# Patient Record
Sex: FEMALE | Race: WHITE | NOT HISPANIC OR LATINO | URBAN - METROPOLITAN AREA
[De-identification: names, ages, dates, MRNs, and addresses within clinical notes are randomized per-mention and may not be internally consistent; named-entity substitution may affect disease eponyms.]

---

## 2016-06-29 RX ORDER — FLUTICASONE PROPIONATE AND SALMETEROL 50; 250 UG/1; UG/1
1 POWDER ORAL; RESPIRATORY (INHALATION)
Qty: 0 | Refills: 0 | COMMUNITY
Start: 2016-06-29

## 2017-01-17 ENCOUNTER — EMERGENCY (EMERGENCY)
Facility: HOSPITAL | Age: 77
LOS: 1 days | Discharge: HOME | End: 2017-01-17
Attending: EMERGENCY MEDICINE | Admitting: EMERGENCY MEDICINE
Payer: MEDICARE

## 2017-01-17 VITALS
OXYGEN SATURATION: 97 % | RESPIRATION RATE: 17 BRPM | SYSTOLIC BLOOD PRESSURE: 104 MMHG | HEART RATE: 76 BPM | DIASTOLIC BLOOD PRESSURE: 69 MMHG | TEMPERATURE: 98 F

## 2017-01-17 VITALS
DIASTOLIC BLOOD PRESSURE: 72 MMHG | RESPIRATION RATE: 16 BRPM | SYSTOLIC BLOOD PRESSURE: 118 MMHG | OXYGEN SATURATION: 99 % | HEART RATE: 73 BPM

## 2017-01-17 DIAGNOSIS — Z93.3 COLOSTOMY STATUS: Chronic | ICD-10-CM

## 2017-01-17 DIAGNOSIS — K63.2 FISTULA OF INTESTINE: ICD-10-CM

## 2017-01-17 DIAGNOSIS — Z88.5 ALLERGY STATUS TO NARCOTIC AGENT: ICD-10-CM

## 2017-01-17 DIAGNOSIS — Z88.8 ALLERGY STATUS TO OTHER DRUGS, MEDICAMENTS AND BIOLOGICAL SUBSTANCES STATUS: ICD-10-CM

## 2017-01-17 DIAGNOSIS — Z88.0 ALLERGY STATUS TO PENICILLIN: ICD-10-CM

## 2017-01-17 DIAGNOSIS — Z88.1 ALLERGY STATUS TO OTHER ANTIBIOTIC AGENTS STATUS: ICD-10-CM

## 2017-01-17 DIAGNOSIS — K63.1 PERFORATION OF INTESTINE (NONTRAUMATIC): Chronic | ICD-10-CM

## 2017-01-17 LAB
ALBUMIN SERPL ELPH-MCNC: 3.7 G/DL — SIGNIFICANT CHANGE UP (ref 3.3–5)
ALP SERPL-CCNC: 151 U/L — HIGH (ref 40–120)
ALT FLD-CCNC: 7 U/L RC — LOW (ref 10–45)
ANION GAP SERPL CALC-SCNC: 13 MMOL/L — SIGNIFICANT CHANGE UP (ref 5–17)
AST SERPL-CCNC: 16 U/L — SIGNIFICANT CHANGE UP (ref 10–40)
BASOPHILS # BLD AUTO: 0 K/UL — SIGNIFICANT CHANGE UP (ref 0–0.2)
BASOPHILS NFR BLD AUTO: 0.6 % — SIGNIFICANT CHANGE UP (ref 0–2)
BILIRUB SERPL-MCNC: 0.3 MG/DL — SIGNIFICANT CHANGE UP (ref 0.2–1.2)
BUN SERPL-MCNC: 20 MG/DL — SIGNIFICANT CHANGE UP (ref 7–23)
CALCIUM SERPL-MCNC: 10 MG/DL — SIGNIFICANT CHANGE UP (ref 8.4–10.5)
CHLORIDE SERPL-SCNC: 102 MMOL/L — SIGNIFICANT CHANGE UP (ref 96–108)
CO2 SERPL-SCNC: 27 MMOL/L — SIGNIFICANT CHANGE UP (ref 22–31)
CREAT SERPL-MCNC: 1.57 MG/DL — HIGH (ref 0.5–1.3)
EOSINOPHIL # BLD AUTO: 0.1 K/UL — SIGNIFICANT CHANGE UP (ref 0–0.5)
EOSINOPHIL NFR BLD AUTO: 1.4 % — SIGNIFICANT CHANGE UP (ref 0–6)
GAS PNL BLDV: SIGNIFICANT CHANGE UP
GLUCOSE SERPL-MCNC: 97 MG/DL — SIGNIFICANT CHANGE UP (ref 70–99)
HCT VFR BLD CALC: 33.8 % — LOW (ref 34.5–45)
HGB BLD-MCNC: 11.6 G/DL — SIGNIFICANT CHANGE UP (ref 11.5–15.5)
LYMPHOCYTES # BLD AUTO: 1.9 K/UL — SIGNIFICANT CHANGE UP (ref 1–3.3)
LYMPHOCYTES # BLD AUTO: 46.4 % — HIGH (ref 13–44)
MAGNESIUM SERPL-MCNC: 1.9 MG/DL — SIGNIFICANT CHANGE UP (ref 1.6–2.6)
MCHC RBC-ENTMCNC: 34 PG — SIGNIFICANT CHANGE UP (ref 27–34)
MCHC RBC-ENTMCNC: 34.3 GM/DL — SIGNIFICANT CHANGE UP (ref 32–36)
MCV RBC AUTO: 99.3 FL — SIGNIFICANT CHANGE UP (ref 80–100)
MONOCYTES # BLD AUTO: 0.4 K/UL — SIGNIFICANT CHANGE UP (ref 0–0.9)
MONOCYTES NFR BLD AUTO: 8.5 % — SIGNIFICANT CHANGE UP (ref 2–14)
NEUTROPHILS # BLD AUTO: 1.8 K/UL — SIGNIFICANT CHANGE UP (ref 1.8–7.4)
NEUTROPHILS NFR BLD AUTO: 43.2 % — SIGNIFICANT CHANGE UP (ref 43–77)
PHOSPHATE SERPL-MCNC: 3.6 MG/DL — SIGNIFICANT CHANGE UP (ref 2.5–4.5)
PLATELET # BLD AUTO: 212 K/UL — SIGNIFICANT CHANGE UP (ref 150–400)
POTASSIUM SERPL-MCNC: 4.8 MMOL/L — SIGNIFICANT CHANGE UP (ref 3.5–5.3)
POTASSIUM SERPL-SCNC: 4.8 MMOL/L — SIGNIFICANT CHANGE UP (ref 3.5–5.3)
PROT SERPL-MCNC: 7 G/DL — SIGNIFICANT CHANGE UP (ref 6–8.3)
RBC # BLD: 3.4 M/UL — LOW (ref 3.8–5.2)
RBC # FLD: 12.9 % — SIGNIFICANT CHANGE UP (ref 10.3–14.5)
SODIUM SERPL-SCNC: 142 MMOL/L — SIGNIFICANT CHANGE UP (ref 135–145)
WBC # BLD: 4.2 K/UL — SIGNIFICANT CHANGE UP (ref 3.8–10.5)
WBC # FLD AUTO: 4.2 K/UL — SIGNIFICANT CHANGE UP (ref 3.8–10.5)

## 2017-01-17 PROCEDURE — 82947 ASSAY GLUCOSE BLOOD QUANT: CPT

## 2017-01-17 PROCEDURE — 85027 COMPLETE CBC AUTOMATED: CPT

## 2017-01-17 PROCEDURE — 83735 ASSAY OF MAGNESIUM: CPT

## 2017-01-17 PROCEDURE — 80053 COMPREHEN METABOLIC PANEL: CPT

## 2017-01-17 PROCEDURE — 82330 ASSAY OF CALCIUM: CPT

## 2017-01-17 PROCEDURE — 83605 ASSAY OF LACTIC ACID: CPT

## 2017-01-17 PROCEDURE — 82435 ASSAY OF BLOOD CHLORIDE: CPT

## 2017-01-17 PROCEDURE — 84295 ASSAY OF SERUM SODIUM: CPT

## 2017-01-17 PROCEDURE — 99283 EMERGENCY DEPT VISIT LOW MDM: CPT

## 2017-01-17 PROCEDURE — 84100 ASSAY OF PHOSPHORUS: CPT

## 2017-01-17 PROCEDURE — 84132 ASSAY OF SERUM POTASSIUM: CPT

## 2017-01-17 PROCEDURE — 99284 EMERGENCY DEPT VISIT MOD MDM: CPT | Mod: GC

## 2017-01-17 PROCEDURE — 85014 HEMATOCRIT: CPT

## 2017-01-17 PROCEDURE — 82803 BLOOD GASES ANY COMBINATION: CPT

## 2017-01-17 NOTE — ED PROVIDER NOTE - MEDICAL DECISION MAKING DETAILS
Sent from NH for new abd fistula. Well appearing. No pain. Evaluate for dehydration, labs, surgical consult.

## 2017-01-17 NOTE — ED ADULT NURSE NOTE - OBJECTIVE STATEMENT
75 yo female with hx diverticulitis, SBO with colostomy, and "abdominal fistulas" brought by EMS from nursing home c/o discomfort around fistula site and high output. Patient also reporting decreased appetite. Patient concerned that she is dehydrated due to low PO intake and high fistula output. At present, patient's abdomen has one midline upper abdominal fistula (?) with yellow output into bag, no redness, swelling, or tenderness to site. Patient also has LLQ colostomy site with an empty bag, site is well appearing and nontender. Respirations are even and unlabored. Patient is moving all extremities. VSS.

## 2017-01-17 NOTE — ED PROVIDER NOTE - ATTENDING CONTRIBUTION TO CARE
75yo with pmhx per chart notable for diverticulitis s/p hartmans, sbo s/p sbr c/b ecf sent from NH for "new fistula" with high output per days. discussed with surgery to come see patient, no abd pain, labs sent, vss. no imaging ordered at this time.

## 2017-01-17 NOTE — ED PROVIDER NOTE - OBJECTIVE STATEMENT
75yo with pmhx per chart notable for diverticulitis s/p hartmans, sbo s/p sbr c/b ecf sent from NH for "new fistula" with high output per days. Pt states she had discomfort around fistula for days but has no pain now. Endorses increased output from fistula of  usual color (light brown). Also with decreased po intake. Denies fever, nausea, vomiting, diarrhea, constipation, sob, cp, rash, new focal numbness weakness, cough, rhinorrhea, change in vision, neck pain,   SURG: Jered

## 2017-01-31 ENCOUNTER — APPOINTMENT (OUTPATIENT)
Dept: TRAUMA SURGERY | Facility: CLINIC | Age: 77
End: 2017-01-31

## 2017-01-31 ENCOUNTER — INPATIENT (INPATIENT)
Facility: HOSPITAL | Age: 77
LOS: 16 days | Discharge: OTHER INSTITUTION | DRG: 394 | End: 2017-02-17
Attending: SURGERY | Admitting: SURGERY
Payer: MEDICARE

## 2017-01-31 VITALS
HEART RATE: 78 BPM | DIASTOLIC BLOOD PRESSURE: 84 MMHG | HEIGHT: 59 IN | TEMPERATURE: 97.5 F | SYSTOLIC BLOOD PRESSURE: 122 MMHG | BODY MASS INDEX: 32.05 KG/M2 | WEIGHT: 159 LBS

## 2017-01-31 VITALS
WEIGHT: 162.92 LBS | HEIGHT: 59 IN | HEART RATE: 85 BPM | DIASTOLIC BLOOD PRESSURE: 72 MMHG | OXYGEN SATURATION: 98 % | RESPIRATION RATE: 16 BRPM | SYSTOLIC BLOOD PRESSURE: 111 MMHG | TEMPERATURE: 98 F

## 2017-01-31 DIAGNOSIS — G62.9 POLYNEUROPATHY, UNSPECIFIED: ICD-10-CM

## 2017-01-31 DIAGNOSIS — D75.82 HEPARIN INDUCED THROMBOCYTOPENIA (HIT): ICD-10-CM

## 2017-01-31 DIAGNOSIS — E03.9 HYPOTHYROIDISM, UNSPECIFIED: ICD-10-CM

## 2017-01-31 DIAGNOSIS — N18.3 CHRONIC KIDNEY DISEASE, STAGE 3 (MODERATE): ICD-10-CM

## 2017-01-31 DIAGNOSIS — K63.2 FISTULA OF INTESTINE: ICD-10-CM

## 2017-01-31 DIAGNOSIS — K63.1 PERFORATION OF INTESTINE (NONTRAUMATIC): Chronic | ICD-10-CM

## 2017-01-31 DIAGNOSIS — Z93.3 COLOSTOMY STATUS: ICD-10-CM

## 2017-01-31 DIAGNOSIS — Z93.3 COLOSTOMY STATUS: Chronic | ICD-10-CM

## 2017-01-31 DIAGNOSIS — R10.9 UNSPECIFIED ABDOMINAL PAIN: ICD-10-CM

## 2017-01-31 DIAGNOSIS — K21.9 GASTRO-ESOPHAGEAL REFLUX DISEASE WITHOUT ESOPHAGITIS: ICD-10-CM

## 2017-01-31 LAB
ALBUMIN SERPL ELPH-MCNC: 4.1 G/DL — SIGNIFICANT CHANGE UP (ref 3.3–5)
ALP SERPL-CCNC: 201 U/L — HIGH (ref 40–120)
ALT FLD-CCNC: 13 U/L RC — SIGNIFICANT CHANGE UP (ref 10–45)
ANION GAP SERPL CALC-SCNC: 17 MMOL/L — SIGNIFICANT CHANGE UP (ref 5–17)
APTT BLD: 30.2 SEC — SIGNIFICANT CHANGE UP (ref 27.5–37.4)
AST SERPL-CCNC: 21 U/L — SIGNIFICANT CHANGE UP (ref 10–40)
BASOPHILS # BLD AUTO: 0 K/UL — SIGNIFICANT CHANGE UP (ref 0–0.2)
BASOPHILS NFR BLD AUTO: 0.5 % — SIGNIFICANT CHANGE UP (ref 0–2)
BILIRUB SERPL-MCNC: 0.4 MG/DL — SIGNIFICANT CHANGE UP (ref 0.2–1.2)
BLD GP AB SCN SERPL QL: NEGATIVE — SIGNIFICANT CHANGE UP
BUN SERPL-MCNC: 23 MG/DL — SIGNIFICANT CHANGE UP (ref 7–23)
CALCIUM SERPL-MCNC: 10.1 MG/DL — SIGNIFICANT CHANGE UP (ref 8.4–10.5)
CHLORIDE SERPL-SCNC: 102 MMOL/L — SIGNIFICANT CHANGE UP (ref 96–108)
CO2 SERPL-SCNC: 21 MMOL/L — LOW (ref 22–31)
CREAT SERPL-MCNC: 1.57 MG/DL — HIGH (ref 0.5–1.3)
EOSINOPHIL # BLD AUTO: 0 K/UL — SIGNIFICANT CHANGE UP (ref 0–0.5)
EOSINOPHIL NFR BLD AUTO: 0.6 % — SIGNIFICANT CHANGE UP (ref 0–6)
GLUCOSE SERPL-MCNC: 94 MG/DL — SIGNIFICANT CHANGE UP (ref 70–99)
HCT VFR BLD CALC: 37.1 % — SIGNIFICANT CHANGE UP (ref 34.5–45)
HGB BLD-MCNC: 12.3 G/DL — SIGNIFICANT CHANGE UP (ref 11.5–15.5)
INR BLD: 1.16 RATIO — SIGNIFICANT CHANGE UP (ref 0.88–1.16)
LYMPHOCYTES # BLD AUTO: 2.1 K/UL — SIGNIFICANT CHANGE UP (ref 1–3.3)
LYMPHOCYTES # BLD AUTO: 37.7 % — SIGNIFICANT CHANGE UP (ref 13–44)
MCHC RBC-ENTMCNC: 32.5 PG — SIGNIFICANT CHANGE UP (ref 27–34)
MCHC RBC-ENTMCNC: 33.1 GM/DL — SIGNIFICANT CHANGE UP (ref 32–36)
MCV RBC AUTO: 98.1 FL — SIGNIFICANT CHANGE UP (ref 80–100)
MONOCYTES # BLD AUTO: 0.4 K/UL — SIGNIFICANT CHANGE UP (ref 0–0.9)
MONOCYTES NFR BLD AUTO: 7.9 % — SIGNIFICANT CHANGE UP (ref 2–14)
NEUTROPHILS # BLD AUTO: 3 K/UL — SIGNIFICANT CHANGE UP (ref 1.8–7.4)
NEUTROPHILS NFR BLD AUTO: 53.2 % — SIGNIFICANT CHANGE UP (ref 43–77)
PLATELET # BLD AUTO: 196 K/UL — SIGNIFICANT CHANGE UP (ref 150–400)
POTASSIUM SERPL-MCNC: 4.5 MMOL/L — SIGNIFICANT CHANGE UP (ref 3.5–5.3)
POTASSIUM SERPL-SCNC: 4.5 MMOL/L — SIGNIFICANT CHANGE UP (ref 3.5–5.3)
PROT SERPL-MCNC: 7.3 G/DL — SIGNIFICANT CHANGE UP (ref 6–8.3)
PROTHROM AB SERPL-ACNC: 12.5 SEC — SIGNIFICANT CHANGE UP (ref 10–13.1)
RBC # BLD: 3.78 M/UL — LOW (ref 3.8–5.2)
RBC # FLD: 13.2 % — SIGNIFICANT CHANGE UP (ref 10.3–14.5)
RH IG SCN BLD-IMP: POSITIVE — SIGNIFICANT CHANGE UP
SODIUM SERPL-SCNC: 140 MMOL/L — SIGNIFICANT CHANGE UP (ref 135–145)
WBC # BLD: 5.6 K/UL — SIGNIFICANT CHANGE UP (ref 3.8–10.5)
WBC # FLD AUTO: 5.6 K/UL — SIGNIFICANT CHANGE UP (ref 3.8–10.5)

## 2017-01-31 PROCEDURE — 99285 EMERGENCY DEPT VISIT HI MDM: CPT | Mod: GC

## 2017-01-31 PROCEDURE — 74176 CT ABD & PELVIS W/O CONTRAST: CPT | Mod: 26

## 2017-01-31 RX ORDER — PANTOPRAZOLE SODIUM 20 MG/1
40 TABLET, DELAYED RELEASE ORAL
Qty: 0 | Refills: 0 | Status: DISCONTINUED | OUTPATIENT
Start: 2017-01-31 | End: 2017-02-17

## 2017-01-31 RX ORDER — SODIUM CHLORIDE 9 MG/ML
1000 INJECTION, SOLUTION INTRAVENOUS
Qty: 0 | Refills: 0 | Status: DISCONTINUED | OUTPATIENT
Start: 2017-01-31 | End: 2017-01-31

## 2017-01-31 RX ORDER — LEVOTHYROXINE SODIUM 125 MCG
88 TABLET ORAL DAILY
Qty: 0 | Refills: 0 | Status: DISCONTINUED | OUTPATIENT
Start: 2017-01-31 | End: 2017-02-17

## 2017-01-31 RX ORDER — GABAPENTIN 400 MG/1
300 CAPSULE ORAL THREE TIMES A DAY
Qty: 0 | Refills: 0 | Status: DISCONTINUED | OUTPATIENT
Start: 2017-01-31 | End: 2017-02-17

## 2017-01-31 RX ORDER — SODIUM CHLORIDE 9 MG/ML
1000 INJECTION INTRAMUSCULAR; INTRAVENOUS; SUBCUTANEOUS
Qty: 0 | Refills: 0 | Status: DISCONTINUED | OUTPATIENT
Start: 2017-01-31 | End: 2017-02-03

## 2017-01-31 RX ORDER — SODIUM CHLORIDE 9 MG/ML
1000 INJECTION INTRAMUSCULAR; INTRAVENOUS; SUBCUTANEOUS ONCE
Qty: 0 | Refills: 0 | Status: COMPLETED | OUTPATIENT
Start: 2017-01-31 | End: 2017-01-31

## 2017-01-31 RX ORDER — ONDANSETRON 8 MG/1
4 TABLET, FILM COATED ORAL EVERY 6 HOURS
Qty: 0 | Refills: 0 | Status: DISCONTINUED | OUTPATIENT
Start: 2017-01-31 | End: 2017-02-17

## 2017-01-31 RX ORDER — TIOTROPIUM BROMIDE 18 UG/1
1 CAPSULE ORAL; RESPIRATORY (INHALATION) DAILY
Qty: 0 | Refills: 0 | Status: DISCONTINUED | OUTPATIENT
Start: 2017-01-31 | End: 2017-02-17

## 2017-01-31 RX ORDER — FLUTICASONE PROPIONATE AND SALMETEROL 50; 250 UG/1; UG/1
1 POWDER ORAL; RESPIRATORY (INHALATION)
Qty: 0 | Refills: 0 | Status: DISCONTINUED | OUTPATIENT
Start: 2017-01-31 | End: 2017-02-17

## 2017-01-31 RX ADMIN — SODIUM CHLORIDE 50 MILLILITER(S): 9 INJECTION INTRAMUSCULAR; INTRAVENOUS; SUBCUTANEOUS at 22:09

## 2017-01-31 RX ADMIN — SODIUM CHLORIDE 2000 MILLILITER(S): 9 INJECTION INTRAMUSCULAR; INTRAVENOUS; SUBCUTANEOUS at 15:22

## 2017-01-31 RX ADMIN — ONDANSETRON 4 MILLIGRAM(S): 8 TABLET, FILM COATED ORAL at 23:50

## 2017-01-31 RX ADMIN — GABAPENTIN 300 MILLIGRAM(S): 400 CAPSULE ORAL at 22:08

## 2017-01-31 NOTE — H&P ADULT. - HISTORY OF PRESENT ILLNESS
76F w/ hx of diverticulitis s/p Ruben's, multiple SBOs s/p small bowel resection (60cm removed in July 2015), enterocutaneous fistula s/p takedown, presents complaining of high output from her multiple abdominal fistulae as well as her end colostomy. She reports that this started a couple of weeks ago. She describes eating small-moderate amounts of food, feeling nauseated and fatigued, falling asleep and then waking up with high output (from both fistulae and ostomy) that required emptying the bags multiple times. The output turned from thicker contents to "more watery than diarrhea." She also reports a new (4th) abdominal fistula that appeared within the last week and a half.

## 2017-01-31 NOTE — H&P ADULT. - ASSESSMENT
77 y/o woman s/p Ruben's complicated by wound dehiscence and the formation of multiple fistulae s/p takedown of the fistula with a small bowel resection complaining of high output from fistulae and colostomy sites

## 2017-01-31 NOTE — ED PROVIDER NOTE - MEDICAL DECISION MAKING DETAILS
77 y/o F w/ CHF, COPD, mult abdom surgeries w/ EC fistula and ostomy, p/w increased ostomy output and abdom pain. Labs, fluids, admission. 75 y/o F w/ CHF, COPD, mult abdom surgeries w/ EC fistula and ostomy, p/w increased ostomy output and abdom pain. Labs, fluids, admission.    Attending MD Nick: 75 yo female with multiple abd surgeries and ileostomy and now enterocutaneous fistula presents for admission for increased ostomy output and abd pain.  Exam (MD Sandoval): abd soft with mild diffuse TTP.  No rebound or guarding. Plan: labs, IVF, admit to surgery for further management.

## 2017-01-31 NOTE — ED PROVIDER NOTE - PHYSICAL EXAMINATION
Gen: NAD  Eyes: PERRL, EOMI. sclerae white, no icterus  ENT: Dry mucous membranes. No exudates  Neck: supple, no LAD, mass or goiter, trachea midline  CV: RRR. Audible S1 and S2. No murmurs, rubs, gallops, S3, nor S4  Pulm: Clear to auscultation bilaterally. No wheezes, rales, or rhonchi  Abd: BS+, nondistended, ostomy in place w/ liquid stool  Musculoskeletal:  No edema  Skin: no lesions or scars noted  Psych: mood good, affect full range and congruent with mood.  Neurologic: AAOx3

## 2017-01-31 NOTE — ED ADULT NURSE NOTE - OBJECTIVE STATEMENT
77 y/o with a hx of Diverticulitis presents to the ED c/o of increased output from enterocutaneous fistula and excess fluids coming from her colostomy.  Patient states that her surgeon drove her to the hospital and the patient has been admitted to surgery.  Patient states she has 4 fistulas on the abdomen and one of them is new, rosio she is unsure how new the fistula is.  Patient states that when she would eat she would become extremely nauseous and tired and she has been feeling this way for 3-4 weeks.  Patient states that when she empties her colostomy bag she would get a rush of fluids would come out. Patient is A&Ox4. Face is symmetrical.  Patient safety and comfort measures provided.

## 2017-01-31 NOTE — ED PROVIDER NOTE - OBJECTIVE STATEMENT
77 y/o F w/ hx of diastolic HF, multiple abdom surgeries w/ enterocutaneous fistuala and ostomy, p/w increased output from ostomy and mid abdom pain, to be admitted to Dr. sheridan for surgery. No fever or chills.

## 2017-01-31 NOTE — H&P ADULT. - PROBLEM SELECTOR PLAN 1
-CT Abdomen and Pelvis with PO contrast only  -IR consult for fistulogram, possible plug  -if total fluid losses >1L or decreased urine output, will start replacement fluid

## 2017-01-31 NOTE — ED ADULT NURSE REASSESSMENT NOTE - NS ED NURSE REASSESS COMMENT FT1
pt. resting comfortably in stretcher, no complaints of abdominal pain or discomfort. bed assigned on 9 monti, report given and pt pending transport. VS stable. safety and fall precautions maintained.

## 2017-01-31 NOTE — H&P ADULT. - GASTROINTESTINAL COMMENTS
Obese abdomen with left colostomy and large dressing over multiple fistulae on the mid-abdomen, both bags containing thin enteric contents

## 2017-01-31 NOTE — ED ADULT NURSE NOTE - PMH
Chronic diastolic CHF (congestive heart failure)    Chronic obstructive pulmonary disease (COPD)    Clostridium difficile infection    Colostomy in place  s/p duarte procedure for diverticulitis  Diverticulitis of intestine with abscess without bleeding  2013  DVT (deep venous thrombosis)  s/p IVC filter, which was later removed  Enterocutaneous fistula    GERD (gastroesophageal reflux disease)    HIT (heparin-induced thrombocytopenia)    Hypothyroid    Orthostatic hypotension    Osteoarthritis of both knees, unspecified osteoarthritis type    Peripheral neuropathy

## 2017-01-31 NOTE — ED PROVIDER NOTE - ATTENDING CONTRIBUTION TO CARE
Attending MD Nick:  I personally have seen and examined this patient.  Resident note reviewed and agree on plan of care and except where noted.  See MDM for details.

## 2017-01-31 NOTE — ED ADULT NURSE NOTE - PSH
Intestinal perforation  8/2015, s/p closure with strattice mesh  S/P exploratory laparotomy  EC fistula complicated with pelvic abscesses  Sigmoid diverticulitis  Ex lap, sigmoid resection, creation of colostomy.  Status post Ruben procedure  for diverticulitis  Wound dehiscence  Wound dehiscence and evisceration, s/p abdominal reconstruction with biologic mesh

## 2017-02-01 LAB
ANION GAP SERPL CALC-SCNC: 12 MMOL/L — SIGNIFICANT CHANGE UP (ref 5–17)
APTT BLD: 30.8 SEC — SIGNIFICANT CHANGE UP (ref 27.5–37.4)
BUN SERPL-MCNC: 25 MG/DL — HIGH (ref 7–23)
CALCIUM SERPL-MCNC: 9 MG/DL — SIGNIFICANT CHANGE UP (ref 8.4–10.5)
CHLORIDE SERPL-SCNC: 107 MMOL/L — SIGNIFICANT CHANGE UP (ref 96–108)
CO2 SERPL-SCNC: 24 MMOL/L — SIGNIFICANT CHANGE UP (ref 22–31)
CREAT SERPL-MCNC: 1.53 MG/DL — HIGH (ref 0.5–1.3)
GLUCOSE SERPL-MCNC: 105 MG/DL — HIGH (ref 70–99)
HCT VFR BLD CALC: 33.2 % — LOW (ref 34.5–45)
HGB BLD-MCNC: 11.1 G/DL — LOW (ref 11.5–15.5)
INR BLD: 1.15 RATIO — SIGNIFICANT CHANGE UP (ref 0.88–1.16)
MAGNESIUM SERPL-MCNC: 1.7 MG/DL — SIGNIFICANT CHANGE UP (ref 1.6–2.6)
MCHC RBC-ENTMCNC: 33.1 PG — SIGNIFICANT CHANGE UP (ref 27–34)
MCHC RBC-ENTMCNC: 33.4 GM/DL — SIGNIFICANT CHANGE UP (ref 32–36)
MCV RBC AUTO: 99.3 FL — SIGNIFICANT CHANGE UP (ref 80–100)
PHOSPHATE SERPL-MCNC: 3.5 MG/DL — SIGNIFICANT CHANGE UP (ref 2.5–4.5)
PLATELET # BLD AUTO: 184 K/UL — SIGNIFICANT CHANGE UP (ref 150–400)
POTASSIUM SERPL-MCNC: 4.7 MMOL/L — SIGNIFICANT CHANGE UP (ref 3.5–5.3)
POTASSIUM SERPL-SCNC: 4.7 MMOL/L — SIGNIFICANT CHANGE UP (ref 3.5–5.3)
PROTHROM AB SERPL-ACNC: 12.4 SEC — SIGNIFICANT CHANGE UP (ref 10–13.1)
RBC # BLD: 3.35 M/UL — LOW (ref 3.8–5.2)
RBC # FLD: 13.2 % — SIGNIFICANT CHANGE UP (ref 10.3–14.5)
SODIUM SERPL-SCNC: 143 MMOL/L — SIGNIFICANT CHANGE UP (ref 135–145)
WBC # BLD: 5.9 K/UL — SIGNIFICANT CHANGE UP (ref 3.8–10.5)
WBC # FLD AUTO: 5.9 K/UL — SIGNIFICANT CHANGE UP (ref 3.8–10.5)

## 2017-02-01 PROCEDURE — 99233 SBSQ HOSP IP/OBS HIGH 50: CPT

## 2017-02-01 RX ORDER — MAGNESIUM SULFATE 500 MG/ML
2 VIAL (ML) INJECTION
Qty: 0 | Refills: 0 | Status: COMPLETED | OUTPATIENT
Start: 2017-02-01 | End: 2017-02-01

## 2017-02-01 RX ORDER — ACETAMINOPHEN 500 MG
650 TABLET ORAL EVERY 6 HOURS
Qty: 0 | Refills: 0 | Status: DISCONTINUED | OUTPATIENT
Start: 2017-02-01 | End: 2017-02-01

## 2017-02-01 RX ORDER — ACETAMINOPHEN 500 MG
650 TABLET ORAL EVERY 6 HOURS
Qty: 0 | Refills: 0 | Status: DISCONTINUED | OUTPATIENT
Start: 2017-02-01 | End: 2017-02-02

## 2017-02-01 RX ADMIN — FLUTICASONE PROPIONATE AND SALMETEROL 1 DOSE(S): 50; 250 POWDER ORAL; RESPIRATORY (INHALATION) at 05:21

## 2017-02-01 RX ADMIN — Medication 650 MILLIGRAM(S): at 12:43

## 2017-02-01 RX ADMIN — Medication 88 MICROGRAM(S): at 05:21

## 2017-02-01 RX ADMIN — Medication 1 TABLET(S): at 12:04

## 2017-02-01 RX ADMIN — Medication 650 MILLIGRAM(S): at 22:02

## 2017-02-01 RX ADMIN — FLUTICASONE PROPIONATE AND SALMETEROL 1 DOSE(S): 50; 250 POWDER ORAL; RESPIRATORY (INHALATION) at 17:22

## 2017-02-01 RX ADMIN — Medication 650 MILLIGRAM(S): at 13:13

## 2017-02-01 RX ADMIN — Medication 650 MILLIGRAM(S): at 06:11

## 2017-02-01 RX ADMIN — GABAPENTIN 300 MILLIGRAM(S): 400 CAPSULE ORAL at 16:13

## 2017-02-01 RX ADMIN — Medication 50 GRAM(S): at 11:41

## 2017-02-01 RX ADMIN — Medication 50 GRAM(S): at 12:41

## 2017-02-01 RX ADMIN — GABAPENTIN 300 MILLIGRAM(S): 400 CAPSULE ORAL at 21:00

## 2017-02-01 RX ADMIN — Medication 650 MILLIGRAM(S): at 05:41

## 2017-02-01 RX ADMIN — TIOTROPIUM BROMIDE 1 CAPSULE(S): 18 CAPSULE ORAL; RESPIRATORY (INHALATION) at 12:41

## 2017-02-01 RX ADMIN — Medication 1 TABLET(S): at 12:41

## 2017-02-01 RX ADMIN — Medication 650 MILLIGRAM(S): at 21:32

## 2017-02-01 RX ADMIN — PANTOPRAZOLE SODIUM 40 MILLIGRAM(S): 20 TABLET, DELAYED RELEASE ORAL at 05:22

## 2017-02-01 RX ADMIN — GABAPENTIN 300 MILLIGRAM(S): 400 CAPSULE ORAL at 05:21

## 2017-02-02 LAB
ANION GAP SERPL CALC-SCNC: 12 MMOL/L — SIGNIFICANT CHANGE UP (ref 5–17)
BUN SERPL-MCNC: 23 MG/DL — SIGNIFICANT CHANGE UP (ref 7–23)
CALCIUM SERPL-MCNC: 8.7 MG/DL — SIGNIFICANT CHANGE UP (ref 8.4–10.5)
CHLORIDE SERPL-SCNC: 108 MMOL/L — SIGNIFICANT CHANGE UP (ref 96–108)
CO2 SERPL-SCNC: 23 MMOL/L — SIGNIFICANT CHANGE UP (ref 22–31)
CREAT SERPL-MCNC: 1.49 MG/DL — HIGH (ref 0.5–1.3)
GLUCOSE SERPL-MCNC: 119 MG/DL — HIGH (ref 70–99)
HCT VFR BLD CALC: 30.2 % — LOW (ref 34.5–45)
HGB BLD-MCNC: 9.8 G/DL — LOW (ref 11.5–15.5)
MAGNESIUM SERPL-MCNC: 2.5 MG/DL — SIGNIFICANT CHANGE UP (ref 1.6–2.6)
MCHC RBC-ENTMCNC: 32.4 GM/DL — SIGNIFICANT CHANGE UP (ref 32–36)
MCHC RBC-ENTMCNC: 32.4 PG — SIGNIFICANT CHANGE UP (ref 27–34)
MCV RBC AUTO: 100 FL — SIGNIFICANT CHANGE UP (ref 80–100)
PHOSPHATE SERPL-MCNC: 3.3 MG/DL — SIGNIFICANT CHANGE UP (ref 2.5–4.5)
PLATELET # BLD AUTO: 156 K/UL — SIGNIFICANT CHANGE UP (ref 150–400)
POTASSIUM SERPL-MCNC: 4.3 MMOL/L — SIGNIFICANT CHANGE UP (ref 3.5–5.3)
POTASSIUM SERPL-SCNC: 4.3 MMOL/L — SIGNIFICANT CHANGE UP (ref 3.5–5.3)
RBC # BLD: 3.02 M/UL — LOW (ref 3.8–5.2)
RBC # FLD: 12.8 % — SIGNIFICANT CHANGE UP (ref 10.3–14.5)
SODIUM SERPL-SCNC: 143 MMOL/L — SIGNIFICANT CHANGE UP (ref 135–145)
SRA INTERP SER-IMP: SIGNIFICANT CHANGE UP
WBC # BLD: 3.9 K/UL — SIGNIFICANT CHANGE UP (ref 3.8–10.5)
WBC # FLD AUTO: 3.9 K/UL — SIGNIFICANT CHANGE UP (ref 3.8–10.5)

## 2017-02-02 PROCEDURE — 74000: CPT | Mod: 26

## 2017-02-02 PROCEDURE — 99233 SBSQ HOSP IP/OBS HIGH 50: CPT

## 2017-02-02 RX ORDER — ACETAMINOPHEN 500 MG
650 TABLET ORAL EVERY 6 HOURS
Qty: 0 | Refills: 0 | Status: DISCONTINUED | OUTPATIENT
Start: 2017-02-03 | End: 2017-02-03

## 2017-02-02 RX ORDER — ACETAMINOPHEN 500 MG
1000 TABLET ORAL ONCE
Qty: 0 | Refills: 0 | Status: COMPLETED | OUTPATIENT
Start: 2017-02-03 | End: 2017-02-03

## 2017-02-02 RX ADMIN — GABAPENTIN 300 MILLIGRAM(S): 400 CAPSULE ORAL at 22:41

## 2017-02-02 RX ADMIN — Medication 88 MICROGRAM(S): at 05:55

## 2017-02-02 RX ADMIN — Medication 1 TABLET(S): at 14:00

## 2017-02-02 RX ADMIN — TIOTROPIUM BROMIDE 1 CAPSULE(S): 18 CAPSULE ORAL; RESPIRATORY (INHALATION) at 14:00

## 2017-02-02 RX ADMIN — GABAPENTIN 300 MILLIGRAM(S): 400 CAPSULE ORAL at 05:55

## 2017-02-02 RX ADMIN — PANTOPRAZOLE SODIUM 40 MILLIGRAM(S): 20 TABLET, DELAYED RELEASE ORAL at 05:55

## 2017-02-02 RX ADMIN — GABAPENTIN 300 MILLIGRAM(S): 400 CAPSULE ORAL at 14:01

## 2017-02-02 RX ADMIN — Medication 650 MILLIGRAM(S): at 17:51

## 2017-02-02 RX ADMIN — FLUTICASONE PROPIONATE AND SALMETEROL 1 DOSE(S): 50; 250 POWDER ORAL; RESPIRATORY (INHALATION) at 17:51

## 2017-02-02 RX ADMIN — FLUTICASONE PROPIONATE AND SALMETEROL 1 DOSE(S): 50; 250 POWDER ORAL; RESPIRATORY (INHALATION) at 05:55

## 2017-02-02 NOTE — PHYSICAL THERAPY INITIAL EVALUATION ADULT - IMPAIRMENTS CONTRIBUTING TO GAIT DEVIATIONS, PT EVAL
decreased strength/endurance decreased. Pt failrly steady wihout AD. Pt required one standing rest break while ambulating secondary to c/o fatigue. decreased strength/endurance decreased. Pt fairly steady wihout AD. Pt required one standing rest break while ambulating secondary to c/o fatigue.

## 2017-02-02 NOTE — PHYSICAL THERAPY INITIAL EVALUATION ADULT - LIVES WITH, PROFILE
Pt resides at an SNF. Pt reports she was there for rehab prior, however, now is a resident there due to ongoing ostomy issues

## 2017-02-02 NOTE — PHYSICAL THERAPY INITIAL EVALUATION ADULT - PERTINENT HX OF CURRENT PROBLEM, REHAB EVAL
Pt is a 76 y.o. female w/ hx of diverticulitis s/p Ruben's, multiple SBOs s/p small bowel resection (60cm removed in July 2015), enterocutaneous fistula s/p takedown, presents complaining of high output from her multiple abdominal fistulae as well as her end colostomy. She describes eating small-moderate amounts of food, feeling nauseated and fatigued, falling asleep and then waking up with high output (from both fistulae and ostomy) that required emptying the bags multiple times.

## 2017-02-02 NOTE — PHYSICAL THERAPY INITIAL EVALUATION ADULT - ADDITIONAL COMMENTS
+CT abdomen and pelvis 1/31/17: Status post Dunham's procedure with a left lower quadrant colostomy. No   bowel obstruction. Large left parastomal hernia containing nonobstructed loops of small bowel. Suggestion of enterocutaneous fistula at the anterior abdomen. Correlate clinically.

## 2017-02-03 LAB
ANION GAP SERPL CALC-SCNC: 8 MMOL/L — SIGNIFICANT CHANGE UP (ref 5–17)
BUN SERPL-MCNC: 23 MG/DL — SIGNIFICANT CHANGE UP (ref 7–23)
CALCIUM SERPL-MCNC: 9.1 MG/DL — SIGNIFICANT CHANGE UP (ref 8.4–10.5)
CHLORIDE SERPL-SCNC: 110 MMOL/L — HIGH (ref 96–108)
CO2 SERPL-SCNC: 24 MMOL/L — SIGNIFICANT CHANGE UP (ref 22–31)
CREAT SERPL-MCNC: 1.37 MG/DL — HIGH (ref 0.5–1.3)
GLUCOSE SERPL-MCNC: 109 MG/DL — HIGH (ref 70–99)
MAGNESIUM SERPL-MCNC: 2.1 MG/DL — SIGNIFICANT CHANGE UP (ref 1.6–2.6)
PHOSPHATE SERPL-MCNC: 3.2 MG/DL — SIGNIFICANT CHANGE UP (ref 2.5–4.5)
POTASSIUM SERPL-MCNC: 4.3 MMOL/L — SIGNIFICANT CHANGE UP (ref 3.5–5.3)
POTASSIUM SERPL-SCNC: 4.3 MMOL/L — SIGNIFICANT CHANGE UP (ref 3.5–5.3)
SODIUM SERPL-SCNC: 142 MMOL/L — SIGNIFICANT CHANGE UP (ref 135–145)

## 2017-02-03 PROCEDURE — 74250 X-RAY XM SM INT 1CNTRST STD: CPT | Mod: 26,59

## 2017-02-03 PROCEDURE — 74000: CPT | Mod: 26

## 2017-02-03 PROCEDURE — 99232 SBSQ HOSP IP/OBS MODERATE 35: CPT

## 2017-02-03 RX ORDER — SODIUM CHLORIDE 9 MG/ML
1000 INJECTION INTRAMUSCULAR; INTRAVENOUS; SUBCUTANEOUS
Qty: 0 | Refills: 0 | Status: DISCONTINUED | OUTPATIENT
Start: 2017-02-03 | End: 2017-02-04

## 2017-02-03 RX ORDER — ACETAMINOPHEN 500 MG
650 TABLET ORAL EVERY 6 HOURS
Qty: 0 | Refills: 0 | Status: DISCONTINUED | OUTPATIENT
Start: 2017-02-04 | End: 2017-02-04

## 2017-02-03 RX ORDER — ACETAMINOPHEN 500 MG
1000 TABLET ORAL ONCE
Qty: 0 | Refills: 0 | Status: COMPLETED | OUTPATIENT
Start: 2017-02-03 | End: 2017-02-03

## 2017-02-03 RX ADMIN — PANTOPRAZOLE SODIUM 40 MILLIGRAM(S): 20 TABLET, DELAYED RELEASE ORAL at 07:49

## 2017-02-03 RX ADMIN — FLUTICASONE PROPIONATE AND SALMETEROL 1 DOSE(S): 50; 250 POWDER ORAL; RESPIRATORY (INHALATION) at 05:44

## 2017-02-03 RX ADMIN — Medication 400 MILLIGRAM(S): at 23:38

## 2017-02-03 RX ADMIN — Medication 1 TABLET(S): at 15:31

## 2017-02-03 RX ADMIN — Medication 400 MILLIGRAM(S): at 00:08

## 2017-02-03 RX ADMIN — GABAPENTIN 300 MILLIGRAM(S): 400 CAPSULE ORAL at 05:45

## 2017-02-03 RX ADMIN — GABAPENTIN 300 MILLIGRAM(S): 400 CAPSULE ORAL at 15:33

## 2017-02-03 RX ADMIN — Medication 1 TABLET(S): at 15:32

## 2017-02-03 RX ADMIN — Medication 88 MICROGRAM(S): at 05:45

## 2017-02-03 RX ADMIN — SODIUM CHLORIDE 50 MILLILITER(S): 9 INJECTION INTRAMUSCULAR; INTRAVENOUS; SUBCUTANEOUS at 07:48

## 2017-02-03 RX ADMIN — Medication 1000 MILLIGRAM(S): at 00:40

## 2017-02-03 RX ADMIN — TIOTROPIUM BROMIDE 1 CAPSULE(S): 18 CAPSULE ORAL; RESPIRATORY (INHALATION) at 15:33

## 2017-02-03 RX ADMIN — SODIUM CHLORIDE 100 MILLILITER(S): 9 INJECTION INTRAMUSCULAR; INTRAVENOUS; SUBCUTANEOUS at 15:35

## 2017-02-03 RX ADMIN — FLUTICASONE PROPIONATE AND SALMETEROL 1 DOSE(S): 50; 250 POWDER ORAL; RESPIRATORY (INHALATION) at 21:58

## 2017-02-03 RX ADMIN — FLUTICASONE PROPIONATE AND SALMETEROL 1 DOSE(S): 50; 250 POWDER ORAL; RESPIRATORY (INHALATION) at 15:33

## 2017-02-03 RX ADMIN — GABAPENTIN 300 MILLIGRAM(S): 400 CAPSULE ORAL at 21:58

## 2017-02-04 LAB
ANION GAP SERPL CALC-SCNC: 12 MMOL/L — SIGNIFICANT CHANGE UP (ref 5–17)
BUN SERPL-MCNC: 23 MG/DL — SIGNIFICANT CHANGE UP (ref 7–23)
CALCIUM SERPL-MCNC: 8.7 MG/DL — SIGNIFICANT CHANGE UP (ref 8.4–10.5)
CHLORIDE SERPL-SCNC: 112 MMOL/L — HIGH (ref 96–108)
CO2 SERPL-SCNC: 20 MMOL/L — LOW (ref 22–31)
CREAT SERPL-MCNC: 1.36 MG/DL — HIGH (ref 0.5–1.3)
GLUCOSE SERPL-MCNC: 91 MG/DL — SIGNIFICANT CHANGE UP (ref 70–99)
HCT VFR BLD CALC: 31.2 % — LOW (ref 34.5–45)
HGB BLD-MCNC: 9.8 G/DL — LOW (ref 11.5–15.5)
MAGNESIUM SERPL-MCNC: 2.2 MG/DL — SIGNIFICANT CHANGE UP (ref 1.6–2.6)
MCHC RBC-ENTMCNC: 31.5 GM/DL — LOW (ref 32–36)
MCHC RBC-ENTMCNC: 31.7 PG — SIGNIFICANT CHANGE UP (ref 27–34)
MCV RBC AUTO: 101 FL — HIGH (ref 80–100)
PHOSPHATE SERPL-MCNC: 3.2 MG/DL — SIGNIFICANT CHANGE UP (ref 2.5–4.5)
PLATELET # BLD AUTO: 132 K/UL — LOW (ref 150–400)
POTASSIUM SERPL-MCNC: 4.7 MMOL/L — SIGNIFICANT CHANGE UP (ref 3.5–5.3)
POTASSIUM SERPL-SCNC: 4.7 MMOL/L — SIGNIFICANT CHANGE UP (ref 3.5–5.3)
RBC # BLD: 3.1 M/UL — LOW (ref 3.8–5.2)
RBC # FLD: 13.1 % — SIGNIFICANT CHANGE UP (ref 10.3–14.5)
SODIUM SERPL-SCNC: 144 MMOL/L — SIGNIFICANT CHANGE UP (ref 135–145)
WBC # BLD: 3.8 K/UL — SIGNIFICANT CHANGE UP (ref 3.8–10.5)
WBC # FLD AUTO: 3.8 K/UL — SIGNIFICANT CHANGE UP (ref 3.8–10.5)

## 2017-02-04 PROCEDURE — 99232 SBSQ HOSP IP/OBS MODERATE 35: CPT

## 2017-02-04 RX ORDER — SODIUM CHLORIDE 9 MG/ML
1000 INJECTION INTRAMUSCULAR; INTRAVENOUS; SUBCUTANEOUS
Qty: 0 | Refills: 0 | Status: DISCONTINUED | OUTPATIENT
Start: 2017-02-04 | End: 2017-02-17

## 2017-02-04 RX ORDER — ACETAMINOPHEN 500 MG
650 TABLET ORAL EVERY 6 HOURS
Qty: 0 | Refills: 0 | Status: DISCONTINUED | OUTPATIENT
Start: 2017-02-05 | End: 2017-02-05

## 2017-02-04 RX ORDER — ACETAMINOPHEN 500 MG
1000 TABLET ORAL ONCE
Qty: 0 | Refills: 0 | Status: COMPLETED | OUTPATIENT
Start: 2017-02-04 | End: 2017-02-04

## 2017-02-04 RX ADMIN — PANTOPRAZOLE SODIUM 40 MILLIGRAM(S): 20 TABLET, DELAYED RELEASE ORAL at 06:04

## 2017-02-04 RX ADMIN — Medication 1 TABLET(S): at 13:01

## 2017-02-04 RX ADMIN — SODIUM CHLORIDE 50 MILLILITER(S): 9 INJECTION INTRAMUSCULAR; INTRAVENOUS; SUBCUTANEOUS at 22:12

## 2017-02-04 RX ADMIN — GABAPENTIN 300 MILLIGRAM(S): 400 CAPSULE ORAL at 22:08

## 2017-02-04 RX ADMIN — Medication 1000 MILLIGRAM(S): at 22:38

## 2017-02-04 RX ADMIN — GABAPENTIN 300 MILLIGRAM(S): 400 CAPSULE ORAL at 06:04

## 2017-02-04 RX ADMIN — Medication 1000 MILLIGRAM(S): at 00:08

## 2017-02-04 RX ADMIN — Medication 88 MICROGRAM(S): at 06:04

## 2017-02-04 RX ADMIN — TIOTROPIUM BROMIDE 1 CAPSULE(S): 18 CAPSULE ORAL; RESPIRATORY (INHALATION) at 13:01

## 2017-02-04 RX ADMIN — GABAPENTIN 300 MILLIGRAM(S): 400 CAPSULE ORAL at 13:01

## 2017-02-04 RX ADMIN — Medication 400 MILLIGRAM(S): at 22:08

## 2017-02-04 RX ADMIN — FLUTICASONE PROPIONATE AND SALMETEROL 1 DOSE(S): 50; 250 POWDER ORAL; RESPIRATORY (INHALATION) at 06:04

## 2017-02-04 RX ADMIN — FLUTICASONE PROPIONATE AND SALMETEROL 1 DOSE(S): 50; 250 POWDER ORAL; RESPIRATORY (INHALATION) at 18:48

## 2017-02-04 RX ADMIN — SODIUM CHLORIDE 100 MILLILITER(S): 9 INJECTION INTRAMUSCULAR; INTRAVENOUS; SUBCUTANEOUS at 06:02

## 2017-02-05 LAB
ANION GAP SERPL CALC-SCNC: 12 MMOL/L — SIGNIFICANT CHANGE UP (ref 5–17)
BUN SERPL-MCNC: 17 MG/DL — SIGNIFICANT CHANGE UP (ref 7–23)
CALCIUM SERPL-MCNC: 9.3 MG/DL — SIGNIFICANT CHANGE UP (ref 8.4–10.5)
CHLORIDE SERPL-SCNC: 110 MMOL/L — HIGH (ref 96–108)
CO2 SERPL-SCNC: 22 MMOL/L — SIGNIFICANT CHANGE UP (ref 22–31)
CREAT SERPL-MCNC: 1.22 MG/DL — SIGNIFICANT CHANGE UP (ref 0.5–1.3)
GLUCOSE SERPL-MCNC: 92 MG/DL — SIGNIFICANT CHANGE UP (ref 70–99)
MAGNESIUM SERPL-MCNC: 1.5 MG/DL — LOW (ref 1.6–2.6)
PHOSPHATE SERPL-MCNC: 3.1 MG/DL — SIGNIFICANT CHANGE UP (ref 2.5–4.5)
POTASSIUM SERPL-MCNC: 4.4 MMOL/L — SIGNIFICANT CHANGE UP (ref 3.5–5.3)
POTASSIUM SERPL-SCNC: 4.4 MMOL/L — SIGNIFICANT CHANGE UP (ref 3.5–5.3)
SODIUM SERPL-SCNC: 144 MMOL/L — SIGNIFICANT CHANGE UP (ref 135–145)

## 2017-02-05 PROCEDURE — 99232 SBSQ HOSP IP/OBS MODERATE 35: CPT

## 2017-02-05 RX ORDER — MAGNESIUM SULFATE 500 MG/ML
2 VIAL (ML) INJECTION
Qty: 0 | Refills: 0 | Status: COMPLETED | OUTPATIENT
Start: 2017-02-05 | End: 2017-02-05

## 2017-02-05 RX ORDER — ACETAMINOPHEN 500 MG
650 TABLET ORAL EVERY 6 HOURS
Qty: 0 | Refills: 0 | Status: DISCONTINUED | OUTPATIENT
Start: 2017-02-06 | End: 2017-02-17

## 2017-02-05 RX ORDER — ACETAMINOPHEN 500 MG
1000 TABLET ORAL ONCE
Qty: 0 | Refills: 0 | Status: COMPLETED | OUTPATIENT
Start: 2017-02-05 | End: 2017-02-05

## 2017-02-05 RX ADMIN — Medication 400 MILLIGRAM(S): at 21:38

## 2017-02-05 RX ADMIN — SODIUM CHLORIDE 50 MILLILITER(S): 9 INJECTION INTRAMUSCULAR; INTRAVENOUS; SUBCUTANEOUS at 21:44

## 2017-02-05 RX ADMIN — FLUTICASONE PROPIONATE AND SALMETEROL 1 DOSE(S): 50; 250 POWDER ORAL; RESPIRATORY (INHALATION) at 06:17

## 2017-02-05 RX ADMIN — Medication 1 TABLET(S): at 13:55

## 2017-02-05 RX ADMIN — GABAPENTIN 300 MILLIGRAM(S): 400 CAPSULE ORAL at 06:16

## 2017-02-05 RX ADMIN — Medication 50 GRAM(S): at 15:14

## 2017-02-05 RX ADMIN — GABAPENTIN 300 MILLIGRAM(S): 400 CAPSULE ORAL at 21:45

## 2017-02-05 RX ADMIN — Medication 88 MICROGRAM(S): at 06:16

## 2017-02-05 RX ADMIN — TIOTROPIUM BROMIDE 1 CAPSULE(S): 18 CAPSULE ORAL; RESPIRATORY (INHALATION) at 13:55

## 2017-02-05 RX ADMIN — GABAPENTIN 300 MILLIGRAM(S): 400 CAPSULE ORAL at 13:56

## 2017-02-05 RX ADMIN — Medication 50 GRAM(S): at 13:56

## 2017-02-05 RX ADMIN — PANTOPRAZOLE SODIUM 40 MILLIGRAM(S): 20 TABLET, DELAYED RELEASE ORAL at 08:10

## 2017-02-05 RX ADMIN — FLUTICASONE PROPIONATE AND SALMETEROL 1 DOSE(S): 50; 250 POWDER ORAL; RESPIRATORY (INHALATION) at 18:18

## 2017-02-06 LAB
ANION GAP SERPL CALC-SCNC: 11 MMOL/L — SIGNIFICANT CHANGE UP (ref 5–17)
BUN SERPL-MCNC: 20 MG/DL — SIGNIFICANT CHANGE UP (ref 7–23)
CALCIUM SERPL-MCNC: 9 MG/DL — SIGNIFICANT CHANGE UP (ref 8.4–10.5)
CHLORIDE SERPL-SCNC: 107 MMOL/L — SIGNIFICANT CHANGE UP (ref 96–108)
CO2 SERPL-SCNC: 24 MMOL/L — SIGNIFICANT CHANGE UP (ref 22–31)
CREAT SERPL-MCNC: 1.3 MG/DL — SIGNIFICANT CHANGE UP (ref 0.5–1.3)
GLUCOSE SERPL-MCNC: 97 MG/DL — SIGNIFICANT CHANGE UP (ref 70–99)
MAGNESIUM SERPL-MCNC: 2.6 MG/DL — SIGNIFICANT CHANGE UP (ref 1.6–2.6)
PHOSPHATE SERPL-MCNC: 3.5 MG/DL — SIGNIFICANT CHANGE UP (ref 2.5–4.5)
POTASSIUM SERPL-MCNC: 5 MMOL/L — SIGNIFICANT CHANGE UP (ref 3.5–5.3)
POTASSIUM SERPL-SCNC: 5 MMOL/L — SIGNIFICANT CHANGE UP (ref 3.5–5.3)
SODIUM SERPL-SCNC: 142 MMOL/L — SIGNIFICANT CHANGE UP (ref 135–145)

## 2017-02-06 PROCEDURE — 76080 X-RAY EXAM OF FISTULA: CPT | Mod: 26

## 2017-02-06 PROCEDURE — 20501 NJX SINUS TRACT DIAGNOSTIC: CPT

## 2017-02-06 RX ORDER — OMEPRAZOLE 10 MG/1
0 CAPSULE, DELAYED RELEASE ORAL
Qty: 0 | Refills: 0 | COMMUNITY

## 2017-02-06 RX ORDER — FONDAPARINUX SODIUM 2.5 MG/.5ML
2.5 INJECTION, SOLUTION SUBCUTANEOUS DAILY
Qty: 0 | Refills: 0 | Status: DISCONTINUED | OUTPATIENT
Start: 2017-02-06 | End: 2017-02-17

## 2017-02-06 RX ADMIN — GABAPENTIN 300 MILLIGRAM(S): 400 CAPSULE ORAL at 05:47

## 2017-02-06 RX ADMIN — Medication 88 MICROGRAM(S): at 05:47

## 2017-02-06 RX ADMIN — GABAPENTIN 300 MILLIGRAM(S): 400 CAPSULE ORAL at 21:16

## 2017-02-06 RX ADMIN — FLUTICASONE PROPIONATE AND SALMETEROL 1 DOSE(S): 50; 250 POWDER ORAL; RESPIRATORY (INHALATION) at 05:48

## 2017-02-06 RX ADMIN — Medication 1 TABLET(S): at 12:38

## 2017-02-06 RX ADMIN — TIOTROPIUM BROMIDE 1 CAPSULE(S): 18 CAPSULE ORAL; RESPIRATORY (INHALATION) at 19:41

## 2017-02-06 RX ADMIN — Medication 650 MILLIGRAM(S): at 16:30

## 2017-02-06 RX ADMIN — FLUTICASONE PROPIONATE AND SALMETEROL 1 DOSE(S): 50; 250 POWDER ORAL; RESPIRATORY (INHALATION) at 19:42

## 2017-02-06 RX ADMIN — Medication 650 MILLIGRAM(S): at 15:49

## 2017-02-06 RX ADMIN — SODIUM CHLORIDE 50 MILLILITER(S): 9 INJECTION INTRAMUSCULAR; INTRAVENOUS; SUBCUTANEOUS at 11:15

## 2017-02-06 RX ADMIN — PANTOPRAZOLE SODIUM 40 MILLIGRAM(S): 20 TABLET, DELAYED RELEASE ORAL at 08:35

## 2017-02-06 RX ADMIN — GABAPENTIN 300 MILLIGRAM(S): 400 CAPSULE ORAL at 15:49

## 2017-02-07 LAB
ANION GAP SERPL CALC-SCNC: 10 MMOL/L — SIGNIFICANT CHANGE UP (ref 5–17)
BUN SERPL-MCNC: 23 MG/DL — SIGNIFICANT CHANGE UP (ref 7–23)
CALCIUM SERPL-MCNC: 8.9 MG/DL — SIGNIFICANT CHANGE UP (ref 8.4–10.5)
CHLORIDE SERPL-SCNC: 105 MMOL/L — SIGNIFICANT CHANGE UP (ref 96–108)
CO2 SERPL-SCNC: 25 MMOL/L — SIGNIFICANT CHANGE UP (ref 22–31)
CREAT SERPL-MCNC: 1.37 MG/DL — HIGH (ref 0.5–1.3)
GLUCOSE SERPL-MCNC: 99 MG/DL — SIGNIFICANT CHANGE UP (ref 70–99)
HCT VFR BLD CALC: 31.1 % — LOW (ref 34.5–45)
HGB BLD-MCNC: 10.4 G/DL — LOW (ref 11.5–15.5)
MAGNESIUM SERPL-MCNC: 1.9 MG/DL — SIGNIFICANT CHANGE UP (ref 1.6–2.6)
MCHC RBC-ENTMCNC: 33.1 PG — SIGNIFICANT CHANGE UP (ref 27–34)
MCHC RBC-ENTMCNC: 33.3 GM/DL — SIGNIFICANT CHANGE UP (ref 32–36)
MCV RBC AUTO: 99.2 FL — SIGNIFICANT CHANGE UP (ref 80–100)
PHOSPHATE SERPL-MCNC: 3.5 MG/DL — SIGNIFICANT CHANGE UP (ref 2.5–4.5)
PLATELET # BLD AUTO: 162 K/UL — SIGNIFICANT CHANGE UP (ref 150–400)
POTASSIUM SERPL-MCNC: 4.7 MMOL/L — SIGNIFICANT CHANGE UP (ref 3.5–5.3)
POTASSIUM SERPL-SCNC: 4.7 MMOL/L — SIGNIFICANT CHANGE UP (ref 3.5–5.3)
RBC # BLD: 3.14 M/UL — LOW (ref 3.8–5.2)
RBC # FLD: 13.1 % — SIGNIFICANT CHANGE UP (ref 10.3–14.5)
SODIUM SERPL-SCNC: 140 MMOL/L — SIGNIFICANT CHANGE UP (ref 135–145)
WBC # BLD: 4.3 K/UL — SIGNIFICANT CHANGE UP (ref 3.8–10.5)
WBC # FLD AUTO: 4.3 K/UL — SIGNIFICANT CHANGE UP (ref 3.8–10.5)

## 2017-02-07 RX ORDER — MAGNESIUM SULFATE 500 MG/ML
1 VIAL (ML) INJECTION ONCE
Qty: 0 | Refills: 0 | Status: COMPLETED | OUTPATIENT
Start: 2017-02-07 | End: 2017-02-07

## 2017-02-07 RX ADMIN — Medication 650 MILLIGRAM(S): at 08:30

## 2017-02-07 RX ADMIN — PANTOPRAZOLE SODIUM 40 MILLIGRAM(S): 20 TABLET, DELAYED RELEASE ORAL at 06:23

## 2017-02-07 RX ADMIN — TIOTROPIUM BROMIDE 1 CAPSULE(S): 18 CAPSULE ORAL; RESPIRATORY (INHALATION) at 15:25

## 2017-02-07 RX ADMIN — Medication 88 MICROGRAM(S): at 06:23

## 2017-02-07 RX ADMIN — GABAPENTIN 300 MILLIGRAM(S): 400 CAPSULE ORAL at 14:26

## 2017-02-07 RX ADMIN — FLUTICASONE PROPIONATE AND SALMETEROL 1 DOSE(S): 50; 250 POWDER ORAL; RESPIRATORY (INHALATION) at 06:23

## 2017-02-07 RX ADMIN — Medication 1 TABLET(S): at 14:26

## 2017-02-07 RX ADMIN — SODIUM CHLORIDE 50 MILLILITER(S): 9 INJECTION INTRAMUSCULAR; INTRAVENOUS; SUBCUTANEOUS at 15:25

## 2017-02-07 RX ADMIN — GABAPENTIN 300 MILLIGRAM(S): 400 CAPSULE ORAL at 06:23

## 2017-02-07 RX ADMIN — GABAPENTIN 300 MILLIGRAM(S): 400 CAPSULE ORAL at 21:32

## 2017-02-07 RX ADMIN — Medication 100 GRAM(S): at 15:25

## 2017-02-07 RX ADMIN — FONDAPARINUX SODIUM 2.5 MILLIGRAM(S): 2.5 INJECTION, SOLUTION SUBCUTANEOUS at 14:26

## 2017-02-07 RX ADMIN — FLUTICASONE PROPIONATE AND SALMETEROL 1 DOSE(S): 50; 250 POWDER ORAL; RESPIRATORY (INHALATION) at 18:12

## 2017-02-08 LAB
ANION GAP SERPL CALC-SCNC: 11 MMOL/L — SIGNIFICANT CHANGE UP (ref 5–17)
BUN SERPL-MCNC: 24 MG/DL — HIGH (ref 7–23)
CALCIUM SERPL-MCNC: 9.3 MG/DL — SIGNIFICANT CHANGE UP (ref 8.4–10.5)
CHLORIDE SERPL-SCNC: 106 MMOL/L — SIGNIFICANT CHANGE UP (ref 96–108)
CO2 SERPL-SCNC: 25 MMOL/L — SIGNIFICANT CHANGE UP (ref 22–31)
CREAT SERPL-MCNC: 1.33 MG/DL — HIGH (ref 0.5–1.3)
GLUCOSE SERPL-MCNC: 85 MG/DL — SIGNIFICANT CHANGE UP (ref 70–99)
MAGNESIUM SERPL-MCNC: 2.2 MG/DL — SIGNIFICANT CHANGE UP (ref 1.6–2.6)
PHOSPHATE SERPL-MCNC: 3.5 MG/DL — SIGNIFICANT CHANGE UP (ref 2.5–4.5)
POTASSIUM SERPL-MCNC: 4.8 MMOL/L — SIGNIFICANT CHANGE UP (ref 3.5–5.3)
POTASSIUM SERPL-SCNC: 4.8 MMOL/L — SIGNIFICANT CHANGE UP (ref 3.5–5.3)
SODIUM SERPL-SCNC: 142 MMOL/L — SIGNIFICANT CHANGE UP (ref 135–145)

## 2017-02-08 PROCEDURE — 93970 EXTREMITY STUDY: CPT | Mod: 26

## 2017-02-08 RX ADMIN — GABAPENTIN 300 MILLIGRAM(S): 400 CAPSULE ORAL at 13:01

## 2017-02-08 RX ADMIN — GABAPENTIN 300 MILLIGRAM(S): 400 CAPSULE ORAL at 21:14

## 2017-02-08 RX ADMIN — FONDAPARINUX SODIUM 2.5 MILLIGRAM(S): 2.5 INJECTION, SOLUTION SUBCUTANEOUS at 11:06

## 2017-02-08 RX ADMIN — TIOTROPIUM BROMIDE 1 CAPSULE(S): 18 CAPSULE ORAL; RESPIRATORY (INHALATION) at 11:06

## 2017-02-08 RX ADMIN — FLUTICASONE PROPIONATE AND SALMETEROL 1 DOSE(S): 50; 250 POWDER ORAL; RESPIRATORY (INHALATION) at 05:10

## 2017-02-08 RX ADMIN — GABAPENTIN 300 MILLIGRAM(S): 400 CAPSULE ORAL at 05:09

## 2017-02-08 RX ADMIN — Medication 88 MICROGRAM(S): at 05:09

## 2017-02-08 RX ADMIN — PANTOPRAZOLE SODIUM 40 MILLIGRAM(S): 20 TABLET, DELAYED RELEASE ORAL at 05:10

## 2017-02-08 RX ADMIN — Medication 1 TABLET(S): at 11:06

## 2017-02-08 RX ADMIN — FLUTICASONE PROPIONATE AND SALMETEROL 1 DOSE(S): 50; 250 POWDER ORAL; RESPIRATORY (INHALATION) at 17:14

## 2017-02-09 LAB
ANION GAP SERPL CALC-SCNC: 13 MMOL/L — SIGNIFICANT CHANGE UP (ref 5–17)
BUN SERPL-MCNC: 25 MG/DL — HIGH (ref 7–23)
CALCIUM SERPL-MCNC: 9 MG/DL — SIGNIFICANT CHANGE UP (ref 8.4–10.5)
CHLORIDE SERPL-SCNC: 107 MMOL/L — SIGNIFICANT CHANGE UP (ref 96–108)
CO2 SERPL-SCNC: 24 MMOL/L — SIGNIFICANT CHANGE UP (ref 22–31)
CREAT SERPL-MCNC: 1.34 MG/DL — HIGH (ref 0.5–1.3)
GLUCOSE SERPL-MCNC: 106 MG/DL — HIGH (ref 70–99)
MAGNESIUM SERPL-MCNC: 1.7 MG/DL — SIGNIFICANT CHANGE UP (ref 1.6–2.6)
PHOSPHATE SERPL-MCNC: 3.9 MG/DL — SIGNIFICANT CHANGE UP (ref 2.5–4.5)
POTASSIUM SERPL-MCNC: 4.8 MMOL/L — SIGNIFICANT CHANGE UP (ref 3.5–5.3)
POTASSIUM SERPL-SCNC: 4.8 MMOL/L — SIGNIFICANT CHANGE UP (ref 3.5–5.3)
SODIUM SERPL-SCNC: 144 MMOL/L — SIGNIFICANT CHANGE UP (ref 135–145)

## 2017-02-09 PROCEDURE — 99221 1ST HOSP IP/OBS SF/LOW 40: CPT

## 2017-02-09 RX ORDER — MAGNESIUM SULFATE 500 MG/ML
2 VIAL (ML) INJECTION ONCE
Qty: 0 | Refills: 0 | Status: COMPLETED | OUTPATIENT
Start: 2017-02-09 | End: 2017-02-09

## 2017-02-09 RX ADMIN — GABAPENTIN 300 MILLIGRAM(S): 400 CAPSULE ORAL at 13:12

## 2017-02-09 RX ADMIN — Medication 88 MICROGRAM(S): at 05:41

## 2017-02-09 RX ADMIN — PANTOPRAZOLE SODIUM 40 MILLIGRAM(S): 20 TABLET, DELAYED RELEASE ORAL at 05:41

## 2017-02-09 RX ADMIN — FONDAPARINUX SODIUM 2.5 MILLIGRAM(S): 2.5 INJECTION, SOLUTION SUBCUTANEOUS at 11:18

## 2017-02-09 RX ADMIN — GABAPENTIN 300 MILLIGRAM(S): 400 CAPSULE ORAL at 22:24

## 2017-02-09 RX ADMIN — FLUTICASONE PROPIONATE AND SALMETEROL 1 DOSE(S): 50; 250 POWDER ORAL; RESPIRATORY (INHALATION) at 05:41

## 2017-02-09 RX ADMIN — FLUTICASONE PROPIONATE AND SALMETEROL 1 DOSE(S): 50; 250 POWDER ORAL; RESPIRATORY (INHALATION) at 17:19

## 2017-02-09 RX ADMIN — Medication 650 MILLIGRAM(S): at 06:12

## 2017-02-09 RX ADMIN — Medication 50 GRAM(S): at 11:18

## 2017-02-09 RX ADMIN — Medication 650 MILLIGRAM(S): at 22:25

## 2017-02-09 RX ADMIN — TIOTROPIUM BROMIDE 1 CAPSULE(S): 18 CAPSULE ORAL; RESPIRATORY (INHALATION) at 11:18

## 2017-02-09 RX ADMIN — Medication 650 MILLIGRAM(S): at 22:55

## 2017-02-09 RX ADMIN — Medication 1 TABLET(S): at 11:18

## 2017-02-09 RX ADMIN — GABAPENTIN 300 MILLIGRAM(S): 400 CAPSULE ORAL at 05:41

## 2017-02-09 RX ADMIN — Medication 650 MILLIGRAM(S): at 05:42

## 2017-02-10 RX ORDER — NYSTATIN CREAM 100000 [USP'U]/G
1 CREAM TOPICAL DAILY
Qty: 0 | Refills: 0 | Status: DISCONTINUED | OUTPATIENT
Start: 2017-02-10 | End: 2017-02-17

## 2017-02-10 RX ADMIN — NYSTATIN CREAM 1 APPLICATION(S): 100000 CREAM TOPICAL at 21:53

## 2017-02-10 RX ADMIN — GABAPENTIN 300 MILLIGRAM(S): 400 CAPSULE ORAL at 06:39

## 2017-02-10 RX ADMIN — TIOTROPIUM BROMIDE 1 CAPSULE(S): 18 CAPSULE ORAL; RESPIRATORY (INHALATION) at 12:33

## 2017-02-10 RX ADMIN — FONDAPARINUX SODIUM 2.5 MILLIGRAM(S): 2.5 INJECTION, SOLUTION SUBCUTANEOUS at 12:33

## 2017-02-10 RX ADMIN — GABAPENTIN 300 MILLIGRAM(S): 400 CAPSULE ORAL at 16:16

## 2017-02-10 RX ADMIN — FLUTICASONE PROPIONATE AND SALMETEROL 1 DOSE(S): 50; 250 POWDER ORAL; RESPIRATORY (INHALATION) at 06:38

## 2017-02-10 RX ADMIN — FLUTICASONE PROPIONATE AND SALMETEROL 1 DOSE(S): 50; 250 POWDER ORAL; RESPIRATORY (INHALATION) at 19:05

## 2017-02-10 RX ADMIN — Medication 88 MICROGRAM(S): at 06:39

## 2017-02-10 RX ADMIN — PANTOPRAZOLE SODIUM 40 MILLIGRAM(S): 20 TABLET, DELAYED RELEASE ORAL at 06:39

## 2017-02-10 RX ADMIN — GABAPENTIN 300 MILLIGRAM(S): 400 CAPSULE ORAL at 21:53

## 2017-02-10 RX ADMIN — Medication 1 TABLET(S): at 12:33

## 2017-02-10 RX ADMIN — SODIUM CHLORIDE 50 MILLILITER(S): 9 INJECTION INTRAMUSCULAR; INTRAVENOUS; SUBCUTANEOUS at 21:54

## 2017-02-11 LAB
ANION GAP SERPL CALC-SCNC: 13 MMOL/L — SIGNIFICANT CHANGE UP (ref 5–17)
BUN SERPL-MCNC: 24 MG/DL — HIGH (ref 7–23)
CALCIUM SERPL-MCNC: 9.1 MG/DL — SIGNIFICANT CHANGE UP (ref 8.4–10.5)
CHLORIDE SERPL-SCNC: 106 MMOL/L — SIGNIFICANT CHANGE UP (ref 96–108)
CO2 SERPL-SCNC: 23 MMOL/L — SIGNIFICANT CHANGE UP (ref 22–31)
CREAT SERPL-MCNC: 1.41 MG/DL — HIGH (ref 0.5–1.3)
GLUCOSE SERPL-MCNC: 95 MG/DL — SIGNIFICANT CHANGE UP (ref 70–99)
HCT VFR BLD CALC: 33.7 % — LOW (ref 34.5–45)
HGB BLD-MCNC: 10.8 G/DL — LOW (ref 11.5–15.5)
MAGNESIUM SERPL-MCNC: 1.9 MG/DL — SIGNIFICANT CHANGE UP (ref 1.6–2.6)
MCHC RBC-ENTMCNC: 32.1 GM/DL — SIGNIFICANT CHANGE UP (ref 32–36)
MCHC RBC-ENTMCNC: 32.2 PG — SIGNIFICANT CHANGE UP (ref 27–34)
MCV RBC AUTO: 100 FL — SIGNIFICANT CHANGE UP (ref 80–100)
PHOSPHATE SERPL-MCNC: 3.5 MG/DL — SIGNIFICANT CHANGE UP (ref 2.5–4.5)
PLATELET # BLD AUTO: 169 K/UL — SIGNIFICANT CHANGE UP (ref 150–400)
POTASSIUM SERPL-MCNC: 5.3 MMOL/L — SIGNIFICANT CHANGE UP (ref 3.5–5.3)
POTASSIUM SERPL-SCNC: 5.3 MMOL/L — SIGNIFICANT CHANGE UP (ref 3.5–5.3)
RBC # BLD: 3.36 M/UL — LOW (ref 3.8–5.2)
RBC # FLD: 13.3 % — SIGNIFICANT CHANGE UP (ref 10.3–14.5)
SODIUM SERPL-SCNC: 142 MMOL/L — SIGNIFICANT CHANGE UP (ref 135–145)
WBC # BLD: 5 K/UL — SIGNIFICANT CHANGE UP (ref 3.8–10.5)
WBC # FLD AUTO: 5 K/UL — SIGNIFICANT CHANGE UP (ref 3.8–10.5)

## 2017-02-11 RX ORDER — MAGNESIUM SULFATE 500 MG/ML
1 VIAL (ML) INJECTION ONCE
Qty: 0 | Refills: 0 | Status: COMPLETED | OUTPATIENT
Start: 2017-02-11 | End: 2017-02-11

## 2017-02-11 RX ADMIN — GABAPENTIN 300 MILLIGRAM(S): 400 CAPSULE ORAL at 21:43

## 2017-02-11 RX ADMIN — TIOTROPIUM BROMIDE 1 CAPSULE(S): 18 CAPSULE ORAL; RESPIRATORY (INHALATION) at 12:55

## 2017-02-11 RX ADMIN — GABAPENTIN 300 MILLIGRAM(S): 400 CAPSULE ORAL at 05:43

## 2017-02-11 RX ADMIN — Medication 1 TABLET(S): at 12:58

## 2017-02-11 RX ADMIN — FLUTICASONE PROPIONATE AND SALMETEROL 1 DOSE(S): 50; 250 POWDER ORAL; RESPIRATORY (INHALATION) at 17:42

## 2017-02-11 RX ADMIN — NYSTATIN CREAM 1 APPLICATION(S): 100000 CREAM TOPICAL at 12:59

## 2017-02-11 RX ADMIN — FLUTICASONE PROPIONATE AND SALMETEROL 1 DOSE(S): 50; 250 POWDER ORAL; RESPIRATORY (INHALATION) at 05:43

## 2017-02-11 RX ADMIN — PANTOPRAZOLE SODIUM 40 MILLIGRAM(S): 20 TABLET, DELAYED RELEASE ORAL at 05:43

## 2017-02-11 RX ADMIN — Medication 100 GRAM(S): at 13:07

## 2017-02-11 RX ADMIN — FONDAPARINUX SODIUM 2.5 MILLIGRAM(S): 2.5 INJECTION, SOLUTION SUBCUTANEOUS at 12:55

## 2017-02-11 RX ADMIN — SODIUM CHLORIDE 100 MILLILITER(S): 9 INJECTION INTRAMUSCULAR; INTRAVENOUS; SUBCUTANEOUS at 21:43

## 2017-02-11 RX ADMIN — GABAPENTIN 300 MILLIGRAM(S): 400 CAPSULE ORAL at 14:06

## 2017-02-11 RX ADMIN — SODIUM CHLORIDE 100 MILLILITER(S): 9 INJECTION INTRAMUSCULAR; INTRAVENOUS; SUBCUTANEOUS at 17:40

## 2017-02-11 RX ADMIN — Medication 88 MICROGRAM(S): at 05:43

## 2017-02-12 LAB
ANION GAP SERPL CALC-SCNC: 9 MMOL/L — SIGNIFICANT CHANGE UP (ref 5–17)
BUN SERPL-MCNC: 21 MG/DL — SIGNIFICANT CHANGE UP (ref 7–23)
CALCIUM SERPL-MCNC: 9 MG/DL — SIGNIFICANT CHANGE UP (ref 8.4–10.5)
CHLORIDE SERPL-SCNC: 107 MMOL/L — SIGNIFICANT CHANGE UP (ref 96–108)
CO2 SERPL-SCNC: 25 MMOL/L — SIGNIFICANT CHANGE UP (ref 22–31)
CREAT SERPL-MCNC: 1.3 MG/DL — SIGNIFICANT CHANGE UP (ref 0.5–1.3)
GLUCOSE SERPL-MCNC: 97 MG/DL — SIGNIFICANT CHANGE UP (ref 70–99)
MAGNESIUM SERPL-MCNC: 1.9 MG/DL — SIGNIFICANT CHANGE UP (ref 1.6–2.6)
PHOSPHATE SERPL-MCNC: 3.5 MG/DL — SIGNIFICANT CHANGE UP (ref 2.5–4.5)
POTASSIUM SERPL-MCNC: 4.7 MMOL/L — SIGNIFICANT CHANGE UP (ref 3.5–5.3)
POTASSIUM SERPL-SCNC: 4.7 MMOL/L — SIGNIFICANT CHANGE UP (ref 3.5–5.3)
SODIUM SERPL-SCNC: 141 MMOL/L — SIGNIFICANT CHANGE UP (ref 135–145)

## 2017-02-12 RX ORDER — MAGNESIUM SULFATE 500 MG/ML
1 VIAL (ML) INJECTION ONCE
Qty: 0 | Refills: 0 | Status: COMPLETED | OUTPATIENT
Start: 2017-02-12 | End: 2017-02-12

## 2017-02-12 RX ADMIN — GABAPENTIN 300 MILLIGRAM(S): 400 CAPSULE ORAL at 05:28

## 2017-02-12 RX ADMIN — FLUTICASONE PROPIONATE AND SALMETEROL 1 DOSE(S): 50; 250 POWDER ORAL; RESPIRATORY (INHALATION) at 18:38

## 2017-02-12 RX ADMIN — FONDAPARINUX SODIUM 2.5 MILLIGRAM(S): 2.5 INJECTION, SOLUTION SUBCUTANEOUS at 11:55

## 2017-02-12 RX ADMIN — NYSTATIN CREAM 1 APPLICATION(S): 100000 CREAM TOPICAL at 11:59

## 2017-02-12 RX ADMIN — Medication 88 MICROGRAM(S): at 05:28

## 2017-02-12 RX ADMIN — Medication 1 TABLET(S): at 11:58

## 2017-02-12 RX ADMIN — GABAPENTIN 300 MILLIGRAM(S): 400 CAPSULE ORAL at 14:03

## 2017-02-12 RX ADMIN — TIOTROPIUM BROMIDE 1 CAPSULE(S): 18 CAPSULE ORAL; RESPIRATORY (INHALATION) at 11:54

## 2017-02-12 RX ADMIN — Medication 1 TABLET(S): at 11:59

## 2017-02-12 RX ADMIN — PANTOPRAZOLE SODIUM 40 MILLIGRAM(S): 20 TABLET, DELAYED RELEASE ORAL at 05:28

## 2017-02-12 RX ADMIN — SODIUM CHLORIDE 100 MILLILITER(S): 9 INJECTION INTRAMUSCULAR; INTRAVENOUS; SUBCUTANEOUS at 21:27

## 2017-02-12 RX ADMIN — Medication 100 GRAM(S): at 14:03

## 2017-02-12 RX ADMIN — FLUTICASONE PROPIONATE AND SALMETEROL 1 DOSE(S): 50; 250 POWDER ORAL; RESPIRATORY (INHALATION) at 05:28

## 2017-02-12 RX ADMIN — GABAPENTIN 300 MILLIGRAM(S): 400 CAPSULE ORAL at 21:26

## 2017-02-13 LAB
ANION GAP SERPL CALC-SCNC: 11 MMOL/L — SIGNIFICANT CHANGE UP (ref 5–17)
BUN SERPL-MCNC: 19 MG/DL — SIGNIFICANT CHANGE UP (ref 7–23)
CALCIUM SERPL-MCNC: 9.1 MG/DL — SIGNIFICANT CHANGE UP (ref 8.4–10.5)
CHLORIDE SERPL-SCNC: 107 MMOL/L — SIGNIFICANT CHANGE UP (ref 96–108)
CO2 SERPL-SCNC: 22 MMOL/L — SIGNIFICANT CHANGE UP (ref 22–31)
CREAT SERPL-MCNC: 1.46 MG/DL — HIGH (ref 0.5–1.3)
GLUCOSE SERPL-MCNC: 117 MG/DL — HIGH (ref 70–99)
POTASSIUM SERPL-MCNC: 4.8 MMOL/L — SIGNIFICANT CHANGE UP (ref 3.5–5.3)
POTASSIUM SERPL-SCNC: 4.8 MMOL/L — SIGNIFICANT CHANGE UP (ref 3.5–5.3)
SODIUM SERPL-SCNC: 140 MMOL/L — SIGNIFICANT CHANGE UP (ref 135–145)

## 2017-02-13 PROCEDURE — 99232 SBSQ HOSP IP/OBS MODERATE 35: CPT

## 2017-02-13 RX ADMIN — GABAPENTIN 300 MILLIGRAM(S): 400 CAPSULE ORAL at 12:45

## 2017-02-13 RX ADMIN — NYSTATIN CREAM 1 APPLICATION(S): 100000 CREAM TOPICAL at 12:45

## 2017-02-13 RX ADMIN — GABAPENTIN 300 MILLIGRAM(S): 400 CAPSULE ORAL at 23:05

## 2017-02-13 RX ADMIN — Medication 650 MILLIGRAM(S): at 13:18

## 2017-02-13 RX ADMIN — GABAPENTIN 300 MILLIGRAM(S): 400 CAPSULE ORAL at 05:02

## 2017-02-13 RX ADMIN — SODIUM CHLORIDE 100 MILLILITER(S): 9 INJECTION INTRAMUSCULAR; INTRAVENOUS; SUBCUTANEOUS at 23:06

## 2017-02-13 RX ADMIN — TIOTROPIUM BROMIDE 1 CAPSULE(S): 18 CAPSULE ORAL; RESPIRATORY (INHALATION) at 12:44

## 2017-02-13 RX ADMIN — FLUTICASONE PROPIONATE AND SALMETEROL 1 DOSE(S): 50; 250 POWDER ORAL; RESPIRATORY (INHALATION) at 23:05

## 2017-02-13 RX ADMIN — Medication 1 TABLET(S): at 12:45

## 2017-02-13 RX ADMIN — FONDAPARINUX SODIUM 2.5 MILLIGRAM(S): 2.5 INJECTION, SOLUTION SUBCUTANEOUS at 12:45

## 2017-02-13 RX ADMIN — PANTOPRAZOLE SODIUM 40 MILLIGRAM(S): 20 TABLET, DELAYED RELEASE ORAL at 05:02

## 2017-02-13 RX ADMIN — Medication 88 MICROGRAM(S): at 05:02

## 2017-02-13 RX ADMIN — Medication 650 MILLIGRAM(S): at 12:48

## 2017-02-13 RX ADMIN — FLUTICASONE PROPIONATE AND SALMETEROL 1 DOSE(S): 50; 250 POWDER ORAL; RESPIRATORY (INHALATION) at 05:02

## 2017-02-14 LAB
ANION GAP SERPL CALC-SCNC: 12 MMOL/L — SIGNIFICANT CHANGE UP (ref 5–17)
BUN SERPL-MCNC: 20 MG/DL — SIGNIFICANT CHANGE UP (ref 7–23)
CALCIUM SERPL-MCNC: 8.5 MG/DL — SIGNIFICANT CHANGE UP (ref 8.4–10.5)
CHLORIDE SERPL-SCNC: 108 MMOL/L — SIGNIFICANT CHANGE UP (ref 96–108)
CO2 SERPL-SCNC: 22 MMOL/L — SIGNIFICANT CHANGE UP (ref 22–31)
CREAT SERPL-MCNC: 1.44 MG/DL — HIGH (ref 0.5–1.3)
GLUCOSE SERPL-MCNC: 101 MG/DL — HIGH (ref 70–99)
POTASSIUM SERPL-MCNC: 4.7 MMOL/L — SIGNIFICANT CHANGE UP (ref 3.5–5.3)
POTASSIUM SERPL-SCNC: 4.7 MMOL/L — SIGNIFICANT CHANGE UP (ref 3.5–5.3)
SODIUM SERPL-SCNC: 142 MMOL/L — SIGNIFICANT CHANGE UP (ref 135–145)

## 2017-02-14 PROCEDURE — 99232 SBSQ HOSP IP/OBS MODERATE 35: CPT

## 2017-02-14 RX ADMIN — NYSTATIN CREAM 1 APPLICATION(S): 100000 CREAM TOPICAL at 13:25

## 2017-02-14 RX ADMIN — FONDAPARINUX SODIUM 2.5 MILLIGRAM(S): 2.5 INJECTION, SOLUTION SUBCUTANEOUS at 13:25

## 2017-02-14 RX ADMIN — GABAPENTIN 300 MILLIGRAM(S): 400 CAPSULE ORAL at 13:25

## 2017-02-14 RX ADMIN — Medication 1 TABLET(S): at 13:25

## 2017-02-14 RX ADMIN — Medication 88 MICROGRAM(S): at 06:12

## 2017-02-14 RX ADMIN — GABAPENTIN 300 MILLIGRAM(S): 400 CAPSULE ORAL at 21:33

## 2017-02-14 RX ADMIN — TIOTROPIUM BROMIDE 1 CAPSULE(S): 18 CAPSULE ORAL; RESPIRATORY (INHALATION) at 13:25

## 2017-02-14 RX ADMIN — FLUTICASONE PROPIONATE AND SALMETEROL 1 DOSE(S): 50; 250 POWDER ORAL; RESPIRATORY (INHALATION) at 17:32

## 2017-02-14 RX ADMIN — PANTOPRAZOLE SODIUM 40 MILLIGRAM(S): 20 TABLET, DELAYED RELEASE ORAL at 08:48

## 2017-02-14 RX ADMIN — FLUTICASONE PROPIONATE AND SALMETEROL 1 DOSE(S): 50; 250 POWDER ORAL; RESPIRATORY (INHALATION) at 06:12

## 2017-02-14 RX ADMIN — GABAPENTIN 300 MILLIGRAM(S): 400 CAPSULE ORAL at 06:12

## 2017-02-14 RX ADMIN — Medication 650 MILLIGRAM(S): at 22:03

## 2017-02-14 RX ADMIN — Medication 650 MILLIGRAM(S): at 21:33

## 2017-02-15 LAB
ANION GAP SERPL CALC-SCNC: 11 MMOL/L — SIGNIFICANT CHANGE UP (ref 5–17)
BUN SERPL-MCNC: 21 MG/DL — SIGNIFICANT CHANGE UP (ref 7–23)
CALCIUM SERPL-MCNC: 8.7 MG/DL — SIGNIFICANT CHANGE UP (ref 8.4–10.5)
CHLORIDE SERPL-SCNC: 109 MMOL/L — HIGH (ref 96–108)
CO2 SERPL-SCNC: 22 MMOL/L — SIGNIFICANT CHANGE UP (ref 22–31)
CREAT SERPL-MCNC: 1.24 MG/DL — SIGNIFICANT CHANGE UP (ref 0.5–1.3)
GLUCOSE SERPL-MCNC: 102 MG/DL — HIGH (ref 70–99)
HCT VFR BLD CALC: 30.1 % — LOW (ref 34.5–45)
HGB BLD-MCNC: 10.1 G/DL — LOW (ref 11.5–15.5)
MAGNESIUM SERPL-MCNC: 1.5 MG/DL — LOW (ref 1.6–2.6)
MCHC RBC-ENTMCNC: 33.6 PG — SIGNIFICANT CHANGE UP (ref 27–34)
MCHC RBC-ENTMCNC: 33.7 GM/DL — SIGNIFICANT CHANGE UP (ref 32–36)
MCV RBC AUTO: 99.6 FL — SIGNIFICANT CHANGE UP (ref 80–100)
PHOSPHATE SERPL-MCNC: 3.5 MG/DL — SIGNIFICANT CHANGE UP (ref 2.5–4.5)
PLATELET # BLD AUTO: 181 K/UL — SIGNIFICANT CHANGE UP (ref 150–400)
POTASSIUM SERPL-MCNC: 4.7 MMOL/L — SIGNIFICANT CHANGE UP (ref 3.5–5.3)
POTASSIUM SERPL-SCNC: 4.7 MMOL/L — SIGNIFICANT CHANGE UP (ref 3.5–5.3)
RBC # BLD: 3.02 M/UL — LOW (ref 3.8–5.2)
RBC # FLD: 13 % — SIGNIFICANT CHANGE UP (ref 10.3–14.5)
SODIUM SERPL-SCNC: 142 MMOL/L — SIGNIFICANT CHANGE UP (ref 135–145)
WBC # BLD: 4.4 K/UL — SIGNIFICANT CHANGE UP (ref 3.8–10.5)
WBC # FLD AUTO: 4.4 K/UL — SIGNIFICANT CHANGE UP (ref 3.8–10.5)

## 2017-02-15 RX ORDER — MAGNESIUM SULFATE 500 MG/ML
2 VIAL (ML) INJECTION ONCE
Qty: 0 | Refills: 0 | Status: COMPLETED | OUTPATIENT
Start: 2017-02-15 | End: 2017-02-15

## 2017-02-15 RX ADMIN — Medication 650 MILLIGRAM(S): at 05:42

## 2017-02-15 RX ADMIN — GABAPENTIN 300 MILLIGRAM(S): 400 CAPSULE ORAL at 15:16

## 2017-02-15 RX ADMIN — TIOTROPIUM BROMIDE 1 CAPSULE(S): 18 CAPSULE ORAL; RESPIRATORY (INHALATION) at 12:50

## 2017-02-15 RX ADMIN — FLUTICASONE PROPIONATE AND SALMETEROL 1 DOSE(S): 50; 250 POWDER ORAL; RESPIRATORY (INHALATION) at 17:06

## 2017-02-15 RX ADMIN — Medication 50 GRAM(S): at 19:02

## 2017-02-15 RX ADMIN — GABAPENTIN 300 MILLIGRAM(S): 400 CAPSULE ORAL at 05:13

## 2017-02-15 RX ADMIN — Medication 650 MILLIGRAM(S): at 05:12

## 2017-02-15 RX ADMIN — FONDAPARINUX SODIUM 2.5 MILLIGRAM(S): 2.5 INJECTION, SOLUTION SUBCUTANEOUS at 12:51

## 2017-02-15 RX ADMIN — Medication 1 TABLET(S): at 12:51

## 2017-02-15 RX ADMIN — Medication 1 TABLET(S): at 12:50

## 2017-02-15 RX ADMIN — PANTOPRAZOLE SODIUM 40 MILLIGRAM(S): 20 TABLET, DELAYED RELEASE ORAL at 05:13

## 2017-02-15 RX ADMIN — NYSTATIN CREAM 1 APPLICATION(S): 100000 CREAM TOPICAL at 12:51

## 2017-02-15 RX ADMIN — Medication 88 MICROGRAM(S): at 05:13

## 2017-02-15 RX ADMIN — FLUTICASONE PROPIONATE AND SALMETEROL 1 DOSE(S): 50; 250 POWDER ORAL; RESPIRATORY (INHALATION) at 05:13

## 2017-02-15 RX ADMIN — GABAPENTIN 300 MILLIGRAM(S): 400 CAPSULE ORAL at 22:10

## 2017-02-16 PROCEDURE — 99232 SBSQ HOSP IP/OBS MODERATE 35: CPT

## 2017-02-16 RX ADMIN — GABAPENTIN 300 MILLIGRAM(S): 400 CAPSULE ORAL at 21:18

## 2017-02-16 RX ADMIN — TIOTROPIUM BROMIDE 1 CAPSULE(S): 18 CAPSULE ORAL; RESPIRATORY (INHALATION) at 17:54

## 2017-02-16 RX ADMIN — PANTOPRAZOLE SODIUM 40 MILLIGRAM(S): 20 TABLET, DELAYED RELEASE ORAL at 05:45

## 2017-02-16 RX ADMIN — FONDAPARINUX SODIUM 2.5 MILLIGRAM(S): 2.5 INJECTION, SOLUTION SUBCUTANEOUS at 12:49

## 2017-02-16 RX ADMIN — NYSTATIN CREAM 1 APPLICATION(S): 100000 CREAM TOPICAL at 12:49

## 2017-02-16 RX ADMIN — FLUTICASONE PROPIONATE AND SALMETEROL 1 DOSE(S): 50; 250 POWDER ORAL; RESPIRATORY (INHALATION) at 17:54

## 2017-02-16 RX ADMIN — FLUTICASONE PROPIONATE AND SALMETEROL 1 DOSE(S): 50; 250 POWDER ORAL; RESPIRATORY (INHALATION) at 05:45

## 2017-02-16 RX ADMIN — Medication 88 MICROGRAM(S): at 05:45

## 2017-02-16 RX ADMIN — GABAPENTIN 300 MILLIGRAM(S): 400 CAPSULE ORAL at 05:45

## 2017-02-16 RX ADMIN — GABAPENTIN 300 MILLIGRAM(S): 400 CAPSULE ORAL at 13:56

## 2017-02-16 RX ADMIN — Medication 1 TABLET(S): at 12:48

## 2017-02-17 ENCOUNTER — TRANSCRIPTION ENCOUNTER (OUTPATIENT)
Age: 77
End: 2017-02-17

## 2017-02-17 VITALS
RESPIRATION RATE: 18 BRPM | HEART RATE: 93 BPM | OXYGEN SATURATION: 97 % | TEMPERATURE: 98 F | DIASTOLIC BLOOD PRESSURE: 68 MMHG | SYSTOLIC BLOOD PRESSURE: 109 MMHG

## 2017-02-17 PROCEDURE — 80053 COMPREHEN METABOLIC PANEL: CPT

## 2017-02-17 PROCEDURE — 86901 BLOOD TYPING SEROLOGIC RH(D): CPT

## 2017-02-17 PROCEDURE — 97161 PT EVAL LOW COMPLEX 20 MIN: CPT

## 2017-02-17 PROCEDURE — 99285 EMERGENCY DEPT VISIT HI MDM: CPT | Mod: 25

## 2017-02-17 PROCEDURE — 76080 X-RAY EXAM OF FISTULA: CPT

## 2017-02-17 PROCEDURE — 86022 PLATELET ANTIBODIES: CPT

## 2017-02-17 PROCEDURE — 86900 BLOOD TYPING SEROLOGIC ABO: CPT

## 2017-02-17 PROCEDURE — 83735 ASSAY OF MAGNESIUM: CPT

## 2017-02-17 PROCEDURE — 74176 CT ABD & PELVIS W/O CONTRAST: CPT

## 2017-02-17 PROCEDURE — 85730 THROMBOPLASTIN TIME PARTIAL: CPT

## 2017-02-17 PROCEDURE — 86850 RBC ANTIBODY SCREEN: CPT

## 2017-02-17 PROCEDURE — C1887: CPT

## 2017-02-17 PROCEDURE — 93970 EXTREMITY STUDY: CPT

## 2017-02-17 PROCEDURE — 85027 COMPLETE CBC AUTOMATED: CPT

## 2017-02-17 PROCEDURE — 74250 X-RAY XM SM INT 1CNTRST STD: CPT

## 2017-02-17 PROCEDURE — 97116 GAIT TRAINING THERAPY: CPT

## 2017-02-17 PROCEDURE — 99231 SBSQ HOSP IP/OBS SF/LOW 25: CPT

## 2017-02-17 PROCEDURE — 84100 ASSAY OF PHOSPHORUS: CPT

## 2017-02-17 PROCEDURE — 94640 AIRWAY INHALATION TREATMENT: CPT

## 2017-02-17 PROCEDURE — 85610 PROTHROMBIN TIME: CPT

## 2017-02-17 PROCEDURE — 80048 BASIC METABOLIC PNL TOTAL CA: CPT

## 2017-02-17 PROCEDURE — 97530 THERAPEUTIC ACTIVITIES: CPT

## 2017-02-17 PROCEDURE — 20501 NJX SINUS TRACT DIAGNOSTIC: CPT

## 2017-02-17 PROCEDURE — 74018 RADEX ABDOMEN 1 VIEW: CPT

## 2017-02-17 RX ORDER — ACETAMINOPHEN 500 MG
2 TABLET ORAL
Qty: 0 | Refills: 0 | COMMUNITY
Start: 2017-02-17

## 2017-02-17 RX ADMIN — Medication 88 MICROGRAM(S): at 06:27

## 2017-02-17 RX ADMIN — Medication 1 TABLET(S): at 12:15

## 2017-02-17 RX ADMIN — GABAPENTIN 300 MILLIGRAM(S): 400 CAPSULE ORAL at 06:27

## 2017-02-17 RX ADMIN — NYSTATIN CREAM 1 APPLICATION(S): 100000 CREAM TOPICAL at 12:15

## 2017-02-17 RX ADMIN — FLUTICASONE PROPIONATE AND SALMETEROL 1 DOSE(S): 50; 250 POWDER ORAL; RESPIRATORY (INHALATION) at 06:28

## 2017-02-17 RX ADMIN — FONDAPARINUX SODIUM 2.5 MILLIGRAM(S): 2.5 INJECTION, SOLUTION SUBCUTANEOUS at 12:16

## 2017-02-17 RX ADMIN — PANTOPRAZOLE SODIUM 40 MILLIGRAM(S): 20 TABLET, DELAYED RELEASE ORAL at 09:51

## 2017-02-17 RX ADMIN — GABAPENTIN 300 MILLIGRAM(S): 400 CAPSULE ORAL at 13:06

## 2017-02-17 RX ADMIN — TIOTROPIUM BROMIDE 1 CAPSULE(S): 18 CAPSULE ORAL; RESPIRATORY (INHALATION) at 12:15

## 2017-02-17 NOTE — DISCHARGE NOTE ADULT - HOSPITAL COURSE
76F w/ hx of diverticulitis s/p Ruben's, multiple SBOs s/p small bowel resection (60cm removed in July 2015), enterocutaneous fistula s/p takedown, presented complaining of high output from her multiple abdominal fistulae as well as her end colostomy as well as a new ECF. She was admitted to the surgical floor and had IV hydration, 76F w/ hx of diverticulitis s/p Ruben's, multiple SBOs s/p small bowel resection (60cm removed in July 2015), enterocutaneous fistula s/p takedown, presented complaining of high output from her multiple abdominal fistulae as well as her end colostomy as well as a new ECF which was also seen on CT. She was admitted to the surgical floor and had IV hydration. Her fistulae were pouched by wound care, who continued to manage her throughout her hospital stay. Her ECF output decreased. On HD 7 she underwent an injection of contrast into the ECF site which showed a small amorphous subcutaneous cavity with contrast extravasation at the superior access sites suggesting communication. Contrast extended superomedially into adjacent small bowel consistent with fistulous communication to small bowel at that location. No other fistula to bowel were identified.    It was decided that the PT would have a plug created with IR for her ECF, however as this plug would take 2-3 weeks to be created, it was decided that the pt would be discharged home.     At the time of discharge, the patient was hemodynamically stable, was tolerating PO diet, was voiding urine and passing stool, was ambulating, and was comfortable with adequate pain control. The patient was instructed to follow up with Dr. Lawton within 1-2 weeks after discharge from the hospital. The patient/family felt comfortable with discharge. The patient was discharged to home/rehab. The patient had no other issues. 76F w/ hx of diverticulitis s/p Ruben's, multiple SBOs s/p small bowel resection (60cm removed in July 2015), enterocutaneous fistula s/p takedown, presented complaining of high output from her multiple abdominal fistulae as well as her end colostomy as well as a new ECF which was also seen on CT. She was admitted to the surgical floor and had IV hydration. Her fistulae were pouched by wound care, who continued to manage her throughout her hospital stay. Her ECF output decreased. On HD 7 she underwent an injection of contrast into the ECF site which showed a small amorphous subcutaneous cavity with contrast extravasation at the superior access sites suggesting communication. Contrast extended superomedially into adjacent small bowel consistent with fistulous communication to small bowel at that location. No other fistula to bowel were identified.    It was decided that the PT would have a plug created with IR for her ECF, however as this plug would take 2-3 weeks to be created, it was decided that the pt would be discharged home.     At the time of discharge, the patient was hemodynamically stable, was tolerating PO diet, was voiding urine and passing stool, was ambulating, and was comfortable with adequate pain control. The patient was instructed to follow up with Dr. Lawton within 1-2 weeks after discharge from the hospital. The patient/family felt comfortable with discharge. The patient was discharged to home. The patient had no other issues.

## 2017-02-17 NOTE — DISCHARGE NOTE ADULT - PLAN OF CARE
Please follow up with Dr. Lawton within 1-2 weeks after discharge from the hospital. You may call [phone] to schedule an appointment. Healthcare maintenance Please follow up with your primary care physician regarding your hospitalization. Please schedule an appointment with your primary care provider within two weeks after your discharge to review your hospital course. recover from high output Please follow up with Dr. Lawton within 1-2 weeks after discharge from the hospital. You may call [phone] to schedule an appointment.  Activity and diet as tolerated

## 2017-02-17 NOTE — DISCHARGE NOTE ADULT - PATIENT PORTAL LINK FT
“You can access the Follow[facility] Patient Portal, offered by [facility], by registering with the following website: [email or web address]”

## 2017-02-17 NOTE — DISCHARGE NOTE ADULT - ADDITIONAL INSTRUCTIONS
Please follow up with Dr. Lawton within 1-2 weeks after discharge from the hospital. You may call [phone] to schedule an appointment.  Activity and diet as tolerated Please follow up with Dr. Lawton within 1-2 weeks after discharge from the hospital. You may call [phone] to schedule an appointment.  Activity and diet as tolerated    Please continue to pouch your wound

## 2017-02-17 NOTE — DISCHARGE NOTE ADULT - CARE PROVIDERS DIRECT ADDRESSES
[email or web address]

## 2017-02-17 NOTE — DISCHARGE NOTE ADULT - CARE PLAN
Principal Discharge DX:	Colostomy in place  Instructions for follow-up, activity and diet:	Please follow up with Dr. Lawton within 1-2 weeks after discharge from the hospital. You may call [phone] to schedule an appointment. Principal Discharge DX:	Colostomy in place  Goal:	recover from high output  Instructions for follow-up, activity and diet:	Please follow up with Dr. Lawton within 1-2 weeks after discharge from the hospital. You may call [phone] to schedule an appointment.  Activity and diet as tolerated  Goal:	Healthcare maintenance  Instructions for follow-up, activity and diet:	Please follow up with your primary care physician regarding your hospitalization. Please schedule an appointment with your primary care provider within two weeks after your discharge to review your hospital course. Principal Discharge DX:	Colostomy in place  Goal:	recover from high output  Instructions for follow-up, activity and diet:	Please follow up with Dr. Lawton within 1-2 weeks after discharge from the hospital. You may call [phone] to schedule an appointment.  Activity and diet as tolerated  Goal:	Healthcare maintenance  Instructions for follow-up, activity and diet:	Please follow up with your primary care physician regarding your hospitalization. Please schedule an appointment with your primary care provider within two weeks after your discharge to review your hospital course. Principal Discharge DX:	Colostomy in place  Goal:	recover from high output  Instructions for follow-up, activity and diet:	Please follow up with Dr. Lawton within 1-2 weeks after discharge from the hospital. You may call [phone] to schedule an appointment.  Activity and diet as tolerated  Goal:	Healthcare maintenance  Instructions for follow-up, activity and diet:	Please follow up with your primary care physician regarding your hospitalization. Please schedule an appointment with your primary care provider within two weeks after your discharge to review your hospital course. Principal Discharge DX:	Colostomy in place  Goal:	recover from high output  Instructions for follow-up, activity and diet:	Please follow up with Dr. Lawton within 1-2 weeks after discharge from the hospital. You may call [phone] to schedule an appointment.  Activity and diet as tolerated  Goal:	Healthcare maintenance  Instructions for follow-up, activity and diet:	Please follow up with your primary care physician regarding your hospitalization. Please schedule an appointment with your primary care provider within two weeks after your discharge to review your hospital course.

## 2017-02-17 NOTE — DISCHARGE NOTE ADULT - CARE PROVIDER_API CALL
Charlotte Lawton), Surgery  Critical Care  [address] [place] [address]  Phone: [phone]  Fax: [phone]

## 2017-02-17 NOTE — DISCHARGE NOTE ADULT - MEDICATION SUMMARY - MEDICATIONS TO TAKE
I will START or STAY ON the medications listed below when I get home from the hospital:    acetaminophen 325 mg oral tablet  -- 2 tab(s) by mouth every 6 hours, As needed, Mild Pain (1 - 3)  -- Indication: For Home med    gabapentin 300 mg oral capsule  -- 1 cap(s) by mouth 3 times a day  -- Indication: For Home med    tiotropium 18 mcg inhalation capsule  -- 1 cap(s) inhaled once a day  -- Indication: For Home med    fluticasone-salmeterol 250 mcg-50 mcg inhalation powder  -- 1 puff(s) inhaled 2 times a day  -- Indication: For Home med    sodium chloride 0.9% injectable solution  -- 100 milliliter(s) intravenous 3 times a week  on Monday, Wednesday, Friday for a total of 1000 ml each time.   -- Indication: For Home med    omeprazole 20 mg oral delayed release capsule  -- 1 cap(s) by mouth once a day  -- Indication: For Home med    levothyroxine 88 mcg (0.088 mg) oral tablet  -- 1 tab(s) by mouth once a day  -- Indication: For Home med    calcium-vitamin D 500 mg-200 intl units oral tablet  -- 1 tab(s) by mouth once a day  -- Indication: For Home med    Multiple Vitamins oral tablet  -- 1 tab(s) by mouth once a day  -- Indication: For Home med

## 2017-02-28 ENCOUNTER — APPOINTMENT (OUTPATIENT)
Dept: TRAUMA SURGERY | Facility: CLINIC | Age: 77
End: 2017-02-28

## 2017-02-28 VITALS
TEMPERATURE: 97.8 F | BODY MASS INDEX: 32.05 KG/M2 | HEIGHT: 59 IN | WEIGHT: 159 LBS | HEART RATE: 108 BPM | SYSTOLIC BLOOD PRESSURE: 87 MMHG | DIASTOLIC BLOOD PRESSURE: 59 MMHG

## 2017-04-13 ENCOUNTER — OUTPATIENT (OUTPATIENT)
Dept: OUTPATIENT SERVICES | Facility: HOSPITAL | Age: 77
LOS: 1 days | End: 2017-04-13
Payer: MEDICARE

## 2017-04-13 VITALS
SYSTOLIC BLOOD PRESSURE: 119 MMHG | HEIGHT: 59 IN | HEART RATE: 75 BPM | OXYGEN SATURATION: 100 % | RESPIRATION RATE: 20 BRPM | DIASTOLIC BLOOD PRESSURE: 74 MMHG | TEMPERATURE: 97 F

## 2017-04-13 DIAGNOSIS — Z01.818 ENCOUNTER FOR OTHER PREPROCEDURAL EXAMINATION: ICD-10-CM

## 2017-04-13 DIAGNOSIS — K63.1 PERFORATION OF INTESTINE (NONTRAUMATIC): Chronic | ICD-10-CM

## 2017-04-13 DIAGNOSIS — Z93.3 COLOSTOMY STATUS: Chronic | ICD-10-CM

## 2017-04-13 DIAGNOSIS — L98.8 OTHER SPECIFIED DISORDERS OF THE SKIN AND SUBCUTANEOUS TISSUE: ICD-10-CM

## 2017-04-13 DIAGNOSIS — K63.2 FISTULA OF INTESTINE: ICD-10-CM

## 2017-04-13 LAB
ANION GAP SERPL CALC-SCNC: 15 MMOL/L — SIGNIFICANT CHANGE UP (ref 5–17)
BUN SERPL-MCNC: 24 MG/DL — HIGH (ref 7–23)
CALCIUM SERPL-MCNC: 9.4 MG/DL — SIGNIFICANT CHANGE UP (ref 8.4–10.5)
CHLORIDE SERPL-SCNC: 102 MMOL/L — SIGNIFICANT CHANGE UP (ref 96–108)
CO2 SERPL-SCNC: 22 MMOL/L — SIGNIFICANT CHANGE UP (ref 22–31)
CREAT SERPL-MCNC: 1.67 MG/DL — HIGH (ref 0.5–1.3)
GLUCOSE SERPL-MCNC: 108 MG/DL — HIGH (ref 70–99)
HCT VFR BLD CALC: 35.4 % — SIGNIFICANT CHANGE UP (ref 34.5–45)
HGB BLD-MCNC: 11.1 G/DL — LOW (ref 11.5–15.5)
MCHC RBC-ENTMCNC: 31.2 PG — SIGNIFICANT CHANGE UP (ref 27–34)
MCHC RBC-ENTMCNC: 31.4 GM/DL — LOW (ref 32–36)
MCV RBC AUTO: 99.4 FL — SIGNIFICANT CHANGE UP (ref 80–100)
PLATELET # BLD AUTO: 248 K/UL — SIGNIFICANT CHANGE UP (ref 150–400)
POTASSIUM SERPL-MCNC: 4.3 MMOL/L — SIGNIFICANT CHANGE UP (ref 3.5–5.3)
POTASSIUM SERPL-SCNC: 4.3 MMOL/L — SIGNIFICANT CHANGE UP (ref 3.5–5.3)
RBC # BLD: 3.56 M/UL — LOW (ref 3.8–5.2)
RBC # FLD: 13.9 % — SIGNIFICANT CHANGE UP (ref 10.3–14.5)
SODIUM SERPL-SCNC: 139 MMOL/L — SIGNIFICANT CHANGE UP (ref 135–145)
WBC # BLD: 6.04 K/UL — SIGNIFICANT CHANGE UP (ref 3.8–10.5)
WBC # FLD AUTO: 6.04 K/UL — SIGNIFICANT CHANGE UP (ref 3.8–10.5)

## 2017-04-13 PROCEDURE — G0463: CPT

## 2017-04-13 PROCEDURE — 80048 BASIC METABOLIC PNL TOTAL CA: CPT

## 2017-04-13 PROCEDURE — 85027 COMPLETE CBC AUTOMATED: CPT

## 2017-04-13 RX ORDER — OMEPRAZOLE 10 MG/1
1 CAPSULE, DELAYED RELEASE ORAL
Qty: 0 | Refills: 0 | COMMUNITY

## 2017-04-13 RX ORDER — PANTOPRAZOLE SODIUM 20 MG/1
1 TABLET, DELAYED RELEASE ORAL
Qty: 0 | Refills: 0 | COMMUNITY

## 2017-04-13 NOTE — H&P PST ADULT - NSANTHOSAYNRD_GEN_A_CORE
No. HUNG screening performed.  STOP BANG Legend: 0-2 = LOW Risk; 3-4 = INTERMEDIATE Risk; 5-8 = HIGH Risk

## 2017-04-13 NOTE — H&P PST ADULT - PMH
Chronic diastolic CHF (congestive heart failure)    Chronic obstructive pulmonary disease (COPD)    Clostridium difficile infection    Colostomy in place  s/p duarte procedure for diverticulitis  Diverticulitis of intestine with abscess without bleeding  2013  DVT (deep venous thrombosis)  s/p IVC filter, which was later removed  Enterocutaneous fistula    GERD (gastroesophageal reflux disease)    HIT (heparin-induced thrombocytopenia)    Hypothyroid    Orthostatic hypotension    Osteoarthritis of both knees, unspecified osteoarthritis type    Peripheral neuropathy Chronic diastolic CHF (congestive heart failure)  mild diastolic dysfunction  Chronic obstructive pulmonary disease (COPD)    Clostridium difficile infection    Colostomy in place  s/p duarte procedure for diverticulitis  Diverticulitis of intestine with abscess without bleeding  2013  DVT (deep venous thrombosis)  s/p IVC filter, which was later removed  Enterocutaneous fistula    GERD (gastroesophageal reflux disease)    HIT (heparin-induced thrombocytopenia)    Hypothyroid    Orthostatic hypotension    Osteoarthritis of both knees, unspecified osteoarthritis type    Peripheral neuropathy    Pulmonary embolism

## 2017-04-13 NOTE — H&P PST ADULT - HISTORY OF PRESENT ILLNESS
75 y/o F PMH B/L lower extremity DVT, S/P IVC filter 2015, diverticulitis, S/P Ruben's procedure, 75 y/o F PMH B/L lower extremity DVT, pulmonary embolism as per patient report, S/P IVC filter 2015, developed HIT with Heparin, diverticulitis, S/P Ruben's procedure, multiple SBO, S/P small bowel resection, S/P takedown, formation of colostomy, enterocutaneous fistula, wound dehiscence, S/P abdominal reconstruction.  Presents today for closure of GI fistula.

## 2017-04-25 ENCOUNTER — INPATIENT (INPATIENT)
Facility: HOSPITAL | Age: 77
LOS: 1 days | Discharge: NURSING, REHAB OR LONG-TERM CARE | DRG: 394 | End: 2017-04-27
Attending: SURGERY | Admitting: SURGERY
Payer: MEDICARE

## 2017-04-25 VITALS
OXYGEN SATURATION: 98 % | DIASTOLIC BLOOD PRESSURE: 64 MMHG | SYSTOLIC BLOOD PRESSURE: 129 MMHG | RESPIRATION RATE: 16 BRPM | TEMPERATURE: 97 F | HEART RATE: 83 BPM

## 2017-04-25 DIAGNOSIS — K63.2 FISTULA OF INTESTINE: ICD-10-CM

## 2017-04-25 DIAGNOSIS — Z93.3 COLOSTOMY STATUS: Chronic | ICD-10-CM

## 2017-04-25 DIAGNOSIS — K63.1 PERFORATION OF INTESTINE (NONTRAUMATIC): Chronic | ICD-10-CM

## 2017-04-25 PROCEDURE — 49999 UNLISTED PX ABD PERTM&OMN: CPT

## 2017-04-25 PROCEDURE — 76080 X-RAY EXAM OF FISTULA: CPT | Mod: 26

## 2017-04-25 PROCEDURE — 20501 NJX SINUS TRACT DIAGNOSTIC: CPT

## 2017-04-26 ENCOUNTER — TRANSCRIPTION ENCOUNTER (OUTPATIENT)
Age: 77
End: 2017-04-26

## 2017-04-26 LAB
ANION GAP SERPL CALC-SCNC: 12 MMOL/L — SIGNIFICANT CHANGE UP (ref 5–17)
APTT BLD: 29.6 SEC — SIGNIFICANT CHANGE UP (ref 27.5–37.4)
BUN SERPL-MCNC: 26 MG/DL — HIGH (ref 7–23)
CALCIUM SERPL-MCNC: 9.1 MG/DL — SIGNIFICANT CHANGE UP (ref 8.4–10.5)
CHLORIDE SERPL-SCNC: 105 MMOL/L — SIGNIFICANT CHANGE UP (ref 96–108)
CO2 SERPL-SCNC: 26 MMOL/L — SIGNIFICANT CHANGE UP (ref 22–31)
CREAT SERPL-MCNC: 1.6 MG/DL — HIGH (ref 0.5–1.3)
GLUCOSE SERPL-MCNC: 120 MG/DL — HIGH (ref 70–99)
HCT VFR BLD CALC: 30.6 % — LOW (ref 34.5–45)
HGB BLD-MCNC: 10.3 G/DL — LOW (ref 11.5–15.5)
INR BLD: 1.1 RATIO — SIGNIFICANT CHANGE UP (ref 0.88–1.16)
MCHC RBC-ENTMCNC: 33.6 PG — SIGNIFICANT CHANGE UP (ref 27–34)
MCHC RBC-ENTMCNC: 33.9 GM/DL — SIGNIFICANT CHANGE UP (ref 32–36)
MCV RBC AUTO: 99.1 FL — SIGNIFICANT CHANGE UP (ref 80–100)
PLATELET # BLD AUTO: 156 K/UL — SIGNIFICANT CHANGE UP (ref 150–400)
POTASSIUM SERPL-MCNC: 4.4 MMOL/L — SIGNIFICANT CHANGE UP (ref 3.5–5.3)
POTASSIUM SERPL-SCNC: 4.4 MMOL/L — SIGNIFICANT CHANGE UP (ref 3.5–5.3)
PROTHROM AB SERPL-ACNC: 12 SEC — SIGNIFICANT CHANGE UP (ref 9.8–12.7)
RBC # BLD: 3.08 M/UL — LOW (ref 3.8–5.2)
RBC # FLD: 12.6 % — SIGNIFICANT CHANGE UP (ref 10.3–14.5)
SODIUM SERPL-SCNC: 143 MMOL/L — SIGNIFICANT CHANGE UP (ref 135–145)
WBC # BLD: 6.9 K/UL — SIGNIFICANT CHANGE UP (ref 3.8–10.5)
WBC # FLD AUTO: 6.9 K/UL — SIGNIFICANT CHANGE UP (ref 3.8–10.5)

## 2017-04-26 PROCEDURE — 99231 SBSQ HOSP IP/OBS SF/LOW 25: CPT

## 2017-04-26 PROCEDURE — 99232 SBSQ HOSP IP/OBS MODERATE 35: CPT

## 2017-04-26 RX ORDER — SODIUM CHLORIDE 9 MG/ML
1000 INJECTION, SOLUTION INTRAVENOUS
Qty: 0 | Refills: 0 | Status: DISCONTINUED | OUTPATIENT
Start: 2017-04-26 | End: 2017-04-27

## 2017-04-26 RX ORDER — TIOTROPIUM BROMIDE 18 UG/1
1 CAPSULE ORAL; RESPIRATORY (INHALATION) DAILY
Qty: 0 | Refills: 0 | Status: DISCONTINUED | OUTPATIENT
Start: 2017-04-26 | End: 2017-04-27

## 2017-04-26 RX ORDER — PANTOPRAZOLE SODIUM 20 MG/1
40 TABLET, DELAYED RELEASE ORAL
Qty: 0 | Refills: 0 | Status: DISCONTINUED | OUTPATIENT
Start: 2017-04-26 | End: 2017-04-27

## 2017-04-26 RX ORDER — BUDESONIDE AND FORMOTEROL FUMARATE DIHYDRATE 160; 4.5 UG/1; UG/1
2 AEROSOL RESPIRATORY (INHALATION)
Qty: 0 | Refills: 0 | Status: DISCONTINUED | OUTPATIENT
Start: 2017-04-26 | End: 2017-04-27

## 2017-04-26 RX ORDER — GABAPENTIN 400 MG/1
300 CAPSULE ORAL THREE TIMES A DAY
Qty: 0 | Refills: 0 | Status: DISCONTINUED | OUTPATIENT
Start: 2017-04-26 | End: 2017-04-27

## 2017-04-26 RX ORDER — LEVOTHYROXINE SODIUM 125 MCG
88 TABLET ORAL DAILY
Qty: 0 | Refills: 0 | Status: DISCONTINUED | OUTPATIENT
Start: 2017-04-26 | End: 2017-04-27

## 2017-04-26 RX ORDER — FONDAPARINUX SODIUM 2.5 MG/.5ML
2.5 INJECTION, SOLUTION SUBCUTANEOUS DAILY
Qty: 0 | Refills: 0 | Status: DISCONTINUED | OUTPATIENT
Start: 2017-04-26 | End: 2017-04-27

## 2017-04-26 RX ADMIN — BUDESONIDE AND FORMOTEROL FUMARATE DIHYDRATE 2 PUFF(S): 160; 4.5 AEROSOL RESPIRATORY (INHALATION) at 17:10

## 2017-04-26 RX ADMIN — GABAPENTIN 300 MILLIGRAM(S): 400 CAPSULE ORAL at 06:24

## 2017-04-26 RX ADMIN — GABAPENTIN 300 MILLIGRAM(S): 400 CAPSULE ORAL at 13:06

## 2017-04-26 RX ADMIN — GABAPENTIN 300 MILLIGRAM(S): 400 CAPSULE ORAL at 22:12

## 2017-04-26 RX ADMIN — PANTOPRAZOLE SODIUM 40 MILLIGRAM(S): 20 TABLET, DELAYED RELEASE ORAL at 06:24

## 2017-04-26 RX ADMIN — SODIUM CHLORIDE 75 MILLILITER(S): 9 INJECTION, SOLUTION INTRAVENOUS at 06:29

## 2017-04-26 RX ADMIN — BUDESONIDE AND FORMOTEROL FUMARATE DIHYDRATE 2 PUFF(S): 160; 4.5 AEROSOL RESPIRATORY (INHALATION) at 08:57

## 2017-04-26 RX ADMIN — TIOTROPIUM BROMIDE 1 CAPSULE(S): 18 CAPSULE ORAL; RESPIRATORY (INHALATION) at 11:11

## 2017-04-26 RX ADMIN — Medication 88 MICROGRAM(S): at 06:24

## 2017-04-26 NOTE — DISCHARGE NOTE ADULT - CARE PROVIDER_API CALL
Charlotte Lawton), Surgery  Critical Care  [address] [place] [address]  Phone: [phone]  Fax: [phone]

## 2017-04-26 NOTE — DISCHARGE NOTE ADULT - CARE PROVIDERS DIRECT ADDRESSES
[email or web address]

## 2017-04-26 NOTE — DISCHARGE NOTE ADULT - MEDICATION SUMMARY - MEDICATIONS TO TAKE
I will START or STAY ON the medications listed below when I get home from the hospital:    gabapentin 300 mg oral capsule  -- 1 cap(s) by mouth 3 times a day  -- Indication: For neuropathy    fluticasone-salmeterol 250 mcg-50 mcg inhalation powder  -- 1 puff(s) inhaled 2 times a day  -- Indication: For respiratory    tiotropium 18 mcg inhalation capsule  -- 1 cap(s) inhaled once a day  -- Indication: For respiratory    Protonix 40 mg oral delayed release tablet  -- 1 tab(s) by mouth once a day  -- Indication: For reflux    levothyroxine 88 mcg (0.088 mg) oral tablet  -- 1 tab(s) by mouth once a day  -- Indication: For hypothyorid    calcium-vitamin D 500 mg-200 intl units oral tablet  -- 1 tab(s) by mouth once a day  -- Indication: For supplement    Multiple Vitamins oral tablet  -- 1 tab(s) by mouth once a day  -- Indication: For supplement I will START or STAY ON the medications listed below when I get home from the hospital:    fondaparinux  -- 2.5 milligram(s) subcutaneous once a day  -- Indication: For DVT ppx    gabapentin 300 mg oral capsule  -- 1 cap(s) by mouth 3 times a day  -- Indication: For neuropathy    fluticasone-salmeterol 250 mcg-50 mcg inhalation powder  -- 1 puff(s) inhaled 2 times a day  -- Indication: For respiratory    tiotropium 18 mcg inhalation capsule  -- 1 cap(s) inhaled once a day  -- Indication: For respiratory    Protonix 40 mg oral delayed release tablet  -- 1 tab(s) by mouth once a day  -- Indication: For reflux    levothyroxine 88 mcg (0.088 mg) oral tablet  -- 1 tab(s) by mouth once a day  -- Indication: For hypothyorid    calcium-vitamin D 500 mg-200 intl units oral tablet  -- 1 tab(s) by mouth once a day  -- Indication: For supplement    Multiple Vitamins oral tablet  -- 1 tab(s) by mouth once a day  -- Indication: For supplement

## 2017-04-26 NOTE — DISCHARGE NOTE ADULT - HOSPITAL COURSE
75 y/o F PMH B/L lower extremity DVT, pulmonary embolism as per patient report, S/P IVC filter 2015, developed HIT with Heparin, diverticulitis, S/P Ruben's procedure, multiple SBO, S/P small bowel resection, S/P takedown, formation of colostomy, enterocutaneous fistula, wound dehiscence, S/P abdominal reconstruction.  Presents today for closure of GI fistula.    Pt underwent  IR fistula plug placement.  Pt. tolerated procedure well and was transferred to the recovery room and then the floor without incidence.  Pt. was mainly managed for post procedural pain, wound care, as well as close monitoring of fluid resuscitation and return of GI function.  Pt has been tolerating a diet, voiding, ambulating, and the pain is now well controlled.   Pt. is ready for discharge home in stable condition, and will follow up within one to two weeks as an outpatient with IR

## 2017-04-26 NOTE — DISCHARGE NOTE ADULT - CARE PLAN
Principal Discharge DX:	Fistula  Goal:	post procedural pain control, tolerate diet, local surgical incision wound care  Instructions for follow-up, activity and diet:	Interventional Radiology in one week.  Call their office [phone] for appointment, and schedule necessary imaging ie: CT scan of abdomen and pelvis if necessary, prior to follow up.  Please discuss with their office when any necessary imaging should be scheduled, when calling to schedule your follow up office appointment.    May call. Dr. Lawton,  Acute Care Surgery [phone] for questions regarding this hospitalization. Principal Discharge DX:	Fistula  Goal:	post procedural pain control, tolerate diet, local surgical incision wound care  Instructions for follow-up, activity and diet:	Interventional Radiology in one week.  Call their office [phone] for appointment, and schedule necessary imaging ie: CT scan of abdomen and pelvis if necessary, prior to follow up.  Please discuss with their office when any necessary imaging should be scheduled, when calling to schedule your follow up office appointment.    May call. Dr. Lawton,  Acute [facility] [phone] for questions regarding this hospitalization. Please call her office to make an appointment for two weeks after discharge. Principal Discharge DX:	Fistula  Goal:	post procedural pain control, tolerate diet, local surgical incision wound care  Instructions for follow-up, activity and diet:	Interventional Radiology in one week.  Call their office [phone] for appointment, and schedule necessary imaging ie: CT scan of abdomen and pelvis if necessary, prior to follow up.  Please discuss with their office when any necessary imaging should be scheduled, when calling to schedule your follow up office appointment.    May call. Dr. Lawton,  Acute [facility] Surgery [phone] for questions regarding this hospitalization. Please call her office to make an appointment for two weeks after discharge. Principal Discharge DX:	Fistula  Goal:	post procedural pain control, tolerate diet, local surgical incision wound care  Instructions for follow-up, activity and diet:	Interventional Radiology in one week.  Call their office [phone] for appointment, and schedule necessary imaging ie: CT scan of abdomen and pelvis if necessary, prior to follow up.  Please discuss with their office when any necessary imaging should be scheduled, when calling to schedule your follow up office appointment.    May call. Dr. Lawton,  Acute [facility] Surgery [phone] for questions regarding this hospitalization. Please call her office to make an appointment for two weeks after discharge. Principal Discharge DX:	Fistula  Goal:	post procedural pain control, tolerate diet, local surgical incision wound care  Instructions for follow-up, activity and diet:	Interventional Radiology in one week.  Call their office [phone] for appointment, and schedule necessary imaging ie: CT scan of abdomen and pelvis if necessary, prior to follow up.  Please discuss with their office when any necessary imaging should be scheduled, when calling to schedule your follow up office appointment.    May call. Dr. Lawton,  Acute [facility] Surgery [phone] for questions regarding this hospitalization. Please call her office to make an appointment for two weeks after discharge.

## 2017-04-26 NOTE — DISCHARGE NOTE ADULT - PLAN OF CARE
post procedural pain control, tolerate diet, local surgical incision wound care Interventional Radiology in one week.  Call their office [phone] for appointment, and schedule necessary imaging ie: CT scan of abdomen and pelvis if necessary, prior to follow up.  Please discuss with their office when any necessary imaging should be scheduled, when calling to schedule your follow up office appointment.    May call. Dr. Lawton,  Acute Care Surgery [phone] for questions regarding this hospitalization. Interventional Radiology in one week.  Call their office [phone] for appointment, and schedule necessary imaging ie: CT scan of abdomen and pelvis if necessary, prior to follow up.  Please discuss with their office when any necessary imaging should be scheduled, when calling to schedule your follow up office appointment.    May call. Dr. Lawton,  Acute Care Surgery [phone] for questions regarding this hospitalization. Please call her office to make an appointment for two weeks after discharge.

## 2017-04-26 NOTE — DISCHARGE NOTE ADULT - PATIENT PORTAL LINK FT
“You can access the Follow[employer] Patient Portal, offered by [facility], by registering with the following website: [email or web address]”

## 2017-04-27 VITALS
HEART RATE: 87 BPM | SYSTOLIC BLOOD PRESSURE: 118 MMHG | TEMPERATURE: 98 F | DIASTOLIC BLOOD PRESSURE: 80 MMHG | RESPIRATION RATE: 18 BRPM | OXYGEN SATURATION: 96 %

## 2017-04-27 PROCEDURE — 85730 THROMBOPLASTIN TIME PARTIAL: CPT

## 2017-04-27 PROCEDURE — C1769: CPT

## 2017-04-27 PROCEDURE — C1887: CPT

## 2017-04-27 PROCEDURE — 76080 X-RAY EXAM OF FISTULA: CPT

## 2017-04-27 PROCEDURE — C1894: CPT

## 2017-04-27 PROCEDURE — 99231 SBSQ HOSP IP/OBS SF/LOW 25: CPT

## 2017-04-27 PROCEDURE — 85027 COMPLETE CBC AUTOMATED: CPT

## 2017-04-27 PROCEDURE — 20501 NJX SINUS TRACT DIAGNOSTIC: CPT

## 2017-04-27 PROCEDURE — 80048 BASIC METABOLIC PNL TOTAL CA: CPT

## 2017-04-27 PROCEDURE — 85610 PROTHROMBIN TIME: CPT

## 2017-04-27 PROCEDURE — 94640 AIRWAY INHALATION TREATMENT: CPT

## 2017-04-27 RX ORDER — FONDAPARINUX SODIUM 2.5 MG/.5ML
2.5 INJECTION, SOLUTION SUBCUTANEOUS
Qty: 0 | Refills: 0 | COMMUNITY
Start: 2017-04-27

## 2017-04-27 RX ADMIN — FONDAPARINUX SODIUM 2.5 MILLIGRAM(S): 2.5 INJECTION, SOLUTION SUBCUTANEOUS at 11:26

## 2017-04-27 RX ADMIN — Medication 88 MICROGRAM(S): at 05:29

## 2017-04-27 RX ADMIN — TIOTROPIUM BROMIDE 1 CAPSULE(S): 18 CAPSULE ORAL; RESPIRATORY (INHALATION) at 11:27

## 2017-04-27 RX ADMIN — PANTOPRAZOLE SODIUM 40 MILLIGRAM(S): 20 TABLET, DELAYED RELEASE ORAL at 05:29

## 2017-04-27 RX ADMIN — BUDESONIDE AND FORMOTEROL FUMARATE DIHYDRATE 2 PUFF(S): 160; 4.5 AEROSOL RESPIRATORY (INHALATION) at 05:29

## 2017-04-27 RX ADMIN — GABAPENTIN 300 MILLIGRAM(S): 400 CAPSULE ORAL at 05:29

## 2017-04-27 RX ADMIN — GABAPENTIN 300 MILLIGRAM(S): 400 CAPSULE ORAL at 13:01

## 2017-05-11 ENCOUNTER — APPOINTMENT (OUTPATIENT)
Dept: INTERVENTIONAL RADIOLOGY/VASCULAR | Facility: CLINIC | Age: 77
End: 2017-05-11

## 2017-05-11 VITALS
SYSTOLIC BLOOD PRESSURE: 115 MMHG | TEMPERATURE: 98 F | HEART RATE: 88 BPM | BODY MASS INDEX: 33.06 KG/M2 | DIASTOLIC BLOOD PRESSURE: 75 MMHG | RESPIRATION RATE: 16 BRPM | HEIGHT: 59 IN | WEIGHT: 164 LBS

## 2017-05-11 DIAGNOSIS — Z87.19 PERSONAL HISTORY OF OTHER DISEASES OF THE DIGESTIVE SYSTEM: ICD-10-CM

## 2017-05-11 DIAGNOSIS — K46.9 UNSPECIFIED ABDOMINAL HERNIA W/OUT OBSTRUCTION OR GANGRENE: ICD-10-CM

## 2017-05-11 DIAGNOSIS — Z78.9 OTHER SPECIFIED HEALTH STATUS: ICD-10-CM

## 2017-05-11 DIAGNOSIS — Z63.4 DISAPPEARANCE AND DEATH OF FAMILY MEMBER: ICD-10-CM

## 2017-05-11 DIAGNOSIS — D75.82 HEPARIN INDUCED THROMBOCYTOPENIA (HIT): ICD-10-CM

## 2017-05-11 DIAGNOSIS — J44.9 CHRONIC OBSTRUCTIVE PULMONARY DISEASE, UNSPECIFIED: ICD-10-CM

## 2017-05-11 DIAGNOSIS — E03.9 HYPOTHYROIDISM, UNSPECIFIED: ICD-10-CM

## 2017-05-11 DIAGNOSIS — Z86.711 PERSONAL HISTORY OF PULMONARY EMBOLISM: ICD-10-CM

## 2017-05-11 DIAGNOSIS — K63.2 FISTULA OF INTESTINE: ICD-10-CM

## 2017-05-11 DIAGNOSIS — I82.493 ACUTE EMBOLISM AND THROMBOSIS OF OTHER SPECIFIED DEEP VEIN OF LOWER EXTREMITY, BILATERAL: ICD-10-CM

## 2017-05-11 DIAGNOSIS — G62.9 POLYNEUROPATHY, UNSPECIFIED: ICD-10-CM

## 2017-05-11 DIAGNOSIS — Z87.891 PERSONAL HISTORY OF NICOTINE DEPENDENCE: ICD-10-CM

## 2017-05-11 RX ORDER — MULTIVITAMIN
TABLET ORAL DAILY
Refills: 0 | Status: ACTIVE | COMMUNITY

## 2017-05-11 RX ORDER — FLUTICASONE PROPION/SALMETEROL 250-50 MCG
250-50 BLISTER, WITH INHALATION DEVICE INHALATION TWICE DAILY
Refills: 0 | Status: ACTIVE | COMMUNITY

## 2017-05-11 RX ORDER — TOCOPHERSOLAN (VITAMIN E TPGS) 400/15ML
500-250-200 LIQUID (ML) ORAL DAILY
Refills: 0 | Status: ACTIVE | COMMUNITY
Start: 2017-05-11

## 2017-05-11 RX ORDER — PANTOPRAZOLE 40 MG/1
40 TABLET, DELAYED RELEASE ORAL TWICE DAILY
Refills: 0 | Status: ACTIVE | COMMUNITY

## 2017-05-11 RX ORDER — TIOTROPIUM BROMIDE 18 UG/1
CAPSULE ORAL; RESPIRATORY (INHALATION) DAILY
Refills: 0 | Status: ACTIVE | COMMUNITY

## 2017-05-11 SDOH — SOCIAL STABILITY - SOCIAL INSECURITY: DISSAPEARANCE AND DEATH OF FAMILY MEMBER: Z63.4

## 2017-05-12 ENCOUNTER — INPATIENT (INPATIENT)
Facility: HOSPITAL | Age: 77
LOS: 5 days | Discharge: NURSING, REHAB OR LONG-TERM CARE | DRG: 394 | End: 2017-05-18
Attending: SURGERY | Admitting: SURGERY
Payer: MEDICARE

## 2017-05-12 VITALS — DIASTOLIC BLOOD PRESSURE: 88 MMHG | RESPIRATION RATE: 16 BRPM | HEART RATE: 94 BPM | SYSTOLIC BLOOD PRESSURE: 131 MMHG

## 2017-05-12 DIAGNOSIS — Z93.3 COLOSTOMY STATUS: Chronic | ICD-10-CM

## 2017-05-12 DIAGNOSIS — K63.1 PERFORATION OF INTESTINE (NONTRAUMATIC): Chronic | ICD-10-CM

## 2017-05-12 LAB
ALBUMIN SERPL ELPH-MCNC: 3.9 G/DL — SIGNIFICANT CHANGE UP (ref 3.3–5)
ALP SERPL-CCNC: 153 U/L — HIGH (ref 40–120)
ALT FLD-CCNC: 7 U/L RC — LOW (ref 10–45)
APTT BLD: 33.6 SEC — SIGNIFICANT CHANGE UP (ref 27.5–37.4)
AST SERPL-CCNC: 16 U/L — SIGNIFICANT CHANGE UP (ref 10–40)
BASOPHILS # BLD AUTO: 0 K/UL — SIGNIFICANT CHANGE UP (ref 0–0.2)
BASOPHILS NFR BLD AUTO: 0 % — SIGNIFICANT CHANGE UP (ref 0–2)
BILIRUB SERPL-MCNC: 0.2 MG/DL — SIGNIFICANT CHANGE UP (ref 0.2–1.2)
BUN SERPL-MCNC: 27 MG/DL — HIGH (ref 7–23)
CALCIUM SERPL-MCNC: 10.2 MG/DL — SIGNIFICANT CHANGE UP (ref 8.4–10.5)
CHLORIDE SERPL-SCNC: 103 MMOL/L — SIGNIFICANT CHANGE UP (ref 96–108)
CO2 SERPL-SCNC: 25 MMOL/L — SIGNIFICANT CHANGE UP (ref 22–31)
CREAT SERPL-MCNC: 1.77 MG/DL — HIGH (ref 0.5–1.3)
EOSINOPHIL # BLD AUTO: 0.1 K/UL — SIGNIFICANT CHANGE UP (ref 0–0.5)
EOSINOPHIL NFR BLD AUTO: 1.3 % — SIGNIFICANT CHANGE UP (ref 0–6)
GAS PNL BLDV: SIGNIFICANT CHANGE UP
GLUCOSE SERPL-MCNC: 91 MG/DL — SIGNIFICANT CHANGE UP (ref 70–99)
HCT VFR BLD CALC: 39.6 % — SIGNIFICANT CHANGE UP (ref 34.5–45)
HGB BLD-MCNC: 12.6 G/DL — SIGNIFICANT CHANGE UP (ref 11.5–15.5)
INR BLD: 1.05 RATIO — SIGNIFICANT CHANGE UP (ref 0.88–1.16)
LYMPHOCYTES # BLD AUTO: 1.6 K/UL — SIGNIFICANT CHANGE UP (ref 1–3.3)
LYMPHOCYTES # BLD AUTO: 17.9 % — SIGNIFICANT CHANGE UP (ref 13–44)
MCHC RBC-ENTMCNC: 31.8 GM/DL — LOW (ref 32–36)
MCHC RBC-ENTMCNC: 32.1 PG — SIGNIFICANT CHANGE UP (ref 27–34)
MCV RBC AUTO: 101 FL — HIGH (ref 80–100)
MONOCYTES # BLD AUTO: 0.6 K/UL — SIGNIFICANT CHANGE UP (ref 0–0.9)
MONOCYTES NFR BLD AUTO: 6.2 % — SIGNIFICANT CHANGE UP (ref 2–14)
NEUTROPHILS # BLD AUTO: 6.6 K/UL — SIGNIFICANT CHANGE UP (ref 1.8–7.4)
NEUTROPHILS NFR BLD AUTO: 74.5 % — SIGNIFICANT CHANGE UP (ref 43–77)
PLATELET # BLD AUTO: 265 K/UL — SIGNIFICANT CHANGE UP (ref 150–400)
POTASSIUM SERPL-MCNC: 5.2 MMOL/L — SIGNIFICANT CHANGE UP (ref 3.5–5.3)
POTASSIUM SERPL-SCNC: 5.2 MMOL/L — SIGNIFICANT CHANGE UP (ref 3.5–5.3)
PROT SERPL-MCNC: 7.6 G/DL — SIGNIFICANT CHANGE UP (ref 6–8.3)
PROTHROM AB SERPL-ACNC: 11.5 SEC — SIGNIFICANT CHANGE UP (ref 9.8–12.7)
RBC # BLD: 3.91 M/UL — SIGNIFICANT CHANGE UP (ref 3.8–5.2)
RBC # FLD: 12.3 % — SIGNIFICANT CHANGE UP (ref 10.3–14.5)
SODIUM SERPL-SCNC: 141 MMOL/L — SIGNIFICANT CHANGE UP (ref 135–145)
WBC # BLD: 8.9 K/UL — SIGNIFICANT CHANGE UP (ref 3.8–10.5)
WBC # FLD AUTO: 8.9 K/UL — SIGNIFICANT CHANGE UP (ref 3.8–10.5)

## 2017-05-12 PROCEDURE — 99285 EMERGENCY DEPT VISIT HI MDM: CPT | Mod: 25,GC

## 2017-05-12 PROCEDURE — 43753 TX GASTRO INTUB W/ASP: CPT | Mod: GC

## 2017-05-12 PROCEDURE — 74176 CT ABD & PELVIS W/O CONTRAST: CPT | Mod: 26

## 2017-05-12 PROCEDURE — 71010: CPT | Mod: 26

## 2017-05-12 PROCEDURE — 74020: CPT | Mod: 26

## 2017-05-12 RX ORDER — ACETAMINOPHEN 500 MG
1000 TABLET ORAL ONCE
Qty: 0 | Refills: 0 | Status: COMPLETED | OUTPATIENT
Start: 2017-05-12 | End: 2017-05-12

## 2017-05-12 RX ORDER — ONDANSETRON 8 MG/1
4 TABLET, FILM COATED ORAL ONCE
Qty: 0 | Refills: 0 | Status: COMPLETED | OUTPATIENT
Start: 2017-05-12 | End: 2017-05-12

## 2017-05-12 RX ORDER — TETRACAINE/BENZOCAINE/BUTAMBEN 2%-14%-2%
1 OINTMENT (GRAM) TOPICAL ONCE
Qty: 0 | Refills: 0 | Status: COMPLETED | OUTPATIENT
Start: 2017-05-12 | End: 2017-05-12

## 2017-05-12 RX ORDER — SODIUM CHLORIDE 9 MG/ML
500 INJECTION INTRAMUSCULAR; INTRAVENOUS; SUBCUTANEOUS ONCE
Qty: 0 | Refills: 0 | Status: COMPLETED | OUTPATIENT
Start: 2017-05-12 | End: 2017-05-12

## 2017-05-12 RX ADMIN — Medication 1 SPRAY(S): at 23:40

## 2017-05-12 RX ADMIN — SODIUM CHLORIDE 500 MILLILITER(S): 9 INJECTION INTRAMUSCULAR; INTRAVENOUS; SUBCUTANEOUS at 21:26

## 2017-05-12 RX ADMIN — SODIUM CHLORIDE 500 MILLILITER(S): 9 INJECTION INTRAMUSCULAR; INTRAVENOUS; SUBCUTANEOUS at 23:40

## 2017-05-12 RX ADMIN — ONDANSETRON 4 MILLIGRAM(S): 8 TABLET, FILM COATED ORAL at 21:25

## 2017-05-12 RX ADMIN — Medication 400 MILLIGRAM(S): at 21:25

## 2017-05-12 NOTE — ED PROVIDER NOTE - PHYSICAL EXAMINATION
**ATTENDING ADDENDUM (Dr. Emerson Padgett): I have reviewed and substantially contributed to the elements of the PE as documented above. I have directly performed an examination of this patient in conjunction with the other members (EM resident/PA/NP) of the patient care team.

## 2017-05-12 NOTE — ED PROCEDURE NOTE - PROCEDURE ADDITIONAL DETAILS
**ATTENDING ADDENDUM (Dr. Emerson Padgett): I was present during the key portions of the procedure and immediately available during the entire procedure. Agree with plan and management. Anticipatory guidance provided. Of note, and in addition to the above, called by radiology as noted in the EMR re: tube appeared to be "proximal" on the CXR. After time and discussion with patient, inserted tube several centimeters more. Post-procedure CXR ordered to verify repositioning of the NGT at 2:14. **ATTENDING ADDENDUM (Dr. Emerson Padgett): I was present during the key portions of the procedure and immediately available during the entire procedure. Agree with plan and management. Anticipatory guidance provided. Of note, and in addition to the above, called by radiology as noted in the EMR re: tube appeared to be "proximal" on the CXR. After time and discussion with patient, inserted tube several centimeters more. Post-procedure CXR ordered to verify repositioning of the NGT at 2:14 and reviewed. Placement appears adequate at this time.

## 2017-05-12 NOTE — ED PROVIDER NOTE - ABDOMINAL TENDER
diffusely without r/g/r diffusely without r/g/r **ATTENDING ADDENDUM (Dr. Emerson Padgett): agree with notation by EM resident Karley. Also, NO pulsatile or non-pulsatile masses. NO hernias. NO obvious hepatosplenomegaly.

## 2017-05-12 NOTE — ED PROVIDER NOTE - PMH
Chronic diastolic CHF (congestive heart failure)  mild diastolic dysfunction  Chronic obstructive pulmonary disease (COPD)    Clostridium difficile infection    Colostomy in place  s/p duarte procedure for diverticulitis  Diverticulitis of intestine with abscess without bleeding  2013  DVT (deep venous thrombosis)  s/p IVC filter, which was later removed  Enterocutaneous fistula    GERD (gastroesophageal reflux disease)    HIT (heparin-induced thrombocytopenia)    Hypothyroid    Orthostatic hypotension    Osteoarthritis of both knees, unspecified osteoarthritis type    Peripheral neuropathy    Pulmonary embolism

## 2017-05-12 NOTE — ED PROVIDER NOTE - PROGRESS NOTE DETAILS
**ATTENDING ADDENDUM (Dr. Emerson Padgett): patient serially evaluated throughout ED course. NO acute deterioration up to this time in the ED. However, patient still reports pain in the abdomen, associated with distension and decreased output from the colostomy site (ileostomy with plug). Awaiting completion of all ED diagnostics (CT and AXR). Anticipatory guidance provided. Will continue to observe and monitor closely. **ATTENDING ADDENDUM (Dr. Emerson Padgett): AXR and CT reveal evidence of small bowel obstruction. General surgery consultation called. Admission pending completion of consultation. NGT placed by EM resident Karley. Awaiting radiograph to assess placement. Will continue to observe and monitor closely until definitive disposition and placement are finalized. Anticipatory guidance provided. **ATTENDING ADDENDUM (Dr. Emerson Padgett): received call from radiology resident. NGT slightly proximal; requires adjustment. Will readjust and reconfirm positioning. Will continue to observe and monitor closely until definitive placement is performed. **ATTENDING ADDENDUM (Dr. Emerson Padgett): patient tearful and upset with current condition. Does not want manipulation of the NGT at this time. Suction stopped temporarily (so as not to cause adverse effects with malpositioned tube). Risks, benefits and alternatives discussed with patient. Will discuss with surgery as well. Anticipatory guidance provided. Will reapproach patient to see if she will consent to manipulation of the NGT. Will continue to observe and monitor closely until definitive placement is made. Surgical team saw patient **ATTENDING ADDENDUM (Dr. Emerson Padgett): patient reassessed; with increased pain. Attempted repositioning and deeper placement of NGT. Inserted; suction restarted. Portable CXR ordered. Will continue to observe and monitor closely. **ATTENDING ADDENDUM (Dr. Emerson Padgett): patient reassessed; with increased pain. Attempted repositioning and deeper placement of NGT. Inserted; suction restarted. Portable CXR ordered and reviewed. Will continue to observe and monitor closely. Case re-reviewed with general surgery resident. Anticipatory guidance provided. Placement to inpatient unit forthcoming within the next 30-60 minutes.

## 2017-05-12 NOTE — ED PROVIDER NOTE - CONTEXT
**ATTENDING ADDENDUM (Dr. Emerson Padgett): prior history of diverticulitis complicated by perforation requiring ileostomy/colostomy s/p recent plugging of ileostomy

## 2017-05-12 NOTE — ED ADULT NURSE NOTE - OBJECTIVE STATEMENT
76 year old female A&OX3 PMHX of ileostomy, CKD, Anemia, frequent bowel obstructions,  BIBEMS presents with diffuse abdominal pain x 1 day. Patient states that abdominal pain is constant and getting progressively worse. Patient states this morning she had multiple episodes of vomiting. Patient states she no longer feels nauseous but has 10/10 abdominal pain. Patient last changed ileostomy this morning. Patient denies chest pain, shortness of breath, dizziness, weakness.

## 2017-05-12 NOTE — ED PROVIDER NOTE - OBJECTIVE STATEMENT
76yF h/o B/L LE DVT, PE, S/P IVC filter 2015, diverticulitis, S/P Ruben's procedure, multiple SBO, S/P small bowel resection, S/P colostomy with recent admission for closure of GI fistula presents with several hours of sudden onset of severe abdominal pain and nausea with multiple episodes of vomiting, feels like prior SBO. Normal output from colostomy. Mild abdominal distention. Last changed colostomy bag this morning. 76yF h/o B/L LE DVT, PE, S/P IVC filter 2015, diverticulitis, S/P Ruben's procedure, multiple SBO, S/P small bowel resection, S/P colostomy with recent admission for closure of GI fistula presents with several hours of sudden onset of severe abdominal pain and nausea with multiple episodes of vomiting, feels like prior SBO. Normal output from colostomy. Mild abdominal distention. Last changed colostomy bag this morning.  **ATTENDING ADDENDUM (Dr. Emerson Padgett): I attest that I have directly and personally interviewed and examined this patient and elicited a comparable history of present illness and review of systems as documented, along with my EM resident. I attest that I have made significant contributions to the documentation where necessary and as noted in the EMR.

## 2017-05-12 NOTE — ED PROVIDER NOTE - ATTENDING CONTRIBUTION TO CARE
**ATTENDING ADDENDUM (Dr. Emerson Padgett): I attest that I have directly examined this patient and reviewed and formulated the diagnostic and therapeutic management plan in collaboration with the EM resident. Please see MDM note and remainder of EMR for findings from CC, HPI, ROS, and PE. (Karley)

## 2017-05-12 NOTE — ED PROVIDER NOTE - CARE PLAN
Principal Discharge DX:	Small bowel obstruction  Secondary Diagnosis:	Colostomy in place  Secondary Diagnosis:	Chronic obstructive pulmonary disease (COPD) Principal Discharge DX:	Small bowel obstruction  Goal:	ATTENDING ADDENDUM (Dr. Emerson Padgett): Goals of care include resolution of emergent/urgent symptoms and concerns, and restoration to baseline level of homeostasis.  Secondary Diagnosis:	Colostomy in place  Secondary Diagnosis:	Chronic obstructive pulmonary disease (COPD)

## 2017-05-12 NOTE — ED PROVIDER NOTE - RESPIRATORY, MLM
Breath sounds clear and equal bilaterally. Breath sounds clear and equal bilaterally. **ATTENDING ADDENDUM (Dr. Emerson Padgett): NO wheezing, rales, rhonchi, crackles, stridor, drooling, retractions, nasal flaring, or tripoding.

## 2017-05-12 NOTE — ED PROVIDER NOTE - PLAN OF CARE
ATTENDING ADDENDUM (Dr. Emerson Padgett): Goals of care include resolution of emergent/urgent symptoms and concerns, and restoration to baseline level of homeostasis.

## 2017-05-12 NOTE — ED PROVIDER NOTE - MEDICAL DECISION MAKING DETAILS
76yF multiple prior SBOs s/p colostomy with sudden onset abdominal pain, distention, vomiting. Will obtain bloodwork including vbg with lactate, preop labs, CT abdomen pelvis eval for SBO, surgical consultation and reevaluate. Karley PGY3 76yF multiple prior SBOs s/p colostomy with sudden onset abdominal pain, distention, vomiting. Will obtain bloodwork including vbg with lactate, preop labs, CT abdomen pelvis eval for SBO, surgical consultation and reevaluate. Karley PGY3  **ATTENDING MEDICAL DECISION MAKING/SYNTHESIS (Dr. Emerson Padgett): I have reviewed the Chief Complaint, the HPI, the ROS, and have directly performed and confirmed the findings on the Physical Examination. I have reviewed the medical decision making with all providers, as applicable. The PROBLEM REPRESENTATION at this time is: 76-year-old woman with prior history of pulmonary embolism/deep venous thrombosis in the bilateral lower extremities (s/p IVC filter), diverticulitis (s/p perforation, s/p Ruben's procedure s/p multiple small bowel obstructions) now presenting with the acute onset today of decreased (not normal) ostomy output, nausea, and abdominal distension/pain without fevers, chills, nausea, vomiting, shortness of breath, or chest pain. The MOST LIKELY DIAGNOSIS, and the LIST OF DIFFERENTIAL DIAGNOSES, includes (but is not limited to) the following: XX. The likelihood of each of these diagnoses has been appropriately considered in the context of this patient's presentation and my evaluation. PLAN: as described in EMR, including diagnostics, therapeutics and consultation as clinically warranted. I will continue to reevaluate the patient, including the results of all testing, and monitor response to therapy throughout the patient's course in the ED. 76yF multiple prior SBOs s/p colostomy with sudden onset abdominal pain, distention, vomiting. Will obtain bloodwork including vbg with lactate, preop labs, CT abdomen pelvis eval for SBO, surgical consultation and reevaluate. Karley PGY3  **ATTENDING MEDICAL DECISION MAKING/SYNTHESIS (Dr. Emerson Padgett): I have reviewed the Chief Complaint, the HPI, the ROS, and have directly performed and confirmed the findings on the Physical Examination. I have reviewed the medical decision making with all providers, as applicable. The PROBLEM REPRESENTATION at this time is: 76-year-old woman with prior history of pulmonary embolism/deep venous thrombosis in the bilateral lower extremities (s/p IVC filter), diverticulitis (s/p perforation, s/p Ruben's procedure s/p multiple small bowel obstructions) now presenting with the acute onset today of decreased (not normal) ostomy output, nausea, and abdominal distension/pain without fevers, chills, nausea, vomiting, shortness of breath, or chest pain. The MOST LIKELY DIAGNOSIS, and the LIST OF DIFFERENTIAL DIAGNOSES, includes (but is not limited to) the following: small bowel obstruction (likely), large bowel obstruction (unlikely given presentation and clinical findings), volvulus/ acute intussusception or equivalent (unlikely given presentation and clinical findings), perforation, peritonitis (the latter two diagnoses are unlikely), dehydration, electrolyte-metabolic-endocrine derangements, serious bacterial infection or sepsis/severe sepsis. The likelihood of each of these diagnoses has been appropriately considered in the context of this patient's presentation and my evaluation. PLAN: as described in EMR, including diagnostics, therapeutics and consultation as clinically warranted. I will continue to reevaluate the patient, including the results of all testing, and monitor response to therapy throughout the patient's course in the ED.

## 2017-05-12 NOTE — ED PROVIDER NOTE - EYES, MLM
Clear bilaterally, pupils equal, round and reactive to light. **ATTENDING ADDENDUM (Dr. Emerson Padgett): Extraocular muscle movements intact. NO nystagmus.

## 2017-05-12 NOTE — ED PROVIDER NOTE - ENMT, MLM
Airway patent, Nasal mucosa clear. **ATTENDING ADDENDUM (Dr. Emerson Padgett): POSITIVE dry mucous membranes, lips, and tongue. Throat has no vesicles, no oropharyngeal exudates and uvula is midline.

## 2017-05-12 NOTE — ED PROVIDER NOTE - CHPI ED SYMPTOMS POS
VOMITING/NAUSEA/PAIN CRAMPS/PAIN/ABDOMINAL DISTENSION/DECREASED EATING/DRINKING/TENDERNESS/NAUSEA/VOMITING

## 2017-05-13 DIAGNOSIS — K56.69 OTHER INTESTINAL OBSTRUCTION: ICD-10-CM

## 2017-05-13 LAB
BLD GP AB SCN SERPL QL: NEGATIVE — SIGNIFICANT CHANGE UP
RH IG SCN BLD-IMP: POSITIVE — SIGNIFICANT CHANGE UP

## 2017-05-13 PROCEDURE — 71010: CPT | Mod: 26

## 2017-05-13 PROCEDURE — 99284 EMERGENCY DEPT VISIT MOD MDM: CPT

## 2017-05-13 PROCEDURE — 99232 SBSQ HOSP IP/OBS MODERATE 35: CPT

## 2017-05-13 RX ORDER — SODIUM CHLORIDE 9 MG/ML
1000 INJECTION INTRAMUSCULAR; INTRAVENOUS; SUBCUTANEOUS ONCE
Qty: 0 | Refills: 0 | Status: COMPLETED | OUTPATIENT
Start: 2017-05-13 | End: 2017-05-13

## 2017-05-13 RX ORDER — BUDESONIDE AND FORMOTEROL FUMARATE DIHYDRATE 160; 4.5 UG/1; UG/1
2 AEROSOL RESPIRATORY (INHALATION)
Qty: 0 | Refills: 0 | Status: DISCONTINUED | OUTPATIENT
Start: 2017-05-13 | End: 2017-05-18

## 2017-05-13 RX ORDER — SODIUM CHLORIDE 9 MG/ML
1000 INJECTION INTRAMUSCULAR; INTRAVENOUS; SUBCUTANEOUS
Qty: 0 | Refills: 0 | Status: DISCONTINUED | OUTPATIENT
Start: 2017-05-13 | End: 2017-05-15

## 2017-05-13 RX ORDER — MORPHINE SULFATE 50 MG/1
4 CAPSULE, EXTENDED RELEASE ORAL ONCE
Qty: 0 | Refills: 0 | Status: DISCONTINUED | OUTPATIENT
Start: 2017-05-13 | End: 2017-05-13

## 2017-05-13 RX ORDER — FONDAPARINUX SODIUM 2.5 MG/.5ML
2.5 INJECTION, SOLUTION SUBCUTANEOUS DAILY
Qty: 0 | Refills: 0 | Status: DISCONTINUED | OUTPATIENT
Start: 2017-05-13 | End: 2017-05-18

## 2017-05-13 RX ORDER — PANTOPRAZOLE SODIUM 20 MG/1
40 TABLET, DELAYED RELEASE ORAL DAILY
Qty: 0 | Refills: 0 | Status: DISCONTINUED | OUTPATIENT
Start: 2017-05-13 | End: 2017-05-15

## 2017-05-13 RX ORDER — LEVOTHYROXINE SODIUM 125 MCG
44 TABLET ORAL DAILY
Qty: 0 | Refills: 0 | Status: DISCONTINUED | OUTPATIENT
Start: 2017-05-13 | End: 2017-05-15

## 2017-05-13 RX ADMIN — SODIUM CHLORIDE 100 MILLILITER(S): 9 INJECTION INTRAMUSCULAR; INTRAVENOUS; SUBCUTANEOUS at 20:25

## 2017-05-13 RX ADMIN — Medication 44 MICROGRAM(S): at 10:22

## 2017-05-13 RX ADMIN — SODIUM CHLORIDE 100 MILLILITER(S): 9 INJECTION INTRAMUSCULAR; INTRAVENOUS; SUBCUTANEOUS at 03:45

## 2017-05-13 RX ADMIN — BUDESONIDE AND FORMOTEROL FUMARATE DIHYDRATE 2 PUFF(S): 160; 4.5 AEROSOL RESPIRATORY (INHALATION) at 20:25

## 2017-05-13 RX ADMIN — MORPHINE SULFATE 4 MILLIGRAM(S): 50 CAPSULE, EXTENDED RELEASE ORAL at 00:32

## 2017-05-13 RX ADMIN — MORPHINE SULFATE 4 MILLIGRAM(S): 50 CAPSULE, EXTENDED RELEASE ORAL at 05:00

## 2017-05-13 RX ADMIN — MORPHINE SULFATE 4 MILLIGRAM(S): 50 CAPSULE, EXTENDED RELEASE ORAL at 04:14

## 2017-05-13 RX ADMIN — SODIUM CHLORIDE 2000 MILLILITER(S): 9 INJECTION INTRAMUSCULAR; INTRAVENOUS; SUBCUTANEOUS at 00:54

## 2017-05-13 RX ADMIN — MORPHINE SULFATE 4 MILLIGRAM(S): 50 CAPSULE, EXTENDED RELEASE ORAL at 02:13

## 2017-05-13 RX ADMIN — Medication 1000 MILLIGRAM(S): at 00:33

## 2017-05-13 RX ADMIN — SODIUM CHLORIDE 100 MILLILITER(S): 9 INJECTION INTRAMUSCULAR; INTRAVENOUS; SUBCUTANEOUS at 02:14

## 2017-05-13 RX ADMIN — BUDESONIDE AND FORMOTEROL FUMARATE DIHYDRATE 2 PUFF(S): 160; 4.5 AEROSOL RESPIRATORY (INHALATION) at 10:22

## 2017-05-13 RX ADMIN — PANTOPRAZOLE SODIUM 40 MILLIGRAM(S): 20 TABLET, DELAYED RELEASE ORAL at 13:01

## 2017-05-13 NOTE — H&P ADULT. - GASTROINTESTINAL COMMENTS
soft, distended, loss of domain of the abdomen with large reducible ventral hernia.  Plugged fistula present, Colostomy in place, without gas or GI function.  Diffusely moderately tender without rebound or guarding.

## 2017-05-13 NOTE — H&P ADULT. - HISTORY OF PRESENT ILLNESS
76 year old female with history of B/L lower extremity DVT, pulmonary embolism as per patient report, S/P IVC filter 2015, developed HIT with Heparin, diverticulitis, S/P Ruben's procedure, multiple SBO, S/P small bowel resection, S/P takedown, formation of colostomy, enterocutaneous fistula, wound dehiscence, S/P abdominal reconstruction, and recent admission at the end of April for closure of GI fistula.  She reports a sudden onset of sharp abdominal pain without nausea, vomiting. She does report a decrease in the amount of stool coming out of her colostomy.

## 2017-05-13 NOTE — PROVIDER CONTACT NOTE (OTHER) - SITUATION
Pt has low BP and complains of pain 7/10 in abd/ Pt has low /58 and complains of pain 7/10 in abd. Pt just received from ED.

## 2017-05-14 LAB
MAGNESIUM SERPL-MCNC: 1.8 MG/DL — SIGNIFICANT CHANGE UP (ref 1.6–2.6)
PHOSPHATE SERPL-MCNC: 3 MG/DL — SIGNIFICANT CHANGE UP (ref 2.5–4.5)

## 2017-05-14 PROCEDURE — 99232 SBSQ HOSP IP/OBS MODERATE 35: CPT

## 2017-05-14 PROCEDURE — 71010: CPT | Mod: 26

## 2017-05-14 RX ADMIN — FONDAPARINUX SODIUM 2.5 MILLIGRAM(S): 2.5 INJECTION, SOLUTION SUBCUTANEOUS at 11:33

## 2017-05-14 RX ADMIN — PANTOPRAZOLE SODIUM 40 MILLIGRAM(S): 20 TABLET, DELAYED RELEASE ORAL at 11:33

## 2017-05-14 RX ADMIN — Medication 44 MICROGRAM(S): at 11:28

## 2017-05-15 LAB
BUN SERPL-MCNC: 20 MG/DL — SIGNIFICANT CHANGE UP (ref 7–23)
CALCIUM SERPL-MCNC: 9 MG/DL — SIGNIFICANT CHANGE UP (ref 8.4–10.5)
CHLORIDE SERPL-SCNC: 106 MMOL/L — SIGNIFICANT CHANGE UP (ref 96–108)
CO2 SERPL-SCNC: 16 MMOL/L — LOW (ref 22–31)
CREAT SERPL-MCNC: 1.31 MG/DL — HIGH (ref 0.5–1.3)
GLUCOSE SERPL-MCNC: 64 MG/DL — LOW (ref 70–99)
MAGNESIUM SERPL-MCNC: 1.8 MG/DL — SIGNIFICANT CHANGE UP (ref 1.6–2.6)
PHOSPHATE SERPL-MCNC: 2.7 MG/DL — SIGNIFICANT CHANGE UP (ref 2.5–4.5)
POTASSIUM SERPL-MCNC: 4.7 MMOL/L — SIGNIFICANT CHANGE UP (ref 3.5–5.3)
POTASSIUM SERPL-SCNC: 4.7 MMOL/L — SIGNIFICANT CHANGE UP (ref 3.5–5.3)
SODIUM SERPL-SCNC: 141 MMOL/L — SIGNIFICANT CHANGE UP (ref 135–145)

## 2017-05-15 PROCEDURE — 99232 SBSQ HOSP IP/OBS MODERATE 35: CPT

## 2017-05-15 RX ORDER — LEVOTHYROXINE SODIUM 125 MCG
88 TABLET ORAL DAILY
Qty: 0 | Refills: 0 | Status: DISCONTINUED | OUTPATIENT
Start: 2017-05-15 | End: 2017-05-18

## 2017-05-15 RX ORDER — MAGNESIUM SULFATE 500 MG/ML
2 VIAL (ML) INJECTION ONCE
Qty: 0 | Refills: 0 | Status: COMPLETED | OUTPATIENT
Start: 2017-05-15 | End: 2017-05-15

## 2017-05-15 RX ORDER — TIOTROPIUM BROMIDE 18 UG/1
1 CAPSULE ORAL; RESPIRATORY (INHALATION) DAILY
Qty: 0 | Refills: 0 | Status: DISCONTINUED | OUTPATIENT
Start: 2017-05-15 | End: 2017-05-18

## 2017-05-15 RX ORDER — GABAPENTIN 400 MG/1
300 CAPSULE ORAL EVERY 8 HOURS
Qty: 0 | Refills: 0 | Status: DISCONTINUED | OUTPATIENT
Start: 2017-05-15 | End: 2017-05-18

## 2017-05-15 RX ORDER — SODIUM CHLORIDE 9 MG/ML
1000 INJECTION, SOLUTION INTRAVENOUS
Qty: 0 | Refills: 0 | Status: DISCONTINUED | OUTPATIENT
Start: 2017-05-15 | End: 2017-05-15

## 2017-05-15 RX ORDER — PANTOPRAZOLE SODIUM 20 MG/1
40 TABLET, DELAYED RELEASE ORAL
Qty: 0 | Refills: 0 | Status: DISCONTINUED | OUTPATIENT
Start: 2017-05-15 | End: 2017-05-18

## 2017-05-15 RX ORDER — BENZOCAINE AND MENTHOL 5; 1 G/100ML; G/100ML
1 LIQUID ORAL EVERY 6 HOURS
Qty: 0 | Refills: 0 | Status: DISCONTINUED | OUTPATIENT
Start: 2017-05-15 | End: 2017-05-18

## 2017-05-15 RX ADMIN — BUDESONIDE AND FORMOTEROL FUMARATE DIHYDRATE 2 PUFF(S): 160; 4.5 AEROSOL RESPIRATORY (INHALATION) at 09:26

## 2017-05-15 RX ADMIN — FONDAPARINUX SODIUM 2.5 MILLIGRAM(S): 2.5 INJECTION, SOLUTION SUBCUTANEOUS at 11:07

## 2017-05-15 RX ADMIN — BUDESONIDE AND FORMOTEROL FUMARATE DIHYDRATE 2 PUFF(S): 160; 4.5 AEROSOL RESPIRATORY (INHALATION) at 21:19

## 2017-05-15 RX ADMIN — Medication 50 GRAM(S): at 14:33

## 2017-05-15 RX ADMIN — PANTOPRAZOLE SODIUM 40 MILLIGRAM(S): 20 TABLET, DELAYED RELEASE ORAL at 11:07

## 2017-05-15 RX ADMIN — GABAPENTIN 300 MILLIGRAM(S): 400 CAPSULE ORAL at 21:19

## 2017-05-15 RX ADMIN — Medication 44 MICROGRAM(S): at 09:26

## 2017-05-15 NOTE — PHYSICAL THERAPY INITIAL EVALUATION ADULT - ACTIVE RANGE OF MOTION EXAMINATION, REHAB EVAL
except L shoulder 0-40 flexion & requires active assist/bilateral  lower extremity Active ROM was WFL (within functional limits)/bilateral upper extremity Active ROM was WFL (within functional limits)

## 2017-05-15 NOTE — PHYSICAL THERAPY INITIAL EVALUATION ADULT - REFERRING PHYSICIAN, REHAB EVAL
PT Orders: PT Evaluate and treat ; ambwith assistance PT Orders: PT Evaluate and treat ; amb with assistance

## 2017-05-15 NOTE — PHYSICAL THERAPY INITIAL EVALUATION ADULT - PERTINENT HX OF CURRENT PROBLEM, REHAB EVAL
76 year old female with history of B/L lower extremity DVT, pulmonary embolism as per patient report, S/P IVC filter 2015, developed HIT with Heparin, diverticulitis, S/P Ruben's procedure, multiple SBO, S/P small bowel resection, S/P takedown, formation of colostomy, enterocutaneous fistula, wound dehiscence, S/P abdominal reconstruction, and recent admission at the end of April for closure of GI fistula. Pt p/w abd pain, CT:+small SBO.

## 2017-05-16 ENCOUNTER — APPOINTMENT (OUTPATIENT)
Dept: TRAUMA SURGERY | Facility: CLINIC | Age: 77
End: 2017-05-16

## 2017-05-16 LAB
ANION GAP SERPL CALC-SCNC: 16 MMOL/L — SIGNIFICANT CHANGE UP (ref 5–17)
BUN SERPL-MCNC: 11 MG/DL — SIGNIFICANT CHANGE UP (ref 7–23)
CALCIUM SERPL-MCNC: 9.4 MG/DL — SIGNIFICANT CHANGE UP (ref 8.4–10.5)
CHLORIDE SERPL-SCNC: 105 MMOL/L — SIGNIFICANT CHANGE UP (ref 96–108)
CO2 SERPL-SCNC: 22 MMOL/L — SIGNIFICANT CHANGE UP (ref 22–31)
CREAT SERPL-MCNC: 1.24 MG/DL — SIGNIFICANT CHANGE UP (ref 0.5–1.3)
GLUCOSE SERPL-MCNC: 94 MG/DL — SIGNIFICANT CHANGE UP (ref 70–99)
HCT VFR BLD CALC: 32.2 % — LOW (ref 34.5–45)
HGB BLD-MCNC: 10.6 G/DL — LOW (ref 11.5–15.5)
MAGNESIUM SERPL-MCNC: 2 MG/DL — SIGNIFICANT CHANGE UP (ref 1.6–2.6)
MCHC RBC-ENTMCNC: 32.8 GM/DL — SIGNIFICANT CHANGE UP (ref 32–36)
MCHC RBC-ENTMCNC: 33.1 PG — SIGNIFICANT CHANGE UP (ref 27–34)
MCV RBC AUTO: 101 FL — HIGH (ref 80–100)
PHOSPHATE SERPL-MCNC: 2.7 MG/DL — SIGNIFICANT CHANGE UP (ref 2.5–4.5)
PLATELET # BLD AUTO: 191 K/UL — SIGNIFICANT CHANGE UP (ref 150–400)
POTASSIUM SERPL-MCNC: 3.8 MMOL/L — SIGNIFICANT CHANGE UP (ref 3.5–5.3)
POTASSIUM SERPL-SCNC: 3.8 MMOL/L — SIGNIFICANT CHANGE UP (ref 3.5–5.3)
RBC # BLD: 3.19 M/UL — LOW (ref 3.8–5.2)
RBC # FLD: 12.2 % — SIGNIFICANT CHANGE UP (ref 10.3–14.5)
SODIUM SERPL-SCNC: 143 MMOL/L — SIGNIFICANT CHANGE UP (ref 135–145)
WBC # BLD: 4 K/UL — SIGNIFICANT CHANGE UP (ref 3.8–10.5)
WBC # FLD AUTO: 4 K/UL — SIGNIFICANT CHANGE UP (ref 3.8–10.5)

## 2017-05-16 RX ADMIN — GABAPENTIN 300 MILLIGRAM(S): 400 CAPSULE ORAL at 14:05

## 2017-05-16 RX ADMIN — GABAPENTIN 300 MILLIGRAM(S): 400 CAPSULE ORAL at 21:42

## 2017-05-16 RX ADMIN — Medication 88 MICROGRAM(S): at 05:09

## 2017-05-16 RX ADMIN — TIOTROPIUM BROMIDE 1 CAPSULE(S): 18 CAPSULE ORAL; RESPIRATORY (INHALATION) at 11:55

## 2017-05-16 RX ADMIN — BUDESONIDE AND FORMOTEROL FUMARATE DIHYDRATE 2 PUFF(S): 160; 4.5 AEROSOL RESPIRATORY (INHALATION) at 21:43

## 2017-05-16 RX ADMIN — FONDAPARINUX SODIUM 2.5 MILLIGRAM(S): 2.5 INJECTION, SOLUTION SUBCUTANEOUS at 11:55

## 2017-05-16 RX ADMIN — BUDESONIDE AND FORMOTEROL FUMARATE DIHYDRATE 2 PUFF(S): 160; 4.5 AEROSOL RESPIRATORY (INHALATION) at 09:26

## 2017-05-16 RX ADMIN — GABAPENTIN 300 MILLIGRAM(S): 400 CAPSULE ORAL at 05:09

## 2017-05-16 RX ADMIN — PANTOPRAZOLE SODIUM 40 MILLIGRAM(S): 20 TABLET, DELAYED RELEASE ORAL at 05:09

## 2017-05-17 LAB
ANION GAP SERPL CALC-SCNC: 11 MMOL/L — SIGNIFICANT CHANGE UP (ref 5–17)
BUN SERPL-MCNC: 13 MG/DL — SIGNIFICANT CHANGE UP (ref 7–23)
CALCIUM SERPL-MCNC: 9.2 MG/DL — SIGNIFICANT CHANGE UP (ref 8.4–10.5)
CHLORIDE SERPL-SCNC: 106 MMOL/L — SIGNIFICANT CHANGE UP (ref 96–108)
CO2 SERPL-SCNC: 26 MMOL/L — SIGNIFICANT CHANGE UP (ref 22–31)
CREAT SERPL-MCNC: 1.31 MG/DL — HIGH (ref 0.5–1.3)
GLUCOSE SERPL-MCNC: 103 MG/DL — HIGH (ref 70–99)
HCT VFR BLD CALC: 33.4 % — LOW (ref 34.5–45)
HGB BLD-MCNC: 10.4 G/DL — LOW (ref 11.5–15.5)
MAGNESIUM SERPL-MCNC: 2 MG/DL — SIGNIFICANT CHANGE UP (ref 1.6–2.6)
MCHC RBC-ENTMCNC: 31.2 GM/DL — LOW (ref 32–36)
MCHC RBC-ENTMCNC: 31.4 PG — SIGNIFICANT CHANGE UP (ref 27–34)
MCV RBC AUTO: 101 FL — HIGH (ref 80–100)
PHOSPHATE SERPL-MCNC: 2.8 MG/DL — SIGNIFICANT CHANGE UP (ref 2.5–4.5)
PLATELET # BLD AUTO: 209 K/UL — SIGNIFICANT CHANGE UP (ref 150–400)
POTASSIUM SERPL-MCNC: 3.8 MMOL/L — SIGNIFICANT CHANGE UP (ref 3.5–5.3)
POTASSIUM SERPL-SCNC: 3.8 MMOL/L — SIGNIFICANT CHANGE UP (ref 3.5–5.3)
RBC # BLD: 3.31 M/UL — LOW (ref 3.8–5.2)
RBC # FLD: 12.5 % — SIGNIFICANT CHANGE UP (ref 10.3–14.5)
SODIUM SERPL-SCNC: 143 MMOL/L — SIGNIFICANT CHANGE UP (ref 135–145)
WBC # BLD: 4.4 K/UL — SIGNIFICANT CHANGE UP (ref 3.8–10.5)
WBC # FLD AUTO: 4.4 K/UL — SIGNIFICANT CHANGE UP (ref 3.8–10.5)

## 2017-05-17 PROCEDURE — 99232 SBSQ HOSP IP/OBS MODERATE 35: CPT

## 2017-05-17 RX ADMIN — BUDESONIDE AND FORMOTEROL FUMARATE DIHYDRATE 2 PUFF(S): 160; 4.5 AEROSOL RESPIRATORY (INHALATION) at 21:30

## 2017-05-17 RX ADMIN — GABAPENTIN 300 MILLIGRAM(S): 400 CAPSULE ORAL at 21:29

## 2017-05-17 RX ADMIN — PANTOPRAZOLE SODIUM 40 MILLIGRAM(S): 20 TABLET, DELAYED RELEASE ORAL at 09:01

## 2017-05-17 RX ADMIN — BUDESONIDE AND FORMOTEROL FUMARATE DIHYDRATE 2 PUFF(S): 160; 4.5 AEROSOL RESPIRATORY (INHALATION) at 09:02

## 2017-05-17 RX ADMIN — GABAPENTIN 300 MILLIGRAM(S): 400 CAPSULE ORAL at 06:18

## 2017-05-17 RX ADMIN — FONDAPARINUX SODIUM 2.5 MILLIGRAM(S): 2.5 INJECTION, SOLUTION SUBCUTANEOUS at 12:42

## 2017-05-17 RX ADMIN — TIOTROPIUM BROMIDE 1 CAPSULE(S): 18 CAPSULE ORAL; RESPIRATORY (INHALATION) at 12:42

## 2017-05-17 RX ADMIN — GABAPENTIN 300 MILLIGRAM(S): 400 CAPSULE ORAL at 15:51

## 2017-05-17 RX ADMIN — Medication 88 MICROGRAM(S): at 06:18

## 2017-05-18 ENCOUNTER — TRANSCRIPTION ENCOUNTER (OUTPATIENT)
Age: 77
End: 2017-05-18

## 2017-05-18 VITALS
SYSTOLIC BLOOD PRESSURE: 129 MMHG | RESPIRATION RATE: 17 BRPM | HEART RATE: 91 BPM | DIASTOLIC BLOOD PRESSURE: 81 MMHG | TEMPERATURE: 98 F | OXYGEN SATURATION: 95 %

## 2017-05-18 PROCEDURE — 74176 CT ABD & PELVIS W/O CONTRAST: CPT

## 2017-05-18 PROCEDURE — 82947 ASSAY GLUCOSE BLOOD QUANT: CPT

## 2017-05-18 PROCEDURE — 84100 ASSAY OF PHOSPHORUS: CPT

## 2017-05-18 PROCEDURE — 97162 PT EVAL MOD COMPLEX 30 MIN: CPT

## 2017-05-18 PROCEDURE — 83605 ASSAY OF LACTIC ACID: CPT

## 2017-05-18 PROCEDURE — 43753 TX GASTRO INTUB W/ASP: CPT

## 2017-05-18 PROCEDURE — 86901 BLOOD TYPING SEROLOGIC RH(D): CPT

## 2017-05-18 PROCEDURE — 94640 AIRWAY INHALATION TREATMENT: CPT

## 2017-05-18 PROCEDURE — 99285 EMERGENCY DEPT VISIT HI MDM: CPT | Mod: 25

## 2017-05-18 PROCEDURE — 85014 HEMATOCRIT: CPT

## 2017-05-18 PROCEDURE — 74020: CPT

## 2017-05-18 PROCEDURE — 86850 RBC ANTIBODY SCREEN: CPT

## 2017-05-18 PROCEDURE — 96375 TX/PRO/DX INJ NEW DRUG ADDON: CPT | Mod: XU

## 2017-05-18 PROCEDURE — 85027 COMPLETE CBC AUTOMATED: CPT

## 2017-05-18 PROCEDURE — 85730 THROMBOPLASTIN TIME PARTIAL: CPT

## 2017-05-18 PROCEDURE — 82435 ASSAY OF BLOOD CHLORIDE: CPT

## 2017-05-18 PROCEDURE — 71045 X-RAY EXAM CHEST 1 VIEW: CPT

## 2017-05-18 PROCEDURE — 82330 ASSAY OF CALCIUM: CPT

## 2017-05-18 PROCEDURE — 85610 PROTHROMBIN TIME: CPT

## 2017-05-18 PROCEDURE — 83735 ASSAY OF MAGNESIUM: CPT

## 2017-05-18 PROCEDURE — 96374 THER/PROPH/DIAG INJ IV PUSH: CPT | Mod: XU

## 2017-05-18 PROCEDURE — 84295 ASSAY OF SERUM SODIUM: CPT

## 2017-05-18 PROCEDURE — 80048 BASIC METABOLIC PNL TOTAL CA: CPT

## 2017-05-18 PROCEDURE — 82803 BLOOD GASES ANY COMBINATION: CPT

## 2017-05-18 PROCEDURE — 86900 BLOOD TYPING SEROLOGIC ABO: CPT

## 2017-05-18 PROCEDURE — 84132 ASSAY OF SERUM POTASSIUM: CPT

## 2017-05-18 PROCEDURE — 80053 COMPREHEN METABOLIC PANEL: CPT

## 2017-05-18 RX ADMIN — PANTOPRAZOLE SODIUM 40 MILLIGRAM(S): 20 TABLET, DELAYED RELEASE ORAL at 05:13

## 2017-05-18 RX ADMIN — GABAPENTIN 300 MILLIGRAM(S): 400 CAPSULE ORAL at 05:13

## 2017-05-18 RX ADMIN — BUDESONIDE AND FORMOTEROL FUMARATE DIHYDRATE 2 PUFF(S): 160; 4.5 AEROSOL RESPIRATORY (INHALATION) at 08:18

## 2017-05-18 RX ADMIN — TIOTROPIUM BROMIDE 1 CAPSULE(S): 18 CAPSULE ORAL; RESPIRATORY (INHALATION) at 11:45

## 2017-05-18 RX ADMIN — Medication 88 MICROGRAM(S): at 05:13

## 2017-05-18 RX ADMIN — FONDAPARINUX SODIUM 2.5 MILLIGRAM(S): 2.5 INJECTION, SOLUTION SUBCUTANEOUS at 11:46

## 2017-05-18 NOTE — DISCHARGE NOTE ADULT - ADDITIONAL INSTRUCTIONS
Please follow up with your primary care physician regarding your hospitalization. Please schedule an appointment with your primary care provider within two weeks after your discharge to review your hospital course. Call 911 and return to the ED for chest pain, shortness of breath, significant increase in pain, or significant decrease in ability to eat. Please follow up with Dr. Hendrix as needed for symptoms of a small bowel obstruction as stated above.

## 2017-05-18 NOTE — DISCHARGE NOTE ADULT - HOSPITAL COURSE
76 year old female with history of B/L lower extremity DVT, pulmonary embolism as per patient report, S/P IVC filter 2015, developed HIT with Heparin, diverticulitis, S/P Ruben's procedure, multiple SBO, S/P small bowel resection, S/P takedown, formation of colostomy, enterocutaneous fistula, wound dehiscence, S/P abdominal reconstruction, and recent admission at the end of April for closure of GI fistula.  She reports a sudden onset of sharp abdominal pain without nausea, vomiting. She does report a decrease in the amount of stool coming out of her colostomy.   She was made NPO and had an NGT placed. NGT output decreased over time and so it was removed. She tolerated a clear liquid diet and was advanced to Low fiber and then a regular diet which she tolerated well. She had stool in the ostomy but minimal gas (which she says is normal for her). She remained afebrile and hemodynamically stable throughout admission, she was voiding adequately. She was evaluated by physical therapy which recommended LC at [facility]. She was discharged with appropriate follow up.

## 2017-05-18 NOTE — DISCHARGE NOTE ADULT - CARE PROVIDER_API CALL
Edwar Hendrix), Surgery; Surgical Critical Care  [address] [place] [id number]  Phone: [phone]  Fax: [phone]

## 2017-05-18 NOTE — DISCHARGE NOTE ADULT - MEDICATION SUMMARY - MEDICATIONS TO TAKE
I will START or STAY ON the medications listed below when I get home from the hospital:    fondaparinux  -- 2.5 milligram(s) subcutaneous once a day  -- Indication: For anticoagulation    gabapentin 300 mg oral capsule  -- 1 cap(s) by mouth 3 times a day  -- Indication: For neuropathic pain    fluticasone-salmeterol 250 mcg-50 mcg inhalation powder  -- 1 puff(s) inhaled 2 times a day  -- Indication: For Chronic obstructive pulmonary disease (COPD)    tiotropium 18 mcg inhalation capsule  -- 1 cap(s) inhaled once a day  -- Indication: For Chronic obstructive pulmonary disease (COPD)    Protonix 40 mg oral delayed release tablet  -- 1 tab(s) by mouth once a day  -- Indication: For GERD    levothyroxine 88 mcg (0.088 mg) oral tablet  -- 1 tab(s) by mouth once a day  -- Indication: For Hypothyroid    calcium-vitamin D 500 mg-200 intl units oral tablet  -- 1 tab(s) by mouth once a day  -- Indication: For Supplement    Multiple Vitamins oral tablet  -- 1 tab(s) by mouth once a day  -- Indication: For Supplement

## 2017-05-18 NOTE — DISCHARGE NOTE ADULT - PLAN OF CARE
Please resume your regular diet and activity. If you begin having symptoms of a bowel obstruction, nausea, vomiting, decreased ostomy output; please call the office or come to the Emergency Department.

## 2017-05-18 NOTE — DISCHARGE NOTE ADULT - PATIENT PORTAL LINK FT
“You can access the Follow[employer] Patient Portal, offered by [facility], by registering with the following website: [email or web address]”

## 2017-05-18 NOTE — DISCHARGE NOTE ADULT - CARE PLAN
Principal Discharge DX:	Small bowel obstruction  Goal:	Please resume your regular diet and activity.  Instructions for follow-up, activity and diet:	If you begin having symptoms of a bowel obstruction, nausea, vomiting, decreased ostomy output; please call the office or come to the Emergency Department.

## 2017-05-23 ENCOUNTER — INPATIENT (INPATIENT)
Facility: HOSPITAL | Age: 77
LOS: 54 days | DRG: 329 | End: 2017-07-17
Attending: INTERNAL MEDICINE | Admitting: SURGERY
Payer: MEDICARE

## 2017-05-23 VITALS
SYSTOLIC BLOOD PRESSURE: 139 MMHG | RESPIRATION RATE: 16 BRPM | DIASTOLIC BLOOD PRESSURE: 71 MMHG | HEART RATE: 112 BPM | OXYGEN SATURATION: 97 %

## 2017-05-23 DIAGNOSIS — K63.1 PERFORATION OF INTESTINE (NONTRAUMATIC): Chronic | ICD-10-CM

## 2017-05-23 DIAGNOSIS — K56.60 UNSPECIFIED INTESTINAL OBSTRUCTION: ICD-10-CM

## 2017-05-23 DIAGNOSIS — Z93.3 COLOSTOMY STATUS: Chronic | ICD-10-CM

## 2017-05-23 LAB
ALBUMIN SERPL ELPH-MCNC: 2.7 G/DL — LOW (ref 3.3–5)
ALBUMIN SERPL ELPH-MCNC: 3.6 G/DL — SIGNIFICANT CHANGE UP (ref 3.3–5)
ALP SERPL-CCNC: 109 U/L — SIGNIFICANT CHANGE UP (ref 40–120)
ALP SERPL-CCNC: 149 U/L — HIGH (ref 40–120)
ALT FLD-CCNC: 13 U/L RC — SIGNIFICANT CHANGE UP (ref 10–45)
ALT FLD-CCNC: 9 U/L RC — LOW (ref 10–45)
ANION GAP SERPL CALC-SCNC: 14 MMOL/L — SIGNIFICANT CHANGE UP (ref 5–17)
ANION GAP SERPL CALC-SCNC: 9 MMOL/L — SIGNIFICANT CHANGE UP (ref 5–17)
APTT BLD: 28.1 SEC — SIGNIFICANT CHANGE UP (ref 27.5–37.4)
AST SERPL-CCNC: 11 U/L — SIGNIFICANT CHANGE UP (ref 10–40)
AST SERPL-CCNC: 16 U/L — SIGNIFICANT CHANGE UP (ref 10–40)
BASOPHILS # BLD AUTO: 0 K/UL — SIGNIFICANT CHANGE UP (ref 0–0.2)
BASOPHILS # BLD AUTO: 0 K/UL — SIGNIFICANT CHANGE UP (ref 0–0.2)
BASOPHILS NFR BLD AUTO: 0.3 % — SIGNIFICANT CHANGE UP (ref 0–2)
BASOPHILS NFR BLD AUTO: 0.4 % — SIGNIFICANT CHANGE UP (ref 0–2)
BILIRUB SERPL-MCNC: 0.3 MG/DL — SIGNIFICANT CHANGE UP (ref 0.2–1.2)
BILIRUB SERPL-MCNC: 0.5 MG/DL — SIGNIFICANT CHANGE UP (ref 0.2–1.2)
BLD GP AB SCN SERPL QL: NEGATIVE — SIGNIFICANT CHANGE UP
BUN SERPL-MCNC: 20 MG/DL — SIGNIFICANT CHANGE UP (ref 7–23)
BUN SERPL-MCNC: 24 MG/DL — HIGH (ref 7–23)
CALCIUM SERPL-MCNC: 6.9 MG/DL — LOW (ref 8.4–10.5)
CALCIUM SERPL-MCNC: 9 MG/DL — SIGNIFICANT CHANGE UP (ref 8.4–10.5)
CHLORIDE SERPL-SCNC: 101 MMOL/L — SIGNIFICANT CHANGE UP (ref 96–108)
CHLORIDE SERPL-SCNC: 107 MMOL/L — SIGNIFICANT CHANGE UP (ref 96–108)
CO2 SERPL-SCNC: 22 MMOL/L — SIGNIFICANT CHANGE UP (ref 22–31)
CO2 SERPL-SCNC: 25 MMOL/L — SIGNIFICANT CHANGE UP (ref 22–31)
CREAT SERPL-MCNC: 1.32 MG/DL — HIGH (ref 0.5–1.3)
CREAT SERPL-MCNC: 1.51 MG/DL — HIGH (ref 0.5–1.3)
EOSINOPHIL # BLD AUTO: 0.1 K/UL — SIGNIFICANT CHANGE UP (ref 0–0.5)
EOSINOPHIL # BLD AUTO: 0.1 K/UL — SIGNIFICANT CHANGE UP (ref 0–0.5)
EOSINOPHIL NFR BLD AUTO: 0.8 % — SIGNIFICANT CHANGE UP (ref 0–6)
EOSINOPHIL NFR BLD AUTO: 1 % — SIGNIFICANT CHANGE UP (ref 0–6)
GAS PNL BLDV: SIGNIFICANT CHANGE UP
GAS PNL BLDV: SIGNIFICANT CHANGE UP
GLUCOSE SERPL-MCNC: 100 MG/DL — HIGH (ref 70–99)
GLUCOSE SERPL-MCNC: 96 MG/DL — SIGNIFICANT CHANGE UP (ref 70–99)
HCT VFR BLD CALC: 29.3 % — LOW (ref 34.5–45)
HCT VFR BLD CALC: 34.3 % — LOW (ref 34.5–45)
HGB BLD-MCNC: 11.1 G/DL — LOW (ref 11.5–15.5)
HGB BLD-MCNC: 9.8 G/DL — LOW (ref 11.5–15.5)
INR BLD: 1.14 RATIO — SIGNIFICANT CHANGE UP (ref 0.88–1.16)
LYMPHOCYTES # BLD AUTO: 1 K/UL — SIGNIFICANT CHANGE UP (ref 1–3.3)
LYMPHOCYTES # BLD AUTO: 1.1 K/UL — SIGNIFICANT CHANGE UP (ref 1–3.3)
LYMPHOCYTES # BLD AUTO: 10.8 % — LOW (ref 13–44)
LYMPHOCYTES # BLD AUTO: 14.7 % — SIGNIFICANT CHANGE UP (ref 13–44)
MCHC RBC-ENTMCNC: 32.5 GM/DL — SIGNIFICANT CHANGE UP (ref 32–36)
MCHC RBC-ENTMCNC: 32.5 PG — SIGNIFICANT CHANGE UP (ref 27–34)
MCHC RBC-ENTMCNC: 33.2 GM/DL — SIGNIFICANT CHANGE UP (ref 32–36)
MCHC RBC-ENTMCNC: 33.5 PG — SIGNIFICANT CHANGE UP (ref 27–34)
MCV RBC AUTO: 100 FL — SIGNIFICANT CHANGE UP (ref 80–100)
MCV RBC AUTO: 101 FL — HIGH (ref 80–100)
MONOCYTES # BLD AUTO: 0.5 K/UL — SIGNIFICANT CHANGE UP (ref 0–0.9)
MONOCYTES # BLD AUTO: 0.5 K/UL — SIGNIFICANT CHANGE UP (ref 0–0.9)
MONOCYTES NFR BLD AUTO: 5.6 % — SIGNIFICANT CHANGE UP (ref 2–14)
MONOCYTES NFR BLD AUTO: 7.5 % — SIGNIFICANT CHANGE UP (ref 2–14)
NEUTROPHILS # BLD AUTO: 5.5 K/UL — SIGNIFICANT CHANGE UP (ref 1.8–7.4)
NEUTROPHILS # BLD AUTO: 7.6 K/UL — HIGH (ref 1.8–7.4)
NEUTROPHILS NFR BLD AUTO: 76.5 % — SIGNIFICANT CHANGE UP (ref 43–77)
NEUTROPHILS NFR BLD AUTO: 82.4 % — HIGH (ref 43–77)
PLATELET # BLD AUTO: 150 K/UL — SIGNIFICANT CHANGE UP (ref 150–400)
PLATELET # BLD AUTO: 191 K/UL — SIGNIFICANT CHANGE UP (ref 150–400)
POTASSIUM SERPL-MCNC: 3.9 MMOL/L — SIGNIFICANT CHANGE UP (ref 3.5–5.3)
POTASSIUM SERPL-MCNC: 4.5 MMOL/L — SIGNIFICANT CHANGE UP (ref 3.5–5.3)
POTASSIUM SERPL-SCNC: 3.9 MMOL/L — SIGNIFICANT CHANGE UP (ref 3.5–5.3)
POTASSIUM SERPL-SCNC: 4.5 MMOL/L — SIGNIFICANT CHANGE UP (ref 3.5–5.3)
PROT SERPL-MCNC: 5.3 G/DL — LOW (ref 6–8.3)
PROT SERPL-MCNC: 7.1 G/DL — SIGNIFICANT CHANGE UP (ref 6–8.3)
PROTHROM AB SERPL-ACNC: 12.3 SEC — SIGNIFICANT CHANGE UP (ref 9.8–12.7)
RBC # BLD: 2.91 M/UL — LOW (ref 3.8–5.2)
RBC # BLD: 3.42 M/UL — LOW (ref 3.8–5.2)
RBC # FLD: 12.4 % — SIGNIFICANT CHANGE UP (ref 10.3–14.5)
RBC # FLD: 12.4 % — SIGNIFICANT CHANGE UP (ref 10.3–14.5)
RH IG SCN BLD-IMP: POSITIVE — SIGNIFICANT CHANGE UP
SODIUM SERPL-SCNC: 138 MMOL/L — SIGNIFICANT CHANGE UP (ref 135–145)
SODIUM SERPL-SCNC: 140 MMOL/L — SIGNIFICANT CHANGE UP (ref 135–145)
WBC # BLD: 7.2 K/UL — SIGNIFICANT CHANGE UP (ref 3.8–10.5)
WBC # BLD: 9.2 K/UL — SIGNIFICANT CHANGE UP (ref 3.8–10.5)
WBC # FLD AUTO: 7.2 K/UL — SIGNIFICANT CHANGE UP (ref 3.8–10.5)
WBC # FLD AUTO: 9.2 K/UL — SIGNIFICANT CHANGE UP (ref 3.8–10.5)

## 2017-05-23 PROCEDURE — 74177 CT ABD & PELVIS W/CONTRAST: CPT | Mod: 26

## 2017-05-23 PROCEDURE — 93308 TTE F-UP OR LMTD: CPT | Mod: 26

## 2017-05-23 PROCEDURE — 99221 1ST HOSP IP/OBS SF/LOW 40: CPT

## 2017-05-23 PROCEDURE — 93010 ELECTROCARDIOGRAM REPORT: CPT

## 2017-05-23 PROCEDURE — 71010: CPT | Mod: 26

## 2017-05-23 PROCEDURE — 99232 SBSQ HOSP IP/OBS MODERATE 35: CPT

## 2017-05-23 PROCEDURE — 76937 US GUIDE VASCULAR ACCESS: CPT | Mod: 26

## 2017-05-23 PROCEDURE — 99292 CRITICAL CARE ADDL 30 MIN: CPT | Mod: 25

## 2017-05-23 PROCEDURE — 99291 CRITICAL CARE FIRST HOUR: CPT | Mod: 25,GC

## 2017-05-23 RX ORDER — ONDANSETRON 8 MG/1
4 TABLET, FILM COATED ORAL ONCE
Qty: 0 | Refills: 0 | Status: COMPLETED | OUTPATIENT
Start: 2017-05-23 | End: 2017-05-23

## 2017-05-23 RX ORDER — FONDAPARINUX SODIUM 2.5 MG/.5ML
2.5 INJECTION, SOLUTION SUBCUTANEOUS DAILY
Qty: 0 | Refills: 0 | Status: DISCONTINUED | OUTPATIENT
Start: 2017-05-23 | End: 2017-05-31

## 2017-05-23 RX ORDER — PANTOPRAZOLE SODIUM 20 MG/1
40 TABLET, DELAYED RELEASE ORAL DAILY
Qty: 0 | Refills: 0 | Status: DISCONTINUED | OUTPATIENT
Start: 2017-05-23 | End: 2017-05-23

## 2017-05-23 RX ORDER — MORPHINE SULFATE 50 MG/1
4 CAPSULE, EXTENDED RELEASE ORAL ONCE
Qty: 0 | Refills: 0 | Status: DISCONTINUED | OUTPATIENT
Start: 2017-05-23 | End: 2017-05-23

## 2017-05-23 RX ORDER — SODIUM CHLORIDE 9 MG/ML
1000 INJECTION INTRAMUSCULAR; INTRAVENOUS; SUBCUTANEOUS ONCE
Qty: 0 | Refills: 0 | Status: COMPLETED | OUTPATIENT
Start: 2017-05-23 | End: 2017-05-23

## 2017-05-23 RX ORDER — ACETAMINOPHEN 500 MG
1000 TABLET ORAL ONCE
Qty: 0 | Refills: 0 | Status: COMPLETED | OUTPATIENT
Start: 2017-05-23 | End: 2017-05-23

## 2017-05-23 RX ORDER — LEVOTHYROXINE SODIUM 125 MCG
44 TABLET ORAL DAILY
Qty: 0 | Refills: 0 | Status: DISCONTINUED | OUTPATIENT
Start: 2017-05-23 | End: 2017-05-26

## 2017-05-23 RX ORDER — SODIUM CHLORIDE 9 MG/ML
1000 INJECTION, SOLUTION INTRAVENOUS
Qty: 0 | Refills: 0 | Status: DISCONTINUED | OUTPATIENT
Start: 2017-05-23 | End: 2017-05-23

## 2017-05-23 RX ORDER — LEVOTHYROXINE SODIUM 125 MCG
44 TABLET ORAL DAILY
Qty: 0 | Refills: 0 | Status: DISCONTINUED | OUTPATIENT
Start: 2017-05-23 | End: 2017-05-23

## 2017-05-23 RX ORDER — PANTOPRAZOLE SODIUM 20 MG/1
40 TABLET, DELAYED RELEASE ORAL DAILY
Qty: 0 | Refills: 0 | Status: DISCONTINUED | OUTPATIENT
Start: 2017-05-23 | End: 2017-05-26

## 2017-05-23 RX ORDER — SODIUM CHLORIDE 9 MG/ML
500 INJECTION INTRAMUSCULAR; INTRAVENOUS; SUBCUTANEOUS ONCE
Qty: 0 | Refills: 0 | Status: COMPLETED | OUTPATIENT
Start: 2017-05-23 | End: 2017-05-23

## 2017-05-23 RX ORDER — TIOTROPIUM BROMIDE 18 UG/1
1 CAPSULE ORAL; RESPIRATORY (INHALATION) DAILY
Qty: 0 | Refills: 0 | Status: DISCONTINUED | OUTPATIENT
Start: 2017-05-23 | End: 2017-06-08

## 2017-05-23 RX ORDER — BUDESONIDE AND FORMOTEROL FUMARATE DIHYDRATE 160; 4.5 UG/1; UG/1
2 AEROSOL RESPIRATORY (INHALATION)
Qty: 0 | Refills: 0 | Status: DISCONTINUED | OUTPATIENT
Start: 2017-05-23 | End: 2017-06-08

## 2017-05-23 RX ORDER — SODIUM CHLORIDE 9 MG/ML
1000 INJECTION, SOLUTION INTRAVENOUS
Qty: 0 | Refills: 0 | Status: DISCONTINUED | OUTPATIENT
Start: 2017-05-23 | End: 2017-05-24

## 2017-05-23 RX ADMIN — SODIUM CHLORIDE 100 MILLILITER(S): 9 INJECTION, SOLUTION INTRAVENOUS at 16:54

## 2017-05-23 RX ADMIN — PANTOPRAZOLE SODIUM 40 MILLIGRAM(S): 20 TABLET, DELAYED RELEASE ORAL at 16:43

## 2017-05-23 RX ADMIN — SODIUM CHLORIDE 100 MILLILITER(S): 9 INJECTION, SOLUTION INTRAVENOUS at 16:17

## 2017-05-23 RX ADMIN — MORPHINE SULFATE 4 MILLIGRAM(S): 50 CAPSULE, EXTENDED RELEASE ORAL at 15:31

## 2017-05-23 RX ADMIN — Medication 400 MILLIGRAM(S): at 11:37

## 2017-05-23 RX ADMIN — Medication 1000 MILLIGRAM(S): at 14:43

## 2017-05-23 RX ADMIN — FONDAPARINUX SODIUM 2.5 MILLIGRAM(S): 2.5 INJECTION, SOLUTION SUBCUTANEOUS at 16:55

## 2017-05-23 RX ADMIN — SODIUM CHLORIDE 1000 MILLILITER(S): 9 INJECTION INTRAMUSCULAR; INTRAVENOUS; SUBCUTANEOUS at 11:39

## 2017-05-23 RX ADMIN — MORPHINE SULFATE 4 MILLIGRAM(S): 50 CAPSULE, EXTENDED RELEASE ORAL at 14:43

## 2017-05-23 RX ADMIN — BUDESONIDE AND FORMOTEROL FUMARATE DIHYDRATE 2 PUFF(S): 160; 4.5 AEROSOL RESPIRATORY (INHALATION) at 16:58

## 2017-05-23 RX ADMIN — Medication 44 MICROGRAM(S): at 17:05

## 2017-05-23 RX ADMIN — SODIUM CHLORIDE 1000 MILLILITER(S): 9 INJECTION INTRAMUSCULAR; INTRAVENOUS; SUBCUTANEOUS at 16:19

## 2017-05-23 RX ADMIN — ONDANSETRON 4 MILLIGRAM(S): 8 TABLET, FILM COATED ORAL at 16:18

## 2017-05-23 NOTE — H&P ADULT. - ATTENDING COMMENTS
Pt seen and examined on admission. Agree with A/P. Admit to ATP, SICU. NPO, IVF, NGT, fluid resuscitation, strict I/Os, serial exams, monitor ostomy output.

## 2017-05-23 NOTE — ED PROVIDER NOTE - PROGRESS NOTE DETAILS
patient BP dropped to 50s systolic. Given 2 L bolus. bedside TTE without B-lines. Surgery at bedside. will place NG tube for obstruction. BP improved to 90s. patient still reporting pain. As per NSX admit to [place] to floor. [employer]: Received call from radiology re: unchanged CT since last image at previous visit for same complaint.  This attending entered room to check on pt and paged surgery.  Pt noted to be newly hypotensive, in distress, endorsing worse abdominal pain.  Pt is not tachycardic, no beta blockers.  Clear breath sounds bilaterally.  No rash.  Received IV contrast and morphine within last 1-2 hrs.  Pt confirms she is not allergic to either of these.  Pt vomited once while in CT per her report. She remains A & O x 3. Fluids administered for pressure support and LUE IV noted to be infiltrated.  Surgery aware and at bedside.  Surgery to admit and is placing NG tube.  Pt’s BP has responded to fluids. [employer]:  Pt noted to be hypoxic to mid to low 80's, boosted on stretcher and O2 sats increase.  Pt noted to become hypoxic again to mid 80's, O2 begun by nasal canula.  Breath sounds remain clear to bilateral bases.  Pt remains easily rousable and is not tachycardic.  BP 90's systolc. [employer]:  Pt's pressure continues to vascilate between 70's SBP and 90's.  She is not in distress.  Pt is currently on 4L nasal canula.  Surgery called to discuss bed choice. [employer]: Surgery made aware of current BP and manual corresponding with monitor currently 88/50.  Surgery discussing with attending and will decide floor vs. SICU.  Per IR pt's fistula plug has not fallen off and 'the part that fell off was expected to fall off'.  NTD from IR perspective.  Awaiting call back from surgery.

## 2017-05-23 NOTE — H&P ADULT. - HISTORY OF PRESENT ILLNESS
76F extensive surgical history with recent admission for SBO returns to hospital for SBO. Patient was discharged 5/18 from most recent admission, states that she was having regular ostomy function until last night. Associated with loss of ostomy function was intermittent, intense abdominal pain. No associated nausea/vomiting/fevers/chills. Tolerated PO well on day prior to admission. On recent admission patient had plug placed in EC fistula by IR, states that it "fell out" on the day prior to admission.  Patient's past medical/surgical history includes diverticulitis s/p Ruben's, recurrent SBO s/p SBR, ECF s/p takedown, wound dehiscence, abdominal reconstruction. She also has COPD, h/o DVT s/p IVC filter (later removed), GERD, Hypothyroidism.  Medications include synthroid, spiriva, advair, gabapentin.   On initial exam the patient was afebrile with stable vital signs. She appeared uncomfortable but not in acute distress. Her abdomen was distended but soft and without tenderness to palpation. Her ECF was pouched with scant, stool-like material in bag. Her ostomy was viable in appearance without gas or stool in bag. NGT was deferred at that point as patient denied nausea. On repeat exam the patient's blood pressure was low with systolic 88 on manual and her O2 saturation decreased requiring NC. She remained AAOx4 without significant change in abdominal exam. She did endorse increased nausea and vomited once (nonbloody) at that time. An NGT was placed with about 50cc gastric contents obtained. She had what appeared to be the exterior cap to an ECF plug in her possession.  Laboratory values showed WBC 7, Cr 1.51 to 1.32 on repeat, venous lactate 0.9. CT showed an SBO with transition point near SB-SB anastamosis, not significantly changed from previous admission. 76F extensive surgical history with recent admission for SBO returns to hospital for SBO. Patient was discharged 5/18 from most recent admission, states that she was having regular ostomy function until last night. Associated with loss of ostomy function was intermittent, intense abdominal pain. No associated nausea/vomiting/fevers/chills. Tolerated PO well on day prior to admission. On recent admission patient had plug placed in EC fistula by IR, states that it "fell out" on the day prior to admission.  Patient's past medical/surgical history includes diverticulitis s/p Ruben's, recurrent SBO s/p SBR, ECF s/p takedown, wound dehiscence, abdominal reconstruction. She also has COPD, h/o DVT s/p IVC filter (later removed), GERD, Hypothyroidism.  Medications include synthroid, spiriva, advair, gabapentin.   On initial exam the patient was afebrile with stable vital signs. She appeared uncomfortable but not in acute distress. Her abdomen was distended but soft and without tenderness to palpation. Her ECF was pouched with scant, stool-like material in bag. Her ostomy was viable in appearance without gas or stool in bag. NGT was deferred at that point as patient denied nausea. On repeat exam the patient's blood pressure was low with systolic 88 on manual and her O2 saturation decreased requiring NC. She remained AAOx4 without significant change in abdominal exam. She did endorse increased nausea and vomited once (nonbloody) at that time. An NGT was placed with about 50cc gastric contents obtained. She had what appeared to be the exterior cap to an ECF plug in her possession.  Laboratory values showed WBC 7, Cr 1.51 to 1.32 on repeat, venous lactate 0.9. CT showed an SBO with transition point near SB-SB anastamosis, not significantly changed from previous admission.    IR (Dr. Hopkins) consulted in ED for possibly displaced ECF plug.

## 2017-05-23 NOTE — ED ADULT NURSE REASSESSMENT NOTE - NS ED NURSE REASSESS COMMENT FT1
Pt is a difficult venipuncture access whereby to obtain blood specimen and MD made aware and a call was placed for IV team. RN was able to insert Rt Wrist area 22g PIV but unable to draw all blood specimen as ordered.

## 2017-05-23 NOTE — H&P ADULT. - ASSESSMENT
76F SBO  - Admit to SICU for hemodynamic monitoring, Dr. 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

## 2017-05-23 NOTE — ED PROVIDER NOTE - CARE PLAN
Principal Discharge DX:	Abdominal pain, unspecified location Principal Discharge DX:	Bowel obstruction

## 2017-05-23 NOTE — ED ADULT NURSE REASSESSMENT NOTE - NS ED NURSE REASSESS COMMENT FT1
Pt went for CT Scan procedure. Pt has a LT Upper Arm 20g US guided PIV that was inserted by DR Edward.

## 2017-05-23 NOTE — ED PROVIDER NOTE - ABDOMINAL TENDER
right upper quadrant/guarding without rebound, left upper quadrant hernia, no overlying erythema, no stool or flatus in left sided colostomy, small amount of fecal material in right sided bag/left upper quadrant

## 2017-05-23 NOTE — ED PROCEDURE NOTE - PROCEDURE ADDITIONAL DETAILS
Peripheral IV access in the Emergency Department obtained under dynamic ultrasound guidance with dark nonpulsatile blood return.  Catheter was flushed afterwards without any resistance or resulting extravasation.  IV catheter confirmed in compressible vein after insertion.     Ultrasound-guided cannulation of vein in the left upper extremity, proximal to elbow, using 20 g catheter.

## 2017-05-23 NOTE — ED PROVIDER NOTE - OBJECTIVE STATEMENT
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 Patient reports abdominal pain diffusely, non-radiating, "hard," intermittent, now 0/10, no aggravating or relieving factors. She also noticed fecal material coming out of bag collecting fluid from fistula. No nausea or vomiting. No fevers or chills. No diarrhea. Last BM was yesterday through colostomy. No rectal BM. No chest pain or shortness of breath. No hematochezia or melena.

## 2017-05-23 NOTE — ED PROVIDER NOTE - MEDICAL DECISION MAKING DETAILS
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 Likely SBO given no flatus, feces in colostomy, will obtain labwork, CT, sx consult,. pain control. 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 Likely SBO given no flatus, feces in colostomy, will obtain labwork, CT, sx consult,. pain control. Gentle hydration given prior congestive heart failure

## 2017-05-23 NOTE — ED PROCEDURE NOTE - GENERAL PROCEDURE NAME
Peripheral IV access in the Emergency Department obtained under dynamic ultrasound guidance with dark nonpulsatile blood return.  Catheter was flushed afterwards without any resistance or resulting extravasation.  IV catheter confirmed in compressible vein after insertion.

## 2017-05-23 NOTE — ED ADULT NURSE NOTE - OBJECTIVE STATEMENT
Pt c/o of abdominal pain rated 9-10 since yesterday. Pt recently had bowel obstruction surgery and has 2 ostomies on the LUQ abd area. 1 of the bag draining fluids from a fistula and the other for feces. Noted some abdominal distention noted.

## 2017-05-23 NOTE — ED PROVIDER NOTE - ATTENDING CONTRIBUTION TO CARE
Attending [employer]:  Agree with above.  Pt is a 76 yr old female with hx of diverticular disease s/p colostomy on L side, enterocutaneous fistula midline with multiple abdominal surgeries and complicated hx of obstructions/reconstruction etc now representing to ED with complaint of abdominal pain after admission for abdominal pain recently with discharge 5/18 s/p intestinal obstruction which was managed medically.  Pt presents after she had recurrent onset of diffuse abdominal pain yesterday.  Today pt noted that she didn’t have BM’s out of colostomy bag and instead out of fistula which is not baseline.  Pt is alert and oriented and not having any pain presently but was concerned because of new output from fistula.  Pt does have diffuse TTP over abdomen.  Small hernia around ostomy site.  States that this site had never hurt before but is currently mildly tender.  No n/v/fevers/chills.  Stools from fistula are non-bloody.

## 2017-05-24 LAB
ANION GAP SERPL CALC-SCNC: 11 MMOL/L — SIGNIFICANT CHANGE UP (ref 5–17)
APTT BLD: 31.4 SEC — SIGNIFICANT CHANGE UP (ref 27.5–37.4)
BUN SERPL-MCNC: 19 MG/DL — SIGNIFICANT CHANGE UP (ref 7–23)
CA-I BLD-SCNC: 1.09 MMOL/L — SIGNIFICANT CHANGE UP (ref 1.05–1.34)
CALCIUM SERPL-MCNC: 8.5 MG/DL — SIGNIFICANT CHANGE UP (ref 8.4–10.5)
CHLORIDE SERPL-SCNC: 105 MMOL/L — SIGNIFICANT CHANGE UP (ref 96–108)
CO2 SERPL-SCNC: 23 MMOL/L — SIGNIFICANT CHANGE UP (ref 22–31)
CREAT SERPL-MCNC: 1.48 MG/DL — HIGH (ref 0.5–1.3)
GLUCOSE SERPL-MCNC: 96 MG/DL — SIGNIFICANT CHANGE UP (ref 70–99)
HCT VFR BLD CALC: 29.8 % — LOW (ref 34.5–45)
HGB BLD-MCNC: 10 G/DL — LOW (ref 11.5–15.5)
INR BLD: 1.15 RATIO — SIGNIFICANT CHANGE UP (ref 0.88–1.16)
MAGNESIUM SERPL-MCNC: 1.5 MG/DL — LOW (ref 1.6–2.6)
MCHC RBC-ENTMCNC: 33.7 GM/DL — SIGNIFICANT CHANGE UP (ref 32–36)
MCHC RBC-ENTMCNC: 34.3 PG — HIGH (ref 27–34)
MCV RBC AUTO: 102 FL — HIGH (ref 80–100)
PHOSPHATE SERPL-MCNC: 3.2 MG/DL — SIGNIFICANT CHANGE UP (ref 2.5–4.5)
PLATELET # BLD AUTO: 164 K/UL — SIGNIFICANT CHANGE UP (ref 150–400)
POTASSIUM SERPL-MCNC: 4.6 MMOL/L — SIGNIFICANT CHANGE UP (ref 3.5–5.3)
POTASSIUM SERPL-SCNC: 4.6 MMOL/L — SIGNIFICANT CHANGE UP (ref 3.5–5.3)
PROTHROM AB SERPL-ACNC: 12.5 SEC — SIGNIFICANT CHANGE UP (ref 9.8–12.7)
RBC # BLD: 2.93 M/UL — LOW (ref 3.8–5.2)
RBC # FLD: 12.2 % — SIGNIFICANT CHANGE UP (ref 10.3–14.5)
SODIUM SERPL-SCNC: 139 MMOL/L — SIGNIFICANT CHANGE UP (ref 135–145)
WBC # BLD: 3.6 K/UL — LOW (ref 3.8–10.5)
WBC # FLD AUTO: 3.6 K/UL — LOW (ref 3.8–10.5)

## 2017-05-24 PROCEDURE — 71010: CPT | Mod: 26

## 2017-05-24 PROCEDURE — 99232 SBSQ HOSP IP/OBS MODERATE 35: CPT

## 2017-05-24 RX ORDER — SODIUM CHLORIDE 9 MG/ML
1000 INJECTION, SOLUTION INTRAVENOUS
Qty: 0 | Refills: 0 | Status: DISCONTINUED | OUTPATIENT
Start: 2017-05-24 | End: 2017-05-26

## 2017-05-24 RX ORDER — MAGNESIUM SULFATE 500 MG/ML
2 VIAL (ML) INJECTION ONCE
Qty: 0 | Refills: 0 | Status: COMPLETED | OUTPATIENT
Start: 2017-05-24 | End: 2017-05-24

## 2017-05-24 RX ORDER — MAGNESIUM SULFATE 500 MG/ML
2 VIAL (ML) INJECTION ONCE
Qty: 0 | Refills: 0 | Status: DISCONTINUED | OUTPATIENT
Start: 2017-05-24 | End: 2017-05-24

## 2017-05-24 RX ADMIN — PANTOPRAZOLE SODIUM 40 MILLIGRAM(S): 20 TABLET, DELAYED RELEASE ORAL at 14:15

## 2017-05-24 RX ADMIN — TIOTROPIUM BROMIDE 1 CAPSULE(S): 18 CAPSULE ORAL; RESPIRATORY (INHALATION) at 17:23

## 2017-05-24 RX ADMIN — BUDESONIDE AND FORMOTEROL FUMARATE DIHYDRATE 2 PUFF(S): 160; 4.5 AEROSOL RESPIRATORY (INHALATION) at 06:01

## 2017-05-24 RX ADMIN — FONDAPARINUX SODIUM 2.5 MILLIGRAM(S): 2.5 INJECTION, SOLUTION SUBCUTANEOUS at 14:15

## 2017-05-24 RX ADMIN — Medication 50 GRAM(S): at 05:57

## 2017-05-24 RX ADMIN — BUDESONIDE AND FORMOTEROL FUMARATE DIHYDRATE 2 PUFF(S): 160; 4.5 AEROSOL RESPIRATORY (INHALATION) at 17:23

## 2017-05-24 RX ADMIN — Medication 44 MICROGRAM(S): at 05:57

## 2017-05-24 NOTE — DIETITIAN INITIAL EVALUATION ADULT. - ORAL INTAKE PTA
Patient reports good appetite and po intake PTA; however, she reported a distaste for the food at the rehab facility she arrived from. States she will supplement meals provided at the facility with food she has purchased herself. Usual Diet Recall: Breakfast- tea, toast Lunch- cold cut sandwich Dinner- pasta or salad with tuna fish, does not eat vegetables at rehab facility due to distaste for texture

## 2017-05-24 NOTE — DIETITIAN INITIAL EVALUATION ADULT. - ENERGY NEEDS
Ht 59 inches Wt 162.8 pounds BMI 32.9 Kg/m^2  IBW 98 pounds +/- 10%; 166% IBW  Edema: none noted Skin: no pressure ulcers  Other pertinent information: 77 yo female with PMH of diverticulitis S/P Ruben's procedure, SBO S/P small bowel resection/ostomy, GERD, COPD, hypothyroidism, CHF, enterocutaneous fistula admitted from rehab facility with SBO, abdominal pain, loss of ostomy function, and dislodged fistula plug

## 2017-05-24 NOTE — DIETITIAN INITIAL EVALUATION ADULT. - OTHER INFO
Patient seen for length of stay in the ICU. Currently NPO due to SBO, poor output from ostomy. Pt states she is at her UBW of 164 pounds- consistent with admit wt of 162.8 pounds and previous RD note (6/2016) where patient weighed 159 pounds. Denies difficulty chewing/swallowing with dentures. Pt with NGT to suction, noted 200mL output 5/24. Reported one episode of emesis following consumption of contrast for CT scan, no nausea/emesis since. Reports NKFA. States she has had minimal ostomy output, denies flatus. Per medical record, ostomy output of 100mL 5/24 noted. Patient aware of usual diet progression as GI function returns/medically feasible.

## 2017-05-24 NOTE — DIETITIAN INITIAL EVALUATION ADULT. - NS AS NUTRI INTERV ED CONTENT
Discussed usual diet progression from clear liquids to low fiber as medically feasible to advance diet. Pt made aware RD remains available as requested/needed

## 2017-05-24 NOTE — DIETITIAN INITIAL EVALUATION ADULT. - ADHERENCE
Patient states she was not following any dietary restrictions PTA but will monitor portion sizes. Pt reports taking MVI, Iron PTA

## 2017-05-25 LAB
ANION GAP SERPL CALC-SCNC: 14 MMOL/L — SIGNIFICANT CHANGE UP (ref 5–17)
BUN SERPL-MCNC: 14 MG/DL — SIGNIFICANT CHANGE UP (ref 7–23)
CA-I BLD-SCNC: 1.23 MMOL/L — SIGNIFICANT CHANGE UP (ref 1.05–1.34)
CALCIUM SERPL-MCNC: 8.7 MG/DL — SIGNIFICANT CHANGE UP (ref 8.4–10.5)
CHLORIDE SERPL-SCNC: 102 MMOL/L — SIGNIFICANT CHANGE UP (ref 96–108)
CO2 SERPL-SCNC: 23 MMOL/L — SIGNIFICANT CHANGE UP (ref 22–31)
CREAT SERPL-MCNC: 1.37 MG/DL — HIGH (ref 0.5–1.3)
GLUCOSE SERPL-MCNC: 122 MG/DL — HIGH (ref 70–99)
HCT VFR BLD CALC: 30.6 % — LOW (ref 34.5–45)
HGB BLD-MCNC: 9.7 G/DL — LOW (ref 11.5–15.5)
MAGNESIUM SERPL-MCNC: 2.2 MG/DL — SIGNIFICANT CHANGE UP (ref 1.6–2.6)
MCHC RBC-ENTMCNC: 31.3 PG — SIGNIFICANT CHANGE UP (ref 27–34)
MCHC RBC-ENTMCNC: 31.7 GM/DL — LOW (ref 32–36)
MCV RBC AUTO: 98.7 FL — SIGNIFICANT CHANGE UP (ref 80–100)
PHOSPHATE SERPL-MCNC: 2.9 MG/DL — SIGNIFICANT CHANGE UP (ref 2.5–4.5)
PLATELET # BLD AUTO: 207 K/UL — SIGNIFICANT CHANGE UP (ref 150–400)
POTASSIUM SERPL-MCNC: 4.2 MMOL/L — SIGNIFICANT CHANGE UP (ref 3.5–5.3)
POTASSIUM SERPL-SCNC: 4.2 MMOL/L — SIGNIFICANT CHANGE UP (ref 3.5–5.3)
RBC # BLD: 3.1 M/UL — LOW (ref 3.8–5.2)
RBC # FLD: 13.4 % — SIGNIFICANT CHANGE UP (ref 10.3–14.5)
SODIUM SERPL-SCNC: 139 MMOL/L — SIGNIFICANT CHANGE UP (ref 135–145)
WBC # BLD: 3.53 K/UL — LOW (ref 3.8–10.5)
WBC # FLD AUTO: 3.53 K/UL — LOW (ref 3.8–10.5)

## 2017-05-25 PROCEDURE — 71010: CPT | Mod: 26

## 2017-05-25 PROCEDURE — 99232 SBSQ HOSP IP/OBS MODERATE 35: CPT

## 2017-05-25 RX ORDER — ACETAMINOPHEN 500 MG
650 TABLET ORAL EVERY 6 HOURS
Qty: 0 | Refills: 0 | Status: DISCONTINUED | OUTPATIENT
Start: 2017-05-25 | End: 2017-05-29

## 2017-05-25 RX ADMIN — Medication 650 MILLIGRAM(S): at 18:06

## 2017-05-25 RX ADMIN — Medication 44 MICROGRAM(S): at 06:26

## 2017-05-25 RX ADMIN — BUDESONIDE AND FORMOTEROL FUMARATE DIHYDRATE 2 PUFF(S): 160; 4.5 AEROSOL RESPIRATORY (INHALATION) at 06:26

## 2017-05-25 RX ADMIN — PANTOPRAZOLE SODIUM 40 MILLIGRAM(S): 20 TABLET, DELAYED RELEASE ORAL at 12:17

## 2017-05-25 RX ADMIN — TIOTROPIUM BROMIDE 1 CAPSULE(S): 18 CAPSULE ORAL; RESPIRATORY (INHALATION) at 17:09

## 2017-05-25 RX ADMIN — Medication 650 MILLIGRAM(S): at 18:36

## 2017-05-25 RX ADMIN — BUDESONIDE AND FORMOTEROL FUMARATE DIHYDRATE 2 PUFF(S): 160; 4.5 AEROSOL RESPIRATORY (INHALATION) at 17:08

## 2017-05-25 RX ADMIN — FONDAPARINUX SODIUM 2.5 MILLIGRAM(S): 2.5 INJECTION, SOLUTION SUBCUTANEOUS at 12:17

## 2017-05-26 LAB
ANION GAP SERPL CALC-SCNC: 13 MMOL/L — SIGNIFICANT CHANGE UP (ref 5–17)
BUN SERPL-MCNC: 9 MG/DL — SIGNIFICANT CHANGE UP (ref 7–23)
CA-I BLD-SCNC: 1.24 MMOL/L — SIGNIFICANT CHANGE UP (ref 1.05–1.34)
CALCIUM SERPL-MCNC: 9.4 MG/DL — SIGNIFICANT CHANGE UP (ref 8.4–10.5)
CHLORIDE SERPL-SCNC: 103 MMOL/L — SIGNIFICANT CHANGE UP (ref 96–108)
CO2 SERPL-SCNC: 25 MMOL/L — SIGNIFICANT CHANGE UP (ref 22–31)
CREAT SERPL-MCNC: 1.49 MG/DL — HIGH (ref 0.5–1.3)
GLUCOSE SERPL-MCNC: 91 MG/DL — SIGNIFICANT CHANGE UP (ref 70–99)
HCT VFR BLD CALC: 31.3 % — LOW (ref 34.5–45)
HGB BLD-MCNC: 10 G/DL — LOW (ref 11.5–15.5)
INR BLD: 1.08 RATIO — SIGNIFICANT CHANGE UP (ref 0.88–1.16)
MAGNESIUM SERPL-MCNC: 2.3 MG/DL — SIGNIFICANT CHANGE UP (ref 1.6–2.6)
MCHC RBC-ENTMCNC: 31.3 PG — SIGNIFICANT CHANGE UP (ref 27–34)
MCHC RBC-ENTMCNC: 31.9 GM/DL — LOW (ref 32–36)
MCV RBC AUTO: 98.1 FL — SIGNIFICANT CHANGE UP (ref 80–100)
PHOSPHATE SERPL-MCNC: 2.7 MG/DL — SIGNIFICANT CHANGE UP (ref 2.5–4.5)
PLATELET # BLD AUTO: 230 K/UL — SIGNIFICANT CHANGE UP (ref 150–400)
POTASSIUM SERPL-MCNC: 4 MMOL/L — SIGNIFICANT CHANGE UP (ref 3.5–5.3)
POTASSIUM SERPL-SCNC: 4 MMOL/L — SIGNIFICANT CHANGE UP (ref 3.5–5.3)
PROTHROM AB SERPL-ACNC: 12.2 SEC — SIGNIFICANT CHANGE UP (ref 10–13.1)
RBC # BLD: 3.19 M/UL — LOW (ref 3.8–5.2)
RBC # FLD: 13.3 % — SIGNIFICANT CHANGE UP (ref 10.3–14.5)
SODIUM SERPL-SCNC: 141 MMOL/L — SIGNIFICANT CHANGE UP (ref 135–145)
WBC # BLD: 3.73 K/UL — LOW (ref 3.8–10.5)
WBC # FLD AUTO: 3.73 K/UL — LOW (ref 3.8–10.5)

## 2017-05-26 PROCEDURE — 99231 SBSQ HOSP IP/OBS SF/LOW 25: CPT

## 2017-05-26 PROCEDURE — 99232 SBSQ HOSP IP/OBS MODERATE 35: CPT

## 2017-05-26 RX ORDER — SODIUM,POTASSIUM PHOSPHATES 278-250MG
1 POWDER IN PACKET (EA) ORAL
Qty: 0 | Refills: 0 | Status: COMPLETED | OUTPATIENT
Start: 2017-05-26 | End: 2017-05-26

## 2017-05-26 RX ORDER — GABAPENTIN 400 MG/1
300 CAPSULE ORAL THREE TIMES A DAY
Qty: 0 | Refills: 0 | Status: DISCONTINUED | OUTPATIENT
Start: 2017-05-26 | End: 2017-05-29

## 2017-05-26 RX ORDER — LEVOTHYROXINE SODIUM 125 MCG
88 TABLET ORAL DAILY
Qty: 0 | Refills: 0 | Status: DISCONTINUED | OUTPATIENT
Start: 2017-05-26 | End: 2017-05-29

## 2017-05-26 RX ORDER — PANTOPRAZOLE SODIUM 20 MG/1
40 TABLET, DELAYED RELEASE ORAL
Qty: 0 | Refills: 0 | Status: DISCONTINUED | OUTPATIENT
Start: 2017-05-26 | End: 2017-05-29

## 2017-05-26 RX ORDER — ACETAMINOPHEN 500 MG
2 TABLET ORAL
Qty: 0 | Refills: 0 | COMMUNITY
Start: 2017-05-26

## 2017-05-26 RX ADMIN — PANTOPRAZOLE SODIUM 40 MILLIGRAM(S): 20 TABLET, DELAYED RELEASE ORAL at 11:28

## 2017-05-26 RX ADMIN — BUDESONIDE AND FORMOTEROL FUMARATE DIHYDRATE 2 PUFF(S): 160; 4.5 AEROSOL RESPIRATORY (INHALATION) at 05:00

## 2017-05-26 RX ADMIN — GABAPENTIN 300 MILLIGRAM(S): 400 CAPSULE ORAL at 21:05

## 2017-05-26 RX ADMIN — Medication 650 MILLIGRAM(S): at 01:45

## 2017-05-26 RX ADMIN — Medication 44 MICROGRAM(S): at 05:00

## 2017-05-26 RX ADMIN — BUDESONIDE AND FORMOTEROL FUMARATE DIHYDRATE 2 PUFF(S): 160; 4.5 AEROSOL RESPIRATORY (INHALATION) at 17:15

## 2017-05-26 RX ADMIN — TIOTROPIUM BROMIDE 1 CAPSULE(S): 18 CAPSULE ORAL; RESPIRATORY (INHALATION) at 17:14

## 2017-05-26 RX ADMIN — Medication 650 MILLIGRAM(S): at 01:15

## 2017-05-26 RX ADMIN — Medication 1 TABLET(S): at 17:21

## 2017-05-26 RX ADMIN — FONDAPARINUX SODIUM 2.5 MILLIGRAM(S): 2.5 INJECTION, SOLUTION SUBCUTANEOUS at 13:13

## 2017-05-26 RX ADMIN — Medication 1 TABLET(S): at 11:38

## 2017-05-26 NOTE — DISCHARGE NOTE ADULT - PATIENT PORTAL LINK FT
“You can access the Follow[facility] Patient Portal, offered by [facility], by registering with the following website: [email or web address]”

## 2017-05-26 NOTE — DISCHARGE NOTE ADULT - ADDITIONAL INSTRUCTIONS
Please follow up with Dr. Scott within 1-2 weeks or as needed for recurrent symptoms. Please follow up with Dr. Scott within 1-2 weeks or as needed for recurrent symptoms. Please follow up with Dr. Rodriguez within 1-2 weeks. Please follow up with Dr. Hopkins within 2-3 months. Call 911 and return to the ED for chest pain, shortness of breath, significant increase in pain, or significant change in color of surgical sites.

## 2017-05-26 NOTE — DISCHARGE NOTE ADULT - PROVIDER TOKENS
TOKEN:'[id number]:'[id number]' TOKEN:'[id number]:'[id number]:'[id number]'

## 2017-05-26 NOTE — DISCHARGE NOTE ADULT - CARE PROVIDER_API CALL
Josh Scott), Surgery  [address] [place] [address]  Phone: [phone]  Fax: [phone]    Brent Rodriguez), Internal Medicine  [address] [place] [address]  Phone: [phone]  Fax: [phone] Josh Scott), Surgery  [address] [place] [address]  Phone: [phone]  Fax: [phone]    Brent Rodriguez (MD), Internal Medicine  [address] [place] [address]  Phone: [phone]  Fax: [phone]    Ben Hopkins), Diagnostic Radiology; [facility]  [address] [place] [address]  Phone: [phone]  Fax: [phone]

## 2017-05-26 NOTE — PHYSICAL THERAPY INITIAL EVALUATION ADULT - ADDITIONAL COMMENTS
wound dehiscence, abdominal reconstruction. She also has COPD, h/o DVT s/p IVC filter (later removed), GERD, Hypothyroidism. wound dehiscence, abdominal reconstruction. She also has COPD, h/o DVT s/p IVC filter (later removed), GERD, Hypothyroidism. Home situation/prior level of function- Pt is temporarily residing in a SNF where she ambulates independently with a walker, and has been receiving PT for UE weakness. As of 8/1 she will be moving to a 1st floor independent living apartment, no stairs to negotiate, will have a HHA 4 hrs/day.

## 2017-05-26 NOTE — DISCHARGE NOTE ADULT - HOSPITAL COURSE
76F extensive surgical history with recent admission for SBO returns to hospital for SBO. Patient was discharged 5/18 from most recent admission, states that she was having regular ostomy function until last night. Associated with loss of ostomy function was intermittent, intense abdominal pain. No associated nausea/vomiting/fevers/chills. Tolerated PO well on day prior to admission. On recent admission patient had plug placed in EC fistula by IR, states that it "fell out" on the day prior to admission.  Patient's past medical/surgical history includes diverticulitis s/p Ruben's, recurrent SBO s/p SBR, ECF s/p takedown, wound dehiscence, abdominal reconstruction. She also has COPD, h/o DVT s/p IVC filter (later removed) also has history of HIT, GERD, Hypothyroidism.  Medications include synthroid, spiriva, advair, fondaparinux, gabapentin.   On initial exam the patient was afebrile with stable vital signs. She appeared uncomfortable but not in acute distress. Her abdomen was distended but soft and without tenderness to palpation. Her ECF was pouched with scant, stool-like material in bag. Her ostomy was viable in appearance without gas or stool in bag. NGT was deferred at that point as patient denied nausea. On repeat exam the patient's blood pressure was low with systolic 88 on manual and her O2 saturation decreased requiring NC. She remained AAOx4 without significant change in abdominal exam. She did endorse increased nausea and vomited once (nonbloody) at that time. An NGT was placed with about 50cc gastric contents obtained. She had what appeared to be the exterior cap to an ECF plug in her possession.  Laboratory values showed WBC 7, Cr 1.51 to 1.32 on repeat, venous lactate 0.9. CT showed an SBO with transition point near SB-SB anastamosis, not significantly changed from previous admission.    IR (Dr. Hokpins) consulted in ED for possibly displaced ECF plug.    She was admitted to the SICU for hypotension. She was resuscitated and remained hemodynamically stable and was subsequently transferred to the floor. She started having ostomy function with stool and gas in the bag. The NGT was removed and a clear liquid diet was started. She tolerated it well and was advanced to a low residue diet. The patient continued to tolerate her diet. IR determined that the internal portion of the plug was in place that she didn't require further intervention. She was discharged in hemodynamically stable condition, tolerating LRD, ambulating, mentating and voiding at baseline. She was discharged with appropriate follow up.

## 2017-05-26 NOTE — DISCHARGE NOTE ADULT - MEDICATION SUMMARY - MEDICATIONS TO TAKE
I will START or STAY ON the medications listed below when I get home from the hospital:    acetaminophen 325 mg oral tablet  -- 2 tab(s) by mouth every 6 hours, As needed, Mild Pain (1 - 3)  -- Indication: For mild pain control    fondaparinux  -- 2.5 milligram(s) subcutaneous once a day  -- Indication: For DVT with HIT    gabapentin 300 mg oral capsule  -- 1 cap(s) by mouth 3 times a day  -- Indication: For neuropathic pain    fluticasone-salmeterol 250 mcg-50 mcg inhalation powder  -- 1 puff(s) inhaled 2 times a day  -- Indication: For COPD    tiotropium 18 mcg inhalation capsule  -- 1 cap(s) inhaled once a day  -- Indication: For COPD    Protonix 40 mg oral delayed release tablet  -- 1 tab(s) by mouth once a day  -- Indication: For GERD    levothyroxine 88 mcg (0.088 mg) oral tablet  -- 1 tab(s) by mouth once a day  -- Indication: For hypothyroidism    calcium-vitamin D 500 mg-200 intl units oral tablet  -- 1 tab(s) by mouth once a day  -- Indication: For calcium supplement    Multiple Vitamins oral tablet  -- 1 tab(s) by mouth once a day  -- Indication: For supplement

## 2017-05-26 NOTE — DISCHARGE NOTE ADULT - CARE PROVIDERS DIRECT ADDRESSES
,[email or web address],DirectAddress_Unknown ,[email or web address],DirectAddress_Unknown,DirectAddress_Unknown

## 2017-05-26 NOTE — DISCHARGE NOTE ADULT - CARE PLAN
Principal Discharge DX:	Bowel obstruction  Goal:	non-operative treatment  Instructions for follow-up, activity and diet:	Please continue your low residue diet.

## 2017-05-26 NOTE — DISCHARGE NOTE ADULT - NS DC ANGIO PCI YN
Please review Dr. Bloom' previous telephone note. I called Pt to schedule Exercise stress test however, he did not answer. I left a detailed voicemail asking him to call me back at his earliest convenience. I will try to call him again tomorrow.   
no

## 2017-05-26 NOTE — DISCHARGE NOTE ADULT - OTHER SIGNIFICANT FINDINGS
She was found to be mildly leukopenic. Dr. Rodriguez was following the patient while admitted and will continue to monitor her as an outpatient.

## 2017-05-27 LAB
ANION GAP SERPL CALC-SCNC: 20 MMOL/L — HIGH (ref 5–17)
APTT BLD: 35.1 SEC — SIGNIFICANT CHANGE UP (ref 27.5–37.4)
BASOPHILS # BLD AUTO: 0.02 K/UL — SIGNIFICANT CHANGE UP (ref 0–0.2)
BASOPHILS NFR BLD AUTO: 0.5 % — SIGNIFICANT CHANGE UP (ref 0–2)
BUN SERPL-MCNC: 16 MG/DL — SIGNIFICANT CHANGE UP (ref 7–23)
CALCIUM SERPL-MCNC: 9.8 MG/DL — SIGNIFICANT CHANGE UP (ref 8.4–10.5)
CHLORIDE SERPL-SCNC: 107 MMOL/L — SIGNIFICANT CHANGE UP (ref 96–108)
CO2 SERPL-SCNC: 18 MMOL/L — LOW (ref 22–31)
CREAT SERPL-MCNC: 1.81 MG/DL — HIGH (ref 0.5–1.3)
EOSINOPHIL # BLD AUTO: 0.16 K/UL — SIGNIFICANT CHANGE UP (ref 0–0.5)
EOSINOPHIL NFR BLD AUTO: 3.6 % — SIGNIFICANT CHANGE UP (ref 0–6)
GLUCOSE SERPL-MCNC: 102 MG/DL — HIGH (ref 70–99)
HCT VFR BLD CALC: 34.8 % — SIGNIFICANT CHANGE UP (ref 34.5–45)
HGB BLD-MCNC: 10.8 G/DL — LOW (ref 11.5–15.5)
IMM GRANULOCYTES NFR BLD AUTO: 0 % — SIGNIFICANT CHANGE UP (ref 0–1.5)
INR BLD: 1.03 RATIO — SIGNIFICANT CHANGE UP (ref 0.88–1.16)
LYMPHOCYTES # BLD AUTO: 1.96 K/UL — SIGNIFICANT CHANGE UP (ref 1–3.3)
LYMPHOCYTES # BLD AUTO: 44.3 % — HIGH (ref 13–44)
MCHC RBC-ENTMCNC: 31 GM/DL — LOW (ref 32–36)
MCHC RBC-ENTMCNC: 31.3 PG — SIGNIFICANT CHANGE UP (ref 27–34)
MCV RBC AUTO: 100.9 FL — HIGH (ref 80–100)
MONOCYTES # BLD AUTO: 0.43 K/UL — SIGNIFICANT CHANGE UP (ref 0–0.9)
MONOCYTES NFR BLD AUTO: 9.7 % — SIGNIFICANT CHANGE UP (ref 2–14)
NEUTROPHILS # BLD AUTO: 1.85 K/UL — SIGNIFICANT CHANGE UP (ref 1.8–7.4)
NEUTROPHILS NFR BLD AUTO: 41.9 % — LOW (ref 43–77)
PLATELET # BLD AUTO: 231 K/UL — SIGNIFICANT CHANGE UP (ref 150–400)
POTASSIUM SERPL-MCNC: 4.9 MMOL/L — SIGNIFICANT CHANGE UP (ref 3.5–5.3)
POTASSIUM SERPL-SCNC: 4.9 MMOL/L — SIGNIFICANT CHANGE UP (ref 3.5–5.3)
PROTHROM AB SERPL-ACNC: 11.7 SEC — SIGNIFICANT CHANGE UP (ref 10–13.1)
RBC # BLD: 3.45 M/UL — LOW (ref 3.8–5.2)
RBC # FLD: 13.4 % — SIGNIFICANT CHANGE UP (ref 10.3–14.5)
SODIUM SERPL-SCNC: 145 MMOL/L — SIGNIFICANT CHANGE UP (ref 135–145)
WBC # BLD: 4.42 K/UL — SIGNIFICANT CHANGE UP (ref 3.8–10.5)
WBC # FLD AUTO: 4.42 K/UL — SIGNIFICANT CHANGE UP (ref 3.8–10.5)

## 2017-05-27 RX ORDER — SODIUM CHLORIDE 9 MG/ML
1000 INJECTION INTRAMUSCULAR; INTRAVENOUS; SUBCUTANEOUS
Qty: 0 | Refills: 0 | Status: DISCONTINUED | OUTPATIENT
Start: 2017-05-27 | End: 2017-05-28

## 2017-05-27 RX ADMIN — Medication 650 MILLIGRAM(S): at 09:11

## 2017-05-27 RX ADMIN — BUDESONIDE AND FORMOTEROL FUMARATE DIHYDRATE 2 PUFF(S): 160; 4.5 AEROSOL RESPIRATORY (INHALATION) at 05:27

## 2017-05-27 RX ADMIN — Medication 1 TABLET(S): at 13:52

## 2017-05-27 RX ADMIN — Medication 650 MILLIGRAM(S): at 09:41

## 2017-05-27 RX ADMIN — BUDESONIDE AND FORMOTEROL FUMARATE DIHYDRATE 2 PUFF(S): 160; 4.5 AEROSOL RESPIRATORY (INHALATION) at 17:27

## 2017-05-27 RX ADMIN — TIOTROPIUM BROMIDE 1 CAPSULE(S): 18 CAPSULE ORAL; RESPIRATORY (INHALATION) at 17:28

## 2017-05-27 RX ADMIN — GABAPENTIN 300 MILLIGRAM(S): 400 CAPSULE ORAL at 13:52

## 2017-05-27 RX ADMIN — Medication 88 MICROGRAM(S): at 05:26

## 2017-05-27 RX ADMIN — FONDAPARINUX SODIUM 2.5 MILLIGRAM(S): 2.5 INJECTION, SOLUTION SUBCUTANEOUS at 13:51

## 2017-05-27 RX ADMIN — GABAPENTIN 300 MILLIGRAM(S): 400 CAPSULE ORAL at 22:56

## 2017-05-27 RX ADMIN — PANTOPRAZOLE SODIUM 40 MILLIGRAM(S): 20 TABLET, DELAYED RELEASE ORAL at 05:26

## 2017-05-27 RX ADMIN — GABAPENTIN 300 MILLIGRAM(S): 400 CAPSULE ORAL at 05:26

## 2017-05-28 LAB
ANION GAP SERPL CALC-SCNC: 13 MMOL/L — SIGNIFICANT CHANGE UP (ref 5–17)
BUN SERPL-MCNC: 19 MG/DL — SIGNIFICANT CHANGE UP (ref 7–23)
CALCIUM SERPL-MCNC: 9.1 MG/DL — SIGNIFICANT CHANGE UP (ref 8.4–10.5)
CHLORIDE SERPL-SCNC: 106 MMOL/L — SIGNIFICANT CHANGE UP (ref 96–108)
CO2 SERPL-SCNC: 21 MMOL/L — LOW (ref 22–31)
CREAT SERPL-MCNC: 1.77 MG/DL — HIGH (ref 0.5–1.3)
GLUCOSE SERPL-MCNC: 106 MG/DL — HIGH (ref 70–99)
MAGNESIUM SERPL-MCNC: 1.8 MG/DL — SIGNIFICANT CHANGE UP (ref 1.6–2.6)
PHOSPHATE SERPL-MCNC: 3.8 MG/DL — SIGNIFICANT CHANGE UP (ref 2.5–4.5)
POTASSIUM SERPL-MCNC: 4.4 MMOL/L — SIGNIFICANT CHANGE UP (ref 3.5–5.3)
POTASSIUM SERPL-SCNC: 4.4 MMOL/L — SIGNIFICANT CHANGE UP (ref 3.5–5.3)
SODIUM SERPL-SCNC: 140 MMOL/L — SIGNIFICANT CHANGE UP (ref 135–145)

## 2017-05-28 RX ORDER — MAGNESIUM SULFATE 500 MG/ML
2 VIAL (ML) INJECTION ONCE
Qty: 0 | Refills: 0 | Status: COMPLETED | OUTPATIENT
Start: 2017-05-28 | End: 2017-05-28

## 2017-05-28 RX ADMIN — Medication 50 GRAM(S): at 17:03

## 2017-05-28 RX ADMIN — Medication 650 MILLIGRAM(S): at 11:52

## 2017-05-28 RX ADMIN — Medication 1 TABLET(S): at 11:52

## 2017-05-28 RX ADMIN — TIOTROPIUM BROMIDE 1 CAPSULE(S): 18 CAPSULE ORAL; RESPIRATORY (INHALATION) at 17:03

## 2017-05-28 RX ADMIN — BUDESONIDE AND FORMOTEROL FUMARATE DIHYDRATE 2 PUFF(S): 160; 4.5 AEROSOL RESPIRATORY (INHALATION) at 17:03

## 2017-05-28 RX ADMIN — Medication 650 MILLIGRAM(S): at 00:34

## 2017-05-28 RX ADMIN — GABAPENTIN 300 MILLIGRAM(S): 400 CAPSULE ORAL at 05:38

## 2017-05-28 RX ADMIN — BUDESONIDE AND FORMOTEROL FUMARATE DIHYDRATE 2 PUFF(S): 160; 4.5 AEROSOL RESPIRATORY (INHALATION) at 05:38

## 2017-05-28 RX ADMIN — Medication 650 MILLIGRAM(S): at 12:22

## 2017-05-28 RX ADMIN — GABAPENTIN 300 MILLIGRAM(S): 400 CAPSULE ORAL at 21:06

## 2017-05-28 RX ADMIN — Medication 88 MICROGRAM(S): at 05:38

## 2017-05-28 RX ADMIN — Medication 650 MILLIGRAM(S): at 21:37

## 2017-05-28 RX ADMIN — PANTOPRAZOLE SODIUM 40 MILLIGRAM(S): 20 TABLET, DELAYED RELEASE ORAL at 05:38

## 2017-05-28 RX ADMIN — Medication 650 MILLIGRAM(S): at 21:07

## 2017-05-28 RX ADMIN — GABAPENTIN 300 MILLIGRAM(S): 400 CAPSULE ORAL at 16:21

## 2017-05-28 RX ADMIN — FONDAPARINUX SODIUM 2.5 MILLIGRAM(S): 2.5 INJECTION, SOLUTION SUBCUTANEOUS at 11:52

## 2017-05-28 RX ADMIN — Medication 650 MILLIGRAM(S): at 00:04

## 2017-05-29 LAB
ANION GAP SERPL CALC-SCNC: 16 MMOL/L — SIGNIFICANT CHANGE UP (ref 5–17)
BASOPHILS # BLD AUTO: 0.03 K/UL — SIGNIFICANT CHANGE UP (ref 0–0.2)
BASOPHILS NFR BLD AUTO: 0.7 % — SIGNIFICANT CHANGE UP (ref 0–2)
BUN SERPL-MCNC: 23 MG/DL — SIGNIFICANT CHANGE UP (ref 7–23)
CALCIUM SERPL-MCNC: 9.6 MG/DL — SIGNIFICANT CHANGE UP (ref 8.4–10.5)
CHLORIDE SERPL-SCNC: 105 MMOL/L — SIGNIFICANT CHANGE UP (ref 96–108)
CO2 SERPL-SCNC: 19 MMOL/L — LOW (ref 22–31)
CREAT SERPL-MCNC: 1.74 MG/DL — HIGH (ref 0.5–1.3)
EOSINOPHIL # BLD AUTO: 0.16 K/UL — SIGNIFICANT CHANGE UP (ref 0–0.5)
EOSINOPHIL NFR BLD AUTO: 3.5 % — SIGNIFICANT CHANGE UP (ref 0–6)
GLUCOSE SERPL-MCNC: 87 MG/DL — SIGNIFICANT CHANGE UP (ref 70–99)
HCT VFR BLD CALC: 33.4 % — LOW (ref 34.5–45)
HGB BLD-MCNC: 10.1 G/DL — LOW (ref 11.5–15.5)
IMM GRANULOCYTES NFR BLD AUTO: 0.2 % — SIGNIFICANT CHANGE UP (ref 0–1.5)
LYMPHOCYTES # BLD AUTO: 2.21 K/UL — SIGNIFICANT CHANGE UP (ref 1–3.3)
LYMPHOCYTES # BLD AUTO: 48.4 % — HIGH (ref 13–44)
MAGNESIUM SERPL-MCNC: 2.5 MG/DL — SIGNIFICANT CHANGE UP (ref 1.6–2.6)
MCHC RBC-ENTMCNC: 30.2 GM/DL — LOW (ref 32–36)
MCHC RBC-ENTMCNC: 30.7 PG — SIGNIFICANT CHANGE UP (ref 27–34)
MCV RBC AUTO: 101.5 FL — HIGH (ref 80–100)
MONOCYTES # BLD AUTO: 0.31 K/UL — SIGNIFICANT CHANGE UP (ref 0–0.9)
MONOCYTES NFR BLD AUTO: 6.8 % — SIGNIFICANT CHANGE UP (ref 2–14)
NEUTROPHILS # BLD AUTO: 1.85 K/UL — SIGNIFICANT CHANGE UP (ref 1.8–7.4)
NEUTROPHILS NFR BLD AUTO: 40.4 % — LOW (ref 43–77)
PHOSPHATE SERPL-MCNC: 3.7 MG/DL — SIGNIFICANT CHANGE UP (ref 2.5–4.5)
PLATELET # BLD AUTO: 247 K/UL — SIGNIFICANT CHANGE UP (ref 150–400)
POTASSIUM SERPL-MCNC: 5 MMOL/L — SIGNIFICANT CHANGE UP (ref 3.5–5.3)
POTASSIUM SERPL-SCNC: 5 MMOL/L — SIGNIFICANT CHANGE UP (ref 3.5–5.3)
RBC # BLD: 3.29 M/UL — LOW (ref 3.8–5.2)
RBC # FLD: 13.6 % — SIGNIFICANT CHANGE UP (ref 10.3–14.5)
SODIUM SERPL-SCNC: 140 MMOL/L — SIGNIFICANT CHANGE UP (ref 135–145)
WBC # BLD: 4.57 K/UL — SIGNIFICANT CHANGE UP (ref 3.8–10.5)
WBC # FLD AUTO: 4.57 K/UL — SIGNIFICANT CHANGE UP (ref 3.8–10.5)

## 2017-05-29 PROCEDURE — 99232 SBSQ HOSP IP/OBS MODERATE 35: CPT

## 2017-05-29 RX ORDER — DEXTROSE MONOHYDRATE, SODIUM CHLORIDE, AND POTASSIUM CHLORIDE 50; .745; 4.5 G/1000ML; G/1000ML; G/1000ML
1000 INJECTION, SOLUTION INTRAVENOUS
Qty: 0 | Refills: 0 | Status: DISCONTINUED | OUTPATIENT
Start: 2017-05-29 | End: 2017-05-30

## 2017-05-29 RX ORDER — LEVOTHYROXINE SODIUM 125 MCG
44 TABLET ORAL DAILY
Qty: 0 | Refills: 0 | Status: DISCONTINUED | OUTPATIENT
Start: 2017-05-29 | End: 2017-06-08

## 2017-05-29 RX ORDER — ACETAMINOPHEN 500 MG
1000 TABLET ORAL ONCE
Qty: 0 | Refills: 0 | Status: COMPLETED | OUTPATIENT
Start: 2017-05-29 | End: 2017-05-31

## 2017-05-29 RX ORDER — PANTOPRAZOLE SODIUM 20 MG/1
40 TABLET, DELAYED RELEASE ORAL DAILY
Qty: 0 | Refills: 0 | Status: DISCONTINUED | OUTPATIENT
Start: 2017-05-29 | End: 2017-06-08

## 2017-05-29 RX ADMIN — FONDAPARINUX SODIUM 2.5 MILLIGRAM(S): 2.5 INJECTION, SOLUTION SUBCUTANEOUS at 11:24

## 2017-05-29 RX ADMIN — BUDESONIDE AND FORMOTEROL FUMARATE DIHYDRATE 2 PUFF(S): 160; 4.5 AEROSOL RESPIRATORY (INHALATION) at 05:40

## 2017-05-29 RX ADMIN — BUDESONIDE AND FORMOTEROL FUMARATE DIHYDRATE 2 PUFF(S): 160; 4.5 AEROSOL RESPIRATORY (INHALATION) at 17:13

## 2017-05-29 RX ADMIN — GABAPENTIN 300 MILLIGRAM(S): 400 CAPSULE ORAL at 11:24

## 2017-05-29 RX ADMIN — GABAPENTIN 300 MILLIGRAM(S): 400 CAPSULE ORAL at 05:40

## 2017-05-29 RX ADMIN — PANTOPRAZOLE SODIUM 40 MILLIGRAM(S): 20 TABLET, DELAYED RELEASE ORAL at 05:40

## 2017-05-29 RX ADMIN — Medication 1 TABLET(S): at 11:24

## 2017-05-29 RX ADMIN — Medication 88 MICROGRAM(S): at 05:40

## 2017-05-29 RX ADMIN — TIOTROPIUM BROMIDE 1 CAPSULE(S): 18 CAPSULE ORAL; RESPIRATORY (INHALATION) at 17:13

## 2017-05-30 LAB
ANION GAP SERPL CALC-SCNC: 13 MMOL/L — SIGNIFICANT CHANGE UP (ref 5–17)
ANION GAP SERPL CALC-SCNC: 13 MMOL/L — SIGNIFICANT CHANGE UP (ref 5–17)
ANION GAP SERPL CALC-SCNC: 14 MMOL/L — SIGNIFICANT CHANGE UP (ref 5–17)
APPEARANCE UR: CLEAR — SIGNIFICANT CHANGE UP
BACTERIA # UR AUTO: NEGATIVE — SIGNIFICANT CHANGE UP
BILIRUB UR-MCNC: NEGATIVE — SIGNIFICANT CHANGE UP
BUN SERPL-MCNC: 18 MG/DL — SIGNIFICANT CHANGE UP (ref 7–23)
BUN SERPL-MCNC: 19 MG/DL — SIGNIFICANT CHANGE UP (ref 7–23)
BUN SERPL-MCNC: 19 MG/DL — SIGNIFICANT CHANGE UP (ref 7–23)
CALCIUM SERPL-MCNC: 9.1 MG/DL — SIGNIFICANT CHANGE UP (ref 8.4–10.5)
CALCIUM SERPL-MCNC: 9.5 MG/DL — SIGNIFICANT CHANGE UP (ref 8.4–10.5)
CALCIUM SERPL-MCNC: 9.6 MG/DL — SIGNIFICANT CHANGE UP (ref 8.4–10.5)
CHLORIDE SERPL-SCNC: 100 MMOL/L — SIGNIFICANT CHANGE UP (ref 96–108)
CHLORIDE SERPL-SCNC: 102 MMOL/L — SIGNIFICANT CHANGE UP (ref 96–108)
CHLORIDE SERPL-SCNC: 103 MMOL/L — SIGNIFICANT CHANGE UP (ref 96–108)
CHLORIDE UR-SCNC: 131 MMOL/L — SIGNIFICANT CHANGE UP
CO2 SERPL-SCNC: 21 MMOL/L — LOW (ref 22–31)
CO2 SERPL-SCNC: 21 MMOL/L — LOW (ref 22–31)
CO2 SERPL-SCNC: 22 MMOL/L — SIGNIFICANT CHANGE UP (ref 22–31)
COLOR SPEC: YELLOW — SIGNIFICANT CHANGE UP
CREAT ?TM UR-MCNC: 34 MG/DL — SIGNIFICANT CHANGE UP
CREAT SERPL-MCNC: 1.54 MG/DL — HIGH (ref 0.5–1.3)
CREAT SERPL-MCNC: 1.63 MG/DL — HIGH (ref 0.5–1.3)
CREAT SERPL-MCNC: 1.64 MG/DL — HIGH (ref 0.5–1.3)
DIFF PNL FLD: ABNORMAL
EPI CELLS # UR: 2 /HPF — SIGNIFICANT CHANGE UP (ref 0–5)
GLUCOSE SERPL-MCNC: 667 MG/DL — CRITICAL HIGH (ref 70–99)
GLUCOSE SERPL-MCNC: 72 MG/DL — SIGNIFICANT CHANGE UP (ref 70–99)
GLUCOSE SERPL-MCNC: 90 MG/DL — SIGNIFICANT CHANGE UP (ref 70–99)
GLUCOSE UR QL: NEGATIVE MG/DL — SIGNIFICANT CHANGE UP
HCT VFR BLD CALC: 33 % — LOW (ref 34.5–45)
HCT VFR BLD CALC: 35.7 % — SIGNIFICANT CHANGE UP (ref 34.5–45)
HGB BLD-MCNC: 10.9 G/DL — LOW (ref 11.5–15.5)
HGB BLD-MCNC: 11.8 G/DL — SIGNIFICANT CHANGE UP (ref 11.5–15.5)
HYALINE CASTS # UR AUTO: 0 /LPF — SIGNIFICANT CHANGE UP (ref 0–7)
KETONES UR-MCNC: NEGATIVE — SIGNIFICANT CHANGE UP
LEUKOCYTE ESTERASE UR-ACNC: ABNORMAL
MAGNESIUM SERPL-MCNC: 1.9 MG/DL — SIGNIFICANT CHANGE UP (ref 1.6–2.6)
MAGNESIUM SERPL-MCNC: 2.1 MG/DL — SIGNIFICANT CHANGE UP (ref 1.6–2.6)
MCHC RBC-ENTMCNC: 33 GM/DL — SIGNIFICANT CHANGE UP (ref 32–36)
MCHC RBC-ENTMCNC: 33 GM/DL — SIGNIFICANT CHANGE UP (ref 32–36)
MCHC RBC-ENTMCNC: 33.4 PG — SIGNIFICANT CHANGE UP (ref 27–34)
MCHC RBC-ENTMCNC: 33.5 PG — SIGNIFICANT CHANGE UP (ref 27–34)
MCV RBC AUTO: 101 FL — HIGH (ref 80–100)
MCV RBC AUTO: 101 FL — HIGH (ref 80–100)
NITRITE UR-MCNC: NEGATIVE — SIGNIFICANT CHANGE UP
OSMOLALITY SERPL: 298 MOS/KG — SIGNIFICANT CHANGE UP (ref 275–300)
OSMOLALITY UR: 363 MOS/KG — SIGNIFICANT CHANGE UP (ref 50–1200)
PH UR: 6 — SIGNIFICANT CHANGE UP (ref 5–8)
PHOSPHATE SERPL-MCNC: 3.1 MG/DL — SIGNIFICANT CHANGE UP (ref 2.5–4.5)
PHOSPHATE SERPL-MCNC: 3.3 MG/DL — SIGNIFICANT CHANGE UP (ref 2.5–4.5)
PLATELET # BLD AUTO: 254 K/UL — SIGNIFICANT CHANGE UP (ref 150–400)
PLATELET # BLD AUTO: 260 K/UL — SIGNIFICANT CHANGE UP (ref 150–400)
POTASSIUM SERPL-MCNC: 4.1 MMOL/L — SIGNIFICANT CHANGE UP (ref 3.5–5.3)
POTASSIUM SERPL-MCNC: 4.7 MMOL/L — SIGNIFICANT CHANGE UP (ref 3.5–5.3)
POTASSIUM SERPL-MCNC: 5.6 MMOL/L — HIGH (ref 3.5–5.3)
POTASSIUM SERPL-SCNC: 4.1 MMOL/L — SIGNIFICANT CHANGE UP (ref 3.5–5.3)
POTASSIUM SERPL-SCNC: 4.7 MMOL/L — SIGNIFICANT CHANGE UP (ref 3.5–5.3)
POTASSIUM SERPL-SCNC: 5.6 MMOL/L — HIGH (ref 3.5–5.3)
POTASSIUM UR-SCNC: 35 MMOL/L — SIGNIFICANT CHANGE UP
PROT UR-MCNC: NEGATIVE MG/DL — SIGNIFICANT CHANGE UP
RBC # BLD: 3.25 M/UL — LOW (ref 3.8–5.2)
RBC # BLD: 3.53 M/UL — LOW (ref 3.8–5.2)
RBC # FLD: 12.4 % — SIGNIFICANT CHANGE UP (ref 10.3–14.5)
RBC # FLD: 12.5 % — SIGNIFICANT CHANGE UP (ref 10.3–14.5)
RBC CASTS # UR COMP ASSIST: 0 /HPF — SIGNIFICANT CHANGE UP (ref 0–4)
SODIUM SERPL-SCNC: 134 MMOL/L — LOW (ref 135–145)
SODIUM SERPL-SCNC: 137 MMOL/L — SIGNIFICANT CHANGE UP (ref 135–145)
SODIUM SERPL-SCNC: 138 MMOL/L — SIGNIFICANT CHANGE UP (ref 135–145)
SODIUM UR-SCNC: 109 MMOL/L — SIGNIFICANT CHANGE UP
SP GR SPEC: 1.01 — SIGNIFICANT CHANGE UP (ref 1.01–1.02)
UROBILINOGEN FLD QL: NEGATIVE MG/DL — SIGNIFICANT CHANGE UP
WBC # BLD: 7.1 K/UL — SIGNIFICANT CHANGE UP (ref 3.8–10.5)
WBC # BLD: 7.1 K/UL — SIGNIFICANT CHANGE UP (ref 3.8–10.5)
WBC # FLD AUTO: 7.1 K/UL — SIGNIFICANT CHANGE UP (ref 3.8–10.5)
WBC # FLD AUTO: 7.1 K/UL — SIGNIFICANT CHANGE UP (ref 3.8–10.5)
WBC UR QL: 7 /HPF — HIGH (ref 0–5)

## 2017-05-30 PROCEDURE — 93010 ELECTROCARDIOGRAM REPORT: CPT

## 2017-05-30 PROCEDURE — 99232 SBSQ HOSP IP/OBS MODERATE 35: CPT

## 2017-05-30 PROCEDURE — 99222 1ST HOSP IP/OBS MODERATE 55: CPT | Mod: GC

## 2017-05-30 RX ORDER — SODIUM CHLORIDE 9 MG/ML
1000 INJECTION INTRAMUSCULAR; INTRAVENOUS; SUBCUTANEOUS
Qty: 0 | Refills: 0 | Status: DISCONTINUED | OUTPATIENT
Start: 2017-05-30 | End: 2017-05-30

## 2017-05-30 RX ORDER — DEXTROSE 50 % IN WATER 50 %
50 SYRINGE (ML) INTRAVENOUS ONCE
Qty: 0 | Refills: 0 | Status: COMPLETED | OUTPATIENT
Start: 2017-05-30 | End: 2017-05-30

## 2017-05-30 RX ORDER — SODIUM CHLORIDE 9 MG/ML
1000 INJECTION, SOLUTION INTRAVENOUS
Qty: 0 | Refills: 0 | Status: DISCONTINUED | OUTPATIENT
Start: 2017-05-30 | End: 2017-05-30

## 2017-05-30 RX ORDER — INSULIN HUMAN 100 [IU]/ML
10 INJECTION, SOLUTION SUBCUTANEOUS ONCE
Qty: 0 | Refills: 0 | Status: COMPLETED | OUTPATIENT
Start: 2017-05-30 | End: 2017-05-30

## 2017-05-30 RX ORDER — SODIUM CHLORIDE 9 MG/ML
1000 INJECTION, SOLUTION INTRAVENOUS
Qty: 0 | Refills: 0 | Status: DISCONTINUED | OUTPATIENT
Start: 2017-05-30 | End: 2017-05-31

## 2017-05-30 RX ADMIN — SODIUM CHLORIDE 100 MILLILITER(S): 9 INJECTION, SOLUTION INTRAVENOUS at 18:56

## 2017-05-30 RX ADMIN — Medication 50 MILLILITER(S): at 16:34

## 2017-05-30 RX ADMIN — Medication 50 MILLILITER(S): at 18:45

## 2017-05-30 RX ADMIN — PANTOPRAZOLE SODIUM 40 MILLIGRAM(S): 20 TABLET, DELAYED RELEASE ORAL at 13:48

## 2017-05-30 RX ADMIN — INSULIN HUMAN 10 UNIT(S): 100 INJECTION, SOLUTION SUBCUTANEOUS at 13:59

## 2017-05-30 RX ADMIN — SODIUM CHLORIDE 100 MILLILITER(S): 9 INJECTION, SOLUTION INTRAVENOUS at 22:30

## 2017-05-30 RX ADMIN — Medication 1000 MILLIGRAM(S): at 23:38

## 2017-05-30 RX ADMIN — Medication 50 MILLILITER(S): at 20:12

## 2017-05-30 RX ADMIN — FONDAPARINUX SODIUM 2.5 MILLIGRAM(S): 2.5 INJECTION, SOLUTION SUBCUTANEOUS at 13:49

## 2017-05-30 RX ADMIN — Medication 50 MILLILITER(S): at 13:53

## 2017-05-30 NOTE — PROVIDER CONTACT NOTE (CRITICAL VALUE NOTIFICATION) - ACTION/TREATMENT ORDERED:
1amp D5 IVP ordered and given; BG increased to 255. Will cont. to monitor BG. Pt responding well, alert and talking.

## 2017-05-30 NOTE — PROVIDER CONTACT NOTE (CRITICAL VALUE NOTIFICATION) - SITUATION
[facility] call with blood glucose level 667.  Labs drawn during a previous hypoglycemic episode with D5 given today, caused this result.    Meanwhile fingerstick blood glucose resulted 42 at the same time as [facility]lab called.

## 2017-05-30 NOTE — PROVIDER CONTACT NOTE (CRITICAL VALUE NOTIFICATION) - BACKGROUND
Hx: Fistula. Blood sugar 42.      Earlier today Pt had similar incident of BG dropping to 29 - which was treated with D5 and increased 340.

## 2017-05-31 LAB
ALBUMIN SERPL ELPH-MCNC: 3.4 G/DL — SIGNIFICANT CHANGE UP (ref 3.3–5)
ALP SERPL-CCNC: 127 U/L — HIGH (ref 40–120)
ALT FLD-CCNC: 8 U/L RC — LOW (ref 10–45)
ANION GAP SERPL CALC-SCNC: 12 MMOL/L — SIGNIFICANT CHANGE UP (ref 5–17)
APTT BLD: 37.2 SEC — SIGNIFICANT CHANGE UP (ref 27.5–37.4)
AST SERPL-CCNC: 15 U/L — SIGNIFICANT CHANGE UP (ref 10–40)
BASOPHILS # BLD AUTO: 0 K/UL — SIGNIFICANT CHANGE UP (ref 0–0.2)
BASOPHILS NFR BLD AUTO: 1 % — SIGNIFICANT CHANGE UP (ref 0–2)
BILIRUB SERPL-MCNC: 0.3 MG/DL — SIGNIFICANT CHANGE UP (ref 0.2–1.2)
BUN SERPL-MCNC: 14 MG/DL — SIGNIFICANT CHANGE UP (ref 7–23)
CALCIUM SERPL-MCNC: 9.5 MG/DL — SIGNIFICANT CHANGE UP (ref 8.4–10.5)
CHLORIDE SERPL-SCNC: 106 MMOL/L — SIGNIFICANT CHANGE UP (ref 96–108)
CO2 SERPL-SCNC: 23 MMOL/L — SIGNIFICANT CHANGE UP (ref 22–31)
CORTIS AM PEAK SERPL-MCNC: 10.7 UG/DL — SIGNIFICANT CHANGE UP (ref 6–18.4)
CREAT SERPL-MCNC: 1.47 MG/DL — HIGH (ref 0.5–1.3)
EOSINOPHIL # BLD AUTO: 0.1 K/UL — SIGNIFICANT CHANGE UP (ref 0–0.5)
EOSINOPHIL NFR BLD AUTO: 2.6 % — SIGNIFICANT CHANGE UP (ref 0–6)
EOSINOPHIL NFR URNS MANUAL: NEGATIVE — SIGNIFICANT CHANGE UP
GLUCOSE SERPL-MCNC: 118 MG/DL — HIGH (ref 70–99)
HCT VFR BLD CALC: 34.5 % — SIGNIFICANT CHANGE UP (ref 34.5–45)
HGB BLD-MCNC: 11.1 G/DL — LOW (ref 11.5–15.5)
INR BLD: 1.05 RATIO — SIGNIFICANT CHANGE UP (ref 0.88–1.16)
LYMPHOCYTES # BLD AUTO: 2 K/UL — SIGNIFICANT CHANGE UP (ref 1–3.3)
LYMPHOCYTES # BLD AUTO: 38.8 % — SIGNIFICANT CHANGE UP (ref 13–44)
MCHC RBC-ENTMCNC: 32.2 GM/DL — SIGNIFICANT CHANGE UP (ref 32–36)
MCHC RBC-ENTMCNC: 32.7 PG — SIGNIFICANT CHANGE UP (ref 27–34)
MCV RBC AUTO: 102 FL — HIGH (ref 80–100)
MONOCYTES # BLD AUTO: 0.4 K/UL — SIGNIFICANT CHANGE UP (ref 0–0.9)
MONOCYTES NFR BLD AUTO: 8 % — SIGNIFICANT CHANGE UP (ref 2–14)
NEUTROPHILS # BLD AUTO: 2.6 K/UL — SIGNIFICANT CHANGE UP (ref 1.8–7.4)
NEUTROPHILS NFR BLD AUTO: 49.6 % — SIGNIFICANT CHANGE UP (ref 43–77)
PLATELET # BLD AUTO: 235 K/UL — SIGNIFICANT CHANGE UP (ref 150–400)
POTASSIUM SERPL-MCNC: 4.9 MMOL/L — SIGNIFICANT CHANGE UP (ref 3.5–5.3)
POTASSIUM SERPL-SCNC: 4.9 MMOL/L — SIGNIFICANT CHANGE UP (ref 3.5–5.3)
PROT SERPL-MCNC: 6.9 G/DL — SIGNIFICANT CHANGE UP (ref 6–8.3)
PROTHROM AB SERPL-ACNC: 11.5 SEC — SIGNIFICANT CHANGE UP (ref 9.8–12.7)
RBC # BLD: 3.4 M/UL — LOW (ref 3.8–5.2)
RBC # FLD: 12.4 % — SIGNIFICANT CHANGE UP (ref 10.3–14.5)
SODIUM SERPL-SCNC: 141 MMOL/L — SIGNIFICANT CHANGE UP (ref 135–145)
TROPONIN T SERPL-MCNC: <0.01 NG/ML — SIGNIFICANT CHANGE UP (ref 0–0.06)
TROPONIN T SERPL-MCNC: <0.01 NG/ML — SIGNIFICANT CHANGE UP (ref 0–0.06)
TSH SERPL-MCNC: 3.08 UIU/ML — SIGNIFICANT CHANGE UP (ref 0.27–4.2)
WBC # BLD: 5.2 K/UL — SIGNIFICANT CHANGE UP (ref 3.8–10.5)
WBC # FLD AUTO: 5.2 K/UL — SIGNIFICANT CHANGE UP (ref 3.8–10.5)

## 2017-05-31 PROCEDURE — 74250 X-RAY XM SM INT 1CNTRST STD: CPT | Mod: 26

## 2017-05-31 PROCEDURE — 99232 SBSQ HOSP IP/OBS MODERATE 35: CPT

## 2017-05-31 RX ORDER — ACETAMINOPHEN 500 MG
1000 TABLET ORAL ONCE
Qty: 0 | Refills: 0 | Status: COMPLETED | OUTPATIENT
Start: 2017-05-31 | End: 2017-05-31

## 2017-05-31 RX ORDER — SODIUM CHLORIDE 9 MG/ML
1000 INJECTION, SOLUTION INTRAVENOUS
Qty: 0 | Refills: 0 | Status: DISCONTINUED | OUTPATIENT
Start: 2017-05-31 | End: 2017-06-05

## 2017-05-31 RX ORDER — ACETAMINOPHEN 500 MG
1000 TABLET ORAL ONCE
Qty: 0 | Refills: 0 | Status: COMPLETED | OUTPATIENT
Start: 2017-05-31 | End: 2017-06-01

## 2017-05-31 RX ADMIN — BUDESONIDE AND FORMOTEROL FUMARATE DIHYDRATE 2 PUFF(S): 160; 4.5 AEROSOL RESPIRATORY (INHALATION) at 17:58

## 2017-05-31 RX ADMIN — PANTOPRAZOLE SODIUM 40 MILLIGRAM(S): 20 TABLET, DELAYED RELEASE ORAL at 15:13

## 2017-05-31 RX ADMIN — Medication 400 MILLIGRAM(S): at 15:13

## 2017-05-31 RX ADMIN — Medication 400 MILLIGRAM(S): at 23:23

## 2017-05-31 RX ADMIN — Medication 1000 MILLIGRAM(S): at 15:28

## 2017-05-31 RX ADMIN — TIOTROPIUM BROMIDE 1 CAPSULE(S): 18 CAPSULE ORAL; RESPIRATORY (INHALATION) at 17:58

## 2017-05-31 RX ADMIN — SODIUM CHLORIDE 75 MILLILITER(S): 9 INJECTION, SOLUTION INTRAVENOUS at 19:58

## 2017-05-31 RX ADMIN — Medication 44 MICROGRAM(S): at 05:27

## 2017-05-31 NOTE — PROVIDER CONTACT NOTE (OTHER) - ASSESSMENT
Pt blood glucose level 53.  Pt states she does not feel right.  Pt states she is feeling weak.  Pt drowsy but arousable.  No s/s of sweating.  Pt denies chest pain or palpitations.  Pt A&Ox4 throughout hypoglycemic episode. VSS. Pt having adequate urine output.

## 2017-05-31 NOTE — PROVIDER CONTACT NOTE (OTHER) - ASSESSMENT
Pt having multiple rounds of hypoglycemia throughout afternoon/evening. Pt treated with D5 injectable x3. Pt symptomatic feeling weak, diaphoretic and drowsy but arousable. As per orders fingersticks were checked Q30 mins X2 and [id number] X 4. Pt refusing hourly fingerstick check at this time. Pt educated on reason for fingerstick. Pt states understanding and is still refusing.

## 2017-06-01 LAB
ANION GAP SERPL CALC-SCNC: 13 MMOL/L — SIGNIFICANT CHANGE UP (ref 5–17)
BUN SERPL-MCNC: 11 MG/DL — SIGNIFICANT CHANGE UP (ref 7–23)
CALCIUM SERPL-MCNC: 9.8 MG/DL — SIGNIFICANT CHANGE UP (ref 8.4–10.5)
CHLORIDE SERPL-SCNC: 104 MMOL/L — SIGNIFICANT CHANGE UP (ref 96–108)
CO2 SERPL-SCNC: 23 MMOL/L — SIGNIFICANT CHANGE UP (ref 22–31)
CREAT SERPL-MCNC: 1.61 MG/DL — HIGH (ref 0.5–1.3)
GLUCOSE SERPL-MCNC: 101 MG/DL — HIGH (ref 70–99)
HCT VFR BLD CALC: 35.3 % — SIGNIFICANT CHANGE UP (ref 34.5–45)
HGB BLD-MCNC: 11.6 G/DL — SIGNIFICANT CHANGE UP (ref 11.5–15.5)
MAGNESIUM SERPL-MCNC: 1.7 MG/DL — SIGNIFICANT CHANGE UP (ref 1.6–2.6)
MCHC RBC-ENTMCNC: 32.9 GM/DL — SIGNIFICANT CHANGE UP (ref 32–36)
MCHC RBC-ENTMCNC: 33 PG — SIGNIFICANT CHANGE UP (ref 27–34)
MCV RBC AUTO: 100 FL — SIGNIFICANT CHANGE UP (ref 80–100)
PHOSPHATE SERPL-MCNC: 3.6 MG/DL — SIGNIFICANT CHANGE UP (ref 2.5–4.5)
PLATELET # BLD AUTO: 271 K/UL — SIGNIFICANT CHANGE UP (ref 150–400)
POTASSIUM SERPL-MCNC: 5.1 MMOL/L — SIGNIFICANT CHANGE UP (ref 3.5–5.3)
POTASSIUM SERPL-SCNC: 5.1 MMOL/L — SIGNIFICANT CHANGE UP (ref 3.5–5.3)
RBC # BLD: 3.51 M/UL — LOW (ref 3.8–5.2)
RBC # FLD: 12.4 % — SIGNIFICANT CHANGE UP (ref 10.3–14.5)
SODIUM SERPL-SCNC: 140 MMOL/L — SIGNIFICANT CHANGE UP (ref 135–145)
WBC # BLD: 4.7 K/UL — SIGNIFICANT CHANGE UP (ref 3.8–10.5)
WBC # FLD AUTO: 4.7 K/UL — SIGNIFICANT CHANGE UP (ref 3.8–10.5)

## 2017-06-01 PROCEDURE — 71010: CPT | Mod: 26

## 2017-06-01 PROCEDURE — 77001 FLUOROGUIDE FOR VEIN DEVICE: CPT | Mod: 26,59

## 2017-06-01 PROCEDURE — 36569 INSJ PICC 5 YR+ W/O IMAGING: CPT

## 2017-06-01 PROCEDURE — 76080 X-RAY EXAM OF FISTULA: CPT | Mod: 26

## 2017-06-01 PROCEDURE — 76937 US GUIDE VASCULAR ACCESS: CPT | Mod: 26

## 2017-06-01 PROCEDURE — 20501 NJX SINUS TRACT DIAGNOSTIC: CPT

## 2017-06-01 RX ORDER — SODIUM CHLORIDE 9 MG/ML
20 INJECTION INTRAMUSCULAR; INTRAVENOUS; SUBCUTANEOUS ONCE
Qty: 0 | Refills: 0 | Status: DISCONTINUED | OUTPATIENT
Start: 2017-06-01 | End: 2017-06-08

## 2017-06-01 RX ORDER — OXYCODONE HYDROCHLORIDE 5 MG/1
5 TABLET ORAL EVERY 4 HOURS
Qty: 0 | Refills: 0 | Status: DISCONTINUED | OUTPATIENT
Start: 2017-06-01 | End: 2017-06-08

## 2017-06-01 RX ORDER — OXYCODONE HYDROCHLORIDE 5 MG/1
10 TABLET ORAL EVERY 4 HOURS
Qty: 0 | Refills: 0 | Status: DISCONTINUED | OUTPATIENT
Start: 2017-06-01 | End: 2017-06-08

## 2017-06-01 RX ORDER — SODIUM CHLORIDE 9 MG/ML
10 INJECTION INTRAMUSCULAR; INTRAVENOUS; SUBCUTANEOUS EVERY 12 HOURS
Qty: 0 | Refills: 0 | Status: DISCONTINUED | OUTPATIENT
Start: 2017-06-01 | End: 2017-06-08

## 2017-06-01 RX ORDER — SODIUM CHLORIDE 9 MG/ML
10 INJECTION INTRAMUSCULAR; INTRAVENOUS; SUBCUTANEOUS
Qty: 0 | Refills: 0 | Status: DISCONTINUED | OUTPATIENT
Start: 2017-06-01 | End: 2017-06-08

## 2017-06-01 RX ORDER — FONDAPARINUX SODIUM 2.5 MG/.5ML
2.5 INJECTION, SOLUTION SUBCUTANEOUS DAILY
Qty: 0 | Refills: 0 | Status: DISCONTINUED | OUTPATIENT
Start: 2017-06-01 | End: 2017-06-08

## 2017-06-01 RX ORDER — ACETAMINOPHEN 500 MG
1000 TABLET ORAL ONCE
Qty: 0 | Refills: 0 | Status: COMPLETED | OUTPATIENT
Start: 2017-06-01 | End: 2017-06-01

## 2017-06-01 RX ORDER — MAGNESIUM SULFATE 500 MG/ML
1 VIAL (ML) INJECTION ONCE
Qty: 0 | Refills: 0 | Status: COMPLETED | OUTPATIENT
Start: 2017-06-01 | End: 2017-06-01

## 2017-06-01 RX ORDER — LIDOCAINE 4 G/100G
1 CREAM TOPICAL DAILY
Qty: 0 | Refills: 0 | Status: DISCONTINUED | OUTPATIENT
Start: 2017-06-01 | End: 2017-06-08

## 2017-06-01 RX ADMIN — Medication 1000 MILLIGRAM(S): at 09:11

## 2017-06-01 RX ADMIN — Medication 1000 MILLIGRAM(S): at 19:14

## 2017-06-01 RX ADMIN — Medication 400 MILLIGRAM(S): at 08:41

## 2017-06-01 RX ADMIN — Medication 400 MILLIGRAM(S): at 18:44

## 2017-06-01 RX ADMIN — Medication 100 GRAM(S): at 14:25

## 2017-06-01 RX ADMIN — PANTOPRAZOLE SODIUM 40 MILLIGRAM(S): 20 TABLET, DELAYED RELEASE ORAL at 12:11

## 2017-06-01 RX ADMIN — Medication 44 MICROGRAM(S): at 05:10

## 2017-06-01 NOTE — PROVIDER CONTACT NOTE (OTHER) - ASSESSMENT
Pt's v/s 97.7 Temp 82HR 129/77 B/P 99O2 on room air. Fingerstick at 0032 was 92. Pt's v/s 97.7 Temp 82HR 129/77 B/P 99O2 on room air. Pt order Q4 fingersticks to monitor episodes of hypoglycemia. Fingerstick at 0032 was 92. Pt refusing 4AM fingerstick.Pt denies dizziness, any palpitations, not diaphoretic. Pt resting comfortably in bed with no other complaints.

## 2017-06-02 LAB
ANION GAP SERPL CALC-SCNC: 13 MMOL/L — SIGNIFICANT CHANGE UP (ref 5–17)
BUN SERPL-MCNC: 20 MG/DL — SIGNIFICANT CHANGE UP (ref 7–23)
CALCIUM SERPL-MCNC: 9 MG/DL — SIGNIFICANT CHANGE UP (ref 8.4–10.5)
CHLORIDE SERPL-SCNC: 104 MMOL/L — SIGNIFICANT CHANGE UP (ref 96–108)
CO2 SERPL-SCNC: 21 MMOL/L — LOW (ref 22–31)
CREAT SERPL-MCNC: 1.29 MG/DL — SIGNIFICANT CHANGE UP (ref 0.5–1.3)
GLUCOSE SERPL-MCNC: 119 MG/DL — HIGH (ref 70–99)
HCT VFR BLD CALC: 32.4 % — LOW (ref 34.5–45)
HGB BLD-MCNC: 10.2 G/DL — LOW (ref 11.5–15.5)
MAGNESIUM SERPL-MCNC: 2.3 MG/DL — SIGNIFICANT CHANGE UP (ref 1.6–2.6)
MCHC RBC-ENTMCNC: 30.8 PG — SIGNIFICANT CHANGE UP (ref 27–34)
MCHC RBC-ENTMCNC: 31.5 GM/DL — LOW (ref 32–36)
MCV RBC AUTO: 97.9 FL — SIGNIFICANT CHANGE UP (ref 80–100)
PHOSPHATE SERPL-MCNC: 3.3 MG/DL — SIGNIFICANT CHANGE UP (ref 2.5–4.5)
PLATELET # BLD AUTO: 265 K/UL — SIGNIFICANT CHANGE UP (ref 150–400)
POTASSIUM SERPL-MCNC: 5.1 MMOL/L — SIGNIFICANT CHANGE UP (ref 3.5–5.3)
POTASSIUM SERPL-SCNC: 5.1 MMOL/L — SIGNIFICANT CHANGE UP (ref 3.5–5.3)
RBC # BLD: 3.31 M/UL — LOW (ref 3.8–5.2)
RBC # FLD: 13.3 % — SIGNIFICANT CHANGE UP (ref 10.3–14.5)
SODIUM SERPL-SCNC: 138 MMOL/L — SIGNIFICANT CHANGE UP (ref 135–145)
WBC # BLD: 4.72 K/UL — SIGNIFICANT CHANGE UP (ref 3.8–10.5)
WBC # FLD AUTO: 4.72 K/UL — SIGNIFICANT CHANGE UP (ref 3.8–10.5)

## 2017-06-02 PROCEDURE — 44360 SMALL BOWEL ENDOSCOPY: CPT | Mod: GC

## 2017-06-02 PROCEDURE — 71100 X-RAY EXAM RIBS UNI 2 VIEWS: CPT | Mod: 26

## 2017-06-02 RX ORDER — ACETAMINOPHEN 500 MG
1000 TABLET ORAL ONCE
Qty: 0 | Refills: 0 | Status: COMPLETED | OUTPATIENT
Start: 2017-06-02 | End: 2017-06-02

## 2017-06-02 RX ADMIN — Medication 1000 MILLIGRAM(S): at 04:10

## 2017-06-02 RX ADMIN — TIOTROPIUM BROMIDE 1 CAPSULE(S): 18 CAPSULE ORAL; RESPIRATORY (INHALATION) at 19:44

## 2017-06-02 RX ADMIN — PANTOPRAZOLE SODIUM 40 MILLIGRAM(S): 20 TABLET, DELAYED RELEASE ORAL at 19:45

## 2017-06-02 RX ADMIN — Medication 44 MICROGRAM(S): at 06:23

## 2017-06-02 RX ADMIN — Medication 400 MILLIGRAM(S): at 03:55

## 2017-06-02 RX ADMIN — BUDESONIDE AND FORMOTEROL FUMARATE DIHYDRATE 2 PUFF(S): 160; 4.5 AEROSOL RESPIRATORY (INHALATION) at 19:44

## 2017-06-02 RX ADMIN — FONDAPARINUX SODIUM 2.5 MILLIGRAM(S): 2.5 INJECTION, SOLUTION SUBCUTANEOUS at 19:46

## 2017-06-03 LAB
ANION GAP SERPL CALC-SCNC: 13 MMOL/L — SIGNIFICANT CHANGE UP (ref 5–17)
BUN SERPL-MCNC: 34 MG/DL — HIGH (ref 7–23)
CALCIUM SERPL-MCNC: 9.1 MG/DL — SIGNIFICANT CHANGE UP (ref 8.4–10.5)
CHLORIDE SERPL-SCNC: 102 MMOL/L — SIGNIFICANT CHANGE UP (ref 96–108)
CO2 SERPL-SCNC: 21 MMOL/L — LOW (ref 22–31)
CREAT SERPL-MCNC: 1.48 MG/DL — HIGH (ref 0.5–1.3)
GLUCOSE SERPL-MCNC: 115 MG/DL — HIGH (ref 70–99)
HCT VFR BLD CALC: 32.3 % — LOW (ref 34.5–45)
HGB BLD-MCNC: 10.3 G/DL — LOW (ref 11.5–15.5)
MAGNESIUM SERPL-MCNC: 2.2 MG/DL — SIGNIFICANT CHANGE UP (ref 1.6–2.6)
MCHC RBC-ENTMCNC: 31.3 PG — SIGNIFICANT CHANGE UP (ref 27–34)
MCHC RBC-ENTMCNC: 31.9 GM/DL — LOW (ref 32–36)
MCV RBC AUTO: 98.2 FL — SIGNIFICANT CHANGE UP (ref 80–100)
PHOSPHATE SERPL-MCNC: 3.5 MG/DL — SIGNIFICANT CHANGE UP (ref 2.5–4.5)
PLATELET # BLD AUTO: 251 K/UL — SIGNIFICANT CHANGE UP (ref 150–400)
POTASSIUM SERPL-MCNC: 5 MMOL/L — SIGNIFICANT CHANGE UP (ref 3.5–5.3)
POTASSIUM SERPL-SCNC: 5 MMOL/L — SIGNIFICANT CHANGE UP (ref 3.5–5.3)
RBC # BLD: 3.29 M/UL — LOW (ref 3.8–5.2)
RBC # FLD: 13.4 % — SIGNIFICANT CHANGE UP (ref 10.3–14.5)
SODIUM SERPL-SCNC: 136 MMOL/L — SIGNIFICANT CHANGE UP (ref 135–145)
WBC # BLD: 5.64 K/UL — SIGNIFICANT CHANGE UP (ref 3.8–10.5)
WBC # FLD AUTO: 5.64 K/UL — SIGNIFICANT CHANGE UP (ref 3.8–10.5)

## 2017-06-03 PROCEDURE — 99232 SBSQ HOSP IP/OBS MODERATE 35: CPT

## 2017-06-03 PROCEDURE — 93010 ELECTROCARDIOGRAM REPORT: CPT

## 2017-06-03 PROCEDURE — 99232 SBSQ HOSP IP/OBS MODERATE 35: CPT | Mod: GC

## 2017-06-03 RX ORDER — ACETAMINOPHEN 500 MG
1000 TABLET ORAL ONCE
Qty: 0 | Refills: 0 | Status: COMPLETED | OUTPATIENT
Start: 2017-06-03 | End: 2017-06-04

## 2017-06-03 RX ORDER — ACETAMINOPHEN 500 MG
1000 TABLET ORAL ONCE
Qty: 0 | Refills: 0 | Status: COMPLETED | OUTPATIENT
Start: 2017-06-03 | End: 2017-06-05

## 2017-06-03 RX ORDER — DEXTROSE 10 % IN WATER 10 %
1000 INTRAVENOUS SOLUTION INTRAVENOUS
Qty: 0 | Refills: 0 | Status: DISCONTINUED | OUTPATIENT
Start: 2017-06-03 | End: 2017-06-04

## 2017-06-03 RX ORDER — SODIUM CHLORIDE 9 MG/ML
1000 INJECTION, SOLUTION INTRAVENOUS
Qty: 0 | Refills: 0 | Status: DISCONTINUED | OUTPATIENT
Start: 2017-06-03 | End: 2017-06-03

## 2017-06-03 RX ADMIN — FONDAPARINUX SODIUM 2.5 MILLIGRAM(S): 2.5 INJECTION, SOLUTION SUBCUTANEOUS at 13:13

## 2017-06-03 RX ADMIN — LIDOCAINE 1 PATCH: 4 CREAM TOPICAL at 13:13

## 2017-06-03 RX ADMIN — BUDESONIDE AND FORMOTEROL FUMARATE DIHYDRATE 2 PUFF(S): 160; 4.5 AEROSOL RESPIRATORY (INHALATION) at 05:36

## 2017-06-03 RX ADMIN — PANTOPRAZOLE SODIUM 40 MILLIGRAM(S): 20 TABLET, DELAYED RELEASE ORAL at 13:13

## 2017-06-03 RX ADMIN — Medication 44 MICROGRAM(S): at 05:36

## 2017-06-04 LAB
ANION GAP SERPL CALC-SCNC: 17 MMOL/L — SIGNIFICANT CHANGE UP (ref 5–17)
BUN SERPL-MCNC: 32 MG/DL — HIGH (ref 7–23)
CALCIUM SERPL-MCNC: 9.2 MG/DL — SIGNIFICANT CHANGE UP (ref 8.4–10.5)
CHLORIDE SERPL-SCNC: 100 MMOL/L — SIGNIFICANT CHANGE UP (ref 96–108)
CO2 SERPL-SCNC: 20 MMOL/L — LOW (ref 22–31)
CREAT ?TM UR-MCNC: 26 MG/DL — SIGNIFICANT CHANGE UP
CREAT SERPL-MCNC: 1.53 MG/DL — HIGH (ref 0.5–1.3)
GLUCOSE SERPL-MCNC: 99 MG/DL — SIGNIFICANT CHANGE UP (ref 70–99)
HCT VFR BLD CALC: 32.2 % — LOW (ref 34.5–45)
HGB BLD-MCNC: 10.4 G/DL — LOW (ref 11.5–15.5)
MAGNESIUM SERPL-MCNC: 2.2 MG/DL — SIGNIFICANT CHANGE UP (ref 1.6–2.6)
MCHC RBC-ENTMCNC: 31.3 PG — SIGNIFICANT CHANGE UP (ref 27–34)
MCHC RBC-ENTMCNC: 32.3 GM/DL — SIGNIFICANT CHANGE UP (ref 32–36)
MCV RBC AUTO: 97 FL — SIGNIFICANT CHANGE UP (ref 80–100)
PHOSPHATE SERPL-MCNC: 3.5 MG/DL — SIGNIFICANT CHANGE UP (ref 2.5–4.5)
PLATELET # BLD AUTO: 233 K/UL — SIGNIFICANT CHANGE UP (ref 150–400)
POTASSIUM SERPL-MCNC: 4.7 MMOL/L — SIGNIFICANT CHANGE UP (ref 3.5–5.3)
POTASSIUM SERPL-SCNC: 4.7 MMOL/L — SIGNIFICANT CHANGE UP (ref 3.5–5.3)
RBC # BLD: 3.32 M/UL — LOW (ref 3.8–5.2)
RBC # FLD: 13.5 % — SIGNIFICANT CHANGE UP (ref 10.3–14.5)
SODIUM SERPL-SCNC: 137 MMOL/L — SIGNIFICANT CHANGE UP (ref 135–145)
SODIUM UR-SCNC: 32 MMOL/L — SIGNIFICANT CHANGE UP
WBC # BLD: 5.02 K/UL — SIGNIFICANT CHANGE UP (ref 3.8–10.5)
WBC # FLD AUTO: 5.02 K/UL — SIGNIFICANT CHANGE UP (ref 3.8–10.5)

## 2017-06-04 PROCEDURE — 99231 SBSQ HOSP IP/OBS SF/LOW 25: CPT

## 2017-06-04 RX ADMIN — Medication 44 MICROGRAM(S): at 05:56

## 2017-06-04 RX ADMIN — LIDOCAINE 1 PATCH: 4 CREAM TOPICAL at 00:46

## 2017-06-04 RX ADMIN — Medication 1000 MILLIGRAM(S): at 00:55

## 2017-06-04 RX ADMIN — Medication 1000 MILLIGRAM(S): at 15:53

## 2017-06-04 RX ADMIN — Medication 400 MILLIGRAM(S): at 15:22

## 2017-06-04 RX ADMIN — Medication 400 MILLIGRAM(S): at 00:40

## 2017-06-04 RX ADMIN — PANTOPRAZOLE SODIUM 40 MILLIGRAM(S): 20 TABLET, DELAYED RELEASE ORAL at 12:42

## 2017-06-04 RX ADMIN — FONDAPARINUX SODIUM 2.5 MILLIGRAM(S): 2.5 INJECTION, SOLUTION SUBCUTANEOUS at 12:44

## 2017-06-04 RX ADMIN — Medication 400 MILLIGRAM(S): at 22:57

## 2017-06-04 RX ADMIN — Medication 1000 MILLIGRAM(S): at 23:12

## 2017-06-04 RX ADMIN — LIDOCAINE 1 PATCH: 4 CREAM TOPICAL at 12:46

## 2017-06-05 LAB
ALBUMIN SERPL ELPH-MCNC: 3.5 G/DL — SIGNIFICANT CHANGE UP (ref 3.3–5)
ALP SERPL-CCNC: 152 U/L — HIGH (ref 40–120)
ALT FLD-CCNC: 15 U/L — SIGNIFICANT CHANGE UP (ref 10–45)
ANION GAP SERPL CALC-SCNC: 12 MMOL/L — SIGNIFICANT CHANGE UP (ref 5–17)
AST SERPL-CCNC: 20 U/L — SIGNIFICANT CHANGE UP (ref 10–40)
BILIRUB DIRECT SERPL-MCNC: 0.1 MG/DL — SIGNIFICANT CHANGE UP (ref 0–0.2)
BILIRUB INDIRECT FLD-MCNC: SIGNIFICANT CHANGE UP (ref 0.2–1)
BILIRUB SERPL-MCNC: <0.2 MG/DL — SIGNIFICANT CHANGE UP (ref 0.2–1.2)
BUN SERPL-MCNC: 30 MG/DL — HIGH (ref 7–23)
CALCIUM SERPL-MCNC: 9.5 MG/DL — SIGNIFICANT CHANGE UP (ref 8.4–10.5)
CHLORIDE SERPL-SCNC: 100 MMOL/L — SIGNIFICANT CHANGE UP (ref 96–108)
CO2 SERPL-SCNC: 23 MMOL/L — SIGNIFICANT CHANGE UP (ref 22–31)
CREAT SERPL-MCNC: 1.47 MG/DL — HIGH (ref 0.5–1.3)
GLUCOSE SERPL-MCNC: 91 MG/DL — SIGNIFICANT CHANGE UP (ref 70–99)
HCT VFR BLD CALC: 33.2 % — LOW (ref 34.5–45)
HGB BLD-MCNC: 10.2 G/DL — LOW (ref 11.5–15.5)
MAGNESIUM SERPL-MCNC: 2.1 MG/DL — SIGNIFICANT CHANGE UP (ref 1.6–2.6)
MCHC RBC-ENTMCNC: 30.7 GM/DL — LOW (ref 32–36)
MCHC RBC-ENTMCNC: 30.8 PG — SIGNIFICANT CHANGE UP (ref 27–34)
MCV RBC AUTO: 100.3 FL — HIGH (ref 80–100)
PHOSPHATE SERPL-MCNC: 3.7 MG/DL — SIGNIFICANT CHANGE UP (ref 2.5–4.5)
PLATELET # BLD AUTO: 228 K/UL — SIGNIFICANT CHANGE UP (ref 150–400)
POTASSIUM SERPL-MCNC: 4.7 MMOL/L — SIGNIFICANT CHANGE UP (ref 3.5–5.3)
POTASSIUM SERPL-SCNC: 4.7 MMOL/L — SIGNIFICANT CHANGE UP (ref 3.5–5.3)
PROT SERPL-MCNC: 6.7 G/DL — SIGNIFICANT CHANGE UP (ref 6–8.3)
RBC # BLD: 3.31 M/UL — LOW (ref 3.8–5.2)
RBC # FLD: 13.6 % — SIGNIFICANT CHANGE UP (ref 10.3–14.5)
SODIUM SERPL-SCNC: 135 MMOL/L — SIGNIFICANT CHANGE UP (ref 135–145)
WBC # BLD: 5.42 K/UL — SIGNIFICANT CHANGE UP (ref 3.8–10.5)
WBC # FLD AUTO: 5.42 K/UL — SIGNIFICANT CHANGE UP (ref 3.8–10.5)

## 2017-06-05 PROCEDURE — 99231 SBSQ HOSP IP/OBS SF/LOW 25: CPT

## 2017-06-05 RX ADMIN — SODIUM CHLORIDE 10 MILLILITER(S): 9 INJECTION INTRAMUSCULAR; INTRAVENOUS; SUBCUTANEOUS at 11:26

## 2017-06-05 RX ADMIN — OXYCODONE HYDROCHLORIDE 5 MILLIGRAM(S): 5 TABLET ORAL at 15:45

## 2017-06-05 RX ADMIN — Medication 1000 MILLIGRAM(S): at 09:42

## 2017-06-05 RX ADMIN — PANTOPRAZOLE SODIUM 40 MILLIGRAM(S): 20 TABLET, DELAYED RELEASE ORAL at 11:26

## 2017-06-05 RX ADMIN — Medication 44 MICROGRAM(S): at 05:06

## 2017-06-05 RX ADMIN — LIDOCAINE 1 PATCH: 4 CREAM TOPICAL at 01:30

## 2017-06-05 RX ADMIN — Medication 400 MILLIGRAM(S): at 08:33

## 2017-06-05 RX ADMIN — FONDAPARINUX SODIUM 2.5 MILLIGRAM(S): 2.5 INJECTION, SOLUTION SUBCUTANEOUS at 11:25

## 2017-06-05 RX ADMIN — OXYCODONE HYDROCHLORIDE 5 MILLIGRAM(S): 5 TABLET ORAL at 15:16

## 2017-06-06 LAB
ANION GAP SERPL CALC-SCNC: 19 MMOL/L — HIGH (ref 5–17)
BUN SERPL-MCNC: 43 MG/DL — HIGH (ref 7–23)
CALCIUM SERPL-MCNC: 9.9 MG/DL — SIGNIFICANT CHANGE UP (ref 8.4–10.5)
CHLORIDE SERPL-SCNC: 99 MMOL/L — SIGNIFICANT CHANGE UP (ref 96–108)
CO2 SERPL-SCNC: 19 MMOL/L — LOW (ref 22–31)
CREAT SERPL-MCNC: 1.56 MG/DL — HIGH (ref 0.5–1.3)
GLUCOSE SERPL-MCNC: 86 MG/DL — SIGNIFICANT CHANGE UP (ref 70–99)
HCT VFR BLD CALC: 33.8 % — LOW (ref 34.5–45)
HGB BLD-MCNC: 11 G/DL — LOW (ref 11.5–15.5)
MAGNESIUM SERPL-MCNC: 2.3 MG/DL — SIGNIFICANT CHANGE UP (ref 1.6–2.6)
MCHC RBC-ENTMCNC: 31.5 PG — SIGNIFICANT CHANGE UP (ref 27–34)
MCHC RBC-ENTMCNC: 32.5 GM/DL — SIGNIFICANT CHANGE UP (ref 32–36)
MCV RBC AUTO: 96.8 FL — SIGNIFICANT CHANGE UP (ref 80–100)
PHOSPHATE SERPL-MCNC: 3.8 MG/DL — SIGNIFICANT CHANGE UP (ref 2.5–4.5)
PLATELET # BLD AUTO: 239 K/UL — SIGNIFICANT CHANGE UP (ref 150–400)
POTASSIUM SERPL-MCNC: 4.9 MMOL/L — SIGNIFICANT CHANGE UP (ref 3.5–5.3)
POTASSIUM SERPL-SCNC: 4.9 MMOL/L — SIGNIFICANT CHANGE UP (ref 3.5–5.3)
RBC # BLD: 3.49 M/UL — LOW (ref 3.8–5.2)
RBC # FLD: 13.6 % — SIGNIFICANT CHANGE UP (ref 10.3–14.5)
SODIUM SERPL-SCNC: 137 MMOL/L — SIGNIFICANT CHANGE UP (ref 135–145)
WBC # BLD: 5.87 K/UL — SIGNIFICANT CHANGE UP (ref 3.8–10.5)
WBC # FLD AUTO: 5.87 K/UL — SIGNIFICANT CHANGE UP (ref 3.8–10.5)

## 2017-06-06 RX ORDER — ACETAMINOPHEN 500 MG
1000 TABLET ORAL ONCE
Qty: 0 | Refills: 0 | Status: COMPLETED | OUTPATIENT
Start: 2017-06-06 | End: 2017-06-06

## 2017-06-06 RX ADMIN — Medication 44 MICROGRAM(S): at 05:50

## 2017-06-06 RX ADMIN — Medication 400 MILLIGRAM(S): at 15:17

## 2017-06-06 RX ADMIN — BUDESONIDE AND FORMOTEROL FUMARATE DIHYDRATE 2 PUFF(S): 160; 4.5 AEROSOL RESPIRATORY (INHALATION) at 05:50

## 2017-06-06 RX ADMIN — PANTOPRAZOLE SODIUM 40 MILLIGRAM(S): 20 TABLET, DELAYED RELEASE ORAL at 11:51

## 2017-06-06 RX ADMIN — SODIUM CHLORIDE 10 MILLILITER(S): 9 INJECTION INTRAMUSCULAR; INTRAVENOUS; SUBCUTANEOUS at 11:56

## 2017-06-06 RX ADMIN — Medication 1000 MILLIGRAM(S): at 17:02

## 2017-06-06 RX ADMIN — FONDAPARINUX SODIUM 2.5 MILLIGRAM(S): 2.5 INJECTION, SOLUTION SUBCUTANEOUS at 11:51

## 2017-06-07 DIAGNOSIS — K63.2 FISTULA OF INTESTINE: ICD-10-CM

## 2017-06-07 DIAGNOSIS — N18.3 CHRONIC KIDNEY DISEASE, STAGE 3 (MODERATE): ICD-10-CM

## 2017-06-07 LAB
ANION GAP SERPL CALC-SCNC: 16 MMOL/L — SIGNIFICANT CHANGE UP (ref 5–17)
APTT BLD: 32.4 SEC — SIGNIFICANT CHANGE UP (ref 27.5–37.4)
BLD GP AB SCN SERPL QL: NEGATIVE — SIGNIFICANT CHANGE UP
BUN SERPL-MCNC: 54 MG/DL — HIGH (ref 7–23)
CALCIUM SERPL-MCNC: 9.4 MG/DL — SIGNIFICANT CHANGE UP (ref 8.4–10.5)
CHLORIDE SERPL-SCNC: 96 MMOL/L — SIGNIFICANT CHANGE UP (ref 96–108)
CO2 SERPL-SCNC: 23 MMOL/L — SIGNIFICANT CHANGE UP (ref 22–31)
CREAT SERPL-MCNC: 1.5 MG/DL — HIGH (ref 0.5–1.3)
GLUCOSE SERPL-MCNC: 110 MG/DL — HIGH (ref 70–99)
HCT VFR BLD CALC: 33.6 % — LOW (ref 34.5–45)
HGB BLD-MCNC: 10.7 G/DL — LOW (ref 11.5–15.5)
INR BLD: 1.03 RATIO — SIGNIFICANT CHANGE UP (ref 0.88–1.16)
MAGNESIUM SERPL-MCNC: 2.3 MG/DL — SIGNIFICANT CHANGE UP (ref 1.6–2.6)
MCHC RBC-ENTMCNC: 31 PG — SIGNIFICANT CHANGE UP (ref 27–34)
MCHC RBC-ENTMCNC: 31.8 GM/DL — LOW (ref 32–36)
MCV RBC AUTO: 97.4 FL — SIGNIFICANT CHANGE UP (ref 80–100)
PHOSPHATE SERPL-MCNC: 4.2 MG/DL — SIGNIFICANT CHANGE UP (ref 2.5–4.5)
PLATELET # BLD AUTO: 213 K/UL — SIGNIFICANT CHANGE UP (ref 150–400)
POTASSIUM SERPL-MCNC: 4.8 MMOL/L — SIGNIFICANT CHANGE UP (ref 3.5–5.3)
POTASSIUM SERPL-SCNC: 4.8 MMOL/L — SIGNIFICANT CHANGE UP (ref 3.5–5.3)
PROTHROM AB SERPL-ACNC: 11.7 SEC — SIGNIFICANT CHANGE UP (ref 10–13.1)
RBC # BLD: 3.45 M/UL — LOW (ref 3.8–5.2)
RBC # FLD: 13.8 % — SIGNIFICANT CHANGE UP (ref 10.3–14.5)
RH IG SCN BLD-IMP: POSITIVE — SIGNIFICANT CHANGE UP
SODIUM SERPL-SCNC: 135 MMOL/L — SIGNIFICANT CHANGE UP (ref 135–145)
WBC # BLD: 5.75 K/UL — SIGNIFICANT CHANGE UP (ref 3.8–10.5)
WBC # FLD AUTO: 5.75 K/UL — SIGNIFICANT CHANGE UP (ref 3.8–10.5)

## 2017-06-07 RX ORDER — ACETAMINOPHEN 500 MG
1000 TABLET ORAL ONCE
Qty: 0 | Refills: 0 | Status: COMPLETED | OUTPATIENT
Start: 2017-06-07 | End: 2017-06-07

## 2017-06-07 RX ORDER — DEXTROSE MONOHYDRATE, SODIUM CHLORIDE, AND POTASSIUM CHLORIDE 50; .745; 4.5 G/1000ML; G/1000ML; G/1000ML
1000 INJECTION, SOLUTION INTRAVENOUS
Qty: 0 | Refills: 0 | Status: DISCONTINUED | OUTPATIENT
Start: 2017-06-07 | End: 2017-06-08

## 2017-06-07 RX ADMIN — Medication 1000 MILLIGRAM(S): at 11:15

## 2017-06-07 RX ADMIN — Medication 400 MILLIGRAM(S): at 11:01

## 2017-06-07 RX ADMIN — FONDAPARINUX SODIUM 2.5 MILLIGRAM(S): 2.5 INJECTION, SOLUTION SUBCUTANEOUS at 12:29

## 2017-06-07 RX ADMIN — Medication 44 MICROGRAM(S): at 05:55

## 2017-06-07 RX ADMIN — Medication 1000 MILLIGRAM(S): at 17:47

## 2017-06-07 RX ADMIN — Medication 400 MILLIGRAM(S): at 17:33

## 2017-06-07 RX ADMIN — PANTOPRAZOLE SODIUM 40 MILLIGRAM(S): 20 TABLET, DELAYED RELEASE ORAL at 12:29

## 2017-06-07 NOTE — PROGRESS NOTE ADULT - SUBJECTIVE AND OBJECTIVE BOX
[facility] [phone]        Patient seen and examined         MEDICATIONS  (STANDING):  tiotropium 18 MICROgram(s) Capsule 1Capsule(s) Inhalation daily  buDESOnide 160 MICROgram(s)/formoterol 4.5 MICROgram(s) Inhaler 2Puff(s) Inhalation two times a day  levothyroxine Injectable 44MICROGram(s) IV Push daily  pantoprazole  Injectable 40milliGRAM(s) IV Push daily  sodium chloride 0.9% lock flush 20milliLiter(s) IV Push once  fondaparinux Injectable 2.5milliGRAM(s) SubCutaneous daily      VITAL:  T(C): , Max: 36.9 (06-06 @ 17:20)  T(F): , Max: 98.4 (06-06 @ 17:20)  HR: 84  BP: 97/65  BP(mean): --  RR: 18  SpO2: 98%  Wt(kg): --    I and O's:    I & Os for current day (as of 06-07 @ 08:05)  =============================================  IN: 1828 ml / OUT: 2050 ml / NET: -222 ml        PHYSICAL EXAM:    Constitutional: NAD  HEENT: PERRLA    Neck:  No JVD  Respiratory: CTAB/L  Cardiovascular: S1 and S2  Gastrointestinal: BS+, soft, NT/ND  Extremities: No peripheral edema  Neurological: A/O x 3, no focal deficits  Psychiatric: Normal mood, normal affect  : No Snyder  Skin: No rashes  Access: Not applicable    LABS:                        11.0   5.87  )-----------( 239      ( 06 Jun 2017 08:23 )             33.8     06-06    137  |  99  |  43<H>  ----------------------------<  86  4.9   |  19<L>  |  1.56<H>    Ca    9.9      06 Jun 2017 08:25  Phos  3.8     06-06  Mg     2.3     06-06    TPro  6.7  /  Alb  3.5  /  TBili  <0.2  /  DBili  0.1  /  AST  20  /  ALT  15  /  AlkPhos  152<H>  06-05          Urine Studies:          RADIOLOGY & ADDITIONAL STUDIES:        ASSESSMENT      RECOMMENDATIONS [facility] [phone]        Patient seen and examined in bed.  She was awaiting surgery in am        MEDICATIONS  (STANDING):  tiotropium 18 MICROgram(s) Capsule 1Capsule(s) Inhalation daily  buDESOnide 160 MICROgram(s)/formoterol 4.5 MICROgram(s) Inhaler 2Puff(s) Inhalation two times a day  levothyroxine Injectable 44MICROGram(s) IV Push daily  pantoprazole  Injectable 40milliGRAM(s) IV Push daily  sodium chloride 0.9% lock flush 20milliLiter(s) IV Push once  fondaparinux Injectable 2.5milliGRAM(s) SubCutaneous daily      VITAL:  T(C): , Max: 36.9 (06-06 @ 17:20)  T(F): , Max: 98.4 (06-06 @ 17:20)  HR: 84  BP: 97/65  BP(mean): --  RR: 18  SpO2: 98%  Wt(kg): --    I and O's:    I & Os for current day (as of 06-07 @ 08:05)  =============================================  IN: 1828 ml / OUT: 2050 ml / NET: -222 ml        PHYSICAL EXAM:    Constitutional: NAD  HEENT: PERRLA    Neck:  No JVD  Respiratory: CTAB/L  Cardiovascular: S1 and S2  Gastrointestinal: + surgical scar; + ostomy  Extremities: No peripheral edema  Neurological: A/O x 3, no focal deficits  Psychiatric: Normal mood, normal affect  : No Snyder  Skin: No rashes       LABS:                        11.0   5.87  )-----------( 239      ( 06 Jun 2017 08:23 )             33.8     06-06    137  |  99  |  43<H>  ----------------------------<  86  4.9   |  19<L>  |  1.56<H>    Ca    9.9      06 Jun 2017 08:25  Phos  3.8     06-06  Mg     2.3     06-06    TPro  6.7  /  Alb  3.5  /  TBili  <0.2  /  DBili  0.1  /  AST  20  /  ALT  15  /  AlkPhos  152<H>  06-05

## 2017-06-07 NOTE — PROGRESS NOTE ADULT - SUBJECTIVE AND OBJECTIVE BOX
MEDICINE, PROGRESS NOTE [phone]    SANJAY SANTOS 76y [id number]    Patient seen and examined.  Patient is a 76y old  Female who presents with a chief complaint of Abdominal pain (26 May 2017 11:33)  Pt resting comfortably in bed with no new complaints. Pt aware of plan for surgery and all questions answered to the best of my abitlity.    PAST MEDICAL & SURGICAL HISTORY:  Pulmonary embolism  Enterocutaneous fistula  Enterocutaneous fistula  Chronic diastolic CHF (congestive heart failure): mild diastolic dysfunction  Osteoarthritis of both knees, unspecified osteoarthritis type  Peripheral neuropathy  Clostridium difficile infection  HIT (heparin-induced thrombocytopenia)  Diverticulitis of intestine with abscess without bleedin  DVT (deep venous thrombosis): s/p IVC filter, which was later removed  Orthostatic hypotension  GERD (gastroesophageal reflux disease)  CHF (congestive heart failure)  Hypothyroid  Hypertension  COPD (chronic obstructive pulmonary disease)  CHF (congestive heart failure)  H/O: hypertension  Chronic obstructive pulmonary disease (COPD)  Colostomy in place: s/p duarte procedure for diverticulitis  Status post Ruben procedure: for diverticulitis  S/P exploratory laparotomy: EC fistula complicated with pelvic abscesses  Wound dehiscence: Wound dehiscence and evisceration, s/p abdominal reconstruction with biologic mesh  Sigmoid diverticulitis: Ex lap, sigmoid resection, creation of colostomy.  Intestinal perforation: 2015, s/p closure with strattice mesh  S/P colostomy    MEDICATIONS  (STANDING):  tiotropium 18 MICROgram(s) Capsule 1Capsule(s) Inhalation daily  buDESOnide 160 MICROgram(s)/formoterol 4.5 MICROgram(s) Inhaler 2Puff(s) Inhalation two times a day  levothyroxine Injectable 44MICROGram(s) IV Push daily  pantoprazole  Injectable 40milliGRAM(s) IV Push daily  sodium chloride 0.9% lock flush 20milliLiter(s) IV Push once  fondaparinux Injectable 2.5milliGRAM(s) SubCutaneous daily  dextrose 5% + sodium chloride 0.45% with potassium chloride 20 mEq/L 1000milliLiter(s) IV Continuous <Continuous>    MEDICATIONS  (PRN):  sodium chloride 0.9% lock flush 10milliLiter(s) IV Push every 1 hour PRN After each medication administration  sodium chloride 0.9% lock flush 10milliLiter(s) IV Push every 12 hours PRN Lumen of catheter NOT used  oxyCODONE IR 5milliGRAM(s) Oral every 4 hours PRN Moderate Pain (4 - 6)  oxyCODONE IR 10milliGRAM(s) Oral every 4 hours PRN Severe Pain (7 - 10)  lidocaine   Patch 1Patch Transdermal daily PRN Pain    Allergies    azithromycin (Unknown)  codeine (Unknown)  heparin (Other)  PC Pen VK (Unknown)  penicillin (Unknown)    Intolerances        PHYSICAL EXAM:  Constitutional: NAD  HEENT: Normocephalic, EOMI  Neck:  No JVD  Respiratory: CTA B/L, No wheezes  Cardiovascular: S1, S2, RRR,  Gastrointestinal: BS+, soft, NT/ND, + ostomy and + midline ecf  Extremities: No peripheral edema  Neurological: AAOX3, no focal deficits  Psychiatric: Normal mood, normal affect  : No Snyder    Vital Signs Last 24 Hrs  T(C): 36.8, Max: 36.9 ( @ 17:20)  T(F): 98.2, Max: 98.4 ( @ 17:20)  HR: 89 (79 - 90)  BP: 100/61 (93/63 - 100/67)  BP(mean): --  RR: 18 (18 - 18)  SpO2: 100% (96% - 100%)  I&O's Summary  I & Os for 24h ending 2017 07:00  =============================================  IN: 1828 ml / OUT: 2050 ml / NET: -222 ml    I & Os for current day (as of 2017 14:16)  =============================================  IN: 0 ml / OUT: 50 ml / NET: -50 ml      LABS:                        10.7   5.75  )-----------( 213      ( 2017 09:12 )             33.6     06-07    135  |  96  |  54<H>  ----------------------------<  110<H>  4.8   |  23  |  1.50<H>    Ca    9.4      2017 09:09  Phos  4.2     -  Mg     2.3     -          Magnesium, Serum: 2.3 mg/dL ( @ 09:09)        REVIEW OF SYSTEMS:      RADIOLOGY & ADDITIONAL STUDIES:      ASSESSMENT/PLAN:

## 2017-06-07 NOTE — PROGRESS NOTE ADULT - SUBJECTIVE AND OBJECTIVE BOX
Day #7 of PN, Pt. remains NPO pending surgery scheduled for this week  PN infusion at 62 ccs/hr  Most recent Labs   06-06    137  |  99  |  43<H>  ----------------------------<  86  4.9   |  19<L>  |  1.56<H>    Ca    9.9      06 Jun 2017 08:25  Phos  3.8     06-06  Mg     2.3     06-06        CAPILLARY BLOOD GLUCOSE  117 (07 Jun 2017 06:02)  111 (06 Jun 2017 23:33)  104 (06 Jun 2017 17:39)  107 (06 Jun 2017 11:53)    I&O's Detail    I & Os for current day (as of 07 Jun 2017 08:48)  =============================================  IN:    TPN (Total Parenteral Nutrition): 1488 ml    Oral Fluid: 240 ml    Solution: 100 ml    Total IN: 1828 ml  ---------------------------------------------  OUT:    Voided: 1750 ml    Drain: 300 ml    Total OUT: 2050 ml  ---------------------------------------------  Total NET: -222 ml

## 2017-06-07 NOTE — PROGRESS NOTE ADULT - SUBJECTIVE AND OBJECTIVE BOX
Patient seen and examined with Dr. Lawton  No complaints  Ambulating well  abd; benign    Risks / benefits / alternatives to OR tomorrow discussed with patient by Dr. Lawton

## 2017-06-07 NOTE — CHART NOTE - NSCHARTNOTEFT_GEN_A_CORE
PRE OPERATIVE NOTE    Pre-op Diagnosis:  Procedure:  Surgeon:                          10.7   5.75  )-----------( 213      ( 2017 09:12 )             33.6     06-07    135  |  96  |  54<H>  ----------------------------<  110<H>  4.8   |  23  |  1.50<H>    Ca    9.4      2017 09:09  Phos  4.2     06-  Mg     2.3     06-        PT/INR - ( 2017 09:12 )   PT: 11.7 sec;   INR: 1.03 ratio         PTT - ( 2017 09:12 )  PTT:32.4 sec  Urinalysis Basic - ( 30 May 2017 12:30 )    Color: Yellow / Appearance: Clear / S.010 / pH: x  Gluc: x / Ketone: Negative  / Bili: Negative / Urobili: Negative mg/dL   Blood: x / Protein: Negative mg/dL / Nitrite: Negative   Leuk Esterase: Small / RBC: 0 /HPF / WBC 7 /HPF   Sq Epi: x / Non Sq Epi: 2 /HPF / Bacteria: Negative      CAPILLARY BLOOD GLUCOSE  117 (2017 06:02)      Type & Screen:  CXR:  EKG:  Echocardiogram:     A/P: 76y Female planned for above procedure  NPO past midnight, except medications  IVF  Pain/nausea control  Blood on hold, 2 Units  AM labs  Consent in chart PRE OPERATIVE NOTE    Pre-op Diagnosis: ENTEROCUTANEOUS FISTULA  Procedure: TAKEDOWN OF ENTEROCUTANEOUS FISTULA, SMALL BOWEL RESECTION,  ABDOMINAL WALL RECONSTRUCTION  Surgeon:BEE LANG                          10.7   5.75  )-----------( 213      ( 2017 09:12 )             33.6       135  |  96  |  54<H>  ----------------------------<  110<H>  4.8   |  23  |  1.50<H>    Ca    9.4     Phos  4.2     Mg     2.3          PT/INR - ( 2017 09:12 )   PT: 11.7 sec;   INR: 1.03 ratio    PTT - ( 2017 09:12 )  PTT:32.4 sec    Urinalysis Basic - ( 30 May 2017 12:30 )    Color: Yellow / Appearance: Clear / S.010 / pH: x  Gluc: x / Ketone: Negative  / Bili: Negative / Urobili: Negative mg/dL   Blood: x / Protein: Negative mg/dL / Nitrite: Negative   Leuk Esterase: Small / RBC: 0 /HPF / WBC 7 /HPF   Sq Epi: x / Non Sq Epi: 2 /HPF / Bacteria: Negative      CAPILLARY BLOOD GLUCOSE  117 (2017 06:02)      Type & Screen: in chart  CXR: Clear lungs from May 25  EKG: NORMAL SINUS RHYTHM  from   Echocardiogram:  No Pericardial Effusion From  May 23    A/P: 76y Female planned for above procedure  NPO past midnight, except medications  IVF  Pain/nausea control  AM labs  Consent in chart  ostomy nurse

## 2017-06-07 NOTE — PROGRESS NOTE ADULT - ASSESSMENT
ECF  copd  acute on ckd 3    given current information would consider pt moderate risk for general anesthesia.  continue current meds  no objection to ivf  discussed case and care with Dr. Lawton and pt and daughter at bedside.  monitor electrolytes post-op

## 2017-06-08 ENCOUNTER — RESULT REVIEW (OUTPATIENT)
Age: 77
End: 2017-06-08

## 2017-06-08 LAB
ANION GAP SERPL CALC-SCNC: 16 MMOL/L — SIGNIFICANT CHANGE UP (ref 5–17)
APTT BLD: 33.6 SEC — SIGNIFICANT CHANGE UP (ref 27.5–37.4)
BUN SERPL-MCNC: 57 MG/DL — HIGH (ref 7–23)
CALCIUM SERPL-MCNC: 9.3 MG/DL — SIGNIFICANT CHANGE UP (ref 8.4–10.5)
CHLORIDE SERPL-SCNC: 98 MMOL/L — SIGNIFICANT CHANGE UP (ref 96–108)
CO2 SERPL-SCNC: 22 MMOL/L — SIGNIFICANT CHANGE UP (ref 22–31)
CREAT SERPL-MCNC: 1.48 MG/DL — HIGH (ref 0.5–1.3)
GLUCOSE SERPL-MCNC: 107 MG/DL — HIGH (ref 70–99)
HCT VFR BLD CALC: 32.5 % — LOW (ref 34.5–45)
HGB BLD-MCNC: 10.2 G/DL — LOW (ref 11.5–15.5)
INR BLD: 1.09 RATIO — SIGNIFICANT CHANGE UP (ref 0.88–1.16)
MAGNESIUM SERPL-MCNC: 2.4 MG/DL — SIGNIFICANT CHANGE UP (ref 1.6–2.6)
MCHC RBC-ENTMCNC: 30.9 PG — SIGNIFICANT CHANGE UP (ref 27–34)
MCHC RBC-ENTMCNC: 31.4 GM/DL — LOW (ref 32–36)
MCV RBC AUTO: 98.5 FL — SIGNIFICANT CHANGE UP (ref 80–100)
PHOSPHATE SERPL-MCNC: 4.1 MG/DL — SIGNIFICANT CHANGE UP (ref 2.5–4.5)
PLATELET # BLD AUTO: 191 K/UL — SIGNIFICANT CHANGE UP (ref 150–400)
POTASSIUM SERPL-MCNC: 4.7 MMOL/L — SIGNIFICANT CHANGE UP (ref 3.5–5.3)
POTASSIUM SERPL-SCNC: 4.7 MMOL/L — SIGNIFICANT CHANGE UP (ref 3.5–5.3)
PROTHROM AB SERPL-ACNC: 12.3 SEC — SIGNIFICANT CHANGE UP (ref 10–13.1)
RBC # BLD: 3.3 M/UL — LOW (ref 3.8–5.2)
RBC # FLD: 13.6 % — SIGNIFICANT CHANGE UP (ref 10.3–14.5)
SODIUM SERPL-SCNC: 136 MMOL/L — SIGNIFICANT CHANGE UP (ref 135–145)
WBC # BLD: 5.22 K/UL — SIGNIFICANT CHANGE UP (ref 3.8–10.5)
WBC # FLD AUTO: 5.22 K/UL — SIGNIFICANT CHANGE UP (ref 3.8–10.5)

## 2017-06-08 PROCEDURE — 88307 TISSUE EXAM BY PATHOLOGIST: CPT | Mod: 26

## 2017-06-08 PROCEDURE — 44640 REPAIR BOWEL-SKIN FISTULA: CPT

## 2017-06-08 PROCEDURE — 49568: CPT

## 2017-06-08 PROCEDURE — 44625 REPAIR BOWEL OPENING: CPT

## 2017-06-08 PROCEDURE — 88305 TISSUE EXAM BY PATHOLOGIST: CPT | Mod: 26

## 2017-06-08 PROCEDURE — 49565: CPT | Mod: 59

## 2017-06-08 RX ADMIN — PANTOPRAZOLE SODIUM 40 MILLIGRAM(S): 20 TABLET, DELAYED RELEASE ORAL at 11:11

## 2017-06-08 RX ADMIN — Medication 44 MICROGRAM(S): at 05:15

## 2017-06-08 NOTE — PROGRESS NOTE ADULT - SUBJECTIVE AND OBJECTIVE BOX
[facility] [phone]        Patient seen and examined         MEDICATIONS  (STANDING):  tiotropium 18 MICROgram(s) Capsule 1Capsule(s) Inhalation daily  buDESOnide 160 MICROgram(s)/formoterol 4.5 MICROgram(s) Inhaler 2Puff(s) Inhalation two times a day  levothyroxine Injectable 44MICROGram(s) IV Push daily  pantoprazole  Injectable 40milliGRAM(s) IV Push daily  sodium chloride 0.9% lock flush 20milliLiter(s) IV Push once  fondaparinux Injectable 2.5milliGRAM(s) SubCutaneous daily  dextrose 5% + sodium chloride 0.45% with potassium chloride 20 mEq/L 1000milliLiter(s) IV Continuous <Continuous>      VITAL:  T(C): , Max: 36.8 (06-07 @ 12:54)  T(F): , Max: 98.2 (06-07 @ 12:54)  HR: 82  BP: 103/62  BP(mean): --  RR: 18  SpO2: 99%  Wt(kg): --    I and O's:    I & Os for current day (as of 06-08 @ 09:02)  =============================================  IN: 1514 ml / OUT: 825 ml / NET: 689 ml        PHYSICAL EXAM:    Constitutional: NAD  HEENT: PERRLA    Neck:  No JVD  Respiratory: CTAB/L  Cardiovascular: S1 and S2  Gastrointestinal: BS+, soft, NT/ND  Extremities: No peripheral edema  Neurological: A/O x 3, no focal deficits  Psychiatric: Normal mood, normal affect  : No Snyder  Skin: No rashes  Access: Not applicable    LABS:                        10.7   5.75  )-----------( 213      ( 07 Jun 2017 09:12 )             33.6     06-07    135  |  96  |  54<H>  ----------------------------<  110<H>  4.8   |  23  |  1.50<H>    Ca    9.4      07 Jun 2017 09:09  Phos  4.2     06-07  Mg     2.3     06-07            Urine Studies:          RADIOLOGY & ADDITIONAL STUDIES:        ASSESSMENT      RECOMMENDATIONS [facility] [phone]        Patient seen and examined in bed.  Uneventful night        MEDICATIONS  (STANDING):  tiotropium 18 MICROgram(s) Capsule 1Capsule(s) Inhalation daily  buDESOnide 160 MICROgram(s)/formoterol 4.5 MICROgram(s) Inhaler 2Puff(s) Inhalation two times a day  levothyroxine Injectable 44MICROGram(s) IV Push daily  pantoprazole  Injectable 40milliGRAM(s) IV Push daily  sodium chloride 0.9% lock flush 20milliLiter(s) IV Push once  fondaparinux Injectable 2.5milliGRAM(s) SubCutaneous daily  dextrose 5% + sodium chloride 0.45% with potassium chloride 20 mEq/L 1000milliLiter(s) IV Continuous <Continuous>      VITAL:  T(C): , Max: 36.8 (06-07 @ 12:54)  T(F): , Max: 98.2 (06-07 @ 12:54)  HR: 82  BP: 103/62  BP(mean): --  RR: 18  SpO2: 99%  Wt(kg): --    I and O's:    I & Os for current day (as of 06-08 @ 09:02)  =============================================  IN: 1514 ml / OUT: 825 ml / NET: 689 ml        PHYSICAL EXAM:    Constitutional: NAD  HEENT: PERRLA    Neck:  No JVD  Respiratory: CTAB/L  Cardiovascular: S1 and S2  Gastrointestinal:+ ostomy  Extremities: No peripheral edema  Neurological: A/O x 3, no focal deficits  Psychiatric: Normal mood, normal affect  : No Snyder  Skin: No rashes  Access: Not applicable    LABS:                        10.7   5.75  )-----------( 213      ( 07 Jun 2017 09:12 )             33.6     06-07    135  |  96  |  54<H>  ----------------------------<  110<H>  4.8   |  23  |  1.50<H>    Ca    9.4      07 Jun 2017 09:09  Phos  4.2     06-07  Mg     2.3     06-07

## 2017-06-08 NOTE — PROGRESS NOTE ADULT - ASSESSMENT
75 yo F SBO w/ high-volume ECF, s/p endoscopy  75 yo F SBO w/ high-volume ECF, s/p endoscopy     -OR today  -trend labs  -NPO/IVF/TPN

## 2017-06-08 NOTE — PROGRESS NOTE ADULT - PROBLEM SELECTOR PLAN 2
Stable renal function and off diuretics.  No objection to IVF at midnight Stable renal function and off diuretics.    IVF running at present

## 2017-06-08 NOTE — PROGRESS NOTE ADULT - SUBJECTIVE AND OBJECTIVE BOX
GENERAL SURGERY DAILY PROGRESS NOTE:       Subjective: Reports feeling nervous about surgery. Otherwise no complaints      Objective:      MEDICATIONS  (STANDING):  tiotropium 18 MICROgram(s) Capsule 1Capsule(s) Inhalation daily  buDESOnide 160 MICROgram(s)/formoterol 4.5 MICROgram(s) Inhaler 2Puff(s) Inhalation two times a day  levothyroxine Injectable 44MICROGram(s) IV Push daily  pantoprazole  Injectable 40milliGRAM(s) IV Push daily  sodium chloride 0.9% lock flush 20milliLiter(s) IV Push once  fondaparinux Injectable 2.5milliGRAM(s) SubCutaneous daily  dextrose 5% + sodium chloride 0.45% with potassium chloride 20 mEq/L 1000milliLiter(s) IV Continuous <Continuous>    MEDICATIONS  (PRN):  sodium chloride 0.9% lock flush 10milliLiter(s) IV Push every 1 hour PRN After each medication administration  sodium chloride 0.9% lock flush 10milliLiter(s) IV Push every 12 hours PRN Lumen of catheter NOT used  oxyCODONE IR 5milliGRAM(s) Oral every 4 hours PRN Moderate Pain (4 - 6)  oxyCODONE IR 10milliGRAM(s) Oral every 4 hours PRN Severe Pain (7 - 10)  lidocaine   Patch 1Patch Transdermal daily PRN Pain      Vital Signs Last 24 Hrs  T(C): 36.6, Max: 36.8 (-07 @ 12:54)  T(F): 97.9, Max: 98.2 (06-07 @ 12:54)  HR: 82 (82 - 107)  BP: 103/62 (99/62 - 104/69)  BP(mean): --  RR: 18 (18 - 18)  SpO2: 99% (97% - 100%)    I&O's Detail    I & Os for current day (as of 2017 07:48)  =============================================  IN:    TPN (Total Parenteral Nutrition): 744 ml    Oral Fluid: 420 ml    dextrose 5% + sodium chloride 0.45% with potassium chloride 20 mEq/L: 350 ml    Total IN: 1514 ml  ---------------------------------------------  OUT:    Voided: 600 ml    Drain: 225 ml    Total OUT: 825 ml  ---------------------------------------------  Total NET: 689 ml      Daily     Daily Weight in k.1 (2017 05:30)    LABS:                        10.7   5.75  )-----------( 213      ( 2017 09:12 )             33.6     06-07    135  |  96  |  54<H>  ----------------------------<  110<H>  4.8   |  23  |  1.50<H>    Ca    9.4      2017 09:09  Phos  4.2     06-07  Mg     2.3     06-07      PT/INR - ( 2017 09:12 )   PT: 11.7 sec;   INR: 1.03 ratio         PTT - ( 2017 09:12 )  PTT:32.4 sec          PHYSICAL EXAM:      Constitutional: NAD    Respiratory: some L sided tenderness    Cardiovascular: RRR    Gastrointestinal: soft NT, parastomal hernia present, colostomy: no fxn, entercutanous fistula: bilious output

## 2017-06-09 ENCOUNTER — TRANSCRIPTION ENCOUNTER (OUTPATIENT)
Age: 77
End: 2017-06-09

## 2017-06-09 DIAGNOSIS — J44.9 CHRONIC OBSTRUCTIVE PULMONARY DISEASE, UNSPECIFIED: ICD-10-CM

## 2017-06-09 DIAGNOSIS — E44.1 MILD PROTEIN-CALORIE MALNUTRITION: ICD-10-CM

## 2017-06-09 LAB
ANION GAP SERPL CALC-SCNC: 10 MMOL/L — SIGNIFICANT CHANGE UP (ref 5–17)
ANION GAP SERPL CALC-SCNC: 11 MMOL/L — SIGNIFICANT CHANGE UP (ref 5–17)
APTT BLD: 27.9 SEC — SIGNIFICANT CHANGE UP (ref 27.5–37.4)
BUN SERPL-MCNC: 56 MG/DL — HIGH (ref 7–23)
BUN SERPL-MCNC: 60 MG/DL — HIGH (ref 7–23)
CALCIUM SERPL-MCNC: 7.3 MG/DL — LOW (ref 8.4–10.5)
CALCIUM SERPL-MCNC: 7.7 MG/DL — LOW (ref 8.4–10.5)
CHLORIDE SERPL-SCNC: 112 MMOL/L — HIGH (ref 96–108)
CHLORIDE SERPL-SCNC: 116 MMOL/L — HIGH (ref 96–108)
CO2 SERPL-SCNC: 19 MMOL/L — LOW (ref 22–31)
CO2 SERPL-SCNC: 19 MMOL/L — LOW (ref 22–31)
CREAT SERPL-MCNC: 1.41 MG/DL — HIGH (ref 0.5–1.3)
CREAT SERPL-MCNC: 1.76 MG/DL — HIGH (ref 0.5–1.3)
GAS PNL BLDA: SIGNIFICANT CHANGE UP
GAS PNL BLDV: SIGNIFICANT CHANGE UP
GLUCOSE SERPL-MCNC: 178 MG/DL — HIGH (ref 70–99)
GLUCOSE SERPL-MCNC: 202 MG/DL — HIGH (ref 70–99)
HCT VFR BLD CALC: 27.8 % — LOW (ref 34.5–45)
HCT VFR BLD CALC: 28.8 % — LOW (ref 34.5–45)
HGB BLD-MCNC: 9.3 G/DL — LOW (ref 11.5–15.5)
HGB BLD-MCNC: 9.5 G/DL — LOW (ref 11.5–15.5)
INR BLD: 1.13 RATIO — SIGNIFICANT CHANGE UP (ref 0.88–1.16)
MAGNESIUM SERPL-MCNC: 1.8 MG/DL — SIGNIFICANT CHANGE UP (ref 1.6–2.6)
MAGNESIUM SERPL-MCNC: 1.8 MG/DL — SIGNIFICANT CHANGE UP (ref 1.6–2.6)
MCHC RBC-ENTMCNC: 32.3 GM/DL — SIGNIFICANT CHANGE UP (ref 32–36)
MCHC RBC-ENTMCNC: 33 PG — SIGNIFICANT CHANGE UP (ref 27–34)
MCHC RBC-ENTMCNC: 34.2 GM/DL — SIGNIFICANT CHANGE UP (ref 32–36)
MCHC RBC-ENTMCNC: 34.4 PG — HIGH (ref 27–34)
MCV RBC AUTO: 101 FL — HIGH (ref 80–100)
MCV RBC AUTO: 102 FL — HIGH (ref 80–100)
PHOSPHATE SERPL-MCNC: 2.7 MG/DL — SIGNIFICANT CHANGE UP (ref 2.5–4.5)
PHOSPHATE SERPL-MCNC: 3.6 MG/DL — SIGNIFICANT CHANGE UP (ref 2.5–4.5)
PLATELET # BLD AUTO: 146 K/UL — LOW (ref 150–400)
PLATELET # BLD AUTO: 161 K/UL — SIGNIFICANT CHANGE UP (ref 150–400)
POTASSIUM SERPL-MCNC: 4.4 MMOL/L — SIGNIFICANT CHANGE UP (ref 3.5–5.3)
POTASSIUM SERPL-MCNC: 5.3 MMOL/L — SIGNIFICANT CHANGE UP (ref 3.5–5.3)
POTASSIUM SERPL-SCNC: 4.4 MMOL/L — SIGNIFICANT CHANGE UP (ref 3.5–5.3)
POTASSIUM SERPL-SCNC: 5.3 MMOL/L — SIGNIFICANT CHANGE UP (ref 3.5–5.3)
PROTHROM AB SERPL-ACNC: 12.3 SEC — SIGNIFICANT CHANGE UP (ref 9.8–12.7)
RBC # BLD: 2.76 M/UL — LOW (ref 3.8–5.2)
RBC # BLD: 2.82 M/UL — LOW (ref 3.8–5.2)
RBC # FLD: 12.7 % — SIGNIFICANT CHANGE UP (ref 10.3–14.5)
RBC # FLD: 13 % — SIGNIFICANT CHANGE UP (ref 10.3–14.5)
SODIUM SERPL-SCNC: 141 MMOL/L — SIGNIFICANT CHANGE UP (ref 135–145)
SODIUM SERPL-SCNC: 146 MMOL/L — HIGH (ref 135–145)
WBC # BLD: 2.8 K/UL — LOW (ref 3.8–10.5)
WBC # BLD: 5.9 K/UL — SIGNIFICANT CHANGE UP (ref 3.8–10.5)
WBC # FLD AUTO: 2.8 K/UL — LOW (ref 3.8–10.5)
WBC # FLD AUTO: 5.9 K/UL — SIGNIFICANT CHANGE UP (ref 3.8–10.5)

## 2017-06-09 PROCEDURE — 99291 CRITICAL CARE FIRST HOUR: CPT

## 2017-06-09 PROCEDURE — 99292 CRITICAL CARE ADDL 30 MIN: CPT

## 2017-06-09 PROCEDURE — 71010: CPT | Mod: 26

## 2017-06-09 RX ORDER — ONDANSETRON 8 MG/1
4 TABLET, FILM COATED ORAL EVERY 6 HOURS
Qty: 0 | Refills: 0 | Status: DISCONTINUED | OUTPATIENT
Start: 2017-06-09 | End: 2017-06-30

## 2017-06-09 RX ORDER — MORPHINE SULFATE 50 MG/1
3 CAPSULE, EXTENDED RELEASE ORAL
Qty: 0 | Refills: 0 | Status: DISCONTINUED | OUTPATIENT
Start: 2017-06-09 | End: 2017-06-10

## 2017-06-09 RX ORDER — FENTANYL CITRATE 50 UG/ML
1 INJECTION INTRAVENOUS
Qty: 5000 | Refills: 0 | Status: DISCONTINUED | OUTPATIENT
Start: 2017-06-09 | End: 2017-06-09

## 2017-06-09 RX ORDER — HYDROMORPHONE HYDROCHLORIDE 2 MG/ML
0.5 INJECTION INTRAMUSCULAR; INTRAVENOUS; SUBCUTANEOUS
Qty: 0 | Refills: 0 | Status: DISCONTINUED | OUTPATIENT
Start: 2017-06-09 | End: 2017-06-09

## 2017-06-09 RX ORDER — CHLORHEXIDINE GLUCONATE 213 G/1000ML
15 SOLUTION TOPICAL EVERY 8 HOURS
Qty: 0 | Refills: 0 | Status: DISCONTINUED | OUTPATIENT
Start: 2017-06-09 | End: 2017-06-10

## 2017-06-09 RX ORDER — FONDAPARINUX SODIUM 2.5 MG/.5ML
2.5 INJECTION, SOLUTION SUBCUTANEOUS DAILY
Qty: 0 | Refills: 0 | Status: DISCONTINUED | OUTPATIENT
Start: 2017-06-09 | End: 2017-06-11

## 2017-06-09 RX ORDER — FENTANYL CITRATE 50 UG/ML
1 INJECTION INTRAVENOUS
Qty: 0 | Refills: 0 | Status: DISCONTINUED | OUTPATIENT
Start: 2017-06-09 | End: 2017-06-12

## 2017-06-09 RX ORDER — ACETAMINOPHEN 500 MG
1000 TABLET ORAL ONCE
Qty: 0 | Refills: 0 | Status: COMPLETED | OUTPATIENT
Start: 2017-06-10 | End: 2017-06-10

## 2017-06-09 RX ORDER — AZTREONAM 2 G
VIAL (EA) INJECTION
Qty: 0 | Refills: 0 | Status: COMPLETED | OUTPATIENT
Start: 2017-06-09 | End: 2017-06-10

## 2017-06-09 RX ORDER — AZTREONAM 2 G
1000 VIAL (EA) INJECTION ONCE
Qty: 0 | Refills: 0 | Status: COMPLETED | OUTPATIENT
Start: 2017-06-09 | End: 2017-06-09

## 2017-06-09 RX ORDER — METRONIDAZOLE 500 MG
TABLET ORAL
Qty: 0 | Refills: 0 | Status: COMPLETED | OUTPATIENT
Start: 2017-06-09 | End: 2017-06-10

## 2017-06-09 RX ORDER — HYDROMORPHONE HYDROCHLORIDE 2 MG/ML
0.5 INJECTION INTRAMUSCULAR; INTRAVENOUS; SUBCUTANEOUS ONCE
Qty: 0 | Refills: 0 | Status: DISCONTINUED | OUTPATIENT
Start: 2017-06-09 | End: 2017-06-09

## 2017-06-09 RX ORDER — AZTREONAM 2 G
1000 VIAL (EA) INJECTION EVERY 8 HOURS
Qty: 0 | Refills: 0 | Status: COMPLETED | OUTPATIENT
Start: 2017-06-09 | End: 2017-06-10

## 2017-06-09 RX ORDER — CALCIUM GLUCONATE 100 MG/ML
2 VIAL (ML) INTRAVENOUS ONCE
Qty: 0 | Refills: 0 | Status: COMPLETED | OUTPATIENT
Start: 2017-06-09 | End: 2017-06-09

## 2017-06-09 RX ORDER — ACETAMINOPHEN 500 MG
1000 TABLET ORAL ONCE
Qty: 0 | Refills: 0 | Status: COMPLETED | OUTPATIENT
Start: 2017-06-09 | End: 2017-06-09

## 2017-06-09 RX ORDER — SODIUM CHLORIDE 9 MG/ML
1000 INJECTION, SOLUTION INTRAVENOUS
Qty: 0 | Refills: 0 | Status: DISCONTINUED | OUTPATIENT
Start: 2017-06-09 | End: 2017-06-17

## 2017-06-09 RX ORDER — HYDROMORPHONE HYDROCHLORIDE 2 MG/ML
1 INJECTION INTRAMUSCULAR; INTRAVENOUS; SUBCUTANEOUS ONCE
Qty: 0 | Refills: 0 | Status: DISCONTINUED | OUTPATIENT
Start: 2017-06-09 | End: 2017-06-09

## 2017-06-09 RX ORDER — SODIUM CHLORIDE 9 MG/ML
1000 INJECTION, SOLUTION INTRAVENOUS ONCE
Qty: 0 | Refills: 0 | Status: COMPLETED | OUTPATIENT
Start: 2017-06-09 | End: 2017-06-09

## 2017-06-09 RX ORDER — DAPTOMYCIN 500 MG/10ML
450 INJECTION, POWDER, LYOPHILIZED, FOR SOLUTION INTRAVENOUS ONCE
Qty: 0 | Refills: 0 | Status: COMPLETED | OUTPATIENT
Start: 2017-06-09 | End: 2017-06-09

## 2017-06-09 RX ORDER — SODIUM CHLORIDE 9 MG/ML
1000 INJECTION INTRAMUSCULAR; INTRAVENOUS; SUBCUTANEOUS
Qty: 0 | Refills: 0 | Status: DISCONTINUED | OUTPATIENT
Start: 2017-06-09 | End: 2017-06-09

## 2017-06-09 RX ORDER — PANTOPRAZOLE SODIUM 20 MG/1
40 TABLET, DELAYED RELEASE ORAL DAILY
Qty: 0 | Refills: 0 | Status: DISCONTINUED | OUTPATIENT
Start: 2017-06-09 | End: 2017-06-30

## 2017-06-09 RX ORDER — SODIUM CHLORIDE 9 MG/ML
500 INJECTION, SOLUTION INTRAVENOUS ONCE
Qty: 0 | Refills: 0 | Status: COMPLETED | OUTPATIENT
Start: 2017-06-09 | End: 2017-06-09

## 2017-06-09 RX ORDER — MORPHINE SULFATE 50 MG/1
30 CAPSULE, EXTENDED RELEASE ORAL
Qty: 0 | Refills: 0 | Status: DISCONTINUED | OUTPATIENT
Start: 2017-06-09 | End: 2017-06-10

## 2017-06-09 RX ORDER — LEVOTHYROXINE SODIUM 125 MCG
44 TABLET ORAL DAILY
Qty: 0 | Refills: 0 | Status: DISCONTINUED | OUTPATIENT
Start: 2017-06-09 | End: 2017-06-30

## 2017-06-09 RX ORDER — METRONIDAZOLE 500 MG
500 TABLET ORAL ONCE
Qty: 0 | Refills: 0 | Status: COMPLETED | OUTPATIENT
Start: 2017-06-09 | End: 2017-06-09

## 2017-06-09 RX ORDER — DIPHENHYDRAMINE HCL 50 MG
25 CAPSULE ORAL EVERY 4 HOURS
Qty: 0 | Refills: 0 | Status: DISCONTINUED | OUTPATIENT
Start: 2017-06-09 | End: 2017-06-18

## 2017-06-09 RX ORDER — HYDROMORPHONE HYDROCHLORIDE 2 MG/ML
1 INJECTION INTRAMUSCULAR; INTRAVENOUS; SUBCUTANEOUS
Qty: 0 | Refills: 0 | Status: DISCONTINUED | OUTPATIENT
Start: 2017-06-09 | End: 2017-06-09

## 2017-06-09 RX ORDER — METRONIDAZOLE 500 MG
500 TABLET ORAL EVERY 8 HOURS
Qty: 0 | Refills: 0 | Status: COMPLETED | OUTPATIENT
Start: 2017-06-09 | End: 2017-06-10

## 2017-06-09 RX ORDER — PHENYLEPHRINE HYDROCHLORIDE 10 MG/ML
0.5 INJECTION INTRAVENOUS
Qty: 80 | Refills: 0 | Status: DISCONTINUED | OUTPATIENT
Start: 2017-06-09 | End: 2017-06-09

## 2017-06-09 RX ORDER — NALOXONE HYDROCHLORIDE 4 MG/.1ML
0.1 SPRAY NASAL
Qty: 0 | Refills: 0 | Status: DISCONTINUED | OUTPATIENT
Start: 2017-06-09 | End: 2017-06-12

## 2017-06-09 RX ORDER — IPRATROPIUM/ALBUTEROL SULFATE 18-103MCG
3 AEROSOL WITH ADAPTER (GRAM) INHALATION EVERY 6 HOURS
Qty: 0 | Refills: 0 | Status: DISCONTINUED | OUTPATIENT
Start: 2017-06-09 | End: 2017-06-12

## 2017-06-09 RX ADMIN — Medication 400 MILLIGRAM(S): at 13:03

## 2017-06-09 RX ADMIN — MORPHINE SULFATE 30 MILLILITER(S): 50 CAPSULE, EXTENDED RELEASE ORAL at 21:23

## 2017-06-09 RX ADMIN — FENTANYL CITRATE 1 PATCH: 50 INJECTION INTRAVENOUS at 13:43

## 2017-06-09 RX ADMIN — SODIUM CHLORIDE 2000 MILLILITER(S): 9 INJECTION, SOLUTION INTRAVENOUS at 11:21

## 2017-06-09 RX ADMIN — PHENYLEPHRINE HYDROCHLORIDE 13.95 MICROGRAM(S)/KG/MIN: 10 INJECTION INTRAVENOUS at 03:55

## 2017-06-09 RX ADMIN — HYDROMORPHONE HYDROCHLORIDE 0.5 MILLIGRAM(S): 2 INJECTION INTRAMUSCULAR; INTRAVENOUS; SUBCUTANEOUS at 13:55

## 2017-06-09 RX ADMIN — HYDROMORPHONE HYDROCHLORIDE 0.5 MILLIGRAM(S): 2 INJECTION INTRAMUSCULAR; INTRAVENOUS; SUBCUTANEOUS at 14:25

## 2017-06-09 RX ADMIN — Medication 100 MILLIGRAM(S): at 22:54

## 2017-06-09 RX ADMIN — HYDROMORPHONE HYDROCHLORIDE 0.5 MILLIGRAM(S): 2 INJECTION INTRAMUSCULAR; INTRAVENOUS; SUBCUTANEOUS at 11:21

## 2017-06-09 RX ADMIN — SODIUM CHLORIDE 50 MILLILITER(S): 9 INJECTION, SOLUTION INTRAVENOUS at 06:12

## 2017-06-09 RX ADMIN — HYDROMORPHONE HYDROCHLORIDE 1 MILLIGRAM(S): 2 INJECTION INTRAMUSCULAR; INTRAVENOUS; SUBCUTANEOUS at 18:04

## 2017-06-09 RX ADMIN — Medication 400 MILLIGRAM(S): at 19:46

## 2017-06-09 RX ADMIN — Medication 3 MILLILITER(S): at 11:45

## 2017-06-09 RX ADMIN — Medication 400 MILLIGRAM(S): at 06:11

## 2017-06-09 RX ADMIN — DAPTOMYCIN 118 MILLIGRAM(S): 500 INJECTION, POWDER, LYOPHILIZED, FOR SOLUTION INTRAVENOUS at 05:16

## 2017-06-09 RX ADMIN — Medication 100 MILLIGRAM(S): at 13:39

## 2017-06-09 RX ADMIN — Medication 400 GRAM(S): at 10:56

## 2017-06-09 RX ADMIN — Medication 1000 MILLIGRAM(S): at 13:15

## 2017-06-09 RX ADMIN — SODIUM CHLORIDE 50 MILLILITER(S): 9 INJECTION INTRAMUSCULAR; INTRAVENOUS; SUBCUTANEOUS at 03:55

## 2017-06-09 RX ADMIN — HYDROMORPHONE HYDROCHLORIDE 0.5 MILLIGRAM(S): 2 INJECTION INTRAMUSCULAR; INTRAVENOUS; SUBCUTANEOUS at 11:15

## 2017-06-09 RX ADMIN — SODIUM CHLORIDE 100 MILLILITER(S): 9 INJECTION, SOLUTION INTRAVENOUS at 11:21

## 2017-06-09 RX ADMIN — Medication 1000 MILLIGRAM(S): at 06:41

## 2017-06-09 RX ADMIN — Medication 1000 MILLIGRAM(S): at 20:16

## 2017-06-09 RX ADMIN — Medication 100 MILLIGRAM(S): at 04:30

## 2017-06-09 RX ADMIN — Medication 3 MILLILITER(S): at 17:53

## 2017-06-09 RX ADMIN — HYDROMORPHONE HYDROCHLORIDE 1 MILLIGRAM(S): 2 INJECTION INTRAMUSCULAR; INTRAVENOUS; SUBCUTANEOUS at 19:20

## 2017-06-09 RX ADMIN — Medication 50 MILLIGRAM(S): at 06:11

## 2017-06-09 RX ADMIN — HYDROMORPHONE HYDROCHLORIDE 1 MILLIGRAM(S): 2 INJECTION INTRAMUSCULAR; INTRAVENOUS; SUBCUTANEOUS at 19:40

## 2017-06-09 RX ADMIN — HYDROMORPHONE HYDROCHLORIDE 0.5 MILLIGRAM(S): 2 INJECTION INTRAMUSCULAR; INTRAVENOUS; SUBCUTANEOUS at 13:38

## 2017-06-09 RX ADMIN — Medication 3 MILLILITER(S): at 23:40

## 2017-06-09 RX ADMIN — HYDROMORPHONE HYDROCHLORIDE 0.5 MILLIGRAM(S): 2 INJECTION INTRAMUSCULAR; INTRAVENOUS; SUBCUTANEOUS at 11:35

## 2017-06-09 RX ADMIN — Medication 50 MILLIGRAM(S): at 13:38

## 2017-06-09 RX ADMIN — HYDROMORPHONE HYDROCHLORIDE 1 MILLIGRAM(S): 2 INJECTION INTRAMUSCULAR; INTRAVENOUS; SUBCUTANEOUS at 18:20

## 2017-06-09 RX ADMIN — SODIUM CHLORIDE 4000 MILLILITER(S): 9 INJECTION, SOLUTION INTRAVENOUS at 05:16

## 2017-06-09 RX ADMIN — SODIUM CHLORIDE 2000 MILLILITER(S): 9 INJECTION, SOLUTION INTRAVENOUS at 18:04

## 2017-06-09 RX ADMIN — HYDROMORPHONE HYDROCHLORIDE 0.5 MILLIGRAM(S): 2 INJECTION INTRAMUSCULAR; INTRAVENOUS; SUBCUTANEOUS at 14:45

## 2017-06-09 RX ADMIN — Medication 3 MILLILITER(S): at 05:26

## 2017-06-09 RX ADMIN — SODIUM CHLORIDE 100 MILLILITER(S): 9 INJECTION, SOLUTION INTRAVENOUS at 22:54

## 2017-06-09 RX ADMIN — FONDAPARINUX SODIUM 2.5 MILLIGRAM(S): 2.5 INJECTION, SOLUTION SUBCUTANEOUS at 16:15

## 2017-06-09 RX ADMIN — HYDROMORPHONE HYDROCHLORIDE 0.5 MILLIGRAM(S): 2 INJECTION INTRAMUSCULAR; INTRAVENOUS; SUBCUTANEOUS at 10:57

## 2017-06-09 RX ADMIN — FENTANYL CITRATE 3.72 MICROGRAM(S)/KG/HR: 50 INJECTION INTRAVENOUS at 03:56

## 2017-06-09 RX ADMIN — PANTOPRAZOLE SODIUM 40 MILLIGRAM(S): 20 TABLET, DELAYED RELEASE ORAL at 12:38

## 2017-06-09 RX ADMIN — Medication 50 MILLIGRAM(S): at 22:55

## 2017-06-09 RX ADMIN — CHLORHEXIDINE GLUCONATE 15 MILLILITER(S): 213 SOLUTION TOPICAL at 06:11

## 2017-06-09 RX ADMIN — Medication 44 MICROGRAM(S): at 06:11

## 2017-06-09 NOTE — BRIEF OPERATIVE NOTE - OPERATION/FINDINGS
s/p exploratory laparotomy, extensive lysis of adhesions, takedown of enterocutaneous fistula, repair of serosal tear on cecum, partial right salpingooophorectomy, colostomy revision, parastomal and incisional hernia repair with strattice mesh

## 2017-06-09 NOTE — PROGRESS NOTE ADULT - SUBJECTIVE AND OBJECTIVE BOX
Day #9 of PN  Admitted to SICU post op for monitoring post op  PN infusion at 62 cc/shr     Total I & Os for 24h ending 06-09 @ 07:00  =============================================  IN:    Lactated Ringers IV Bolus: 1000 ml    TPN (Total Parenteral Nutrition): 248 ml (representing infusion volume since admitted to SICU)    lactated ringers.: 150 ml    Solution: 100 ml    Solution: 100 ml    sodium chloride 0.9%: 50 ml    Solution: 50 ml    Solution: 50 ml    phenylephrine   Infusion: 11.2 ml    fentaNYL  Infusion: 3.7 ml    Total IN: 1762.9 ml  ---------------------------------------------  OUT:    Voided: 700 ml    Drain: 180 ml    Indwelling Catheter - Urethral: 130 ml    Nasoenteral Tube: 70 ml    Colostomy: 1 ml    Total OUT: 1081 ml  ---------------------------------------------  Total NET: 681.9 ml    Labs   06-09    146<H>  |  116<H>  |  56<H>  ----------------------------<  202<H>  4.4   |  19<L>  |  1.41<H>    Ca    7.3<L>      09 Jun 2017 03:56  Phos  2.7     06-09  Mg     1.8     06-09      CAPILLARY BLOOD GLUCOSE  202 (09 Jun 2017 04:00)  115 (08 Jun 2017 12:14)    I&O's Detail  I & Os for 24h ending 09 Jun 2017 07:00  =============================================  IN:    Lactated Ringers IV Bolus: 1000 ml    TPN (Total Parenteral Nutrition): 248 ml    lactated ringers.: 150 ml    Solution: 100 ml    Solution: 100 ml    sodium chloride 0.9%: 50 ml    Solution: 50 ml    Solution: 50 ml    phenylephrine   Infusion: 11.2 ml    fentaNYL  Infusion: 3.7 ml    Total IN: 1762.9 ml  ---------------------------------------------  OUT:    Voided: 700 ml    Drain: 180 ml    Indwelling Catheter - Urethral: 130 ml    Nasoenteral Tube: 70 ml    Colostomy: 1 ml    Total OUT: 1081 ml  ---------------------------------------------  Total NET: 681.9 ml    I & Os for current day (as of 09 Jun 2017 09:49)  =============================================  IN:    TPN (Total Parenteral Nutrition): 62 ml    lactated ringers.: 50 ml    Total IN: 112 ml  ---------------------------------------------  OUT:    Total OUT: 0 ml  ---------------------------------------------  Total NET: 112 ml

## 2017-06-09 NOTE — CHART NOTE - NSCHARTNOTEFT_GEN_A_CORE
RD f/u: Pt c diverticulitis S/P Dunham's/ostomy creation, recurrent SBOs, enterocutaneous fistula now admitted c SBO, abdominal pain, loss of ostomy function, dislodged fistula plug. Pt now S/P 6/9  exploratory laparotomy, extensive lysis of adhesions, takedown of enterocutaneous fistula, repair of serosal tear on cecum, partial right salpingooophorectomy, colostomy revision, parastomal and incisional hernia repair with strattice mesh. Per team, plan for extubation today if feasible.      Source: Patient [ ]    Family [ ]     other [X ]: team rounds, medical record    Diet : NPO with TPN; NGT to suction, 24-hour provision = 70ml      Patient reports [ ] nausea  [ ] vomiting [ ] diarrhea [ ] constipation  [ ]chewing problems [ ] swallowing issues  [X ] other: + colostomy output     PO intake:  < 50% [ ] 50-75% [ ]   % [ ]  other :     Source for PO intake [ ] Patient [ ] family [ ] chart [ ] staff [ ] other     Enteral /Parenteral Nutrition: TPN infusing at 62ml/hr (84Gm amino acids, 214Gm dextrose, 213ml 20%lipids) to provide 1490 clarke (20cal/kg, 1.1Gm prot/Kg per dosing wt 74.4Kg).       Current Weight: 78.5Kg (6/9), 74.4Kg (dosing 74.4Kg)  % Weight Change: 106%    Pertinent Medications: MEDICATIONS  (STANDING):    lactated ringers. 1000milliLiter(s) IV Continuous <Continuous>  calcium gluconate IVPB 2Gram(s) IV Intermittent once  HYDROmorphone  Injectable 0.5milliGRAM(s) IV Push once  lactated ringers Bolus 500milliLiter(s) IV Bolus once    MEDICATIONS  (PRN):  HYDROmorphone  Injectable 0.5milliGRAM(s) IV Push every 3 hours PRN Severe Pain (7 - 10)    Pertinent Labs:  06-09 Na146 mmol/L<H> Glu 202 mg/dL<H> K+ 4.4 mmol/L Cr  1.41 mg/dL<H> BUN 56 mg/dL<H> Phos 2.7 mg/dL Fingersticks x 24 hours: 110-119mg/dL    Skin: 1+ generalized    Estimated Needs:   [X ] no change since previous assessment  [ ] recalculated:       Previous Nutrition Diagnosis:     [ ] Inadequate Energy Intake [ X]Inadequate Oral Intake [ ] Excessive Energy Intake     [ ] Underweight [ ] Increased Nutrient Needs [ ] Overweight/Obesity     [ ] Altered GI Function [ ] Unintended Weight Loss [ ] Food & Nutrition Related Knowledge Deficit [ ] Malnutrition          Nutrition Diagnosis is [ X] ongoing  [ ] resolved [ ] not applicable          New Nutrition Diagnosis: [X ] not applicable    [ ] Inadequate Protein Energy Intake [ ]Inadequate Oral Intake [ ] Excessive Energy Intake     [ ] Underweight [ ] Increased Nutrient Needs [ ] Overweight/Obesity     [ ] Altered GI Function [ ] Unintended Weight Loss [ ] Food & Nutrition Related Knowledge Deficit[ ] Limited Adherence to nutrition related recommendations [ ] Malnutrition  [ ] other: Free text       Related to:      As evidenced by:      Interventions:     Recommend    [ X] Change Diet To: Pending extubation and diet advancement, recommend clear liquids; advance to low fiber diet as tolerated and medically feasible    [ ] Nutrition Supplement    [ X] Nutrition Support: TPN per Dr. Carias    [ ] Other:        Monitoring and Evaluation:     [ ] PO intake [ ] Tolerance to diet prescription [ ] weights [X ] follow up per protocol    [ X] other: monitor PN provision, ability to advance diet RD f/u: Pt c diverticulitis S/P Dunham's/ostomy creation, recurrent SBOs, enterocutaneous fistula now admitted c SBO, abdominal pain, loss of ostomy function, dislodged fistula plug. Pt now S/P 6/9  exploratory laparotomy, extensive lysis of adhesions, takedown of enterocutaneous fistula, repair of serosal tear on cecum, partial right salpingooophorectomy, colostomy revision, parastomal and incisional hernia repair with strattice mesh. Per team, plan for extubation today if feasible.      Source: Patient [ ]    Family [ ]     other [X ]: team rounds, medical record    Diet : NPO with TPN; NGT to suction, 24-hour provision = 70ml      Patient reports [ ] nausea  [ ] vomiting [ ] diarrhea [ ] constipation  [ ]chewing problems [ ] swallowing issues  [X ] other: + colostomy output     PO intake:  < 50% [ ] 50-75% [ ]   % [ ]  other :     Source for PO intake [ ] Patient [ ] family [ ] chart [ ] staff [ ] other     Enteral /Parenteral Nutrition: TPN infusing at 62ml/hr (84Gm amino acids, 214Gm dextrose, 213ml 20%lipids) to provide 1490 clarke (20cal/kg, 1.1Gm prot/Kg per dosing wt 74.4Kg).       Current Weight: 78.5Kg (6/9), 74.4Kg (dosing 74.4Kg)  % Weight Change: 106%    Pertinent Medications: MEDICATIONS  (STANDING):    lactated ringers. 1000milliLiter(s) IV Continuous <Continuous>  calcium gluconate IVPB 2Gram(s) IV Intermittent once  HYDROmorphone  Injectable 0.5milliGRAM(s) IV Push once  lactated ringers Bolus 500milliLiter(s) IV Bolus once    MEDICATIONS  (PRN):  HYDROmorphone  Injectable 0.5milliGRAM(s) IV Push every 3 hours PRN Severe Pain (7 - 10)    Pertinent Labs:  06-09 Na146 mmol/L<H> Glu 202 mg/dL<H> K+ 4.4 mmol/L Cr  1.41 mg/dL<H> BUN 56 mg/dL<H> Phos 2.7 mg/dL Fingersticks x 24 hours: 110-119mg/dL    Skin: no pressure injuries    Edema: 1+ generalized    Estimated Needs:   [X ] no change since previous assessment  [ ] recalculated:       Previous Nutrition Diagnosis:     [ ] Inadequate Energy Intake [ X]Inadequate Oral Intake [ ] Excessive Energy Intake     [ ] Underweight [ ] Increased Nutrient Needs [ ] Overweight/Obesity     [ ] Altered GI Function [ ] Unintended Weight Loss [ ] Food & Nutrition Related Knowledge Deficit [ ] Malnutrition          Nutrition Diagnosis is [ X] ongoing  [ ] resolved [ ] not applicable          New Nutrition Diagnosis: [X ] not applicable    [ ] Inadequate Protein Energy Intake [ ]Inadequate Oral Intake [ ] Excessive Energy Intake     [ ] Underweight [ ] Increased Nutrient Needs [ ] Overweight/Obesity     [ ] Altered GI Function [ ] Unintended Weight Loss [ ] Food & Nutrition Related Knowledge Deficit[ ] Limited Adherence to nutrition related recommendations [ ] Malnutrition  [ ] other: Free text       Related to:      As evidenced by:      Interventions:     Recommend    [ X] Change Diet To: Pending extubation and diet advancement, recommend clear liquids; advance to low fiber diet as tolerated and medically feasible    [ ] Nutrition Supplement    [ X] Nutrition Support: TPN per Dr. Carias    [ ] Other:        Monitoring and Evaluation:     [ ] PO intake [ ] Tolerance to diet prescription [ ] weights [X ] follow up per protocol    [ X] other: monitor PN provision, ability to advance diet

## 2017-06-09 NOTE — PROGRESS NOTE ADULT - ASSESSMENT
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

## 2017-06-09 NOTE — PROGRESS NOTE ADULT - PROBLEM SELECTOR PLAN 2
Stable renal function and off diuretics.    IVF running at present IVF running at present-LR; Off pressors

## 2017-06-09 NOTE — AIRWAY REMOVAL NOTE  ADULT & PEDS - ARTIFICAL AIRWAY REMOVAL COMMENTS
Patient extubated without complications. Written order for extubation was verified. The patient was identified by full name and birthdate compared to the ID band. Present during the procedure was ABEL Valdivia

## 2017-06-09 NOTE — PROGRESS NOTE ADULT - ASSESSMENT
ASSESSMENT  76y female with high volume ECF s/p exploratory laparotomy, extensive lysis of adhesions, takedown of enterocutaneous fistula, repair of serosal tear on cecum, partial right salpingooophorectomy, colostomy revision, parastomal and incisional hernia repair with strattice mesh, POD 0    PLAN  - Hold sedation, wean to extubate as per SICU  - Cont. NGT to suction  - Pain control PRN, limit narcotics as tolerated  - NPO  - continue chemical VTE ppx  - Will discuss with attending.

## 2017-06-09 NOTE — PROGRESS NOTE ADULT - ASSESSMENT
ASSESSMENT: 76y Female with PMH of COPD, CHF, h/o DVT/PE s/p IVC filter (later removed), GERD, Hypothyroidism, h/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 Diff, and PSH of diverticulitis s/p Rubne's, recurrent SBO s/p SBR, ECF at small bowel anastomosis site s/p takedown, wound dehiscence, and abdominal reconstruction complicated by parastomal hernia and incisional hernia with unresolving SBO s/p exploratory laparotomy, extensive lysis of adhesions, take down of ECF, repair of serosal tear on cecum, partial right salpingoophorectomy, colostomy revision, parastomal and incisional hernia repair with strattice mesh.    PLAN:   Neurologic: Hold all sedation and allow patient to wake up/regain mental status. Plan to start fentanyl/Precedex for analgesia and sedation if pt wakes up and is agitated.    Respiratory: Acute respiratory failure. Mechanical ventilatory support Mode: AC/ CMV RR: 12 TV: 450 PEEP: 5 FiO2: 40. Check ABG and make adjustments if necessary. Check CXR to assess ETT position. Plan for SBT later this AM when pt awake.    Cardiovascular: Hypotension likely secondary to sepsis and hypovolemia. IVF resuscitate. Wean phenylephrine gtt as tolerated.    Gastrointestinal: S/p extensive abdominal surgery. NPO with NGT to low wall suction.     Genitourinary/Renal: CKD stage 3. Monitor I&Os. Trend BUN/Cr. Replete electrolytes as needed. LR @ 50.    Hematologic: h/o HIT. Start fondaparinux tomorrow for VTE prophylaxis.    Infectious Disease: Sepsis. Treat with broad spectrum antibiotics aztreonam and metronidazole x24 hours. 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 Monitor for fevers and trend WBC count.    Endocrine: Monitor glucose on BMP.    Heidi Calloway PA-C ASSESSMENT: 76y Female with PMH of COPD, CHF, h/o DVT/PE s/p IVC filter (later removed), GERD, Hypothyroidism, h/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 Diff, and PSH of diverticulitis s/p Ruben's, recurrent SBO s/p SBR, ECF at small bowel anastomosis site s/p takedown, wound dehiscence, and abdominal reconstruction complicated by parastomal hernia and incisional hernia with unresolving SBO s/p exploratory laparotomy, extensive lysis of adhesions, take down of ECF, repair of serosal tear on cecum, partial right salpingoophorectomy, colostomy revision, parastomal and incisional hernia repair with strattice mesh.    PLAN:   Neurologic: Hold all sedation and allow patient to wake up/regain mental status. Plan to start fentanyl/Precedex for analgesia and sedation if pt wakes up and is agitated.    Respiratory: Acute respiratory failure. Mechanical ventilatory support Mode: AC/ CMV RR: 12 TV: 450 PEEP: 5 FiO2: 40. Check ABG and make adjustments if necessary. Check CXR to assess ETT position. Plan for SBT later this AM when pt awake. COPD. Duonebs.    Cardiovascular: Hypotension likely secondary to sepsis and hypovolemia. IVF resuscitate. Wean phenylephrine gtt as tolerated.    Gastrointestinal: S/p extensive abdominal surgery. NPO with NGT to low wall suction. GERD. Protonix daily.    Genitourinary/Renal: CKD stage 3. Monitor I&Os. Trend BUN/Cr. Replete electrolytes as needed. LR @ 50.    Hematologic: h/o HIT. Start fondaparinux tomorrow for VTE prophylaxis.    Infectious Disease: Sepsis. Treat with broad spectrum antibiotics aztreonam and metronidazole x24 hours. 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 Monitor for fevers and trend WBC count.    Endocrine: Hypothyroidism. Continue home levothyroxine. Monitor glucose on BMP.    Heidi Calloway PA-C ASSESSMENT: 76y Female with PMH of COPD, CHF, h/o DVT/PE s/p IVC filter (later removed), GERD, Hypothyroidism, h/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 Diff, and PSH of diverticulitis s/p Ruben's, recurrent SBO s/p SBR, ECF at small bowel anastomosis site s/p takedown, wound dehiscence, and abdominal reconstruction complicated by parastomal hernia and incisional hernia with unresolving SBO now POD#0 s/p exploratory laparotomy, extensive lysis of adhesions, take down of ECF, repair of serosal tear on cecum, partial right salpingoophorectomy, colostomy revision, parastomal and incisional hernia repair with strattice mesh.    PLAN:   Neurologic: Hold all sedation and allow patient to wake up/regain mental status. Plan to start fentanyl/Precedex for analgesia and sedation if pt wakes up and is agitated.    Respiratory: Acute respiratory failure. Mechanical ventilatory support Mode: AC/ CMV RR: 12 TV: 450 PEEP: 5 FiO2: 40. Check ABG and make adjustments if necessary. Check CXR to assess ETT position. Plan for SBT later this AM when pt awake. COPD. Duonebs.    Cardiovascular: Hypotension likely secondary to sepsis and hypovolemia. IVF resuscitate. Wean phenylephrine gtt as tolerated.    Gastrointestinal: S/p extensive abdominal surgery. NPO with NGT to low wall suction. GERD. Protonix daily.    Genitourinary/Renal: CKD stage 3. Monitor I&Os. Trend BUN/Cr. Replete electrolytes as needed. LR @ 50.    Hematologic: h/o HIT. Start fondaparinux tomorrow for VTE prophylaxis.    Infectious Disease: Sepsis. Treat with broad spectrum antibiotics aztreonam and metronidazole x24 hours. 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 Monitor for fevers and trend WBC count.    Endocrine: Hypothyroidism. Continue home levothyroxine. Monitor glucose on BMP.    Heidi Calloway PA-C

## 2017-06-09 NOTE — PROGRESS NOTE ADULT - SUBJECTIVE AND OBJECTIVE BOX
Dr. Capellan (Attending Physician)  I provided additional critical care and management today after Dr. Harmon.    N - Stopped Fentanyl will start Prn dilaudid for pain management.   P - Acute Respiratory Failure Postop will SBT this morning and attempt to extubate. Minimal vent settings.  C - Hypotensive, Tachycardic, Low UO, On U/S difficult views but good biventricular function with underfilled LV. A-line with respiratory variation. Will Give 500 mL bolus place on FloTrac. Increase fluids to 100 mL/hr.  GI - S/p enterocutaneous fistula takedown and abd. reconstruction for SBO.  NPO with NGT to low continuous wall suction.   - CKD Stage 3 with oliguria this morning 0 - 10mL/hr.  Bolused 500 mL and increased fluids.  Now 25 ml/hr.  H - h/o JOSE on Fondaparinux  ID - On Aztreonam and Metronidazole ho C. diff will continue.  Given Daptomycin perioperatively.  Endo - Glucose 140-180  PPx - PPI and Fondaparinux    Plan of care d/w family and the patient.  Patient was critically ill and required frequent reassessment.  Total additional CC time 40 minutes.

## 2017-06-09 NOTE — PROGRESS NOTE ADULT - ATTENDING COMMENTS
Pt evaluated in SICU with Dr Samson. Very complicated medical and surg history s/p  s/p exploratory laparotomy, extensive lysis of adhesions, take down of ECF, repair of serosal tear on cecum, partial right salpingoophorectomy, colostomy revision, parastomal and incisional hernia repair with strattice mesh.   In post op acute resp failure, hypotensive on Neosynephrine drip gtt, ? intravascular depleted, possible hypovolumic shock, SIRS, CKD, was on diuretic for diastolic HF, h/o HIT on Arextra  Plan, Fentanyl drip gtt for pain,   Vent adj, good gas exchange, wean down fio2, will assess for extubation  IVF bolus, optimization of volume status, monitor lactate  24 hr perioperative abx  DVT P with Arixtra, CrCl > 30  Time spent > 45 minutes reviewing all her data, imagine and formulating plan of care

## 2017-06-09 NOTE — BRIEF OPERATIVE NOTE - POST-OP DX
Enterocutaneous fistula  06/09/2017    Baldev Lebron  Parastomal hernia  06/09/2017    Baldev Lebron  Small bowel obstruction  06/09/2017    Baldev Lebron

## 2017-06-09 NOTE — PROGRESS NOTE ADULT - SUBJECTIVE AND OBJECTIVE BOX
NEPHROLOGY-[facility] [phone]        Patient seen and examined         MEDICATIONS  (STANDING):  aztreonam  IVPB 1000milliGRAM(s) IV Intermittent every 8 hours  metroNIDAZOLE  IVPB 500milliGRAM(s) IV Intermittent every 8 hours  aztreonam  IVPB  IV Intermittent   metroNIDAZOLE  IVPB  IV Intermittent   lactated ringers. 1000milliLiter(s) IV Continuous <Continuous>  levothyroxine Injectable 44MICROGram(s) IV Push daily  pantoprazole  Injectable 40milliGRAM(s) IV Push daily  chlorhexidine 0.12% Liquid 15milliLiter(s) Swish and Spit every 8 hours  ALBUTerol/ipratropium for Nebulization 3milliLiter(s) Nebulizer every 6 hours  fondaparinux Injectable 2.5milliGRAM(s) SubCutaneous daily  calcium gluconate IVPB 2Gram(s) IV Intermittent once  HYDROmorphone  Injectable 0.5milliGRAM(s) IV Push once      VITAL:  T(C): , Max: 36.8 (06-09 @ 07:00)  T(F): , Max: 98.2 (06-09 @ 07:00)  HR: 117  BP: 90/62  BP(mean): 70  RR: 26  SpO2: 100%  Wt(kg): --    I and O's:  I & Os for 24h ending 06-09 @ 07:00  =============================================  IN: 1762.9 ml / OUT: 1081 ml / NET: 681.9 ml    I & Os for current day (as of 06-09 @ 09:13)  =============================================  IN: 112 ml / OUT: 0 ml / NET: 112 ml        PHYSICAL EXAM:    Constitutional: NAD  HEENT: PERRLA    Neck:  No JVD  Respiratory: CTAB/L  Cardiovascular: S1 and S2  Gastrointestinal: BS+, soft, NT/ND  Extremities: No peripheral edema  Neurological: A/O x 3, no focal deficits  Psychiatric: Normal mood, normal affect  : No Snyder  Skin: No rashes  Access: Not applicable    LABS:                        9.5    2.8   )-----------( 146      ( 09 Jun 2017 03:56 )             27.8     06-09    146<H>  |  116<H>  |  56<H>  ----------------------------<  202<H>  4.4   |  19<L>  |  1.41<H>    Ca    7.3<L>      09 Jun 2017 03:56  Phos  2.7     06-09  Mg     1.8     06-09            U   RECOMMENDATIONS NEPHROLOGY-[facility] [phone]        Patient seen and examined.  At present she is intubated and now off pressors      ROS- Unable    MEDICATIONS  (STANDING):  aztreonam  IVPB 1000milliGRAM(s) IV Intermittent every 8 hours  metroNIDAZOLE  IVPB 500milliGRAM(s) IV Intermittent every 8 hours  aztreonam  IVPB  IV Intermittent   metroNIDAZOLE  IVPB  IV Intermittent   lactated ringers. 1000milliLiter(s) IV Continuous <Continuous>  levothyroxine Injectable 44MICROGram(s) IV Push daily  pantoprazole  Injectable 40milliGRAM(s) IV Push daily  chlorhexidine 0.12% Liquid 15milliLiter(s) Swish and Spit every 8 hours  ALBUTerol/ipratropium for Nebulization 3milliLiter(s) Nebulizer every 6 hours  fondaparinux Injectable 2.5milliGRAM(s) SubCutaneous daily  calcium gluconate IVPB 2Gram(s) IV Intermittent once  HYDROmorphone  Injectable 0.5milliGRAM(s) IV Push once      VITAL:  T(C): , Max: 36.8 (06-09 @ 07:00)  T(F): , Max: 98.2 (06-09 @ 07:00)  HR: 117  BP: 90/62  BP(mean): 70  RR: 26  SpO2: 100%  Wt(kg): --    I and O's:  I & Os for 24h ending 06-09 @ 07:00  =============================================  IN: 1762.9 ml / OUT: 1081 ml / NET: 681.9 ml    I & Os for current day (as of 06-09 @ 09:13)  =============================================  IN: 112 ml / OUT: 0 ml / NET: 112 ml        PHYSICAL EXAM:    Constitutional: Intubated  HEENT: PERRLA    Neck:  No JVD  Respiratory: transmitted upper  Cardiovascular: S1 and S2  Gastrointestinal: hypoactive bowel sounds; +surgical scar  Extremities: No peripheral edema  Neurological: intubated  Psychiatric: unable  : + Claudio  Skin: No rashes  Access: Not applicable    LABS:                        9.5    2.8   )-----------( 146      ( 09 Jun 2017 03:56 )             27.8     06-09    146<H>  |  116<H>  |  56<H>  ----------------------------<  202<H>  4.4   |  19<L>  |  1.41<H>    Ca    7.3<L>      09 Jun 2017 03:56  Phos  2.7     06-09  Mg     1.8     06-09    CXR- Clear

## 2017-06-09 NOTE — PROGRESS NOTE ADULT - SUBJECTIVE AND OBJECTIVE BOX
STATUS POST: exploratory laparotomy, extensive lysis of adhesions, takedown of enterocutaneous fistula, repair of serosal tear on cecum, partial right salpingooophorectomy, colostomy revision, parastomal and incisional hernia repair with strattice mesh    POD#:   0    INTERVAL EVENTS: Patient transferred to SICU postop, remains intubated, was on pressors postop and now weaned off phenylephrine, remains tachycardic.  Sedation held this morning.      SUBJECTIVE: Pt seen + examined.  Remains sedated, eyes opening briefly but not following commands, unable to assess for pain, appears comfortable.    VITALS  T(C): 36.4, Max: 36.6 (06-08 @ 13:00)  HR: 119 (80 - 126)  BP: 114/71 (114/71 - 138/74)  BP(mean): 88 (88 - 88)  RR: 24 (12 - 26)  SpO2: 100% (98% - 100%)  CAPILLARY BLOOD GLUCOSE  202 (09 Jun 2017 04:00)  115 (08 Jun 2017 12:14)    Is/Os  I & Os for current day (as of 06-09 @ 07:30)  =============================================  IN:    Lactated Ringers IV Bolus: 1000 ml    TPN (Total Parenteral Nutrition): 248 ml    lactated ringers.: 150 ml    Solution: 100 ml    Solution: 100 ml    sodium chloride 0.9%: 50 ml    Solution: 50 ml    Solution: 50 ml    phenylephrine   Infusion: 11.2 ml    fentaNYL  Infusion: 3.7 ml    Total IN: 1762.9 ml  ---------------------------------------------  OUT:    Voided: 700 ml    Drain: 180 ml    Indwelling Catheter - Urethral: 130 ml    Nasoenteral Tube: 70 ml    Colostomy: 1 ml    Total OUT: 1081 ml  ---------------------------------------------  Total NET: 681.9 ml    PHYSICAL EXAM:   General: Sedated, appears comfortable, intubated on mechanical ventilation  Neuro: no apparent neurologic defects  Cardio: Tachycardic  Resp: Intubated, on mechanical ventilation   Mode: AC/ CMV (Assist Control/ Continuous Mandatory Ventilation)  RR (machine): 12  TV (machine): 450  FiO2: 40  PEEP: 5  ITime: 1  MAP: 9  PIP: 25  GI/Abd: Soft, mildly distended, midline wound with prevena vac in place, minimal drainage, left sided ostomy pink and viable, no drainage,   Vascular: All 4 extremities warm.    LABS  CBC (06-09 @ 03:56)                              9.5<L>                         2.8<L>  )----------------(  146<L>     --    % Neutrophils, --    % Lymphocytes, ANC: --                                  27.8<L>  CBC (06-08 @ 08:31)                              10.2<L>                         5.22    )----------------(  191        --    % Neutrophils, --    % Lymphocytes, ANC: --                                  32.5<L>    BMP (06-09 @ 03:56)             146<H>  |  116<H>  |  56<H> 		Ca++ --      Ca 7.3<L>             ---------------------------------( 202<H>		Mg 1.8                4.4     |  19<L>   |  1.41<H>			Ph 2.7     BMP (06-08 @ 08:34)             136     |  98      |  57<H> 		Ca++ --      Ca 9.3                ---------------------------------( 107<H>		Mg 2.4                4.7     |  22      |  1.48<H>			Ph 4.1       Coags (06-09 @ 03:56)  aPTT 27.9 / INR 1.13 / PT 12.3  Coags (06-08 @ 09:03)  aPTT 33.6 / INR 1.09 / PT 12.3    ABG (06-09 @ 03:50)     7.20<LL> / 48<H> / 129<H> / 18<L> / -9.1<L> / 98<H>%     Lactate:     ABG (06-09 @ 01:07)     7.37 / 36 / 230<H> / 20<L> / -4.0<L> / 100<H>%     Lactate:

## 2017-06-09 NOTE — PROGRESS NOTE ADULT - SUBJECTIVE AND OBJECTIVE BOX
[facility] SICU Progress Note    HISTORY  76y Female with PMH of COPD, h/o DVT s/p IVC filter (later removed), GERD, Hypothyroidism and PSH of diverticulitis s/p Ruben's, recurrent SBO s/p SBR, ECF s/p takedown, wound dehiscence, and abdominal reconstruction with parastomal hernia. She is currently hospitalized for recurrent SBO associated with intermittent abdominal pain and loss of ostomy function. She was managed conservatively but then was taken to the OR yesterday evening for failure of SBO to resolve.     24 HOUR EVENTS: She is now s/p exploratory laparotomy, extensive lysis of adhesions, take down of ECF, repair of serosal tear on cecum, partial right salpingoophorectomy, colostomy revision, parastomal and incisional hernia repair with strattice mesh. She was in the OR for appromiately 12 hours. In: 4500ml crystalloid, 500ml TPN. Out: 300ml EBL, 400ml urine. She was transferred to the SICU postoperatively intubated, on phenylephrine gtt @ 0.2 mcg/kg/min.       SUBJECTIVE/ROS:   [ x]  Due to altered mental status/intubation, subjective information were not able to be obtained from the patient. History was obtained, to the extent possible, from review of the chart and collateral sources of information.    NEURO  RASS:     GCS:     CAM ICU:  Exam: awake, alert, oriented  Meds: fentaNYL   Infusion 1MICROgram(s)/kG/Hr IV Continuous <Continuous>    [x] Adequacy of sedation and pain control has been assessed and adjusted    RESPIRATORY  RR: 18 (18 - 18)  SpO2: 98% (98% - 99%)  Wt(kg): --  Exam: unlabored, clear to auscultation bilaterally  Mechanical Ventilation: Mode: AC/ CMV (Assist Control/ Continuous Mandatory Ventilation), RR (machine): 12, RR (patient): 12, TV (machine): 450, FiO2: 40, PEEP: 5, ITime: 1, MAP: 9, PIP: 25  ABG - ( 09 Jun 2017 03:50 )  pH: 7.20  /  pCO2: 48    /  pO2: 129   / HCO3: 18    / Base Excess: -9.1  /  SaO2: 98      Lactate: x                [N/A] Extubation Readiness Assessed  Meds:     CARDIOVASCULAR  HR: 116 (80 - 116)  BP: 138/74 (103/62 - 138/74)  BP(mean): --  ABP: --  ABP(mean): --  Wt(kg): --  CVP(cm H2O): --      Exam: regular rate and rhythm  Cardiac Rhythm: sinus  Perfusion     [x]Adequate   [ ]Inadequate  Mentation   [x]Normal       [ ]Reduced  Extremities  [x]Warm         [ ]Cool  I & Os for 24h ending 06-08 @ 07:00  =============================================  IN: 1514 ml / OUT: 825 ml / NET: 689 ml    Volume Status [ ]Hypervolemic [x]Euvolemic [ ]Hypovolemic  Meds: phenylephrine    Infusion 0.5MICROgram(s)/kG/Min IV Continuous <Continuous>      GI/NUTRITION  Exam: soft, nontender, nondistended, incision C/D/I  Diet:  Meds:       GENITOURINARY  I&O's Detail  I & Os for 24h ending 06-08 @ 07:00  =============================================  IN:    TPN (Total Parenteral Nutrition): 744 ml    Oral Fluid: 420 ml    dextrose 5% + sodium chloride 0.45% with potassium chloride 20 mEq/L: 350 ml    Total IN: 1514 ml  ---------------------------------------------  OUT:    Voided: 600 ml    Drain: 225 ml    Total OUT: 825 ml  ---------------------------------------------  Total NET: 689 ml    I & Os for current day (as of 06-09 @ 03:58)  =============================================  IN:    Total IN: 0 ml  ---------------------------------------------  OUT:    Voided: 700 ml    Drain: 130 ml    Colostomy: 1 ml    Total OUT: 831 ml  ---------------------------------------------  Total NET: -831 ml      06-08    136  |  98  |  57<H>  ----------------------------<  107<H>  4.7   |  22  |  1.48<H>    Ca    9.3      08 Jun 2017 08:34  Phos  4.1     06-08  Mg     2.4     06-08      Snyder catheter indication:  Meds: sodium chloride 0.9%. 1000milliLiter(s) IV Continuous <Continuous>      HEMATOLOGIC  Meds:   [x] VTE Prophylaxis                        10.2   5.22  )-----------( 191      ( 08 Jun 2017 08:31 )             32.5     PT/INR - ( 08 Jun 2017 09:03 )   PT: 12.3 sec;   INR: 1.09 ratio         PTT - ( 08 Jun 2017 09:03 )  PTT:33.6 sec  Transfusion     [ ] PRBC   [ ] Platelets   [ ] FFP   [ ] Cryoprecipitate    INFECTIOUS DISEASES  WBC Count: 5.22 K/uL (06-08 @ 08:31)    RECENT CULTURES:    Meds: DAPTOmycin IVPB 450milliGRAM(s) IV Intermittent once  aztreonam  IVPB 1000milliGRAM(s) IV Intermittent once  aztreonam  IVPB 1000milliGRAM(s) IV Intermittent every 8 hours  metroNIDAZOLE  IVPB 500milliGRAM(s) IV Intermittent once  metroNIDAZOLE  IVPB 500milliGRAM(s) IV Intermittent every 8 hours  aztreonam  IVPB  IV Intermittent   metroNIDAZOLE  IVPB  IV Intermittent       ENDOCRINE  CAPILLARY BLOOD GLUCOSE  115 (08 Jun 2017 12:14)  110 (08 Jun 2017 05:30)    Meds:     ACCESS DEVICES:  [ ] Peripheral IV  [ ] Central Venous Line	[ ] R	[ ] L	[ ] IJ	[ ] Fem	[ ] SC	Placed:   [ ] Arterial Line		[ ] R	[ ] L	[ ] Fem	[ ] Rad	[ ] Ax	Placed:   [ ] PICC:					[ ] Mediport  [ ] Urinary Catheter, Date Placed:   [x] Necessity of urinary, arterial, and venous catheters discussed    OTHER MEDICATIONS:      CODE STATUS:      IMAGING: [facility] SICU Progress Note    HISTORY  76y Female with PMH of COPD, CHF, h/o DVT/PE s/p IVC filter (later removed), GERD, Hypothyroidism, h/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 Diff, and PSH of diverticulitis s/p Ruben's, recurrent SBO s/p SBR, ECF at small bowel anastomosis site s/p takedown, wound dehiscence, and abdominal reconstruction complicated by parastomal hernia and incisional hernia. She is currently hospitalized for recurrent SBO associated with intermittent abdominal pain and loss of ostomy function. She was managed conservatively but then was taken to the OR yesterday evening for failure of SBO to resolve.     24 HOUR EVENTS: She is now s/p exploratory laparotomy, extensive lysis of adhesions, take down of ECF, repair of serosal tear on cecum, partial right salpingoophorectomy, colostomy revision, parastomal and incisional hernia repair with strattice mesh. She was in the OR for appromiately 12 hours. In: 4500ml crystalloid, 500ml TPN. Out: 300ml EBL, 400ml urine. She was transferred to the SICU postoperatively intubated, on phenylephrine gtt @ 0.2 mcg/kg/min.       SUBJECTIVE/ROS:   [ x]  Due to altered mental status/intubation, subjective information were not able to be obtained from the patient. History was obtained, to the extent possible, from review of the chart and collateral sources of information.    NEURO  RASS:        CAM ICU:  Exam: awake, alert, oriented  Meds: fentaNYL   Infusion 1MICROgram(s)/kG/Hr IV Continuous <Continuous>    [x] Adequacy of sedation and pain control has been assessed and adjusted    RESPIRATORY  RR: 18 (18 - 18)  SpO2: 98% (98% - 99%)  Wt(kg): --  Exam: unlabored, clear to auscultation bilaterally  Mechanical Ventilation: Mode: AC/ CMV (Assist Control/ Continuous Mandatory Ventilation), RR (machine): 12, RR (patient): 12, TV (machine): 450, FiO2: 40, PEEP: 5, ITime: 1, MAP: 9, PIP: 25  ABG - ( 09 Jun 2017 03:50 )  pH: 7.20  /  pCO2: 48    /  pO2: 129   / HCO3: 18    / Base Excess: -9.1  /  SaO2: 98      Lactate: x        [x] Extubation Readiness Assessed - needs further resuscitation      CARDIOVASCULAR  HR: 116 (80 - 116)  BP: 138/74 (103/62 - 138/74)  BP(mean): --  ABP: --  ABP(mean): --  Wt(kg): --  CVP(cm H2O): --      Exam: regular rate and rhythm  Cardiac Rhythm: sinus  Perfusion     [x]Adequate   [ ]Inadequate  Mentation   [x]Normal       [ ]Reduced  Extremities  [x]Warm         [ ]Cool  I & Os for 24h ending 06-08 @ 07:00  =============================================  IN: 1514 ml / OUT: 825 ml / NET: 689 ml    Volume Status [ ]Hypervolemic [ ]Euvolemic [x ]Hypovolemic  Meds: phenylephrine    Infusion 0.5MICROgram(s)/kG/Min IV Continuous <Continuous>      GI/NUTRITION  Exam: soft, nontender, nondistended, incision C/D/I  Diet: NPO        GENITOURINARY  I&O's Detail  I & Os for 24h ending 06-08 @ 07:00  =============================================  IN:    TPN (Total Parenteral Nutrition): 744 ml    Oral Fluid: 420 ml    dextrose 5% + sodium chloride 0.45% with potassium chloride 20 mEq/L: 350 ml    Total IN: 1514 ml  ---------------------------------------------  OUT:    Voided: 600 ml    Drain: 225 ml    Total OUT: 825 ml  ---------------------------------------------  Total NET: 689 ml    I & Os for current day (as of 06-09 @ 03:58)  =============================================  IN:    Total IN: 0 ml  ---------------------------------------------  OUT:    Voided: 700 ml    Drain: 130 ml    Colostomy: 1 ml    Total OUT: 831 ml  ---------------------------------------------  Total NET: -831 ml      06-08    136  |  98  |  57<H>  ----------------------------<  107<H>  4.7   |  22  |  1.48<H>    Ca    9.3      08 Jun 2017 08:34  Phos  4.1     06-08  Mg     2.4     06-08      Snyder catheter indication: Strict I&Os in setting of fluid resuscitation.  Meds: sodium chloride 0.9%. 1000milliLiter(s) IV Continuous @ 50ml/hr <Continuous>      HEMATOLOGIC  [] VTE Prophylaxis - history of HIT, to start Arixtra tomorrow                        10.2   5.22  )-----------( 191      ( 08 Jun 2017 08:31 )             32.5     PT/INR - ( 08 Jun 2017 09:03 )   PT: 12.3 sec;   INR: 1.09 ratio         PTT - ( 08 Jun 2017 09:03 )  PTT:33.6 sec  Transfusion: None     [ ] PRBC   [ ] Platelets   [ ] FFP   [ ] Cryoprecipitate    INFECTIOUS DISEASES  WBC Count: 5.22 K/uL (06-08 @ 08:31)    RECENT CULTURES: None    Meds: DAPTOmycin IVPB 450milliGRAM(s) IV Intermittent once  aztreonam  IVPB 1000milliGRAM(s) IV Intermittent once  aztreonam  IVPB 1000milliGRAM(s) IV Intermittent every 8 hours  metroNIDAZOLE  IVPB 500milliGRAM(s) IV Intermittent once  metroNIDAZOLE  IVPB 500milliGRAM(s) IV Intermittent every 8 hours  aztreonam  IVPB  IV Intermittent   metroNIDAZOLE  IVPB  IV Intermittent       ENDOCRINE  CAPILLARY BLOOD GLUCOSE  115 (08 Jun 2017 12:14)  110 (08 Jun 2017 05:30)        ACCESS DEVICES:  [ x] Peripheral IV  [ ] Central Venous Line	[ ] R	[ ] L	[ ] IJ	[ ] Fem	[ ] SC	Placed:   [ ] Arterial Line		[ ] R	[ ] L	[ ] Fem	[ ] Rad	[ ] Ax	Placed:   [x] PICC:					[ ] Mediport  [x ] Urinary Catheter  [x] Necessity of urinary, arterial, and venous catheters discussed          CODE STATUS: Full code      IMAGING: [facility] SICU Progress Note    HISTORY  76y Female with PMH of COPD, CHF, h/o DVT/PE s/p IVC filter (later removed), GERD, Hypothyroidism, h/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 Diff, and PSH of diverticulitis s/p Ruben's, recurrent SBO s/p SBR, ECF at small bowel anastomosis site s/p takedown, wound dehiscence, and abdominal reconstruction complicated by parastomal hernia and incisional hernia. She is currently hospitalized for recurrent SBO associated with intermittent abdominal pain and loss of ostomy function. She was managed conservatively but then was taken to the OR yesterday evening for unresolving SBO.     24 HOUR EVENTS: She is now s/p exploratory laparotomy, extensive lysis of adhesions, take down of ECF, repair of serosal tear on cecum, partial right salpingoophorectomy, colostomy revision, parastomal and incisional hernia repair with strattice mesh. She was in the OR for appromiately 12 hours. In: 4500ml crystalloid, 500ml TPN. Out: 300ml EBL, 400ml urine. She was transferred to the SICU postoperatively intubated, on phenylephrine gtt @ 0.2 mcg/kg/min.       SUBJECTIVE/ROS:   [ x]  Due to altered mental status/intubation, subjective information were not able to be obtained from the patient. History was obtained, to the extent possible, from review of the chart and collateral sources of information.    NEURO  RASS:  -4      CAM ICU: n/a  Exam: somnolent, unarousable  Meds: x    RESPIRATORY  RR: 18 (18 - 18)  SpO2: 98% (98% - 99%)  Wt(kg): --  Exam: clear to auscultation bilaterally  Mechanical Ventilation: Mode: AC/ CMV (Assist Control/ Continuous Mandatory Ventilation), RR (machine): 12, RR (patient): 12, TV (machine): 450, FiO2: 40, PEEP: 5, ITime: 1, MAP: 9, PIP: 25  ABG - ( 09 Jun 2017 03:50 )  pH: 7.20  /  pCO2: 48    /  pO2: 129   / HCO3: 18    / Base Excess: -9.1  /  SaO2: 98      Lactate: x        [x] Extubation Readiness Assessed - needs further resuscitation and time to wake up from anesthesia/sedation      CARDIOVASCULAR  HR: 116 (80 - 116)  BP: 138/74 (103/62 - 138/74)  BP(mean): --  ABP: --  ABP(mean): --  Wt(kg): --  CVP(cm H2O): --      Exam: sinus tachycardia   Cardiac Rhythm: sinus tachycardia  Perfusion     [x]Adequate   [ ]Inadequate  Mentation   [x]Normal       [ ]Reduced  Extremities  [x]Warm         [ ]Cool  I & Os for 24h ending 06-08 @ 07:00  =============================================  IN: 1514 ml / OUT: 825 ml / NET: 689 ml    Volume Status [ ]Hypervolemic [ ]Euvolemic [x ]Hypovolemic  Meds: phenylephrine    Infusion 0.5MICROgram(s)/kG/Min IV Continuous <Continuous>      GI/NUTRITION  Exam: softly distended, unable to assess tenderness, Provena vac to suction, ostomy pink and viable, no gas or stool in bag  Diet: NPO        GENITOURINARY  I&O's Detail  I & Os for 24h ending 06-08 @ 07:00  =============================================  IN:    TPN (Total Parenteral Nutrition): 744 ml    Oral Fluid: 420 ml    dextrose 5% + sodium chloride 0.45% with potassium chloride 20 mEq/L: 350 ml    Total IN: 1514 ml  ---------------------------------------------  OUT:    Voided: 600 ml    Drain: 225 ml    Total OUT: 825 ml  ---------------------------------------------  Total NET: 689 ml    I & Os for current day (as of 06-09 @ 03:58)  =============================================  IN:    Total IN: 0 ml  ---------------------------------------------  OUT:    Voided: 700 ml    Drain: 130 ml    Colostomy: 1 ml    Total OUT: 831 ml  ---------------------------------------------  Total NET: -831 ml      06-08    136  |  98  |  57<H>  ----------------------------<  107<H>  4.7   |  22  |  1.48<H>    Ca    9.3      08 Jun 2017 08:34  Phos  4.1     06-08  Mg     2.4     06-08      Snyder catheter indication: Strict I&Os in setting of fluid resuscitation.  Meds: lactated ringer's 1000milliLiter(s) IV Continuous @ 50ml/hr <Continuous>      HEMATOLOGIC  [] VTE Prophylaxis - history of HIT, to start fondaparinux tomorrow                        10.2   5.22  )-----------( 191      ( 08 Jun 2017 08:31 )             32.5     PT/INR - ( 08 Jun 2017 09:03 )   PT: 12.3 sec;   INR: 1.09 ratio         PTT - ( 08 Jun 2017 09:03 )  PTT:33.6 sec  Transfusion: None     [ ] PRBC   [ ] Platelets   [ ] FFP   [ ] Cryoprecipitate    INFECTIOUS DISEASES  WBC Count: 5.22 K/uL (06-08 @ 08:31)    RECENT CULTURES: None    Meds: DAPTOmycin IVPB 450milliGRAM(s) IV Intermittent once  aztreonam  IVPB 1000milliGRAM(s) IV Intermittent every 8 hours  metroNIDAZOLE  IVPB 500milliGRAM(s) IV Intermittent every 8 hours      ENDOCRINE:  CAPILLARY BLOOD GLUCOSE: WNL  115 (08 Jun 2017 12:14)  110 (08 Jun 2017 05:30)        ACCESS DEVICES:  [ x] Peripheral IV  [ ] Central Venous Line	[ ] R	[ ] L	[ ] IJ	[ ] Fem	[ ] SC	Placed:   [ ] Arterial Line		[ ] R	[ ] L	[ ] Fem	[ ] Rad	[ ] Ax	Placed:   [x] PICC: LUE double lumen PICC	[ ] Mediport  [x ] Urinary Catheter  [x] Necessity of urinary, arterial, and venous catheters discussed          CODE STATUS: Full code      IMAGING: [facility] SICU Progress Note    HISTORY  76y Female with PMH of COPD, CHF, h/o DVT/PE s/p IVC filter (later removed), GERD, Hypothyroidism, h/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 Diff, and PSH of diverticulitis s/p Ruben's, recurrent SBO s/p SBR, ECF at small bowel anastomosis site s/p takedown, wound dehiscence, and abdominal reconstruction complicated by parastomal hernia and incisional hernia. She is currently hospitalized for recurrent SBO associated with intermittent abdominal pain and loss of ostomy function. She was managed conservatively but then was taken to the OR yesterday evening for unresolving SBO.     24 HOUR EVENTS: She is now s/p exploratory laparotomy, extensive lysis of adhesions, take down of ECF, repair of serosal tear on cecum, partial right salpingoophorectomy, colostomy revision, parastomal and incisional hernia repair with strattice mesh. She was in the OR for appromiately 12 hours. In: 4500ml crystalloid, 500ml TPN. Out: 300ml EBL, 400ml urine. She was transferred to the SICU postoperatively intubated, on phenylephrine gtt @ 0.2 mcg/kg/min.       SUBJECTIVE/ROS:   [ x]  Due to altered mental status/intubation, subjective information were not able to be obtained from the patient. History was obtained, to the extent possible, from review of the chart and collateral sources of information.    NEURO  RASS:  -4      CAM ICU: n/a  Exam: somnolent, unarousable  Meds: x    RESPIRATORY  RR: 18 (18 - 18)  SpO2: 98% (98% - 99%)  Wt(kg): --  Exam: clear to auscultation bilaterally  Mechanical Ventilation: Mode: AC/ CMV (Assist Control/ Continuous Mandatory Ventilation), RR (machine): 12, RR (patient): 12, TV (machine): 450, FiO2: 40, PEEP: 5, ITime: 1, MAP: 9, PIP: 25  ABG - ( 09 Jun 2017 03:50 )  pH: 7.20  /  pCO2: 48    /  pO2: 129   / HCO3: 18    / Base Excess: -9.1  /  SaO2: 98      Lactate: x      Meds: Mukesh    [x] Extubation Readiness Assessed - needs further resuscitation and time to wake up from anesthesia/sedation      CARDIOVASCULAR  HR: 116 (80 - 116)  BP: 138/74 (103/62 - 138/74)  BP(mean): --  ABP: --  ABP(mean): --  Wt(kg): --  CVP(cm H2O): --      Exam: sinus tachycardia   Cardiac Rhythm: sinus tachycardia  Perfusion     [x]Adequate   [ ]Inadequate  Mentation   [x]Normal       [ ]Reduced  Extremities  [x]Warm         [ ]Cool  I & Os for 24h ending 06-08 @ 07:00  =============================================  IN: 1514 ml / OUT: 825 ml / NET: 689 ml    Volume Status [ ]Hypervolemic [ ]Euvolemic [x ]Hypovolemic  Meds: phenylephrine    Infusion 0.5MICROgram(s)/kG/Min IV Continuous <Continuous>      GI/NUTRITION  Exam: softly distended, unable to assess tenderness, Provena vac to suction, ostomy pink and viable, no gas or stool in bag  Diet: NPO  Meds: Protonix      GENITOURINARY  I&O's Detail  I & Os for 24h ending 06-08 @ 07:00  =============================================  IN:    TPN (Total Parenteral Nutrition): 744 ml    Oral Fluid: 420 ml    dextrose 5% + sodium chloride 0.45% with potassium chloride 20 mEq/L: 350 ml    Total IN: 1514 ml  ---------------------------------------------  OUT:    Voided: 600 ml    Drain: 225 ml    Total OUT: 825 ml  ---------------------------------------------  Total NET: 689 ml    I & Os for current day (as of 06-09 @ 03:58)  =============================================  IN:    Total IN: 0 ml  ---------------------------------------------  OUT:    Voided: 700 ml    Drain: 130 ml    Colostomy: 1 ml    Total OUT: 831 ml  ---------------------------------------------  Total NET: -831 ml      06-08    136  |  98  |  57<H>  ----------------------------<  107<H>  4.7   |  22  |  1.48<H>    Ca    9.3      08 Jun 2017 08:34  Phos  4.1     06-08  Mg     2.4     06-08      Snyder catheter indication: Strict I&Os in setting of fluid resuscitation.  Meds: lactated ringer's 1000milliLiter(s) IV Continuous @ 50ml/hr <Continuous>      HEMATOLOGIC  [] VTE Prophylaxis - history of HIT, to start fondaparinux tomorrow                        10.2   5.22  )-----------( 191      ( 08 Jun 2017 08:31 )             32.5     PT/INR - ( 08 Jun 2017 09:03 )   PT: 12.3 sec;   INR: 1.09 ratio         PTT - ( 08 Jun 2017 09:03 )  PTT:33.6 sec  Transfusion: None     [ ] PRBC   [ ] Platelets   [ ] FFP   [ ] Cryoprecipitate    INFECTIOUS DISEASES  WBC Count: 5.22 K/uL (06-08 @ 08:31)    RECENT CULTURES: None    Meds: DAPTOmycin IVPB 450milliGRAM(s) IV Intermittent once  aztreonam  IVPB 1000milliGRAM(s) IV Intermittent every 8 hours  metroNIDAZOLE  IVPB 500milliGRAM(s) IV Intermittent every 8 hours      ENDOCRINE:  CAPILLARY BLOOD GLUCOSE: WNL  115 (08 Jun 2017 12:14)  110 (08 Jun 2017 05:30)  Meds: Levothyroxine      ACCESS DEVICES:  [ x] Peripheral IV  [ ] Central Venous Line	[ ] R	[ ] L	[ ] IJ	[ ] Fem	[ ] SC	Placed:   [ ] Arterial Line		[ ] R	[ ] L	[ ] Fem	[ ] Rad	[ ] Ax	Placed:   [x] PICC: LUE double lumen PICC	[ ] Mediport  [x ] Urinary Catheter  [x] Necessity of urinary, arterial, and venous catheters discussed          CODE STATUS: Full code      IMAGING: CXR [facility] SICU Progress Note    HISTORY  76y Female with PMH of COPD, CHF, h/o DVT/PE s/p IVC filter (later removed), GERD, Hypothyroidism, h/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 Diff, and PSH of diverticulitis s/p Ruben's, recurrent SBO s/p SBR, ECF at small bowel anastomosis site s/p takedown, wound dehiscence, and abdominal reconstruction complicated by parastomal hernia and incisional hernia. She is currently hospitalized for recurrent SBO associated with intermittent abdominal pain and loss of ostomy function. She was managed conservatively but then was taken to the OR yesterday evening for unresolving SBO.     24 HOUR EVENTS: She is now POD#0 s/p exploratory laparotomy, extensive lysis of adhesions, take down of ECF, repair of serosal tear on cecum, partial right salpingoophorectomy, colostomy revision, parastomal and incisional hernia repair with strattice mesh. She was in the OR for appromiately 12 hours. In: 4500ml crystalloid, 500ml TPN. Out: 300ml EBL, 400ml urine. She was transferred to the SICU postoperatively intubated, on phenylephrine gtt @ 0.2 mcg/kg/min.       SUBJECTIVE/ROS:   [ x]  Due to altered mental status/intubation, subjective information were not able to be obtained from the patient. History was obtained, to the extent possible, from review of the chart and collateral sources of information.    NEURO  RASS:  -4      CAM ICU: n/a  Exam: somnolent, unarousable  Meds: x    RESPIRATORY  RR: 18 (18 - 18)  SpO2: 98% (98% - 99%)  Wt(kg): --  Exam: clear to auscultation bilaterally  Mechanical Ventilation: Mode: AC/ CMV (Assist Control/ Continuous Mandatory Ventilation), RR (machine): 12, RR (patient): 12, TV (machine): 450, FiO2: 40, PEEP: 5, ITime: 1, MAP: 9, PIP: 25  ABG - ( 09 Jun 2017 03:50 )  pH: 7.20  /  pCO2: 48    /  pO2: 129   / HCO3: 18    / Base Excess: -9.1  /  SaO2: 98      Lactate: x      Meds: Duvicki    [x] Extubation Readiness Assessed - needs further resuscitation and time to wake up from anesthesia/sedation      CARDIOVASCULAR  HR: 116 (80 - 116)  BP: 138/74 (103/62 - 138/74)  BP(mean): --  ABP: --  ABP(mean): --  Wt(kg): --  CVP(cm H2O): --      Exam: sinus tachycardia   Cardiac Rhythm: sinus tachycardia  Perfusion     [x]Adequate   [ ]Inadequate  Mentation   [x]Normal       [ ]Reduced  Extremities  [x]Warm         [ ]Cool  I & Os for 24h ending 06-08 @ 07:00  =============================================  IN: 1514 ml / OUT: 825 ml / NET: 689 ml    Volume Status [ ]Hypervolemic [ ]Euvolemic [x ]Hypovolemic  Meds: phenylephrine    Infusion 0.5MICROgram(s)/kG/Min IV Continuous <Continuous>      GI/NUTRITION  Exam: softly distended, unable to assess tenderness, Provena vac to suction, ostomy pink and viable, no gas or stool in bag  Diet: NPO  Meds: Protonix      GENITOURINARY  I&O's Detail  I & Os for 24h ending 06-08 @ 07:00  =============================================  IN:    TPN (Total Parenteral Nutrition): 744 ml    Oral Fluid: 420 ml    dextrose 5% + sodium chloride 0.45% with potassium chloride 20 mEq/L: 350 ml    Total IN: 1514 ml  ---------------------------------------------  OUT:    Voided: 600 ml    Drain: 225 ml    Total OUT: 825 ml  ---------------------------------------------  Total NET: 689 ml    I & Os for current day (as of 06-09 @ 03:58)  =============================================  IN:    Total IN: 0 ml  ---------------------------------------------  OUT:    Voided: 700 ml    Drain: 130 ml    Colostomy: 1 ml    Total OUT: 831 ml  ---------------------------------------------  Total NET: -831 ml      06-08    136  |  98  |  57<H>  ----------------------------<  107<H>  4.7   |  22  |  1.48<H>    Ca    9.3      08 Jun 2017 08:34  Phos  4.1     06-08  Mg     2.4     06-08      Snyder catheter indication: Strict I&Os in setting of fluid resuscitation.  Meds: lactated ringer's 1000milliLiter(s) IV Continuous @ 50ml/hr <Continuous>      HEMATOLOGIC  [] VTE Prophylaxis - history of HIT, to start fondaparinux tomorrow                        10.2   5.22  )-----------( 191      ( 08 Jun 2017 08:31 )             32.5     PT/INR - ( 08 Jun 2017 09:03 )   PT: 12.3 sec;   INR: 1.09 ratio         PTT - ( 08 Jun 2017 09:03 )  PTT:33.6 sec  Transfusion: None     [ ] PRBC   [ ] Platelets   [ ] FFP   [ ] Cryoprecipitate    INFECTIOUS DISEASES  WBC Count: 5.22 K/uL (06-08 @ 08:31)    RECENT CULTURES: None    Meds: DAPTOmycin IVPB 450milliGRAM(s) IV Intermittent once  aztreonam  IVPB 1000milliGRAM(s) IV Intermittent every 8 hours  metroNIDAZOLE  IVPB 500milliGRAM(s) IV Intermittent every 8 hours      ENDOCRINE:  CAPILLARY BLOOD GLUCOSE: WNL  115 (08 Jun 2017 12:14)  110 (08 Jun 2017 05:30)  Meds: Levothyroxine      ACCESS DEVICES:  [ x] Peripheral IV  [ ] Central Venous Line	[ ] R	[ ] L	[ ] IJ	[ ] Fem	[ ] SC	Placed:   [ ] Arterial Line		[ ] R	[ ] L	[ ] Fem	[ ] Rad	[ ] Ax	Placed:   [x] PICC: LUE double lumen PICC	[ ] Mediport  [x ] Urinary Catheter  [x] Necessity of urinary, arterial, and venous catheters discussed          CODE STATUS: Full code      IMAGING: CXR [facility] SICU Progress Note    HISTORY  76y Female with PMH of COPD, CHF, h/o DVT/PE s/p IVC filter (later removed), GERD, Hypothyroidism, h/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 Diff, and PSH of diverticulitis s/p Ruben's, recurrent SBO s/p SBR, ECF at small bowel anastomosis site s/p takedown, wound dehiscence, and abdominal reconstruction complicated by parastomal hernia and incisional hernia. She is currently hospitalized for recurrent SBO associated with intermittent abdominal pain and loss of ostomy function. She was managed conservatively but then was taken to the OR yesterday evening for unresolving SBO.     24 HOUR EVENTS: She is now POD#0 s/p exploratory laparotomy, extensive lysis of adhesions, take down of ECF, repair of serosal tear on cecum, partial right salpingoophorectomy, colostomy revision, parastomal and incisional hernia repair with strattice mesh. She was in the OR for appromiately 12 hours. In: 4500ml crystalloid, 500ml TPN. Out: 300ml EBL, 400ml urine. She was transferred to the SICU postoperatively intubated, on phenylephrine gtt @ 0.2 mcg/kg/min.       SUBJECTIVE/ROS:   [ x]  Due to altered mental status/intubation, subjective information were not able to be obtained from the patient. History was obtained, to the extent possible, from review of the chart and collateral sources of information.    NEURO  RASS:  -4      CAM ICU: n/a  Exam: somnolent, unarousable  Meds: x    RESPIRATORY  RR: 18 (18 - 18)  SpO2: 98% (98% - 99%)  Wt(kg): --  Exam: clear to auscultation bilaterally  Mechanical Ventilation: Mode: AC/ CMV (Assist Control/ Continuous Mandatory Ventilation), RR (machine): 12, RR (patient): 12, TV (machine): 450, FiO2: 40, PEEP: 5, ITime: 1, MAP: 9, PIP: 25  ABG - ( 09 Jun 2017 03:50 )  pH: 7.20  /  pCO2: 48    /  pO2: 129   / HCO3: 18    / Base Excess: -9.1  /  SaO2: 98      Lactate: x      Meds: Duvicki    [x] Extubation Readiness Assessed - needs further resuscitation and time to wake up from anesthesia/sedation      CARDIOVASCULAR  HR: 116 (80 - 116)  BP: 138/74 (103/62 - 138/74)  BP(mean): --  ABP: --  ABP(mean): --  Wt(kg): --  CVP(cm H2O): --      Exam: sinus tachycardia   Cardiac Rhythm: sinus tachycardia  Perfusion     [x]Adequate   [ ]Inadequate  Mentation   [x]Normal       [ ]Reduced  Extremities  [x]Warm         [ ]Cool  I & Os for 24h ending 06-08 @ 07:00  =============================================  IN: 1514 ml / OUT: 825 ml / NET: 689 ml    Volume Status [ ]Hypervolemic [ ]Euvolemic [x ]Hypovolemic  Meds: phenylephrine    Infusion 0.5MICROgram(s)/kG/Min IV Continuous <Continuous>      GI/NUTRITION  Exam: softly distended, unable to assess tenderness, Provena vac to suction, ostomy pink and viable, no gas or stool in bag  Diet: NPO  Meds: Protonix      GENITOURINARY  I&O's Detail    I & Os for current day (as of 06-09 @ 07:14)  =============================================  IN:    Lactated Ringers IV Bolus: 1000 ml    TPN (Total Parenteral Nutrition): 248 ml    lactated ringers.: 150 ml    Solution: 100 ml    Solution: 100 ml    sodium chloride 0.9%: 50 ml    Solution: 50 ml    Solution: 50 ml    phenylephrine   Infusion: 11.2 ml    fentaNYL  Infusion: 3.7 ml    Total IN: 1762.9 ml  ---------------------------------------------  OUT:    Voided: 700 ml    Drain: 180 ml    Indwelling Catheter - Urethral: 130 ml    Nasoenteral Tube: 70 ml    Colostomy: 1 ml    Total OUT: 1081 ml  ---------------------------------------------  Total NET: 681.9 ml          06-08    136  |  98  |  57<H>  ----------------------------<  107<H>  4.7   |  22  |  1.48<H>    Ca    9.3      08 Jun 2017 08:34  Phos  4.1     06-08  Mg     2.4     06-08      Snyder catheter indication: Strict I&Os in setting of fluid resuscitation.  Meds: lactated ringer's 1000milliLiter(s) IV Continuous @ 50ml/hr <Continuous>      HEMATOLOGIC  [] VTE Prophylaxis - history of HIT, to start fondaparinux tomorrow                        10.2   5.22  )-----------( 191      ( 08 Jun 2017 08:31 )             32.5     PT/INR - ( 08 Jun 2017 09:03 )   PT: 12.3 sec;   INR: 1.09 ratio         PTT - ( 08 Jun 2017 09:03 )  PTT:33.6 sec  Transfusion: None     [ ] PRBC   [ ] Platelets   [ ] FFP   [ ] Cryoprecipitate    INFECTIOUS DISEASES  WBC Count: 5.22 K/uL (06-08 @ 08:31)    RECENT CULTURES: None    Meds: DAPTOmycin IVPB 450milliGRAM(s) IV Intermittent once  aztreonam  IVPB 1000milliGRAM(s) IV Intermittent every 8 hours  metroNIDAZOLE  IVPB 500milliGRAM(s) IV Intermittent every 8 hours      ENDOCRINE:  CAPILLARY BLOOD GLUCOSE: WNL  115 (08 Jun 2017 12:14)  110 (08 Jun 2017 05:30)  Meds: Levothyroxine      ACCESS DEVICES:  [ x] Peripheral IV  [ ] Central Venous Line	[ ] R	[ ] L	[ ] IJ	[ ] Fem	[ ] SC	Placed:   [ ] Arterial Line		[ ] R	[ ] L	[ ] Fem	[ ] Rad	[ ] Ax	Placed:   [x] PICC: LUE double lumen PICC	[ ] Mediport  [x ] Urinary Catheter  [x] Necessity of urinary, arterial, and venous catheters discussed          CODE STATUS: Full code      IMAGING: CXR

## 2017-06-10 DIAGNOSIS — E87.5 HYPERKALEMIA: ICD-10-CM

## 2017-06-10 LAB
ANION GAP SERPL CALC-SCNC: 11 MMOL/L — SIGNIFICANT CHANGE UP (ref 5–17)
ANION GAP SERPL CALC-SCNC: 12 MMOL/L — SIGNIFICANT CHANGE UP (ref 5–17)
APPEARANCE UR: ABNORMAL
BILIRUB UR-MCNC: NEGATIVE — SIGNIFICANT CHANGE UP
BUN SERPL-MCNC: 66 MG/DL — HIGH (ref 7–23)
BUN SERPL-MCNC: 67 MG/DL — HIGH (ref 7–23)
CALCIUM SERPL-MCNC: 8.4 MG/DL — SIGNIFICANT CHANGE UP (ref 8.4–10.5)
CALCIUM SERPL-MCNC: 8.6 MG/DL — SIGNIFICANT CHANGE UP (ref 8.4–10.5)
CHLORIDE SERPL-SCNC: 110 MMOL/L — HIGH (ref 96–108)
CHLORIDE SERPL-SCNC: 112 MMOL/L — HIGH (ref 96–108)
CO2 SERPL-SCNC: 17 MMOL/L — LOW (ref 22–31)
CO2 SERPL-SCNC: 18 MMOL/L — LOW (ref 22–31)
COLOR SPEC: YELLOW — SIGNIFICANT CHANGE UP
CREAT SERPL-MCNC: 1.83 MG/DL — HIGH (ref 0.5–1.3)
CREAT SERPL-MCNC: 1.86 MG/DL — HIGH (ref 0.5–1.3)
DIFF PNL FLD: ABNORMAL
GAS PNL BLDA: SIGNIFICANT CHANGE UP
GAS PNL BLDA: SIGNIFICANT CHANGE UP
GLUCOSE SERPL-MCNC: 150 MG/DL — HIGH (ref 70–99)
GLUCOSE SERPL-MCNC: 156 MG/DL — HIGH (ref 70–99)
GLUCOSE UR QL: NEGATIVE — SIGNIFICANT CHANGE UP
HCT VFR BLD CALC: 23.9 % — LOW (ref 34.5–45)
HCT VFR BLD CALC: 24.5 % — LOW (ref 34.5–45)
HCT VFR BLD CALC: 27.6 % — LOW (ref 34.5–45)
HGB BLD-MCNC: 7.5 G/DL — LOW (ref 11.5–15.5)
HGB BLD-MCNC: 8 G/DL — LOW (ref 11.5–15.5)
HGB BLD-MCNC: 9 G/DL — LOW (ref 11.5–15.5)
KETONES UR-MCNC: NEGATIVE — SIGNIFICANT CHANGE UP
LEUKOCYTE ESTERASE UR-ACNC: ABNORMAL
MAGNESIUM SERPL-MCNC: 1.8 MG/DL — SIGNIFICANT CHANGE UP (ref 1.6–2.6)
MAGNESIUM SERPL-MCNC: 2.4 MG/DL — SIGNIFICANT CHANGE UP (ref 1.6–2.6)
MCHC RBC-ENTMCNC: 31.5 GM/DL — LOW (ref 32–36)
MCHC RBC-ENTMCNC: 32.5 GM/DL — SIGNIFICANT CHANGE UP (ref 32–36)
MCHC RBC-ENTMCNC: 32.6 GM/DL — SIGNIFICANT CHANGE UP (ref 32–36)
MCHC RBC-ENTMCNC: 33.2 PG — SIGNIFICANT CHANGE UP (ref 27–34)
MCHC RBC-ENTMCNC: 34 PG — SIGNIFICANT CHANGE UP (ref 27–34)
MCHC RBC-ENTMCNC: 34.2 PG — HIGH (ref 27–34)
MCV RBC AUTO: 105 FL — HIGH (ref 80–100)
MCV RBC AUTO: 105 FL — HIGH (ref 80–100)
MCV RBC AUTO: 106 FL — HIGH (ref 80–100)
NITRITE UR-MCNC: NEGATIVE — SIGNIFICANT CHANGE UP
PH UR: 5.5 — SIGNIFICANT CHANGE UP (ref 5–8)
PHOSPHATE SERPL-MCNC: 4 MG/DL — SIGNIFICANT CHANGE UP (ref 2.5–4.5)
PHOSPHATE SERPL-MCNC: 4.3 MG/DL — SIGNIFICANT CHANGE UP (ref 2.5–4.5)
PLATELET # BLD AUTO: 105 K/UL — LOW (ref 150–400)
PLATELET # BLD AUTO: 109 K/UL — LOW (ref 150–400)
PLATELET # BLD AUTO: 109 K/UL — LOW (ref 150–400)
POTASSIUM SERPL-MCNC: 5.1 MMOL/L — SIGNIFICANT CHANGE UP (ref 3.5–5.3)
POTASSIUM SERPL-MCNC: 5.7 MMOL/L — HIGH (ref 3.5–5.3)
POTASSIUM SERPL-SCNC: 5.1 MMOL/L — SIGNIFICANT CHANGE UP (ref 3.5–5.3)
POTASSIUM SERPL-SCNC: 5.7 MMOL/L — HIGH (ref 3.5–5.3)
PROCALCITONIN SERPL-MCNC: 52.16 NG/ML — HIGH (ref 0–0.04)
PROT UR-MCNC: SIGNIFICANT CHANGE UP
RBC # BLD: 2.26 M/UL — LOW (ref 3.8–5.2)
RBC # BLD: 2.35 M/UL — LOW (ref 3.8–5.2)
RBC # BLD: 2.64 M/UL — LOW (ref 3.8–5.2)
RBC # FLD: 13.4 % — SIGNIFICANT CHANGE UP (ref 10.3–14.5)
RBC # FLD: 13.4 % — SIGNIFICANT CHANGE UP (ref 10.3–14.5)
RBC # FLD: 13.6 % — SIGNIFICANT CHANGE UP (ref 10.3–14.5)
SODIUM SERPL-SCNC: 139 MMOL/L — SIGNIFICANT CHANGE UP (ref 135–145)
SODIUM SERPL-SCNC: 141 MMOL/L — SIGNIFICANT CHANGE UP (ref 135–145)
SP GR SPEC: 1.02 — SIGNIFICANT CHANGE UP (ref 1.01–1.02)
UROBILINOGEN FLD QL: NEGATIVE — SIGNIFICANT CHANGE UP
WBC # BLD: 7.2 K/UL — SIGNIFICANT CHANGE UP (ref 3.8–10.5)
WBC # BLD: 7.5 K/UL — SIGNIFICANT CHANGE UP (ref 3.8–10.5)
WBC # BLD: 9.2 K/UL — SIGNIFICANT CHANGE UP (ref 3.8–10.5)
WBC # FLD AUTO: 7.2 K/UL — SIGNIFICANT CHANGE UP (ref 3.8–10.5)
WBC # FLD AUTO: 7.5 K/UL — SIGNIFICANT CHANGE UP (ref 3.8–10.5)
WBC # FLD AUTO: 9.2 K/UL — SIGNIFICANT CHANGE UP (ref 3.8–10.5)

## 2017-06-10 PROCEDURE — 99291 CRITICAL CARE FIRST HOUR: CPT

## 2017-06-10 PROCEDURE — 71010: CPT | Mod: 26

## 2017-06-10 RX ORDER — HYDROMORPHONE HYDROCHLORIDE 2 MG/ML
0.5 INJECTION INTRAMUSCULAR; INTRAVENOUS; SUBCUTANEOUS ONCE
Qty: 0 | Refills: 0 | Status: DISCONTINUED | OUTPATIENT
Start: 2017-06-10 | End: 2017-06-10

## 2017-06-10 RX ORDER — ACETAMINOPHEN 500 MG
1000 TABLET ORAL ONCE
Qty: 0 | Refills: 0 | Status: COMPLETED | OUTPATIENT
Start: 2017-06-10 | End: 2017-06-10

## 2017-06-10 RX ORDER — METRONIDAZOLE 500 MG
500 TABLET ORAL EVERY 8 HOURS
Qty: 0 | Refills: 0 | Status: DISCONTINUED | OUTPATIENT
Start: 2017-06-10 | End: 2017-06-20

## 2017-06-10 RX ORDER — SODIUM CHLORIDE 9 MG/ML
500 INJECTION, SOLUTION INTRAVENOUS ONCE
Qty: 0 | Refills: 0 | Status: COMPLETED | OUTPATIENT
Start: 2017-06-10 | End: 2017-06-10

## 2017-06-10 RX ORDER — NOREPINEPHRINE BITARTRATE/D5W 8 MG/250ML
0.02 PLASTIC BAG, INJECTION (ML) INTRAVENOUS
Qty: 8 | Refills: 0 | Status: DISCONTINUED | OUTPATIENT
Start: 2017-06-10 | End: 2017-06-12

## 2017-06-10 RX ORDER — PHENYLEPHRINE HYDROCHLORIDE 10 MG/ML
0.5 INJECTION INTRAVENOUS
Qty: 80 | Refills: 0 | Status: DISCONTINUED | OUTPATIENT
Start: 2017-06-10 | End: 2017-06-10

## 2017-06-10 RX ORDER — ALBUMIN HUMAN 25 %
250 VIAL (ML) INTRAVENOUS ONCE
Qty: 0 | Refills: 0 | Status: COMPLETED | OUTPATIENT
Start: 2017-06-10 | End: 2017-06-10

## 2017-06-10 RX ORDER — ACETAMINOPHEN 500 MG
1000 TABLET ORAL ONCE
Qty: 0 | Refills: 0 | Status: COMPLETED | OUTPATIENT
Start: 2017-06-11 | End: 2017-06-11

## 2017-06-10 RX ORDER — VASOPRESSIN 20 [USP'U]/ML
0.02 INJECTION INTRAVENOUS
Qty: 100 | Refills: 0 | Status: DISCONTINUED | OUTPATIENT
Start: 2017-06-10 | End: 2017-06-12

## 2017-06-10 RX ORDER — AZTREONAM 2 G
1000 VIAL (EA) INJECTION EVERY 8 HOURS
Qty: 0 | Refills: 0 | Status: DISCONTINUED | OUTPATIENT
Start: 2017-06-10 | End: 2017-06-20

## 2017-06-10 RX ORDER — MAGNESIUM SULFATE 500 MG/ML
2 VIAL (ML) INJECTION ONCE
Qty: 0 | Refills: 0 | Status: COMPLETED | OUTPATIENT
Start: 2017-06-10 | End: 2017-06-10

## 2017-06-10 RX ADMIN — Medication 50 MILLIGRAM(S): at 21:30

## 2017-06-10 RX ADMIN — Medication 3 MILLILITER(S): at 23:44

## 2017-06-10 RX ADMIN — HYDROMORPHONE HYDROCHLORIDE 0.5 MILLIGRAM(S): 2 INJECTION INTRAMUSCULAR; INTRAVENOUS; SUBCUTANEOUS at 20:22

## 2017-06-10 RX ADMIN — Medication 3 MILLILITER(S): at 05:11

## 2017-06-10 RX ADMIN — Medication 100 MILLIGRAM(S): at 23:04

## 2017-06-10 RX ADMIN — SODIUM CHLORIDE 2000 MILLILITER(S): 9 INJECTION, SOLUTION INTRAVENOUS at 05:51

## 2017-06-10 RX ADMIN — Medication 1000 MILLIGRAM(S): at 13:55

## 2017-06-10 RX ADMIN — PANTOPRAZOLE SODIUM 40 MILLIGRAM(S): 20 TABLET, DELAYED RELEASE ORAL at 13:39

## 2017-06-10 RX ADMIN — Medication 1000 MILLIGRAM(S): at 09:15

## 2017-06-10 RX ADMIN — Medication 100 MILLIGRAM(S): at 05:18

## 2017-06-10 RX ADMIN — Medication 400 MILLIGRAM(S): at 01:04

## 2017-06-10 RX ADMIN — VASOPRESSIN 1.2 UNIT(S)/MIN: 20 INJECTION INTRAVENOUS at 23:05

## 2017-06-10 RX ADMIN — SODIUM CHLORIDE 100 MILLILITER(S): 9 INJECTION, SOLUTION INTRAVENOUS at 23:04

## 2017-06-10 RX ADMIN — Medication 400 MILLIGRAM(S): at 08:59

## 2017-06-10 RX ADMIN — HYDROMORPHONE HYDROCHLORIDE 0.5 MILLIGRAM(S): 2 INJECTION INTRAMUSCULAR; INTRAVENOUS; SUBCUTANEOUS at 20:10

## 2017-06-10 RX ADMIN — Medication 125 MILLILITER(S): at 23:23

## 2017-06-10 RX ADMIN — Medication 50 MILLIGRAM(S): at 13:39

## 2017-06-10 RX ADMIN — HYDROMORPHONE HYDROCHLORIDE 0.5 MILLIGRAM(S): 2 INJECTION INTRAMUSCULAR; INTRAVENOUS; SUBCUTANEOUS at 21:40

## 2017-06-10 RX ADMIN — PHENYLEPHRINE HYDROCHLORIDE 13.95 MICROGRAM(S)/KG/MIN: 10 INJECTION INTRAVENOUS at 09:00

## 2017-06-10 RX ADMIN — FONDAPARINUX SODIUM 2.5 MILLIGRAM(S): 2.5 INJECTION, SOLUTION SUBCUTANEOUS at 14:59

## 2017-06-10 RX ADMIN — Medication 400 MILLIGRAM(S): at 13:40

## 2017-06-10 RX ADMIN — SODIUM CHLORIDE 100 MILLILITER(S): 9 INJECTION, SOLUTION INTRAVENOUS at 09:00

## 2017-06-10 RX ADMIN — Medication 400 MILLIGRAM(S): at 18:41

## 2017-06-10 RX ADMIN — HYDROMORPHONE HYDROCHLORIDE 0.5 MILLIGRAM(S): 2 INJECTION INTRAMUSCULAR; INTRAVENOUS; SUBCUTANEOUS at 22:00

## 2017-06-10 RX ADMIN — MORPHINE SULFATE 30 MILLILITER(S): 50 CAPSULE, EXTENDED RELEASE ORAL at 07:14

## 2017-06-10 RX ADMIN — Medication 400 MILLIGRAM(S): at 06:33

## 2017-06-10 RX ADMIN — HYDROMORPHONE HYDROCHLORIDE 0.5 MILLIGRAM(S): 2 INJECTION INTRAMUSCULAR; INTRAVENOUS; SUBCUTANEOUS at 20:25

## 2017-06-10 RX ADMIN — HYDROMORPHONE HYDROCHLORIDE 0.5 MILLIGRAM(S): 2 INJECTION INTRAMUSCULAR; INTRAVENOUS; SUBCUTANEOUS at 20:40

## 2017-06-10 RX ADMIN — Medication 100 MILLIGRAM(S): at 13:39

## 2017-06-10 RX ADMIN — PHENYLEPHRINE HYDROCHLORIDE 13.95 MICROGRAM(S)/KG/MIN: 10 INJECTION INTRAVENOUS at 07:17

## 2017-06-10 RX ADMIN — Medication 50 GRAM(S): at 05:18

## 2017-06-10 RX ADMIN — Medication 1000 MILLIGRAM(S): at 18:55

## 2017-06-10 RX ADMIN — Medication 1000 MILLIGRAM(S): at 07:00

## 2017-06-10 RX ADMIN — Medication 50 MILLIGRAM(S): at 05:18

## 2017-06-10 RX ADMIN — Medication 3 MILLILITER(S): at 11:23

## 2017-06-10 RX ADMIN — SODIUM CHLORIDE 2000 MILLILITER(S): 9 INJECTION, SOLUTION INTRAVENOUS at 05:36

## 2017-06-10 RX ADMIN — Medication 44 MICROGRAM(S): at 05:35

## 2017-06-10 RX ADMIN — Medication 1000 MILLIGRAM(S): at 01:34

## 2017-06-10 NOTE — PROGRESS NOTE ADULT - ASSESSMENT
77 yo F SBO w/ high-volume ECF, s/p endoscopy     -OR today  -trend labs  -NPO/IVF/TPN 77 yo F SBO w/ high-volume ECF, s/p endoscopy     -trend labs  -NPO/IVF/TPN  -c/w antibiotics aztreonam and metronidazole  -Continue home levothyroxine  h/o HIT. Continue fondaparinux for DVT PPx

## 2017-06-10 NOTE — PROGRESS NOTE ADULT - ASSESSMENT
ASSESSMENT: 76y Female with PMH of COPD, CHF, h/o DVT/PE s/p IVC filter (later removed), GERD, Hypothyroidism, h/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 Diff, and PSH of diverticulitis s/p Ruben's, recurrent SBO s/p SBR, ECF at small bowel anastomosis site s/p takedown, wound dehiscence, and abdominal reconstruction complicated by parastomal hernia and incisional hernia with unresolving SBO now POD#1 s/p exploratory laparotomy, extensive lysis of adhesions, take down of ECF, repair of serosal tear on cecum, partial right salpingoophorectomy, colostomy revision, parastomal and incisional hernia repair with strattice mesh.    PLAN:   Neurologic: Continue morphine PCA, tylenol PRN, fentanyl patch for pain control    Respiratory: Continue Duonebs, tolerating nasal cannula    Cardiovascular: Hypotension likely secondary to sepsis and hypovolemia. IVF resuscitate. Wean phenylephrine gtt as tolerated.    Gastrointestinal: S/p extensive abdominal surgery. NPO with NGT to low wall suction. GERD. Protonix daily.    Genitourinary/Renal: CKD stage 3. Monitor I&Os. Trend BUN/Cr. Replete electrolytes as needed. LR @ 50.    Hematologic: h/o HIT. Continue fondaparinux for DVT PPx    Infectious Disease: Sepsis. Treat with broad spectrum antibiotics aztreonam and metronidazole x24 hours.  Monitor for fevers and trend WBC count.    Endocrine: Hypothyroidism. Continue home levothyroxine. Monitor glucose on BMP. ASSESSMENT: 76y Female with PMH of COPD, CHF, h/o DVT/PE s/p IVC filter (later removed), GERD, Hypothyroidism, h/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 Diff, and PSH of diverticulitis s/p Ruben's, recurrent SBO s/p SBR, ECF at small bowel anastomosis site s/p takedown, wound dehiscence, and abdominal reconstruction complicated by parastomal hernia and incisional hernia with unresolving SBO now POD#1 s/p exploratory laparotomy, extensive lysis of adhesions, take down of ECF, repair of serosal tear on cecum, partial right salpingoophorectomy, colostomy revision, parastomal and incisional hernia repair with strattice mesh.    PLAN:   Neurologic: Chronic Pain - Holding morphine PCA this morning secondary to hypotension, tylenol PRN, fentanyl patch for pain control    Respiratory: Continue Duonebs, tolerating nasal cannula    Cardiovascular: Hypotension likely secondary to sepsis and hypovolemia. IVF resuscitate. Start NE gtt today d/c phenylephrine. Will get Echo today.    Gastrointestinal: S/p extensive abdominal surgery. NPO with NGT to low wall suction. GERD. Protonix daily.    Genitourinary/Renal: TREVA with CKD stage 3. Monitor I&Os. Trend BUN/Cr. Replete electrolytes as needed. LR @ 100.    Hematologic: h/o HIT. Continue fondaparinux for DVT PPx    Infectious Disease: Sepsis. Treat with broad spectrum antibiotics aztreonam and metronidazole x24 hours.  Monitor for fevers and trend WBC count.    Endocrine: Hypothyroidism. Continue home levothyroxine. Monitor glucose on BMP.

## 2017-06-10 NOTE — PROGRESS NOTE ADULT - SUBJECTIVE AND OBJECTIVE BOX
GENERAL SURGERY DAILY PROGRESS NOTE:       Subjective:  denies n/v/f/c, denies SOB or CP     Objective:  General: NAD  HEENT: WNL  Lymph nodes: Non-tender, no palpable masses  Neck: No masses  Cardiovascular: Regular rate and rhythm; normal S1, S2; no murmurs, rubs, or gallops  Lungs: Lungs clear to auscultation bilaterally; No wheezes or crackles    Abdominal:  -Abdomen soft and non-distended  -No pulsatile masses, no abdominal bruits ascultated  -Non-Tender; No reflex tenderness; No guarding;     MEDICATIONS  (STANDING):  lactated ringers. 1000milliLiter(s) IV Continuous <Continuous>  levothyroxine Injectable 44MICROGram(s) IV Push daily  pantoprazole  Injectable 40milliGRAM(s) IV Push daily  ALBUTerol/ipratropium for Nebulization 3milliLiter(s) Nebulizer every 6 hours  fondaparinux Injectable 2.5milliGRAM(s) SubCutaneous daily  fentaNYL   Patch  50 MICROgram(s)/Hr. 1Patch Transdermal every 48 hours  phenylephrine    Infusion 0.5MICROgram(s)/kG/Min IV Continuous <Continuous>  acetaminophen  IVPB. 1000milliGRAM(s) IV Intermittent once  acetaminophen  IVPB. 1000milliGRAM(s) IV Intermittent once  norepinephrine Infusion 0.02MICROgram(s)/kG/Min IV Continuous <Continuous>  metroNIDAZOLE  IVPB 500milliGRAM(s) IV Intermittent every 8 hours  aztreonam  IVPB 1000milliGRAM(s) IV Intermittent every 8 hours    MEDICATIONS  (PRN):  morphine PCA (5 mG/mL) Rescue Clinician Bolus 3milliGRAM(s) IV Push every 15 minutes PRN for Pain Scale GREATER THAN 6  naloxone Injectable 0.1milliGRAM(s) IV Push every 3 minutes PRN For ANY of the following changes in patient status:  A. RR LESS THAN 10 breaths per minute, B. Oxygen saturation LESS THAN 90%, C. Sedation score of 6  ondansetron Injectable 4milliGRAM(s) IV Push every 6 hours PRN Nausea  diphenhydrAMINE   Injectable 25milliGRAM(s) IV Push every 4 hours PRN Pruritus      Vital Signs Last 24 Hrs  T(C): 36.6, Max: 36.7 ( @ 11:00)  T(F): 97.9, Max: 98.1 (-09 @ 11:00)  HR: 110 (101 - 123)  BP: 104/49 (76/35 - 136/63)  BP(mean): 71 (50 - 90)  RR: 19 (9 - 24)  SpO2: 100% (93% - 100%)    I&O's Detail  I & Os for 24h ending 10 Anastacio 2017 07:00  =============================================  IN:    lactated ringers.: 2100 ml    TPN (Total Parenteral Nutrition): 1488 ml    Lactated Ringers IV Bolus: 1000 ml    Solution: 300 ml    Solution: 300 ml    Solution: 200 ml    Solution: 100 ml    Solution: 50 ml    Total IN: 5538 ml  ---------------------------------------------  OUT:    Indwelling Catheter - Urethral: 770 ml    Nasoenteral Tube: 450 ml    Drain: 160 ml    Total OUT: 1380 ml  ---------------------------------------------  Total NET: 4158 ml    I & Os for current day (as of 10 Anastacio 2017 10:41)  =============================================  IN:    lactated ringers.: 300 ml    TPN (Total Parenteral Nutrition): 186 ml    Solution: 100 ml    phenylephrine   Infusion: 55.7 ml    Total IN: 641.7 ml  ---------------------------------------------  OUT:    Indwelling Catheter - Urethral: 125 ml    Total OUT: 125 ml  ---------------------------------------------  Total NET: 516.7 ml      Daily     Daily Weight in k.8 (10 Anastacio 2017 01:00)    LABS:                        8.0    7.2   )-----------( 109      ( 10 Anastacio 2017 02:43 )             24.5     06-10    141  |  112<H>  |  66<H>  ----------------------------<  156<H>  5.7<H>   |  18<L>  |  1.83<H>    Ca    8.4      10 Anastacio 2017 02:43  Phos  4.3     06-10  Mg     1.8     06-10      PT/INR - ( 2017 03:56 )   PT: 12.3 sec;   INR: 1.13 ratio         PTT - ( 2017 03:56 )  PTT:27.9 sec

## 2017-06-10 NOTE — PROGRESS NOTE ADULT - SUBJECTIVE AND OBJECTIVE BOX
Day __1_ of Anesthesia Pain Management Service    SUBJECTIVE:    Pain Scale Score	At rest: __2_ 	With Activity: __2_ 	[ ] Refer to charted pain scores    THERAPY:    [x ] IV PCA Morphine		[ ] 5 mg/mL	[ ] 1 mg/mL  [ ] IV PCA Hydromorphone	[ ] 5 mg/mL	[ ] 1 mg/mL  [ ] IV PCA Fentanyl		[ ] 50 micrograms/mL    Demand dose   1 lockout 6   (minutes) Continuous Rate 0   Total:     Daily      MEDICATIONS  (STANDING):  lactated ringers. 1000milliLiter(s) IV Continuous <Continuous>  levothyroxine Injectable 44MICROGram(s) IV Push daily  pantoprazole  Injectable 40milliGRAM(s) IV Push daily  ALBUTerol/ipratropium for Nebulization 3milliLiter(s) Nebulizer every 6 hours  fondaparinux Injectable 2.5milliGRAM(s) SubCutaneous daily  fentaNYL   Patch  50 MICROgram(s)/Hr. 1Patch Transdermal every 48 hours  phenylephrine    Infusion 0.5MICROgram(s)/kG/Min IV Continuous <Continuous>  acetaminophen  IVPB. 1000milliGRAM(s) IV Intermittent once  acetaminophen  IVPB. 1000milliGRAM(s) IV Intermittent once    MEDICATIONS  (PRN):  morphine PCA (5 mG/mL) Rescue Clinician Bolus 3milliGRAM(s) IV Push every 15 minutes PRN for Pain Scale GREATER THAN 6  naloxone Injectable 0.1milliGRAM(s) IV Push every 3 minutes PRN For ANY of the following changes in patient status:  A. RR LESS THAN 10 breaths per minute, B. Oxygen saturation LESS THAN 90%, C. Sedation score of 6  ondansetron Injectable 4milliGRAM(s) IV Push every 6 hours PRN Nausea  diphenhydrAMINE   Injectable 25milliGRAM(s) IV Push every 4 hours PRN Pruritus      OBJECTIVE:    Sedation Score:	[x ] Alert	[ ] Drowsy 	[ ] Arousable	[ ] Asleep	[ ] Unresponsive    Side Effects:	[x ] None	[ ] Nausea	[ ] Vomiting	[ ] Pruritus  		[ ] Other:    Vital Signs Last 24 Hrs  T(C): 36.7, Max: 36.7 (06-09 @ 11:00)  T(F): 98.1, Max: 98.1 (06-09 @ 11:00)  HR: 112 (101 - 123)  BP: 88/41 (76/35 - 136/63)  BP(mean): 59 (50 - 90)  RR: 11 (11 - 24)  SpO2: 100% (93% - 100%)    ASSESSMENT/ PLAN    Therapy to  be:	[ x] Continue   [ ] Discontinued   [ ] Change to prn Analgesics    Documentation and Verification of current medications:   [X] Done	[ ] Not done, not elligible    Comments:

## 2017-06-10 NOTE — PROGRESS NOTE ADULT - SUBJECTIVE AND OBJECTIVE BOX
ffHISTORY  76y Female    24 HOUR EVENTS: Patient was started on Morphine PCA for pain control. She received a total of 2500 cc in fluid boluses to decrease pressor requirements and then to improve blood pressure off of pressors.     SUBJECTIVE/ROS:  [x ] A ten-point review of systems was otherwise negative except as noted.  [ ] Due to altered mental status/intubation, subjective information were not able to be obtained from the patient. History was obtained, to the extent possible, from review of the chart and collateral sources of information.    NEURO  Exam: awake, alert, complaining of pain  Meds: fentaNYL   Patch  50 MICROgram(s)/Hr. 1Patch Transdermal every 48 hours  acetaminophen  IVPB. 1000milliGRAM(s) IV Intermittent once  morphine PCA (5 mG/mL) 30milliLiter(s) PCA Continuous PCA Continuous  morphine PCA (5 mG/mL) Rescue Clinician Bolus 3milliGRAM(s) IV Push every 15 minutes PRN for Pain Scale GREATER THAN 6  ondansetron Injectable 4milliGRAM(s) IV Push every 6 hours PRN Nausea    [x] Adequacy of sedation and pain control has been assessed and adjusted    RESPIRATORY  RR: 13 (13 - 26)  SpO2: 100% (93% - 100%)  Wt(kg): --  Exam: unlabored, clear to auscultation bilaterally  Mechanical Ventilation: Mode: CPAP with PS, FiO2: 40, PEEP: 5, PS: 5, MAP: 13, PIP: 17  ABG - ( 10 Anastacio 2017 02:42 )  pH: 7.26  /  pCO2: 42    /  pO2: 154   / HCO3: 18    / Base Excess: -8.0  /  SaO2: 99      Lactate: 1.7        Meds: ALBUTerol/ipratropium for Nebulization 3milliLiter(s) Nebulizer every 6 hours  diphenhydrAMINE   Injectable 25milliGRAM(s) IV Push every 4 hours PRN Pruritus      CARDIOVASCULAR  HR: 118 (101 - 121)  BP: 99/54 (90/62 - 136/63)  BP(mean): 73 (70 - 90)  ABP: 98/40 (95/59 - 173/63)  ABP(mean): 56 (56 - 101)  Wt(kg): --  CVP(cm H2O): --    Lactate: 1.7          Exam: regular rate and rhythm  Cardiac Rhythm: sinus tachycardia  Perfusion     [x]Adequate   [ ]Inadequate  Mentation   [x]Normal       [ ]Reduced  Extremities  [x]Warm         [ ]Cool  I & Os for 24h ending 06-09 @ 07:00  =============================================  IN: 1762.9 ml / OUT: 1081 ml / NET: 681.9 ml    Volume Status [ ]Hypervolemic [x]Euvolemic [ ]Hypovolemic  Meds:     GI/NUTRITION  Exam: soft, mildly distended, incision CDI, tender perincisionally  Diet: NPO  Meds: pantoprazole  Injectable 40milliGRAM(s) IV Push daily      GENITOURINARY  I&O's Detail  I & Os for 24h ending 06-09 @ 07:00  =============================================  IN:    Lactated Ringers IV Bolus: 1000 ml    TPN (Total Parenteral Nutrition): 248 ml    lactated ringers.: 150 ml    Solution: 100 ml    Solution: 100 ml    sodium chloride 0.9%: 50 ml    Solution: 50 ml    Solution: 50 ml    phenylephrine   Infusion: 11.2 ml    fentaNYL  Infusion: 3.7 ml    Total IN: 1762.9 ml  ---------------------------------------------  OUT:    Voided: 700 ml    Drain: 180 ml    Indwelling Catheter - Urethral: 130 ml    Nasoenteral Tube: 70 ml    Colostomy: 1 ml    Total OUT: 1081 ml  ---------------------------------------------  Total NET: 681.9 ml    I & Os for current day (as of 06-10 @ 05:07)  =============================================  IN:    lactated ringers.: 1800 ml    TPN (Total Parenteral Nutrition): 1302 ml    Lactated Ringers IV Bolus: 1000 ml    Solution: 200 ml    Solution: 200 ml    Solution: 150 ml    Solution: 100 ml    Total IN: 4752 ml  ---------------------------------------------  OUT:    Indwelling Catheter - Urethral: 690 ml    Nasoenteral Tube: 400 ml    Drain: 150 ml    Total OUT: 1240 ml  ---------------------------------------------  Total NET: 3512 ml      06-10    141  |  112<H>  |  66<H>  ----------------------------<  156<H>  5.7<H>   |  18<L>  |  1.83<H>    Ca    8.4      10 Anastacio 2017 02:43  Phos  4.3     06-10  Mg     1.8     06-10      [x ] Snyder catheter, indication: monitoring of fluid status  Meds: lactated ringers. 1000milliLiter(s) IV Continuous <Continuous>  magnesium sulfate  IVPB 2Gram(s) IV Intermittent once      HEMATOLOGIC  Meds: fondaparinux Injectable 2.5milliGRAM(s) SubCutaneous daily    [x] VTE Prophylaxis                        8.0    7.2   )-----------( 109      ( 10 Anastacio 2017 02:43 )             24.5     PT/INR - ( 09 Jun 2017 03:56 )   PT: 12.3 sec;   INR: 1.13 ratio         PTT - ( 09 Jun 2017 03:56 )  PTT:27.9 sec  Transfusion     [ ] PRBC   [ ] Platelets   [ ] FFP   [ ] Cryoprecipitate    INFECTIOUS DISEASES  WBC Count: 7.2 K/uL (06-10 @ 02:43)  WBC Count: 5.9 K/uL (06-09 @ 10:52)    RECENT CULTURES:    Meds: aztreonam  IVPB 1000milliGRAM(s) IV Intermittent every 8 hours  metroNIDAZOLE  IVPB 500milliGRAM(s) IV Intermittent every 8 hours  aztreonam  IVPB  IV Intermittent   metroNIDAZOLE  IVPB  IV Intermittent       ENDOCRINE  CAPILLARY BLOOD GLUCOSE    Meds: levothyroxine Injectable 44MICROGram(s) IV Push daily      ACCESS DEVICES:  [x ] Peripheral IV  [ ] Central Venous Line	[ ] R	[ ] L	[ ] IJ	[ ] Fem	[ ] SC	Placed:   [x ] Arterial Line		[ ] R	[ x] L	[ ] Fem	[ x] Rad	[ ] Ax	Placed:   [ ] PICC:					[ ] Mediport  [x ] Urinary Catheter, Date Placed:   [x] Necessity of urinary, arterial, and venous catheters discussed    OTHER MEDICATIONS:  naloxone Injectable 0.1milliGRAM(s) IV Push every 3 minutes PRN ffHISTORY  76y Female    24 HOUR EVENTS: Patient was started on Morphine PCA for pain control. She received a total of 2500 cc in fluid boluses to decrease pressor requirements and then to improve blood pressure off of pressors.     SUBJECTIVE/ROS:  [x ] A ten-point review of systems was otherwise negative except as noted.  [ ] Due to altered mental status/intubation, subjective information were not able to be obtained from the patient. History was obtained, to the extent possible, from review of the chart and collateral sources of information.    NEURO  Exam: awake, alert, complaining of pain  Meds: fentaNYL   Patch  50 MICROgram(s)/Hr. 1Patch Transdermal every 48 hours  acetaminophen  IVPB. 1000milliGRAM(s) IV Intermittent once  morphine PCA (5 mG/mL) 30milliLiter(s) PCA Continuous PCA Continuous  morphine PCA (5 mG/mL) Rescue Clinician Bolus 3milliGRAM(s) IV Push every 15 minutes PRN for Pain Scale GREATER THAN 6  ondansetron Injectable 4milliGRAM(s) IV Push every 6 hours PRN Nausea    [x] Adequacy of sedation and pain control has been assessed and adjusted    RESPIRATORY  RR: 13 (13 - 26)  SpO2: 100% (93% - 100%)  Wt(kg): --  Exam: unlabored, clear to auscultation bilaterally  Mechanical Ventilation: Mode: CPAP with PS, FiO2: 40, PEEP: 5, PS: 5, MAP: 13, PIP: 17  ABG - ( 10 Anastacio 2017 02:42 )  pH: 7.26  /  pCO2: 42    /  pO2: 154   / HCO3: 18    / Base Excess: -8.0  /  SaO2: 99      Lactate: 1.7        Meds: ALBUTerol/ipratropium for Nebulization 3milliLiter(s) Nebulizer every 6 hours  diphenhydrAMINE   Injectable 25milliGRAM(s) IV Push every 4 hours PRN Pruritus      CARDIOVASCULAR  HR: 118 (101 - 121)  BP: 99/54 (90/62 - 136/63)  BP(mean): 73 (70 - 90)  ABP: 98/40 (95/59 - 173/63)  ABP(mean): 56 (56 - 101)  Wt(kg): --  CVP(cm H2O): --    Lactate: 1.7          Exam: regular rate and rhythm  Cardiac Rhythm: sinus tachycardia  Perfusion     [x]Adequate   [ ]Inadequate  Mentation   [x]Normal       [ ]Reduced  Extremities  [x]Warm         [ ]Cool  I & Os for 24h ending 06-09 @ 07:00  =============================================  IN: 1762.9 ml / OUT: 1081 ml / NET: 681.9 ml    Volume Status [ ]Hypervolemic [x]Euvolemic [ ]Hypovolemic  Meds:     GI/NUTRITION  Exam: soft, mildly distended, incision CDI, tender perincisionally  Diet: NPO  Meds: pantoprazole  Injectable 40milliGRAM(s) IV Push daily      GENITOURINARY  I&O's Detail    I & Os for current day (as of 10 Anastacio 2017 07:08)  =============================================  IN:    lactated ringers.: 2000 ml    TPN (Total Parenteral Nutrition): 1426 ml    Lactated Ringers IV Bolus: 1000 ml    Solution: 300 ml    Solution: 200 ml    Solution: 200 ml    Solution: 100 ml    Solution: 50 ml    Total IN: 5276 ml  ---------------------------------------------  OUT:    Indwelling Catheter - Urethral: 740 ml    Nasoenteral Tube: 450 ml    Drain: 160 ml    Total OUT: 1350 ml  ---------------------------------------------  Total NET: 3926 ml        06-10    141  |  112<H>  |  66<H>  ----------------------------<  156<H>  5.7<H>   |  18<L>  |  1.83<H>    Ca    8.4      10 Anastacio 2017 02:43  Phos  4.3     06-10  Mg     1.8     06-10      [x ] Snyder catheter, indication: monitoring of fluid status  Meds: lactated ringers. 1000milliLiter(s) IV Continuous <Continuous>  magnesium sulfate  IVPB 2Gram(s) IV Intermittent once      HEMATOLOGIC  Meds: fondaparinux Injectable 2.5milliGRAM(s) SubCutaneous daily    [x] VTE Prophylaxis                        8.0    7.2   )-----------( 109      ( 10 Anastacio 2017 02:43 )             24.5     PT/INR - ( 09 Jun 2017 03:56 )   PT: 12.3 sec;   INR: 1.13 ratio         PTT - ( 09 Jun 2017 03:56 )  PTT:27.9 sec  Transfusion     [ ] PRBC   [ ] Platelets   [ ] FFP   [ ] Cryoprecipitate    INFECTIOUS DISEASES  WBC Count: 7.2 K/uL (06-10 @ 02:43)  WBC Count: 5.9 K/uL (06-09 @ 10:52)    RECENT CULTURES:    Meds: aztreonam  IVPB 1000milliGRAM(s) IV Intermittent every 8 hours  metroNIDAZOLE  IVPB 500milliGRAM(s) IV Intermittent every 8 hours  aztreonam  IVPB  IV Intermittent   metroNIDAZOLE  IVPB  IV Intermittent       ENDOCRINE  CAPILLARY BLOOD GLUCOSE    Meds: levothyroxine Injectable 44MICROGram(s) IV Push daily      ACCESS DEVICES:  [x ] Peripheral IV  [ ] Central Venous Line	[ ] R	[ ] L	[ ] IJ	[ ] Fem	[ ] SC	Placed:   [x ] Arterial Line		[ ] R	[ x] L	[ ] Fem	[ x] Rad	[ ] Ax	Placed:   [ ] PICC:					[ ] Mediport  [x ] Urinary Catheter, Date Placed:   [x] Necessity of urinary, arterial, and venous catheters discussed    OTHER MEDICATIONS:  naloxone Injectable 0.1milliGRAM(s) IV Push every 3 minutes PRN

## 2017-06-10 NOTE — PROGRESS NOTE ADULT - ATTENDING COMMENTS
Dr. Capellan (Attending Physician)  Pain poorly controlled. Will consult Anesthesia for epidural vs. TAP block.  Postprocedural hypotension.  Secondary to septic shock versus pain medications.  Volume resuscitated yesterday with improved UO but remains hypotensive. Likely euvolemic today. Will start NE. Difficult Echo windows so will get TTE. Anemia with hemoglobin downtrending. Will recheck this afternoon.  Likely transfuse if Hb less than 8 and remain hypotensive. Restart flagyl and aztreonam. Will send blood cultures, ua urine cultures since remains hypotensive.

## 2017-06-10 NOTE — PROGRESS NOTE ADULT - SUBJECTIVE AND OBJECTIVE BOX
Patient seen and examined lying in samson chair lethargic, now extubated remains off pressors, given fluid bolus for hypotension. NGT in place, TPN running. Hyperkalemic this am.    REVIEW OF SYSTEMS: limited due to lethargy    CONSTITUTIONAL: NAD    MEDICATIONS  (STANDING):  lactated ringers. 1000milliLiter(s) IV Continuous <Continuous>  levothyroxine Injectable 44MICROGram(s) IV Push daily  pantoprazole  Injectable 40milliGRAM(s) IV Push daily  ALBUTerol/ipratropium for Nebulization 3milliLiter(s) Nebulizer every 6 hours  fondaparinux Injectable 2.5milliGRAM(s) SubCutaneous daily  fentaNYL   Patch  50 MICROgram(s)/Hr. 1Patch Transdermal every 48 hours  phenylephrine    Infusion 0.5MICROgram(s)/kG/Min IV Continuous <Continuous>  acetaminophen  IVPB. 1000milliGRAM(s) IV Intermittent once  acetaminophen  IVPB. 1000milliGRAM(s) IV Intermittent once    MEDICATIONS  (PRN):  morphine PCA (5 mG/mL) Rescue Clinician Bolus 3milliGRAM(s) IV Push every 15 minutes PRN for Pain Scale GREATER THAN 6  naloxone Injectable 0.1milliGRAM(s) IV Push every 3 minutes PRN For ANY of the following changes in patient status:  A. RR LESS THAN 10 breaths per minute, B. Oxygen saturation LESS THAN 90%, C. Sedation score of 6  ondansetron Injectable 4milliGRAM(s) IV Push every 6 hours PRN Nausea  diphenhydrAMINE   Injectable 25milliGRAM(s) IV Push every 4 hours PRN Pruritus      VITAL:  T(F): , Max: 98.1 (06-09 @ 11:00)  HR: 111  BP: 104/49  BP(mean): 71  RR: 20  SpO2: 100%  Wt(kg): --    I and O's:  I & Os for 24h ending 06-10 @ 07:00  =============================================  IN: 5538 ml / OUT: 1380 ml / NET: 4158 ml    I & Os for current day (as of 06-10 @ 10:04)  =============================================  IN: 541.7 ml / OUT: 0 ml / NET: 541.7 ml        PHYSICAL EXAM:    Constitutional: NAD  HEENT: PERRLA, EOMI,  MMM  Neck: No LAD, No JVD  Respiratory: CTAB  Cardiovascular: S1 and S2  Gastrointestinal: BS+, soft, NT/ND, + ngt  Extremities: + peripheral edema  Neurological: lethargic/asleep  : + Snyder  Skin: No rashes  Access: Not applicable    LABS:                          8.0    7.2   )-----------( 109      ( 10 Anastacio 2017 02:43 )             24.5                         9.3    5.9   )-----------( 161      ( 09 Jun 2017 10:52 )             28.8                         9.5    2.8   )-----------( 146      ( 09 Jun 2017 03:56 )             27.8                         10.2   5.22  )-----------( 191      ( 08 Jun 2017 08:31 )             32.5     06-10    141  |  112<H>  |  66<H>  ----------------------------<  156<H>  5.7<H>   |  18<L>  |  1.83<H>  06-09    141  |  112<H>  |  60<H>  ----------------------------<  178<H>  5.3   |  19<L>  |  1.76<H>  06-09    146<H>  |  116<H>  |  56<H>  ----------------------------<  202<H>  4.4   |  19<L>  |  1.41<H>  06-08    136  |  98  |  57<H>  ----------------------------<  107<H>  4.7   |  22  |  1.48<H>    Phos  4.3     06-10  Phos  3.6     06-09  Phos  2.7     06-09  Phos  4.1     06-08  Mg     1.8     06-10  Mg     1.8     06-09  Mg     1.8     06-09  Mg     2.4     06-08

## 2017-06-10 NOTE — PROGRESS NOTE ADULT - SUBJECTIVE AND OBJECTIVE BOX
Day #10 of PN  PN infusion at 62  Labs   06-10    141  |  112<H>  |  66<H>  ----------------------------<  156<H>  5.7<H>   |  18<L>  |  1.83<H>    Ca    8.4      10 Anastacio 2017 02:43  Phos  4.3     06-10  Mg     1.8     06-10      CAPILLARY BLOOD GLUCOSE not done    I&O's Detail    I & Os for current day (as of 10 Anastacio 2017 08:37)  =============================================  IN:    lactated ringers.: 2100 ml    TPN (Total Parenteral Nutrition): 1488 ml    Lactated Ringers IV Bolus: 1000 ml    Solution: 300 ml    Solution: 300 ml    Solution: 200 ml    Solution: 100 ml    Solution: 50 ml    Total IN: 5538 ml  ---------------------------------------------  OUT:    Indwelling Catheter - Urethral: 770 ml    Nasoenteral Tube: 450 ml    Drain: 160 ml    Total OUT: 1380 ml  ---------------------------------------------  Total NET: 4158 ml

## 2017-06-10 NOTE — PROGRESS NOTE ADULT - PROBLEM SELECTOR PLAN 4
Due to CKD/TREVA, LR- K+ rising  Would recommend IVF + lasix for kaliuresis  consider change IVF to NS

## 2017-06-10 NOTE — PROGRESS NOTE ADULT - ASSESSMENT
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

## 2017-06-11 DIAGNOSIS — N17.9 ACUTE KIDNEY FAILURE, UNSPECIFIED: ICD-10-CM

## 2017-06-11 LAB
ALBUMIN SERPL ELPH-MCNC: 2 G/DL — LOW (ref 3.3–5)
ALP SERPL-CCNC: 129 U/L — HIGH (ref 40–120)
ALT FLD-CCNC: 30 U/L RC — SIGNIFICANT CHANGE UP (ref 10–45)
ANION GAP SERPL CALC-SCNC: 11 MMOL/L — SIGNIFICANT CHANGE UP (ref 5–17)
ANION GAP SERPL CALC-SCNC: 13 MMOL/L — SIGNIFICANT CHANGE UP (ref 5–17)
APTT BLD: 57.1 SEC — HIGH (ref 27.5–37.4)
APTT BLD: 57.5 SEC — HIGH (ref 27.5–37.4)
AST SERPL-CCNC: 33 U/L — SIGNIFICANT CHANGE UP (ref 10–40)
BASE EXCESS BLDA CALC-SCNC: -8.8 MMOL/L — LOW (ref -2–2)
BILIRUB DIRECT SERPL-MCNC: 0.2 MG/DL — SIGNIFICANT CHANGE UP (ref 0–0.2)
BILIRUB INDIRECT FLD-MCNC: 0.1 MG/DL — LOW (ref 0.2–1)
BILIRUB SERPL-MCNC: 0.3 MG/DL — SIGNIFICANT CHANGE UP (ref 0.2–1.2)
BUN SERPL-MCNC: 67 MG/DL — HIGH (ref 7–23)
BUN SERPL-MCNC: 68 MG/DL — HIGH (ref 7–23)
CALCIUM SERPL-MCNC: 8.9 MG/DL — SIGNIFICANT CHANGE UP (ref 8.4–10.5)
CALCIUM SERPL-MCNC: 9 MG/DL — SIGNIFICANT CHANGE UP (ref 8.4–10.5)
CHLORIDE SERPL-SCNC: 109 MMOL/L — HIGH (ref 96–108)
CHLORIDE SERPL-SCNC: 110 MMOL/L — HIGH (ref 96–108)
CO2 BLDA-SCNC: 20 MMOL/L — LOW (ref 22–30)
CO2 SERPL-SCNC: 17 MMOL/L — LOW (ref 22–31)
CO2 SERPL-SCNC: 18 MMOL/L — LOW (ref 22–31)
CREAT ?TM UR-MCNC: 25 MG/DL — SIGNIFICANT CHANGE UP
CREAT SERPL-MCNC: 1.77 MG/DL — HIGH (ref 0.5–1.3)
CREAT SERPL-MCNC: 1.91 MG/DL — HIGH (ref 0.5–1.3)
FIBRINOGEN PPP-MCNC: 1040 MG/DL — HIGH (ref 310–510)
FIBRINOGEN PPP-MCNC: 974 MG/DL — HIGH (ref 310–510)
GAS PNL BLDA: SIGNIFICANT CHANGE UP
GLUCOSE SERPL-MCNC: 143 MG/DL — HIGH (ref 70–99)
GLUCOSE SERPL-MCNC: 150 MG/DL — HIGH (ref 70–99)
HCO3 BLDA-SCNC: 18 MMOL/L — LOW (ref 21–29)
HCT VFR BLD CALC: 24.7 % — LOW (ref 34.5–45)
HCT VFR BLD CALC: 25.4 % — LOW (ref 34.5–45)
HGB BLD-MCNC: 8.1 G/DL — LOW (ref 11.5–15.5)
HGB BLD-MCNC: 8.4 G/DL — LOW (ref 11.5–15.5)
HOROWITZ INDEX BLDA+IHG-RTO: 28 — SIGNIFICANT CHANGE UP
INR BLD: 2.12 RATIO — HIGH (ref 0.88–1.16)
INR BLD: 2.21 RATIO — HIGH (ref 0.88–1.16)
MAGNESIUM SERPL-MCNC: 2.4 MG/DL — SIGNIFICANT CHANGE UP (ref 1.6–2.6)
MAGNESIUM SERPL-MCNC: 2.6 MG/DL — SIGNIFICANT CHANGE UP (ref 1.6–2.6)
MCHC RBC-ENTMCNC: 32.9 GM/DL — SIGNIFICANT CHANGE UP (ref 32–36)
MCHC RBC-ENTMCNC: 32.9 GM/DL — SIGNIFICANT CHANGE UP (ref 32–36)
MCHC RBC-ENTMCNC: 34.2 PG — HIGH (ref 27–34)
MCHC RBC-ENTMCNC: 34.2 PG — HIGH (ref 27–34)
MCV RBC AUTO: 104 FL — HIGH (ref 80–100)
MCV RBC AUTO: 104 FL — HIGH (ref 80–100)
OSMOLALITY UR: 370 MOS/KG — SIGNIFICANT CHANGE UP (ref 300–900)
PCO2 BLDA: 50 MMHG — HIGH (ref 32–46)
PH BLDA: 7.19 — CRITICAL LOW (ref 7.35–7.45)
PHOSPHATE SERPL-MCNC: 4.5 MG/DL — SIGNIFICANT CHANGE UP (ref 2.5–4.5)
PHOSPHATE SERPL-MCNC: 4.7 MG/DL — HIGH (ref 2.5–4.5)
PLATELET # BLD AUTO: 78 K/UL — LOW (ref 150–400)
PLATELET # BLD AUTO: 82 K/UL — LOW (ref 150–400)
PO2 BLDA: 123 MMHG — HIGH (ref 74–108)
POTASSIUM SERPL-MCNC: 4.9 MMOL/L — SIGNIFICANT CHANGE UP (ref 3.5–5.3)
POTASSIUM SERPL-MCNC: 5.2 MMOL/L — SIGNIFICANT CHANGE UP (ref 3.5–5.3)
POTASSIUM SERPL-SCNC: 4.9 MMOL/L — SIGNIFICANT CHANGE UP (ref 3.5–5.3)
POTASSIUM SERPL-SCNC: 5.2 MMOL/L — SIGNIFICANT CHANGE UP (ref 3.5–5.3)
POTASSIUM UR-SCNC: 21 MMOL/L — SIGNIFICANT CHANGE UP
PROCALCITONIN SERPL-MCNC: 52.56 NG/ML — HIGH (ref 0–0.04)
PROT SERPL-MCNC: 4.3 G/DL — LOW (ref 6–8.3)
PROTHROM AB SERPL-ACNC: 23.2 SEC — HIGH (ref 9.8–12.7)
PROTHROM AB SERPL-ACNC: 24.3 SEC — HIGH (ref 9.8–12.7)
RBC # BLD: 2.38 M/UL — LOW (ref 3.8–5.2)
RBC # BLD: 2.44 M/UL — LOW (ref 3.8–5.2)
RBC # FLD: 14 % — SIGNIFICANT CHANGE UP (ref 10.3–14.5)
RBC # FLD: 14.2 % — SIGNIFICANT CHANGE UP (ref 10.3–14.5)
SAO2 % BLDA: 99 % — HIGH (ref 92–96)
SODIUM SERPL-SCNC: 139 MMOL/L — SIGNIFICANT CHANGE UP (ref 135–145)
SODIUM SERPL-SCNC: 139 MMOL/L — SIGNIFICANT CHANGE UP (ref 135–145)
SODIUM UR-SCNC: 51 MMOL/L — SIGNIFICANT CHANGE UP
WBC # BLD: 7.4 K/UL — SIGNIFICANT CHANGE UP (ref 3.8–10.5)
WBC # BLD: 7.8 K/UL — SIGNIFICANT CHANGE UP (ref 3.8–10.5)
WBC # FLD AUTO: 7.4 K/UL — SIGNIFICANT CHANGE UP (ref 3.8–10.5)
WBC # FLD AUTO: 7.8 K/UL — SIGNIFICANT CHANGE UP (ref 3.8–10.5)

## 2017-06-11 PROCEDURE — 71010: CPT | Mod: 26

## 2017-06-11 PROCEDURE — 99291 CRITICAL CARE FIRST HOUR: CPT

## 2017-06-11 RX ORDER — ACETAMINOPHEN 500 MG
1000 TABLET ORAL ONCE
Qty: 0 | Refills: 0 | Status: COMPLETED | OUTPATIENT
Start: 2017-06-11 | End: 2017-06-11

## 2017-06-11 RX ORDER — HYDROMORPHONE HYDROCHLORIDE 2 MG/ML
0.5 INJECTION INTRAMUSCULAR; INTRAVENOUS; SUBCUTANEOUS ONCE
Qty: 0 | Refills: 0 | Status: DISCONTINUED | OUTPATIENT
Start: 2017-06-11 | End: 2017-06-11

## 2017-06-11 RX ORDER — ACETAMINOPHEN 500 MG
1000 TABLET ORAL ONCE
Qty: 0 | Refills: 0 | Status: COMPLETED | OUTPATIENT
Start: 2017-06-12 | End: 2017-06-12

## 2017-06-11 RX ORDER — HYDROMORPHONE HYDROCHLORIDE 2 MG/ML
0.2 INJECTION INTRAMUSCULAR; INTRAVENOUS; SUBCUTANEOUS
Qty: 0 | Refills: 0 | Status: DISCONTINUED | OUTPATIENT
Start: 2017-06-11 | End: 2017-06-17

## 2017-06-11 RX ORDER — FONDAPARINUX SODIUM 2.5 MG/.5ML
2.5 INJECTION, SOLUTION SUBCUTANEOUS EVERY OTHER DAY
Qty: 0 | Refills: 0 | Status: DISCONTINUED | OUTPATIENT
Start: 2017-06-13 | End: 2017-06-14

## 2017-06-11 RX ADMIN — Medication 1000 MILLIGRAM(S): at 10:23

## 2017-06-11 RX ADMIN — FONDAPARINUX SODIUM 2.5 MILLIGRAM(S): 2.5 INJECTION, SOLUTION SUBCUTANEOUS at 12:19

## 2017-06-11 RX ADMIN — Medication 100 MILLIGRAM(S): at 05:19

## 2017-06-11 RX ADMIN — Medication 44 MICROGRAM(S): at 05:19

## 2017-06-11 RX ADMIN — Medication 100 MILLIGRAM(S): at 15:52

## 2017-06-11 RX ADMIN — Medication 400 MILLIGRAM(S): at 01:26

## 2017-06-11 RX ADMIN — Medication 3 MILLILITER(S): at 05:03

## 2017-06-11 RX ADMIN — Medication 50 MILLIGRAM(S): at 23:04

## 2017-06-11 RX ADMIN — Medication 400 MILLIGRAM(S): at 09:05

## 2017-06-11 RX ADMIN — Medication 400 MILLIGRAM(S): at 15:52

## 2017-06-11 RX ADMIN — FENTANYL CITRATE 1 PATCH: 50 INJECTION INTRAVENOUS at 12:20

## 2017-06-11 RX ADMIN — Medication 50 MILLIGRAM(S): at 06:11

## 2017-06-11 RX ADMIN — Medication 1000 MILLIGRAM(S): at 18:06

## 2017-06-11 RX ADMIN — PANTOPRAZOLE SODIUM 40 MILLIGRAM(S): 20 TABLET, DELAYED RELEASE ORAL at 12:18

## 2017-06-11 RX ADMIN — HYDROMORPHONE HYDROCHLORIDE 0.5 MILLIGRAM(S): 2 INJECTION INTRAMUSCULAR; INTRAVENOUS; SUBCUTANEOUS at 02:53

## 2017-06-11 RX ADMIN — Medication 400 MILLIGRAM(S): at 23:05

## 2017-06-11 RX ADMIN — Medication 100 MILLIGRAM(S): at 22:30

## 2017-06-11 RX ADMIN — HYDROMORPHONE HYDROCHLORIDE 0.2 MILLIGRAM(S): 2 INJECTION INTRAMUSCULAR; INTRAVENOUS; SUBCUTANEOUS at 19:54

## 2017-06-11 RX ADMIN — HYDROMORPHONE HYDROCHLORIDE 0.2 MILLIGRAM(S): 2 INJECTION INTRAMUSCULAR; INTRAVENOUS; SUBCUTANEOUS at 20:09

## 2017-06-11 RX ADMIN — Medication 2.79 MICROGRAM(S)/KG/MIN: at 02:54

## 2017-06-11 RX ADMIN — FENTANYL CITRATE 1 PATCH: 50 INJECTION INTRAVENOUS at 15:51

## 2017-06-11 RX ADMIN — Medication 50 MILLIGRAM(S): at 15:12

## 2017-06-11 RX ADMIN — Medication 1000 MILLIGRAM(S): at 02:00

## 2017-06-11 RX ADMIN — Medication 1000 MILLIGRAM(S): at 23:30

## 2017-06-11 RX ADMIN — Medication 3 MILLILITER(S): at 11:39

## 2017-06-11 RX ADMIN — HYDROMORPHONE HYDROCHLORIDE 0.5 MILLIGRAM(S): 2 INJECTION INTRAMUSCULAR; INTRAVENOUS; SUBCUTANEOUS at 03:05

## 2017-06-11 NOTE — PROGRESS NOTE ADULT - ASSESSMENT
Potassium is improving since removal of all K from PN, however serum phos is elevated as well as increasing BUN and creatinine.  Pt is also acidotic raising the question of possible TREVA.

## 2017-06-11 NOTE — PROGRESS NOTE ADULT - SUBJECTIVE AND OBJECTIVE BOX
Day # 10 of PN  PN infusion at 62 ccs/hr  Labs   06-11    139  |  110<H>  |  68<H>  ----------------------------<  143<H>  5.2   |  18<L>  |  1.91<H>    Ca    9.0      11 Jun 2017 02:36  Phos  4.7     06-11  Mg     2.4     06-11      CAPILLARY BLOOD GLUCOSE    I&O's Detail    I & Os for current day (as of 11 Jun 2017 08:29)  =============================================  IN:    lactated ringers.: 2200 ml    TPN (Total Parenteral Nutrition): 1488 ml    Packed Red Blood Cells: 320 ml    Solution: 300 ml    norepinephrine Infusion: 208 ml    Solution: 200 ml    Solution: 150 ml    phenylephrine   Infusion: 55.7 ml    vasopressin Infusion: 26.4 ml    Total IN: 4948.1 ml  ---------------------------------------------  OUT:    Indwelling Catheter - Urethral: 1515 ml    Drain: 300 ml    Nasoenteral Tube: 300 ml    Total OUT: 2115 ml  ---------------------------------------------  Total NET: 2833.1 ml

## 2017-06-11 NOTE — PROGRESS NOTE ADULT - SUBJECTIVE AND OBJECTIVE BOX
STATUS POST: exploratory laparotomy, extensive lysis of adhesions, takedown of enterocutaneous fistula, repair of serosal tear on cecum, partial right salpingooophorectomy, colostomy revision, parastomal and incisional hernia repair with strattice mesh    POD#:  2    INTERVAL EVENTS: Patient restarted on levo this morning and placed on BiPAP for hypoventilation (pH 7.17).     SUBJECTIVE:   Complaining of abdominal pain this morning.     VITALS  T(C): 36.4, Max: 36.6 (06-08 @ 13:00)  HR: 119 (80 - 126)  BP: 114/71 (114/71 - 138/74)  BP(mean): 88 (88 - 88)  RR: 24 (12 - 26)  SpO2: 100% (98% - 100%)  CAPILLARY BLOOD GLUCOSE  202 (09 Jun 2017 04:00)  115 (08 Jun 2017 12:14)    Is/Os  I & Os for current day (as of 06-09 @ 07:30)  =============================================  IN:    Lactated Ringers IV Bolus: 1000 ml    TPN (Total Parenteral Nutrition): 248 ml    lactated ringers.: 150 ml    Solution: 100 ml    Solution: 100 ml    sodium chloride 0.9%: 50 ml    Solution: 50 ml    Solution: 50 ml    phenylephrine   Infusion: 11.2 ml    fentaNYL  Infusion: 3.7 ml    Total IN: 1762.9 ml  ---------------------------------------------  OUT:    Voided: 700 ml    Drain: 180 ml    Indwelling Catheter - Urethral: 130 ml    Nasoenteral Tube: 70 ml    Colostomy: 1 ml    Total OUT: 1081 ml  ---------------------------------------------  Total NET: 681.9 ml    PHYSICAL EXAM:   General: Resting comfortably in bed, AOx3.   Neuro: no apparent neurologic defects  Resp: on BiPAP; normal respiratory pattern  GI/Abd: Soft, mildly distended, diffuse abdominal tenderness. Midline wound with prevena vac in place, minimal drainage, left sided ostomy congested, no drainage or gas in bag. NGT with bilious output.   Vascular: All 4 extremities warm.    LABS  CBC (06-09 @ 03:56)                              9.5<L>                         2.8<L>  )----------------(  146<L>     --    % Neutrophils, --    % Lymphocytes, ANC: --                                  27.8<L>  CBC (06-08 @ 08:31)                              10.2<L>                         5.22    )----------------(  191        --    % Neutrophils, --    % Lymphocytes, ANC: --                                  32.5<L>    BMP (06-09 @ 03:56)             146<H>  |  116<H>  |  56<H> 		Ca++ --      Ca 7.3<L>             ---------------------------------( 202<H>		Mg 1.8                4.4     |  19<L>   |  1.41<H>			Ph 2.7     BMP (06-08 @ 08:34)             136     |  98      |  57<H> 		Ca++ --      Ca 9.3                ---------------------------------( 107<H>		Mg 2.4                4.7     |  22      |  1.48<H>			Ph 4.1       Coags (06-09 @ 03:56)  aPTT 27.9 / INR 1.13 / PT 12.3  Coags (06-08 @ 09:03)  aPTT 33.6 / INR 1.09 / PT 12.3    ABG (06-09 @ 03:50)     7.20<LL> / 48<H> / 129<H> / 18<L> / -9.1<L> / 98<H>%     Lactate:     ABG (06-09 @ 01:07)     7.37 / 36 / 230<H> / 20<L> / -4.0<L> / 100<H>%     Lactate: STATUS POST: exploratory laparotomy, extensive lysis of adhesions, takedown of enterocutaneous fistula, repair of serosal tear on cecum, partial right salpingooophorectomy, colostomy revision, parastomal and incisional hernia repair with strattice mesh    POD#:  2    INTERVAL EVENTS: Patient restarted on levo this morning and placed on BiPAP for hypoventilation (pH 7.16).     SUBJECTIVE:   Complaining of abdominal pain this morning.     VITALS  T(C): 36.4, Max: 36.6 (06-08 @ 13:00)  HR: 119 (80 - 126)  BP: 114/71 (114/71 - 138/74)  BP(mean): 88 (88 - 88)  RR: 24 (12 - 26)  SpO2: 100% (98% - 100%)  CAPILLARY BLOOD GLUCOSE  202 (09 Jun 2017 04:00)  115 (08 Jun 2017 12:14)    Is/Os  I & Os for current day (as of 06-09 @ 07:30)  =============================================  IN:    Lactated Ringers IV Bolus: 1000 ml    TPN (Total Parenteral Nutrition): 248 ml    lactated ringers.: 150 ml    Solution: 100 ml    Solution: 100 ml    sodium chloride 0.9%: 50 ml    Solution: 50 ml    Solution: 50 ml    phenylephrine   Infusion: 11.2 ml    fentaNYL  Infusion: 3.7 ml    Total IN: 1762.9 ml  ---------------------------------------------  OUT:    Voided: 700 ml    Drain: 180 ml    Indwelling Catheter - Urethral: 130 ml    Nasoenteral Tube: 70 ml    Colostomy: 1 ml    Total OUT: 1081 ml  ---------------------------------------------  Total NET: 681.9 ml    PHYSICAL EXAM:   General: Resting comfortably in bed, AOx3.   Neuro: no apparent neurologic defects  Resp: on BiPAP; normal respiratory pattern  GI/Abd: Soft, mildly distended, diffuse abdominal tenderness. Midline wound with prevena vac in place, minimal drainage, left sided ostomy congested, no drainage or gas in bag. NGT with bilious output.   Vascular: All 4 extremities warm.    LABS  CBC (06-09 @ 03:56)                              9.5<L>                         2.8<L>  )----------------(  146<L>     --    % Neutrophils, --    % Lymphocytes, ANC: --                                  27.8<L>  CBC (06-08 @ 08:31)                              10.2<L>                         5.22    )----------------(  191        --    % Neutrophils, --    % Lymphocytes, ANC: --                                  32.5<L>    BMP (06-09 @ 03:56)             146<H>  |  116<H>  |  56<H> 		Ca++ --      Ca 7.3<L>             ---------------------------------( 202<H>		Mg 1.8                4.4     |  19<L>   |  1.41<H>			Ph 2.7     BMP (06-08 @ 08:34)             136     |  98      |  57<H> 		Ca++ --      Ca 9.3                ---------------------------------( 107<H>		Mg 2.4                4.7     |  22      |  1.48<H>			Ph 4.1       Coags (06-09 @ 03:56)  aPTT 27.9 / INR 1.13 / PT 12.3  Coags (06-08 @ 09:03)  aPTT 33.6 / INR 1.09 / PT 12.3    ABG (06-09 @ 03:50)     7.20<LL> / 48<H> / 129<H> / 18<L> / -9.1<L> / 98<H>%     Lactate:     ABG (06-09 @ 01:07)     7.37 / 36 / 230<H> / 20<L> / -4.0<L> / 100<H>%     Lactate:

## 2017-06-11 NOTE — PROGRESS NOTE ADULT - SUBJECTIVE AND OBJECTIVE BOX
24 HOUR EVENTS: Off pressors overnight. Given 250cc bolus of 5% albumin. Given dilaudid IV push for persistent abdominal pain.    SUBJECTIVE/ROS:  [ ] A ten-point review of systems was otherwise negative except as noted.  [ ] Due to altered mental status/intubation, subjective information were not able to be obtained from the patient. History was obtained, to the extent possible, from review of the chart and collateral sources of information.    NEURO  Exam: alert and oriented  Meds: fentaNYL   Patch  50 MICROgram(s)/Hr. 1Patch Transdermal every 48 hours  ondansetron Injectable 4milliGRAM(s) IV Push every 6 hours PRN Nausea    [x] Adequacy of sedation and pain control has been assessed and adjusted    RESPIRATORY  RR: 10 (9 - 25)  SpO2: 97% (86% - 100%)  Wt(kg): --  Exam: unlabored, clear to auscultation bilaterally  Mechanical Ventilation:   ABG - ( 11 Jun 2017 03:53 )  pH: x     /  pCO2: 54    /  pO2: 120   / HCO3: 19    / Base Excess: -9.0  /  SaO2: 99      Lactate: x            [N/A] Extubation Readiness Assessed  Meds: ALBUTerol/ipratropium for Nebulization 3milliLiter(s) Nebulizer every 6 hours  diphenhydrAMINE   Injectable 25milliGRAM(s) IV Push every 4 hours PRN Pruritus      CARDIOVASCULAR  HR: 111 (108 - 123)  BP: 104/44 (76/35 - 104/49)  BP(mean): 63 (50 - 71)  ABP: 112/48 (76/33 - 141/43)  ABP(mean): 69 (45 - 98)  Wt(kg): --  CVP(cm H2O): --  VBG - ( 09 Jun 2017 10:51 )  pH: 7.20  /  pCO2: 51    /  pO2: 47    / HCO3: 19    / Base Excess: -7.9  /  SaO2: 76     Lactate: 1.7                Exam: regular rate and rhythm  Cardiac Rhythm: sinus  Perfusion     [x]Adequate   [ ]Inadequate  Mentation   [x]Normal       [ ]Reduced  Extremities  [x]Warm         [ ]Cool  I & Os for 24h ending 06-10 @ 07:00  =============================================  IN: 5538 ml / OUT: 1380 ml / NET: 4158 ml    Volume Status [ ]Hypervolemic [x]Euvolemic [ ]Hypovolemic  Meds: norepinephrine Infusion 0.02MICROgram(s)/kG/Min IV Continuous <Continuous>      GI/NUTRITION  Exam: soft, nontender, nondistended, incision C/D/I  Diet:  Meds: pantoprazole  Injectable 40milliGRAM(s) IV Push daily      GENITOURINARY  I&O's Detail  I & Os for 24h ending 06-10 @ 07:00  =============================================  IN:    lactated ringers.: 2100 ml    TPN (Total Parenteral Nutrition): 1488 ml    Lactated Ringers IV Bolus: 1000 ml    Solution: 300 ml    Solution: 300 ml    Solution: 200 ml    Solution: 100 ml    Solution: 50 ml    Total IN: 5538 ml  ---------------------------------------------  OUT:    Indwelling Catheter - Urethral: 770 ml    Nasoenteral Tube: 450 ml    Drain: 160 ml    Total OUT: 1380 ml  ---------------------------------------------  Total NET: 4158 ml    I & Os for current day (as of 06-11 @ 04:00)  =============================================  IN:    lactated ringers.: 1800 ml    TPN (Total Parenteral Nutrition): 1240 ml    Packed Red Blood Cells: 320 ml    Solution: 300 ml    norepinephrine Infusion: 201 ml    Solution: 200 ml    Solution: 100 ml    phenylephrine   Infusion: 55.7 ml    vasopressin Infusion: 16.8 ml    Total IN: 4233.5 ml  ---------------------------------------------  OUT:    Indwelling Catheter - Urethral: 1135 ml    Drain: 300 ml    Nasoenteral Tube: 300 ml    Total OUT: 1735 ml  ---------------------------------------------  Total NET: 2498.5 ml      06-11    139  |  110<H>  |  68<H>  ----------------------------<  143<H>  5.2   |  18<L>  |  1.91<H>    Ca    9.0      11 Jun 2017 02:36  Phos  4.7     06-11  Mg     2.4     06-11      [ ] Snyder catheter, indication: N/A  Meds: lactated ringers. 1000milliLiter(s) IV Continuous <Continuous>      HEMATOLOGIC  Meds: fondaparinux Injectable 2.5milliGRAM(s) SubCutaneous daily    [x] VTE Prophylaxis                        8.1    7.4   )-----------( 82       ( 11 Jun 2017 02:36 )             24.7       Transfusion     [ ] PRBC   [ ] Platelets   [ ] FFP   [ ] Cryoprecipitate    INFECTIOUS DISEASES  WBC Count: 7.4 K/uL (06-11 @ 02:36)  WBC Count: 7.5 K/uL (06-10 @ 16:31)  WBC Count: 9.2 K/uL (06-10 @ 11:04)    RECENT CULTURES:    Meds: metroNIDAZOLE  IVPB 500milliGRAM(s) IV Intermittent every 8 hours  aztreonam  IVPB 1000milliGRAM(s) IV Intermittent every 8 hours      ENDOCRINE  CAPILLARY BLOOD GLUCOSE    Meds: levothyroxine Injectable 44MICROGram(s) IV Push daily  vasopressin Infusion 0.02Unit(s)/Min IV Continuous <Continuous>      ACCESS DEVICES:  [ ] Peripheral IV  [ ] Central Venous Line	[ ] R	[ ] L	[ ] IJ	[ ] Fem	[ ] SC	Placed:   [ ] Arterial Line		[ ] R	[ ] L	[ ] Fem	[ ] Rad	[ ] Ax	Placed:   [ ] PICC:					[ ] Mediport  [ ] Urinary Catheter, Date Placed:   [x] Necessity of urinary, arterial, and venous catheters discussed    OTHER MEDICATIONS:  naloxone Injectable 0.1milliGRAM(s) IV Push every 3 minutes PRN      CODE STATUS:      IMAGING: 24 HOUR EVENTS: Off and on pressors overnight. Given 250cc bolus of 5% albumin. Given dilaudid IV pushes for persistent abdominal pain. Started on BiPAP for hypercapnea.    SUBJECTIVE/ROS:  [ ] A ten-point review of systems was otherwise negative except as noted.  [ ] Due to altered mental status/intubation, subjective information were not able to be obtained from the patient. History was obtained, to the extent possible, from review of the chart and collateral sources of information.    NEURO  Exam: alert and oriented  Meds: fentaNYL   Patch  50 MICROgram(s)/Hr. 1Patch Transdermal every 48 hours  ondansetron Injectable 4milliGRAM(s) IV Push every 6 hours PRN Nausea    [x] Adequacy of sedation and pain control has been assessed and adjusted    RESPIRATORY  RR: 10 (9 - 25)  SpO2: 97% (86% - 100%)  Wt(kg): --  Exam: unlabored, clear to auscultation bilaterally  Mechanical Ventilation:   ABG - ( 11 Jun 2017 03:53 )  pH: x     /  pCO2: 54    /  pO2: 120   / HCO3: 19    / Base Excess: -9.0  /  SaO2: 99      Lactate: x            [N/A] Extubation Readiness Assessed  Meds: ALBUTerol/ipratropium for Nebulization 3milliLiter(s) Nebulizer every 6 hours  diphenhydrAMINE   Injectable 25milliGRAM(s) IV Push every 4 hours PRN Pruritus      CARDIOVASCULAR  HR: 111 (108 - 123)  BP: 104/44 (76/35 - 104/49)  BP(mean): 63 (50 - 71)  ABP: 112/48 (76/33 - 141/43)  ABP(mean): 69 (45 - 98)  Wt(kg): --  CVP(cm H2O): --  VBG - ( 09 Jun 2017 10:51 )  pH: 7.20  /  pCO2: 51    /  pO2: 47    / HCO3: 19    / Base Excess: -7.9  /  SaO2: 76     Lactate: 1.7                Exam: regular rate and rhythm  Cardiac Rhythm: sinus  Perfusion     [x]Adequate   [ ]Inadequate  Mentation   [x]Normal       [ ]Reduced  Extremities  [x]Warm         [ ]Cool  I & Os for 24h ending 06-10 @ 07:00  =============================================  IN: 5538 ml / OUT: 1380 ml / NET: 4158 ml    Volume Status [ ]Hypervolemic [x]Euvolemic [ ]Hypovolemic  Meds: norepinephrine Infusion 0.02MICROgram(s)/kG/Min IV Continuous <Continuous>      GI/NUTRITION  Exam: soft, nontender, nondistended, incision C/D/I  Diet:  Meds: pantoprazole  Injectable 40milliGRAM(s) IV Push daily      GENITOURINARY  I&O's Detail  I & Os for 24h ending 06-10 @ 07:00  =============================================  IN:    lactated ringers.: 2100 ml    TPN (Total Parenteral Nutrition): 1488 ml    Lactated Ringers IV Bolus: 1000 ml    Solution: 300 ml    Solution: 300 ml    Solution: 200 ml    Solution: 100 ml    Solution: 50 ml    Total IN: 5538 ml  ---------------------------------------------  OUT:    Indwelling Catheter - Urethral: 770 ml    Nasoenteral Tube: 450 ml    Drain: 160 ml    Total OUT: 1380 ml  ---------------------------------------------  Total NET: 4158 ml    I & Os for current day (as of 06-11 @ 04:00)  =============================================  IN:    lactated ringers.: 1800 ml    TPN (Total Parenteral Nutrition): 1240 ml    Packed Red Blood Cells: 320 ml    Solution: 300 ml    norepinephrine Infusion: 201 ml    Solution: 200 ml    Solution: 100 ml    phenylephrine   Infusion: 55.7 ml    vasopressin Infusion: 16.8 ml    Total IN: 4233.5 ml  ---------------------------------------------  OUT:    Indwelling Catheter - Urethral: 1135 ml    Drain: 300 ml    Nasoenteral Tube: 300 ml    Total OUT: 1735 ml  ---------------------------------------------  Total NET: 2498.5 ml      06-11    139  |  110<H>  |  68<H>  ----------------------------<  143<H>  5.2   |  18<L>  |  1.91<H>    Ca    9.0      11 Jun 2017 02:36  Phos  4.7     06-11  Mg     2.4     06-11      [ ] Snyder catheter, indication: N/A  Meds: lactated ringers. 1000milliLiter(s) IV Continuous <Continuous>      HEMATOLOGIC  Meds: fondaparinux Injectable 2.5milliGRAM(s) SubCutaneous daily    [x] VTE Prophylaxis                        8.1    7.4   )-----------( 82       ( 11 Jun 2017 02:36 )             24.7       Transfusion     [ ] PRBC   [ ] Platelets   [ ] FFP   [ ] Cryoprecipitate    INFECTIOUS DISEASES  WBC Count: 7.4 K/uL (06-11 @ 02:36)  WBC Count: 7.5 K/uL (06-10 @ 16:31)  WBC Count: 9.2 K/uL (06-10 @ 11:04)    RECENT CULTURES:    Meds: metroNIDAZOLE  IVPB 500milliGRAM(s) IV Intermittent every 8 hours  aztreonam  IVPB 1000milliGRAM(s) IV Intermittent every 8 hours      ENDOCRINE  CAPILLARY BLOOD GLUCOSE    Meds: levothyroxine Injectable 44MICROGram(s) IV Push daily  vasopressin Infusion 0.02Unit(s)/Min IV Continuous <Continuous>      ACCESS DEVICES:  [ ] Peripheral IV  [ ] Central Venous Line	[ ] R	[ ] L	[ ] IJ	[ ] Fem	[ ] SC	Placed:   [ ] Arterial Line		[ ] R	[ ] L	[ ] Fem	[ ] Rad	[ ] Ax	Placed:   [ ] PICC:					[ ] Mediport  [ ] Urinary Catheter, Date Placed:   [x] Necessity of urinary, arterial, and venous catheters discussed    OTHER MEDICATIONS:  naloxone Injectable 0.1milliGRAM(s) IV Push every 3 minutes PRN      CODE STATUS:      IMAGING: 24 HOUR EVENTS: Off and on pressors overnight. Given 250cc bolus of 5% albumin. Given dilaudid IV pushes for persistent abdominal pain. Started on BiPAP for hypercapnea.    SUBJECTIVE/ROS:  Severe pain    NEURO  Exam: alert and oriented  Meds: fentaNYL   Patch  50 MICROgram(s)/Hr. 1Patch Transdermal every 48 hours  ondansetron Injectable 4milliGRAM(s) IV Push every 6 hours PRN Nausea    [x] Adequacy of sedation and pain control has been assessed and adjusted    RESPIRATORY  RR: 10 (9 - 25)  SpO2: 97% (86% - 100%)  Wt(kg): --  Exam: unlabored, clear to auscultation bilaterally  Mechanical Ventilation:   ABG - ( 11 Jun 2017 03:53 )  pH: x     /  pCO2: 54    /  pO2: 120   / HCO3: 19    / Base Excess: -9.0  /  SaO2: 99      Lactate: x            [N/A] Extubation Readiness Assessed  Meds: ALBUTerol/ipratropium for Nebulization 3milliLiter(s) Nebulizer every 6 hours  diphenhydrAMINE   Injectable 25milliGRAM(s) IV Push every 4 hours PRN Pruritus      CARDIOVASCULAR  HR: 111 (108 - 123)  BP: 104/44 (76/35 - 104/49)  BP(mean): 63 (50 - 71)  ABP: 112/48 (76/33 - 141/43)  ABP(mean): 69 (45 - 98)  Wt(kg): --  CVP(cm H2O): --  VBG - ( 09 Jun 2017 10:51 )  pH: 7.20  /  pCO2: 51    /  pO2: 47    / HCO3: 19    / Base Excess: -7.9  /  SaO2: 76     Lactate: 1.7        Exam: regular rate and rhythm  Cardiac Rhythm: sinus  Perfusion     [x]Adequate   [ ]Inadequate  Mentation   [x]Normal       [ ]Reduced  Extremities  [x]Warm         [ ]Cool  I & Os for 24h ending 06-10 @ 07:00  =============================================  IN: 5538 ml / OUT: 1380 ml / NET: 4158 ml    Volume Status [ ]Hypervolemic [x]Euvolemic [ ]Hypovolemic  Meds: norepinephrine Infusion 0.02MICROgram(s)/kG/Min IV Continuous <Continuous>      GI/NUTRITION  Exam: soft, nontender, nondistended, incision C/D/I  Diet:  Meds: pantoprazole  Injectable 40milliGRAM(s) IV Push daily      GENITOURINARY  I&O's Detail    I & Os for current day (as of 06-11 @ 08:56)  =============================================  IN:    lactated ringers.: 2200 ml    TPN (Total Parenteral Nutrition): 1488 ml    Packed Red Blood Cells: 320 ml    Solution: 300 ml    norepinephrine Infusion: 208 ml    Solution: 200 ml    Solution: 150 ml    phenylephrine   Infusion: 55.7 ml    vasopressin Infusion: 26.4 ml    Total IN: 4948.1 ml  ---------------------------------------------  OUT:    Indwelling Catheter - Urethral: 1515 ml    Drain: 300 ml    Nasoenteral Tube: 300 ml    Total OUT: 2115 ml  ---------------------------------------------  Total NET: 2833.1 ml          06-11    139  |  110<H>  |  68<H>  ----------------------------<  143<H>  5.2   |  18<L>  |  1.91<H>    Ca    9.0      11 Jun 2017 02:36  Phos  4.7     06-11  Mg     2.4     06-11      [ ] Snyder catheter, indication: N/A  Meds: lactated ringers. 1000milliLiter(s) IV Continuous <Continuous>      HEMATOLOGIC  Meds: fondaparinux Injectable 2.5milliGRAM(s) SubCutaneous daily    [x] VTE Prophylaxis                        8.1    7.4   )-----------( 82       ( 11 Jun 2017 02:36 )             24.7       Transfusion     [ ] PRBC   [ ] Platelets   [ ] FFP   [ ] Cryoprecipitate    INFECTIOUS DISEASES  WBC Count: 7.4 K/uL (06-11 @ 02:36)  WBC Count: 7.5 K/uL (06-10 @ 16:31)  WBC Count: 9.2 K/uL (06-10 @ 11:04)  Procalcitonin, Serum: 52.16: Procalcitonin (PCT) Interpretation (ng/mL) - Diagnosis of systemic  bacterial infection/sepsis        RECENT CULTURES:  Meds: metroNIDAZOLE  IVPB 500milliGRAM(s) IV Intermittent every 8 hours  aztreonam  IVPB 1000milliGRAM(s) IV Intermittent every 8 hours      ENDOCRINE  CAPILLARY BLOOD GLUCOSE  Blood Gas Arterial - Glucose (06.11.17 @ 06:18)    Blood Gas Arterial - Glucose: 132 mg/dL      Meds: levothyroxine Injectable 44MICROGram(s) IV Push daily  vasopressin Infusion 0.02Unit(s)/Min IV Continuous <Continuous>      ACCESS DEVICES:  [ ] Peripheral IV  [ ] Central Venous Line	[ ] R	[ ] L	[ ] IJ	[ ] Fem	[ ] SC	Placed:   [ ] Arterial Line		[ ] R	[ ] L	[ ] Fem	[ ] Rad	[ ] Ax	Placed:   [ ] PICC:					[ ] Mediport  [ ] Urinary Catheter, Date Placed:   [x] Necessity of urinary, arterial, and venous catheters discussed    OTHER MEDICATIONS:  naloxone Injectable 0.1milliGRAM(s) IV Push every 3 minutes PRN      CODE STATUS:      IMAGING:

## 2017-06-11 NOTE — PROGRESS NOTE ADULT - ASSESSMENT
ASSESSMENT:   76y Female with PMH of COPD, CHF, h/o DVT/PE s/p IVC filter (later removed), GERD, Hypothyroidism, h/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 Diff, and PSH of diverticulitis s/p Ruben's, recurrent SBO s/p SBR, ECF at small bowel anastomosis site s/p takedown, wound dehiscence, and abdominal reconstruction complicated by parastomal hernia and incisional hernia with unresolving SBO now POD#2 s/p exploratory laparotomy, extensive lysis of adhesions, take down of ECF, repair of serosal tear on cecum, partial right salpingoophorectomy, colostomy revision, parastomal and incisional hernia repair with strattice mesh.    PLAN:   Neurologic: Chronic Pain - Tylenol PRN, fentanyl patch for pain control. Pain consult for further recommendations    Respiratory: Wean BiPAP. Pulmonary toilet, OOB, incentive spirometry    Cardiovascular: Hypotension likely secondary to sepsis and hypovolemia. IVF resuscitate. Wean pressors.    Gastrointestinal: S/p extensive abdominal surgery. NPO with NGT to low wall suction. GERD. Protonix daily. Monitor GI function.    Genitourinary/Renal: TREVA with CKD stage 3. Monitor I&Os. Trend BUN/Cr. Replete electrolytes as needed. C/w IVF. Check urine lytes.    Hematologic: h/o HIT. Continue fondaparinux for DVT PPx    Infectious Disease: Sepsis. Continue to trend WBC, c/w aztreonam/flagyl. F/u blood cultures.    Endocrine: Hypothyroidism. Continue home levothyroxine. Monitor glucose on BMP.    Dispo: SICU ASSESSMENT:   76y Female with PMH of COPD, h/o DVT/PE s/p IVC filter (later removed), GERD, Hypothyroidism, h/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 Diff, and PSH of diverticulitis s/p Ruben's, recurrent SBO s/p SBR, ECF at small bowel anastomosis site s/p takedown, wound dehiscence, and abdominal reconstruction complicated by parastomal hernia and incisional hernia with unresolving SBO now POD#2 s/p exploratory laparotomy, extensive lysis of adhesions, take down of ECF, repair of serosal tear on cecum, partial right salpingoophorectomy, colostomy revision, parastomal and incisional hernia repair with strattice mesh.    PLAN:   Neurologic: Chronic Pain - Tylenol PRN, fentanyl patch for pain control. Pain consult for further recommendations, Epidural vs. TAP Block    Respiratory: Wean BiPAP. Pulmonary toilet, OOB, incentive spirometry    Cardiovascular: Hypotension likely secondary to sepsis and hypovolemia. IVF resuscitate. Wean pressors.    Gastrointestinal: S/p extensive abdominal surgery. NPO with NGT to low wall suction. GERD. Protonix daily. Monitor GI function.    Genitourinary/Renal: TREVA with CKD stage 3. Monitor I&Os. Trend BUN/Cr. Replete electrolytes as needed. C/w IVF. Check urine lytes.    Hematologic: h/o HIT. Continue fondaparinux for DVT PPx    Infectious Disease: Sepsis procalcitonin elevated to 52. Continue to trend WBC, c/w aztreonam/flagyl. F/u blood cultures.    Endocrine: Hypothyroidism. Continue home levothyroxine. Monitor glucose on BMP.    Dispo: SICU

## 2017-06-11 NOTE — PROGRESS NOTE ADULT - PROBLEM SELECTOR PLAN 3
Removal of all phos from PN as well as replacing part of the Cl with Acetate.  However it is unclear as to whether or not the liver will process the acetate to bicarbonate.

## 2017-06-11 NOTE — PROGRESS NOTE ADULT - ATTENDING COMMENTS
Dr. Capellan (Attending Physician)  Chronic pain now with intermittent severe pain.  CO2 retention overnight and acidosis. Started on BiPAP.  Likely septic shock weaning NE on vaso gtt.  Poor echo windows. SVV 13-15 so likely euvolemic despite being positive 2.8 liters yesterday. TREVA on CKD with significant acidosis from Uremia.  She appears volume resuscitated. Trend h/h send coags received 1 unit RBC yesterday. c/w aztreonam and flagyl.    Total CC time 45 min

## 2017-06-11 NOTE — PROGRESS NOTE ADULT - SUBJECTIVE AND OBJECTIVE BOX
Patient seen and examined lying in bed lethargic, placed on bipap earlier for hypercapnia now on Levophed and Vasopressin    REVIEW OF SYSTEMS:  SHEMAR 2/2 lethargy      MEDICATIONS  (STANDING):  lactated ringers. 1000milliLiter(s) IV Continuous <Continuous>  levothyroxine Injectable 44MICROGram(s) IV Push daily  pantoprazole  Injectable 40milliGRAM(s) IV Push daily  ALBUTerol/ipratropium for Nebulization 3milliLiter(s) Nebulizer every 6 hours  fondaparinux Injectable 2.5milliGRAM(s) SubCutaneous daily  fentaNYL   Patch  50 MICROgram(s)/Hr. 1Patch Transdermal every 48 hours  norepinephrine Infusion 0.02MICROgram(s)/kG/Min IV Continuous <Continuous>  metroNIDAZOLE  IVPB 500milliGRAM(s) IV Intermittent every 8 hours  aztreonam  IVPB 1000milliGRAM(s) IV Intermittent every 8 hours  vasopressin Infusion 0.02Unit(s)/Min IV Continuous <Continuous>  acetaminophen  IVPB. 1000milliGRAM(s) IV Intermittent once  acetaminophen  IVPB. 1000milliGRAM(s) IV Intermittent once    MEDICATIONS  (PRN):  naloxone Injectable 0.1milliGRAM(s) IV Push every 3 minutes PRN For ANY of the following changes in patient status:  A. RR LESS THAN 10 breaths per minute, B. Oxygen saturation LESS THAN 90%, C. Sedation score of 6  ondansetron Injectable 4milliGRAM(s) IV Push every 6 hours PRN Nausea  diphenhydrAMINE   Injectable 25milliGRAM(s) IV Push every 4 hours PRN Pruritus      VITAL:  T(F): , Max: 98.5 (06-10 @ 23:00)  HR: 108  BP: 104/44  BP(mean): 63  RR: 11  SpO2: 100%  Wt(kg): --    I and O's:  I & Os for 24h ending  @ 07:00  =============================================  IN: 4948.1 ml / OUT: 2115 ml / NET: 2833.1 ml    I & Os for current day (as of  @ 11:33)  =============================================  IN: 397.4 ml / OUT: 165 ml / NET: 232.4 ml        PHYSICAL EXAM:    Constitutional: lethargic on bipap  Neck: No LAD, No JVD  Respiratory: diminished anterolaterally  Cardiovascular: S1 and S2  Gastrointestinal: BS hypoactive, soft distended  Extremities: + generalized edema  Neurological: lethargic  : + Snyder      LABS:                          8.4    7.8   )-----------( 78       ( 2017 11:07 )             25.4                         8.1    7.4   )-----------( 82       ( 2017 02:36 )             24.7                         9.0    7.5   )-----------( 105      ( 10 Anastacio 2017 16:31 )             27.6                         7.5    9.2   )-----------( 109      ( 10 Anastacio 2017 11:04 )             23.9                         8.0    7.2   )-----------( 109      ( 10 Anastacio 2017 02:43 )             24.5     06-11    139  |  109<H>  |  67<H>  ----------------------------<  150<H>  4.9   |  17<L>  |  1.77<H>  11    139  |  110<H>  |  68<H>  ----------------------------<  143<H>  5.2   |  18<L>  |  1.91<H>  06-10    139  |  110<H>  |  67<H>  ----------------------------<  150<H>  5.1   |  17<L>  |  1.86<H>  06-10    141  |  112<H>  |  66<H>  ----------------------------<  156<H>  5.7<H>   |  18<L>  |  1.83<H>  06-09    141  |  112<H>  |  60<H>  ----------------------------<  178<H>  5.3   |  19<L>  |  1.76<H>  06-09    146<H>  |  116<H>  |  56<H>  ----------------------------<  202<H>  4.4   |  19<L>  |  1.41<H>    Phos  4.5     06-11  Phos  4.7     06-11  Phos  4.0     06-10  Phos  4.3     06-10  Phos  3.6     06-09  Phos  2.7     06-09  Mg     2.6     06-11  Mg     2.4     06-11  Mg     2.4     06-10  Mg     1.8     06-10  Mg     1.8     06-09  Mg     1.8     06-09        Urine Studies:  Urinalysis Basic - ( 10 Anastacio 2017 11:04 )    Color: Yellow / Appearance: SL Turbid / S.020 / pH: x  Gluc: x / Ketone: Negative  / Bili: Negative / Urobili: Negative   Blood: x / Protein: Trace / Nitrite: Negative   Leuk Esterase: Trace / RBC: 2-5 /HPF / WBC 2-5 /HPF   Sq Epi: x / Non Sq Epi: Occasional /HPF / Bacteria: Few /HPF        Urine Studies -11 @ 10:43  Random Urine Creatinine 25  Random Urine Sodium 51  Random Urine Chloride --  Random Urine Potassium 21  Randoom Urine Osmolality 370  Serum Osmolality --      RADIOLOGY & ADDITIONAL STUDIES:    Jose Alberto Levine NP  [facility]  [phone]

## 2017-06-11 NOTE — PROGRESS NOTE ADULT - ASSESSMENT
ASSESSMENT  76y female with high volume ECF s/p exploratory laparotomy, extensive lysis of adhesions, takedown of enterocutaneous fistula, repair of serosal tear on cecum, partial right salpingooophorectomy, colostomy revision, parastomal and incisional hernia repair with strattice mesh, POD 2    PLAN  - Wean BiPAP as tolerated  - Wean levo as tolerated  - Cont. NGT to suction  - Pain control PRN; add RTC Tylenol. Limit narcotics.   - NPO / NGT  - continue chemical VTE ppx  - Will discuss with attending    KRYSTAL Ansari, PGY1  Pager: [phone]

## 2017-06-11 NOTE — PROGRESS NOTE ADULT - PROBLEM SELECTOR PLAN 1
sp removal of ECF c lysis of adhesion.  NG tube at present. TPN running. Hypotensive on Levophed and Vasopressin. S/p multiple fluid boluses

## 2017-06-12 DIAGNOSIS — E83.52 HYPERCALCEMIA: ICD-10-CM

## 2017-06-12 DIAGNOSIS — I95.9 HYPOTENSION, UNSPECIFIED: ICD-10-CM

## 2017-06-12 LAB
ALBUMIN SERPL ELPH-MCNC: 1.8 G/DL — LOW (ref 3.3–5)
ALBUMIN SERPL ELPH-MCNC: 2.7 G/DL — LOW (ref 3.3–5)
ALP SERPL-CCNC: 114 U/L — SIGNIFICANT CHANGE UP (ref 40–120)
ALP SERPL-CCNC: 131 U/L — HIGH (ref 40–120)
ALT FLD-CCNC: 23 U/L RC — SIGNIFICANT CHANGE UP (ref 10–45)
ALT FLD-CCNC: 27 U/L RC — SIGNIFICANT CHANGE UP (ref 10–45)
ANION GAP SERPL CALC-SCNC: 11 MMOL/L — SIGNIFICANT CHANGE UP (ref 5–17)
ANION GAP SERPL CALC-SCNC: 13 MMOL/L — SIGNIFICANT CHANGE UP (ref 5–17)
APPEARANCE UR: CLEAR — SIGNIFICANT CHANGE UP
APTT BLD: 44.4 SEC — HIGH (ref 27.5–37.4)
APTT BLD: 54.8 SEC — HIGH (ref 27.5–37.4)
AST SERPL-CCNC: 35 U/L — SIGNIFICANT CHANGE UP (ref 10–40)
AST SERPL-CCNC: 35 U/L — SIGNIFICANT CHANGE UP (ref 10–40)
BACTERIA # UR AUTO: ABNORMAL /HPF
BILIRUB DIRECT SERPL-MCNC: 0.2 MG/DL — SIGNIFICANT CHANGE UP (ref 0–0.2)
BILIRUB INDIRECT FLD-MCNC: 0.1 MG/DL — LOW (ref 0.2–1)
BILIRUB SERPL-MCNC: 0.3 MG/DL — SIGNIFICANT CHANGE UP (ref 0.2–1.2)
BILIRUB SERPL-MCNC: 0.7 MG/DL — SIGNIFICANT CHANGE UP (ref 0.2–1.2)
BILIRUB UR-MCNC: NEGATIVE — SIGNIFICANT CHANGE UP
BUN SERPL-MCNC: 57 MG/DL — HIGH (ref 7–23)
BUN SERPL-MCNC: 66 MG/DL — HIGH (ref 7–23)
CA-I BLD-SCNC: 1.35 MMOL/L — HIGH (ref 1.12–1.3)
CALCIUM SERPL-MCNC: 9.1 MG/DL — SIGNIFICANT CHANGE UP (ref 8.4–10.5)
CALCIUM SERPL-MCNC: 9.3 MG/DL — SIGNIFICANT CHANGE UP (ref 8.4–10.5)
CHLORIDE SERPL-SCNC: 109 MMOL/L — HIGH (ref 96–108)
CHLORIDE SERPL-SCNC: 111 MMOL/L — HIGH (ref 96–108)
CO2 SERPL-SCNC: 18 MMOL/L — LOW (ref 22–31)
CO2 SERPL-SCNC: 21 MMOL/L — LOW (ref 22–31)
COLOR SPEC: YELLOW — SIGNIFICANT CHANGE UP
COMMENT - URINE: SIGNIFICANT CHANGE UP
CREAT ?TM UR-MCNC: 29 MG/DL — SIGNIFICANT CHANGE UP
CREAT SERPL-MCNC: 1.63 MG/DL — HIGH (ref 0.5–1.3)
CREAT SERPL-MCNC: 1.68 MG/DL — HIGH (ref 0.5–1.3)
DIFF PNL FLD: ABNORMAL
EPI CELLS # UR: SIGNIFICANT CHANGE UP /HPF
GAS PNL BLDA: SIGNIFICANT CHANGE UP
GLUCOSE SERPL-MCNC: 128 MG/DL — HIGH (ref 70–99)
GLUCOSE SERPL-MCNC: 137 MG/DL — HIGH (ref 70–99)
GLUCOSE UR QL: NEGATIVE — SIGNIFICANT CHANGE UP
HCT VFR BLD CALC: 22.9 % — LOW (ref 34.5–45)
HCT VFR BLD CALC: 24.5 % — LOW (ref 34.5–45)
HGB BLD-MCNC: 7.5 G/DL — LOW (ref 11.5–15.5)
HGB BLD-MCNC: 7.8 G/DL — LOW (ref 11.5–15.5)
INR BLD: 2.02 RATIO — HIGH (ref 0.88–1.16)
INR BLD: 2.06 RATIO — HIGH (ref 0.88–1.16)
KETONES UR-MCNC: NEGATIVE — SIGNIFICANT CHANGE UP
LEUKOCYTE ESTERASE UR-ACNC: NEGATIVE — SIGNIFICANT CHANGE UP
MAGNESIUM SERPL-MCNC: 2.4 MG/DL — SIGNIFICANT CHANGE UP (ref 1.6–2.6)
MAGNESIUM SERPL-MCNC: 2.4 MG/DL — SIGNIFICANT CHANGE UP (ref 1.6–2.6)
MCHC RBC-ENTMCNC: 31.7 GM/DL — LOW (ref 32–36)
MCHC RBC-ENTMCNC: 32.6 GM/DL — SIGNIFICANT CHANGE UP (ref 32–36)
MCHC RBC-ENTMCNC: 33 PG — SIGNIFICANT CHANGE UP (ref 27–34)
MCHC RBC-ENTMCNC: 34.3 PG — HIGH (ref 27–34)
MCV RBC AUTO: 104 FL — HIGH (ref 80–100)
MCV RBC AUTO: 105 FL — HIGH (ref 80–100)
NITRITE UR-MCNC: NEGATIVE — SIGNIFICANT CHANGE UP
NT-PROBNP SERPL-SCNC: 1307 PG/ML — HIGH (ref 0–300)
OSMOLALITY UR: 383 MOS/KG — SIGNIFICANT CHANGE UP (ref 300–900)
PH UR: 5.5 — SIGNIFICANT CHANGE UP (ref 5–8)
PHOSPHATE SERPL-MCNC: 2.7 MG/DL — SIGNIFICANT CHANGE UP (ref 2.5–4.5)
PHOSPHATE SERPL-MCNC: 3.1 MG/DL — SIGNIFICANT CHANGE UP (ref 2.5–4.5)
PLATELET # BLD AUTO: 64 K/UL — LOW (ref 150–400)
PLATELET # BLD AUTO: 70 K/UL — LOW (ref 150–400)
POTASSIUM SERPL-MCNC: 4.3 MMOL/L — SIGNIFICANT CHANGE UP (ref 3.5–5.3)
POTASSIUM SERPL-MCNC: 4.4 MMOL/L — SIGNIFICANT CHANGE UP (ref 3.5–5.3)
POTASSIUM SERPL-SCNC: 4.3 MMOL/L — SIGNIFICANT CHANGE UP (ref 3.5–5.3)
POTASSIUM SERPL-SCNC: 4.4 MMOL/L — SIGNIFICANT CHANGE UP (ref 3.5–5.3)
POTASSIUM UR-SCNC: 22 MMOL/L — SIGNIFICANT CHANGE UP
PROT SERPL-MCNC: 4.3 G/DL — LOW (ref 6–8.3)
PROT SERPL-MCNC: 4.8 G/DL — LOW (ref 6–8.3)
PROT UR-MCNC: 30 MG/DL
PROTHROM AB SERPL-ACNC: 22.3 SEC — HIGH (ref 9.8–12.7)
PROTHROM AB SERPL-ACNC: 22.6 SEC — HIGH (ref 9.8–12.7)
RBC # BLD: 2.17 M/UL — LOW (ref 3.8–5.2)
RBC # BLD: 2.35 M/UL — LOW (ref 3.8–5.2)
RBC # FLD: 14.3 % — SIGNIFICANT CHANGE UP (ref 10.3–14.5)
RBC # FLD: 14.4 % — SIGNIFICANT CHANGE UP (ref 10.3–14.5)
RBC CASTS # UR COMP ASSIST: SIGNIFICANT CHANGE UP /HPF (ref 0–2)
SODIUM SERPL-SCNC: 140 MMOL/L — SIGNIFICANT CHANGE UP (ref 135–145)
SODIUM SERPL-SCNC: 143 MMOL/L — SIGNIFICANT CHANGE UP (ref 135–145)
SODIUM UR-SCNC: 31 MMOL/L — SIGNIFICANT CHANGE UP
SP GR SPEC: 1.02 — SIGNIFICANT CHANGE UP (ref 1.01–1.02)
UROBILINOGEN FLD QL: NEGATIVE — SIGNIFICANT CHANGE UP
UUN UR-MCNC: 494 MG/DL — SIGNIFICANT CHANGE UP
WBC # BLD: 4.7 K/UL — SIGNIFICANT CHANGE UP (ref 3.8–10.5)
WBC # BLD: 4.7 K/UL — SIGNIFICANT CHANGE UP (ref 3.8–10.5)
WBC # FLD AUTO: 4.7 K/UL — SIGNIFICANT CHANGE UP (ref 3.8–10.5)
WBC # FLD AUTO: 4.7 K/UL — SIGNIFICANT CHANGE UP (ref 3.8–10.5)

## 2017-06-12 PROCEDURE — 36556 INSERT NON-TUNNEL CV CATH: CPT | Mod: GC

## 2017-06-12 PROCEDURE — 74177 CT ABD & PELVIS W/CONTRAST: CPT | Mod: 26

## 2017-06-12 PROCEDURE — 93010 ELECTROCARDIOGRAM REPORT: CPT

## 2017-06-12 PROCEDURE — 31500 INSERT EMERGENCY AIRWAY: CPT | Mod: GC

## 2017-06-12 PROCEDURE — 71010: CPT | Mod: 26,77,76

## 2017-06-12 PROCEDURE — 74000: CPT | Mod: 26

## 2017-06-12 PROCEDURE — 36620 INSERTION CATHETER ARTERY: CPT | Mod: GC

## 2017-06-12 PROCEDURE — 99291 CRITICAL CARE FIRST HOUR: CPT

## 2017-06-12 PROCEDURE — 71010: CPT | Mod: 26

## 2017-06-12 PROCEDURE — 99292 CRITICAL CARE ADDL 30 MIN: CPT | Mod: 25

## 2017-06-12 RX ORDER — DEXMEDETOMIDINE HYDROCHLORIDE IN 0.9% SODIUM CHLORIDE 4 UG/ML
1 INJECTION INTRAVENOUS
Qty: 200 | Refills: 0 | Status: DISCONTINUED | OUTPATIENT
Start: 2017-06-12 | End: 2017-06-17

## 2017-06-12 RX ORDER — VASOPRESSIN 20 [USP'U]/ML
0.04 INJECTION INTRAVENOUS
Qty: 100 | Refills: 0 | Status: DISCONTINUED | OUTPATIENT
Start: 2017-06-12 | End: 2017-06-14

## 2017-06-12 RX ORDER — DAPTOMYCIN 500 MG/10ML
INJECTION, POWDER, LYOPHILIZED, FOR SOLUTION INTRAVENOUS
Qty: 0 | Refills: 0 | Status: DISCONTINUED | OUTPATIENT
Start: 2017-06-12 | End: 2017-06-17

## 2017-06-12 RX ORDER — DAPTOMYCIN 500 MG/10ML
450 INJECTION, POWDER, LYOPHILIZED, FOR SOLUTION INTRAVENOUS ONCE
Qty: 0 | Refills: 0 | Status: COMPLETED | OUTPATIENT
Start: 2017-06-12 | End: 2017-06-12

## 2017-06-12 RX ORDER — ALBUMIN HUMAN 25 %
500 VIAL (ML) INTRAVENOUS ONCE
Qty: 0 | Refills: 0 | Status: DISCONTINUED | OUTPATIENT
Start: 2017-06-12 | End: 2017-06-12

## 2017-06-12 RX ORDER — NOREPINEPHRINE BITARTRATE/D5W 8 MG/250ML
0.2 PLASTIC BAG, INJECTION (ML) INTRAVENOUS
Qty: 8 | Refills: 0 | Status: DISCONTINUED | OUTPATIENT
Start: 2017-06-12 | End: 2017-06-14

## 2017-06-12 RX ORDER — SODIUM CHLORIDE 9 MG/ML
500 INJECTION, SOLUTION INTRAVENOUS ONCE
Qty: 0 | Refills: 0 | Status: COMPLETED | OUTPATIENT
Start: 2017-06-12 | End: 2017-06-12

## 2017-06-12 RX ORDER — ACETAMINOPHEN 500 MG
1000 TABLET ORAL ONCE
Qty: 0 | Refills: 0 | Status: COMPLETED | OUTPATIENT
Start: 2017-06-12 | End: 2017-06-12

## 2017-06-12 RX ORDER — DAPTOMYCIN 500 MG/10ML
450 INJECTION, POWDER, LYOPHILIZED, FOR SOLUTION INTRAVENOUS ONCE
Qty: 0 | Refills: 0 | Status: DISCONTINUED | OUTPATIENT
Start: 2017-06-12 | End: 2017-06-12

## 2017-06-12 RX ORDER — DAPTOMYCIN 500 MG/10ML
INJECTION, POWDER, LYOPHILIZED, FOR SOLUTION INTRAVENOUS
Qty: 0 | Refills: 0 | Status: DISCONTINUED | OUTPATIENT
Start: 2017-06-12 | End: 2017-06-12

## 2017-06-12 RX ORDER — DAPTOMYCIN 500 MG/10ML
450 INJECTION, POWDER, LYOPHILIZED, FOR SOLUTION INTRAVENOUS
Qty: 0 | Refills: 0 | Status: DISCONTINUED | OUTPATIENT
Start: 2017-06-14 | End: 2017-06-17

## 2017-06-12 RX ORDER — ALTEPLASE 100 MG
2 KIT INTRAVENOUS ONCE
Qty: 0 | Refills: 0 | Status: DISCONTINUED | OUTPATIENT
Start: 2017-06-12 | End: 2017-06-12

## 2017-06-12 RX ORDER — FLUCONAZOLE 150 MG/1
200 TABLET ORAL ONCE
Qty: 0 | Refills: 0 | Status: COMPLETED | OUTPATIENT
Start: 2017-06-12 | End: 2017-06-12

## 2017-06-12 RX ORDER — ACETAMINOPHEN 500 MG
1000 TABLET ORAL ONCE
Qty: 0 | Refills: 0 | Status: COMPLETED | OUTPATIENT
Start: 2017-06-13 | End: 2017-06-13

## 2017-06-12 RX ORDER — ALBUMIN HUMAN 25 %
500 VIAL (ML) INTRAVENOUS ONCE
Qty: 0 | Refills: 0 | Status: COMPLETED | OUTPATIENT
Start: 2017-06-12 | End: 2017-06-12

## 2017-06-12 RX ORDER — AMIODARONE HYDROCHLORIDE 400 MG/1
1 TABLET ORAL
Qty: 900 | Refills: 0 | Status: DISCONTINUED | OUTPATIENT
Start: 2017-06-12 | End: 2017-06-13

## 2017-06-12 RX ORDER — FLUCONAZOLE 150 MG/1
100 TABLET ORAL EVERY 24 HOURS
Qty: 0 | Refills: 0 | Status: DISCONTINUED | OUTPATIENT
Start: 2017-06-13 | End: 2017-06-20

## 2017-06-12 RX ORDER — AMIODARONE HYDROCHLORIDE 400 MG/1
150 TABLET ORAL ONCE
Qty: 0 | Refills: 0 | Status: COMPLETED | OUTPATIENT
Start: 2017-06-12 | End: 2017-06-12

## 2017-06-12 RX ORDER — FLUCONAZOLE 150 MG/1
TABLET ORAL
Qty: 0 | Refills: 0 | Status: DISCONTINUED | OUTPATIENT
Start: 2017-06-12 | End: 2017-06-20

## 2017-06-12 RX ORDER — AMIODARONE HYDROCHLORIDE 400 MG/1
0.5 TABLET ORAL
Qty: 900 | Refills: 0 | Status: DISCONTINUED | OUTPATIENT
Start: 2017-06-13 | End: 2017-06-13

## 2017-06-12 RX ORDER — IPRATROPIUM/ALBUTEROL SULFATE 18-103MCG
3 AEROSOL WITH ADAPTER (GRAM) INHALATION EVERY 6 HOURS
Qty: 0 | Refills: 0 | Status: DISCONTINUED | OUTPATIENT
Start: 2017-06-12 | End: 2017-06-17

## 2017-06-12 RX ADMIN — HYDROMORPHONE HYDROCHLORIDE 0.2 MILLIGRAM(S): 2 INJECTION INTRAMUSCULAR; INTRAVENOUS; SUBCUTANEOUS at 02:09

## 2017-06-12 RX ADMIN — Medication 1000 MILLIGRAM(S): at 23:15

## 2017-06-12 RX ADMIN — Medication 400 MILLIGRAM(S): at 23:00

## 2017-06-12 RX ADMIN — HYDROMORPHONE HYDROCHLORIDE 0.2 MILLIGRAM(S): 2 INJECTION INTRAMUSCULAR; INTRAVENOUS; SUBCUTANEOUS at 07:20

## 2017-06-12 RX ADMIN — Medication 50 MILLIGRAM(S): at 06:19

## 2017-06-12 RX ADMIN — Medication 1000 MILLIGRAM(S): at 20:16

## 2017-06-12 RX ADMIN — PANTOPRAZOLE SODIUM 40 MILLIGRAM(S): 20 TABLET, DELAYED RELEASE ORAL at 12:39

## 2017-06-12 RX ADMIN — Medication 100 MILLIGRAM(S): at 16:27

## 2017-06-12 RX ADMIN — AMIODARONE HYDROCHLORIDE 600 MILLIGRAM(S): 400 TABLET ORAL at 18:52

## 2017-06-12 RX ADMIN — HYDROMORPHONE HYDROCHLORIDE 0.2 MILLIGRAM(S): 2 INJECTION INTRAMUSCULAR; INTRAVENOUS; SUBCUTANEOUS at 15:00

## 2017-06-12 RX ADMIN — Medication 250 MILLILITER(S): at 19:50

## 2017-06-12 RX ADMIN — Medication 50 MILLIGRAM(S): at 16:27

## 2017-06-12 RX ADMIN — SODIUM CHLORIDE 50 MILLILITER(S): 9 INJECTION, SOLUTION INTRAVENOUS at 21:39

## 2017-06-12 RX ADMIN — FLUCONAZOLE 100 MILLIGRAM(S): 150 TABLET ORAL at 12:39

## 2017-06-12 RX ADMIN — Medication 400 MILLIGRAM(S): at 03:42

## 2017-06-12 RX ADMIN — Medication 250 MILLILITER(S): at 11:00

## 2017-06-12 RX ADMIN — HYDROMORPHONE HYDROCHLORIDE 0.2 MILLIGRAM(S): 2 INJECTION INTRAMUSCULAR; INTRAVENOUS; SUBCUTANEOUS at 01:54

## 2017-06-12 RX ADMIN — DAPTOMYCIN 118 MILLIGRAM(S): 500 INJECTION, POWDER, LYOPHILIZED, FOR SOLUTION INTRAVENOUS at 12:39

## 2017-06-12 RX ADMIN — Medication 250 MILLILITER(S): at 12:21

## 2017-06-12 RX ADMIN — Medication 1000 MILLIGRAM(S): at 03:57

## 2017-06-12 RX ADMIN — Medication 44 MICROGRAM(S): at 06:19

## 2017-06-12 RX ADMIN — HYDROMORPHONE HYDROCHLORIDE 0.2 MILLIGRAM(S): 2 INJECTION INTRAMUSCULAR; INTRAVENOUS; SUBCUTANEOUS at 20:17

## 2017-06-12 RX ADMIN — Medication 2.79 MICROGRAM(S)/KG/MIN: at 08:41

## 2017-06-12 RX ADMIN — AMIODARONE HYDROCHLORIDE 33.33 MG/MIN: 400 TABLET ORAL at 21:40

## 2017-06-12 RX ADMIN — SODIUM CHLORIDE 50 MILLILITER(S): 9 INJECTION, SOLUTION INTRAVENOUS at 08:41

## 2017-06-12 RX ADMIN — Medication 50 MILLIGRAM(S): at 21:40

## 2017-06-12 RX ADMIN — Medication 100 MILLIGRAM(S): at 21:40

## 2017-06-12 RX ADMIN — HYDROMORPHONE HYDROCHLORIDE 0.2 MILLIGRAM(S): 2 INJECTION INTRAMUSCULAR; INTRAVENOUS; SUBCUTANEOUS at 07:35

## 2017-06-12 RX ADMIN — SODIUM CHLORIDE 2000 MILLILITER(S): 9 INJECTION, SOLUTION INTRAVENOUS at 19:00

## 2017-06-12 RX ADMIN — FENTANYL CITRATE 1 PATCH: 50 INJECTION INTRAVENOUS at 11:13

## 2017-06-12 RX ADMIN — SODIUM CHLORIDE 2000 MILLILITER(S): 9 INJECTION, SOLUTION INTRAVENOUS at 12:22

## 2017-06-12 RX ADMIN — Medication 100 MILLIGRAM(S): at 06:19

## 2017-06-12 RX ADMIN — Medication 400 MILLIGRAM(S): at 11:00

## 2017-06-12 RX ADMIN — VASOPRESSIN 1.2 UNIT(S)/MIN: 20 INJECTION INTRAVENOUS at 08:40

## 2017-06-12 NOTE — PROGRESS NOTE ADULT - PROBLEM SELECTOR PLAN 3
Remove all Ca from PN  N.B. there is no Vitamin D free version of MVI so patient will continue to receive small amounts of Vitamin D

## 2017-06-12 NOTE — PROCEDURE NOTE - NSTRACHPOSTINTU_RESP_A_CORE
Chest excursion noted/Chest X-Ray/Breath sounds equal/Positive end tidal Co2 noted/Breath sounds bilateral

## 2017-06-12 NOTE — PROGRESS NOTE ADULT - PROBLEM SELECTOR PLAN 2
Stable renal function and off diuretics.    IVF running at present Maintain MAP of 60  Check cortisol level

## 2017-06-12 NOTE — CHART NOTE - NSCHARTNOTEFT_GEN_A_CORE
Okay for patient to receive IV contrast for CT of abdomen/pelvis. Creatinine elevated in setting of improving TREVA. Benefits vs risks of IV contrast administration discussed with patient and family.

## 2017-06-12 NOTE — PROGRESS NOTE ADULT - SUBJECTIVE AND OBJECTIVE BOX
NEPHROLOGY-[facility] [phone]        Patient seen and examined         MEDICATIONS  (STANDING):  lactated ringers. 1000milliLiter(s) IV Continuous <Continuous>  levothyroxine Injectable 44MICROGram(s) IV Push daily  pantoprazole  Injectable 40milliGRAM(s) IV Push daily  fentaNYL   Patch  50 MICROgram(s)/Hr. 1Patch Transdermal every 48 hours  norepinephrine Infusion 0.02MICROgram(s)/kG/Min IV Continuous <Continuous>  metroNIDAZOLE  IVPB 500milliGRAM(s) IV Intermittent every 8 hours  aztreonam  IVPB 1000milliGRAM(s) IV Intermittent every 8 hours  vasopressin Infusion 0.02Unit(s)/Min IV Continuous <Continuous>  acetaminophen  IVPB. 1000milliGRAM(s) IV Intermittent once  acetaminophen  IVPB. 1000milliGRAM(s) IV Intermittent once      VITAL:  T(C): , Max: 37 ( @ 15:00)  T(F): , Max: 98.6 ( @ 15:00)  HR: 111  BP: 94/46  BP(mean): 66  RR: 22  SpO2: 95%  Wt(kg): --    I and O's:    I & Os for current day (as of  @ 08:10)  =============================================  IN: 3236 ml / OUT: 2470 ml / NET: 766 ml        PHYSICAL EXAM:    Constitutional: NAD  HEENT: PERRLA    Neck:  No JVD  Respiratory: CTAB/L  Cardiovascular: S1 and S2  Gastrointestinal: BS+, soft, NT/ND  Extremities: No peripheral edema  Neurological: A/O x 3, no focal deficits  Psychiatric: Normal mood, normal affect  : No Snyder  Skin: No rashes  Access: Not applicable    LABS:                        7.8    4.7   )-----------( 70       ( 2017 02:41 )             24.5     06-12    140  |  111<H>  |  66<H>  ----------------------------<  137<H>  4.4   |  18<L>  |  1.68<H>    Ca    9.1      2017 02:41  Phos  3.1       Mg     2.4         TPro  4.3<L>  /  Alb  1.8<L>  /  TBili  0.3  /  DBili  0.2  /  AST  35  /  ALT  27  /  AlkPhos  131<H>            Urine Studies:  Urinalysis Basic - ( 10 Anastacio 2017 11:04 )    Color: Yellow / Appearance: SL Turbid / S.020 / pH: x  Gluc: x / Ketone: Negative  / Bili: Negative / Urobili: Negative   Blood: x / Protein: Trace / Nitrite: Negative   Leuk Esterase: Trace / RBC: 2-5 /HPF / WBC 2-5 /HPF   Sq Epi: x / Non Sq Epi: Occasional /HPF / Bacteria: Few /HPF      Creatinine, Random Urine: 29 mg/dL ( @ 05:29)  Sodium, Random Urine: 31 mmol/L ( @ 05:29)  Osmolality, Random Urine: 383 mos/kg ( @ 05:29)  Potassium, Random Urine: 22 mmol/L ( @ 05:29)  Sodium, Random Urine: 51 mmol/L ( @ 10:43)  Osmolality, Random Urine: 370 mos/kg ( @ 10:43)  Creatinine, Random Urine: 25 mg/dL ( @ 10:43)  Potassium, Random Urine: 21 mmol/L ( @ 10:43)        RADIOLOGY & ADDITIONAL STUDIES:        ASSESSMENT      RECOMMENDATIONS NEPHROLOGY-[facility] [phone]        Patient seen and examined in bed.  Confused last night.  Remains on pressors        MEDICATIONS  (STANDING):  lactated ringers. 1000milliLiter(s) IV Continuous <Continuous>  levothyroxine Injectable 44MICROGram(s) IV Push daily  pantoprazole  Injectable 40milliGRAM(s) IV Push daily  fentaNYL   Patch  50 MICROgram(s)/Hr. 1Patch Transdermal every 48 hours  norepinephrine Infusion 0.02MICROgram(s)/kG/Min IV Continuous <Continuous>  metroNIDAZOLE  IVPB 500milliGRAM(s) IV Intermittent every 8 hours  aztreonam  IVPB 1000milliGRAM(s) IV Intermittent every 8 hours  vasopressin Infusion 0.02Unit(s)/Min IV Continuous <Continuous>  acetaminophen  IVPB. 1000milliGRAM(s) IV Intermittent once  acetaminophen  IVPB. 1000milliGRAM(s) IV Intermittent once      VITAL:  T(C): , Max: 37 ( @ 15:00)  T(F): , Max: 98.6 ( @ 15:00)  HR: 111  BP: 94/46  BP(mean): 66  RR: 22  SpO2: 95%  Wt(kg): --    I and O's:    I & Os for current day (as of  @ 08:10)  =============================================  IN: 3236 ml / OUT: 2470 ml / NET: 766 ml        PHYSICAL EXAM:    Constitutional: NAD + ngt  HEENT: PERRLA    Neck:  No JVD  Respiratory: poor effort  Cardiovascular: S1 and S2  Gastrointestinal: hypoactive bowel sounds  Extremities: + edema  Neurological: A/O x 3, no focal deficits  Psychiatric: Normal mood, normal affect  : + Snyder  Skin: No rashes  Access: Not applicable    LABS:                        7.8    4.7   )-----------( 70       ( 2017 02:41 )             24.5     06-12    140  |  111<H>  |  66<H>  ----------------------------<  137<H>  4.4   |  18<L>  |  1.68<H>    Ca    9.1      2017 02:41  Phos  3.1       Mg     2.4         TPro  4.3<L>  /  Alb  1.8<L>  /  TBili  0.3  /  DBili  0.2  /  AST  35  /  ALT  27  /  AlkPhos  131<H>            Urine Studies:  Urinalysis Basic - ( 10 Anastacio 2017 11:04 )    Color: Yellow / Appearance: SL Turbid / S.020 / pH: x  Gluc: x / Ketone: Negative  / Bili: Negative / Urobili: Negative   Blood: x / Protein: Trace / Nitrite: Negative   Leuk Esterase: Trace / RBC: 2-5 /HPF / WBC 2-5 /HPF   Sq Epi: x / Non Sq Epi: Occasional /HPF / Bacteria: Few /HPF      Creatinine, Random Urine: 29 mg/dL ( @ 05:29)  Sodium, Random Urine: 31 mmol/L ( @ 05:29)  Osmolality, Random Urine: 383 mos/kg ( @ 05:29)  Potassium, Random Urine: 22 mmol/L ( @ 05:29)  Sodium, Random Urine: 51 mmol/L ( @ 10:43)  Osmolality, Random Urine: 370 mos/kg ( @ 10:43)  Creatinine, Random Urine: 25 mg/dL ( @ 10:43)  Potassium, Random Urine: 21 mmol/L ( @ 10:43)

## 2017-06-12 NOTE — PROGRESS NOTE ADULT - ASSESSMENT
77 yo F SICU day #4, POD #3 s/p ex-lap, extensive NANETTE, takedown of EC fistula, repair of cecal serosal tear, partial R salpingectomy, colostomy revision, parastomal and incisional hernia repair with strattice mesh, with significant post operative pain, and resolving TREVA.    Neuro: acute post op pain and chronic pain- inadequate analgesia with non-narcotics, but concern for respiratory compromise with narcotics; request anesthesia assessment for regional analgesia with TAP block or possible PCEA  Resp: encourage incentive spirometry, pulmonary hygiene, continue nebs PRN, and bipap if patient permits  CVS: persistently hypotensive, concern for possible sepsis without clear underlying source, attempt to wean pressors as tolerated  GI/Nutrition: NPO, continue NG tube to low wall suction, follow up GI function, continue TPN at this time  Genitourinary/Renal: acute on chronic kidney disease (stage III), with downtrending creatinine, continue to monitor urine output and electrolytes, follow up urine electrolytes  Hematologic: continue VTE ppx with fondaparinux dosed every other day in setting of TREVA (GFR < 30) and with history of HIT  Infectious Disease: concern for sepsis with persistent hypotension and procalcitonin elevation, on broad spectrum antibiotics with aztreonam and flagyl, follow up repeat cultures  Endocrine: continue levothyroxine at home dose for hypothyroidism, monitor glycemic control on BMP  Dispo: full code, remain in SICU    --RENZO Samson, PGY-2 77 yo F SICU day #4, POD #3 s/p ex-lap, extensive NANETTE, takedown of EC fistula, repair of cecal serosal tear, partial R salpingectomy, colostomy revision, parastomal and incisional hernia repair with strattice mesh, with significant post operative pain, and resolving TREVA.    Neuro: acute post op pain and chronic pain- inadequate analgesia with non-narcotics, but concern for respiratory compromise with narcotics; request anesthesia assessment for regional analgesia with TAP block or possible PCEA  Resp: encourage incentive spirometry, pulmonary hygiene, continue nebs PRN, and bipap if patient permits  CVS: persistently hypotensive, concern for possible sepsis without clear underlying source, attempt to wean pressors as tolerated  GI/Nutrition: NPO, continue NG tube to low wall suction, follow up GI function, continue TPN at this time  Genitourinary/Renal: acute on chronic kidney disease (stage III), with downtrending creatinine, continue to monitor urine output and electrolytes, follow up urine electrolytes  Hematologic: continue VTE ppx with fondaparinux dosed every other day in setting of TREVA (GFR < 30) and with history of HIT  Infectious Disease: concern for sepsis with persistent hypotension and procalcitonin elevation, on broad spectrum antibiotics with aztreonam and flagyl, follow up repeat cultures  Endocrine: continue levothyroxine at home dose for hypothyroidism, monitor glycemic control on BMP  Dispo: full code, remain in SICU 75 yo F SICU day #4, POD #3 s/p ex-lap, extensive NANETTE, takedown of EC fistula, repair of cecal serosal tear, partial R salpingectomy, colostomy revision, parastomal and incisional hernia repair with strattice mesh, with significant post operative pain, and resolving TREVA.    Neuro: acute post op pain and chronic pain- inadequate analgesia with non-narcotics, but concern for respiratory compromise with narcotics; request anesthesia assessment for regional analgesia with TAP block or possible PCEA  Resp: encourage incentive spirometry, pulmonary hygiene, continue nebs PRN, and bipap if patient permits  CVS: persistently hypotensive, concern for possible sepsis without clear underlying source, attempt to wean pressors as tolerated  GI/Nutrition: NPO, continue NG tube to low wall suction, follow up GI function, continue TPN at this time  Genitourinary/Renal: acute on chronic kidney disease (stage III), with downtrending creatinine, continue to monitor urine output and electrolytes, follow up urine electrolytes  Hematologic: coagulopathy likely secondary to compromised renal excretion of fondaparinux; continue VTE ppx with fondaparinux dosed every other day in setting of TREVA (GFR < 30) and with history of HIT  Infectious Disease: concern for sepsis with persistent hypotension and procalcitonin elevation, on broad spectrum antibiotics with aztreonam and flagyl, follow up repeat cultures  Endocrine: continue levothyroxine at home dose for hypothyroidism, monitor glycemic control on BMP  Dispo: full code, remain in SICU 77 yo F SICU day #4, POD #3 s/p ex-lap, extensive NANETTE, takedown of EC fistula, repair of cecal serosal tear, partial R salpingectomy, colostomy revision, parastomal and incisional hernia repair with strattice mesh, with hypotension requiring pressors, significant post operative pain, and resolving TREVA.    Neuro: acute post op pain and chronic pain- inadequate analgesia with non-narcotics, but concern for respiratory compromise with narcotics; request anesthesia assessment for regional analgesia with TAP block or possible PCEA  Resp: encourage incentive spirometry, pulmonary hygiene, continue nebs PRN, and bipap if patient permits  CVS: persistently hypotensive, concern for possible sepsis without clear underlying source, attempt to wean pressors as tolerated  GI/Nutrition: NPO, continue NG tube to low wall suction, follow up GI function, continue TPN at this time  Genitourinary/Renal: acute on chronic kidney disease (stage III), with downtrending creatinine, continue to monitor urine output and electrolytes, follow up urine electrolytes  Hematologic: coagulopathy likely secondary to compromised renal excretion of fondaparinux; continue VTE ppx with fondaparinux dosed every other day in setting of TREVA (GFR < 30) and with history of HIT  Infectious Disease: concern for sepsis with persistent hypotension and procalcitonin elevation, on broad spectrum antibiotics with aztreonam and flagyl, follow up repeat cultures  Endocrine: continue levothyroxine at home dose for hypothyroidism, monitor glycemic control on BMP  Dispo: full code, remain in SICU 75 yo F SICU day #4, POD #3 s/p ex-lap, extensive NANETTE, takedown of EC fistula, repair of cecal serosal tear, partial R salpingectomy, colostomy revision, parastomal and incisional hernia repair with strattice mesh, with hypotension requiring pressors, significant post operative pain, and resolving TREVA.    Neuro: acute post op pain and chronic pain- inadequate analgesia with non-narcotics, but concern for respiratory compromise with narcotics; request anesthesia assessment for regional analgesia with TAP block or possible PCEA  Resp: encourage incentive spirometry, pulmonary hygiene, continue nebs PRN, and bipap if patient permits  CVS: persistently hypotensive, concern for possible sepsis without clear underlying source, attempt to wean pressors as tolerated, on echo, looks hypovolemic  GI/Nutrition: NPO, continue NG tube to low wall suction, follow up GI function, continue TPN at this time  Genitourinary/Renal: acute on chronic kidney disease (stage III), with downtrending creatinine, continue to monitor urine output and electrolytes, follow up urine electrolytes  Hematologic: coagulopathy likely secondary to compromised renal excretion of fondaparinux; continue VTE ppx with fondaparinux dosed every other day in setting of TREVA (GFR < 30) and with history of HIT  Infectious Disease: concern for sepsis with persistent hypotension and procalcitonin elevation, on broad spectrum antibiotics with aztreonam and flagyl, follow up repeat cultures  Endocrine: continue levothyroxine at home dose for hypothyroidism, monitor glycemic control on BMP  Dispo: full code, remain in SICU 75 yo F SICU day #4, POD #3 s/p ex-lap, extensive NANETTE, takedown of EC fistula, repair of cecal serosal tear, partial R salpingectomy, colostomy revision, parastomal and incisional hernia repair with strattice mesh, with hypotension requiring pressors, significant post operative pain, and resolving TREVA.    Neuro: acute post op pain and chronic pain- inadequate analgesia with non-narcotics, but concern for respiratory compromise with narcotics; holding off on TAP block. Fentanyl patch discontinued in setting of decreased mental status.  Resp: encourage incentive spirometry, pulmonary hygiene, continue nebs PRN, and bipap if patient permits  CVS: persistently hypotensive, concern for possible sepsis without clear underlying source, attempt to wean pressors as tolerated, on echo, looks hypovolemic  GI/Nutrition: NPO, continue NG tube to low wall suction, follow up GI function, continue TPN at this time  Genitourinary/Renal: acute on chronic kidney disease (stage III), with downtrending creatinine, continue to monitor urine output and electrolytes, follow up urine electrolytes  Hematologic: coagulopathy likely secondary to compromised renal excretion of fondaparinux; continue VTE ppx with fondaparinux dosed every other day in setting of TREVA (GFR < 30) and with history of HIT  Infectious Disease: concern for sepsis with persistent hypotension and procalcitonin elevation, on broad spectrum antibiotics with aztreonam and flagyl, follow up repeat cultures  Endocrine: continue levothyroxine at home dose for hypothyroidism, monitor glycemic control on BMP  Dispo: full code, remain in SICU 75 yo F SICU day #4, POD #3 s/p ex-lap, extensive NANETTE, takedown of EC fistula, repair of cecal serosal tear, partial R salpingectomy, colostomy revision, parastomal and incisional hernia repair with strattice mesh, with hypotension requiring pressors, significant post operative pain, and resolving TREVA.    Neuro: acute post op pain and chronic pain- inadequate analgesia with non-narcotics, but concern for respiratory compromise with narcotics; holding off on TAP block. Fentanyl patch discontinued in setting of decreased mental status.  Resp: encourage incentive spirometry, pulmonary hygiene, continue nebs PRN, and bipap if patient permits  CVS: persistently hypotensive, concern for possible sepsis without clear underlying source, attempt to wean pressors as tolerated, on echo, looks hypovolemic with Hyperdynamic LV, with near complete wall effacement, ICV-0.35cm with complete inspiratory collapse. Will administer 500ml 5% Albumin  GI/Nutrition: NPO, continue NG tube to low wall suction, follow up GI function, continue TPN at this time  Genitourinary/Renal: acute on chronic kidney disease (stage III)( Baseline Cr-1.2), with downtrending creatinine, continue to monitor urine output and electrolytes, follow up urine electrolytes  Hematologic: coagulopathy likely secondary to compromised renal excretion of fondaparinux; continue VTE ppx with fondaparinux dosed every other day in setting of TREVA (GFR < 30) and with history of HIT  Infectious Disease: concern for sepsis with persistent hypotension and procalcitonin elevation, on broad spectrum antibiotics with aztreonam and flagyl, follow up repeat cultures, Check UA and urine culture, if UA positive, with known history of VRE UTI  Endocrine: continue levothyroxine at home dose for hypothyroidism, monitor glycemic control on BMP  Dispo: full code, remain in SICU 77 yo F SICU day #4, POD #3 s/p ex-lap, extensive NANETTE, takedown of EC fistula, repair of cecal serosal tear, partial R salpingectomy, colostomy revision, parastomal and incisional hernia repair with strattice mesh, with hypotension requiring pressors, significant post operative pain, and resolving TREVA.    Neuro: acute post op pain and chronic pain- inadequate analgesia with non-narcotics, but concern for respiratory compromise with narcotics; holding off on TAP block. Fentanyl patch discontinued in setting of decreased mental status.  Resp: encourage incentive spirometry, pulmonary hygiene, continue nebs PRN, and bipap if patient permits  CVS: persistently hypotensive, concern for possible sepsis without clear underlying source, attempt to wean pressors as tolerated, on echo, looks hypovolemic with Hyperdynamic LV, with near complete wall effacement, ICV-0.35cm with complete inspiratory collapse. Will administer 500ml 5% Albumin  GI/Nutrition: NPO, continue NG tube to low wall suction, follow up GI function, continue TPN at this time  Genitourinary/Renal: acute on chronic kidney disease (stage III)( Baseline Cr-1.2), with downtrending creatinine, continue to monitor urine output and electrolytes, follow up urine electrolytes  Hematologic: coagulopathy likely secondary to compromised renal excretion of fondaparinux; continue VTE ppx with fondaparinux dosed every other day in setting of TREVA (GFR < 30) and with history of HIT  Infectious Disease: concern for sepsis with persistent hypotension and procalcitonin elevation, on broad spectrum antibiotics with aztreonam and flagyl, follow up repeat cultures, Check UA and urine culture, if UA positive, with known history of VRE UTI. Will obtain Ct Abd/Pelvis with PO/IV for concerns for intra-abdominal sepsis  Endocrine: continue levothyroxine at home dose for hypothyroidism, monitor glycemic control on BMP  Dispo: full code, remain in SICU

## 2017-06-12 NOTE — PROGRESS NOTE ADULT - ATTENDING COMMENTS
Persistent septic shock, will get CT to look for abdominal source. D/W Jennifer Moon and Jered at bedside.

## 2017-06-12 NOTE — PROGRESS NOTE ADULT - ASSESSMENT
Pt continues to show signs of TREVA, serum ionized calcium is now elevated.  Pt cannot receive any enteral diet at this time so a modified PN formula will be needed

## 2017-06-12 NOTE — PROGRESS NOTE ADULT - SUBJECTIVE AND OBJECTIVE BOX
Day #12 of PN  PN infusion at 62 cc/hr    ICU Vital Signs Last 24 Hrs  T(C): 37, Max: 37 (06-11 @ 15:00)  T(F): 98.6, Max: 98.6 (06-11 @ 15:00)  HR: 115 (104 - 122)  BP: 95/52 (94/46 - 99/47)  BP(mean): 70 (66 - 73)  ABP: 95/45 (93/32 - 129/60)  ABP(mean): 63 (51 - 83)  RR: 16 (0 - 26)  SpO2: 97% (84% - 100%)       I & Os for 24h ending 06-12 @ 07:00  =============================================  IN:    TPN (Total Parenteral Nutrition): 1478 ml    lactated ringers.: 1250 ml    Solution: 200 ml    Solution: 100 ml    Solution: 100 ml    vasopressin Infusion: 57.6 ml    norepinephrine Infusion: 50.4 ml    Total IN: 3236 ml  ---------------------------------------------  OUT:    Indwelling Catheter - Urethral: 1515 ml    Nasoenteral Tube: 700 ml    Drain: 250 ml    Colostomy: 5 ml    Total OUT: 2470 ml  ---------------------------------------------  Total NET: 766 ml    Labs   06-12    140  |  111<H>  |  66<H>  ----------------------------<  137<H>  4.4   |  18<L>  |  1.68<H>    Ca    9.1      12 Jun 2017 02:41 ionized calcium 1.87  Phos  3.1     06-12  Mg     2.4     06-12      TPro  4.3<L>  /  Alb  1.8<L>  /  TBili  0.3  /  DBili  0.2  /  AST  35  /  ALT  27  /  AlkPhos  131<H>  06-12  LIVER FUNCTIONS - ( 12 Jun 2017 02:41 )  Alb: 1.8 g/dL / Pro: 4.3 g/dL / ALK PHOS: 131 U/L / ALT: 27 U/L RC / AST: 35 U/L / GGT: x           CAPILLARY BLOOD GLUCOSE note done

## 2017-06-12 NOTE — CHART NOTE - NSCHARTNOTEFT_GEN_A_CORE
Patient's daughter at bedside, refusing second set of blood cultures to prevent additional venipuncture.

## 2017-06-12 NOTE — PROGRESS NOTE ADULT - SUBJECTIVE AND OBJECTIVE BOX
STATUS POST: exploratory laparotomy, extensive lysis of adhesions, takedown of enterocutaneous fistula, repair of serosal tear on cecum, partial right salpingooophorectomy, colostomy revision, parastomal and incisional hernia repair with strattice mesh    POD#:  2    INTERVAL EVENTS:     SUBJECTIVE:     PHYSICAL EXAM:   General: Resting comfortably in bed, AOx3.   Neuro: no apparent neurologic defects  Resp: on BiPAP; normal respiratory pattern  GI/Abd: Soft, mildly distended, diffuse abdominal tenderness. Midline wound with prevena vac in place, minimal drainage, left sided ostomy congested, no drainage or gas in bag. NGT with bilious output.   Vascular: All 4 extremities warm. STATUS POST: exploratory laparotomy, extensive lysis of adhesions, takedown of enterocutaneous fistula, repair of serosal tear on cecum, partial right salpingooophorectomy, colostomy revision, parastomal and incisional hernia repair with strattice mesh    POD#:  3    INTERVAL EVENTS:     SUBJECTIVE:     PHYSICAL EXAM:   General: Resting comfortably in bed, AOx3.   Neuro: no apparent neurologic defects  Resp: on BiPAP; normal respiratory pattern  GI/Abd: Soft, mildly distended, diffuse abdominal tenderness. Midline wound with prevena vac in place, minimal drainage, left sided ostomy congested, no drainage or gas in bag. NGT with bilious output.   Vascular: All 4 extremities warm. [facility] Progress Note    HOSPITAL DAY #: []  POST OPERATIVE DAY #:  [ ]   STATUS POST: [ ]                     SUBJECTIVE: [    ]     Pain (0-10): []    ROS: [ ]    OBJECTIVE:   Vital Signs Last 24 Hrs  T(C): 37, Max: 37 ( @ 15:00)  T(F): 98.6, Max: 98.6 ( @ 15:00)  HR: 111 (104 - 122)  BP: 94/46 (94/46 - 99/47)  BP(mean): 66 (66 - 73)  RR: 22 (0 - 26)  SpO2: 95% (84% - 100%)    Constitutional: [] appears comfortable  Respiratory: [] non-labored, CTAB  Cardiovascular: [] RRR, no murmors  Gastrointestinal: []   Musculoskeletal: []  Integumentary: []  Neurological: [] alert and oriented x 3, no focal deficits  Psychiatric: [] normal mood and affect    LABS:                        7.8    4.7   )-----------( 70       ( 2017 02:41 )             24.5     06-12    140  |  111<H>  |  66<H>  ----------------------------<  137<H>  4.4   |  18<L>  |  1.68<H>    Ca    9.1      2017 02:41  Phos  3.1     06-12  Mg     2.4     06-12    TPro  4.3<L>  /  Alb  1.8<L>  /  TBili  0.3  /  DBili  0.2  /  AST  35  /  ALT  27  /  AlkPhos  131<H>  06-12    PT/INR - ( 2017 02:41 )   PT: 22.6 sec;   INR: 2.06 ratio         PTT - ( 2017 02:41 )  PTT:54.8 sec  Urinalysis Basic - ( 10 Anastacio 2017 11:04 )    Color: Yellow / Appearance: SL Turbid / S.020 / pH: x  Gluc: x / Ketone: Negative  / Bili: Negative / Urobili: Negative   Blood: x / Protein: Trace / Nitrite: Negative   Leuk Esterase: Trace / RBC: 2-5 /HPF / WBC 2-5 /HPF   Sq Epi: x / Non Sq Epi: Occasional /HPF / Bacteria: Few /HPF        MEDICATIONS:  lactated ringers. 1000milliLiter(s) IV Continuous <Continuous>  levothyroxine Injectable 44MICROGram(s) IV Push daily  pantoprazole  Injectable 40milliGRAM(s) IV Push daily  diphenhydrAMINE   Injectable 25milliGRAM(s) IV Push every 4 hours PRN  norepinephrine Infusion 0.02MICROgram(s)/kG/Min IV Continuous <Continuous>  vasopressin Infusion 0.02Unit(s)/Min IV Continuous <Continuous>  ALBUTerol/ipratropium for Nebulization 3milliLiter(s) Nebulizer every 6 hours PRN    Anticoagulation:    Antibiotics:   metroNIDAZOLE  IVPB 500milliGRAM(s) IV Intermittent every 8 hours  aztreonam  IVPB 1000milliGRAM(s) IV Intermittent every 8 hours    Pain medications:   fentaNYL   Patch  50 MICROgram(s)/Hr. 1Patch Transdermal every 48 hours  ondansetron Injectable 4milliGRAM(s) IV Push every 6 hours PRN  acetaminophen  IVPB. 1000milliGRAM(s) IV Intermittent once  acetaminophen  IVPB. 1000milliGRAM(s) IV Intermittent once  HYDROmorphone  Injectable 0.2milliGRAM(s) IV Push every 2 hours PRN      RADIOLOGY & ADDITIONAL STUDIES:    A/P :  SANJAY SANTOS is a 76y Female who []  -    Multimodal pain control  -    DVT ppx: [ ] Lovenox [ ] Heparin  [ ] contraindicated due to [ ]   -    Diet:   -    Check AM labs  -    Weight bearing status: WBAT [ ]        PWB    [ ]     TTWB  [ ]      NWB  [ ]  -    Resume home meds  -    Physical Therapy  -    Dispo: Home [ ]     Rehab [ ]      LC [ ]      To be determined [ ] [facility] Trauma/Acute [facility] Progress Note    HOSPITAL DAY #:21  POST OPERATIVE DAY #:  3  STATUS POST:  exploratory laparotomy, extensive lysis of adhesions, takedown of enterocutaneous fistula, repair of serosal tear on cecum, partial right salpingooophorectomy, colostomy revision, parastomal and incisional hernia repair with strattice mesh                    SUBJECTIVE: Pt reports having abdominal pain. She does not want to be bothered.      OBJECTIVE:   Vital Signs Last 24 Hrs  T(C): 37, Max: 37 ( @ 15:00)  T(F): 98.6, Max: 98.6 ( @ 15:00)  HR: 111 (104 - 122)  BP: 94/46 (94/46 - 99/47)  BP(mean): 66 (66 - 73)  RR: 22 (0 - 26)  SpO2: 95% (84% - 100%)    Constitutional: appears to be uncomfortable; delirious   Respiratory: labored breathing  Cardiovascular: tachycardic  Gastrointestinal: soft, tender, ostomy appread congested but viable, no fxn, provena in place, dark fluid in canister. NGT in place: bilious  Psychiatric: appeared depressed    LABS:                        7.8    4.7   )-----------( 70       ( 2017 02:41 )             24.5     06-12    140  |  111<H>  |  66<H>  ----------------------------<  137<H>  4.4   |  18<L>  |  1.68<H>    Ca    9.1      2017 02:41  Phos  3.1     -12  Mg     2.4     06-12    TPro  4.3<L>  /  Alb  1.8<L>  /  TBili  0.3  /  DBili  0.2  /  AST  35  /  ALT  27  /  AlkPhos  131<H>  06-12    PT/INR - ( 2017 02:41 )   PT: 22.6 sec;   INR: 2.06 ratio         PTT - ( 2017 02:41 )  PTT:54.8 sec  Urinalysis Basic - ( 10 Anastacio 2017 11:04 )    Color: Yellow / Appearance: SL Turbid / S.020 / pH: x  Gluc: x / Ketone: Negative  / Bili: Negative / Urobili: Negative   Blood: x / Protein: Trace / Nitrite: Negative   Leuk Esterase: Trace / RBC: 2-5 /HPF / WBC 2-5 /HPF   Sq Epi: x / Non Sq Epi: Occasional /HPF / Bacteria: Few /HPF        MEDICATIONS:  lactated ringers. 1000milliLiter(s) IV Continuous <Continuous>  levothyroxine Injectable 44MICROGram(s) IV Push daily  pantoprazole  Injectable 40milliGRAM(s) IV Push daily  diphenhydrAMINE   Injectable 25milliGRAM(s) IV Push every 4 hours PRN  norepinephrine Infusion 0.02MICROgram(s)/kG/Min IV Continuous <Continuous>  vasopressin Infusion 0.02Unit(s)/Min IV Continuous <Continuous>  ALBUTerol/ipratropium for Nebulization 3milliLiter(s) Nebulizer every 6 hours PRN    Anticoagulation:    Antibiotics:   metroNIDAZOLE  IVPB 500milliGRAM(s) IV Intermittent every 8 hours  aztreonam  IVPB 1000milliGRAM(s) IV Intermittent every 8 hours    Pain medications:   fentaNYL   Patch  50 MICROgram(s)/Hr. 1Patch Transdermal every 48 hours  ondansetron Injectable 4milliGRAM(s) IV Push every 6 hours PRN  acetaminophen  IVPB. 1000milliGRAM(s) IV Intermittent once  acetaminophen  IVPB. 1000milliGRAM(s) IV Intermittent once  HYDROmorphone  Injectable 0.2milliGRAM(s) IV Push every 2 hours PRN      RADIOLOGY & ADDITIONAL STUDIES:

## 2017-06-12 NOTE — PROGRESS NOTE ADULT - ATTENDING COMMENTS
Patient with worsening mental status and lethargy which began overnight, She is able to answer questions but is complaining of severe abdominal pain. I discontinued her fentany patch until her mental status improves.   UA- positive for buddng yeast- started on fluconzaole this afternoon  Leukocytosis increased slightly    Hypovolemic shock- that patient continues on vasopressors  On critical care ultrasoiund demonstrates IVC collapsable and much less than 1 cm. Patient given 1000ml albumin and 500 cc LR  Patient is to get CT scan to monitor intraabdomaly fter CT set.

## 2017-06-12 NOTE — CHART NOTE - NSCHARTNOTEFT_GEN_A_CORE
Dr. Capellan (Attending Physician)  Patient returned from CT scan and was tachycardic to the 180s in Afib with RVR, unable to read O2 saturation, with gurgling/crackly breathing and minimally responsive.  Placed on BiPAP 100% and highest O2 sat was 94%.  She was hypotensive. Dr. Capellan (Attending Physician)  Patient returned from CT scan and was tachycardic to the 180s in Afib with RVR, unable to read O2 saturation, with gurgling/crackly breathing and minimally responsive.  Placed on BiPAP 100% and highest O2 sat was 94%.  She was hypotensive to 70s/50s.  Decision made to intubate because of concern for aspiration. Had large distended stomach on CT scan.  Intubated without difficulty on visualization with glidescope had large mucous sitting on top of vocal cords.  No bilious material.  Post intubation was right mainstem pulled back to 22 cm but remained difficult to ventilate. Gave albuterol/atrovent and suctioned with mild secretions. ET tube was still in the right mainstem on chest xray pulled back 2 additional cm with improvement in ventilation and airway pressures. Likely aspiration caused worsening symptoms but secretions were lower than expected.  Afib with RVR is new in onset. Started amiodarone gtt. Increased NE and bolused  followed by Albumin 500.  BNP sent and slightly elevated. Placed RIJ central line. with elevated CVP 17 to 20 despite collapsed IVC on ultrasound and CT scan today.  Abdomen very distended but soft.  Check unparalyzed bladder pressure which was 22 cmH2O. Decided to keep maintenance fluids but avoid any further boluses tonight despite lactate of 2.2 and monitor UO.  Has free fluid in the abdomen and is likely third spacing into abdomen. New right femoral arterial line placed.    Started precedex gtt continuing dilaudid prn pain.  Continuing Daptomycin, Aztreonam, Flagyl, and Fluconazole.    Plan of care discussed with her two daughters and son. Total CC time 90 minutes excluding procedures.

## 2017-06-12 NOTE — PROGRESS NOTE ADULT - SUBJECTIVE AND OBJECTIVE BOX
HISTORY  76y Female with history significant for COPD, h/o DVT s/p IVC filter (now removed), GERD, hypothyroidism, HIT and diverticulitis s/p Ruben's, recurrent SBO s/p ex-lap small bowel resection, EC fistula s/p takedown, complicated by wound dehiscence, abdominal reconstruction underwent ex-lap, extensive NANETTE, EC fistula takedown, colostomy revision, and parastomal/incisional hernia repair, admitted to SICU intubated post operatively    24 HOUR EVENTS: Patient maintained on TPN, formulation adjusted to account for elevated phos, and renal dysfunction. Persistently hypotensive, requiring vasopression and norepinephrine. Urine electrolytes sent to further assess renal dysfunction, showed FENa of 2.6% consistent with ATN.    SUBJECTIVE/ROS: substantial abdominal pain, doesn't want to live anymore, remains oriented.  [ ] A ten-point review of systems was otherwise negative except as noted.  [x] Due to altered mental status/intubation, subjective information were not able to be obtained from the patient. History was obtained, to the extent possible, from review of the chart and collateral sources of information.    NEURO  RASS: 0 to 1   GCS:     CAM ICU:   Exam: awake, alert, oriented  Meds: fentaNYL   Patch  50 MICROgram(s)/Hr. 1Patch Transdermal every 48 hours  ondansetron Injectable 4milliGRAM(s) IV Push every 6 hours PRN Nausea  acetaminophen  IVPB. 1000milliGRAM(s) IV Intermittent once  acetaminophen  IVPB. 1000milliGRAM(s) IV Intermittent once  HYDROmorphone  Injectable 0.2milliGRAM(s) IV Push every 2 hours PRN Breakthru Pain  [x] Adequacy of sedation and pain control has been assessed and adjusted      RESPIRATORY  RR: 26 (0 - 26)  SpO2: 93% (84% - 100%)  Wt(kg): --  Exam: coarse breath sounds bilaterally, improved with cough  Mechanical Ventilation: --  ABG - ( 12 Jun 2017 02:45 )  pH: 7.30  /  pCO2: 42    /  pO2: 63    / HCO3: 20    / Base Excess: -5.5  /  SaO2: 93      Lactate: x      [N/A] Extubation Readiness Assessed  Meds: diphenhydrAMINE   Injectable 25milliGRAM(s) IV Push every 4 hours PRN Pruritus  ALBUTerol/ipratropium for Nebulization 3milliLiter(s) Nebulizer every 6 hours PRN Shortness of Breath and/or Wheezing      CARDIOVASCULAR  HR: 115 (104 - 122)  BP: 94/46 (94/46 - 99/47)  BP(mean): 66 (66 - 73)  ABP: 100/44 (93/32 - 143/65)  ABP(mean): 62 (51 - 94)  Exam: regular, tachycardic  Cardiac Rhythm: sinus  Perfusion     [x]Adequate**with pressors   [ ]Inadequate  Mentation    [ ]Normal       [x]Reduced  Extremities  [x]Warm         [ ]Cool  Volume Status [ ]Hypervolemic [x]Euvolemic [ ]Hypovolemic  Meds: norepinephrine Infusion 0.02MICROgram(s)/kG/Min IV Continuous <Continuous>      GI/NUTRITION  Exam: soft, nontender, nondistended, incisional vac in place, ostomy congested, with minimal air in bag  Diet: NPO, NG tube to suction  Meds: pantoprazole  Injectable 40milliGRAM(s) IV Push daily      GENITOURINARY  I&O's Detail  I & Os for 24h ending 06-11 @ 07:00  =============================================  IN:    lactated ringers.: 2200 ml    TPN (Total Parenteral Nutrition): 1488 ml    Packed Red Blood Cells: 320 ml    Solution: 300 ml    norepinephrine Infusion: 208 ml    Solution: 200 ml    Solution: 150 ml    phenylephrine   Infusion: 55.7 ml    vasopressin Infusion: 26.4 ml    Total IN: 4948.1 ml  ---------------------------------------------  OUT:    Indwelling Catheter - Urethral: 1515 ml    Drain: 300 ml    Nasoenteral Tube: 300 ml    Total OUT: 2115 ml  ---------------------------------------------  Total NET: 2833.1 ml    I & Os for current day (as of 06-12 @ 05:00)  =============================================  IN:    TPN (Total Parenteral Nutrition): 1354 ml    lactated ringers.: 1150 ml    Solution: 100 ml    Solution: 100 ml    vasopressin Infusion: 52.8 ml    Solution: 50 ml    norepinephrine Infusion: 39.2 ml    Total IN: 2846 ml  ---------------------------------------------  OUT:    Indwelling Catheter - Urethral: 1365 ml    Nasoenteral Tube: 600 ml    Drain: 250 ml    Total OUT: 2215 ml  ---------------------------------------------  Total NET: 631 ml      06-12    140  |  111<H>  |  66<H>  ----------------------------<  137<H>  4.4   |  18<L>  |  1.68<H>    Ca    9.1      12 Jun 2017 02:41  Phos  3.1     06-12  Mg     2.4     06-12    TPro  4.3<L>  /  Alb  1.8<L>  /  TBili  0.3  /  DBili  0.2  /  AST  35  /  ALT  27  /  AlkPhos  131<H>  06-12    [ ] Snyder catheter, indication: N/A  Meds: lactated ringers. 1000milliLiter(s) IV Continuous <Continuous>      HEMATOLOGIC  Meds: --  [x] VTE Prophylaxis with fondaparinux (every other day)               7.8    4.7   )-----------( 70       ( 12 Jun 2017 02:41 )             24.5     PT/INR - ( 12 Jun 2017 02:41 )   PT: 22.6 sec;   INR: 2.06 ratio    PTT - ( 12 Jun 2017 02:41 )  PTT:54.8 sec  Transfusion     [ ] PRBC   [ ] Platelets   [ ] FFP   [ ] Cryoprecipitate    INFECTIOUS DISEASES  WBC Count: 4.7 K/uL (06-12 @ 02:41)  WBC Count: 7.8 K/uL (06-11 @ 11:07)  RECENT CULTURES:  Specimen Source: .Blood Blood  Date/Time: 06-10 @ 17:29  Culture Results: No growth to date.  Meds: metroNIDAZOLE  IVPB 500milliGRAM(s) IV Intermittent every 8 hours  aztreonam  IVPB 1000milliGRAM(s) IV Intermittent every 8 hours      ENDOCRINE  Meds: levothyroxine Injectable 44MICROGram(s) IV Push daily  vasopressin Infusion 0.02Unit(s)/Min IV Continuous <Continuous>      ACCESS DEVICES:  [x] Peripheral IV  [ ] Central Venous Line	[ ] R	[ ] L	[ ] IJ	[ ] Fem	[ ] SC	Placed:   [ ] Arterial Line		[ ] R	[ ] L	[ ] Fem	[ ] Rad	[ ] Ax	Placed:   [x] PICC:					[ ] Mediport  [x] Urinary Catheter, Date Placed:   [x] Necessity of urinary, arterial, and venous catheters discussed    OTHER MEDICATIONS:  naloxone Injectable 0.1milliGRAM(s) IV Push every 3 minutes PRN    CODE STATUS: full code HISTORY  76y Female with history significant for COPD, h/o DVT s/p IVC filter (now removed), GERD, hypothyroidism, HIT and diverticulitis s/p Ruben's, recurrent SBO s/p ex-lap small bowel resection, EC fistula s/p takedown, complicated by wound dehiscence, abdominal reconstruction underwent ex-lap, extensive NANETTE, EC fistula takedown, colostomy revision, and parastomal/incisional hernia repair, admitted to SICU intubated post operatively    24 HOUR EVENTS: Patient maintained on TPN, formulation adjusted to account for elevated phos, and renal dysfunction. Persistently hypotensive, requiring vasopression and norepinephrine. Urine electrolytes sent to further assess renal dysfunction, showed FENa of 2.6% consistent with ATN.    SUBJECTIVE/ROS: substantial abdominal pain, doesn't want to live anymore, remains oriented.  [ ] A ten-point review of systems was otherwise negative except as noted.  [x] Due to altered mental status/intubation, subjective information were not able to be obtained from the patient. History was obtained, to the extent possible, from review of the chart and collateral sources of information.    NEURO  RASS: 0 to 1   GCS:     CAM ICU:   Exam: awake, alert, oriented  Meds: fentaNYL   Patch  50 MICROgram(s)/Hr. 1Patch Transdermal every 48 hours  ondansetron Injectable 4milliGRAM(s) IV Push every 6 hours PRN Nausea  acetaminophen  IVPB. 1000milliGRAM(s) IV Intermittent once  acetaminophen  IVPB. 1000milliGRAM(s) IV Intermittent once  HYDROmorphone  Injectable 0.2milliGRAM(s) IV Push every 2 hours PRN Breakthru Pain  [x] Adequacy of sedation and pain control has been assessed and adjusted      RESPIRATORY  RR: 26 (0 - 26)  SpO2: 93% (84% - 100%)  Wt(kg): --  Exam: coarse breath sounds bilaterally, improved with cough  Mechanical Ventilation: --  ABG - ( 12 Jun 2017 02:45 )  pH: 7.30  /  pCO2: 42    /  pO2: 63    / HCO3: 20    / Base Excess: -5.5  /  SaO2: 93      Lactate: x      [N/A] Extubation Readiness Assessed  Meds: diphenhydrAMINE   Injectable 25milliGRAM(s) IV Push every 4 hours PRN Pruritus  ALBUTerol/ipratropium for Nebulization 3milliLiter(s) Nebulizer every 6 hours PRN Shortness of Breath and/or Wheezing      CARDIOVASCULAR  HR: 115 (104 - 122)  BP: 94/46 (94/46 - 99/47)  BP(mean): 66 (66 - 73)  ABP: 100/44 (93/32 - 143/65)  ABP(mean): 62 (51 - 94)  Exam: regular, tachycardic  Cardiac Rhythm: sinus  Perfusion     [x]Adequate**with pressors   [ ]Inadequate  I&O's Detail    I & Os for current day (as of 12 Jun 2017 07:02)  =============================================  IN:    TPN (Total Parenteral Nutrition): 1354 ml    lactated ringers.: 1150 ml    Solution: 100 ml    Solution: 100 ml    vasopressin Infusion: 52.8 ml    Solution: 50 ml    norepinephrine Infusion: 39.2 ml    Total IN: 2846 ml  ---------------------------------------------  OUT:    Indwelling Catheter - Urethral: 1365 ml    Nasoenteral Tube: 600 ml    Drain: 250 ml    Total OUT: 2215 ml  ---------------------------------------------  Total NET: 631 ml    Mentation    [ ]Normal       [x]Reduced  Extremities  [x]Warm         [ ]Cool  Volume Status [ ]Hypervolemic [x]Euvolemic [ ]Hypovolemic  Meds: norepinephrine Infusion 0.02MICROgram(s)/kG/Min IV Continuous <Continuous>      GI/NUTRITION  Exam: soft, nontender, nondistended, incisional vac in place, ostomy congested, with minimal air in bag  Diet: NPO, NG tube to suction  Meds: pantoprazole  Injectable 40milliGRAM(s) IV Push daily      GENITOURINARY        06-12    140  |  111<H>  |  66<H>  ----------------------------<  137<H>  4.4   |  18<L>  |  1.68<H>    Ca    9.1      12 Jun 2017 02:41  Phos  3.1     06-12  Mg     2.4     06-12    TPro  4.3<L>  /  Alb  1.8<L>  /  TBili  0.3  /  DBili  0.2  /  AST  35  /  ALT  27  /  AlkPhos  131<H>  06-12    [ ] Snyder catheter, indication: N/A  Meds: lactated ringers. 1000milliLiter(s) IV Continuous <Continuous>      HEMATOLOGIC  Meds: --  [x] VTE Prophylaxis with fondaparinux (every other day)               7.8    4.7   )-----------( 70       ( 12 Jun 2017 02:41 )             24.5     PT/INR - ( 12 Jun 2017 02:41 )   PT: 22.6 sec;   INR: 2.06 ratio    PTT - ( 12 Jun 2017 02:41 )  PTT:54.8 sec  Transfusion     [ ] PRBC   [ ] Platelets   [ ] FFP   [ ] Cryoprecipitate    INFECTIOUS DISEASES  WBC Count: 4.7 K/uL (06-12 @ 02:41)  WBC Count: 7.8 K/uL (06-11 @ 11:07)  RECENT CULTURES:  Specimen Source: .Blood Blood  Date/Time: 06-10 @ 17:29  Culture Results: No growth to date.  Meds: metroNIDAZOLE  IVPB 500milliGRAM(s) IV Intermittent every 8 hours  aztreonam  IVPB 1000milliGRAM(s) IV Intermittent every 8 hours      ENDOCRINE  Meds: levothyroxine Injectable 44MICROGram(s) IV Push daily  vasopressin Infusion 0.02Unit(s)/Min IV Continuous <Continuous>      ACCESS DEVICES:  [x] Peripheral IV  [ ] Central Venous Line	[ ] R	[ ] L	[ ] IJ	[ ] Fem	[ ] SC	Placed:   [ ] Arterial Line		[ ] R	[ ] L	[ ] Fem	[ ] Rad	[ ] Ax	Placed:   [x] PICC:					[ ] Mediport  [x] Urinary Catheter, Date Placed:   [x] Necessity of urinary, arterial, and venous catheters discussed    OTHER MEDICATIONS:  naloxone Injectable 0.1milliGRAM(s) IV Push every 3 minutes PRN    CODE STATUS: full code HISTORY  76y Female with history significant for COPD, h/o DVT s/p IVC filter (now removed), GERD, hypothyroidism, HIT and diverticulitis s/p Ruben's, recurrent SBO s/p ex-lap small bowel resection, EC fistula s/p takedown, complicated by wound dehiscence, abdominal reconstruction underwent ex-lap, extensive NANETTE, EC fistula takedown, colostomy revision, and parastomal/incisional hernia repair, admitted to SICU intubated post operatively    24 HOUR EVENTS: Patient maintained on TPN, formulation adjusted to account for elevated phos, and renal dysfunction. Persistently hypotensive, requiring vasopression and norepinephrine. Urine electrolytes sent to further assess renal dysfunction, showed FENa of 2.6% consistent with ATN.    SUBJECTIVE/ROS: substantial abdominal pain, doesn't want to live anymore, remains oriented.  [ ] A ten-point review of systems was otherwise negative except as noted.  [x] Due to altered mental status/intubation, subjective information were not able to be obtained from the patient. History was obtained, to the extent possible, from review of the chart and collateral sources of information.    NEURO  RASS: 0 to 1   GCS:     CAM ICU:   Exam: awake, alert, oriented  Meds: fentaNYL   Patch  50 MICROgram(s)/Hr. 1Patch Transdermal every 48 hours  ondansetron Injectable 4milliGRAM(s) IV Push every 6 hours PRN Nausea  acetaminophen  IVPB. 1000milliGRAM(s) IV Intermittent once  acetaminophen  IVPB. 1000milliGRAM(s) IV Intermittent once  HYDROmorphone  Injectable 0.2milliGRAM(s) IV Push every 2 hours PRN Breakthru Pain  [x] Adequacy of sedation and pain control has been assessed and adjusted      RESPIRATORY  RR: 26 (0 - 26)  SpO2: 93% (84% - 100%)  Wt(kg): --  Exam: coarse breath sounds bilaterally, improved with cough  Mechanical Ventilation: --  ABG - ( 12 Jun 2017 02:45 )  pH: 7.30  /  pCO2: 42    /  pO2: 63    / HCO3: 20    / Base Excess: -5.5  /  SaO2: 93      Lactate: x      [N/A] Extubation Readiness Assessed  Meds: diphenhydrAMINE   Injectable 25milliGRAM(s) IV Push every 4 hours PRN Pruritus  ALBUTerol/ipratropium for Nebulization 3milliLiter(s) Nebulizer every 6 hours PRN Shortness of Breath and/or Wheezing      CARDIOVASCULAR  HR: 115 (104 - 122)  BP: 94/46 (94/46 - 99/47)  BP(mean): 66 (66 - 73)  ABP: 100/44 (93/32 - 143/65)  ABP(mean): 62 (51 - 94)  Exam: regular, tachycardic  Cardiac Rhythm: sinus  Perfusion     [x]Adequate**with pressors   [ ]Inadequate  I&O's Detail    I & Os for current day (as of 12 Jun 2017 07:02)  =============================================  IN:    TPN (Total Parenteral Nutrition): 1354 ml    lactated ringers.: 1150 ml    Solution: 100 ml    Solution: 100 ml    vasopressin Infusion: 52.8 ml    Solution: 50 ml    norepinephrine Infusion: 39.2 ml    Total IN: 2846 ml  ---------------------------------------------  OUT:    Indwelling Catheter - Urethral: 1365 ml    Nasoenteral Tube: 600 ml    Drain: 250 ml    Total OUT: 2215 ml  ---------------------------------------------  Total NET: 631 ml    Mentation    [ ]Normal       [x]Reduced  Extremities  [x]Warm         [ ]Cool  Volume Status [ ]Hypervolemic [x]Euvolemic [ ]Hypovolemic  Meds: norepinephrine Infusion 0.02MICROgram(s)/kG/Min IV Continuous <Continuous>      GI/NUTRITION  Exam: soft, nontender, nondistended, incisional vac in place, ostomy congested, with minimal air in bag  Diet: NPO, NG tube to suction  Meds: pantoprazole  Injectable 40milliGRAM(s) IV Push daily      GENITOURINARY      06-12    140  |  111<H>  |  66<H>  ----------------------------<  137<H>  4.4   |  18<L>  |  1.68<H>    Ca    9.1      12 Jun 2017 02:41  Phos  3.1     06-12  Mg     2.4     06-12    TPro  4.3<L>  /  Alb  1.8<L>  /  TBili  0.3  /  DBili  0.2  /  AST  35  /  ALT  27  /  AlkPhos  131<H>  06-12    [x ] Snyder catheter, indication: fluid monitoring in ICU setting  Meds: lactated ringers. 1000milliLiter(s) IV Continuous <Continuous>      HEMATOLOGIC  Meds: --  [x] VTE Prophylaxis with fondaparinux (every other day)               7.8    4.7   )-----------( 70       ( 12 Jun 2017 02:41 )             24.5     PT/INR - ( 12 Jun 2017 02:41 )   PT: 22.6 sec;   INR: 2.06 ratio    PTT - ( 12 Jun 2017 02:41 )  PTT:54.8 sec  Transfusion     [ ] PRBC   [ ] Platelets   [ ] FFP   [ ] Cryoprecipitate    INFECTIOUS DISEASES  WBC Count: 4.7 K/uL (06-12 @ 02:41)  WBC Count: 7.8 K/uL (06-11 @ 11:07)  RECENT CULTURES:  Specimen Source: .Blood Blood  Date/Time: 06-10 @ 17:29  Culture Results: No growth to date.  Meds: metroNIDAZOLE  IVPB 500milliGRAM(s) IV Intermittent every 8 hours  aztreonam  IVPB 1000milliGRAM(s) IV Intermittent every 8 hours      ENDOCRINE  Meds: levothyroxine Injectable 44MICROGram(s) IV Push daily  vasopressin Infusion 0.02Unit(s)/Min IV Continuous <Continuous>      ACCESS DEVICES:  [x] Peripheral IV  [ ] Central Venous Line	[ ] R	[ ] L	[ ] IJ	[ ] Fem	[ ] SC	Placed:   [ ] Arterial Line		[ ] R	[ ] L	[ ] Fem	[ ] Rad	[ ] Ax	Placed:   [x] PICC:					[ ] Mediport  [x] Urinary Catheter, Date Placed:   [x] Necessity of urinary, arterial, and venous catheters discussed    OTHER MEDICATIONS:  naloxone Injectable 0.1milliGRAM(s) IV Push every 3 minutes PRN    CODE STATUS: full code HISTORY  76y Female with history significant for COPD, h/o DVT s/p IVC filter (now removed), GERD, hypothyroidism, HIT and diverticulitis s/p Ruben's, recurrent SBO s/p ex-lap small bowel resection, EC fistula s/p takedown, complicated by wound dehiscence, abdominal reconstruction underwent ex-lap, extensive NANETTE, EC fistula takedown, colostomy revision, and parastomal/incisional hernia repair, admitted to SICU intubated post operatively    24 HOUR EVENTS: Patient maintained on TPN, formulation adjusted to account for elevated phos, and renal dysfunction. Persistently hypotensive, requiring vasopression and norepinephrine. Urine electrolytes sent to further assess renal dysfunction, showed FENa of 2.6% consistent with ATN.    SUBJECTIVE/ROS: substantial abdominal pain, doesn't want to live anymore, remains oriented.  [ ] A ten-point review of systems was otherwise negative except as noted.  [x] Due to altered mental status/intubation, subjective information were not able to be obtained from the patient. History was obtained, to the extent possible, from review of the chart and collateral sources of information.    NEURO  RASS: 0 to 1   GCS:     CAM ICU:   Exam: awake, alert, oriented  Meds: fentaNYL   Patch  50 MICROgram(s)/Hr. 1Patch Transdermal every 48 hours  ondansetron Injectable 4milliGRAM(s) IV Push every 6 hours PRN Nausea  acetaminophen  IVPB. 1000milliGRAM(s) IV Intermittent once  acetaminophen  IVPB. 1000milliGRAM(s) IV Intermittent once  HYDROmorphone  Injectable 0.2milliGRAM(s) IV Push every 2 hours PRN Breakthru Pain  [x] Adequacy of sedation and pain control has been assessed and adjusted      RESPIRATORY  RR: 26 (0 - 26)  SpO2: 93% (84% - 100%)  Wt(kg): --  Exam: coarse breath sounds bilaterally, improved with cough  Mechanical Ventilation: --  ABG - ( 12 Jun 2017 02:45 )  pH: 7.30  /  pCO2: 42    /  pO2: 63    / HCO3: 20    / Base Excess: -5.5  /  SaO2: 93      Lactate: x      [N/A] Extubation Readiness Assessed  Meds: diphenhydrAMINE   Injectable 25milliGRAM(s) IV Push every 4 hours PRN Pruritus  ALBUTerol/ipratropium for Nebulization 3milliLiter(s) Nebulizer every 6 hours PRN Shortness of Breath and/or Wheezing      CARDIOVASCULAR  HR: 115 (104 - 122)  BP: 94/46 (94/46 - 99/47)  BP(mean): 66 (66 - 73)  ABP: 100/44 (93/32 - 143/65)  ABP(mean): 62 (51 - 94)  Exam: regular, tachycardic  Cardiac Rhythm: sinus  Perfusion     [x]Adequate**with pressors   [ ]Inadequate  I&O's Detail    I & Os for current day (as of 12 Jun 2017 07:02)  =============================================  IN:    TPN (Total Parenteral Nutrition): 1354 ml    lactated ringers.: 1150 ml    Solution: 100 ml    Solution: 100 ml    vasopressin Infusion: 52.8 ml    Solution: 50 ml    norepinephrine Infusion: 39.2 ml    Total IN: 2846 ml  ---------------------------------------------  OUT:    Indwelling Catheter - Urethral: 1365 ml    Nasoenteral Tube: 600 ml    Drain: 250 ml    Total OUT: 2215 ml  ---------------------------------------------  Total NET: 631 ml    Mentation    [ ]Normal       [x]Reduced  Extremities  [x]Warm         [ ]Cool  Volume Status [ ]Hypervolemic []Euvolemic [x]Hypovolemic  Bedside TTE: Hyperdynamic LV, with near complete wall effacement, ICV-0.35cm with complete inspiratory collapse  Meds: norepinephrine Infusion 0.02MICROgram(s)/kG/Min IV Continuous <Continuous>      GI/NUTRITION  Exam: soft, nontender, nondistended, incisional vac in place, ostomy congested, with minimal air in bag  Diet: NPO, NG tube to suction  Meds: pantoprazole  Injectable 40milliGRAM(s) IV Push daily      GENITOURINARY      06-12    140  |  111<H>  |  66<H>  ----------------------------<  137<H>  4.4   |  18<L>  |  1.68<H>    Ca    9.1      12 Jun 2017 02:41  Phos  3.1     06-12  Mg     2.4     06-12    TPro  4.3<L>  /  Alb  1.8<L>  /  TBili  0.3  /  DBili  0.2  /  AST  35  /  ALT  27  /  AlkPhos  131<H>  06-12    [x ] Snyder catheter, indication: fluid monitoring in ICU setting  Meds: lactated ringers. 1000milliLiter(s) IV Continuous <Continuous>      HEMATOLOGIC  Meds: --  [x] VTE Prophylaxis with fondaparinux (every other day)               7.8    4.7   )-----------( 70       ( 12 Jun 2017 02:41 )             24.5     PT/INR - ( 12 Jun 2017 02:41 )   PT: 22.6 sec;   INR: 2.06 ratio    PTT - ( 12 Jun 2017 02:41 )  PTT:54.8 sec  Transfusion     [ ] PRBC   [ ] Platelets   [ ] FFP   [ ] Cryoprecipitate    INFECTIOUS DISEASES  WBC Count: 4.7 K/uL (06-12 @ 02:41)  WBC Count: 7.8 K/uL (06-11 @ 11:07)  RECENT CULTURES:  Specimen Source: .Blood Blood  Date/Time: 06-10 @ 17:29  Culture Results: No growth to date.  Meds: metroNIDAZOLE  IVPB 500milliGRAM(s) IV Intermittent every 8 hours  aztreonam  IVPB 1000milliGRAM(s) IV Intermittent every 8 hours      ENDOCRINE  Meds: levothyroxine Injectable 44MICROGram(s) IV Push daily  vasopressin Infusion 0.02Unit(s)/Min IV Continuous <Continuous>      ACCESS DEVICES:  [x] Peripheral IV  [ ] Central Venous Line	[ ] R	[ ] L	[ ] IJ	[ ] Fem	[ ] SC	Placed:   [ ] Arterial Line		[ ] R	[ ] L	[ ] Fem	[ ] Rad	[ ] Ax	Placed:   [x] PICC:					[ ] Mediport  [x] Urinary Catheter, Date Placed:   [x] Necessity of urinary, arterial, and venous catheters discussed    OTHER MEDICATIONS:  naloxone Injectable 0.1milliGRAM(s) IV Push every 3 minutes PRN    CODE STATUS: full code

## 2017-06-12 NOTE — PROGRESS NOTE ADULT - PROBLEM SELECTOR PLAN 1
Surgery today Stable renal function and off diuretics.   Is volume overloaded but pressors will prevent large diuresis  TPN ongoing

## 2017-06-12 NOTE — PROGRESS NOTE ADULT - ASSESSMENT
ASSESSMENT  76y female with high volume ECF s/p exploratory laparotomy, extensive lysis of adhesions, takedown of enterocutaneous fistula, repair of serosal tear on cecum, partial right salpingooophorectomy, colostomy revision, parastomal and incisional hernia repair with strattice mesh, POD 4    PLAN    - Cont. NGT to suction  - Pain control PRN; add RTC Tylenol. Limit narcotics.   - NPO / NGT  - continue chemical VTE ppx  - Will discuss with attending    Pager: [phone] ASSESSMENT  76y female with high volume ECF s/p exploratory laparotomy, extensive lysis of adhesions, takedown of enterocutaneous fistula, repair of serosal tear on cecum, partial right salpingooophorectomy, colostomy revision, parastomal and incisional hernia repair with strattice mesh, POD 3    PLAN    - Cont. NGT to suction  - Pain control PRN; add RTC Tylenol. Limit narcotics.   - NPO / NGT  - continue chemical VTE ppx  - Will discuss with attending    Pager: [phone] ASSESSMENT  76y female with high volume ECF s/p exploratory laparotomy, extensive lysis of adhesions, takedown of enterocutaneous fistula, repair of serosal tear on cecum, partial right salpingooophorectomy, colostomy revision, parastomal and incisional hernia repair with strattice mesh, POD 3. Hypotensive on pressors, coagulopathic, thrombocytopenic with altered mentation. Appears to be in septic shock with source likely abdomen.     PLAN    - Cont. NGT to suction  - Will take down provena VAC    - Broad spectrum abx   - Consider CT abd/pelvis to look for source in the next few days  - Will discuss with attending    Pager: [phone] ASSESSMENT  76y female with high volume ECF s/p exploratory laparotomy, extensive lysis of adhesions, takedown of enterocutaneous fistula, repair of serosal tear on cecum, partial right salpingooophorectomy, colostomy revision, parastomal and incisional hernia repair with strattice mesh, POD 3. Hypotensive on pressors, coagulopathic, thrombocytopenic with altered mentation. Appears to be in septic shock, unclear source    PLAN  - Cont. NGT to suction  - Will take down provena VAC    - Broad spectrum abx   - CT abd/pelvis to look for abdominal source  - Will discuss with attending    Pager: [phone]

## 2017-06-13 DIAGNOSIS — J96.01 ACUTE RESPIRATORY FAILURE WITH HYPOXIA: ICD-10-CM

## 2017-06-13 LAB
ANION GAP SERPL CALC-SCNC: 15 MMOL/L — SIGNIFICANT CHANGE UP (ref 5–17)
APTT BLD: 55.6 SEC — HIGH (ref 27.5–37.4)
BASE EXCESS BLDV CALC-SCNC: -5.7 MMOL/L — LOW (ref -2–2)
BLD GP AB SCN SERPL QL: NEGATIVE — SIGNIFICANT CHANGE UP
BUN SERPL-MCNC: 55 MG/DL — HIGH (ref 7–23)
CALCIUM SERPL-MCNC: 9.4 MG/DL — SIGNIFICANT CHANGE UP (ref 8.4–10.5)
CHLORIDE SERPL-SCNC: 107 MMOL/L — SIGNIFICANT CHANGE UP (ref 96–108)
CO2 BLDV-SCNC: 22 MMOL/L — SIGNIFICANT CHANGE UP (ref 22–30)
CO2 SERPL-SCNC: 20 MMOL/L — LOW (ref 22–31)
CREAT SERPL-MCNC: 1.57 MG/DL — HIGH (ref 0.5–1.3)
CULTURE RESULTS: SIGNIFICANT CHANGE UP
FOLATE SERPL-MCNC: 10.6 NG/ML — SIGNIFICANT CHANGE UP (ref 4.8–24.2)
GAS PNL BLDA: SIGNIFICANT CHANGE UP
GAS PNL BLDV: SIGNIFICANT CHANGE UP
GLUCOSE SERPL-MCNC: 159 MG/DL — HIGH (ref 70–99)
GRAM STN FLD: SIGNIFICANT CHANGE UP
HCO3 BLDV-SCNC: 21 MMOL/L — SIGNIFICANT CHANGE UP (ref 21–29)
HCT VFR BLD CALC: 21.1 % — LOW (ref 34.5–45)
HCT VFR BLD CALC: 24 % — LOW (ref 34.5–45)
HGB BLD-MCNC: 6.6 G/DL — CRITICAL LOW (ref 11.5–15.5)
HGB BLD-MCNC: 8 G/DL — LOW (ref 11.5–15.5)
HOROWITZ INDEX BLDV+IHG-RTO: 40 — SIGNIFICANT CHANGE UP
INR BLD: 2.22 RATIO — HIGH (ref 0.88–1.16)
MAGNESIUM SERPL-MCNC: 2.4 MG/DL — SIGNIFICANT CHANGE UP (ref 1.6–2.6)
MCHC RBC-ENTMCNC: 31.4 GM/DL — LOW (ref 32–36)
MCHC RBC-ENTMCNC: 32.9 PG — SIGNIFICANT CHANGE UP (ref 27–34)
MCHC RBC-ENTMCNC: 33.6 GM/DL — SIGNIFICANT CHANGE UP (ref 32–36)
MCHC RBC-ENTMCNC: 34.3 PG — HIGH (ref 27–34)
MCV RBC AUTO: 102 FL — HIGH (ref 80–100)
MCV RBC AUTO: 105 FL — HIGH (ref 80–100)
PCO2 BLDV: 50 MMHG — SIGNIFICANT CHANGE UP (ref 35–50)
PH BLDV: 7.24 — LOW (ref 7.35–7.45)
PHOSPHATE SERPL-MCNC: 1.9 MG/DL — LOW (ref 2.5–4.5)
PHOSPHATE SERPL-MCNC: 5.4 MG/DL — HIGH (ref 2.5–4.5)
PLATELET # BLD AUTO: 72 K/UL — LOW (ref 150–400)
PLATELET # BLD AUTO: 72 K/UL — LOW (ref 150–400)
PO2 BLDV: 36 MMHG — SIGNIFICANT CHANGE UP (ref 25–45)
POTASSIUM SERPL-MCNC: 3.6 MMOL/L — SIGNIFICANT CHANGE UP (ref 3.5–5.3)
POTASSIUM SERPL-SCNC: 3.6 MMOL/L — SIGNIFICANT CHANGE UP (ref 3.5–5.3)
PROCALCITONIN SERPL-MCNC: 28.04 NG/ML — HIGH (ref 0–0.04)
PROTHROM AB SERPL-ACNC: 24.6 SEC — HIGH (ref 9.8–12.7)
RBC # BLD: 2.01 M/UL — LOW (ref 3.8–5.2)
RBC # BLD: 2.35 M/UL — LOW (ref 3.8–5.2)
RBC # FLD: 14.4 % — SIGNIFICANT CHANGE UP (ref 10.3–14.5)
RBC # FLD: 14.7 % — HIGH (ref 10.3–14.5)
RH IG SCN BLD-IMP: POSITIVE — SIGNIFICANT CHANGE UP
SAO2 % BLDV: 67 % — SIGNIFICANT CHANGE UP (ref 67–88)
SODIUM SERPL-SCNC: 142 MMOL/L — SIGNIFICANT CHANGE UP (ref 135–145)
SPECIMEN SOURCE: SIGNIFICANT CHANGE UP
SPECIMEN SOURCE: SIGNIFICANT CHANGE UP
TROPONIN T SERPL-MCNC: <0.01 NG/ML — SIGNIFICANT CHANGE UP (ref 0–0.06)
VIT B12 SERPL-MCNC: >2000 PG/ML — HIGH (ref 243–894)
WBC # BLD: 4.6 K/UL — SIGNIFICANT CHANGE UP (ref 3.8–10.5)
WBC # BLD: 6.5 K/UL — SIGNIFICANT CHANGE UP (ref 3.8–10.5)
WBC # FLD AUTO: 4.6 K/UL — SIGNIFICANT CHANGE UP (ref 3.8–10.5)
WBC # FLD AUTO: 6.5 K/UL — SIGNIFICANT CHANGE UP (ref 3.8–10.5)

## 2017-06-13 PROCEDURE — 71010: CPT | Mod: 26

## 2017-06-13 RX ORDER — AMIODARONE HYDROCHLORIDE 400 MG/1
0.5 TABLET ORAL
Qty: 900 | Refills: 0 | Status: DISCONTINUED | OUTPATIENT
Start: 2017-06-13 | End: 2017-06-14

## 2017-06-13 RX ORDER — ACETAMINOPHEN 500 MG
1000 TABLET ORAL ONCE
Qty: 0 | Refills: 0 | Status: COMPLETED | OUTPATIENT
Start: 2017-06-14 | End: 2017-06-14

## 2017-06-13 RX ORDER — ACETAMINOPHEN 500 MG
1000 TABLET ORAL ONCE
Qty: 0 | Refills: 0 | Status: COMPLETED | OUTPATIENT
Start: 2017-06-13 | End: 2017-06-13

## 2017-06-13 RX ORDER — CHLORHEXIDINE GLUCONATE 213 G/1000ML
15 SOLUTION TOPICAL EVERY 8 HOURS
Qty: 0 | Refills: 0 | Status: DISCONTINUED | OUTPATIENT
Start: 2017-06-13 | End: 2017-06-17

## 2017-06-13 RX ORDER — POTASSIUM CHLORIDE 20 MEQ
20 PACKET (EA) ORAL
Qty: 0 | Refills: 0 | Status: COMPLETED | OUTPATIENT
Start: 2017-06-13 | End: 2017-06-13

## 2017-06-13 RX ADMIN — Medication 400 MILLIGRAM(S): at 14:30

## 2017-06-13 RX ADMIN — AMIODARONE HYDROCHLORIDE 16.67 MG/MIN: 400 TABLET ORAL at 07:31

## 2017-06-13 RX ADMIN — HYDROMORPHONE HYDROCHLORIDE 0.2 MILLIGRAM(S): 2 INJECTION INTRAMUSCULAR; INTRAVENOUS; SUBCUTANEOUS at 18:30

## 2017-06-13 RX ADMIN — Medication 100 MILLIGRAM(S): at 16:32

## 2017-06-13 RX ADMIN — Medication 400 MILLIGRAM(S): at 07:28

## 2017-06-13 RX ADMIN — VASOPRESSIN 2.4 UNIT(S)/MIN: 20 INJECTION INTRAVENOUS at 21:49

## 2017-06-13 RX ADMIN — SODIUM CHLORIDE 50 MILLILITER(S): 9 INJECTION, SOLUTION INTRAVENOUS at 07:32

## 2017-06-13 RX ADMIN — Medication 255 MILLIMOLE(S): at 07:36

## 2017-06-13 RX ADMIN — FONDAPARINUX SODIUM 2.5 MILLIGRAM(S): 2.5 INJECTION, SOLUTION SUBCUTANEOUS at 11:11

## 2017-06-13 RX ADMIN — Medication 400 MILLIGRAM(S): at 19:49

## 2017-06-13 RX ADMIN — DEXMEDETOMIDINE HYDROCHLORIDE IN 0.9% SODIUM CHLORIDE 18.6 MICROGRAM(S)/KG/HR: 4 INJECTION INTRAVENOUS at 19:28

## 2017-06-13 RX ADMIN — HYDROMORPHONE HYDROCHLORIDE 0.2 MILLIGRAM(S): 2 INJECTION INTRAMUSCULAR; INTRAVENOUS; SUBCUTANEOUS at 11:15

## 2017-06-13 RX ADMIN — Medication 255 MILLIMOLE(S): at 09:00

## 2017-06-13 RX ADMIN — VASOPRESSIN 2.4 UNIT(S)/MIN: 20 INJECTION INTRAVENOUS at 00:22

## 2017-06-13 RX ADMIN — HYDROMORPHONE HYDROCHLORIDE 0.2 MILLIGRAM(S): 2 INJECTION INTRAMUSCULAR; INTRAVENOUS; SUBCUTANEOUS at 18:45

## 2017-06-13 RX ADMIN — PANTOPRAZOLE SODIUM 40 MILLIGRAM(S): 20 TABLET, DELAYED RELEASE ORAL at 11:12

## 2017-06-13 RX ADMIN — Medication 1000 MILLIGRAM(S): at 07:43

## 2017-06-13 RX ADMIN — Medication 50 MILLIGRAM(S): at 14:31

## 2017-06-13 RX ADMIN — Medication 1000 MILLIGRAM(S): at 20:04

## 2017-06-13 RX ADMIN — Medication 100 MILLIGRAM(S): at 05:32

## 2017-06-13 RX ADMIN — Medication 44 MICROGRAM(S): at 07:04

## 2017-06-13 RX ADMIN — Medication 1000 MILLIGRAM(S): at 15:00

## 2017-06-13 RX ADMIN — CHLORHEXIDINE GLUCONATE 15 MILLILITER(S): 213 SOLUTION TOPICAL at 21:49

## 2017-06-13 RX ADMIN — HYDROMORPHONE HYDROCHLORIDE 0.2 MILLIGRAM(S): 2 INJECTION INTRAMUSCULAR; INTRAVENOUS; SUBCUTANEOUS at 11:00

## 2017-06-13 RX ADMIN — Medication 100 MILLIGRAM(S): at 23:22

## 2017-06-13 RX ADMIN — DEXMEDETOMIDINE HYDROCHLORIDE IN 0.9% SODIUM CHLORIDE 18.6 MICROGRAM(S)/KG/HR: 4 INJECTION INTRAVENOUS at 07:32

## 2017-06-13 RX ADMIN — CHLORHEXIDINE GLUCONATE 15 MILLILITER(S): 213 SOLUTION TOPICAL at 14:30

## 2017-06-13 RX ADMIN — FLUCONAZOLE 100 MILLIGRAM(S): 150 TABLET ORAL at 12:26

## 2017-06-13 RX ADMIN — Medication 100 MILLIEQUIVALENT(S): at 04:05

## 2017-06-13 RX ADMIN — VASOPRESSIN 2.4 UNIT(S)/MIN: 20 INJECTION INTRAVENOUS at 07:31

## 2017-06-13 RX ADMIN — Medication 27.9 MICROGRAM(S)/KG/MIN: at 07:31

## 2017-06-13 RX ADMIN — Medication 100 MILLIEQUIVALENT(S): at 05:32

## 2017-06-13 RX ADMIN — HYDROMORPHONE HYDROCHLORIDE 0.2 MILLIGRAM(S): 2 INJECTION INTRAMUSCULAR; INTRAVENOUS; SUBCUTANEOUS at 05:00

## 2017-06-13 RX ADMIN — Medication 50 MILLIGRAM(S): at 05:32

## 2017-06-13 RX ADMIN — DEXMEDETOMIDINE HYDROCHLORIDE IN 0.9% SODIUM CHLORIDE 18.6 MICROGRAM(S)/KG/HR: 4 INJECTION INTRAVENOUS at 00:22

## 2017-06-13 RX ADMIN — Medication 50 MILLIGRAM(S): at 21:49

## 2017-06-13 RX ADMIN — Medication 27.9 MICROGRAM(S)/KG/MIN: at 00:22

## 2017-06-13 RX ADMIN — Medication 255 MILLIMOLE(S): at 04:05

## 2017-06-13 RX ADMIN — HYDROMORPHONE HYDROCHLORIDE 0.2 MILLIGRAM(S): 2 INJECTION INTRAMUSCULAR; INTRAVENOUS; SUBCUTANEOUS at 05:15

## 2017-06-13 NOTE — PROGRESS NOTE ADULT - PROBLEM SELECTOR PLAN 4
Due to CKD/TREVA, LR- K+ rising  Would recommend IVF + lasix for kaliuresis  consider change IVF to NS Pressor per ICU

## 2017-06-13 NOTE — PROGRESS NOTE ADULT - SUBJECTIVE AND OBJECTIVE BOX
HISTORY  76y Female with history significant for COPD, h/o DVT s/p IVC filter (now removed), GERD, hypothyroidism, HIT and diverticulitis s/p Ruben's, recurrent SBO s/p ex-lap small bowel resection, EC fistula s/p takedown, complicated by wound dehiscence, abdominal reconstruction underwent ex-lap, extensive NANETTE, EC fistula takedown, colostomy revision, and parastomal/incisional hernia repair, admitted to SICU intubated post operatively    24 HOUR EVENTS: Patient was in significant discomfort in the morning and seemed to have worsening mental status. Prevena was taken down at bedside and serous fluid was expressed from incision and previous ostomy site. Given her worsening status decision was made to obtain CT abd/pelvis with PO/IV contrast. Patient also had baldwin replaced after UA showed yeast and bacteria. 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 After scan patient was in acute respiratory distress and decision was made to intubate at bedside. After intubation manuel was replaced and central line was placed in Right IJ. She has remained on vasopressor support.    SUBJECTIVE/ROS:  [ x] A ten-point review of systems was otherwise negative except as noted.  [ ] Due to altered mental status/intubation, subjective information were not able to be obtained from the patient. History was obtained, to the extent possible, from review of the chart and collateral sources of information.      NEURO  RASS: -2    GCS:     CAM ICU:  Exam: awake, alert, oriented  Meds: ondansetron Injectable 4milliGRAM(s) IV Push every 6 hours PRN Nausea  HYDROmorphone  Injectable 0.2milliGRAM(s) IV Push every 2 hours PRN Breakthru Pain  acetaminophen  IVPB. 1000milliGRAM(s) IV Intermittent once  dexmedetomidine Infusion 1MICROgram(s)/kG/Hr IV Continuous <Continuous>    [x] Adequacy of sedation and pain control has been assessed and adjusted      RESPIRATORY  RR: 19 (0 - 35)  SpO2: 100% (83% - 100%)  Wt(kg): --  Exam: unlabored, clear to auscultation bilaterally  Mechanical Ventilation: Mode: AC/ CMV (Assist Control/ Continuous Mandatory Ventilation), RR (machine): 20, RR (patient): 24, TV (machine): 400, FiO2: 40, PEEP: 5, ITime: 1, MAP: 9, PIP: 19  ABG - ( 13 Jun 2017 02:26 )  pH: 7.34  /  pCO2: 39    /  pO2: 151   / HCO3: 20    / Base Excess: -4.4  /  SaO2: 100     Lactate: x                [N/A] Extubation Readiness Assessed  Meds: diphenhydrAMINE   Injectable 25milliGRAM(s) IV Push every 4 hours PRN Pruritus  ALBUTerol/ipratropium for Nebulization 3milliLiter(s) Nebulizer every 6 hours PRN Shortness of Breath and/or Wheezing        CARDIOVASCULAR  HR: 98 (97 - 164)  BP: 122/58 (80/52 - 145/74)  BP(mean): 83 (56 - 102)  ABP: 122/65 (73/51 - 144/65)  ABP(mean): 85 (52 - 121)  Wt(kg): --  CVP(cm H2O): --      Exam: regular rate and rhythm  Cardiac Rhythm: sinus  Perfusion     [x]Adequate   [ ]Inadequate  Mentation   [x]Normal       [ ]Reduced  Extremities  [x]Warm         [ ]Cool  Volume Status [ ]Hypervolemic [x]Euvolemic [ ]Hypovolemic  Meds: amiodarone Infusion 1mG/Min IV Continuous <Continuous>  amiodarone Infusion 0.5mG/Min IV Continuous <Continuous>  norepinephrine Infusion 0.2MICROgram(s)/kG/Min IV Continuous <Continuous>      GI/NUTRITION  Exam: soft, distended, dressings clean with some serous fluid saturation  Diet: NPO  Meds: pantoprazole  Injectable 40milliGRAM(s) IV Push daily      GENITOURINARY  I&O's Detail  I & Os for 24h ending 06-12 @ 07:00  =============================================  IN:    TPN (Total Parenteral Nutrition): 1478 ml    lactated ringers.: 1250 ml    Solution: 200 ml    Solution: 100 ml    Solution: 100 ml    vasopressin Infusion: 57.6 ml    norepinephrine Infusion: 50.4 ml    Total IN: 3236 ml  ---------------------------------------------  OUT:    Indwelling Catheter - Urethral: 1515 ml    Nasoenteral Tube: 700 ml    Drain: 250 ml    Colostomy: 5 ml    Total OUT: 2470 ml  ---------------------------------------------  Total NET: 766 ml    I & Os for current day (as of 06-13 @ 04:25)  =============================================  IN:    TPN (Total Parenteral Nutrition): 1178 ml    lactated ringers.: 950 ml    Lactated Ringers IV Bolus: 500 ml    norepinephrine Infusion: 230.6 ml    dexmedetomidine Infusion: 206.2 ml    Solution: 200 ml    Solution: 200 ml    amiodarone Infusion: 199.8 ml    Solution: 100 ml    Solution: 100 ml    amiodarone Infusion: 66.8 ml    Solution: 50 ml    Solution: 50 ml    norepinephrine Infusion: 22.5 ml    vasopressin Infusion: 19.2 ml    vasopressin Infusion: 16.8 ml    Total IN: 4089.9 ml  ---------------------------------------------  OUT:    Nasoenteral Tube: 1350 ml    Indwelling Catheter - Urethral: 1070 ml    Drain: 100 ml    Total OUT: 2520 ml  ---------------------------------------------  Total NET: 1569.9 ml      06-13    142  |  107  |  55<H>  ----------------------------<  159<H>  3.6   |  20<L>  |  1.57<H>    Ca    9.4      13 Jun 2017 02:34  Phos  1.9     06-13  Mg     2.4     06-13    TPro  4.8<L>  /  Alb  2.7<L>  /  TBili  0.7  /  DBili  x   /  AST  35  /  ALT  23  /  AlkPhos  114  06-12    [ x] Baldwin catheter, indication: Fluid monitoring in critically ill patient  Meds: lactated ringers. 1000milliLiter(s) IV Continuous <Continuous>  sodium phosphate IVPB 15milliMole(s) IV Intermittent every 1 hour  potassium chloride  20 mEq/100 mL IVPB 20milliEquivalent(s) IV Intermittent every 1 hour        HEMATOLOGIC  Meds: fondaparinux Injectable 2.5milliGRAM(s) SubCutaneous every other day    [x] VTE Prophylaxis                        6.6    4.6   )-----------( 72       ( 13 Jun 2017 02:33 )             21.1     PT/INR - ( 13 Jun 2017 02:33 )   PT: 24.6 sec;   INR: 2.22 ratio         PTT - ( 13 Jun 2017 02:33 )  PTT:55.6 sec  Transfusion     [ ] PRBC   [ ] Platelets   [ ] FFP   [ ] Cryoprecipitate      INFECTIOUS DISEASES  WBC Count: 4.6 K/uL (06-13 @ 02:33)  WBC Count: 4.7 K/uL (06-12 @ 18:55)    RECENT CULTURES:  Specimen Source: .Blood Blood  Date/Time: 06-10 @ 17:29  Culture Results:   No growth to date.  Gram Stain: --  Organism: --    Meds: metroNIDAZOLE  IVPB 500milliGRAM(s) IV Intermittent every 8 hours  aztreonam  IVPB 1000milliGRAM(s) IV Intermittent every 8 hours  DAPTOmycin IVPB     fluconAZOLE IVPB  IV Intermittent   fluconAZOLE IVPB 100milliGRAM(s) IV Intermittent every 24 hours        ENDOCRINE  CAPILLARY BLOOD GLUCOSE    Meds: levothyroxine Injectable 44MICROGram(s) IV Push daily  vasopressin Infusion 0.04Unit(s)/Min IV Continuous <Continuous>        ACCESS DEVICES:  [x ] Peripheral IV  [ x] Central Venous Line	[x ] R	[ ] L	[ x] IJ	[ ] Fem	[ ] SC	Placed:   [x ] Arterial Line		[x ] R	[ ] L	[x ] Fem	[ ] Rad	[ ] Ax	Placed:   [x ] PICC:					[ ] Mediport  [x ] Urinary Catheter, Date Placed:   [x] Necessity of urinary, arterial, and venous catheters discussed    OTHER MEDICATIONS:      CODE STATUS: Full Code      IMAGING: HISTORY  76y Female with history significant for COPD, h/o DVT s/p IVC filter (now removed), GERD, hypothyroidism, HIT and diverticulitis s/p Ruben's, recurrent SBO s/p ex-lap small bowel resection, EC fistula s/p takedown, complicated by wound dehiscence, abdominal reconstruction underwent ex-lap, extensive NANETTE, EC fistula takedown, colostomy revision, and parastomal/incisional hernia repair, admitted to SICU intubated post operatively    24 HOUR EVENTS: Patient was in significant discomfort in the morning and seemed to have worsening mental status. Prevena was taken down at bedside and serous fluid was expressed from incision and previous ostomy site. Given her worsening status decision was made to obtain CT abd/pelvis with PO/IV contrast. Patient also had baldwin replaced after UA showed yeast and bacteria. 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 After scan patient was in acute respiratory distress and decision was made to intubate at bedside. After intubation manuel was replaced and central line was placed in Right IJ. She has remained on vasopressor support.    SUBJECTIVE/ROS:  [ x] A ten-point review of systems was otherwise negative except as noted.  [ ] Due to altered mental status/intubation, subjective information were not able to be obtained from the patient. History was obtained, to the extent possible, from review of the chart and collateral sources of information.      NEURO  RASS: -2    GCS:     CAM ICU:  Exam: awake, alert, oriented  Meds: ondansetron Injectable 4milliGRAM(s) IV Push every 6 hours PRN Nausea  HYDROmorphone  Injectable 0.2milliGRAM(s) IV Push every 2 hours PRN Breakthru Pain  acetaminophen  IVPB. 1000milliGRAM(s) IV Intermittent once  dexmedetomidine Infusion 1MICROgram(s)/kG/Hr IV Continuous <Continuous>    [x] Adequacy of sedation and pain control has been assessed and adjusted      RESPIRATORY  RR: 19 (0 - 35)  SpO2: 100% (83% - 100%)  Wt(kg): --  Exam: unlabored, clear to auscultation bilaterally  Mechanical Ventilation: Mode: AC/ CMV (Assist Control/ Continuous Mandatory Ventilation), RR (machine): 20, RR (patient): 24, TV (machine): 400, FiO2: 40, PEEP: 5, ITime: 1, MAP: 9, PIP: 19  ABG - ( 13 Jun 2017 02:26 )  pH: 7.34  /  pCO2: 39    /  pO2: 151   / HCO3: 20    / Base Excess: -4.4  /  SaO2: 100     Lactate: x                [N/A] Extubation Readiness Assessed  Meds: diphenhydrAMINE   Injectable 25milliGRAM(s) IV Push every 4 hours PRN Pruritus  ALBUTerol/ipratropium for Nebulization 3milliLiter(s) Nebulizer every 6 hours PRN Shortness of Breath and/or Wheezing        CARDIOVASCULAR  HR: 98 (97 - 164)  BP: 122/58 (80/52 - 145/74)  BP(mean): 83 (56 - 102)  ABP: 122/65 (73/51 - 144/65)  ABP(mean): 85 (52 - 121)  Wt(kg): --  CVP(cm H2O): --      Exam: irregularly irregular  Cardiac Rhythm: afib  Perfusion     [x]Adequate   [ ]Inadequate  Mentation   [x]Normal       [ ]Reduced  Extremities  [x]Warm         [ ]Cool  Volume Status [ ]Hypervolemic [x]Euvolemic [ ]Hypovolemic  Meds: amiodarone Infusion 1mG/Min IV Continuous <Continuous>  amiodarone Infusion 0.5mG/Min IV Continuous <Continuous>  norepinephrine Infusion 0.2MICROgram(s)/kG/Min IV Continuous <Continuous>      GI/NUTRITION  Exam: soft, distended, dressings clean with some serous fluid saturation  Diet: NPO  Meds: pantoprazole  Injectable 40milliGRAM(s) IV Push daily      GENITOURINARY  I&O's Detail  I & Os for 24h ending 06-12 @ 07:00  =============================================  IN:    TPN (Total Parenteral Nutrition): 1478 ml    lactated ringers.: 1250 ml    Solution: 200 ml    Solution: 100 ml    Solution: 100 ml    vasopressin Infusion: 57.6 ml    norepinephrine Infusion: 50.4 ml    Total IN: 3236 ml  ---------------------------------------------  OUT:    Indwelling Catheter - Urethral: 1515 ml    Nasoenteral Tube: 700 ml    Drain: 250 ml    Colostomy: 5 ml    Total OUT: 2470 ml  ---------------------------------------------  Total NET: 766 ml    I & Os for current day (as of 06-13 @ 04:25)  =============================================  IN:    TPN (Total Parenteral Nutrition): 1178 ml    lactated ringers.: 950 ml    Lactated Ringers IV Bolus: 500 ml    norepinephrine Infusion: 230.6 ml    dexmedetomidine Infusion: 206.2 ml    Solution: 200 ml    Solution: 200 ml    amiodarone Infusion: 199.8 ml    Solution: 100 ml    Solution: 100 ml    amiodarone Infusion: 66.8 ml    Solution: 50 ml    Solution: 50 ml    norepinephrine Infusion: 22.5 ml    vasopressin Infusion: 19.2 ml    vasopressin Infusion: 16.8 ml    Total IN: 4089.9 ml  ---------------------------------------------  OUT:    Nasoenteral Tube: 1350 ml    Indwelling Catheter - Urethral: 1070 ml    Drain: 100 ml    Total OUT: 2520 ml  ---------------------------------------------  Total NET: 1569.9 ml      06-13    142  |  107  |  55<H>  ----------------------------<  159<H>  3.6   |  20<L>  |  1.57<H>    Ca    9.4      13 Jun 2017 02:34  Phos  1.9     06-13  Mg     2.4     06-13    TPro  4.8<L>  /  Alb  2.7<L>  /  TBili  0.7  /  DBili  x   /  AST  35  /  ALT  23  /  AlkPhos  114  06-12    [ x] Baldwin catheter, indication: Fluid monitoring in critically ill patient  Meds: lactated ringers. 1000milliLiter(s) IV Continuous <Continuous>  sodium phosphate IVPB 15milliMole(s) IV Intermittent every 1 hour  potassium chloride  20 mEq/100 mL IVPB 20milliEquivalent(s) IV Intermittent every 1 hour        HEMATOLOGIC  Meds: fondaparinux Injectable 2.5milliGRAM(s) SubCutaneous every other day    [x] VTE Prophylaxis                        6.6    4.6   )-----------( 72       ( 13 Jun 2017 02:33 )             21.1     PT/INR - ( 13 Jun 2017 02:33 )   PT: 24.6 sec;   INR: 2.22 ratio         PTT - ( 13 Jun 2017 02:33 )  PTT:55.6 sec  Transfusion     [ ] PRBC   [ ] Platelets   [ ] FFP   [ ] Cryoprecipitate      INFECTIOUS DISEASES  WBC Count: 4.6 K/uL (06-13 @ 02:33)  WBC Count: 4.7 K/uL (06-12 @ 18:55)    RECENT CULTURES:  Specimen Source: .Blood Blood  Date/Time: 06-10 @ 17:29  Culture Results:   No growth to date.  Gram Stain: --  Organism: --    Meds: metroNIDAZOLE  IVPB 500milliGRAM(s) IV Intermittent every 8 hours  aztreonam  IVPB 1000milliGRAM(s) IV Intermittent every 8 hours  DAPTOmycin IVPB     fluconAZOLE IVPB  IV Intermittent   fluconAZOLE IVPB 100milliGRAM(s) IV Intermittent every 24 hours        ENDOCRINE  CAPILLARY BLOOD GLUCOSE    Meds: levothyroxine Injectable 44MICROGram(s) IV Push daily  vasopressin Infusion 0.04Unit(s)/Min IV Continuous <Continuous>        ACCESS DEVICES:  [x ] Peripheral IV  [ x] Central Venous Line	[x ] R	[ ] L	[ x] IJ	[ ] Fem	[ ] SC	Placed:   [x ] Arterial Line		[x ] R	[ ] L	[x ] Fem	[ ] Rad	[ ] Ax	Placed:   [x ] PICC:					[ ] Mediport  [x ] Urinary Catheter, Date Placed:   [x] Necessity of urinary, arterial, and venous catheters discussed    OTHER MEDICATIONS:      CODE STATUS: Full Code      IMAGING: HISTORY  76y Female with history significant for COPD, h/o DVT s/p IVC filter (now removed), GERD, hypothyroidism, HIT and diverticulitis s/p Ruben's, recurrent SBO s/p ex-lap small bowel resection, EC fistula s/p takedown, complicated by wound dehiscence, abdominal reconstruction underwent ex-lap, extensive NANETTE, EC fistula takedown, colostomy revision, and parastomal/incisional hernia repair, admitted to SICU intubated post operatively    24 HOUR EVENTS: Patient was in significant discomfort in the morning and seemed to have worsening mental status. Prevena was taken down at bedside and serous fluid was expressed from incision and previous ostomy site. Given her worsening status decision was made to obtain CT abd/pelvis with PO/IV contrast. Patient also had baldwin replaced after UA showed yeast and bacteria. 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 After scan patient was in acute respiratory distress and decision was made to intubate at bedside. After intubation manuel was replaced and central line was placed in Right IJ. She has remained on vasopressor support.    SUBJECTIVE/ROS:  [ x] A ten-point review of systems was otherwise negative except as noted.  [ ] Due to altered mental status/intubation, subjective information were not able to be obtained from the patient. History was obtained, to the extent possible, from review of the chart and collateral sources of information.      NEURO  RASS: -2    GCS:     CAM ICU:  Exam: awake, alert, oriented  Meds: ondansetron Injectable 4milliGRAM(s) IV Push every 6 hours PRN Nausea  HYDROmorphone  Injectable 0.2milliGRAM(s) IV Push every 2 hours PRN Breakthru Pain  acetaminophen  IVPB. 1000milliGRAM(s) IV Intermittent once  dexmedetomidine Infusion 1MICROgram(s)/kG/Hr IV Continuous <Continuous>    [x] Adequacy of sedation and pain control has been assessed and adjusted      RESPIRATORY  RR: 19 (0 - 35)  SpO2: 100% (83% - 100%)  Wt(kg): --  Exam: unlabored, clear to auscultation bilaterally  Mechanical Ventilation: Mode: AC/ CMV (Assist Control/ Continuous Mandatory Ventilation), RR (machine): 20, RR (patient): 24, TV (machine): 400, FiO2: 40, PEEP: 5, ITime: 1, MAP: 9, PIP: 19  ABG - ( 13 Jun 2017 02:26 )  pH: 7.34  /  pCO2: 39    /  pO2: 151   / HCO3: 20    / Base Excess: -4.4  /  SaO2: 100     Lactate: x                [N/A] Extubation Readiness Assessed  Meds: diphenhydrAMINE   Injectable 25milliGRAM(s) IV Push every 4 hours PRN Pruritus  ALBUTerol/ipratropium for Nebulization 3milliLiter(s) Nebulizer every 6 hours PRN Shortness of Breath and/or Wheezing        CARDIOVASCULAR  HR: 98 (97 - 164)  BP: 122/58 (80/52 - 145/74)  BP(mean): 83 (56 - 102)  ABP: 122/65 (73/51 - 144/65)  ABP(mean): 85 (52 - 121)  Wt(kg): --  CVP(cm H2O): --      Exam: irregularly irregular  Cardiac Rhythm: afib  Perfusion     [x]Adequate   [ ]Inadequate  Mentation   [x]Normal       [ ]Reduced  Extremities  [x]Warm         [ ]Cool  Volume Status [ ]Hypervolemic [x]Euvolemic [ ]Hypovolemic  Meds: amiodarone Infusion 1mG/Min IV Continuous <Continuous>  amiodarone Infusion 0.5mG/Min IV Continuous <Continuous>  norepinephrine Infusion 0.2MICROgram(s)/kG/Min IV Continuous <Continuous>      GI/NUTRITION  Exam: soft, distended, dressings clean with some serous fluid saturation  Diet: NPO  Meds: pantoprazole  Injectable 40milliGRAM(s) IV Push daily      GENITOURINARY    I & Os for current day (as of 13 Jun 2017 07:24)  =============================================  IN:    TPN (Total Parenteral Nutrition): 1488 ml    lactated ringers.: 1100 ml    Lactated Ringers IV Bolus: 500 ml    Solution: 400 ml    norepinephrine Infusion: 360.4 ml    dexmedetomidine Infusion: 260.5 ml    Packed Red Blood Cells: 250 ml    Solution: 250 ml    Solution: 200 ml    Solution: 200 ml    amiodarone Infusion: 199.8 ml    amiodarone Infusion: 116.9 ml    Solution: 100 ml    Solution: 100 ml    Solution: 50 ml    vasopressin Infusion: 31.2 ml    norepinephrine Infusion: 22.5 ml    vasopressin Infusion: 16.8 ml    Total IN: 5646 ml  ---------------------------------------------  OUT:    Nasoenteral Tube: 1700 ml    Indwelling Catheter - Urethral: 1340 ml    Drain: 100 ml    Colostomy: 5 ml    Total OUT: 3145 ml  ---------------------------------------------  Total NET: 2501.1 ml      06-13    142  |  107  |  55<H>  ----------------------------<  159<H>  3.6   |  20<L>  |  1.57<H>    Ca    9.4      13 Jun 2017 02:34  Phos  1.9     06-13  Mg     2.4     06-13    TPro  4.8<L>  /  Alb  2.7<L>  /  TBili  0.7  /  DBili  x   /  AST  35  /  ALT  23  /  AlkPhos  114  06-12    [ x] Baldwin catheter, indication: Fluid monitoring in critically ill patient  Meds: lactated ringers. 1000milliLiter(s) IV Continuous <Continuous>  sodium phosphate IVPB 15milliMole(s) IV Intermittent every 1 hour  potassium chloride  20 mEq/100 mL IVPB 20milliEquivalent(s) IV Intermittent every 1 hour        HEMATOLOGIC  Meds: fondaparinux Injectable 2.5milliGRAM(s) SubCutaneous every other day    [x] VTE Prophylaxis                        6.6    4.6   )-----------( 72       ( 13 Jun 2017 02:33 )             21.1     PT/INR - ( 13 Jun 2017 02:33 )   PT: 24.6 sec;   INR: 2.22 ratio         PTT - ( 13 Jun 2017 02:33 )  PTT:55.6 sec  Transfusion     [ ] PRBC   [ ] Platelets   [ ] FFP   [ ] Cryoprecipitate      INFECTIOUS DISEASES  WBC Count: 4.6 K/uL (06-13 @ 02:33)  WBC Count: 4.7 K/uL (06-12 @ 18:55)    RECENT CULTURES:  Specimen Source: .Blood Blood  Date/Time: 06-10 @ 17:29  Culture Results:   No growth to date.  Gram Stain: --  Organism: --    Meds: metroNIDAZOLE  IVPB 500milliGRAM(s) IV Intermittent every 8 hours  aztreonam  IVPB 1000milliGRAM(s) IV Intermittent every 8 hours  DAPTOmycin IVPB     fluconAZOLE IVPB  IV Intermittent   fluconAZOLE IVPB 100milliGRAM(s) IV Intermittent every 24 hours        ENDOCRINE  CAPILLARY BLOOD GLUCOSE    Meds: levothyroxine Injectable 44MICROGram(s) IV Push daily  vasopressin Infusion 0.04Unit(s)/Min IV Continuous <Continuous>        ACCESS DEVICES:  [x ] Peripheral IV  [ x] Central Venous Line	[x ] R	[ ] L	[ x] IJ	[ ] Fem	[ ] SC	Placed:   [x ] Arterial Line		[x ] R	[ ] L	[x ] Fem	[ ] Rad	[ ] Ax	Placed:   [x ] PICC:					[ ] Mediport  [x ] Urinary Catheter, Date Placed:   [x] Necessity of urinary, arterial, and venous catheters discussed    OTHER MEDICATIONS:      CODE STATUS: Full Code      IMAGING: HISTORY  76y Female with history significant for COPD, h/o DVT s/p IVC filter (now removed), GERD, hypothyroidism, HIT and diverticulitis s/p Ruben's, recurrent SBO s/p ex-lap small bowel resection, EC fistula s/p takedown, complicated by wound dehiscence, abdominal reconstruction underwent ex-lap, extensive NANETTE, EC fistula takedown, colostomy revision, and parastomal/incisional hernia repair, admitted to SICU intubated post operatively    24 HOUR EVENTS: Patient was in significant discomfort in the morning and seemed to have worsening mental status. Prevena was taken down at bedside and serous fluid was expressed from incision and previous ostomy site. Given her worsening status decision was made to obtain CT abd/pelvis with PO/IV contrast. Patient also had baldwin replaced after UA showed yeast and bacteria. 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 After scan patient was in acute respiratory distress and decision was made to intubate at bedside. After intubation manuel was replaced and central line was placed in Right IJ. She has remained on vasopressor support.    SUBJECTIVE/ROS:  [ x] A ten-point review of systems was otherwise negative except as noted.  [ ] Due to altered mental status/intubation, subjective information were not able to be obtained from the patient. History was obtained, to the extent possible, from review of the chart and collateral sources of information.      NEURO  RASS: -2    GCS:     CAM ICU:  Exam: awake, alert, oriented  Meds: ondansetron Injectable 4milliGRAM(s) IV Push every 6 hours PRN Nausea  HYDROmorphone  Injectable 0.2milliGRAM(s) IV Push every 2 hours PRN Breakthru Pain  acetaminophen  IVPB. 1000milliGRAM(s) IV Intermittent once  dexmedetomidine Infusion 1MICROgram(s)/kG/Hr IV Continuous <Continuous>    [x] Adequacy of sedation and pain control has been assessed and adjusted      RESPIRATORY  RR: 19 (0 - 35)  SpO2: 100% (83% - 100%)  Wt(kg): --  Exam: unlabored, course breath sounds bilaterally  Mechanical Ventilation: Mode: AC/ CMV (Assist Control/ Continuous Mandatory Ventilation), RR (machine): 20, RR (patient): 24, TV (machine): 400, FiO2: 40, PEEP: 5, ITime: 1, MAP: 9, PIP: 19  ABG - ( 13 Jun 2017 02:26 )  pH: 7.34  /  pCO2: 39    /  pO2: 151   / HCO3: 20    / Base Excess: -4.4  /  SaO2: 100     Lactate: x                [N/A] Extubation Readiness Assessed  Meds: diphenhydrAMINE   Injectable 25milliGRAM(s) IV Push every 4 hours PRN Pruritus  ALBUTerol/ipratropium for Nebulization 3milliLiter(s) Nebulizer every 6 hours PRN Shortness of Breath and/or Wheezing        CARDIOVASCULAR  HR: 98 (97 - 164)  BP: 122/58 (80/52 - 145/74)  BP(mean): 83 (56 - 102)  ABP: 122/65 (73/51 - 144/65)  ABP(mean): 85 (52 - 121)  Wt(kg): --  CVP(cm H2O): --      Exam: irregularly irregular  Cardiac Rhythm: afib  Perfusion     [x]Adequate   [ ]Inadequate  Mentation   [x]Normal       [ ]Reduced  Extremities  [x]Warm         [ ]Cool  Volume Status [ ]Hypervolemic [x]Euvolemic [ ]Hypovolemic  Meds: amiodarone Infusion 1mG/Min IV Continuous <Continuous>  amiodarone Infusion 0.5mG/Min IV Continuous <Continuous>  norepinephrine Infusion 0.2MICROgram(s)/kG/Min IV Continuous <Continuous>      GI/NUTRITION  Exam: soft, distended, dressings clean with some serous fluid saturation  Diet: NPO  Meds: pantoprazole  Injectable 40milliGRAM(s) IV Push daily      GENITOURINARY    I & Os for current day (as of 13 Jun 2017 07:24)  =============================================  IN:    TPN (Total Parenteral Nutrition): 1488 ml    lactated ringers.: 1100 ml    Lactated Ringers IV Bolus: 500 ml    Solution: 400 ml    norepinephrine Infusion: 360.4 ml    dexmedetomidine Infusion: 260.5 ml    Packed Red Blood Cells: 250 ml    Solution: 250 ml    Solution: 200 ml    Solution: 200 ml    amiodarone Infusion: 199.8 ml    amiodarone Infusion: 116.9 ml    Solution: 100 ml    Solution: 100 ml    Solution: 50 ml    vasopressin Infusion: 31.2 ml    norepinephrine Infusion: 22.5 ml    vasopressin Infusion: 16.8 ml    Total IN: 5646 ml  ---------------------------------------------  OUT:    Nasoenteral Tube: 1700 ml    Indwelling Catheter - Urethral: 1340 ml    Drain: 100 ml    Colostomy: 5 ml    Total OUT: 3145 ml  ---------------------------------------------  Total NET: 2501.1 ml      06-13    142  |  107  |  55<H>  ----------------------------<  159<H>  3.6   |  20<L>  |  1.57<H>    Ca    9.4      13 Jun 2017 02:34  Phos  1.9     06-13  Mg     2.4     06-13    TPro  4.8<L>  /  Alb  2.7<L>  /  TBili  0.7  /  DBili  x   /  AST  35  /  ALT  23  /  AlkPhos  114  06-12    [ x] Baldwin catheter, indication: Fluid monitoring in critically ill patient  Meds: lactated ringers. 1000milliLiter(s) IV Continuous <Continuous>  sodium phosphate IVPB 15milliMole(s) IV Intermittent every 1 hour  potassium chloride  20 mEq/100 mL IVPB 20milliEquivalent(s) IV Intermittent every 1 hour        HEMATOLOGIC  Meds: fondaparinux Injectable 2.5milliGRAM(s) SubCutaneous every other day    [x] VTE Prophylaxis                        6.6    4.6   )-----------( 72       ( 13 Jun 2017 02:33 )             21.1     PT/INR - ( 13 Jun 2017 02:33 )   PT: 24.6 sec;   INR: 2.22 ratio         PTT - ( 13 Jun 2017 02:33 )  PTT:55.6 sec  Transfusion     [ ] PRBC   [ ] Platelets   [ ] FFP   [ ] Cryoprecipitate      INFECTIOUS DISEASES  WBC Count: 4.6 K/uL (06-13 @ 02:33)  WBC Count: 4.7 K/uL (06-12 @ 18:55)    RECENT CULTURES:  Specimen Source: .Blood Blood  Date/Time: 06-10 @ 17:29  Culture Results:   No growth to date.  Gram Stain: --  Organism: --    Meds: metroNIDAZOLE  IVPB 500milliGRAM(s) IV Intermittent every 8 hours  aztreonam  IVPB 1000milliGRAM(s) IV Intermittent every 8 hours  DAPTOmycin IVPB     fluconAZOLE IVPB  IV Intermittent   fluconAZOLE IVPB 100milliGRAM(s) IV Intermittent every 24 hours        ENDOCRINE  CAPILLARY BLOOD GLUCOSE    Meds: levothyroxine Injectable 44MICROGram(s) IV Push daily  vasopressin Infusion 0.04Unit(s)/Min IV Continuous <Continuous>        ACCESS DEVICES:  [x ] Peripheral IV  [ x] Central Venous Line	[x ] R	[ ] L	[ x] IJ	[ ] Fem	[ ] SC	Placed:   [x ] Arterial Line		[x ] R	[ ] L	[x ] Fem	[ ] Rad	[ ] Ax	Placed:   [x ] PICC:					[ ] Mediport  [x ] Urinary Catheter, Date Placed:   [x] Necessity of urinary, arterial, and venous catheters discussed    OTHER MEDICATIONS:      CODE STATUS: Full Code      IMAGING:

## 2017-06-13 NOTE — PROGRESS NOTE ADULT - ASSESSMENT
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

## 2017-06-13 NOTE — PROGRESS NOTE ADULT - SUBJECTIVE AND OBJECTIVE BOX
NEPHROLOGY-[facility] [phone]        Patient seen and examined         MEDICATIONS  (STANDING):  lactated ringers. 1000milliLiter(s) IV Continuous <Continuous>  levothyroxine Injectable 44MICROGram(s) IV Push daily  pantoprazole  Injectable 40milliGRAM(s) IV Push daily  metroNIDAZOLE  IVPB 500milliGRAM(s) IV Intermittent every 8 hours  aztreonam  IVPB 1000milliGRAM(s) IV Intermittent every 8 hours  fondaparinux Injectable 2.5milliGRAM(s) SubCutaneous every other day  DAPTOmycin IVPB     fluconAZOLE IVPB  IV Intermittent   fluconAZOLE IVPB 100milliGRAM(s) IV Intermittent every 24 hours  amiodarone Infusion 1mG/Min IV Continuous <Continuous>  amiodarone Infusion 0.5mG/Min IV Continuous <Continuous>  norepinephrine Infusion 0.2MICROgram(s)/kG/Min IV Continuous <Continuous>  vasopressin Infusion 0.04Unit(s)/Min IV Continuous <Continuous>  dexmedetomidine Infusion 1MICROgram(s)/kG/Hr IV Continuous <Continuous>  sodium phosphate IVPB 15milliMole(s) IV Intermittent every 1 hour      VITAL:  T(C): , Max: 37.2 ( @ 19:00)  T(F): , Max: 99 ( @ 19:00)  HR: 88  BP: 122/58  BP(mean): 83  RR: 19  SpO2: 100%  Wt(kg): --    I and O's:  I & Os for 24h ending  @ 07:00  =============================================  IN: 5646 ml / OUT: 3145 ml / NET: 2501.1 ml    I & Os for current day (as of  @ 09:05)  =============================================  IN: 700.1 ml / OUT: 40 ml / NET: 660.1 ml        PHYSICAL EXAM:    Constitutional: NAD  HEENT: PERRLA    Neck:  No JVD  Respiratory: CTAB/L  Cardiovascular: S1 and S2  Gastrointestinal: BS+, soft, NT/ND  Extremities: No peripheral edema  Neurological: A/O x 3, no focal deficits  Psychiatric: Normal mood, normal affect  : No Snyder  Skin: No rashes  Access: Not applicable    LABS:                        8.0    6.5   )-----------( 72       ( 2017 07:52 )             24.0         142  |  107  |  55<H>  ----------------------------<  159<H>  3.6   |  20<L>  |  1.57<H>    Ca    9.4      2017 02:34  Phos  1.9       Mg     2.4         TPro  4.8<L>  /  Alb  2.7<L>  /  TBili  0.7  /  DBili  x   /  AST  35  /  ALT  23  /  AlkPhos  114  -          Urine Studies:  Urinalysis Basic - ( 2017 11:40 )    Color: Yellow / Appearance: Clear / S.017 / pH: x  Gluc: x / Ketone: Negative  / Bili: Negative / Urobili: Negative   Blood: x / Protein: 30 mg/dL / Nitrite: Negative   Leuk Esterase: Negative / RBC: 0-2 /HPF / WBC x   Sq Epi: x / Non Sq Epi: OCC /HPF / Bacteria: Few /HPF      Creatinine, Random Urine: 29 mg/dL ( @ 05:29)  Sodium, Random Urine: 31 mmol/L ( @ 05:29)  Osmolality, Random Urine: 383 mos/kg ( @ 05:29)  Potassium, Random Urine: 22 mmol/L ( @ 05:29)  Sodium, Random Urine: 51 mmol/L ( @ 10:43)  Osmolality, Random Urine: 370 mos/kg ( @ 10:43)  Creatinine, Random Urine: 25 mg/dL ( @ 10:43)  Potassium, Random Urine: 21 mmol/L ( @ 10:43)        RADIOLOGY & ADDITIONAL STUDIES:        ASSESSMENT      RECOMMENDATIONS NEPHROLOGY-[facility] [phone]        Patient seen and examined bed.  Events updated.  She likely aspirated the contrast and was intubated.  Is still on pressors at present.      ROS-Unable    MEDICATIONS  (STANDING):  lactated ringers. 1000milliLiter(s) IV Continuous <Continuous>  levothyroxine Injectable 44MICROGram(s) IV Push daily  pantoprazole  Injectable 40milliGRAM(s) IV Push daily  metroNIDAZOLE  IVPB 500milliGRAM(s) IV Intermittent every 8 hours  aztreonam  IVPB 1000milliGRAM(s) IV Intermittent every 8 hours  fondaparinux Injectable 2.5milliGRAM(s) SubCutaneous every other day  DAPTOmycin IVPB     fluconAZOLE IVPB  IV Intermittent   fluconAZOLE IVPB 100milliGRAM(s) IV Intermittent every 24 hours  amiodarone Infusion 1mG/Min IV Continuous <Continuous>  amiodarone Infusion 0.5mG/Min IV Continuous <Continuous>  norepinephrine Infusion 0.2MICROgram(s)/kG/Min IV Continuous <Continuous>  vasopressin Infusion 0.04Unit(s)/Min IV Continuous <Continuous>  dexmedetomidine Infusion 1MICROgram(s)/kG/Hr IV Continuous <Continuous>  sodium phosphate IVPB 15milliMole(s) IV Intermittent every 1 hour      VITAL:  T(C): , Max: 37.2 ( @ 19:00)  T(F): , Max: 99 ( @ 19:00)  HR: 88  BP: 122/58  BP(mean): 83  RR: 19  SpO2: 100%  Wt(kg): --    I and O's:  I & Os for 24h ending  @ 07:00  =============================================  IN: 5646 ml / OUT: 3145 ml / NET: 2501.1 ml    I & Os for current day (as of  @ 09:05)  =============================================  IN: 700.1 ml / OUT: 40 ml / NET: 660.1 ml        PHYSICAL EXAM:    Constitutional: intubated  HEENT: PERRLA    Neck:  No JVD  Respiratory: transmitted upper  Cardiovascular: S1 and S2  Gastrointestinal:hypoactive bowel sounds  Extremities: +  peripheral edema  Neurological: unable  : No Snyder  Skin: No rashes  Access: Not applicable    LABS:                        8.0    6.5   )-----------( 72       ( 2017 07:52 )             24.0         142  |  107  |  55<H>  ----------------------------<  159<H>  3.6   |  20<L>  |  1.57<H>    Ca    9.4      2017 02:34  Phos  1.9       Mg     2.4         TPro  4.8<L>  /  Alb  2.7<L>  /  TBili  0.7  /  DBili  x   /  AST  35  /  ALT  23  /  AlkPhos  114            Urine Studies:  Urinalysis Basic - ( 2017 11:40 )    Color: Yellow / Appearance: Clear / S.017 / pH: x  Gluc: x / Ketone: Negative  / Bili: Negative / Urobili: Negative   Blood: x / Protein: 30 mg/dL / Nitrite: Negative   Leuk Esterase: Negative / RBC: 0-2 /HPF / WBC x   Sq Epi: x / Non Sq Epi: OCC /HPF / Bacteria: Few /HPF      Creatinine, Random Urine: 29 mg/dL ( @ 05:29)  Sodium, Random Urine: 31 mmol/L ( @ 05:29)  Osmolality, Random Urine: 383 mos/kg ( @ 05:29)  Potassium, Random Urine: 22 mmol/L ( @ 05:29)  Sodium, Random Urine: 51 mmol/L ( @ 10:43)  Osmolality, Random Urine: 370 mos/kg ( @ 10:43)  Creatinine, Random Urine: 25 mg/dL ( @ 10:43)  Potassium, Random Urine: 21 mmol/L ( @ 10:43)        RADIOLOGY & ADDITIONAL STUDIES:    EXAM:  CHEST PORTABLE ROUTINE                            PROCEDURE DATE:  2017            INTERPRETATION:  AP chest x-ray at 0657 hours on .    Clinical information: Pulmonary vascular congestion.    Comparison: Chest x-ray at 2246 hours on .    Findings: Endotracheal tube is been slightly withdrawn and now lies at   the level of the fifth thoracic vertebral body, 3 cm above the jess.   Enteric tube side-port remains above the diaphragmatic hiatus. Right   internal jugularvein  and left subclavian vein central venous catheters   are in good position.    No consolidation or pleural effusion is present.    Impression: Clear lungs.        CT of the abd  IMPRESSION:   1. Mild postoperative ileus. Trace free fluid.  2. No abscess.          ASSESSMENT      RECOMMENDATIONS

## 2017-06-13 NOTE — PROGRESS NOTE ADULT - PROBLEM SELECTOR PLAN 2
Continue with current management as per SICU  Will continue with K free, decreased Cl PN  Since phos is low will not at this point remove the phos from PN

## 2017-06-13 NOTE — PROGRESS NOTE ADULT - ASSESSMENT
ASSESSMENT  76y female with high volume ECF s/p exploratory laparotomy, extensive lysis of adhesions, takedown of enterocutaneous fistula, repair of serosal tear on cecum, partial right salpingooophorectomy, colostomy revision, parastomal and incisional hernia repair with strattice mesh, POD 4. Hypotensive on pressors, coagulopathic, thrombocytopenic with altered mentation. Appears to be in septic shock, unclear source. Possibly aspiration event leading to ARF.     PLAN  - wean pressors, sedation as tolerated  - Cont. NGT to suction  - Broad spectrum abx   - f/u CT a/p read  - Amio gtt for afib  - Will discuss with attending    Pager: [phone] ASSESSMENT  76y female with high volume ECF s/p exploratory laparotomy, extensive lysis of adhesions, takedown of enterocutaneous fistula, repair of serosal tear on cecum, partial right salpingooophorectomy, colostomy revision, parastomal and incisional hernia repair with strattice mesh, POD 4. Hypotensive on pressors, coagulopathic, thrombocytopenic with altered mentation. Now with respiratory failure requiring intubation and mechanical ventilation. Appears to be in septic shock, unclear source. CT abd/pelvis obtained yesterday, does not look alarming currently however high risk for having a complication in the near future given her history.     PLAN  - wean pressors, sedation as tolerated  - Cont. NGT to suction/TPN  - Broad spectrum abx   - Appreciate support care of the SICU  - Amio gtt for afib  - Will discuss with attending    Pager: [phone]

## 2017-06-13 NOTE — PROGRESS NOTE ADULT - ASSESSMENT
75 yo F SICU day #4, POD #3 s/p ex-lap, extensive NANETTE, takedown of EC fistula, repair of cecal serosal tear, partial R salpingectomy, colostomy revision, parastomal and incisional hernia repair with strattice mesh, with hypotension requiring pressors, significant post operative pain, now s/p intubation for respiratory distress after ct scan.    Neuro: Continue precedex with sedation vacations  Resp: Wean vent settings as tolerated, spontaneous breathing trials  CVS: persistently hypotensive, concern for possible sepsis without clear underlying source, attempt to wean pressors as tolerated  GI/Nutrition: NPO, continue NG tube to low wall suction, follow up GI function, continue TPN at this time  Genitourinary/Renal: acute on chronic kidney disease (stage III)( Baseline Cr-1.2), with downtrending creatinine, continue to monitor urine output and electrolytes, follow up urine electrolytes  Hematologic: coagulopathy likely secondary to compromised renal excretion of fondaparinux; continue VTE ppx with fondaparinux dosed every other day in setting of TREVA (GFR < 30) and with history of HIT  Infectious Disease: concern for sepsis with persistent hypotension and procalcitonin elevation, on broad spectrum antibiotics with aztreonam and flagyl, and 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    Neuro: Continue precedex with sedation vacations  Resp: Wean vent settings as tolerated, spontaneous breathing trials  CVS: persistently hypotensive, concern for possible sepsis without clear underlying source, attempt to wean pressors as tolerated. Continue amio gtt to manage afib  GI/Nutrition: NPO, continue NG tube to low wall suction, follow up GI function, continue TPN at this time  Genitourinary/Renal: acute on chronic kidney disease (stage III)( Baseline Cr-1.2), with downtrending creatinine, continue to monitor urine output and electrolytes, follow up urine electrolytes  Hematologic: coagulopathy likely secondary to compromised renal excretion of fondaparinux; continue VTE ppx with fondaparinux dosed every other day in setting of TREVA (GFR < 30) and with history of HIT  Infectious Disease: concern for sepsis with persistent hypotension and procalcitonin elevation, on broad spectrum antibiotics with aztreonam and flagyl, and 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

## 2017-06-13 NOTE — PROGRESS NOTE ADULT - SUBJECTIVE AND OBJECTIVE BOX
[facility] Trauma/Acute Care [place] Progress Note    HOSPITAL DAY #:22  POST OPERATIVE DAY #:  4  STATUS POST:  exploratory laparotomy, extensive lysis of adhesions, takedown of enterocutaneous fistula, repair of serosal tear on cecum, partial right salpingooophorectomy, colostomy revision, parastomal and incisional hernia repair with strattice mesh                    INTERVAL EVENTS: Patient intubated overnight for acute respiratory failure (pH 7.20). Metabolic acidosis improving with respiratory support (pH now 7.34). Amiodarone gtt initiated for afib with RVR and patient placed on levo and vaso for pressure support.       OBJECTIVE:     Constitutional: Intubated, sedated  Respiratory: A/C vent; PEEP 5 / FiO2 40%  Cardiovascular: tachycardic  Gastrointestinal: soft, distended, ostomy appears congested but viable, no fxn. NGT in place: bilious  Psychiatric: unable to assess [facility] Trauma/Acute Care [place] Progress Note    HOSPITAL DAY #:22  POST OPERATIVE DAY #:  4  STATUS POST:  exploratory laparotomy, extensive lysis of adhesions, takedown of enterocutaneous fistula, repair of serosal tear on cecum, partial right salpingooophorectomy, colostomy revision, parastomal and incisional hernia repair with strattice mesh                    INTERVAL EVENTS: Patient intubated overnight for acute respiratory failure (pH 7.20). Metabolic acidosis improving with respiratory support (pH now 7.34). Amiodarone gtt initiated for afib with RVR and patient placed on levo and vaso for pressure support.       SUBJECTIVE: Pt is intubated. Unable to obtain ROS.     OBJECTIVE:     ICU Vital Signs Last 24 Hrs  T(C): 37.1, Max: 37.2 (06-12 @ 19:00)  T(F): 98.8, Max: 99 (06-12 @ 19:00)  HR: 79 (79 - 164)  BP: 122/58 (80/52 - 145/74)  BP(mean): 83 (56 - 102)  ABP: 99/50 (73/36 - 148/65)  ABP(mean): 67 (49 - 121)  RR: 29 (15 - 35)  SpO2: 100% (57% - 100%)    I&O's Detail  I & Os for 24h ending 13 Jun 2017 07:00  =============================================  IN:    TPN (Total Parenteral Nutrition): 1488 ml    lactated ringers.: 1100 ml    Lactated Ringers IV Bolus: 500 ml    Solution: 400 ml    norepinephrine Infusion: 360.4 ml    dexmedetomidine Infusion: 260.5 ml    Packed Red Blood Cells: 250 ml    Solution: 250 ml    Solution: 200 ml    Solution: 200 ml    amiodarone Infusion: 199.8 ml    amiodarone Infusion: 116.9 ml    Solution: 100 ml    Solution: 100 ml    Solution: 50 ml    vasopressin Infusion: 31.2 ml    norepinephrine Infusion: 22.5 ml    vasopressin Infusion: 16.8 ml    Total IN: 5646 ml  ---------------------------------------------  OUT:    Nasoenteral Tube: 1700 ml    Indwelling Catheter - Urethral: 1340 ml    Drain: 100 ml    Colostomy: 5 ml    Total OUT: 3145 ml  ---------------------------------------------  Total NET: 2501.1 ml    I & Os for current day (as of 13 Jun 2017 12:07)  =============================================  IN:    Solution: 500 ml    TPN (Total Parenteral Nutrition): 248 ml    lactated ringers.: 200 ml    Solution: 100 ml    norepinephrine Infusion: 85 ml    dexmedetomidine Infusion: 82 ml    amiodarone Infusion: 33.3 ml    amiodarone Infusion: 33.3 ml    vasopressin Infusion: 9.6 ml    Total IN: 1291.2 ml  ---------------------------------------------  OUT:    Indwelling Catheter - Urethral: 150 ml    Total OUT: 150 ml  ---------------------------------------------  Total NET: 1141.2 ml      Constitutional: Intubated, minimally arousable  Respiratory: A/C vent; PEEP 5 / FiO2 40%  Cardiovascular: sinus  Gastrointestinal: soft, distended, ostomy appears congested but viable, no fxn. NGT in place: bilious, old colostomy site, packed  Psychiatric: unable to assess

## 2017-06-13 NOTE — PROGRESS NOTE ADULT - SUBJECTIVE AND OBJECTIVE BOX
Day #12 of PN  PN infusion at 62 ccs/hr  Labs   06-13    142  |  107  |  55<H>  ----------------------------<  159<H>  3.6   |  20<L>  |  1.57<H>    Ca    9.4      13 Jun 2017 02:34  Phos  1.9     06-13  Mg     2.4     06-13    TPro  4.8<L>  /  Alb  2.7<L>  /  TBili  0.7  /  DBili  x   /  AST  35  /  ALT  23  /  AlkPhos  114  06-12      LIVER FUNCTIONS - ( 12 Jun 2017 18:55 )  Alb: 2.7 g/dL / Pro: 4.8 g/dL / ALK PHOS: 114 U/L / ALT: 23 U/L RC / AST: 35 U/L / GGT: x             I&O's Detail    I & Os for current day (as of 13 Jun 2017 08:16)  =============================================  IN:    TPN (Total Parenteral Nutrition): 1488 ml    lactated ringers.: 1100 ml    Lactated Ringers IV Bolus: 500 ml    Solution: 400 ml    norepinephrine Infusion: 360.4 ml    dexmedetomidine Infusion: 260.5 ml    Packed Red Blood Cells: 250 ml    Solution: 250 ml    Solution: 200 ml    Solution: 200 ml    amiodarone Infusion: 199.8 ml    amiodarone Infusion: 116.9 ml    Solution: 100 ml    Solution: 100 ml    Solution: 50 ml    vasopressin Infusion: 31.2 ml    norepinephrine Infusion: 22.5 ml    vasopressin Infusion: 16.8 ml    Total IN: 5646 ml  ---------------------------------------------  OUT:    Nasoenteral Tube: 1700 ml    Indwelling Catheter - Urethral: 1340 ml    Drain: 100 ml    Colostomy: 5 ml    Total OUT: 3145 ml  ---------------------------------------------  Total NET: 5071.1 ml

## 2017-06-13 NOTE — PROGRESS NOTE ADULT - PROBLEM SELECTOR PLAN 2
CKD/TREVA: nonoliguric, due to hemodynamics- Renal fxn unchanged today CKD/TREVA: nonoliguric, due to hemodynamics- Renal fxn largely unchanged

## 2017-06-13 NOTE — PROGRESS NOTE ADULT - ATTENDING COMMENTS
Patient continues to remain critically ill on vasopressors.  Budding yeast on UA, patient started on fluconazole yesterday.  Mental status improved.  [facility]  respirtaory failure hypoxic- will chaya off vent as tolerated

## 2017-06-13 NOTE — CHART NOTE - NSCHARTNOTEFT_GEN_A_CORE
RD f/u: Pt c diverticulitis S/P Dunham's/ostomy creation, recurrent SBOs, enterocutaneous fistula now admitted c SBO, abdominal pain, loss of ostomy function, dislodged fistula plug. Pt now S/P 6/9  exploratory laparotomy, extensive lysis of adhesions, takedown of enterocutaneous fistula, repair of serosal tear on cecum, partial right salpingooophorectomy, colostomy revision, parastomal and incisional hernia repair with strattice mesh. Pt reintubated 6/12 after CT scan, out of concern for aspiration. Pt remains sedated on pressors, no SBT planned today per team. Per chart, pt with TREVA, may plan to d/c PN if phosphorus continued to rise; fluctuations in phosphorus noted.    Source: Patient [ ]    Family [ ]     other [X ]: team rounds, medical record    Diet : NPO with TPN; NGT to suction, 24-hour output = 1700ml (6/13), 700ml (6/12)      Patient reports [ ] nausea  [ ] vomiting [ ] diarrhea [ ] constipation  [ ]chewing problems [ ] swallowing issues  [X ] other:  colostomy output=5ml     PO intake:  < 50% [ ] 50-75% [ ]   % [ ]  other :     Source for PO intake [ ] Patient [ ] family [ ] chart [ ] staff [ ] other     Enteral /Parenteral Nutrition: PN infusing at 62ml/hr (84Gm amino acids, 214Gm dextrose, 213ml 20%lipids) to provide 1490 clarke (20cal/kg, 1.1Gm prot/Kg per dosing wt 74.4Kg). Of note, calcium, potassium, and phosphorus removed from PN; acetate adjusted to 70mEq, chloride adjusted to 50mEq.       Current Weight: 85.5Kg (6/11), 78.5Kg (6/9), 74.4Kg (dosing 74.4Kg)  % Weight Change: 115% with edema    Edema: 3+ generalized/dependent, L/R hand    Pertinent Medications: MEDICATIONS  (STANDING):  lactated ringers. 1000milliLiter(s) IV Continuous <Continuous>  norepinephrine Infusion 0.2MICROgram(s)/kG/Min IV Continuous <Continuous>  vasopressin Infusion 0.04Unit(s)/Min IV Continuous <Continuous>  sodium phosphate IVPB 15milliMole(s) IV Intermittent every 1 hour    MEDICATIONS  (PRN):  ondansetron Injectable 4milliGRAM(s) IV Push every 6 hours PRN Nausea  HYDROmorphone  Injectable 0.2milliGRAM(s) IV Push every 2 hours PRN Breakthru Pain  ALBUTerol/ipratropium for Nebulization 3milliLiter(s) Nebulizer every 6 hours PRN Shortness of Breath and/or Wheezing    Pertinent Labs:  06-13 Na142 mmol/L Glu 159 mg/dL<H> K+ 3.6 mmol/L Cr  1.57 mg/dL<H> BUN 55 mg/dL<H> Phos 1.9 mg/dL<L>       Skin: no pressure injuries    Estimated Needs:   X] no change since previous assessment  [ ] recalculated:       Previous Nutrition Diagnosis:     [ ] Inadequate Energy Intake [X ]Inadequate Oral Intake [ ] Excessive Energy Intake     [ ] Underweight [ ] Increased Nutrient Needs [ ] Overweight/Obesity     [ ] Altered GI Function [ ] Unintended Weight Loss [ ] Food & Nutrition Related Knowledge Deficit [ ] Malnutrition          Nutrition Diagnosis is [X ] ongoing  [ ] resolved [ ] not applicable          New Nutrition Diagnosis: [X ] not applicable    [ ] Inadequate Protein Energy Intake [ ]Inadequate Oral Intake [ ] Excessive Energy Intake     [ ] Underweight [ ] Increased Nutrient Needs [ ] Overweight/Obesity     [ ] Altered GI Function [ ] Unintended Weight Loss [ ] Food & Nutrition Related Knowledge Deficit[ ] Limited Adherence to nutrition related recommendations [ ] Malnutrition  [ ] other: Free text       Related to:      As evidenced by:      Interventions:     Recommend    [ ] Change Diet To: Pending extubation and diet advancement, recommend clear liquids; advance to low fiber diet as tolerated and medically feasible    [ ] Nutrition Supplement    [ ] Nutrition Support: PN per Dr. Carias    [ ] Other:        Monitoring and Evaluation:     [ ] PO intake [ ] Tolerance to diet prescription [ ] weights [ X] follow up per protocol    [X ] other: monitor PN provision, ability to advance diet

## 2017-06-13 NOTE — PROGRESS NOTE ADULT - ASSESSMENT
Events of past 24 hrs discussed with SICU attending.  Pulmonary failure of unclear etiology but ? related to aspiration  TREVA stable with slowly improving labs  Hypercalcemia persists again with unclear etiology

## 2017-06-14 LAB
ALBUMIN SERPL ELPH-MCNC: 2.4 G/DL — LOW (ref 3.3–5)
ALP SERPL-CCNC: 111 U/L — SIGNIFICANT CHANGE UP (ref 40–120)
ALT FLD-CCNC: 16 U/L RC — SIGNIFICANT CHANGE UP (ref 10–45)
ANION GAP SERPL CALC-SCNC: 11 MMOL/L — SIGNIFICANT CHANGE UP (ref 5–17)
ANION GAP SERPL CALC-SCNC: 12 MMOL/L — SIGNIFICANT CHANGE UP (ref 5–17)
ANION GAP SERPL CALC-SCNC: 15 MMOL/L — SIGNIFICANT CHANGE UP (ref 5–17)
ANION GAP SERPL CALC-SCNC: 15 MMOL/L — SIGNIFICANT CHANGE UP (ref 5–17)
APTT BLD: 46.1 SEC — HIGH (ref 27.5–37.4)
AST SERPL-CCNC: 25 U/L — SIGNIFICANT CHANGE UP (ref 10–40)
BASE EXCESS BLDV CALC-SCNC: -2.4 MMOL/L — LOW (ref -2–2)
BILIRUB DIRECT SERPL-MCNC: 0.6 MG/DL — HIGH (ref 0–0.2)
BILIRUB INDIRECT FLD-MCNC: 0.4 MG/DL — SIGNIFICANT CHANGE UP (ref 0.2–1)
BILIRUB SERPL-MCNC: 1 MG/DL — SIGNIFICANT CHANGE UP (ref 0.2–1.2)
BUN SERPL-MCNC: 47 MG/DL — HIGH (ref 7–23)
BUN SERPL-MCNC: 50 MG/DL — HIGH (ref 7–23)
BUN SERPL-MCNC: 51 MG/DL — HIGH (ref 7–23)
BUN SERPL-MCNC: 55 MG/DL — HIGH (ref 7–23)
CALCIUM SERPL-MCNC: 7.7 MG/DL — LOW (ref 8.4–10.5)
CALCIUM SERPL-MCNC: 8 MG/DL — LOW (ref 8.4–10.5)
CALCIUM SERPL-MCNC: 8.1 MG/DL — LOW (ref 8.4–10.5)
CALCIUM SERPL-MCNC: 8.4 MG/DL — SIGNIFICANT CHANGE UP (ref 8.4–10.5)
CHLORIDE SERPL-SCNC: 104 MMOL/L — SIGNIFICANT CHANGE UP (ref 96–108)
CHLORIDE SERPL-SCNC: 104 MMOL/L — SIGNIFICANT CHANGE UP (ref 96–108)
CHLORIDE SERPL-SCNC: 105 MMOL/L — SIGNIFICANT CHANGE UP (ref 96–108)
CHLORIDE SERPL-SCNC: 107 MMOL/L — SIGNIFICANT CHANGE UP (ref 96–108)
CO2 BLDV-SCNC: 24 MMOL/L — SIGNIFICANT CHANGE UP (ref 22–30)
CO2 SERPL-SCNC: 19 MMOL/L — LOW (ref 22–31)
CO2 SERPL-SCNC: 19 MMOL/L — LOW (ref 22–31)
CO2 SERPL-SCNC: 20 MMOL/L — LOW (ref 22–31)
CO2 SERPL-SCNC: 20 MMOL/L — LOW (ref 22–31)
CREAT SERPL-MCNC: 1.42 MG/DL — HIGH (ref 0.5–1.3)
CREAT SERPL-MCNC: 1.42 MG/DL — HIGH (ref 0.5–1.3)
CREAT SERPL-MCNC: 1.5 MG/DL — HIGH (ref 0.5–1.3)
CREAT SERPL-MCNC: 1.61 MG/DL — HIGH (ref 0.5–1.3)
FUNGITELL: <31 PG/ML — SIGNIFICANT CHANGE UP (ref 0–59)
GAS PNL BLDA: SIGNIFICANT CHANGE UP
GAS PNL BLDV: SIGNIFICANT CHANGE UP
GLUCOSE SERPL-MCNC: 126 MG/DL — HIGH (ref 70–99)
GLUCOSE SERPL-MCNC: 158 MG/DL — HIGH (ref 70–99)
GLUCOSE SERPL-MCNC: 179 MG/DL — HIGH (ref 70–99)
GLUCOSE SERPL-MCNC: 179 MG/DL — HIGH (ref 70–99)
HCO3 BLDV-SCNC: 22 MMOL/L — SIGNIFICANT CHANGE UP (ref 21–29)
HCT VFR BLD CALC: 21 % — CRITICAL LOW (ref 34.5–45)
HCT VFR BLD CALC: 21.4 % — LOW (ref 34.5–45)
HCT VFR BLD CALC: 24.8 % — LOW (ref 34.5–45)
HCT VFR BLD CALC: 26.2 % — LOW (ref 34.5–45)
HGB BLD-MCNC: 7 G/DL — CRITICAL LOW (ref 11.5–15.5)
HGB BLD-MCNC: 7.1 G/DL — LOW (ref 11.5–15.5)
HGB BLD-MCNC: 8.2 G/DL — LOW (ref 11.5–15.5)
HGB BLD-MCNC: 8.9 G/DL — LOW (ref 11.5–15.5)
HOROWITZ INDEX BLDV+IHG-RTO: 30 — SIGNIFICANT CHANGE UP
INR BLD: 2.25 RATIO — HIGH (ref 0.88–1.16)
MAGNESIUM SERPL-MCNC: 2 MG/DL — SIGNIFICANT CHANGE UP (ref 1.6–2.6)
MAGNESIUM SERPL-MCNC: 2.2 MG/DL — SIGNIFICANT CHANGE UP (ref 1.6–2.6)
MAGNESIUM SERPL-MCNC: 2.3 MG/DL — SIGNIFICANT CHANGE UP (ref 1.6–2.6)
MAGNESIUM SERPL-MCNC: 2.4 MG/DL — SIGNIFICANT CHANGE UP (ref 1.6–2.6)
MCHC RBC-ENTMCNC: 33.1 PG — SIGNIFICANT CHANGE UP (ref 27–34)
MCHC RBC-ENTMCNC: 33.2 GM/DL — SIGNIFICANT CHANGE UP (ref 32–36)
MCHC RBC-ENTMCNC: 33.2 GM/DL — SIGNIFICANT CHANGE UP (ref 32–36)
MCHC RBC-ENTMCNC: 33.3 GM/DL — SIGNIFICANT CHANGE UP (ref 32–36)
MCHC RBC-ENTMCNC: 33.7 PG — SIGNIFICANT CHANGE UP (ref 27–34)
MCHC RBC-ENTMCNC: 33.7 PG — SIGNIFICANT CHANGE UP (ref 27–34)
MCHC RBC-ENTMCNC: 33.8 GM/DL — SIGNIFICANT CHANGE UP (ref 32–36)
MCHC RBC-ENTMCNC: 33.8 PG — SIGNIFICANT CHANGE UP (ref 27–34)
MCV RBC AUTO: 100 FL — SIGNIFICANT CHANGE UP (ref 80–100)
MCV RBC AUTO: 101 FL — HIGH (ref 80–100)
MCV RBC AUTO: 101 FL — HIGH (ref 80–100)
MCV RBC AUTO: 99.8 FL — SIGNIFICANT CHANGE UP (ref 80–100)
PCO2 BLDV: 42 MMHG — SIGNIFICANT CHANGE UP (ref 35–50)
PH BLDV: 7.35 — SIGNIFICANT CHANGE UP (ref 7.35–7.45)
PHOSPHATE SERPL-MCNC: 2 MG/DL — LOW (ref 2.5–4.5)
PHOSPHATE SERPL-MCNC: 2.1 MG/DL — LOW (ref 2.5–4.5)
PHOSPHATE SERPL-MCNC: 3.6 MG/DL — SIGNIFICANT CHANGE UP (ref 2.5–4.5)
PHOSPHATE SERPL-MCNC: 4.1 MG/DL — SIGNIFICANT CHANGE UP (ref 2.5–4.5)
PLATELET # BLD AUTO: 58 K/UL — LOW (ref 150–400)
PLATELET # BLD AUTO: 62 K/UL — LOW (ref 150–400)
PLATELET # BLD AUTO: 64 K/UL — LOW (ref 150–400)
PLATELET # BLD AUTO: 68 K/UL — LOW (ref 150–400)
PO2 BLDV: 36 MMHG — SIGNIFICANT CHANGE UP (ref 25–45)
POTASSIUM SERPL-MCNC: 3.3 MMOL/L — LOW (ref 3.5–5.3)
POTASSIUM SERPL-MCNC: 3.3 MMOL/L — LOW (ref 3.5–5.3)
POTASSIUM SERPL-MCNC: 3.4 MMOL/L — LOW (ref 3.5–5.3)
POTASSIUM SERPL-MCNC: 3.9 MMOL/L — SIGNIFICANT CHANGE UP (ref 3.5–5.3)
POTASSIUM SERPL-SCNC: 3.3 MMOL/L — LOW (ref 3.5–5.3)
POTASSIUM SERPL-SCNC: 3.3 MMOL/L — LOW (ref 3.5–5.3)
POTASSIUM SERPL-SCNC: 3.4 MMOL/L — LOW (ref 3.5–5.3)
POTASSIUM SERPL-SCNC: 3.9 MMOL/L — SIGNIFICANT CHANGE UP (ref 3.5–5.3)
PROT SERPL-MCNC: 4.5 G/DL — LOW (ref 6–8.3)
PROTHROM AB SERPL-ACNC: 24.7 SEC — HIGH (ref 9.8–12.7)
RBC # BLD: 2.07 M/UL — LOW (ref 3.8–5.2)
RBC # BLD: 2.11 M/UL — LOW (ref 3.8–5.2)
RBC # BLD: 2.48 M/UL — LOW (ref 3.8–5.2)
RBC # BLD: 2.63 M/UL — LOW (ref 3.8–5.2)
RBC # FLD: 14.6 % — HIGH (ref 10.3–14.5)
RBC # FLD: 14.6 % — HIGH (ref 10.3–14.5)
RBC # FLD: 14.8 % — HIGH (ref 10.3–14.5)
RBC # FLD: 14.8 % — HIGH (ref 10.3–14.5)
SAO2 % BLDV: 70 % — SIGNIFICANT CHANGE UP (ref 67–88)
SODIUM SERPL-SCNC: 136 MMOL/L — SIGNIFICANT CHANGE UP (ref 135–145)
SODIUM SERPL-SCNC: 138 MMOL/L — SIGNIFICANT CHANGE UP (ref 135–145)
SODIUM SERPL-SCNC: 138 MMOL/L — SIGNIFICANT CHANGE UP (ref 135–145)
SODIUM SERPL-SCNC: 139 MMOL/L — SIGNIFICANT CHANGE UP (ref 135–145)
WBC # BLD: 11.2 K/UL — HIGH (ref 3.8–10.5)
WBC # BLD: 6.9 K/UL — SIGNIFICANT CHANGE UP (ref 3.8–10.5)
WBC # BLD: 7.4 K/UL — SIGNIFICANT CHANGE UP (ref 3.8–10.5)
WBC # BLD: 7.5 K/UL — SIGNIFICANT CHANGE UP (ref 3.8–10.5)
WBC # FLD AUTO: 11.2 K/UL — HIGH (ref 3.8–10.5)
WBC # FLD AUTO: 6.9 K/UL — SIGNIFICANT CHANGE UP (ref 3.8–10.5)
WBC # FLD AUTO: 7.4 K/UL — SIGNIFICANT CHANGE UP (ref 3.8–10.5)
WBC # FLD AUTO: 7.5 K/UL — SIGNIFICANT CHANGE UP (ref 3.8–10.5)

## 2017-06-14 PROCEDURE — 71010: CPT | Mod: 26

## 2017-06-14 PROCEDURE — 99291 CRITICAL CARE FIRST HOUR: CPT

## 2017-06-14 RX ORDER — ACETAMINOPHEN 500 MG
1000 TABLET ORAL ONCE
Qty: 0 | Refills: 0 | Status: COMPLETED | OUTPATIENT
Start: 2017-06-15 | End: 2017-06-15

## 2017-06-14 RX ORDER — POTASSIUM CHLORIDE 20 MEQ
20 PACKET (EA) ORAL
Qty: 0 | Refills: 0 | Status: DISCONTINUED | OUTPATIENT
Start: 2017-06-14 | End: 2017-06-14

## 2017-06-14 RX ORDER — POTASSIUM CHLORIDE 20 MEQ
20 PACKET (EA) ORAL
Qty: 0 | Refills: 0 | Status: COMPLETED | OUTPATIENT
Start: 2017-06-14 | End: 2017-06-14

## 2017-06-14 RX ORDER — NOREPINEPHRINE BITARTRATE/D5W 8 MG/250ML
0.4 PLASTIC BAG, INJECTION (ML) INTRAVENOUS
Qty: 8 | Refills: 0 | Status: DISCONTINUED | OUTPATIENT
Start: 2017-06-14 | End: 2017-06-15

## 2017-06-14 RX ORDER — ACETAMINOPHEN 500 MG
1000 TABLET ORAL ONCE
Qty: 0 | Refills: 0 | Status: COMPLETED | OUTPATIENT
Start: 2017-06-14 | End: 2017-06-14

## 2017-06-14 RX ORDER — AMIODARONE HYDROCHLORIDE 400 MG/1
0.5 TABLET ORAL
Qty: 900 | Refills: 0 | Status: DISCONTINUED | OUTPATIENT
Start: 2017-06-14 | End: 2017-06-17

## 2017-06-14 RX ORDER — FONDAPARINUX SODIUM 2.5 MG/.5ML
2.5 INJECTION, SOLUTION SUBCUTANEOUS EVERY 24 HOURS
Qty: 0 | Refills: 0 | Status: DISCONTINUED | OUTPATIENT
Start: 2017-06-14 | End: 2017-06-30

## 2017-06-14 RX ORDER — PHYTONADIONE (VIT K1) 5 MG
10 TABLET ORAL ONCE
Qty: 0 | Refills: 0 | Status: COMPLETED | OUTPATIENT
Start: 2017-06-14 | End: 2017-06-14

## 2017-06-14 RX ORDER — POTASSIUM CHLORIDE 20 MEQ
10 PACKET (EA) ORAL ONCE
Qty: 0 | Refills: 0 | Status: COMPLETED | OUTPATIENT
Start: 2017-06-14 | End: 2017-06-14

## 2017-06-14 RX ORDER — INSULIN LISPRO 100/ML
VIAL (ML) SUBCUTANEOUS EVERY 6 HOURS
Qty: 0 | Refills: 0 | Status: DISCONTINUED | OUTPATIENT
Start: 2017-06-14 | End: 2017-06-26

## 2017-06-14 RX ORDER — POTASSIUM CHLORIDE 20 MEQ
10 PACKET (EA) ORAL
Qty: 0 | Refills: 0 | Status: COMPLETED | OUTPATIENT
Start: 2017-06-14 | End: 2017-06-14

## 2017-06-14 RX ORDER — MAGNESIUM SULFATE 500 MG/ML
2 VIAL (ML) INJECTION ONCE
Qty: 0 | Refills: 0 | Status: COMPLETED | OUTPATIENT
Start: 2017-06-14 | End: 2017-06-14

## 2017-06-14 RX ADMIN — HYDROMORPHONE HYDROCHLORIDE 0.2 MILLIGRAM(S): 2 INJECTION INTRAMUSCULAR; INTRAVENOUS; SUBCUTANEOUS at 11:27

## 2017-06-14 RX ADMIN — HYDROMORPHONE HYDROCHLORIDE 0.2 MILLIGRAM(S): 2 INJECTION INTRAMUSCULAR; INTRAVENOUS; SUBCUTANEOUS at 17:47

## 2017-06-14 RX ADMIN — Medication 100 MILLIEQUIVALENT(S): at 21:38

## 2017-06-14 RX ADMIN — Medication 44 MICROGRAM(S): at 05:13

## 2017-06-14 RX ADMIN — Medication 400 MILLIGRAM(S): at 13:40

## 2017-06-14 RX ADMIN — CHLORHEXIDINE GLUCONATE 15 MILLILITER(S): 213 SOLUTION TOPICAL at 13:01

## 2017-06-14 RX ADMIN — Medication 100 MILLIEQUIVALENT(S): at 02:41

## 2017-06-14 RX ADMIN — Medication 50 MILLIGRAM(S): at 05:02

## 2017-06-14 RX ADMIN — Medication 100 MILLIEQUIVALENT(S): at 16:51

## 2017-06-14 RX ADMIN — Medication 127.5 MILLIMOLE(S): at 09:09

## 2017-06-14 RX ADMIN — Medication 100 MILLIEQUIVALENT(S): at 09:34

## 2017-06-14 RX ADMIN — Medication 4: at 12:01

## 2017-06-14 RX ADMIN — SODIUM CHLORIDE 50 MILLILITER(S): 9 INJECTION, SOLUTION INTRAVENOUS at 02:01

## 2017-06-14 RX ADMIN — Medication 63.75 MILLIMOLE(S): at 14:26

## 2017-06-14 RX ADMIN — Medication 63.75 MILLIMOLE(S): at 11:58

## 2017-06-14 RX ADMIN — PANTOPRAZOLE SODIUM 40 MILLIGRAM(S): 20 TABLET, DELAYED RELEASE ORAL at 11:26

## 2017-06-14 RX ADMIN — Medication 1000 MILLIGRAM(S): at 20:24

## 2017-06-14 RX ADMIN — Medication 127.5 MILLIMOLE(S): at 06:06

## 2017-06-14 RX ADMIN — Medication 27.9 MICROGRAM(S)/KG/MIN: at 01:51

## 2017-06-14 RX ADMIN — Medication 100 MILLIGRAM(S): at 21:38

## 2017-06-14 RX ADMIN — VASOPRESSIN 2.4 UNIT(S)/MIN: 20 INJECTION INTRAVENOUS at 01:51

## 2017-06-14 RX ADMIN — HYDROMORPHONE HYDROCHLORIDE 0.2 MILLIGRAM(S): 2 INJECTION INTRAMUSCULAR; INTRAVENOUS; SUBCUTANEOUS at 11:57

## 2017-06-14 RX ADMIN — Medication 50 MILLIGRAM(S): at 21:38

## 2017-06-14 RX ADMIN — CHLORHEXIDINE GLUCONATE 15 MILLILITER(S): 213 SOLUTION TOPICAL at 21:38

## 2017-06-14 RX ADMIN — Medication 1000 MILLIGRAM(S): at 14:10

## 2017-06-14 RX ADMIN — Medication 400 MILLIGRAM(S): at 20:09

## 2017-06-14 RX ADMIN — Medication 50 MILLIGRAM(S): at 13:01

## 2017-06-14 RX ADMIN — Medication 1000 MILLIGRAM(S): at 02:05

## 2017-06-14 RX ADMIN — Medication 100 MILLIEQUIVALENT(S): at 03:49

## 2017-06-14 RX ADMIN — CHLORHEXIDINE GLUCONATE 15 MILLILITER(S): 213 SOLUTION TOPICAL at 05:03

## 2017-06-14 RX ADMIN — FONDAPARINUX SODIUM 2.5 MILLIGRAM(S): 2.5 INJECTION, SOLUTION SUBCUTANEOUS at 10:39

## 2017-06-14 RX ADMIN — HYDROMORPHONE HYDROCHLORIDE 0.2 MILLIGRAM(S): 2 INJECTION INTRAMUSCULAR; INTRAVENOUS; SUBCUTANEOUS at 23:32

## 2017-06-14 RX ADMIN — Medication 100 MILLIEQUIVALENT(S): at 11:27

## 2017-06-14 RX ADMIN — Medication 102 MILLIGRAM(S): at 10:39

## 2017-06-14 RX ADMIN — HYDROMORPHONE HYDROCHLORIDE 0.2 MILLIGRAM(S): 2 INJECTION INTRAMUSCULAR; INTRAVENOUS; SUBCUTANEOUS at 14:26

## 2017-06-14 RX ADMIN — Medication 400 MILLIGRAM(S): at 08:16

## 2017-06-14 RX ADMIN — DAPTOMYCIN 118 MILLIGRAM(S): 500 INJECTION, POWDER, LYOPHILIZED, FOR SOLUTION INTRAVENOUS at 11:27

## 2017-06-14 RX ADMIN — Medication 100 MILLIEQUIVALENT(S): at 04:52

## 2017-06-14 RX ADMIN — FLUCONAZOLE 100 MILLIGRAM(S): 150 TABLET ORAL at 11:59

## 2017-06-14 RX ADMIN — Medication 2: at 23:54

## 2017-06-14 RX ADMIN — Medication 100 MILLIEQUIVALENT(S): at 15:41

## 2017-06-14 RX ADMIN — Medication 50 GRAM(S): at 15:57

## 2017-06-14 RX ADMIN — Medication 400 MILLIGRAM(S): at 01:50

## 2017-06-14 RX ADMIN — HYDROMORPHONE HYDROCHLORIDE 0.2 MILLIGRAM(S): 2 INJECTION INTRAMUSCULAR; INTRAVENOUS; SUBCUTANEOUS at 14:56

## 2017-06-14 RX ADMIN — AMIODARONE HYDROCHLORIDE 16.67 MG/MIN: 400 TABLET ORAL at 01:50

## 2017-06-14 RX ADMIN — Medication 100 MILLIGRAM(S): at 06:04

## 2017-06-14 RX ADMIN — HYDROMORPHONE HYDROCHLORIDE 0.2 MILLIGRAM(S): 2 INJECTION INTRAMUSCULAR; INTRAVENOUS; SUBCUTANEOUS at 17:17

## 2017-06-14 RX ADMIN — Medication 100 MILLIGRAM(S): at 14:03

## 2017-06-14 RX ADMIN — HYDROMORPHONE HYDROCHLORIDE 0.2 MILLIGRAM(S): 2 INJECTION INTRAMUSCULAR; INTRAVENOUS; SUBCUTANEOUS at 23:17

## 2017-06-14 RX ADMIN — AMIODARONE HYDROCHLORIDE 16.67 MG/MIN: 400 TABLET ORAL at 21:38

## 2017-06-14 RX ADMIN — DEXMEDETOMIDINE HYDROCHLORIDE IN 0.9% SODIUM CHLORIDE 18.6 MICROGRAM(S)/KG/HR: 4 INJECTION INTRAVENOUS at 01:51

## 2017-06-14 NOTE — PROGRESS NOTE ADULT - PROBLEM SELECTOR PLAN 2
Maintain MAP of 60  Check cortisol level Maintain MAP of 60  Check cortisol level  Can we transfuse one unit of PRBC?

## 2017-06-14 NOTE — PHYSICAL THERAPY INITIAL EVALUATION ADULT - MANUAL MUSCLE TESTING RESULTS, REHAB EVAL
not formally assessed, at least 3/5 throughout except AMALIA shoulders 2/5
3/5 throughout/grossly assessed due to

## 2017-06-14 NOTE — PROGRESS NOTE ADULT - SUBJECTIVE AND OBJECTIVE BOX
HISTORY  76y Female with history significant for COPD, h/o DVT s/p IVC filter (now removed), GERD, hypothyroidism, HIT and diverticulitis s/p Ruben's, recurrent SBO s/p ex-lap small bowel resection, EC fistula s/p takedown, complicated by wound dehiscence, abdominal reconstruction underwent ex-lap, extensive NANETTE, EC fistula takedown, colostomy revision, and parastomal/incisional hernia repair, admitted to SICU intubated post operatively.    24 HOUR EVENTS: Patient remains intubated on minimal vent settings. Norepinephrine gtt was weaned off but remains on vasopressin gtt. Ostomy still only has bowel sweat. Urine culture only had normal urogenital amanda. Combicath pending.    SUBJECTIVE/ROS:  [ ] A ten-point review of systems was otherwise negative except as noted.  [x] Due to altered mental status/intubation, subjective information were not able to be obtained from the patient. History was obtained, to the extent possible, from review of the chart and collateral sources of information.      NEURO  RASS: 0  Exam: intubated and sedated, easily aroused, intermittently follows commands  Meds: HYDROmorphone  Injectable 0.2milliGRAM(s) IV Push every 2 hours PRN Breakthru Pain  dexmedetomidine Infusion 1MICROgram(s)/kG/Hr IV Continuous  acetaminophen  IVPB. 1000milliGRAM(s) IV Intermittent once  diphenhydrAMINE   Injectable 25milliGRAM(s) IV Push every 4 hours PRN Pruritus    [x] Adequacy of sedation and pain control has been assessed and adjusted      RESPIRATORY  RR: 32 (17 - 35)  SpO2: 100% (57% - 100%)  Exam: clear to auscultation bilaterally  Mechanical Ventilation: Mode - AC/CMV (Assist Control/Continuous Mandatory Ventilation), RR (machine): 26, RR (patient): 28, TV (machine): 400, FiO2: 30, PEEP: 5, ITime: 1, MAP: 18, PIP: 36  ABG - ( 14 Jun 2017 02:25 )  pH: 7.41  /  pCO2: 34    /  pO2: 137   / HCO3: 21    / Base Excess: -3.1  /  SaO2: 100     Lactate: 1.3    [x] Extubation Readiness Assessed  Meds: ALBUTerol/ipratropium for Nebulization 3milliLiter(s) Nebulizer every 6 hours PRN Shortness of Breath and/or Wheezing      CARDIOVASCULAR  HR: 83 (75 - 108)  BP: 99/52 (99/52 - 99/52)  BP(mean): 73 (73 - 73)  ABP: 116/55 (73/36 - 148/65)  ABP(mean): 78 (49 - 98)  VBG - ( 14 Jun 2017 02:25 )  pH: 7.35  /  pCO2: 42    /  pO2: 36    / HCO3: 22    / Base Excess: -2.4  /  SaO2: 70     Lactate:      Exam: regular rate and rhythm  Cardiac Rhythm: sinus  Perfusion     [x]Adequate   [ ]Inadequate  Mentation    [ ]Normal       [x]Reduced  Extremities  [x]Warm         [ ]Cool  Volume Status [ ]Hypervolemic [x]Euvolemic [ ]Hypovolemic  Meds: vasopressin Infusion 0.04Unit(s)/Min IV Continuous <Continuous>  amiodarone Infusion 0.5mG/Min IV Continuous <Continuous>      GI/NUTRITION  Exam: soft, nontender, nondistended, incision C/D/I  Diet: NPO  Meds: pantoprazole  Injectable 40milliGRAM(s) IV Push daily      GENITOURINARY  Is&Os    06-14    138  |  107  |  55<H>  ----------------------------<  158<H>  3.3<L>   |  20<L>  |  1.61<H>    Ca    8.4      14 Jun 2017 02:26  Phos  2.0     06-14  Mg     2.2     06-14    TPro  4.5<L>  /  Alb  2.4<L>  /  TBili  1.0  /  DBili  0.6<H>  /  AST  25  /  ALT  16  /  AlkPhos  111  06-14    [x] Snyder catheter, indication: urine output monitoring in the critically ill  Meds: lactated ringers. 1000milliLiter(s) IV Continuous infuse at 50 mL/Hr      HEMATOLOGIC  Meds: fondaparinux Injectable 2.5milliGRAM(s) SubCutaneous every other day    [x] VTE Prophylaxis                        7.1    6.9   )-----------( 62       ( 14 Jun 2017 02:26 )             21.4     PT/INR - ( 14 Jun 2017 02:26 )   PT: 24.7 sec;   INR: 2.25 ratio    PTT - ( 14 Jun 2017 02:26 )  PTT:46.1 sec      INFECTIOUS DISEASES  WBC Count: 6.9 K/uL (06-14 @ 02:26)  WBC Count: 6.5 K/uL (06-13 @ 07:52)    Temperature    RECENT CULTURES:  Specimen Source: .[facility]  Date/Time: 06-13 @ 17:50  Culture Results: --  Gram Stain:   Numerous polymorphonuclear leukocytes per low power field  Few Squamous epithelial cells per low power field  Few Gram positive cocci in pairs per oil power field  Organism: --  Specimen Source: .Urine Catheterized  Date/Time: 06-12 @ 18:32  Culture Results:   <10,000 CFU/ml  Normal Urogenital amanda present  Gram Stain: --  Organism: --  Specimen Source: .Blood Blood-Peripheral  Date/Time: 06-12 @ 18:22  Culture Results:   No growth to date.  Gram Stain: --  Organism: --  Specimen Source: .Blood Blood-Peripheral  Date/Time: 06-12 @ 15:42  Culture Results:   No growth to date.  Gram Stain: --  Organism: --  Specimen Source: .Blood Blood  Date/Time: 06-10 @ 17:29  Culture Results:   No growth to date.  Gram Stain: --  Organism: --    Meds: metroNIDAZOLE  IVPB 500milliGRAM(s) IV Intermittent every 8 hours  aztreonam  IVPB 1000milliGRAM(s) IV Intermittent every 8 hours  DAPTOmycin IVPB 450milliGRAM(s) IV Intermittent every 48 hours  fluconAZOLE IVPB 100milliGRAM(s) IV Intermittent every 24 hours      ENDOCRINE  CAPILLARY BLOOD GLUCOSE: -  Meds: levothyroxine Injectable 44MICROGram(s) IV Push daily      ACCESS DEVICES:  [x] Peripheral IV  [x] Central Venous Line	[x] R	[ ] L	[x] IJ	[ ] Fem	[ ] SC	Placed:   [x] Arterial Line		[x] R	[ ] L	[x] Fem	[ ] Rad	[ ] Ax	Placed:   [x] PICC: left basilic vein					[ ] Mediport  [ ] Urinary Catheter, Date Placed:   [x] Necessity of urinary, arterial, and venous catheters discussed    OTHER MEDICATIONS:  chlorhexidine 0.12% Liquid 15milliLiter(s) Swish and Spit every 8 hours      CODE STATUS: full code HISTORY  76y Female with history significant for COPD, h/o DVT s/p IVC filter (now removed), GERD, hypothyroidism, HIT and diverticulitis s/p Ruben's, recurrent SBO s/p ex-lap small bowel resection, EC fistula s/p takedown, complicated by wound dehiscence, abdominal reconstruction underwent ex-lap, extensive NANETTE, EC fistula takedown, colostomy revision, and parastomal/incisional hernia repair, admitted to SICU intubated post operatively.    24 HOUR EVENTS: Patient remains intubated on minimal vent settings. Norepinephrine gtt was weaned off but remains on vasopressin gtt. Ostomy still only has bowel sweat. Urine culture only had normal urogenital amanda. Combicath pending.    SUBJECTIVE/ROS:  [ ] A ten-point review of systems was otherwise negative except as noted.  [x] Due to altered mental status/intubation, subjective information were not able to be obtained from the patient. History was obtained, to the extent possible, from review of the chart and collateral sources of information.      NEURO  RASS: 0  Exam: intubated and sedated, easily aroused, intermittently follows commands  Meds: HYDROmorphone  Injectable 0.2milliGRAM(s) IV Push every 2 hours PRN Breakthru Pain  dexmedetomidine Infusion 1MICROgram(s)/kG/Hr IV Continuous  acetaminophen  IVPB. 1000milliGRAM(s) IV Intermittent once  diphenhydrAMINE   Injectable 25milliGRAM(s) IV Push every 4 hours PRN Pruritus    [x] Adequacy of sedation and pain control has been assessed and adjusted      RESPIRATORY  RR: 32 (17 - 35)  SpO2: 100% (57% - 100%)  Exam: clear to auscultation bilaterally  Mechanical Ventilation: Mode - AC/CMV (Assist Control/Continuous Mandatory Ventilation), RR (machine): 26, RR (patient): 28, TV (machine): 400, FiO2: 30, PEEP: 5, ITime: 1, MAP: 18, PIP: 36  ABG - ( 14 Jun 2017 02:25 )  pH: 7.41  /  pCO2: 34    /  pO2: 137   / HCO3: 21    / Base Excess: -3.1  /  SaO2: 100     Lactate: 1.3    [x] Extubation Readiness Assessed  Meds: ALBUTerol/ipratropium for Nebulization 3milliLiter(s) Nebulizer every 6 hours PRN Shortness of Breath and/or Wheezing      CARDIOVASCULAR  HR: 83 (75 - 108)  BP: 99/52 (99/52 - 99/52)  BP(mean): 73 (73 - 73)  ABP: 116/55 (73/36 - 148/65)  ABP(mean): 78 (49 - 98)  VBG - ( 14 Jun 2017 02:25 )  pH: 7.35  /  pCO2: 42    /  pO2: 36    / HCO3: 22    / Base Excess: -2.4  /  SaO2: 70       Exam: regular rate and rhythm  Cardiac Rhythm: sinus  Perfusion     [x]Adequate   [ ]Inadequate  Mentation    [ ]Normal       [x]Reduced  Extremities  [x]Warm         [ ]Cool  Volume Status [ ]Hypervolemic [x]Euvolemic [ ]Hypovolemic  Meds: vasopressin Infusion 0.04Unit(s)/Min IV Continuous <Continuous>  amiodarone Infusion 0.5mG/Min IV Continuous <Continuous>      GI/NUTRITION  Exam: soft, nontender, nondistended, incision C/D/I  Diet: NPO  Meds: pantoprazole  Injectable 40milliGRAM(s) IV Push daily      GENITOURINARY  Is&Os    06-14    138  |  107  |  55<H>  ----------------------------<  158<H>  3.3<L>   |  20<L>  |  1.61<H>    Ca    8.4      14 Jun 2017 02:26  Phos  2.0     06-14  Mg     2.2     06-14    TPro  4.5<L>  /  Alb  2.4<L>  /  TBili  1.0  /  DBili  0.6<H>  /  AST  25  /  ALT  16  /  AlkPhos  111  06-14    [x] Snyder catheter, indication: urine output monitoring in the critically ill  Meds: lactated ringers. 1000milliLiter(s) IV Continuous infuse at 50 mL/Hr      HEMATOLOGIC  Meds: fondaparinux Injectable 2.5milliGRAM(s) SubCutaneous every other day    [x] VTE Prophylaxis                        7.1    6.9   )-----------( 62       ( 14 Jun 2017 02:26 )             21.4     PT/INR - ( 14 Jun 2017 02:26 )   PT: 24.7 sec;   INR: 2.25 ratio    PTT - ( 14 Jun 2017 02:26 )  PTT:46.1 sec      INFECTIOUS DISEASES  WBC Count: 6.9 K/uL (06-14 @ 02:26)  WBC Count: 6.5 K/uL (06-13 @ 07:52)    Tc and Tmax    RECENT CULTURES:  Specimen Source: .[facility]  Date/Time: 06-13 @ 17:50  Culture Results: --  Gram Stain:   Numerous polymorphonuclear leukocytes per low power field  Few Squamous epithelial cells per low power field  Few Gram positive cocci in pairs per oil power field  Organism: --  Specimen Source: .Urine Catheterized  Date/Time: 06-12 @ 18:32  Culture Results:   <10,000 CFU/ml  Normal Urogenital amanda present  Gram Stain: --  Organism: --  Specimen Source: .Blood Blood-Peripheral  Date/Time: 06-12 @ 18:22  Culture Results:   No growth to date.  Gram Stain: --  Organism: --  Specimen Source: .Blood Blood-Peripheral  Date/Time: 06-12 @ 15:42  Culture Results:   No growth to date.  Gram Stain: --  Organism: --  Specimen Source: .Blood Blood  Date/Time: 06-10 @ 17:29  Culture Results:   No growth to date.  Gram Stain: --  Organism: --    Meds: metroNIDAZOLE  IVPB 500milliGRAM(s) IV Intermittent every 8 hours  aztreonam  IVPB 1000milliGRAM(s) IV Intermittent every 8 hours  DAPTOmycin IVPB 450milliGRAM(s) IV Intermittent every 48 hours  fluconAZOLE IVPB 100milliGRAM(s) IV Intermittent every 24 hours      ENDOCRINE  CAPILLARY BLOOD GLUCOSE: -  Meds: levothyroxine Injectable 44MICROGram(s) IV Push daily      ACCESS DEVICES:  [x] Peripheral IV  [x] Central Venous Line	[x] R	[ ] L	[x] IJ	[ ] Fem	[ ] SC	Placed: 6/12/2017  [x] Arterial Line		[x] R	[ ] L	[x] Fem	[ ] Rad	[ ] Ax	Placed: 6/12/2017  [x] PICC: left basilic vein	placed on 5/31/2017				[ ] Mediport  [ ] Urinary Catheter, Date Placed:   [x] Necessity of urinary, arterial, and venous catheters discussed    OTHER MEDICATIONS:  chlorhexidine 0.12% Liquid 15milliLiter(s) Swish and Spit every 8 hours      CODE STATUS: full code HISTORY  76y Female with history significant for COPD, h/o DVT s/p IVC filter (now removed), GERD, hypothyroidism, HIT and diverticulitis s/p Ruben's, recurrent SBO s/p ex-lap small bowel resection, EC fistula s/p takedown, complicated by wound dehiscence, abdominal reconstruction underwent ex-lap, extensive NANETTE, EC fistula takedown, colostomy revision, and parastomal/incisional hernia repair, admitted to SICU intubated post operatively.    24 HOUR EVENTS: Patient remains intubated on minimal vent settings. Norepinephrine gtt was weaned off but remains on vasopressin gtt. Ostomy still only has bowel sweat. Urine culture only had normal urogenital amanda. Combicath pending.    SUBJECTIVE/ROS:  [ ] A ten-point review of systems was otherwise negative except as noted.  [x] Due to altered mental status/intubation, subjective information were not able to be obtained from the patient. History was obtained, to the extent possible, from review of the chart and collateral sources of information.      NEURO  RASS: 0  Exam: intubated and sedated, easily aroused, intermittently follows commands  Meds: HYDROmorphone  Injectable 0.2milliGRAM(s) IV Push every 2 hours PRN Breakthru Pain  dexmedetomidine Infusion 1MICROgram(s)/kG/Hr IV Continuous  acetaminophen  IVPB. 1000milliGRAM(s) IV Intermittent once  diphenhydrAMINE   Injectable 25milliGRAM(s) IV Push every 4 hours PRN Pruritus    [x] Adequacy of sedation and pain control has been assessed and adjusted      RESPIRATORY  RR: 32 (17 - 35)  SpO2: 100% (57% - 100%)  Exam: clear to auscultation bilaterally  Mechanical Ventilation: Mode - AC/CMV (Assist Control/Continuous Mandatory Ventilation), RR (machine): 26, RR (patient): 28, TV (machine): 400, FiO2: 30, PEEP: 5, ITime: 1, MAP: 18, PIP: 36  ABG - ( 14 Jun 2017 02:25 )  pH: 7.41  /  pCO2: 34    /  pO2: 137   / HCO3: 21    / Base Excess: -3.1  /  SaO2: 100     Lactate: 1.3    [x] Extubation Readiness Assessed  Meds: ALBUTerol/ipratropium for Nebulization 3milliLiter(s) Nebulizer every 6 hours PRN Shortness of Breath and/or Wheezing      CARDIOVASCULAR  HR: 83 (75 - 108)  BP: 99/52 (99/52 - 99/52)  BP(mean): 73 (73 - 73)  ABP: 116/55 (73/36 - 148/65)  ABP(mean): 78 (49 - 98)  VBG - ( 14 Jun 2017 02:25 )  pH: 7.35  /  pCO2: 42    /  pO2: 36    / HCO3: 22    / Base Excess: -2.4  /  SaO2: 70       Exam: regular rate and rhythm  Cardiac Rhythm: sinus  Perfusion     [x]Adequate   [ ]Inadequate  Mentation    [ ]Normal       [x]Reduced  Extremities  [x]Warm         [ ]Cool  Volume Status [ ]Hypervolemic [x]Euvolemic [ ]Hypovolemic  Meds: vasopressin Infusion 0.04Unit(s)/Min IV Continuous <Continuous>  amiodarone Infusion 0.5mG/Min IV Continuous <Continuous>      GI/NUTRITION  Exam: soft, nontender, nondistended, incision C/D/I  Diet: NPO  Meds: pantoprazole  Injectable 40milliGRAM(s) IV Push daily      GENITOURINARY  Is&Os  I&O's Detail  I & Os for 24h ending 06-14 @ 07:00  =============================================  IN:    TPN (Total Parenteral Nutrition): 1488 ml    lactated ringers.: 1200 ml    Solution: 625 ml    Solution: 400 ml    Solution: 400 ml    dexmedetomidine Infusion: 264.6 ml    norepinephrine Infusion: 251.4 ml    Solution: 250 ml    amiodarone Infusion: 200.4 ml    Solution: 200 ml    amiodarone Infusion: 166.6 ml    vasopressin Infusion: 57.6 ml    amiodarone Infusion: 33.3 ml    Total IN: 5536.9 ml  ---------------------------------------------  OUT:    Indwelling Catheter - Urethral: 1500 ml    Nasoenteral Tube: 950 ml    Colostomy: 50 ml    Total OUT: 2500 ml  ---------------------------------------------  Total NET: 3036.9 ml    I & Os for current day (as of 06-14 @ 08:49)  =============================================  IN:    Solution: 100 ml    TPN (Total Parenteral Nutrition): 62 ml    lactated ringers.: 50 ml    amiodarone Infusion: 16.7 ml    norepinephrine Infusion: 4.2 ml    dexmedetomidine Infusion: 3.8 ml    vasopressin Infusion: 2.4 ml    Total IN: 239.1 ml  ---------------------------------------------  OUT:    Indwelling Catheter - Urethral: 50 ml    Total OUT: 50 ml  ---------------------------------------------  Total NET: 189.1 ml          06-14    138  |  107  |  55<H>  ----------------------------<  158<H>  3.3<L>   |  20<L>  |  1.61<H>    Ca    8.4      14 Jun 2017 02:26  Phos  2.0     06-14  Mg     2.2     06-14    TPro  4.5<L>  /  Alb  2.4<L>  /  TBili  1.0  /  DBili  0.6<H>  /  AST  25  /  ALT  16  /  AlkPhos  111  06-14    [x] Snyder catheter, indication: urine output monitoring in the critically ill  Meds: lactated ringers. 1000milliLiter(s) IV Continuous infuse at 50 mL/Hr      HEMATOLOGIC  Meds: fondaparinux Injectable 2.5milliGRAM(s) SubCutaneous every other day    [x] VTE Prophylaxis                        7.1    6.9   )-----------( 62       ( 14 Jun 2017 02:26 )             21.4     PT/INR - ( 14 Jun 2017 02:26 )   PT: 24.7 sec;   INR: 2.25 ratio    PTT - ( 14 Jun 2017 02:26 )  PTT:46.1 sec      INFECTIOUS DISEASES  WBC Count: 6.9 K/uL (06-14 @ 02:26)  WBC Count: 6.5 K/uL (06-13 @ 07:52)    Tc and Tmax    RECENT CULTURES:  Specimen Source: .[facility]  Date/Time: 06-13 @ 17:50  Culture Results: --  Gram Stain:   Numerous polymorphonuclear leukocytes per low power field  Few Squamous epithelial cells per low power field  Few Gram positive cocci in pairs per oil power field  Organism: --  Specimen Source: .Urine Catheterized  Date/Time: 06-12 @ 18:32  Culture Results:   <10,000 CFU/ml  Normal Urogenital amanda present  Gram Stain: --  Organism: --  Specimen Source: .Blood Blood-Peripheral  Date/Time: 06-12 @ 18:22  Culture Results:   No growth to date.  Gram Stain: --  Organism: --  Specimen Source: .Blood Blood-Peripheral  Date/Time: 06-12 @ 15:42  Culture Results:   No growth to date.  Gram Stain: --  Organism: --  Specimen Source: .Blood Blood  Date/Time: 06-10 @ 17:29  Culture Results:   No growth to date.  Gram Stain: --  Organism: --    Meds: metroNIDAZOLE  IVPB 500milliGRAM(s) IV Intermittent every 8 hours  aztreonam  IVPB 1000milliGRAM(s) IV Intermittent every 8 hours  DAPTOmycin IVPB 450milliGRAM(s) IV Intermittent every 48 hours  fluconAZOLE IVPB 100milliGRAM(s) IV Intermittent every 24 hours      ENDOCRINE  CAPILLARY BLOOD GLUCOSE: -  Meds: levothyroxine Injectable 44MICROGram(s) IV Push daily      ACCESS DEVICES:  [x] Peripheral IV  [x] Central Venous Line	[x] R	[ ] L	[x] IJ	[ ] Fem	[ ] SC	Placed: 6/12/2017  [x] Arterial Line		[x] R	[ ] L	[x] Fem	[ ] Rad	[ ] Ax	Placed: 6/12/2017  [x] PICC: left basilic vein	placed on 5/31/2017				[ ] Mediport  [ ] Urinary Catheter, Date Placed:   [x] Necessity of urinary, arterial, and venous catheters discussed    OTHER MEDICATIONS:  chlorhexidine 0.12% Liquid 15milliLiter(s) Swish and Spit every 8 hours      CODE STATUS: full code

## 2017-06-14 NOTE — PROGRESS NOTE ADULT - ASSESSMENT
hyperglycemia  s/p abd surgery  ckd 3    continue current care  no medical objection to sliding scale if needed  pressor support as needed  attempt to ween vent

## 2017-06-14 NOTE — PHYSICAL THERAPY INITIAL EVALUATION ADULT - DID THE PATIENT HAVE SURGERY?
yes//p ex-lap, extensive NANETET, takedown of EC fistula, repair of cecal serosal tear, partial R salpingectomy, colostomy revision, parastomal and incisional hernia repair with strattice mesh, yes/s/p ex-lap, extensive NANETTE, takedown of EC fistula, repair of cecal serosal tear, partial R salpingectomy, colostomy revision, parastomal and incisional hernia repair with strattice mesh,

## 2017-06-14 NOTE — PROGRESS NOTE ADULT - SUBJECTIVE AND OBJECTIVE BOX
Day #14 of PN  ICU Vital Signs Last 24 Hrs  T(C): 37.3, Max: 37.7 (06-13 @ 19:15)  T(F): 99.1, Max: 99.9 (06-13 @ 19:15)  HR: 94 (75 - 97)  BP: 99/52 (99/52 - 99/52)  BP(mean): 73 (73 - 73)  ABP: 117/52 (84/42 - 148/65)  ABP(mean): 77 (57 - 98)  RR: 29 (19 - 36)  SpO2: 100% (89% - 100%)    PN infusion at 62 ccs/hr   I & Os for 24h ending 06-14 @ 07:00  =============================================  IN:    TPN (Total Parenteral Nutrition): 1488 ml    lactated ringers.: 1200 ml    Solution: 625 ml    Solution: 400 ml    Solution: 400 ml    dexmedetomidine Infusion: 264.6 ml    norepinephrine Infusion: 251.4 ml    Solution: 250 ml    amiodarone Infusion: 200.4 ml    Solution: 200 ml    amiodarone Infusion: 166.6 ml    vasopressin Infusion: 57.6 ml    amiodarone Infusion: 33.3 ml    Total IN: 5536.9 ml  ---------------------------------------------  OUT:    Indwelling Catheter - Urethral: 1500 ml    Nasoenteral Tube: 950 ml    Colostomy: 50 ml    Total OUT: 2500 ml  ---------------------------------------------  Total NET: 3036.9 ml    Labs   06-14    138  |  107  |  55<H>  ----------------------------<  158<H>  3.3<L>   |  20<L>  |  1.61<H>    Ca    8.4      14 Jun 2017 02:26  Phos  2.0     06-14  Mg     2.2     06-14    TPro  4.5<L>  /  Alb  2.4<L>  /  TBili  1.0  /  DBili  0.6<H>  /  AST  25  /  ALT  16  /  AlkPhos  111  06-14      LIVER FUNCTIONS - ( 14 Jun 2017 02:26 )  Alb: 2.4 g/dL / Pro: 4.5 g/dL / ALK PHOS: 111 U/L / ALT: 16 U/L RC / AST: 25 U/L / GGT: x           CAPILLARY BLOOD GLUCOSE    ITotal OUT: 50 ml  ---------------------------------------------  Total NET: 89.1 ml

## 2017-06-14 NOTE — PROGRESS NOTE ADULT - PROBLEM SELECTOR PLAN 1
Stable renal function and off diuretics.   Is volume overloaded but pressors will prevent large diuresis  TPN ongoing She is volume overloaded but pressors will prevent large diuresis  TPN ongoing.  Can Phos be added to the TPN?  Replete K as u are doing.

## 2017-06-14 NOTE — PROGRESS NOTE ADULT - SUBJECTIVE AND OBJECTIVE BOX
MEDICINE, PROGRESS NOTE [phone]    SANJAY SANTOS 76y [id number]    Patient seen and examined.  Patient is a 76y old  Female who presents with a chief complaint of Abdominal pain (26 May 2017 11:33)  Pt is currently intubated but awake following commands.    PAST MEDICAL & SURGICAL HISTORY:  Pulmonary embolism  Enterocutaneous fistula  Enterocutaneous fistula  Chronic diastolic CHF (congestive heart failure): mild diastolic dysfunction  Osteoarthritis of both knees, unspecified osteoarthritis type  Peripheral neuropathy  Clostridium difficile infection  HIT (heparin-induced thrombocytopenia)  Diverticulitis of intestine with abscess without bleedin  DVT (deep venous thrombosis): s/p IVC filter, which was later removed  Orthostatic hypotension  GERD (gastroesophageal reflux disease)  CHF (congestive heart failure)  Hypothyroid  Hypertension  COPD (chronic obstructive pulmonary disease)  CHF (congestive heart failure)  H/O: hypertension  Chronic obstructive pulmonary disease (COPD)  Colostomy in place: s/p duarte procedure for diverticulitis  Status post Ruben procedure: for diverticulitis  S/P exploratory laparotomy: EC fistula complicated with pelvic abscesses  Wound dehiscence: Wound dehiscence and evisceration, s/p abdominal reconstruction with biologic mesh  Sigmoid diverticulitis: Ex lap, sigmoid resection, creation of colostomy.  Intestinal perforation: 2015, s/p closure with strattice mesh  S/P colostomy    MEDICATIONS  (STANDING):  lactated ringers. 1000milliLiter(s) IV Continuous <Continuous>  levothyroxine Injectable 44MICROGram(s) IV Push daily  pantoprazole  Injectable 40milliGRAM(s) IV Push daily  metroNIDAZOLE  IVPB 500milliGRAM(s) IV Intermittent every 8 hours  aztreonam  IVPB 1000milliGRAM(s) IV Intermittent every 8 hours  DAPTOmycin IVPB     fluconAZOLE IVPB  IV Intermittent   DAPTOmycin IVPB 450milliGRAM(s) IV Intermittent every 48 hours  fluconAZOLE IVPB 100milliGRAM(s) IV Intermittent every 24 hours  dexmedetomidine Infusion 1MICROgram(s)/kG/Hr IV Continuous <Continuous>  chlorhexidine 0.12% Liquid 15milliLiter(s) Swish and Spit every 8 hours  fondaparinux Injectable 2.5milliGRAM(s) SubCutaneous every 24 hours  insulin lispro (HumaLOG) corrective regimen sliding scale  SubCutaneous every 6 hours  acetaminophen  IVPB. 1000milliGRAM(s) IV Intermittent once  amiodarone Infusion 0.5mG/Min IV Continuous <Continuous>  norepinephrine Infusion 0.4MICROgram(s)/kG/Min IV Continuous <Continuous>    MEDICATIONS  (PRN):  ondansetron Injectable 4milliGRAM(s) IV Push every 6 hours PRN Nausea  diphenhydrAMINE   Injectable 25milliGRAM(s) IV Push every 4 hours PRN Pruritus  HYDROmorphone  Injectable 0.2milliGRAM(s) IV Push every 2 hours PRN Breakthru Pain  ALBUTerol/ipratropium for Nebulization 3milliLiter(s) Nebulizer every 6 hours PRN Shortness of Breath and/or Wheezing    Allergies    azithromycin (Unknown)  codeine (Unknown)  heparin (Other)  PC Pen VK (Unknown)  penicillin (Unknown)    Intolerances        PHYSICAL EXAM:  Constitutional: NAD  HEENT: Normocephalic, EOMI, +ETT  Neck:  No JVD  Respiratory: CTA B/L, No wheezes  Cardiovascular: S1, S2, RRR, + systolic murmur  Gastrointestinal: bs-, dressing c/d/i,  Extremities: + peripheral edema X 4 extrem  Neurological: AAOX3,  Psychiatric: Normal mood, normal affect  : No Snyder    Vital Signs Last 24 Hrs  T(C): 37.1, Max: 37.7 ( @ 19:15)  T(F): 98.8, Max: 99.9 (13 @ 19:15)  HR: 90 (75 - 101)  BP: 99/52 (99/52 - 99/52)  BP(mean): 73 (73 - 73)  RR: 27 (26 - 36)  SpO2: 100% (89% - 100%)  I&O's Summary  I & Os for 24h ending 2017 07:00  =============================================  IN: 5661.9 ml / OUT: 2500 ml / NET: 3161.9 ml    I & Os for current day (as of 2017 18:50)  =============================================  IN: 3392.5 ml / OUT: 1635 ml / NET: 1757.5 ml      LABS:                        8.2    7.5   )-----------( 58       ( 2017 14:15 )             24.8         136  |  105  |  50<H>  ----------------------------<  179<H>  3.4<L>   |  19<L>  |  1.42<H>    Ca    7.7<L>      2017 14:15  Phos  4.1       Mg     2.0         TPro  4.5<L>  /  Alb  2.4<L>  /  TBili  1.0  /  DBili  0.6<H>  /  AST  25  /  ALT  16  /  AlkPhos  111      CARDIAC MARKERS ( 2017 02:34 )  x     / <0.01 ng/mL / x     / x     / x          Magnesium, Serum: 2.0 mg/dL ( @ 14:15)        REVIEW OF SYSTEMS:      RADIOLOGY & ADDITIONAL STUDIES:      ASSESSMENT/PLAN:

## 2017-06-14 NOTE — PHYSICAL THERAPY INITIAL EVALUATION ADULT - GENERAL OBSERVATIONS, REHAB EVAL
Pt received in bedside chair, + ostomy, + NGT, + tele, + Bp cuff, + pulse ox, + baldwin
Received pt bedside. /78, HR 81, O2 sats 98% on room air

## 2017-06-14 NOTE — PROGRESS NOTE ADULT - ASSESSMENT
ASSESSMENT  76y female with high volume ECF s/p exploratory laparotomy, extensive lysis of adhesions, takedown of enterocutaneous fistula, repair of serosal tear on cecum, partial right salpingooophorectomy, colostomy revision, parastomal and incisional hernia repair with strattice mesh, POD 4. Hypotensive on pressors, coagulopathic, thrombocytopenic with altered mentation. Now with respiratory failure requiring intubation and mechanical ventilation. Appears to be in septic shock, unclear source. CT abd/pelvis does not look alarming currently however high risk for having a complication in the near future given her history.     PLAN  - wean pressors, sedation as tolerated  - Cont. NGT to suction  - Broad spectrum abx   - Appreciate support care of the SICU  - Amio gtt for afib  - Will discuss with attending    KRYSTAL Ansari PGY1  Pager: [phone]

## 2017-06-14 NOTE — PHYSICAL THERAPY INITIAL EVALUATION ADULT - RANGE OF MOTION EXAMINATION, REHAB EVAL
bilateral upper extremity ROM was WFL (within functional limits)/bilateral lower extremity ROM was WFL (within functional limits)
bilateral lower extremity ROM was WFL (within functional limits)/bilateral upper extremity ROM was WFL (within functional limits)/except for AMALIA shoulders limited AROM L more limited than R

## 2017-06-14 NOTE — PROGRESS NOTE ADULT - ATTENDING COMMENTS
Patient continues to remain critically ill on vasopressors.  1)Mentation continues to be poor. She will say "no" to everything that is asked of her.     -dilaudid PRN for pain  2)Acute hypoxemic respiratory failure- continues on mechanical ventilation     -CXR appears stable from prior, clear overall with blunting of left costophrenic angle     - continue to wean off the ventilator if mental status improves and able to to perform a spontaneous breathing trial     -keep SpO2 >92%  3) Hypotension-levophed stopped this am and continues to be on vasopressin  4)Acute on chronic kidney insufficiency stage 3- continue to monitor urine out with goal 0.05ml/h or greater     -appears stable  5) atrial fibrillation- on amiodarone      -goal HR <115 bpm and currently controlled  6) anemia of chronic disease- h /H 7/21     -plan to transfuse one unit PRBC as patient continues to be on vasopressors      -goal to keep Hb >7  7) Budding yeast on UA, continue on fluconazole     -awaiting cultures  8) Hyperglycemia- continue insulin sliding scale   9) HIT- on fondaparineux for DVT prophylaxis  10) coagulopathy- remains elevated     -plan to give vitamin K  11) hypokalemia- repleted and will repeat  12- protein calorie malnutrition- continue TPN       -LFTs appear normal, will continue to trend  13) hypophoshatemia- repleted and will replace          I have spent 45 minutes of critical care time

## 2017-06-14 NOTE — PHYSICAL THERAPY INITIAL EVALUATION ADULT - PRECAUTIONS/LIMITATIONS, REHAB EVAL
fall precautions/hospital course: intubated post operatively and was successfully extubated. After getting CT with oral contrast, pt went into respiratory distress and was reintubated, found to have MRSA pneumonia likely aspiration in nature. She was subsequently extubated, and now off pressors.

## 2017-06-14 NOTE — PHYSICAL THERAPY INITIAL EVALUATION ADULT - GAIT DEVIATIONS NOTED, PT EVAL
SOB
decreased stride length/decreased weight-shifting ability/decreased velocity of limb motion/decreased julisa

## 2017-06-14 NOTE — PROGRESS NOTE ADULT - ASSESSMENT
75 yo F SICU day #5, POD #4 s/p ex-lap, extensive NANETTE, takedown of EC fistula, repair of cecal serosal tear, partial R salpingectomy, colostomy revision, parastomal and incisional hernia repair with strattice mesh, with hypotension requiring pressors, significant post operative pain, now s/p intubation for respiratory distress after CT scan.    Neuro: continue Precedex for sedation w/ IV acetaminophen and Dilaudid PRN pain and Benadryl PRN pruritus  Resp: spontaneous breathing trials in AM w/ goal to extubate. Duoneb PRN shortness of breath and/or wheezing  CVS: wean vasopressin gtt as tolerated for MAP > 65 mmHg. Continue amio gtt to manage afib  GI/Nutrition: NPO, continue NG tube to low wall suction, follow up GI function, continue TPN at this time  Genitourinary/Renal: resolving acute on chronic kidney disease (stage III w/ baseline Cr 1.2), continue to monitor urine output and electrolytes  Hematologic: coagulopathy likely secondary to compromised renal excretion of fondaparinux; continue VTE ppx with fondaparinux dosed every other day in setting of TREVA (GFR < 30) and with history of HIT  Infectious Disease: concern for sepsis with persistent hypotension and procalcitonin elevation, on broad spectrum antibiotics with aztreonam and flagyl, and started on daptomycin given history of VRE; follow up cultures  Endocrine: continue levothyroxine at home dose for hypothyroidism, monitor glycemic control on BMP  Dispo: full code, remain in SICU      Rosmery Kovacs PA-C   [id number] 75 yo F SICU day #5, POD #4 s/p ex-lap, extensive NANETTE, takedown of EC fistula, repair of cecal serosal tear, partial R salpingectomy, colostomy revision, parastomal and incisional hernia repair with strattice mesh, with hypotension requiring pressors, significant post operative pain, now s/p intubation for respiratory distress after CT scan.    Neuro: continue Precedex for sedation w/ IV acetaminophen and Dilaudid PRN pain and Benadryl PRN pruritus  Resp: spontaneous breathing trials in AM w/ goal to extubate. Duoneb PRN shortness of breath and/or wheezing  CVS: wean vasopressin gtt as tolerated for MAP > 65 mmHg. Continue amio gtt to manage afib  GI/Nutrition: NPO, continue NG tube to low wall suction, follow up GI function, continue TPN at this time  Genitourinary/Renal: resolving acute on chronic kidney disease (stage III w/ baseline Cr 1.2), continue to monitor urine output and electrolytes  Hematologic: coagulopathy likely secondary to compromised renal excretion of fondaparinux; continue VTE ppx with fondaparinux dosed every other day in setting of TREVA (GFR < 30) and with history of HIT  Infectious Disease: concern for sepsis with persistent hypotension and procalcitonin elevation, on broad spectrum antibiotics with aztreonam and flagyl, daptomycin given history of VRE, and fluconazole for presence of yeast in UA; follow up cultures  Endocrine: continue levothyroxine at home dose for hypothyroidism, monitor glycemic control on BMP  Dispo: full code, remain in SICU      Rosmery Kovacs PA-C   [id number]

## 2017-06-14 NOTE — PROGRESS NOTE ADULT - SUBJECTIVE AND OBJECTIVE BOX
NEPHROLOGY-[facility] [phone]        Patient seen and examined         MEDICATIONS  (STANDING):  lactated ringers. 1000milliLiter(s) IV Continuous <Continuous>  levothyroxine Injectable 44MICROGram(s) IV Push daily  pantoprazole  Injectable 40milliGRAM(s) IV Push daily  metroNIDAZOLE  IVPB 500milliGRAM(s) IV Intermittent every 8 hours  aztreonam  IVPB 1000milliGRAM(s) IV Intermittent every 8 hours  fondaparinux Injectable 2.5milliGRAM(s) SubCutaneous every other day  DAPTOmycin IVPB     fluconAZOLE IVPB  IV Intermittent   DAPTOmycin IVPB 450milliGRAM(s) IV Intermittent every 48 hours  fluconAZOLE IVPB 100milliGRAM(s) IV Intermittent every 24 hours  norepinephrine Infusion 0.2MICROgram(s)/kG/Min IV Continuous <Continuous>  vasopressin Infusion 0.04Unit(s)/Min IV Continuous <Continuous>  dexmedetomidine Infusion 1MICROgram(s)/kG/Hr IV Continuous <Continuous>  chlorhexidine 0.12% Liquid 15milliLiter(s) Swish and Spit every 8 hours  acetaminophen  IVPB. 1000milliGRAM(s) IV Intermittent once  amiodarone Infusion 0.5mG/Min IV Continuous <Continuous>  sodium phosphate IVPB 15milliMole(s) IV Intermittent every 2 hours      VITAL:  T(C): , Max: 37.7 ( @ 19:15)  T(F): , Max: 99.9 ( @ 19:15)  HR: 89  BP: 99/52  BP(mean): 73  RR: 27  SpO2: 100%  Wt(kg): --    I and O's:  I & Os for 24h ending  @ 07:00  =============================================  IN: 5536.9 ml / OUT: 2500 ml / NET: 3036.9 ml    I & Os for current day (as of  @ 08:16)  =============================================  IN: 139.1 ml / OUT: 50 ml / NET: 89.1 ml        PHYSICAL EXAM:     Constitutional: intubated  HEENT: PERRLA    Neck:  No JVD  Respiratory: transmitted upper  Cardiovascular: S1 and S2  Gastrointestinal:hypoactive bowel sounds  Extremities: +  peripheral edema  Neurological: unable  : No Snyder  Skin: No rashes  Access: Not applicable    LABS:                        7.1    6.9   )-----------( 62       ( 2017 02:26 )             21.4     06-14    138  |  107  |  55<H>  ----------------------------<  158<H>  3.3<L>   |  20<L>  |  1.61<H>    Ca    8.4      2017 02:26  Phos  2.0     -  Mg     2.2     -    TPro  4.5<L>  /  Alb  2.4<L>  /  TBili  1.0  /  DBili  0.6<H>  /  AST  25  /  ALT  16  /  AlkPhos  111  06-14          Urine Studies:  Urinalysis Basic - ( 2017 11:40 )    Color: Yellow / Appearance: Clear / S.017 / pH: x  Gluc: x / Ketone: Negative  / Bili: Negative / Urobili: Negative   Blood: x / Protein: 30 mg/dL / Nitrite: Negative   Leuk Esterase: Negative / RBC: 0-2 /HPF / WBC x   Sq Epi: x / Non Sq Epi: OCC /HPF / Bacteria: Few /HPF            RADIOLOGY & ADDITIONAL STUDIES: NEPHROLOGY-[facility] [phone]        Patient seen and examined in bed.  Remains intubated and on pressors as well.          MEDICATIONS  (STANDING):  lactated ringers. 1000milliLiter(s) IV Continuous <Continuous>  levothyroxine Injectable 44MICROGram(s) IV Push daily  pantoprazole  Injectable 40milliGRAM(s) IV Push daily  metroNIDAZOLE  IVPB 500milliGRAM(s) IV Intermittent every 8 hours  aztreonam  IVPB 1000milliGRAM(s) IV Intermittent every 8 hours  fondaparinux Injectable 2.5milliGRAM(s) SubCutaneous every other day  DAPTOmycin IVPB     fluconAZOLE IVPB  IV Intermittent   DAPTOmycin IVPB 450milliGRAM(s) IV Intermittent every 48 hours  fluconAZOLE IVPB 100milliGRAM(s) IV Intermittent every 24 hours  norepinephrine Infusion 0.2MICROgram(s)/kG/Min IV Continuous <Continuous>  vasopressin Infusion 0.04Unit(s)/Min IV Continuous <Continuous>  dexmedetomidine Infusion 1MICROgram(s)/kG/Hr IV Continuous <Continuous>  chlorhexidine 0.12% Liquid 15milliLiter(s) Swish and Spit every 8 hours  acetaminophen  IVPB. 1000milliGRAM(s) IV Intermittent once  amiodarone Infusion 0.5mG/Min IV Continuous <Continuous>  sodium phosphate IVPB 15milliMole(s) IV Intermittent every 2 hours      VITAL:  T(C): , Max: 37.7 ( @ 19:15)  T(F): , Max: 99.9 ( @ 19:15)  HR: 89  BP: 99/52  BP(mean): 73  RR: 27  SpO2: 100%  Wt(kg): --    I and O's:  I & Os for 24h ending  @ 07:00  =============================================  IN: 5536.9 ml / OUT: 2500 ml / NET: 3036.9 ml    I & Os for current day (as of  @ 08:16)  =============================================  IN: 139.1 ml / OUT: 50 ml / NET: 89.1 ml        PHYSICAL EXAM:     Constitutional: intubated  HEENT: PERRLA    Neck:  No JVD  Respiratory: transmitted upper  Cardiovascular: S1 and S2  Gastrointestinal:hypoactive bowel sounds  Extremities: +  peripheral edema  Neurological: unable  : + Snyder  Skin: No rashes  Access: Not applicable    LABS:                        7.1    6.9   )-----------( 62       ( 2017 02:26 )             21.4     06-14    138  |  107  |  55<H>  ----------------------------<  158<H>  3.3<L>   |  20<L>  |  1.61<H>    Ca    8.4      2017 02:26  Phos  2.0     06-14  Mg     2.2     06-14    TPro  4.5<L>  /  Alb  2.4<L>  /  TBili  1.0  /  DBili  0.6<H>  /  AST  25  /  ALT  16  /  AlkPhos  111  06-14          Urine Studies:  Urinalysis Basic - ( 2017 11:40 )    Color: Yellow / Appearance: Clear / S.017 / pH: x  Gluc: x / Ketone: Negative  / Bili: Negative / Urobili: Negative   Blood: x / Protein: 30 mg/dL / Nitrite: Negative   Leuk Esterase: Negative / RBC: 0-2 /HPF / WBC x   Sq Epi: x / Non Sq Epi: OCC /HPF / Bacteria: Few /HPF            RADIOLOGY & ADDITIONAL STUDIES:

## 2017-06-14 NOTE — PROGRESS NOTE ADULT - ASSESSMENT
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  Acute on chronic renal failure  hypokalemia  Hypophosphatemia

## 2017-06-15 DIAGNOSIS — E87.6 HYPOKALEMIA: ICD-10-CM

## 2017-06-15 DIAGNOSIS — E44.1 MILD PROTEIN-CALORIE MALNUTRITION: ICD-10-CM

## 2017-06-15 DIAGNOSIS — E03.9 HYPOTHYROIDISM, UNSPECIFIED: ICD-10-CM

## 2017-06-15 LAB
-  AMPICILLIN/SULBACTAM: SIGNIFICANT CHANGE UP
-  CEFAZOLIN: SIGNIFICANT CHANGE UP
-  CIPROFLOXACIN: SIGNIFICANT CHANGE UP
-  CLINDAMYCIN: SIGNIFICANT CHANGE UP
-  ERYTHROMYCIN: SIGNIFICANT CHANGE UP
-  GENTAMICIN: SIGNIFICANT CHANGE UP
-  LEVOFLOXACIN: SIGNIFICANT CHANGE UP
-  LINEZOLID: SIGNIFICANT CHANGE UP
-  MOXIFLOXACIN(AEROBIC): SIGNIFICANT CHANGE UP
-  OXACILLIN: SIGNIFICANT CHANGE UP
-  PENICILLIN: SIGNIFICANT CHANGE UP
-  RIFAMPIN: SIGNIFICANT CHANGE UP
-  TETRACYCLINE: SIGNIFICANT CHANGE UP
-  TRIMETHOPRIM/SULFAMETHOXAZOLE: SIGNIFICANT CHANGE UP
-  VANCOMYCIN: SIGNIFICANT CHANGE UP
ALBUMIN SERPL ELPH-MCNC: 2.2 G/DL — LOW (ref 3.3–5)
ALP SERPL-CCNC: 120 U/L — SIGNIFICANT CHANGE UP (ref 40–120)
ALT FLD-CCNC: 23 U/L RC — SIGNIFICANT CHANGE UP (ref 10–45)
ANION GAP SERPL CALC-SCNC: 10 MMOL/L — SIGNIFICANT CHANGE UP (ref 5–17)
ANION GAP SERPL CALC-SCNC: 10 MMOL/L — SIGNIFICANT CHANGE UP (ref 5–17)
ANION GAP SERPL CALC-SCNC: 9 MMOL/L — SIGNIFICANT CHANGE UP (ref 5–17)
APTT BLD: 38.9 SEC — HIGH (ref 27.5–37.4)
AST SERPL-CCNC: 30 U/L — SIGNIFICANT CHANGE UP (ref 10–40)
BASE EXCESS BLDV CALC-SCNC: -1.9 MMOL/L — SIGNIFICANT CHANGE UP (ref -2–2)
BILIRUB DIRECT SERPL-MCNC: 0.8 MG/DL — HIGH (ref 0–0.2)
BILIRUB INDIRECT FLD-MCNC: 0.5 MG/DL — SIGNIFICANT CHANGE UP (ref 0.2–1)
BILIRUB SERPL-MCNC: 1.3 MG/DL — HIGH (ref 0.2–1.2)
BUN SERPL-MCNC: 43 MG/DL — HIGH (ref 7–23)
BUN SERPL-MCNC: 45 MG/DL — HIGH (ref 7–23)
BUN SERPL-MCNC: 47 MG/DL — HIGH (ref 7–23)
CALCIUM SERPL-MCNC: 8 MG/DL — LOW (ref 8.4–10.5)
CALCIUM SERPL-MCNC: 8 MG/DL — LOW (ref 8.4–10.5)
CALCIUM SERPL-MCNC: 8.2 MG/DL — LOW (ref 8.4–10.5)
CHLORIDE SERPL-SCNC: 108 MMOL/L — SIGNIFICANT CHANGE UP (ref 96–108)
CHLORIDE SERPL-SCNC: 111 MMOL/L — HIGH (ref 96–108)
CHLORIDE SERPL-SCNC: 111 MMOL/L — HIGH (ref 96–108)
CO2 BLDV-SCNC: 24 MMOL/L — SIGNIFICANT CHANGE UP (ref 22–30)
CO2 SERPL-SCNC: 21 MMOL/L — LOW (ref 22–31)
CO2 SERPL-SCNC: 22 MMOL/L — SIGNIFICANT CHANGE UP (ref 22–31)
CO2 SERPL-SCNC: 23 MMOL/L — SIGNIFICANT CHANGE UP (ref 22–31)
CORTIS AM PEAK SERPL-MCNC: 11.5 UG/DL — SIGNIFICANT CHANGE UP (ref 6–18.4)
CREAT SERPL-MCNC: 1.33 MG/DL — HIGH (ref 0.5–1.3)
CREAT SERPL-MCNC: 1.38 MG/DL — HIGH (ref 0.5–1.3)
CREAT SERPL-MCNC: 1.42 MG/DL — HIGH (ref 0.5–1.3)
CULTURE RESULTS: SIGNIFICANT CHANGE UP
GAS PNL BLDA: SIGNIFICANT CHANGE UP
GAS PNL BLDV: SIGNIFICANT CHANGE UP
GLUCOSE SERPL-MCNC: 153 MG/DL — HIGH (ref 70–99)
GLUCOSE SERPL-MCNC: 157 MG/DL — HIGH (ref 70–99)
GLUCOSE SERPL-MCNC: 165 MG/DL — HIGH (ref 70–99)
GRAM STN FLD: SIGNIFICANT CHANGE UP
HBA1C BLD-MCNC: 5.4 % — SIGNIFICANT CHANGE UP (ref 4–5.6)
HCO3 BLDV-SCNC: 23 MMOL/L — SIGNIFICANT CHANGE UP (ref 21–29)
HCT VFR BLD CALC: 25.8 % — LOW (ref 34.5–45)
HCT VFR BLD CALC: 26.1 % — LOW (ref 34.5–45)
HCT VFR BLD CALC: 27.5 % — LOW (ref 34.5–45)
HGB BLD-MCNC: 8.5 G/DL — LOW (ref 11.5–15.5)
HGB BLD-MCNC: 8.7 G/DL — LOW (ref 11.5–15.5)
HGB BLD-MCNC: 9 G/DL — LOW (ref 11.5–15.5)
HOROWITZ INDEX BLDV+IHG-RTO: 30 — SIGNIFICANT CHANGE UP
INR BLD: 1.68 RATIO — HIGH (ref 0.88–1.16)
MAGNESIUM SERPL-MCNC: 2.5 MG/DL — SIGNIFICANT CHANGE UP (ref 1.6–2.6)
MAGNESIUM SERPL-MCNC: 2.7 MG/DL — HIGH (ref 1.6–2.6)
MAGNESIUM SERPL-MCNC: 2.8 MG/DL — HIGH (ref 1.6–2.6)
MCHC RBC-ENTMCNC: 32.5 GM/DL — SIGNIFICANT CHANGE UP (ref 32–36)
MCHC RBC-ENTMCNC: 32.6 PG — SIGNIFICANT CHANGE UP (ref 27–34)
MCHC RBC-ENTMCNC: 32.8 GM/DL — SIGNIFICANT CHANGE UP (ref 32–36)
MCHC RBC-ENTMCNC: 32.8 PG — SIGNIFICANT CHANGE UP (ref 27–34)
MCHC RBC-ENTMCNC: 33.3 GM/DL — SIGNIFICANT CHANGE UP (ref 32–36)
MCHC RBC-ENTMCNC: 33.3 PG — SIGNIFICANT CHANGE UP (ref 27–34)
MCV RBC AUTO: 100 FL — SIGNIFICANT CHANGE UP (ref 80–100)
MCV RBC AUTO: 99.8 FL — SIGNIFICANT CHANGE UP (ref 80–100)
MCV RBC AUTO: 99.9 FL — SIGNIFICANT CHANGE UP (ref 80–100)
METHOD TYPE: SIGNIFICANT CHANGE UP
ORGANISM # SPEC MICROSCOPIC CNT: SIGNIFICANT CHANGE UP
ORGANISM # SPEC MICROSCOPIC CNT: SIGNIFICANT CHANGE UP
PCO2 BLDV: 42 MMHG — SIGNIFICANT CHANGE UP (ref 35–50)
PH BLDV: 7.36 — SIGNIFICANT CHANGE UP (ref 7.35–7.45)
PHOSPHATE SERPL-MCNC: 2.6 MG/DL — SIGNIFICANT CHANGE UP (ref 2.5–4.5)
PHOSPHATE SERPL-MCNC: 2.7 MG/DL — SIGNIFICANT CHANGE UP (ref 2.5–4.5)
PHOSPHATE SERPL-MCNC: 3.3 MG/DL — SIGNIFICANT CHANGE UP (ref 2.5–4.5)
PLATELET # BLD AUTO: 64 K/UL — LOW (ref 150–400)
PLATELET # BLD AUTO: 68 K/UL — LOW (ref 150–400)
PLATELET # BLD AUTO: 74 K/UL — LOW (ref 150–400)
PO2 BLDV: 41 MMHG — SIGNIFICANT CHANGE UP (ref 25–45)
POTASSIUM SERPL-MCNC: 3.8 MMOL/L — SIGNIFICANT CHANGE UP (ref 3.5–5.3)
POTASSIUM SERPL-MCNC: 4.1 MMOL/L — SIGNIFICANT CHANGE UP (ref 3.5–5.3)
POTASSIUM SERPL-MCNC: 4.1 MMOL/L — SIGNIFICANT CHANGE UP (ref 3.5–5.3)
POTASSIUM SERPL-SCNC: 3.8 MMOL/L — SIGNIFICANT CHANGE UP (ref 3.5–5.3)
POTASSIUM SERPL-SCNC: 4.1 MMOL/L — SIGNIFICANT CHANGE UP (ref 3.5–5.3)
POTASSIUM SERPL-SCNC: 4.1 MMOL/L — SIGNIFICANT CHANGE UP (ref 3.5–5.3)
PROT SERPL-MCNC: 4.6 G/DL — LOW (ref 6–8.3)
PROTHROM AB SERPL-ACNC: 18.5 SEC — HIGH (ref 9.8–12.7)
RBC # BLD: 2.59 M/UL — LOW (ref 3.8–5.2)
RBC # BLD: 2.61 M/UL — LOW (ref 3.8–5.2)
RBC # BLD: 2.75 M/UL — LOW (ref 3.8–5.2)
RBC # FLD: 14.6 % — HIGH (ref 10.3–14.5)
RBC # FLD: 14.9 % — HIGH (ref 10.3–14.5)
RBC # FLD: 15.1 % — HIGH (ref 10.3–14.5)
SAO2 % BLDV: 77 % — SIGNIFICANT CHANGE UP (ref 67–88)
SODIUM SERPL-SCNC: 140 MMOL/L — SIGNIFICANT CHANGE UP (ref 135–145)
SODIUM SERPL-SCNC: 142 MMOL/L — SIGNIFICANT CHANGE UP (ref 135–145)
SODIUM SERPL-SCNC: 143 MMOL/L — SIGNIFICANT CHANGE UP (ref 135–145)
SPECIMEN SOURCE: SIGNIFICANT CHANGE UP
SURGICAL PATHOLOGY STUDY: SIGNIFICANT CHANGE UP
WBC # BLD: 9 K/UL — SIGNIFICANT CHANGE UP (ref 3.8–10.5)
WBC # BLD: 9.2 K/UL — SIGNIFICANT CHANGE UP (ref 3.8–10.5)
WBC # BLD: 9.3 K/UL — SIGNIFICANT CHANGE UP (ref 3.8–10.5)
WBC # FLD AUTO: 9 K/UL — SIGNIFICANT CHANGE UP (ref 3.8–10.5)
WBC # FLD AUTO: 9.2 K/UL — SIGNIFICANT CHANGE UP (ref 3.8–10.5)
WBC # FLD AUTO: 9.3 K/UL — SIGNIFICANT CHANGE UP (ref 3.8–10.5)

## 2017-06-15 PROCEDURE — 71010: CPT | Mod: 26

## 2017-06-15 PROCEDURE — 99291 CRITICAL CARE FIRST HOUR: CPT

## 2017-06-15 PROCEDURE — 93010 ELECTROCARDIOGRAM REPORT: CPT

## 2017-06-15 RX ORDER — POTASSIUM PHOSPHATE, MONOBASIC POTASSIUM PHOSPHATE, DIBASIC 236; 224 MG/ML; MG/ML
15 INJECTION, SOLUTION INTRAVENOUS ONCE
Qty: 0 | Refills: 0 | Status: COMPLETED | OUTPATIENT
Start: 2017-06-15 | End: 2017-06-15

## 2017-06-15 RX ORDER — POTASSIUM CHLORIDE 20 MEQ
10 PACKET (EA) ORAL ONCE
Qty: 0 | Refills: 0 | Status: COMPLETED | OUTPATIENT
Start: 2017-06-15 | End: 2017-06-15

## 2017-06-15 RX ORDER — PHYTONADIONE (VIT K1) 5 MG
10 TABLET ORAL ONCE
Qty: 0 | Refills: 0 | Status: COMPLETED | OUTPATIENT
Start: 2017-06-15 | End: 2017-06-15

## 2017-06-15 RX ADMIN — FONDAPARINUX SODIUM 2.5 MILLIGRAM(S): 2.5 INJECTION, SOLUTION SUBCUTANEOUS at 12:11

## 2017-06-15 RX ADMIN — HYDROMORPHONE HYDROCHLORIDE 0.2 MILLIGRAM(S): 2 INJECTION INTRAMUSCULAR; INTRAVENOUS; SUBCUTANEOUS at 23:16

## 2017-06-15 RX ADMIN — HYDROMORPHONE HYDROCHLORIDE 0.2 MILLIGRAM(S): 2 INJECTION INTRAMUSCULAR; INTRAVENOUS; SUBCUTANEOUS at 07:44

## 2017-06-15 RX ADMIN — Medication 44 MICROGRAM(S): at 05:11

## 2017-06-15 RX ADMIN — Medication 100 MILLIGRAM(S): at 05:04

## 2017-06-15 RX ADMIN — Medication 55.8 MICROGRAM(S)/KG/MIN: at 06:17

## 2017-06-15 RX ADMIN — Medication 2: at 05:20

## 2017-06-15 RX ADMIN — HYDROMORPHONE HYDROCHLORIDE 0.2 MILLIGRAM(S): 2 INJECTION INTRAMUSCULAR; INTRAVENOUS; SUBCUTANEOUS at 08:00

## 2017-06-15 RX ADMIN — CHLORHEXIDINE GLUCONATE 15 MILLILITER(S): 213 SOLUTION TOPICAL at 05:07

## 2017-06-15 RX ADMIN — HYDROMORPHONE HYDROCHLORIDE 0.2 MILLIGRAM(S): 2 INJECTION INTRAMUSCULAR; INTRAVENOUS; SUBCUTANEOUS at 04:15

## 2017-06-15 RX ADMIN — Medication 2: at 12:41

## 2017-06-15 RX ADMIN — SODIUM CHLORIDE 30 MILLILITER(S): 9 INJECTION, SOLUTION INTRAVENOUS at 06:17

## 2017-06-15 RX ADMIN — HYDROMORPHONE HYDROCHLORIDE 0.2 MILLIGRAM(S): 2 INJECTION INTRAMUSCULAR; INTRAVENOUS; SUBCUTANEOUS at 19:57

## 2017-06-15 RX ADMIN — DEXMEDETOMIDINE HYDROCHLORIDE IN 0.9% SODIUM CHLORIDE 18.6 MICROGRAM(S)/KG/HR: 4 INJECTION INTRAVENOUS at 00:02

## 2017-06-15 RX ADMIN — Medication 400 MILLIGRAM(S): at 09:13

## 2017-06-15 RX ADMIN — SODIUM CHLORIDE 30 MILLILITER(S): 9 INJECTION, SOLUTION INTRAVENOUS at 21:40

## 2017-06-15 RX ADMIN — HYDROMORPHONE HYDROCHLORIDE 0.2 MILLIGRAM(S): 2 INJECTION INTRAMUSCULAR; INTRAVENOUS; SUBCUTANEOUS at 01:38

## 2017-06-15 RX ADMIN — Medication 100 MILLIGRAM(S): at 21:39

## 2017-06-15 RX ADMIN — HYDROMORPHONE HYDROCHLORIDE 0.2 MILLIGRAM(S): 2 INJECTION INTRAMUSCULAR; INTRAVENOUS; SUBCUTANEOUS at 15:57

## 2017-06-15 RX ADMIN — FLUCONAZOLE 100 MILLIGRAM(S): 150 TABLET ORAL at 12:12

## 2017-06-15 RX ADMIN — Medication 10 MILLIGRAM(S): at 20:39

## 2017-06-15 RX ADMIN — HYDROMORPHONE HYDROCHLORIDE 0.2 MILLIGRAM(S): 2 INJECTION INTRAMUSCULAR; INTRAVENOUS; SUBCUTANEOUS at 16:12

## 2017-06-15 RX ADMIN — POTASSIUM PHOSPHATE, MONOBASIC POTASSIUM PHOSPHATE, DIBASIC 62.5 MILLIMOLE(S): 236; 224 INJECTION, SOLUTION INTRAVENOUS at 06:17

## 2017-06-15 RX ADMIN — Medication 50 MILLIGRAM(S): at 21:39

## 2017-06-15 RX ADMIN — Medication 100 MILLIGRAM(S): at 13:23

## 2017-06-15 RX ADMIN — Medication 50 MILLIGRAM(S): at 05:02

## 2017-06-15 RX ADMIN — HYDROMORPHONE HYDROCHLORIDE 0.2 MILLIGRAM(S): 2 INJECTION INTRAMUSCULAR; INTRAVENOUS; SUBCUTANEOUS at 19:42

## 2017-06-15 RX ADMIN — HYDROMORPHONE HYDROCHLORIDE 0.2 MILLIGRAM(S): 2 INJECTION INTRAMUSCULAR; INTRAVENOUS; SUBCUTANEOUS at 23:31

## 2017-06-15 RX ADMIN — HYDROMORPHONE HYDROCHLORIDE 0.2 MILLIGRAM(S): 2 INJECTION INTRAMUSCULAR; INTRAVENOUS; SUBCUTANEOUS at 04:30

## 2017-06-15 RX ADMIN — Medication 400 MILLIGRAM(S): at 01:37

## 2017-06-15 RX ADMIN — CHLORHEXIDINE GLUCONATE 15 MILLILITER(S): 213 SOLUTION TOPICAL at 21:39

## 2017-06-15 RX ADMIN — HYDROMORPHONE HYDROCHLORIDE 0.2 MILLIGRAM(S): 2 INJECTION INTRAMUSCULAR; INTRAVENOUS; SUBCUTANEOUS at 01:52

## 2017-06-15 RX ADMIN — Medication 1000 MILLIGRAM(S): at 01:52

## 2017-06-15 RX ADMIN — Medication 100 MILLIEQUIVALENT(S): at 05:01

## 2017-06-15 RX ADMIN — CHLORHEXIDINE GLUCONATE 15 MILLILITER(S): 213 SOLUTION TOPICAL at 13:23

## 2017-06-15 RX ADMIN — HYDROMORPHONE HYDROCHLORIDE 0.2 MILLIGRAM(S): 2 INJECTION INTRAMUSCULAR; INTRAVENOUS; SUBCUTANEOUS at 13:00

## 2017-06-15 RX ADMIN — Medication 50 MILLIGRAM(S): at 13:22

## 2017-06-15 RX ADMIN — HYDROMORPHONE HYDROCHLORIDE 0.2 MILLIGRAM(S): 2 INJECTION INTRAMUSCULAR; INTRAVENOUS; SUBCUTANEOUS at 12:46

## 2017-06-15 RX ADMIN — Medication 1000 MILLIGRAM(S): at 09:30

## 2017-06-15 RX ADMIN — PANTOPRAZOLE SODIUM 40 MILLIGRAM(S): 20 TABLET, DELAYED RELEASE ORAL at 12:11

## 2017-06-15 NOTE — PROGRESS NOTE ADULT - SUBJECTIVE AND OBJECTIVE BOX
[facility] Trauma/Acute Care [place] Progress Note    HOSPITAL DAY #:24  POST OPERATIVE DAY #:  6  STATUS POST:  exploratory laparotomy, extensive lysis of adhesions, takedown of enterocutaneous fistula, repair of serosal tear on cecum, partial right salpingooophorectomy, colostomy revision, parastomal and incisional hernia repair with strattice mesh                    INTERVAL EVENTS: Patient remains on levophed. Levo was transiently off but had to be restarted after patient received dilaudid. Bacteremia with GPC in clusters now evident from 6/12 culture.       SUBJECTIVE: Pt is intubated. Unable to obtain ROS.     OBJECTIVE:     Constitutional: Intubated, eyes open spontaneously and to her name.   Respiratory: A/C vent; PEEP 5 / FiO2 40%  Cardiovascular: sinus  Gastrointestinal: soft, distended, ostomy mucosa appears congested, dark but viable, no fxn. NGT in place: bilious, old colostomy site, packed  Ext: b/l UE edema  Psychiatric: unable to assess    Vital Signs Last 24 Hrs  T(C): 36.8, Max: 37.1 (06-14 @ 15:00)  T(F): 98.3, Max: 98.8 (06-14 @ 15:00)  HR: 93 (74 - 101)  BP: 127/59 (127/59 - 127/59)  BP(mean): 85 (85 - 85)  RR: 29 (26 - 36)  SpO2: 100% (100% - 100%)    I&O's Detail    I & Os for current day (as of 15 Anastacio 2017 08:13)  =============================================  IN:    TPN (Total Parenteral Nutrition): 1488 ml    lactated ringers.: 1200 ml    Solution: 825 ml    Solution: 525 ml    amiodarone Infusion: 300.6 ml    Solution: 300 ml    Solution: 300 ml    Packed Red Blood Cells: 250 ml    Solution: 200 ml    amiodarone Infusion: 100.2 ml    Solution: 100 ml    norepinephrine Infusion: 73.6 ml    dexmedetomidine Infusion: 50.7 ml    Solution: 50 ml    vasopressin Infusion: 14.4 ml    norepinephrine Infusion: 12.6 ml    Total IN: 5790.1 ml  ---------------------------------------------  OUT:    Indwelling Catheter - Urethral: 2925 ml    Nasoenteral Tube: 1200 ml    Total OUT: 4125 ml  ---------------------------------------------  Total NET: 1665.1 ml                            8.7    9.3   )-----------( 64       ( 15 Anastacio 2017 03:06 )             26.1       06-15    140  |  108  |  47<H>  ----------------------------<  153<H>  3.8   |  22  |  1.42<H>    Ca    8.0<L>      15 Anastacio 2017 03:06  Phos  2.7     06-15  Mg     2.5     06-15    TPro  4.6<L>  /  Alb  2.2<L>  /  TBili  1.3<H>  /  DBili  0.8<H>  /  AST  30  /  ALT  23  /  AlkPhos  120  06-15

## 2017-06-15 NOTE — PROGRESS NOTE ADULT - ASSESSMENT
Renal function slowly improving as are electrolyte abnormalities.  Nutritional status appears stable

## 2017-06-15 NOTE — PROGRESS NOTE ADULT - ASSESSMENT
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  Acute on chronic renal failure  hypokalemia  Hypophosphatemia 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  Acute on chronic renal failure  hypokalemia

## 2017-06-15 NOTE — PROGRESS NOTE ADULT - PROBLEM SELECTOR PLAN 1
She is volume overloaded but pressors will prevent large diuresis  TPN ongoing.  Can Phos be added to the TPN?  Replete K as u are doing. She is volume overloaded but pressors will prevent large diuresis  TPN ongoing.  Can Phos be added to the TPN?

## 2017-06-15 NOTE — PROGRESS NOTE ADULT - SUBJECTIVE AND OBJECTIVE BOX
Day #15 of PN  PN infusion at 62 ccs/hr  Labs   06-15    140  |  108  |  47<H>  ----------------------------<  153<H>  3.8   |  22  |  1.42<H>    Ca    8.0<L>      15 Anastacio 2017 03:06  Phos  2.7     06-15  Mg     2.5     06-15    TPro  4.6<L>  /  Alb  2.2<L>  /  TBili  1.3<H>  /  DBili  0.8<H>  /  AST  30  /  ALT  23  /  AlkPhos  120  06-15      LIVER FUNCTIONS - ( 15 Anastacio 2017 03:06 )  Alb: 2.2 g/dL / Pro: 4.6 g/dL / ALK PHOS: 120 U/L / ALT: 23 U/L RC / AST: 30 U/L / GGT: x           CAPILLARY BLOOD GLUCOSE  154 (15 Anastacio 2017 05:30)  155 (14 Jun 2017 23:52)  104 (14 Jun 2017 17:27)  206 (14 Jun 2017 11:45)    I&O's Detail    I & Os for current day (as of 15 Anastacio 2017 10:11)  =============================================  IN:    TPN (Total Parenteral Nutrition): 1488 ml    lactated ringers.: 1200 ml    Solution: 825 ml    Solution: 525 ml    amiodarone Infusion: 300.6 ml    Solution: 300 ml    Solution: 300 ml    Packed Red Blood Cells: 250 ml    Solution: 200 ml    amiodarone Infusion: 100.2 ml    Solution: 100 ml    norepinephrine Infusion: 73.6 ml    dexmedetomidine Infusion: 50.7 ml    Solution: 50 ml    vasopressin Infusion: 14.4 ml    norepinephrine Infusion: 12.6 ml    Total IN: 5790.1 ml  ---------------------------------------------  OUT:    Indwelling Catheter - Urethral: 2925 ml    Nasoenteral Tube: 1200 ml    Total OUT: 4125 ml  ---------------------------------------------  Total NET: 1665.1 ml      T(C): 37, Max: 37.1 (06-14 @ 15:00)  HR: 91 (74 - 101)  BP: 127/59 (127/59 - 127/59)  RR: 28 (26 - 36)  SpO2: 100% (100% - 100%)  Wt(kg): --

## 2017-06-15 NOTE — PROGRESS NOTE ADULT - PROBLEM SELECTOR PLAN 2
Maintain MAP of 60  Check cortisol level  Can we transfuse one unit of PRBC? Maintain MAP of 60  Taper pressors as tolerated by BP

## 2017-06-15 NOTE — PROGRESS NOTE ADULT - ASSESSMENT
ASSESSMENT  76y female with high volume ECF s/p exploratory laparotomy, extensive lysis of adhesions, takedown of enterocutaneous fistula, repair of serosal tear on cecum, partial right salpingooophorectomy, colostomy revision, parastomal and incisional hernia repair with strattice mesh, POD 4. Hypotensive on pressors, coagulopathic, thrombocytopenic with altered mentation. Now with respiratory failure requiring intubation and mechanical ventilation. Appears to be in septic shock, unclear source. CT abd/pelvis does not look alarming currently however high risk for having a complication in the near future given her history.     PLAN  - wean pressors, sedation as tolerated  - Cont. NGT to suction  - Broad spectrum abx   - Appreciate support care of the SICU  - Amio gtt for afib  - Will discuss with attending    KRYSTAL Ansari PGY1  Pager: [phone]

## 2017-06-15 NOTE — PROGRESS NOTE ADULT - ASSESSMENT
77 yo F SICU day #7, POD #6 s/p ex-lap, extensive NANETTE, takedown of EC fistula, repair of cecal serosal tear, partial R salpingectomy, colostomy revision, parastomal and incisional hernia repair with strattice mesh.        Neuro:  CV:  Pulm:  GI/Nutrition:  /Renal:  ID:  Tubes/Lines/Drains:  Endocrine:  Skin:  Prophylaxis:  Dispo: 75 yo F SICU day #7, POD #6 s/p ex-lap, extensive NANETET, takedown of EC fistula, repair of cecal serosal tear, partial R salpingectomy, colostomy revision, parastomal and incisional hernia repair with strattice mesh, complicated by reintubation for respiratory distress, hypotension requiring pressors, with staph aureus on combicath and GPC in blood cultures.    Neuro: sedate with precedex, dilaudid prn  CV: continue amio gtt while NPO to prevent tachyarrhythmia, transition to PO when NG tube removed; requiring small amount of levophed, wean as tolerated  Pulm: remains intubated, requiring high respiratory rate, previous failure of SBT due to low RSBI, wean respiratory rate as tolerated, additional spontaneous breathing trial in AM  GI/Nutrition: continue TPN for nutritional support, NPO with NG tube, pending ostomy function  /Renal: baldwin in place, continue aggressive electrolyte repletion  ID: combicath and blood cultures with growth, will follow sensitivities and speciation, will attempt to narrow empiric coverage  Tubes/Lines/Drains: continue a-line while on pressors and intubated  Endocrine: no active concerns, monitor glycemic control on BMP   Skin: reposition and turn every 2 hours for skin protection  Prophylaxis: VTE ppx with fondaparinux  Dispo: full code, remain in SICU    --RENZO Samson, PGY-2

## 2017-06-15 NOTE — PROGRESS NOTE ADULT - SUBJECTIVE AND OBJECTIVE BOX
NEPHROLOGY-[facility] [phone]        Patient seen and examined         MEDICATIONS  (STANDING):  lactated ringers. 1000milliLiter(s) IV Continuous <Continuous>  levothyroxine Injectable 44MICROGram(s) IV Push daily  pantoprazole  Injectable 40milliGRAM(s) IV Push daily  metroNIDAZOLE  IVPB 500milliGRAM(s) IV Intermittent every 8 hours  aztreonam  IVPB 1000milliGRAM(s) IV Intermittent every 8 hours  DAPTOmycin IVPB     fluconAZOLE IVPB  IV Intermittent   DAPTOmycin IVPB 450milliGRAM(s) IV Intermittent every 48 hours  fluconAZOLE IVPB 100milliGRAM(s) IV Intermittent every 24 hours  dexmedetomidine Infusion 1MICROgram(s)/kG/Hr IV Continuous <Continuous>  chlorhexidine 0.12% Liquid 15milliLiter(s) Swish and Spit every 8 hours  fondaparinux Injectable 2.5milliGRAM(s) SubCutaneous every 24 hours  insulin lispro (HumaLOG) corrective regimen sliding scale  SubCutaneous every 6 hours  acetaminophen  IVPB. 1000milliGRAM(s) IV Intermittent once  amiodarone Infusion 0.5mG/Min IV Continuous <Continuous>  norepinephrine Infusion 0.4MICROgram(s)/kG/Min IV Continuous <Continuous>      VITAL:  T(C): , Max: 37.1 (06-14 @ 15:00)  T(F): , Max: 98.8 (06-14 @ 15:00)  HR: 93  BP: 127/59  BP(mean): 85  RR: 29  SpO2: 100%  Wt(kg): --    I and O's:    I & Os for current day (as of 06-15 @ 08:15)  =============================================  IN: 5790.1 ml / OUT: 4125 ml / NET: 1665.1 ml        PHYSICAL EXAM:    Constitutional: NAD  HEENT: PERRLA    Neck:  No JVD  Respiratory: CTAB/L  Cardiovascular: S1 and S2  Gastrointestinal: BS+, soft, NT/ND  Extremities: No peripheral edema  Neurological: A/O x 3, no focal deficits  Psychiatric: Normal mood, normal affect  : No Snyder  Skin: No rashes  Access: Not applicable    LABS:                        8.7    9.3   )-----------( 64       ( 15 Anastacio 2017 03:06 )             26.1     06-15    140  |  108  |  47<H>  ----------------------------<  153<H>  3.8   |  22  |  1.42<H>    Ca    8.0<L>      15 Anastacio 2017 03:06  Phos  2.7     06-15  Mg     2.5     06-15    TPro  4.6<L>  /  Alb  2.2<L>  /  TBili  1.3<H>  /  DBili  0.8<H>  /  AST  30  /  ALT  23  /  AlkPhos  120  06-15          Urine Studies:          RADIOLOGY & ADDITIONAL STUDIES: NEPHROLOGY-[facility] [phone]        Patient seen and examined bed.  She is still on pressors and intubated        MEDICATIONS  (STANDING):  lactated ringers. 1000milliLiter(s) IV Continuous <Continuous>  levothyroxine Injectable 44MICROGram(s) IV Push daily  pantoprazole  Injectable 40milliGRAM(s) IV Push daily  metroNIDAZOLE  IVPB 500milliGRAM(s) IV Intermittent every 8 hours  aztreonam  IVPB 1000milliGRAM(s) IV Intermittent every 8 hours  DAPTOmycin IVPB     fluconAZOLE IVPB  IV Intermittent   DAPTOmycin IVPB 450milliGRAM(s) IV Intermittent every 48 hours  fluconAZOLE IVPB 100milliGRAM(s) IV Intermittent every 24 hours  dexmedetomidine Infusion 1MICROgram(s)/kG/Hr IV Continuous <Continuous>  chlorhexidine 0.12% Liquid 15milliLiter(s) Swish and Spit every 8 hours  fondaparinux Injectable 2.5milliGRAM(s) SubCutaneous every 24 hours  insulin lispro (HumaLOG) corrective regimen sliding scale  SubCutaneous every 6 hours  acetaminophen  IVPB. 1000milliGRAM(s) IV Intermittent once  amiodarone Infusion 0.5mG/Min IV Continuous <Continuous>  norepinephrine Infusion 0.4MICROgram(s)/kG/Min IV Continuous <Continuous>      VITAL:  T(C): , Max: 37.1 (06-14 @ 15:00)  T(F): , Max: 98.8 (06-14 @ 15:00)  HR: 93  BP: 127/59  BP(mean): 85  RR: 29  SpO2: 100%  Wt(kg): --    I and O's:    I & Os for current day (as of 06-15 @ 08:15)  =============================================  IN: 5790.1 ml / OUT: 4125 ml / NET: 1665.1 ml        PHYSICAL EXAM:  Constitutional: intubated  HEENT: PERRLA    Neck:  No JVD  Respiratory: transmitted upper  Cardiovascular: S1 and S2  Gastrointestinal:hypoactive bowel sounds  Extremities: +  peripheral edema  Neurological: unable  : + Snyder  Skin: No rashes  Access: Not applicable         LABS:                        8.7    9.3   )-----------( 64       ( 15 Anastacio 2017 03:06 )             26.1     06-15    140  |  108  |  47<H>  ----------------------------<  153<H>  3.8   |  22  |  1.42<H>    Ca    8.0<L>      15 Anastacio 2017 03:06  Phos  2.7     06-15  Mg     2.5     06-15    TPro  4.6<L>  /  Alb  2.2<L>  /  TBili  1.3<H>  /  DBili  0.8<H>  /  AST  30  /  ALT  23  /  AlkPhos  120  06-15

## 2017-06-15 NOTE — PROGRESS NOTE ADULT - SUBJECTIVE AND OBJECTIVE BOX
HISTORY  76y Female with history significant for COPD, h/o DVT s/p IVC filter (now removed), GERD, hypothyroidism, HIT and diverticulitis s/p Ruben's, recurrent SBO s/p ex-lap small bowel resection, EC fistula s/p takedown, complicated by wound dehiscence, abdominal reconstruction underwent ex-lap, extensive NANETTE, EC fistula takedown, colostomy revision, and parastomal/incisional hernia repair, admitted to SICU intubated post operatively, extubated, and reintubated for respiratory distress.    24 HOUR EVENTS: Pressor requirements downtrending, off transiently but had to restart levophed in PM. Patient's mental status improved, was following commands.     SUBJECTIVE/ROS: (-) gas in ostomy, (-) stool in ostomy  [ ] A ten-point review of systems was otherwise negative except as noted.  [x] Due to altered mental status/intubation, subjective information were not able to be obtained from the patient. History was obtained, to the extent possible, from review of the chart and collateral sources of information.      NEURO  RASS:0     GCS:     CAM ICU:  Exam: sedated, resting comfortable, arousable, follows commands  Meds: ondansetron Injectable 4milliGRAM(s) IV Push every 6 hours PRN Nausea  HYDROmorphone  Injectable 0.2milliGRAM(s) IV Push every 2 hours PRN Breakthru Pain  dexmedetomidine Infusion 1MICROgram(s)/kG/Hr IV Continuous <Continuous>  acetaminophen  IVPB. 1000milliGRAM(s) IV Intermittent once  [x] Adequacy of sedation and pain control has been assessed and adjusted      RESPIRATORY  RR: 26 (26 - 36)  SpO2: 100% (100% - 100%)  Wt(kg): --  Exam: intubated, mildly decreased breath sounds in all fields  Mechanical Ventilation: Mode: IMV (Intermittent Mandatory Ventilation), RR (machine): 26, RR (patient): 28, TV (machine): 400, FiO2: 30, PEEP: 5, ITime: 0.8, MAP: 14, PIP: 29  ABG - ( 15 Anastacio 2017 03:06 )  pH: 7.41  /  pCO2: 34    /  pO2: 141   / HCO3: 21    / Base Excess: -2.4  /  SaO2: 100     Lactate: x  [x] Extubation Readiness Assessed  Meds: diphenhydrAMINE   Injectable 25milliGRAM(s) IV Push every 4 hours PRN Pruritus  ALBUTerol/ipratropium for Nebulization 3milliLiter(s) Nebulizer every 6 hours PRN Shortness of Breath and/or Wheezing      CARDIOVASCULAR  HR: 88 (84 - 101)  BP: 127/59 (127/59 - 127/59)  BP(mean): 85 (85 - 85)  ABP: 95/46 (88/40 - 149/64)  ABP(mean): 64 (58 - 99)  Wt(kg): --  CVP(cm H2O): --  VBG - ( 15 Anastacio 2017 03:05 )  pH: 7.36  /  pCO2: 42    /  pO2: 41    / HCO3: 23    / Base Excess: -1.9  /  SaO2: 77     Lactate: x      Exam: regular rate and rhythm  Cardiac Rhythm: sinus  Perfusion     [ ]Adequate   [x]Inadequate without pressor support  Mentation   [x]Normal       [ ]Reduced  Extremities  [x]Warm         [ ]Cool  Volume Status [ ]Hypervolemic [x]Euvolemic [ ]Hypovolemic  Meds: amiodarone Infusion 0.5mG/Min IV Continuous <Continuous>  norepinephrine Infusion 0.4MICROgram(s)/kG/Min IV Continuous <Continuous>      GI/NUTRITION  Exam: softly distended, ostomy congested with some discoloration, unchanged from previous, digitized; dressing C/D/I  Diet: NPO  Meds: pantoprazole  Injectable 40milliGRAM(s) IV Push daily      GENITOURINARY  I&O's Detail  I & Os for 24h ending 06-14 @ 07:00  =============================================  IN:    TPN (Total Parenteral Nutrition): 1488 ml    lactated ringers.: 1200 ml    Solution: 750 ml    Solution: 400 ml    Solution: 400 ml    dexmedetomidine Infusion: 264.6 ml    norepinephrine Infusion: 251.4 ml    Solution: 250 ml    amiodarone Infusion: 200.4 ml    Solution: 200 ml    amiodarone Infusion: 166.6 ml    vasopressin Infusion: 57.6 ml    amiodarone Infusion: 33.3 ml    Total IN: 5661.9 ml  ---------------------------------------------  OUT:    Indwelling Catheter - Urethral: 1500 ml    Nasoenteral Tube: 950 ml    Colostomy: 50 ml    Total OUT: 2500 ml  ---------------------------------------------  Total NET: 3161.9 ml    I & Os for current day (as of 06-15 @ 04:56)  =============================================  IN:    TPN (Total Parenteral Nutrition): 1302 ml    lactated ringers.: 1050 ml    Solution: 825 ml    Solution: 400 ml    Solution: 300 ml    amiodarone Infusion: 250.5 ml    Packed Red Blood Cells: 250 ml    Solution: 200 ml    amiodarone Infusion: 100.2 ml    Solution: 100 ml    Solution: 100 ml    Solution: 50 ml    norepinephrine Infusion: 41.3 ml    dexmedetomidine Infusion: 40.8 ml    vasopressin Infusion: 14.4 ml    norepinephrine Infusion: 12.6 ml    Total IN: 5036.8 ml  ---------------------------------------------  OUT:    Indwelling Catheter - Urethral: 2450 ml    Nasoenteral Tube: 700 ml    Total OUT: 3150 ml  ---------------------------------------------  Total NET: 1886.8 ml      06-15    140  |  108  |  47<H>  ----------------------------<  153<H>  3.8   |  22  |  1.42<H>    Ca    8.0<L>      15 Jun 2017 03:06  Phos  2.7     06-15  Mg     2.5     06-15    TPro  4.6<L>  /  Alb  2.2<L>  /  TBili  1.3<H>  /  DBili  0.8<H>  /  AST  30  /  ALT  23  /  AlkPhos  120  06-15    [x] Snyder catheter, indication: urine output monitoring in critically ill patient   Meds: lactated ringers. 1000milliLiter(s) IV Continuous <Continuous>  potassium phosphate IVPB 15milliMole(s) IV Intermittent once  potassium chloride  10 mEq/100 mL IVPB 10milliEquivalent(s) IV Intermittent once      HEMATOLOGIC  Meds: fondaparinux Injectable 2.5milliGRAM(s) SubCutaneous every 24 hours  [x] VTE Prophylaxis with arixtra             8.7    9.3   )-----------( 64       ( 15 Anastacio 2017 03:06 )             26.1     PT/INR - ( 15 Anastacio 2017 03:06 )   PT: 18.5 sec;   INR: 1.68 ratio    PTT - ( 15 Anastacio 2017 03:06 )  PTT:38.9 sec  Transfusion     [ ] PRBC   [ ] Platelets   [ ] FFP   [ ] Cryoprecipitate      INFECTIOUS DISEASES  T(C): 36.8, Max: 37.3 (06-14 @ 07:00)  WBC Count: 9.3 K/uL (06-15 @ 03:06)  WBC Count: 11.2 K/uL (06-14 @ 20:21)  WBC Count: 7.5 K/uL (06-14 @ 14:15)  WBC Count: 7.4 K/uL (06-14 @ 08:35)  RECENT CULTURES:  [facility]50[facility] culture: moderate Staphylococcus aureus, normal respiratory amanda present  Specimen Source: .Urine Catheterized (06-12 @ 18:32) culture: <10,000 CFU/ml normal Urogenital amanda  Specimen Source: .Blood Blood-Peripheral (06-12 @ 18:22) culture: Growth in anaerobic bottle: Gram Positive Cocci in Clusters  Specimen Source: .Blood Blood-Peripheral (06-12 @ 15:42) culture: no growth to date.  Specimen Source: .Blood Blood (06-10 @ 17:29) culture: no growth to date.  Meds: metroNIDAZOLE  IVPB 500milliGRAM(s) IV Intermittent every 8 hours  aztreonam  IVPB 1000milliGRAM(s) IV Intermittent every 8 hours  DAPTOmycin IVPB 450milliGRAM(s) IV Intermittent every 48 hours  fluconAZOLE IVPB 100milliGRAM(s) IV Intermittent every 24 hours      ENDOCRINE  CAPILLARY BLOOD GLUCOSE  155 (14 Jun 2017 23:52)  104 (14 Jun 2017 17:27)  206 (14 Jun 2017 11:45)  Meds: levothyroxine Injectable 44MICROGram(s) IV Push daily  insulin lispro (HumaLOG) corrective regimen sliding scale  SubCutaneous every 6 hours      ACCESS DEVICES:  [x] Peripheral IV  [x] Central Venous Line	[x] R	[ ] L	[x] IJ	[ ] Fem	[ ] SC	Placed: 6/12  [x] Arterial Line		[x] R	[ ] L	[x] Fem	[ ] Rad	[ ] Ax	Placed: 6/12   [ ] PICC:					[ ] Mediport  [x] Urinary Catheter, Date Placed:   [x] Necessity of urinary, arterial, and venous catheters discussed    OTHER MEDICATIONS:  chlorhexidine 0.12% Liquid 15milliLiter(s) Swish and Spit every 8 hours    CODE STATUS: full code    IMAGING: --

## 2017-06-16 LAB
ANION GAP SERPL CALC-SCNC: 11 MMOL/L — SIGNIFICANT CHANGE UP (ref 5–17)
ANION GAP SERPL CALC-SCNC: 12 MMOL/L — SIGNIFICANT CHANGE UP (ref 5–17)
BUN SERPL-MCNC: 44 MG/DL — HIGH (ref 7–23)
BUN SERPL-MCNC: 45 MG/DL — HIGH (ref 7–23)
CALCIUM SERPL-MCNC: 7.8 MG/DL — LOW (ref 8.4–10.5)
CALCIUM SERPL-MCNC: 8.1 MG/DL — LOW (ref 8.4–10.5)
CHLORIDE SERPL-SCNC: 110 MMOL/L — HIGH (ref 96–108)
CHLORIDE SERPL-SCNC: 110 MMOL/L — HIGH (ref 96–108)
CO2 SERPL-SCNC: 21 MMOL/L — LOW (ref 22–31)
CO2 SERPL-SCNC: 23 MMOL/L — SIGNIFICANT CHANGE UP (ref 22–31)
CREAT SERPL-MCNC: 1.37 MG/DL — HIGH (ref 0.5–1.3)
CREAT SERPL-MCNC: 1.38 MG/DL — HIGH (ref 0.5–1.3)
GAS PNL BLDA: SIGNIFICANT CHANGE UP
GAS PNL BLDA: SIGNIFICANT CHANGE UP
GLUCOSE SERPL-MCNC: 156 MG/DL — HIGH (ref 70–99)
GLUCOSE SERPL-MCNC: 170 MG/DL — HIGH (ref 70–99)
HCT VFR BLD CALC: 24.7 % — LOW (ref 34.5–45)
HCT VFR BLD CALC: 25 % — LOW (ref 34.5–45)
HGB BLD-MCNC: 8.4 G/DL — LOW (ref 11.5–15.5)
HGB BLD-MCNC: 8.4 G/DL — LOW (ref 11.5–15.5)
MAGNESIUM SERPL-MCNC: 2.5 MG/DL — SIGNIFICANT CHANGE UP (ref 1.6–2.6)
MAGNESIUM SERPL-MCNC: 2.7 MG/DL — HIGH (ref 1.6–2.6)
MCHC RBC-ENTMCNC: 33.8 PG — SIGNIFICANT CHANGE UP (ref 27–34)
MCHC RBC-ENTMCNC: 33.9 GM/DL — SIGNIFICANT CHANGE UP (ref 32–36)
MCHC RBC-ENTMCNC: 34 GM/DL — SIGNIFICANT CHANGE UP (ref 32–36)
MCHC RBC-ENTMCNC: 34.1 PG — HIGH (ref 27–34)
MCV RBC AUTO: 100 FL — SIGNIFICANT CHANGE UP (ref 80–100)
MCV RBC AUTO: 99.7 FL — SIGNIFICANT CHANGE UP (ref 80–100)
PHOSPHATE SERPL-MCNC: 2.1 MG/DL — LOW (ref 2.5–4.5)
PHOSPHATE SERPL-MCNC: 2.9 MG/DL — SIGNIFICANT CHANGE UP (ref 2.5–4.5)
PLATELET # BLD AUTO: 71 K/UL — LOW (ref 150–400)
PLATELET # BLD AUTO: 84 K/UL — LOW (ref 150–400)
POTASSIUM SERPL-MCNC: 4 MMOL/L — SIGNIFICANT CHANGE UP (ref 3.5–5.3)
POTASSIUM SERPL-MCNC: 4.1 MMOL/L — SIGNIFICANT CHANGE UP (ref 3.5–5.3)
POTASSIUM SERPL-SCNC: 4 MMOL/L — SIGNIFICANT CHANGE UP (ref 3.5–5.3)
POTASSIUM SERPL-SCNC: 4.1 MMOL/L — SIGNIFICANT CHANGE UP (ref 3.5–5.3)
RBC # BLD: 2.46 M/UL — LOW (ref 3.8–5.2)
RBC # BLD: 2.5 M/UL — LOW (ref 3.8–5.2)
RBC # FLD: 14.7 % — HIGH (ref 10.3–14.5)
RBC # FLD: 14.8 % — HIGH (ref 10.3–14.5)
SODIUM SERPL-SCNC: 143 MMOL/L — SIGNIFICANT CHANGE UP (ref 135–145)
SODIUM SERPL-SCNC: 144 MMOL/L — SIGNIFICANT CHANGE UP (ref 135–145)
WBC # BLD: 10.3 K/UL — SIGNIFICANT CHANGE UP (ref 3.8–10.5)
WBC # BLD: 9.7 K/UL — SIGNIFICANT CHANGE UP (ref 3.8–10.5)
WBC # FLD AUTO: 10.3 K/UL — SIGNIFICANT CHANGE UP (ref 3.8–10.5)
WBC # FLD AUTO: 9.7 K/UL — SIGNIFICANT CHANGE UP (ref 3.8–10.5)

## 2017-06-16 PROCEDURE — 99291 CRITICAL CARE FIRST HOUR: CPT

## 2017-06-16 PROCEDURE — 74000: CPT | Mod: 26

## 2017-06-16 RX ORDER — ACETAMINOPHEN 500 MG
1000 TABLET ORAL ONCE
Qty: 0 | Refills: 0 | Status: COMPLETED | OUTPATIENT
Start: 2017-06-16 | End: 2017-06-16

## 2017-06-16 RX ORDER — NOREPINEPHRINE BITARTRATE/D5W 8 MG/250ML
0.02 PLASTIC BAG, INJECTION (ML) INTRAVENOUS
Qty: 8 | Refills: 0 | Status: DISCONTINUED | OUTPATIENT
Start: 2017-06-16 | End: 2017-06-17

## 2017-06-16 RX ORDER — FUROSEMIDE 40 MG
20 TABLET ORAL ONCE
Qty: 0 | Refills: 0 | Status: COMPLETED | OUTPATIENT
Start: 2017-06-16 | End: 2017-06-16

## 2017-06-16 RX ORDER — VANCOMYCIN HCL 1 G
1000 VIAL (EA) INTRAVENOUS EVERY 24 HOURS
Qty: 0 | Refills: 0 | Status: COMPLETED | OUTPATIENT
Start: 2017-06-17 | End: 2017-06-25

## 2017-06-16 RX ORDER — VANCOMYCIN HCL 1 G
VIAL (EA) INTRAVENOUS
Qty: 0 | Refills: 0 | Status: COMPLETED | OUTPATIENT
Start: 2017-06-16 | End: 2017-06-25

## 2017-06-16 RX ORDER — VANCOMYCIN HCL 1 G
1000 VIAL (EA) INTRAVENOUS ONCE
Qty: 0 | Refills: 0 | Status: COMPLETED | OUTPATIENT
Start: 2017-06-16 | End: 2017-06-16

## 2017-06-16 RX ADMIN — Medication 50 MILLIGRAM(S): at 05:28

## 2017-06-16 RX ADMIN — Medication 100 MILLIGRAM(S): at 14:09

## 2017-06-16 RX ADMIN — HYDROMORPHONE HYDROCHLORIDE 0.2 MILLIGRAM(S): 2 INJECTION INTRAMUSCULAR; INTRAVENOUS; SUBCUTANEOUS at 01:26

## 2017-06-16 RX ADMIN — Medication 250 MILLIGRAM(S): at 11:37

## 2017-06-16 RX ADMIN — Medication 100 MILLIGRAM(S): at 05:29

## 2017-06-16 RX ADMIN — Medication 50 MILLIGRAM(S): at 14:09

## 2017-06-16 RX ADMIN — HYDROMORPHONE HYDROCHLORIDE 0.2 MILLIGRAM(S): 2 INJECTION INTRAMUSCULAR; INTRAVENOUS; SUBCUTANEOUS at 10:42

## 2017-06-16 RX ADMIN — DAPTOMYCIN 118 MILLIGRAM(S): 500 INJECTION, POWDER, LYOPHILIZED, FOR SOLUTION INTRAVENOUS at 11:36

## 2017-06-16 RX ADMIN — SODIUM CHLORIDE 30 MILLILITER(S): 9 INJECTION, SOLUTION INTRAVENOUS at 05:00

## 2017-06-16 RX ADMIN — HYDROMORPHONE HYDROCHLORIDE 0.2 MILLIGRAM(S): 2 INJECTION INTRAMUSCULAR; INTRAVENOUS; SUBCUTANEOUS at 19:02

## 2017-06-16 RX ADMIN — FLUCONAZOLE 100 MILLIGRAM(S): 150 TABLET ORAL at 11:36

## 2017-06-16 RX ADMIN — CHLORHEXIDINE GLUCONATE 15 MILLILITER(S): 213 SOLUTION TOPICAL at 21:57

## 2017-06-16 RX ADMIN — HYDROMORPHONE HYDROCHLORIDE 0.2 MILLIGRAM(S): 2 INJECTION INTRAMUSCULAR; INTRAVENOUS; SUBCUTANEOUS at 18:47

## 2017-06-16 RX ADMIN — Medication 2.79 MICROGRAM(S)/KG/MIN: at 05:00

## 2017-06-16 RX ADMIN — Medication 44 MICROGRAM(S): at 05:29

## 2017-06-16 RX ADMIN — PANTOPRAZOLE SODIUM 40 MILLIGRAM(S): 20 TABLET, DELAYED RELEASE ORAL at 11:36

## 2017-06-16 RX ADMIN — Medication: at 17:35

## 2017-06-16 RX ADMIN — Medication 100 MILLIGRAM(S): at 21:56

## 2017-06-16 RX ADMIN — Medication 1000 MILLIGRAM(S): at 10:40

## 2017-06-16 RX ADMIN — HYDROMORPHONE HYDROCHLORIDE 0.2 MILLIGRAM(S): 2 INJECTION INTRAMUSCULAR; INTRAVENOUS; SUBCUTANEOUS at 22:25

## 2017-06-16 RX ADMIN — FONDAPARINUX SODIUM 2.5 MILLIGRAM(S): 2.5 INJECTION, SOLUTION SUBCUTANEOUS at 11:36

## 2017-06-16 RX ADMIN — Medication 400 MILLIGRAM(S): at 10:10

## 2017-06-16 RX ADMIN — Medication 50 MILLIGRAM(S): at 21:56

## 2017-06-16 RX ADMIN — HYDROMORPHONE HYDROCHLORIDE 0.2 MILLIGRAM(S): 2 INJECTION INTRAMUSCULAR; INTRAVENOUS; SUBCUTANEOUS at 16:45

## 2017-06-16 RX ADMIN — Medication 20 MILLIGRAM(S): at 11:36

## 2017-06-16 RX ADMIN — HYDROMORPHONE HYDROCHLORIDE 0.2 MILLIGRAM(S): 2 INJECTION INTRAMUSCULAR; INTRAVENOUS; SUBCUTANEOUS at 05:44

## 2017-06-16 RX ADMIN — DEXMEDETOMIDINE HYDROCHLORIDE IN 0.9% SODIUM CHLORIDE 18.6 MICROGRAM(S)/KG/HR: 4 INJECTION INTRAVENOUS at 21:57

## 2017-06-16 RX ADMIN — HYDROMORPHONE HYDROCHLORIDE 0.2 MILLIGRAM(S): 2 INJECTION INTRAMUSCULAR; INTRAVENOUS; SUBCUTANEOUS at 17:15

## 2017-06-16 RX ADMIN — CHLORHEXIDINE GLUCONATE 15 MILLILITER(S): 213 SOLUTION TOPICAL at 05:29

## 2017-06-16 RX ADMIN — Medication 255 MILLIMOLE(S): at 16:45

## 2017-06-16 RX ADMIN — HYDROMORPHONE HYDROCHLORIDE 0.2 MILLIGRAM(S): 2 INJECTION INTRAMUSCULAR; INTRAVENOUS; SUBCUTANEOUS at 05:29

## 2017-06-16 RX ADMIN — HYDROMORPHONE HYDROCHLORIDE 0.2 MILLIGRAM(S): 2 INJECTION INTRAMUSCULAR; INTRAVENOUS; SUBCUTANEOUS at 11:12

## 2017-06-16 RX ADMIN — HYDROMORPHONE HYDROCHLORIDE 0.2 MILLIGRAM(S): 2 INJECTION INTRAMUSCULAR; INTRAVENOUS; SUBCUTANEOUS at 22:07

## 2017-06-16 RX ADMIN — HYDROMORPHONE HYDROCHLORIDE 0.2 MILLIGRAM(S): 2 INJECTION INTRAMUSCULAR; INTRAVENOUS; SUBCUTANEOUS at 01:41

## 2017-06-16 RX ADMIN — CHLORHEXIDINE GLUCONATE 15 MILLILITER(S): 213 SOLUTION TOPICAL at 14:09

## 2017-06-16 RX ADMIN — DEXMEDETOMIDINE HYDROCHLORIDE IN 0.9% SODIUM CHLORIDE 18.6 MICROGRAM(S)/KG/HR: 4 INJECTION INTRAVENOUS at 05:00

## 2017-06-16 NOTE — PROGRESS NOTE ADULT - SUBJECTIVE AND OBJECTIVE BOX
HISTORY  76y Female with history significant for COPD, h/o DVT s/p IVC filter (now removed), GERD, hypothyroidism, HIT and diverticulitis s/p Ruben's, recurrent SBO s/p ex-lap small bowel resection, EC fistula s/p takedown, complicated by wound dehiscence, abdominal reconstruction underwent ex-lap, extensive NANETTE, EC fistula takedown, colostomy revision, and parastomal/incisional hernia repair, admitted to SICU intubated post operatively, extubated, and reintubated for respiratory distress. Also requiring vasopressor support for hypotension likely secondary to sepsis.      24 HOUR EVENTS: Patient was weaned off of vasopressors around 1500. However, she starting requiring a norepinephrine gtt for hypotension again overnight. Attempted SBT but patient was very tachypneic and anxious. Still no ostomy function.       SUBJECTIVE/ROS:  [ ] A ten-point review of systems was otherwise negative except as noted.  [x] Due to altered mental status/intubation, subjective information were not able to be obtained from the patient. History was obtained, to the extent possible, from review of the chart and collateral sources of information.      NEURO  RASS: 0  Exam: intubated and sedated, easily arousable, follows commands  Meds:  ·	HYDROmorphone  Injectable 0.2milliGRAM(s) IV Push every 2 hours PRN Breakthru Pain  ·	dexmedetomidine Infusion 1MICROgram(s)/kG/Hr IV Continuous <Continuous>  ·	diphenhydrAMINE   Injectable 25milliGRAM(s) IV Push every 4 hours PRN Pruritus  [x] Adequacy of sedation and pain control has been assessed and adjusted      RESPIRATORY  RR: 28 (22 - 39)  SpO2: 98% (95% - 100%)  Exam: intubated, decreased bibasilar breath sounds  Mechanical Ventilation:  ·	Mode: AC/ CMV (Assist Control/ Continuous Mandatory Ventilation)  ·	RR (machine): 26, RR (patient): 29  ·	TV (machine): 400  ·	FiO2: 30  ·	PEEP: 5  ·	ITime: 0.8, MAP: 11, PIP: 24  [x] Extubation Readiness Assessed  ABG?  Meds:  ·	ALBUTerol/ipratropium for Nebulization 3milliLiter(s) Nebulizer every 6 hours PRN Shortness of Breath and/or Wheezing      CARDIOVASCULAR  HR: 97 (76 - 107)  BP: 138/65 (138/65 - 138/65)  BP(mean): 93 (93 - 93)  ABP: 131/65 (74/41 - 155/76)  ABP(mean): 89 (53 - 112)  Exam: regular rate and rhythm  Cardiac Rhythm: sinus  Perfusion      [x]Adequate   [ ]Inadequate  Mentation     [ ]Normal       [x]Reduced  Extremities   [x]Warm        [ ]Cool  Volume Status  [ ]Hypervolemic  [x]Euvolemic  [ ]Hypovolemic  Meds:  ·	amiodarone Infusion 0.5mG/Min IV Continuous <Continuous>  ·	norepinephrine Infusion 0.02MICROgram(s)/kG/Min IV Continuous <Continuous>      GI/NUTRITION  Exam: soft, nontender, nondistended, incisions C/D/I, ostomy pink w/ bowel sweat only  Diet: NPO w/ NGT to low wall suction draining bilious fluid  Meds:  ·	pantoprazole  Injectable 40milliGRAM(s) IV Push daily  ·	ondansetron Injectable 4milliGRAM(s) IV Push every 6 hours PRN Nausea      GENITOURINARY  I&O's Detail?    16 Jun 2017 03:22  144  |  110<H>  |  44<H>  ----------------------------<  156<H>  4.0   |  23  |  1.37<H>  Ca    8.1<L>        Phos  2.9  Mg     2.7  TPro  4.6<L>  /  Alb  2.2<L>  /  TBili  1.3<H>  /  DBili  0.8<H>  /  AST  30  /  ALT  23  /  AlkPhos  120    [x] Snyder catheter, indication: urine output monitoring in the critically ill  Meds: lactated ringers. 1000milliLiter(s) IV Continuous infuse at 30 mL/Hr      HEMATOLOGIC  Meds: fondaparinux Injectable 2.5milliGRAM(s) SubCutaneous every 24 hours  [x] VTE Prophylaxis                        8.4    10.3  )-----------( 71       ( 16 Jun 2017 03:22 )             24.7     PT/INR - ( 15 Anastacio 2017 03:06 )   PT: 18.5 sec;   INR: 1.68 ratio    PTT - ( 15 Anastacio 2017 03:06 )  PTT:38.9 sec      INFECTIOUS DISEASES  WBC Count: 10.3 K/uL (06-16 @ 03:22)  WBC Count: 9.2 K/uL (06-15 @ 20:16)  WBC Count: 9.0 K/uL (06-15 @ 11:36)    RECENT CULTURES:  [facility]  Date/Time: 06-13 @ 17:50  Culture Results:   Moderate Methicillin resistant Staphylococcus aureus  Normal Respiratory Amanda present  Gram Stain:   Numerous polymorphonuclear leukocytes per low power field  Few Squamous epithelial cells per low power field  Few Gram positive cocci in pairs per oil power field  Organism: Methicillin resistant Staphylococcus aureus  Specimen Source: .Urine Catheterized  Date/Time: 06-12 @ 18:32  Culture Results:   <10,000 CFU/ml  Normal Urogenital amanda present  Gram Stain: --  Organism: --  Specimen Source: .Blood Blood-Peripheral  Date/Time: 06-12 @ 18:22  Culture Results:   Growth in anaerobic bottle: Coag Negative Staphylococcus  Single set isolate, possible contaminant. Contact  Microbiology if susceptibility testing clinically  indicated.  Gram Stain:   Growth in anaerobic bottle: Gram Positive Cocci in Clusters  Organism: --  Specimen Source: .Blood Blood-Peripheral  Date/Time: 06-12 @ 15:42  Culture Results:   No growth to date.  Gram Stain: --  Organism: --    Meds:  ·	metroNIDAZOLE  IVPB 500milliGRAM(s) IV Intermittent every 8 hours  ·	aztreonam  IVPB 1000milliGRAM(s) IV Intermittent every 8 hours  ·	DAPTOmycin IVPB 450milliGRAM(s) IV Intermittent every 48 hours  ·	fluconAZOLE IVPB 100milliGRAM(s) IV Intermittent every 24 hours      ENDOCRINE  CAPILLARY BLOOD GLUCOSE  126 (16 Jun 2017 00:00)  145 (15 Anastacio 2017 18:00)  156 (15 Anastacio 2017 12:00)  154 (15 Anastacio 2017 05:30)    Meds:  ·	levothyroxine Injectable 44MICROGram(s) IV Push daily  ·	insulin lispro (HumaLOG) corrective regimen sliding scale  SubCutaneous every 6 hours      ACCESS DEVICES:  [x] Peripheral IV  [x] Central Venous Line	[x] R	[ ] L	[x] IJ	[ ] Fem	[ ] SC	Placed: 6/12/2017  [x] Arterial Line		[x] R	[ ] L	[x] Fem	[ ] Rad	[ ] Ax	Placed: 6/12/2017  [ ] PICC:					[ ] Mediport  [x] Urinary Catheter, Date Placed: 6/12/2017  [x] Necessity of urinary, arterial, and venous catheters discussed      OTHER MEDICATIONS:  chlorhexidine 0.12% Liquid 15milliLiter(s) Swish and Spit every 8 hours      CODE STATUS: full code      IMAGING: CXR HISTORY  76y Female with history significant for COPD, h/o DVT s/p IVC filter (now removed), GERD, hypothyroidism, HIT and diverticulitis s/p Ruben's, recurrent SBO s/p ex-lap small bowel resection, EC fistula s/p takedown, complicated by wound dehiscence, abdominal reconstruction underwent ex-lap, extensive NANETTE, EC fistula takedown, colostomy revision, and parastomal/incisional hernia repair, admitted to SICU intubated post operatively, extubated, and reintubated for respiratory distress. Also requiring vasopressor support for hypotension likely secondary to sepsis.      24 HOUR EVENTS: Patient was weaned off of vasopressors around 1500. However, she starting requiring a norepinephrine gtt for hypotension again overnight. Attempted SBT but patient was very tachypneic and anxious. Still no ostomy function.       SUBJECTIVE/ROS:  [ ] A ten-point review of systems was otherwise negative except as noted.  [x] Due to altered mental status/intubation, subjective information were not able to be obtained from the patient. History was obtained, to the extent possible, from review of the chart and collateral sources of information.      NEURO  RASS: 0  Exam: intubated and sedated, easily arousable, follows commands  Meds:  ·	HYDROmorphone  Injectable 0.2milliGRAM(s) IV Push every 2 hours PRN Breakthru Pain  ·	dexmedetomidine Infusion 1MICROgram(s)/kG/Hr IV Continuous <Continuous>  ·	diphenhydrAMINE   Injectable 25milliGRAM(s) IV Push every 4 hours PRN Pruritus  [x] Adequacy of sedation and pain control has been assessed and adjusted      RESPIRATORY  RR: 28 (22 - 39)  SpO2: 98% (95% - 100%)  Exam: intubated, decreased bibasilar breath sounds  Mechanical Ventilation:  ·	Mode: AC/ CMV (Assist Control/ Continuous Mandatory Ventilation)  ·	RR (machine): 26, RR (patient): 29  ·	TV (machine): 400  ·	FiO2: 30  ·	PEEP: 5  ·	ITime: 0.8, MAP: 11, PIP: 24  [x] Extubation Readiness Assessed  ABG?  Meds:  ·	ALBUTerol/ipratropium for Nebulization 3milliLiter(s) Nebulizer every 6 hours PRN Shortness of Breath and/or Wheezing      CARDIOVASCULAR  HR: 97 (76 - 107)  BP: 138/65 (138/65 - 138/65)  BP(mean): 93 (93 - 93)  ABP: 131/65 (74/41 - 155/76)  ABP(mean): 89 (53 - 112)  Exam: regular rate and rhythm  Cardiac Rhythm: sinus  Perfusion      [x]Adequate   [ ]Inadequate  Mentation     [ ]Normal       [x]Reduced  Extremities   [x]Warm        [ ]Cool  Volume Status  [ ]Hypervolemic  [x]Euvolemic  [ ]Hypovolemic  Meds:  ·	amiodarone Infusion 0.5mG/Min IV Continuous <Continuous>  ·	norepinephrine Infusion 0.02MICROgram(s)/kG/Min IV Continuous <Continuous>      GI/NUTRITION  Exam: soft, nontender, nondistended, incisions C/D/I, ostomy pink w/ bowel sweat only  Diet: NPO w/ NGT to low wall suction draining bilious fluid  Meds:  ·	pantoprazole  Injectable 40milliGRAM(s) IV Push daily  ·	ondansetron Injectable 4milliGRAM(s) IV Push every 6 hours PRN Nausea      GENITOURINARY  I&O's Detail    I & Os for current day (as of 06-16 @ 07:16)  =============================================  IN:    TPN (Total Parenteral Nutrition): 1488 ml    lactated ringers.: 1030 ml    amiodarone Infusion: 400.8 ml    Solution: 300 ml    Solution: 200 ml    dexmedetomidine Infusion: 138.2 ml    Solution: 125 ml    norepinephrine Infusion: 48 ml    norepinephrine Infusion: 32.8 ml    Total IN: 3762.8 ml  ---------------------------------------------  OUT:    Indwelling Catheter - Urethral: 2400 ml    Nasoenteral Tube: 1200 ml    Total OUT: 3600 ml  ---------------------------------------------  Total NET: 162.8 ml      16 Jun 2017 03:22  144  |  110<H>  |  44<H>  ----------------------------<  156<H>  4.0   |  23  |  1.37<H>  Ca    8.1<L>        Phos  2.9  Mg     2.7  TPro  4.6<L>  /  Alb  2.2<L>  /  TBili  1.3<H>  /  DBili  0.8<H>  /  AST  30  /  ALT  23  /  AlkPhos  120    [x] Snyder catheter, indication: urine output monitoring in the critically ill  Meds: lactated ringers. 1000milliLiter(s) IV Continuous infuse at 30 mL/Hr      HEMATOLOGIC  Meds: fondaparinux Injectable 2.5milliGRAM(s) SubCutaneous every 24 hours  [x] VTE Prophylaxis                        8.4    10.3  )-----------( 71       ( 16 Jun 2017 03:22 )             24.7     PT/INR - ( 15 Anastacio 2017 03:06 )   PT: 18.5 sec;   INR: 1.68 ratio    PTT - ( 15 Anastacio 2017 03:06 )  PTT:38.9 sec      INFECTIOUS DISEASES  WBC Count: 10.3 K/uL (06-16 @ 03:22)  WBC Count: 9.2 K/uL (06-15 @ 20:16)  WBC Count: 9.0 K/uL (06-15 @ 11:36)    RECENT CULTURES:  Specimen Source: .[facility]  Date/Time: 06-13 @ 17:50  Culture Results:   Moderate Methicillin resistant Staphylococcus aureus  Normal Respiratory Amanda present  Gram Stain:   Numerous polymorphonuclear leukocytes per low power field  Few Squamous epithelial cells per low power field  Few Gram positive cocci in pairs per oil power field  Organism: Methicillin resistant Staphylococcus aureus  Specimen Source: .Urine Catheterized  Date/Time: 06-12 @ 18:32  Culture Results:   <10,000 CFU/ml  Normal Urogenital amanda present  Gram Stain: --  Organism: --  Specimen Source: .Blood Blood-Peripheral  Date/Time: 06-12 @ 18:22  Culture Results:   Growth in anaerobic bottle: Coag Negative Staphylococcus  Single set isolate, possible contaminant. Contact  Microbiology if susceptibility testing clinically  indicated.  Gram Stain:   Growth in anaerobic bottle: Gram Positive Cocci in Clusters  Organism: --  Specimen Source: .Blood Blood-Peripheral  Date/Time: 06-12 @ 15:42  Culture Results:   No growth to date.  Gram Stain: --  Organism: --    Meds:  ·	metroNIDAZOLE  IVPB 500milliGRAM(s) IV Intermittent every 8 hours  ·	aztreonam  IVPB 1000milliGRAM(s) IV Intermittent every 8 hours  ·	DAPTOmycin IVPB 450milliGRAM(s) IV Intermittent every 48 hours  ·	fluconAZOLE IVPB 100milliGRAM(s) IV Intermittent every 24 hours      ENDOCRINE  CAPILLARY BLOOD GLUCOSE  126 (16 Jun 2017 00:00)  145 (15 Anastacio 2017 18:00)  156 (15 Anastacio 2017 12:00)  154 (15 Anastacio 2017 05:30)    Meds:  ·	levothyroxine Injectable 44MICROGram(s) IV Push daily  ·	insulin lispro (HumaLOG) corrective regimen sliding scale  SubCutaneous every 6 hours      ACCESS DEVICES:  [x] Peripheral IV  [x] Central Venous Line	[x] R	[ ] L	[x] IJ	[ ] Fem	[ ] SC	Placed: 6/12/2017  [x] Arterial Line		[x] R	[ ] L	[x] Fem	[ ] Rad	[ ] Ax	Placed: 6/12/2017  [ ] PICC:					[ ] Mediport  [x] Urinary Catheter, Date Placed: 6/12/2017  [x] Necessity of urinary, arterial, and venous catheters discussed      OTHER MEDICATIONS:  chlorhexidine 0.12% Liquid 15milliLiter(s) Swish and Spit every 8 hours      CODE STATUS: full code      IMAGING: CXR

## 2017-06-16 NOTE — PROGRESS NOTE ADULT - SUBJECTIVE AND OBJECTIVE BOX
NEPHROLOGY-[facility] [phone]        Patient seen and examined         MEDICATIONS  (STANDING):  lactated ringers. 1000milliLiter(s) IV Continuous <Continuous>  levothyroxine Injectable 44MICROGram(s) IV Push daily  pantoprazole  Injectable 40milliGRAM(s) IV Push daily  metroNIDAZOLE  IVPB 500milliGRAM(s) IV Intermittent every 8 hours  aztreonam  IVPB 1000milliGRAM(s) IV Intermittent every 8 hours  DAPTOmycin IVPB     fluconAZOLE IVPB  IV Intermittent   DAPTOmycin IVPB 450milliGRAM(s) IV Intermittent every 48 hours  fluconAZOLE IVPB 100milliGRAM(s) IV Intermittent every 24 hours  dexmedetomidine Infusion 1MICROgram(s)/kG/Hr IV Continuous <Continuous>  chlorhexidine 0.12% Liquid 15milliLiter(s) Swish and Spit every 8 hours  fondaparinux Injectable 2.5milliGRAM(s) SubCutaneous every 24 hours  insulin lispro (HumaLOG) corrective regimen sliding scale  SubCutaneous every 6 hours  amiodarone Infusion 0.5mG/Min IV Continuous <Continuous>  norepinephrine Infusion 0.02MICROgram(s)/kG/Min IV Continuous <Continuous>      VITAL:  T(C): , Max: 37.8 (06-16 @ 03:00)  T(F): , Max: 100 (06-16 @ 03:00)  HR: 76  BP: 138/65  BP(mean): 93  RR: 29  SpO2: 100%  Wt(kg): --    I and O's:  I & Os for 24h ending 06-16 @ 07:00  =============================================  IN: 3762.8 ml / OUT: 3600 ml / NET: 162.8 ml    I & Os for current day (as of 06-16 @ 08:28)  =============================================  IN: 401.1 ml / OUT: 75 ml / NET: 326.1 ml        PHYSICAL EXAM:    Constitutional: NAD  HEENT: PERRLA    Neck:  No JVD  Respiratory: CTAB/L  Cardiovascular: S1 and S2  Gastrointestinal: BS+, soft, NT/ND  Extremities: No peripheral edema  Neurological: A/O x 3, no focal deficits  Psychiatric: Normal mood, normal affect  : No Snyder  Skin: No rashes  Access: Not applicable    LABS:                        8.4    10.3  )-----------( 71       ( 16 Jun 2017 03:22 )             24.7     06-16    144  |  110<H>  |  44<H>  ----------------------------<  156<H>  4.0   |  23  |  1.37<H>    Ca    8.1<L>      16 Jun 2017 03:22  Phos  2.9     06-16  Mg     2.7     06-16    TPro  4.6<L>  /  Alb  2.2<L>  /  TBili  1.3<H>  /  DBili  0.8<H>  /  AST  30  /  ALT  23  /  AlkPhos  120  06-15          Urine Studies:          RADIOLOGY & ADDITIONAL STUDIES: NEPHROLOGY-[facility] [phone]        Patient seen and examined in bed.  Still intubated and on Levo gtt.          MEDICATIONS  (STANDING):  lactated ringers. 1000milliLiter(s) IV Continuous <Continuous>  levothyroxine Injectable 44MICROGram(s) IV Push daily  pantoprazole  Injectable 40milliGRAM(s) IV Push daily  metroNIDAZOLE  IVPB 500milliGRAM(s) IV Intermittent every 8 hours  aztreonam  IVPB 1000milliGRAM(s) IV Intermittent every 8 hours  DAPTOmycin IVPB     fluconAZOLE IVPB  IV Intermittent   DAPTOmycin IVPB 450milliGRAM(s) IV Intermittent every 48 hours  fluconAZOLE IVPB 100milliGRAM(s) IV Intermittent every 24 hours  dexmedetomidine Infusion 1MICROgram(s)/kG/Hr IV Continuous <Continuous>  chlorhexidine 0.12% Liquid 15milliLiter(s) Swish and Spit every 8 hours  fondaparinux Injectable 2.5milliGRAM(s) SubCutaneous every 24 hours  insulin lispro (HumaLOG) corrective regimen sliding scale  SubCutaneous every 6 hours  amiodarone Infusion 0.5mG/Min IV Continuous <Continuous>  norepinephrine Infusion 0.02MICROgram(s)/kG/Min IV Continuous <Continuous>      VITAL:  T(C): , Max: 37.8 (06-16 @ 03:00)  T(F): , Max: 100 (06-16 @ 03:00)  HR: 76  BP: 138/65  BP(mean): 93  RR: 29  SpO2: 100%  Wt(kg): --    I and O's:  I & Os for 24h ending 06-16 @ 07:00  =============================================  IN: 3762.8 ml / OUT: 3600 ml / NET: 162.8 ml    I & Os for current day (as of 06-16 @ 08:28)  =============================================  IN: 401.1 ml / OUT: 75 ml / NET: 326.1 ml        PHYSICAL EXAM:    Constitutional: Intubated  HEENT: PERRLA    Neck:  No JVD  Respiratory:transmitted upper airway  Cardiovascular: S1 and S2  Gastrointestinal: hypoactive bowel sounds  Extremities: + 4 edema  Neurological: A/O x 3, no focal deficits     : + Snyder  Skin: No rashes  Access: Not applicable    LABS:                        8.4    10.3  )-----------( 71       ( 16 Jun 2017 03:22 )             24.7     06-16    144  |  110<H>  |  44<H>  ----------------------------<  156<H>  4.0   |  23  |  1.37<H>    Ca    8.1<L>      16 Jun 2017 03:22  Phos  2.9     06-16  Mg     2.7     06-16    TPro  4.6<L>  /  Alb  2.2<L>  /  TBili  1.3<H>  /  DBili  0.8<H>  /  AST  30  /  ALT  23  /  AlkPhos  120  06-15          Urine Studies:          RADIOLOGY & ADDITIONAL STUDIES:      EXAM:  CHEST PORTABLE ROUTINE                            PROCEDURE DATE:  06/15/2017            INTERPRETATION:  CLINICAL INFORMATION: Pulmonary vascular congestion,   pain..    A single portable view of the chest was obtained.     Comparison: 6/14/2017.     The mediastinal cardiac silhouette is unremarkable. Lines and tubes   unchanged.    Left basilar atelectasis. The lungs are otherwise clear.    No acute osseous finding.     IMPRESSION:    Left basilar atelectasis unchanged.

## 2017-06-16 NOTE — PROGRESS NOTE ADULT - ASSESSMENT
ASSESSMENT  76y female with high volume ECF s/p exploratory laparotomy, extensive lysis of adhesions, takedown of enterocutaneous fistula, repair of serosal tear on cecum, partial right salpingooophorectomy, colostomy revision, parastomal and incisional hernia repair with strattice mesh, POD 7.     PLAN  - wean pressors, sedation as tolerated  - Cont. NGT to suction  - Broad spectrum abx   - Appreciate support care of the SICU  - Amio gtt for afib    Pager: [phone] ASSESSMENT  76y female with high volume ECF s/p exploratory laparotomy, extensive lysis of adhesions, takedown of enterocutaneous fistula, repair of serosal tear on cecum, partial right salpingooophorectomy, colostomy revision, parastomal and incisional hernia repair with strattice mesh, POD 7.     PLAN  - wean pressors, sedation as tolerated  - wean vent as tolerated  - Cont. NGT to suction  - Broad spectrum abx   - Appreciate support care of the SICU  - Amio gtt for comfort [facility] PGY1  Pager: [phone]

## 2017-06-16 NOTE — PROGRESS NOTE ADULT - SUBJECTIVE AND OBJECTIVE BOX
[facility] Trauma/Acute Care [place] Progress Note    HOSPITAL DAY #:25  POST OPERATIVE DAY #:  7  STATUS POST:  exploratory laparotomy, extensive lysis of adhesions, takedown of enterocutaneous fistula, repair of serosal tear on cecum, partial right salpingooophorectomy, colostomy revision, parastomal and incisional hernia repair with strattice mesh                    INTERVAL EVENTS: Patient reintubated, on precedex and norepi.      SUBJECTIVE: Pt is intubated. Unable to obtain ROS.     OBJECTIVE:     Constitutional: Intubated, eyes open spontaneously and to her name.   Respiratory: A/C vent; PEEP 5 / FiO2 40%  Cardiovascular: sinus  Gastrointestinal: soft, distended, ostomy mucosa appears congested, dark but viable, no fxn. NGT in place: bilious, old colostomy site, packed  Ext: b/l UE edema  Psychiatric: unable to assess    Vital Signs Last 24 Hrs      I&O's Summary  I & Os for 24h ending 15 Anastacio 2017 07:00  =============================================  IN: 5790.1 ml / OUT: 4250 ml / NET: 1540.1 ml    I & Os for current day (as of 16 Jun 2017 04:25)  =============================================  IN: 3265.7 ml / OUT: 3050 ml / NET: 215.7 ml  ---------------------------------------------  Total NET: 1665.1 ml      LABS: [facility] Trauma/Acute Care [place] Progress Note    HOSPITAL DAY #:25  POST OPERATIVE DAY #:  7  STATUS POST:  exploratory laparotomy, extensive lysis of adhesions, takedown of enterocutaneous fistula, repair of serosal tear on cecum, partial right salpingooophorectomy, colostomy revision, parastomal and incisional hernia repair with strattice mesh                    INTERVAL EVENTS: Patient was weaned off of vasopressors around 1500. However, she starting requiring a norepinephrine gtt for hypotension again overnight. Attempted SBT but patient was very tachypneic and anxious. Still no ostomy function.     SUBJECTIVE: Pt is intubated. Unable to obtain ROS.     OBJECTIVE:     Constitutional: Intubated, eyes open spontaneously.  Respiratory: A/C vent; PEEP 5 / FiO2 30%  Cardiovascular: sinus  Gastrointestinal: soft, distended, ostomy mucosa appears congested, dark but viable, no fxn. NGT in place: bilious, old colostomy site, packed  Ext: b/l UE edema  Psychiatric: unable to assess    Vital Signs Last 24 Hrs    Vital Signs Last 24 Hrs  T(C): 37.8, Max: 37.8 (06-16 @ 03:00)  T(F): 100, Max: 100 (06-16 @ 03:00)  HR: 87 (73 - 107)  BP: 138/65 (138/65 - 138/65)  BP(mean): 93 (93 - 93)  RR: 28 (22 - 39)  SpO2: 100% (95% - 100%)    I&O's Detail    I & Os for current day (as of 16 Jun 2017 07:16)  =============================================  IN:    TPN (Total Parenteral Nutrition): 1488 ml    lactated ringers.: 1030 ml    amiodarone Infusion: 400.8 ml    Solution: 300 ml    Solution: 200 ml    dexmedetomidine Infusion: 138.2 ml    Solution: 125 ml    norepinephrine Infusion: 48 ml    norepinephrine Infusion: 32.8 ml    Total IN: 3762.8 ml  ---------------------------------------------  OUT:    Indwelling Catheter - Urethral: 2400 ml    Nasoenteral Tube: 1200 ml    Total OUT: 3600 ml  ---------------------------------------------  Total NET: 162.8 ml                            8.4    10.3  )-----------( 71       ( 16 Jun 2017 03:22 )             24.7       06-16    144  |  110<H>  |  44<H>  ----------------------------<  156<H>  4.0   |  23  |  1.37<H>    Ca    8.1<L>      16 Jun 2017 03:22  Phos  2.9     06-16  Mg     2.7     06-16    TPro  4.6<L>  /  Alb  2.2<L>  /  TBili  1.3<H>  /  DBili  0.8<H>  /  AST  30  /  ALT  23  /  AlkPhos  120  06-15

## 2017-06-16 NOTE — PROGRESS NOTE ADULT - ASSESSMENT
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  Acute on chronic renal failure  hypokalemia 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   Volume overloaded state  Hypotensive

## 2017-06-16 NOTE — PROGRESS NOTE ADULT - ASSESSMENT
75 yo F SICU day #8, POD #7 s/p ex lap, extensive NANETTE, takedown of EC fistula, repair of cecal serosal tear, partial R salpingectomy, colostomy revision, parastomal and incisional hernia repair with Strattice mesh complicated by reintubation for respiratory distress, hypotension requiring pressors, with Combicath cultures growing MRSA and blood cultures growing GPC.    NEURO:  ·	Continue sedation w/ Precedex gtt.  ·	Dilaudid PRN pain.  CV:  ·	Monitor vital signs.  ·	Continue amiodarone gtt while NPO to prevent tachyarrhythmia and transition to PO when patient begins to have ostomy function.  RESP:  ·	Ventilatory support for acute respiratory distress and adjust settings as needed.  ·	Reattempt SBT in AM - failed SBT yesterday due to low RSBI.  GI/NUTRITION:  ·	NPO with NG tube pending ostomy function.  ·	Continue TPN for nutritional support.  RENAL:  ·	Snyder for urine output monitoring in the critically ill.  ·	Continue aggressive electrolyte repletion.  HEME:  ·	  ID: combicath and blood cultures with growth, will follow sensitivities and speciation, will attempt to narrow empiric coverage  Tubes/Lines/Drains: continue a-line while on pressors and intubated  Endocrine: no active concerns, monitor glycemic control on BMP   Skin: reposition and turn every 2 hours for skin protection  Prophylaxis: VTE ppx with fondaparinux  Dispo: full code, remain in SICU    --RENZO Samson, PGY-2 75 yo F SICU day #8, POD #7 s/p ex lap, extensive NANETTE, takedown of EC fistula, repair of cecal serosal tear, partial R salpingectomy, colostomy revision, parastomal and incisional hernia repair with Strattice mesh complicated by reintubation for respiratory distress, hypotension requiring pressors, with Combicath cultures growing MRSA and blood cultures growing GPC.    NEURO:  ·	Continue sedation w/ Precedex gtt.  ·	Dilaudid PRN pain.  CV:  ·	Monitor vital signs.  ·	Continue amiodarone gtt while NPO to prevent tachyarrhythmia and transition to PO when patient begins to have ostomy function.  RESP:  ·	Ventilatory support for acute respiratory distress and adjust settings as needed.  ·	Reattempt SBT today - failed SBT yesterday due to low RSBI.  GI/NUTRITION:  ·	NPO with NG tube to low wall suction pending ostomy function.  ·	Continue TPN for nutritional support.  RENAL:  ·	Snyder for urine output monitoring in the critically ill.  ·	Continue monitoring electrolytes and replete as necessary.  HEME:  ·	Monitor CBC and coags - patient was coagulopathic and was given vitamin K 10 mg PO yesterday.  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 difficile, and aztreonam for empiric Gram negative.  ·	Tubes/Lines/Drains - continue arterial line while intubated  Endocrine:  ·	Monitor glycemic control on BMP   DISPOSITION: full code, remain in SICU      Rosmery Kovacs PA-C   [id number] 75 yo F SICU day #8, POD #7 s/p ex lap, extensive NANETTE, takedown of EC fistula, repair of cecal serosal tear, partial R salpingectomy, colostomy revision, parastomal and incisional hernia repair with Strattice mesh complicated by reintubation for respiratory distress, hypotension requiring pressors, with Combicath cultures growing MRSA and blood cultures growing GPC.    NEURO:  ·	Continue sedation w/ Precedex gtt.  ·	Dilaudid PRN pain.  CV:  ·	Monitor vital signs.  ·	Continue amiodarone gtt while NPO to prevent tachyarrhythmia and transition to PO when patient begins to have ostomy function.  RESP:  ·	Ventilatory support for acute respiratory distress and adjust settings as needed.  ·	Reattempt SBT today - failed SBT yesterday due to low RSBI.  ·	Duonebs PRN wheezing given history of COPD.  GI/NUTRITION:  ·	NPO with NG tube to low wall suction pending ostomy function.  ·	Continue TPN for nutritional support.  RENAL:  ·	Snyder for urine output monitoring in the critically ill.  ·	Continue monitoring electrolytes and replete as necessary.  HEME:  ·	Monitor CBC and coags - patient was coagulopathic and was given vitamin K 10 mg PO yesterday.  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  ID:  ·	Follow-up cultures - Combicath growing MRSA, blood cultures growing GPC.  ·	Continue daptomycin given history of VRE, Diflucan for budding yeast on UA, Flagyl for history of C. difficile, and aztreonam for empiric Gram negative.  ·	Tubes/Lines/Drains - continue arterial line while intubated  Endocrine:  ·	Continue home Synthroid for hypothyroidism.  ·	Monitor glycemic control on BMP.  DISPOSITION:  ·	Full code.  ·	Will remain in SICU.      Rosmery Kovacs PA-C   [id number] 77 yo F SICU day #8, POD #7 s/p ex lap, extensive NANETTE, takedown of EC fistula, repair of cecal serosal tear, partial R salpingectomy, colostomy revision, parastomal and incisional hernia repair with Strattice mesh complicated by reintubation for respiratory distress, hypotension requiring pressors, with Combicath cultures growing MRSA and blood cultures growing GPC.    NEURO:  ·	Continue sedation w/ Precedex gtt.  ·	Dilaudid PRN pain.  CV:  ·	Monitor vital signs.  ·	Continue amiodarone gtt while NPO to prevent tachyarrhythmia and transition to PO when patient begins to have ostomy function.  RESP:  ·	Ventilatory support for acute respiratory distress and adjust settings as needed.  ·	Reattempt SBT today - failed SBT yesterday due to low RSBI.  ·	Duonebs PRN wheezing given history of COPD.  GI/NUTRITION:  ·	NPO with NG tube to low wall suction pending ostomy function.  ·	Continue TPN for nutritional support.  RENAL:  ·	Snyder for urine output monitoring in the critically ill.  ·	Continue monitoring electrolytes and replete as necessary.  ·	LR @ 30 as patient is on TPN and NPO awaiting GI function.  HEME:  ·	Monitor CBC and coags - patient was coagulopathic and was given vitamin K 10 mg PO yesterday.  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  ID:  ·	Follow-up cultures - Combicath growing MRSA, blood cultures growing GPC.  ·	Continue daptomycin given history of VRE, Diflucan for budding yeast on UA, Flagyl for history of C. difficile, and aztreonam for empiric Gram negative.  ·	Tubes/Lines/Drains - continue arterial line while intubated  Endocrine:  ·	Continue home Synthroid for hypothyroidism.  ·	Monitor glycemic control on BMP.  DISPOSITION:  ·	Full code.  ·	Will remain in SICU for ventilatory support.      Rosmery Kovacs PA-C   [id number] 77 yo F SICU day #8, POD #7 s/p ex lap, extensive NANETTE, takedown of EC fistula, repair of cecal serosal tear, partial R salpingectomy, colostomy revision, parastomal and incisional hernia repair with Strattice mesh complicated by reintubation for respiratory distress, hypotension requiring pressors, with Combicath cultures growing MRSA and blood cultures growing GPC.    NEURO:  ·	Continue sedation w/ Precedex gtt.  ·	Dilaudid PRN pain.  ·	Benadryl PRN pruritis.  CV:  ·	Monitor vital signs.  ·	Wean vasopressor support as tolerated for goal MAP greater than 65 mmHg.  ·	Continue amiodarone gtt while NPO to prevent tachyarrhythmia and transition to PO when patient begins to have ostomy function.  RESP:  ·	Ventilatory support for acute respiratory distress and adjust settings as needed.  ·	Reattempt SBT today - failed SBT yesterday due to low RSBI.  ·	Duonebs PRN wheezing given history of COPD.  GI/NUTRITION:  ·	NPO with NG tube to low wall suction pending ostomy function.  ·	Continue TPN for nutritional support.  ·	Protonix for stress ulcer prophylaxis.  ·	Zofran PRN nausea  RENAL:  ·	Snyder for urine output monitoring in the critically ill.  ·	Continue monitoring electrolytes and replete as necessary.  ·	LR @ 30 as patient is on TPN and NPO awaiting GI function.  HEME:  ·	Monitor CBC and coags - patient was coagulopathic and was given vitamin K 10 mg PO yesterday.  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  ID:  ·	Follow-up cultures - Combicath growing MRSA, blood cultures growing GPC.  ·	Continue daptomycin given history of VRE, Diflucan for budding yeast on UA, Flagyl for history of C. difficile, and aztreonam for empiric Gram negative.  ·	Tubes/Lines/Drains - continue arterial line while intubated  Endocrine:  ·	Continue home Synthroid for hypothyroidism.  ·	Monitor glycemic control on BMP.  DISPOSITION:  ·	Full code.  ·	Will remain in SICU for ventilatory support.      Rosmery Kovacs PA-C   [id number]

## 2017-06-16 NOTE — PROGRESS NOTE ADULT - ATTENDING COMMENTS
Patient continues to remain critically ill on small dose of vasopressors.  1)Mentation improved. Patient is following commands and agreeing to treatments such as getting mobilized.      -dilaudid and tylenol PRN for pain  2)Acute hypoxemic respiratory failure- continues on mechanical ventilation. SBT this morning patient appears tired.  Will plan to retry this afternoon.      -patient is oxygenating appropriately     -CXR appears stable from prior, clear      - continue to wean off the ventilator if mental status improves and able to to perform a spontaneous breathing trial     -will give lasix 20 mg because patient appears hypervolemic     -keep SpO2 >92%  3) Hypotension-levophed (0.07) on small amount this am; sirs response to the MRSA pneumonia  4)Acute on chronic kidney insufficiency stage 3- continue to monitor urine out with goal 0.05ml/h or greater     -adequate  urine output  5) atrial fibrillation- on amiodarone      -goal HR <115 bpm and currently controlled  6) anemia of chronic disease-stable     -goal to keep Hb >7  7) blood cultures x 1 GPC - unclear whether contaminant; will repeat them   8) MRSA pneumonia-  right lower lobe       -begun on vancomycin  Budding yeast on UA, continue on fluconazole     9) Hyperglycemia- continue insulin sliding scale   10) HIT- on fondaparineux for DVT prophylaxis  11)- protein calorie malnutrition- continue TPN       -LFTs appear normal, will continue to trend      -high NGT output- will obtain AXR      -ostomy slightly odorous and ischemic appearing, non functional will continue to monitor        I have spent 45 minutes of critical care time Patient continues to remain critically ill on small dose of vasopressors.  1)Mentation improved. Patient is following commands and agreeing to treatments such as getting mobilized.      -dilaudid and tylenol PRN for pain  2)Acute hypoxemic respiratory failure- continues on mechanical ventilation. SBT this morning patient appears tired.  Will plan to retry this afternoon.      -patient is oxygenating appropriately     -CXR appears stable from prior, clear      - continue to wean off the ventilator if mental status improves and able to to perform a spontaneous breathing trial     -will give lasix 20 mg because patient appears hypervolemic     -keep SpO2 >92%  3) Hypotension-levophed (0.07) on small amount this am; sirs response to the MRSA pneumonia  4)Acute on chronic kidney insufficiency stage 3- continue to monitor urine out with goal 0.05ml/h or greater     -adequate  urine output  5) atrial fibrillation earlier but now normal sinus rhythm- on amiodarone      -goal HR <115 bpm and currently controlled  6) anemia of chronic disease-stable     -goal to keep Hb >7  7) blood cultures x 1 GPC - unclear whether contaminant; will repeat them   8) MRSA pneumonia-  right lower lobe       -begun on vancomycin  Budding yeast on UA, continue on fluconazole     9) Hyperglycemia- continue insulin sliding scale   10) HIT- on fondaparineux for DVT prophylaxis  11)- protein calorie malnutrition- continue TPN       -LFTs appear normal, will continue to trend      -high NGT output- will obtain AXR      -ostomy slightly odorous and ischemic appearing, non functional will continue to monitor        I have spent 45 minutes of critical care time

## 2017-06-16 NOTE — PROGRESS NOTE ADULT - PROBLEM SELECTOR PLAN 1
She is volume overloaded but pressors will prevent large diuresis  TPN ongoing.  Can Phos be added to the TPN? She is volume overloaded but pressors will prevent large diuresis  TPN ongoing.  One dose of IV lasix

## 2017-06-16 NOTE — PROGRESS NOTE ADULT - SUBJECTIVE AND OBJECTIVE BOX
Day #16  of PN  PN infusion at 62cc/hr   I & Os for 24h ending 06-16 @ 07:00  =============================================  IN:    TPN (Total Parenteral Nutrition): 1488 ml    lactated ringers.: 1030 ml    amiodarone Infusion: 400.8 ml    Solution: 300 ml    Solution: 200 ml    dexmedetomidine Infusion: 138.2 ml    Solution: 125 ml    norepinephrine Infusion: 48 ml    norepinephrine Infusion: 32.8 ml    Total IN: 3762.8 ml  ---------------------------------------------  OUT:    Indwelling Catheter - Urethral: 2400 ml    Nasoenteral Tube: 1200 ml    Total OUT: 3600 ml  ---------------------------------------------  Total NET: 162.8 ml    Labs   06-16    144  |  110<H>  |  44<H>  ----------------------------<  156<H>  4.0   |  23  |  1.37<H>    Ca    8.1<L>      16 Jun 2017 03:22  Phos  2.9     06-16  Mg     2.7     06-16    TPro  4.6<L>  /  Alb  2.2<L>  /  TBili  1.3<H>  /  DBili  0.8<H>  /  AST  30  /  ALT  23  /  AlkPhos  120  06-15      LIVER FUNCTIONS - ( 15 Anastacio 2017 03:06 )  Alb: 2.2 g/dL / Pro: 4.6 g/dL / ALK PHOS: 120 U/L / ALT: 23 U/L RC / AST: 30 U/L / GGT: x           CAPILLARY BLOOD GLUCOSE  122 (16 Jun 2017 06:00)  126 (16 Jun 2017 00:00)  145 (15 Anastacio 2017 18:00)  156 (15 Anastacio 2017 12:00)    I&O's Detail  I & Os for 24h ending 16 Jun 2017 07:00  =============================================  IN:    TPN (Total Parenteral Nutrition): 1488 ml    lactated ringers.: 1030 ml    amiodarone Infusion: 400.8 ml    Solution: 300 ml    Solution: 200 ml    dexmedetomidine Infusion: 138.2 ml    Solution: 125 ml    norepinephrine Infusion: 48 ml    norepinephrine Infusion: 32.8 ml    Total IN: 3762.8 ml  ---------------------------------------------  OUT:    Indwelling Catheter - Urethral: 2400 ml    Nasoenteral Tube: 1200 ml    Total OUT: 3600 ml  ---------------------------------------------  Total NET: 162.8 ml    I & Os for current day (as of 16 Jun 2017 09:04)  =============================================  IN:    lactated ringers.: 303 ml    TPN (Total Parenteral Nutrition): 62 ml    amiodarone Infusion: 16.7 ml    norepinephrine Infusion: 10 ml    dexmedetomidine Infusion: 9.4 ml    Total IN: 401.1 ml  ---------------------------------------------  OUT:    Indwelling Catheter - Urethral: 75 ml    Total OUT: 75 ml  ---------------------------------------------  Total NET: 326.1 ml      T(C): 37.3, Max: 37.8 (06-16 @ 03:00)  HR: 91 (73 - 107)  BP: 138/65 (138/65 - 138/65)  RR: 31 (22 - 39)  SpO2: 100% (95% - 100%)  Wt(kg): --

## 2017-06-17 LAB
ALBUMIN SERPL ELPH-MCNC: 1.8 G/DL — LOW (ref 3.3–5)
ALP SERPL-CCNC: 135 U/L — HIGH (ref 40–120)
ALT FLD-CCNC: 18 U/L RC — SIGNIFICANT CHANGE UP (ref 10–45)
ANION GAP SERPL CALC-SCNC: 12 MMOL/L — SIGNIFICANT CHANGE UP (ref 5–17)
ANION GAP SERPL CALC-SCNC: 12 MMOL/L — SIGNIFICANT CHANGE UP (ref 5–17)
APTT BLD: 35.7 SEC — SIGNIFICANT CHANGE UP (ref 27.5–37.4)
AST SERPL-CCNC: 20 U/L — SIGNIFICANT CHANGE UP (ref 10–40)
BILIRUB SERPL-MCNC: 1 MG/DL — SIGNIFICANT CHANGE UP (ref 0.2–1.2)
BUN SERPL-MCNC: 45 MG/DL — HIGH (ref 7–23)
BUN SERPL-MCNC: 45 MG/DL — HIGH (ref 7–23)
CA-I BLD-SCNC: 1.08 MMOL/L — LOW (ref 1.12–1.3)
CALCIUM SERPL-MCNC: 7.5 MG/DL — LOW (ref 8.4–10.5)
CALCIUM SERPL-MCNC: 7.6 MG/DL — LOW (ref 8.4–10.5)
CHLORIDE SERPL-SCNC: 106 MMOL/L — SIGNIFICANT CHANGE UP (ref 96–108)
CHLORIDE SERPL-SCNC: 109 MMOL/L — HIGH (ref 96–108)
CO2 SERPL-SCNC: 22 MMOL/L — SIGNIFICANT CHANGE UP (ref 22–31)
CO2 SERPL-SCNC: 22 MMOL/L — SIGNIFICANT CHANGE UP (ref 22–31)
CREAT SERPL-MCNC: 1.23 MG/DL — SIGNIFICANT CHANGE UP (ref 0.5–1.3)
CREAT SERPL-MCNC: 1.23 MG/DL — SIGNIFICANT CHANGE UP (ref 0.5–1.3)
CULTURE RESULTS: SIGNIFICANT CHANGE UP
GAS PNL BLDA: SIGNIFICANT CHANGE UP
GLUCOSE SERPL-MCNC: 145 MG/DL — HIGH (ref 70–99)
GLUCOSE SERPL-MCNC: 173 MG/DL — HIGH (ref 70–99)
HCT VFR BLD CALC: 23.4 % — LOW (ref 34.5–45)
HCT VFR BLD CALC: 24.3 % — LOW (ref 34.5–45)
HGB BLD-MCNC: 7.9 G/DL — LOW (ref 11.5–15.5)
HGB BLD-MCNC: 8 G/DL — LOW (ref 11.5–15.5)
INR BLD: 1.66 RATIO — HIGH (ref 0.88–1.16)
MAGNESIUM SERPL-MCNC: 2.1 MG/DL — SIGNIFICANT CHANGE UP (ref 1.6–2.6)
MAGNESIUM SERPL-MCNC: 2.2 MG/DL — SIGNIFICANT CHANGE UP (ref 1.6–2.6)
MCHC RBC-ENTMCNC: 32.5 GM/DL — SIGNIFICANT CHANGE UP (ref 32–36)
MCHC RBC-ENTMCNC: 33 PG — SIGNIFICANT CHANGE UP (ref 27–34)
MCHC RBC-ENTMCNC: 34.1 GM/DL — SIGNIFICANT CHANGE UP (ref 32–36)
MCHC RBC-ENTMCNC: 34.3 PG — HIGH (ref 27–34)
MCV RBC AUTO: 101 FL — HIGH (ref 80–100)
MCV RBC AUTO: 101 FL — HIGH (ref 80–100)
PHOSPHATE SERPL-MCNC: 2.5 MG/DL — SIGNIFICANT CHANGE UP (ref 2.5–4.5)
PHOSPHATE SERPL-MCNC: 2.9 MG/DL — SIGNIFICANT CHANGE UP (ref 2.5–4.5)
PLATELET # BLD AUTO: 80 K/UL — LOW (ref 150–400)
PLATELET # BLD AUTO: 87 K/UL — LOW (ref 150–400)
POTASSIUM SERPL-MCNC: 3.9 MMOL/L — SIGNIFICANT CHANGE UP (ref 3.5–5.3)
POTASSIUM SERPL-MCNC: 4.1 MMOL/L — SIGNIFICANT CHANGE UP (ref 3.5–5.3)
POTASSIUM SERPL-SCNC: 3.9 MMOL/L — SIGNIFICANT CHANGE UP (ref 3.5–5.3)
POTASSIUM SERPL-SCNC: 4.1 MMOL/L — SIGNIFICANT CHANGE UP (ref 3.5–5.3)
PROT SERPL-MCNC: 4.6 G/DL — LOW (ref 6–8.3)
PROTHROM AB SERPL-ACNC: 18.3 SEC — HIGH (ref 9.8–12.7)
RBC # BLD: 2.33 M/UL — LOW (ref 3.8–5.2)
RBC # BLD: 2.39 M/UL — LOW (ref 3.8–5.2)
RBC # FLD: 14.8 % — HIGH (ref 10.3–14.5)
RBC # FLD: 15 % — HIGH (ref 10.3–14.5)
SODIUM SERPL-SCNC: 140 MMOL/L — SIGNIFICANT CHANGE UP (ref 135–145)
SODIUM SERPL-SCNC: 143 MMOL/L — SIGNIFICANT CHANGE UP (ref 135–145)
SPECIMEN SOURCE: SIGNIFICANT CHANGE UP
VANCOMYCIN TROUGH SERPL-MCNC: 8.9 UG/ML — LOW (ref 10–20)
WBC # BLD: 7.5 K/UL — SIGNIFICANT CHANGE UP (ref 3.8–10.5)
WBC # BLD: 8.4 K/UL — SIGNIFICANT CHANGE UP (ref 3.8–10.5)
WBC # FLD AUTO: 7.5 K/UL — SIGNIFICANT CHANGE UP (ref 3.8–10.5)
WBC # FLD AUTO: 8.4 K/UL — SIGNIFICANT CHANGE UP (ref 3.8–10.5)

## 2017-06-17 PROCEDURE — 71010: CPT | Mod: 26

## 2017-06-17 PROCEDURE — 99223 1ST HOSP IP/OBS HIGH 75: CPT | Mod: GC

## 2017-06-17 PROCEDURE — 99291 CRITICAL CARE FIRST HOUR: CPT

## 2017-06-17 RX ORDER — ACETAMINOPHEN 500 MG
1000 TABLET ORAL ONCE
Qty: 0 | Refills: 0 | Status: COMPLETED | OUTPATIENT
Start: 2017-06-17 | End: 2017-06-17

## 2017-06-17 RX ORDER — FUROSEMIDE 40 MG
20 TABLET ORAL ONCE
Qty: 0 | Refills: 0 | Status: COMPLETED | OUTPATIENT
Start: 2017-06-17 | End: 2017-06-17

## 2017-06-17 RX ORDER — HYDROMORPHONE HYDROCHLORIDE 2 MG/ML
0.2 INJECTION INTRAMUSCULAR; INTRAVENOUS; SUBCUTANEOUS
Qty: 0 | Refills: 0 | Status: DISCONTINUED | OUTPATIENT
Start: 2017-06-17 | End: 2017-06-17

## 2017-06-17 RX ORDER — IPRATROPIUM/ALBUTEROL SULFATE 18-103MCG
3 AEROSOL WITH ADAPTER (GRAM) INHALATION EVERY 6 HOURS
Qty: 0 | Refills: 0 | Status: DISCONTINUED | OUTPATIENT
Start: 2017-06-17 | End: 2017-06-30

## 2017-06-17 RX ORDER — CALCIUM GLUCONATE 100 MG/ML
1 VIAL (ML) INTRAVENOUS ONCE
Qty: 1 | Refills: 0 | Status: COMPLETED | OUTPATIENT
Start: 2017-06-17 | End: 2017-06-17

## 2017-06-17 RX ORDER — ACETAMINOPHEN 500 MG
1000 TABLET ORAL ONCE
Qty: 0 | Refills: 0 | Status: COMPLETED | OUTPATIENT
Start: 2017-06-18 | End: 2017-06-18

## 2017-06-17 RX ORDER — HYDROMORPHONE HYDROCHLORIDE 2 MG/ML
0.2 INJECTION INTRAMUSCULAR; INTRAVENOUS; SUBCUTANEOUS ONCE
Qty: 0 | Refills: 0 | Status: DISCONTINUED | OUTPATIENT
Start: 2017-06-17 | End: 2017-06-17

## 2017-06-17 RX ORDER — HYDROMORPHONE HYDROCHLORIDE 2 MG/ML
0.5 INJECTION INTRAMUSCULAR; INTRAVENOUS; SUBCUTANEOUS
Qty: 0 | Refills: 0 | Status: DISCONTINUED | OUTPATIENT
Start: 2017-06-17 | End: 2017-06-21

## 2017-06-17 RX ORDER — CALCIUM GLUCONATE 100 MG/ML
2 VIAL (ML) INTRAVENOUS ONCE
Qty: 2 | Refills: 0 | Status: COMPLETED | OUTPATIENT
Start: 2017-06-17 | End: 2017-06-17

## 2017-06-17 RX ORDER — POTASSIUM CHLORIDE 20 MEQ
10 PACKET (EA) ORAL
Qty: 0 | Refills: 0 | Status: COMPLETED | OUTPATIENT
Start: 2017-06-17 | End: 2017-06-17

## 2017-06-17 RX ADMIN — Medication 3 MILLILITER(S): at 11:37

## 2017-06-17 RX ADMIN — Medication 100 MILLIEQUIVALENT(S): at 07:52

## 2017-06-17 RX ADMIN — HYDROMORPHONE HYDROCHLORIDE 0.2 MILLIGRAM(S): 2 INJECTION INTRAMUSCULAR; INTRAVENOUS; SUBCUTANEOUS at 17:40

## 2017-06-17 RX ADMIN — Medication 100 MILLIGRAM(S): at 13:14

## 2017-06-17 RX ADMIN — Medication 2: at 11:30

## 2017-06-17 RX ADMIN — FLUCONAZOLE 100 MILLIGRAM(S): 150 TABLET ORAL at 11:19

## 2017-06-17 RX ADMIN — FONDAPARINUX SODIUM 2.5 MILLIGRAM(S): 2.5 INJECTION, SOLUTION SUBCUTANEOUS at 11:19

## 2017-06-17 RX ADMIN — HYDROMORPHONE HYDROCHLORIDE 0.2 MILLIGRAM(S): 2 INJECTION INTRAMUSCULAR; INTRAVENOUS; SUBCUTANEOUS at 00:41

## 2017-06-17 RX ADMIN — HYDROMORPHONE HYDROCHLORIDE 0.2 MILLIGRAM(S): 2 INJECTION INTRAMUSCULAR; INTRAVENOUS; SUBCUTANEOUS at 05:09

## 2017-06-17 RX ADMIN — Medication 100 MILLIEQUIVALENT(S): at 09:44

## 2017-06-17 RX ADMIN — Medication 400 MILLIGRAM(S): at 15:57

## 2017-06-17 RX ADMIN — Medication 2.79 MICROGRAM(S)/KG/MIN: at 02:22

## 2017-06-17 RX ADMIN — HYDROMORPHONE HYDROCHLORIDE 0.2 MILLIGRAM(S): 2 INJECTION INTRAMUSCULAR; INTRAVENOUS; SUBCUTANEOUS at 17:10

## 2017-06-17 RX ADMIN — Medication 50 MILLIGRAM(S): at 22:36

## 2017-06-17 RX ADMIN — Medication 100 MILLIGRAM(S): at 22:35

## 2017-06-17 RX ADMIN — HYDROMORPHONE HYDROCHLORIDE 0.2 MILLIGRAM(S): 2 INJECTION INTRAMUSCULAR; INTRAVENOUS; SUBCUTANEOUS at 06:07

## 2017-06-17 RX ADMIN — Medication 50 MILLIGRAM(S): at 05:09

## 2017-06-17 RX ADMIN — Medication 100 MILLIEQUIVALENT(S): at 08:23

## 2017-06-17 RX ADMIN — Medication 255 MILLIMOLE(S): at 07:52

## 2017-06-17 RX ADMIN — CHLORHEXIDINE GLUCONATE 15 MILLILITER(S): 213 SOLUTION TOPICAL at 13:14

## 2017-06-17 RX ADMIN — Medication 25 MILLIGRAM(S): at 00:42

## 2017-06-17 RX ADMIN — Medication 3 MILLILITER(S): at 17:44

## 2017-06-17 RX ADMIN — Medication 100 MILLIGRAM(S): at 05:10

## 2017-06-17 RX ADMIN — HYDROMORPHONE HYDROCHLORIDE 0.2 MILLIGRAM(S): 2 INJECTION INTRAMUSCULAR; INTRAVENOUS; SUBCUTANEOUS at 14:38

## 2017-06-17 RX ADMIN — PANTOPRAZOLE SODIUM 40 MILLIGRAM(S): 20 TABLET, DELAYED RELEASE ORAL at 11:19

## 2017-06-17 RX ADMIN — Medication 250 MILLIGRAM(S): at 10:12

## 2017-06-17 RX ADMIN — Medication 1000 MILLIGRAM(S): at 10:35

## 2017-06-17 RX ADMIN — Medication 3 MILLILITER(S): at 23:34

## 2017-06-17 RX ADMIN — Medication 400 MILLIGRAM(S): at 10:05

## 2017-06-17 RX ADMIN — HYDROMORPHONE HYDROCHLORIDE 0.2 MILLIGRAM(S): 2 INJECTION INTRAMUSCULAR; INTRAVENOUS; SUBCUTANEOUS at 08:51

## 2017-06-17 RX ADMIN — HYDROMORPHONE HYDROCHLORIDE 0.2 MILLIGRAM(S): 2 INJECTION INTRAMUSCULAR; INTRAVENOUS; SUBCUTANEOUS at 08:21

## 2017-06-17 RX ADMIN — Medication 400 MILLIGRAM(S): at 22:35

## 2017-06-17 RX ADMIN — Medication 200 GRAM(S): at 08:47

## 2017-06-17 RX ADMIN — Medication 20 MILLIGRAM(S): at 10:12

## 2017-06-17 RX ADMIN — Medication 200 GRAM(S): at 15:57

## 2017-06-17 RX ADMIN — HYDROMORPHONE HYDROCHLORIDE 0.2 MILLIGRAM(S): 2 INJECTION INTRAMUSCULAR; INTRAVENOUS; SUBCUTANEOUS at 06:23

## 2017-06-17 RX ADMIN — AMIODARONE HYDROCHLORIDE 16.67 MG/MIN: 400 TABLET ORAL at 02:22

## 2017-06-17 RX ADMIN — HYDROMORPHONE HYDROCHLORIDE 0.2 MILLIGRAM(S): 2 INJECTION INTRAMUSCULAR; INTRAVENOUS; SUBCUTANEOUS at 01:00

## 2017-06-17 RX ADMIN — HYDROMORPHONE HYDROCHLORIDE 0.5 MILLIGRAM(S): 2 INJECTION INTRAMUSCULAR; INTRAVENOUS; SUBCUTANEOUS at 23:54

## 2017-06-17 RX ADMIN — HYDROMORPHONE HYDROCHLORIDE 0.2 MILLIGRAM(S): 2 INJECTION INTRAMUSCULAR; INTRAVENOUS; SUBCUTANEOUS at 14:08

## 2017-06-17 RX ADMIN — CHLORHEXIDINE GLUCONATE 15 MILLILITER(S): 213 SOLUTION TOPICAL at 05:09

## 2017-06-17 RX ADMIN — Medication 44 MICROGRAM(S): at 05:09

## 2017-06-17 RX ADMIN — Medication 50 MILLIGRAM(S): at 13:41

## 2017-06-17 RX ADMIN — HYDROMORPHONE HYDROCHLORIDE 0.5 MILLIGRAM(S): 2 INJECTION INTRAMUSCULAR; INTRAVENOUS; SUBCUTANEOUS at 20:15

## 2017-06-17 RX ADMIN — Medication: at 17:13

## 2017-06-17 RX ADMIN — SODIUM CHLORIDE 30 MILLILITER(S): 9 INJECTION, SOLUTION INTRAVENOUS at 05:10

## 2017-06-17 RX ADMIN — DEXMEDETOMIDINE HYDROCHLORIDE IN 0.9% SODIUM CHLORIDE 18.6 MICROGRAM(S)/KG/HR: 4 INJECTION INTRAVENOUS at 02:21

## 2017-06-17 RX ADMIN — HYDROMORPHONE HYDROCHLORIDE 0.5 MILLIGRAM(S): 2 INJECTION INTRAMUSCULAR; INTRAVENOUS; SUBCUTANEOUS at 20:30

## 2017-06-17 NOTE — PROGRESS NOTE ADULT - ASSESSMENT
77 yo F SICU day #9, POD #8 s/p ex lap, extensive NANETTE, takedown of EC fistula, repair of cecal serosal tear, partial R salpingectomy, colostomy revision, parastomal and incisional hernia repair with Strattice mesh complicated by reintubation for respiratory distress, hypotension requiring pressors, with Combicath cultures growing MRSA and blood cultures growing GPC.    NEURO:  ·	Continue sedation w/ Precedex gtt, hold for SBT  ·	Dilaudid PRN pain.  ·	Benadryl PRN pruritis.  CV:  ·	Monitor vital signs.  ·	Wean vasopressor support as tolerated for goal MAP greater than 65 mmHg.  ·	Patient remains in NSR, consider d/cing amiodarone  RESP:  ·	Ventilatory support for acute respiratory distress and adjust settings as needed.  ·	Reattempt SBT today - failed SBT yesterday due to low tachypnea and accessory muscle use  ·	Duonebs PRN wheezing given history of COPD.  GI/NUTRITION:  ·	NPO with NG tube to low wall suction pending ostomy function.  ·	Continue TPN for nutritional support.  ·	Protonix for stress ulcer prophylaxis.  ·	Zofran PRN nausea  RENAL:  ·	Snyder for urine output monitoring in the critically ill.  ·	Continue monitoring electrolytes and replete as necessary.  ·	LR @ 30 as patient is on TPN and NPO awaiting GI function.  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  ID:  ·	Follow-up cultures - Combicath growing MRSA, blood cultures growing GPC.  ·	Continue daptomycin given history of VRE, Diflucan for budding yeast on UA, Flagyl for history of C. difficile, and aztreonam for empiric Gram negative.  ·	Tubes/Lines/Drains - continue arterial line while intubated and on pressors  Endocrine:  ·	Continue home Synthroid for hypothyroidism.  ·	Monitor glycemic control on BMP.  DISPOSITION:  ·	Full code.  ·	Will remain in SICU for hemodynamci and ventilatory support      Rosmery Kovacs PA-C   [id number] 75 yo F SICU day #9, POD #8 s/p ex lap, extensive NANETTE, takedown of EC fistula, repair of cecal serosal tear, partial R salpingectomy, colostomy revision, parastomal and incisional hernia repair with Strattice mesh complicated by reintubation for respiratory distress, hypotension requiring pressors, with Combicath cultures growing MRSA and blood cultures growing GPC.    NEURO: pain control   ·	Precedex gtt held   ·	Dilaudid PRN pain.  ·	Benadryl PRN pruritis.  CV:   ·	Monitor vital signs.  ·	Wean vasopressor support as tolerated for goal MAP greater than 65 mmHg.  ·	Patient remains in NSR, D/C Amiodarone   RESP:respiratory failure   ·	SBT, plan to extubate   ·	Duonebs PRN wheezing given history of COPD.  GI/NUTRITION: malnutrition  ·	NPO with NG tube to low wall suction pending ostomy function.  ·	Continue TPN for nutritional support.  ·	Protonix for stress ulcer prophylaxis.  RENAL: TREVA improving   ·	Snyder for urine output monitoring in the critically ill.  ·	IV lock LR  ·	Lasix 20mg IV x 1   HEME: VTE prophylaxis, h/o HIT  ·	Monitor CBC and coags  ·	Arixtra   ID: MRSA PNA  ·	Follow-up cultures   ·	Aztreonam, Flagyl, Vanco. D/C Dapto. ID following  ·	Tubes/Lines/Drains - continue arterial line while intubated and on pressors  Endocrine: DM, Hypothyroid  ·	Continue home Synthroid for hypothyroidism.  ·	ISS q 6hrs   DISPOSITION:  ·	Full code.  ·	Will remain in SICU for hemodynamic and ventilatory support      Rosmery Kovacs PA-C   [id number]

## 2017-06-17 NOTE — PROGRESS NOTE ADULT - SUBJECTIVE AND OBJECTIVE BOX
Day #17 of PN  ICU Vital Signs Last 24 Hrs  T(C): 37.9, Max: 38.1 (06-16 @ 10:00)  T(F): 100.2, Max: 100.5 (06-16 @ 10:00)  HR: 82 (67 - 101)  ABP: 113/46 (64/49 - 161/73)  ABP(mean): 72 (57 - 107)  RR: 19 (16 - 37)  SpO2: 100% (93% - 100%)    PN infusion at62 cc/hr   I & Os for 24h ending 06-17 @ 07:00  =============================================  IN:    TPN (Total Parenteral Nutrition): 1488 ml    lactated ringers.: 660 ml    amiodarone Infusion: 400.8 ml    Solution: 300 ml    Solution: 250 ml    Solution: 250 ml    dexmedetomidine Infusion: 225.6 ml    norepinephrine Infusion: 209 ml    Solution: 200 ml    Solution: 100 ml    Solution: 100 ml    Solution: 50 ml    Total IN: 4233.4 ml  ---------------------------------------------  OUT:    Indwelling Catheter - Urethral: 2895 ml    Nasoenteral Tube: 900 ml    Total OUT: 3795 ml  ---------------------------------------------  Total NET: 438.4 ml    Labs   06-17    143  |  109<H>  |  45<H>  ----------------------------<  145<H>  3.9   |  22  |  1.23    Ca    7.6<L>      17 Jun 2017 04:11  Phos  2.5     06-17  Mg     2.2     06-17    TPro  4.6<L>  /  Alb  1.8<L>  /  TBili  1.0  /  DBili  x   /  AST  20  /  ALT  18  /  AlkPhos  135<H>  06-17      LIVER FUNCTIONS - ( 17 Jun 2017 04:11 )  Alb: 1.8 g/dL / Pro: 4.6 g/dL / ALK PHOS: 135 U/L / ALT: 18 U/L RC / AST: 20 U/L / GGT: x           CAPILLARY BLOOD GLUCOSE  121 (17 Jun 2017 06:00)  142 (17 Jun 2017 00:00)  176 (16 Jun 2017 17:30)  143 (16 Jun 2017 11:30)    I&O's Detail  I & Os for 24h ending 17 Jun 2017 07:00  =============================================  IN:    TPN (Total Parenteral Nutrition): 1488 ml    lactated ringers.: 660 ml    amiodarone Infusion: 400.8 ml    Solution: 300 ml    Solution: 250 ml    Solution: 250 ml    dexmedetomidine Infusion: 225.6 ml    norepinephrine Infusion: 209 ml    Solution: 200 ml    Solution: 100 ml    Solution: 100 ml    Solution: 50 ml    Total IN: 4233.4 ml  ---------------------------------------------  OUT:    Indwelling Catheter - Urethral: 2895 ml    Nasoenteral Tube: 900 ml    Total OUT: 3795 ml  ---------------------------------------------  Total NET: 438.4 ml    I & Os for current day (as of 17 Jun 2017 09:22)  =============================================  IN:    Solution: 200 ml    Solution: 125 ml    TPN (Total Parenteral Nutrition): 124 ml    lactated ringers.: 60 ml    amiodarone Infusion: 33.4 ml    norepinephrine Infusion: 20 ml    dexmedetomidine Infusion: 14.1 ml    Total IN: 576.5 ml  ---------------------------------------------  OUT:    Indwelling Catheter - Urethral: 225 ml    Total OUT: 225 ml  ---------------------------------------------  Total NET: 351.5 ml      T(C): 37.9, Max: 38.1 (06-16 @ 10:00)  HR: 82 (67 - 101)  BP: --  RR: 19 (16 - 37)  SpO2: 100% (93% - 100%)  Wt(kg): --

## 2017-06-17 NOTE — PROVIDER CONTACT NOTE (OTHER) - ASSESSMENT
Expiratory wheezing noted, pt alert and oriented x3, pt on levo to maintain MAP > 65. pt OOB in chair tolerating. Pt suctioned, minimal suctions.

## 2017-06-17 NOTE — PROGRESS NOTE ADULT - SUBJECTIVE AND OBJECTIVE BOX
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 x]Hypervolemic [ ]Euvolemic [ ]Hypovolemic  Meds: amiodarone Infusion 0.5mG/Min IV Continuous <Continuous>  norepinephrine Infusion 0.02MICROgram(s)/kG/Min IV Continuous <Continuous>      GI/NUTRITION  Exam: soft, nontender, nondistended, incisions C/D/I, ostomy dusky w/ bowel sweat  Diet: NPO  Meds: pantoprazole  Injectable 40milliGRAM(s) IV Push daily      GENITOURINARY  I&O's Detail    I & Os for current day (as of 06-17 @ 07:37)  =============================================  IN:    TPN (Total Parenteral Nutrition): 1488 ml    lactated ringers.: 630 ml    amiodarone Infusion: 400.8 ml    Solution: 300 ml    Solution: 250 ml    Solution: 250 ml    dexmedetomidine Infusion: 225.6 ml    norepinephrine Infusion: 209 ml    Solution: 200 ml    Solution: 100 ml    Solution: 100 ml    Solution: 50 ml    Total IN: 4203.4 ml  ---------------------------------------------  OUT:    Indwelling Catheter - Urethral: 2895 ml    Nasoenteral Tube: 900 ml    Total OUT: 3795 ml  ---------------------------------------------  Total NET: 408.4 ml      06-17    143  |  109<H>  |  45<H>  ----------------------------<  145<H>  3.9   |  22  |  1.23    Ca    7.6<L>      17 Jun 2017 04:11  Phos  2.5     06-17  Mg     2.2     06-17    TPro  4.6<L>  /  Alb  1.8<L>  /  TBili  1.0  /  DBili  x   /  AST  20  /  ALT  18  /  AlkPhos  135<H>  06-17    [X] Snyder catheter, indication: monitoring in critically ill  Meds: lactated ringers. 1000milliLiter(s) IV Continuous <Continuous>  potassium chloride  10 mEq/100 mL IVPB 10milliEquivalent(s) IV Intermittent every 1 hour  sodium phosphate IVPB 15milliMole(s) IV Intermittent once  calcium gluconate IVPB 1Gram(s) IV Intermittent once        HEMATOLOGIC  Meds: fondaparinux Injectable 2.5milliGRAM(s) SubCutaneous every 24 hours    [x] VTE Prophylaxis                        8.0    8.4   )-----------( 87       ( 17 Jun 2017 04:11 )             23.4     PT/INR - ( 17 Jun 2017 04:11 )   PT: 18.3 sec;   INR: 1.66 ratio         PTT - ( 17 Jun 2017 04:11 )  PTT:35.7 sec  Transfusion     [ ] PRBC   [ ] Platelets   [ ] FFP   [ ] Cryoprecipitate      INFECTIOUS DISEASES  WBC Count: 8.4 K/uL (06-17 @ 04:11)  WBC Count: 9.7 K/uL (06-16 @ 12:20)    RECENT CULTURES:  Specimen Source: .Blood Blood-Venous  Date/Time: 06-16 @ 02:28  Culture Results:   No growth to date.  Gram Stain: --  Organism: --  Specimen Source: .Blood Blood-Peripheral  Date/Time: 06-16 @ 02:27  Culture Results:   No growth to date.  Gram Stain: --  Organism: --  Specimen Source: .Broncial Combicath  Date/Time: 06-13 @ 17:50  Culture Results:   Moderate Methicillin resistant Staphylococcus aureus  Normal Respiratory Francesca present  Gram Stain:   Numerous polymorphonuclear leukocytes per low power field  Few Squamous epithelial cells per low power field  Few Gram positive cocci in pairs per oil power field  Organism: Methicillin resistant Staphylococcus aureus  Specimen Source: .Urine Catheterized  Date/Time: 06-12 @ 18:32  Culture Results:   <10,000 CFU/ml  Normal Urogenital francesca present  Gram Stain: --  Organism: --  Specimen Source: .Blood Blood-Peripheral  Date/Time: 06-12 @ 18:22  Culture Results:   Growth in anaerobic bottle: Coag Negative Staphylococcus  Single set isolate, possible contaminant. Contact  Microbiology if susceptibility testing clinically  indicated.  Gram Stain:   Growth in anaerobic bottle: Gram Positive Cocci in Clusters  Organism: --  Specimen Source: .Blood Blood-Peripheral  Date/Time: 06-12 @ 15:42  Culture Results:   No growth to date.  Gram Stain: --  Organism: --    Meds: metroNIDAZOLE  IVPB 500milliGRAM(s) IV Intermittent every 8 hours  aztreonam  IVPB 1000milliGRAM(s) IV Intermittent every 8 hours  DAPTOmycin IVPB     fluconAZOLE IVPB  IV Intermittent   DAPTOmycin IVPB 450milliGRAM(s) IV Intermittent every 48 hours  fluconAZOLE IVPB 100milliGRAM(s) IV Intermittent every 24 hours  vancomycin  IVPB  IV Intermittent   vancomycin  IVPB 1000milliGRAM(s) IV Intermittent every 24 hours        ENDOCRINE  CAPILLARY BLOOD GLUCOSE  121 (17 Jun 2017 06:00)  142 (17 Jun 2017 00:00)  176 (16 Jun 2017 17:30)  143 (16 Jun 2017 11:30)    Meds: levothyroxine Injectable 44MICROGram(s) IV Push daily  insulin lispro (HumaLOG) corrective regimen sliding scale  SubCutaneous every 6 hours        ACCESS DEVICES:  [X] Peripheral IV  [X] Central Venous Line	[X] R	[ ] L	[X] IJ	[ ] Fem	[ ] SC	Placed: 6/12  [X] Arterial Line		[X] R	[ ] L	[X] Fem	[ ] Rad	[ ] Ax	Placed: 6/12  [ ] PICC:					[ ] Mediport  [ ] Urinary Catheter, Date Placed:   [x] Necessity of urinary, arterial, and venous catheters discussed    OTHER MEDICATIONS:  chlorhexidine 0.12% Liquid 15milliLiter(s) Swish and Spit every 8 hours      CODE STATUS:      IMAGING:  CXR 6/17  The mediastinal cardiac silhouette is unremarkable. Lines and tubes   unchanged.    Left basilar atelectasis. The lungs are otherwise clear.

## 2017-06-17 NOTE — CONSULT NOTE ADULT - ASSESSMENT
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 She is currently hospitalized for recurrent SBO associated with intermittent abdominal pain and loss of ostomy function. She was managed conservatively but then was taken to the OR 6/9 evening for unresolving SBO.   Pt s/p exploratory laparotomy, extensive lysis of adhesions, take down of ECF, repair of serosal tear on cecum, partial right salpingoophorectomy, colostomy revision, parastomal and incisional hernia repair with strattice mesh.   Pt with hypotension and now with fevers. BCx and UCx neg, but combicath Cx with MRSA. Old ostomy site with reported purulent drainage. 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 She is currently hospitalized for recurrent SBO associated with intermittent abdominal pain and loss of ostomy function. She was managed conservatively but then was taken to the OR 6/9 evening for unresolving SBO.   Pt s/p exploratory laparotomy, extensive lysis of adhesions, take down of ECF, repair of serosal tear on cecum, partial right salpingoophorectomy, colostomy revision, parastomal and incisional hernia repair with strattice mesh.   Pt with hypotension and now with fevers. BCx and UCx neg, but combicath Cx with MRSA. Old ostomy site with reported purulent drainage.     Currently d/c Daptomycin.  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 She is currently hospitalized for recurrent SBO associated with intermittent abdominal pain and loss of ostomy function. She was managed conservatively but then was taken to the OR 6/9 evening for unresolving SBO.   Pt s/p exploratory laparotomy, extensive lysis of adhesions, take down of ECF, repair of serosal tear on cecum, partial right salpingoophorectomy, colostomy revision, parastomal and incisional hernia repair with strattice mesh.   Pt with hypotension and now with fevers. BCx and UCx neg, but combicath Cx with MRSA. Old ostomy site with reported purulent drainage.     Currently d/c Daptomycin.  -Can c/w vancomycin 1gm [id number], with vanc troughs  -c/w aztreonam and flagyl    follow stoma site  According to team seems to be improving. Would not make any major change in regimen at this time

## 2017-06-17 NOTE — PROGRESS NOTE ADULT - ASSESSMENT
ASSESSMENT  76y female with high volume ECF s/p exploratory laparotomy, extensive lysis of adhesions, takedown of enterocutaneous fistula, repair of serosal tear on cecum, partial right salpingooophorectomy, colostomy revision, parastomal and incisional hernia repair with strattice mesh, POD 8.     PLAN  - wean pressors, sedation as tolerated  - Cont. NGT to suction  - Broad spectrum abx   - Appreciate support care of the SICU  - Amio gtt for afib      Pager: [phone]

## 2017-06-17 NOTE — PROGRESS NOTE ADULT - SUBJECTIVE AND OBJECTIVE BOX
NEPHROLOGY-[facility]  Lin Cagle NP      Patient seen and examined. Remains intubated, on pressor support. Awake, OOB to chair. Daughter present.      MEDICATIONS  (STANDING):  levothyroxine Injectable 44MICROGram(s) IV Push daily  pantoprazole  Injectable 40milliGRAM(s) IV Push daily  metroNIDAZOLE  IVPB 500milliGRAM(s) IV Intermittent every 8 hours  aztreonam  IVPB 1000milliGRAM(s) IV Intermittent every 8 hours  fluconAZOLE IVPB  IV Intermittent   fluconAZOLE IVPB 100milliGRAM(s) IV Intermittent every 24 hours  chlorhexidine 0.12% Liquid 15milliLiter(s) Swish and Spit every 8 hours  fondaparinux Injectable 2.5milliGRAM(s) SubCutaneous every 24 hours  insulin lispro (HumaLOG) corrective regimen sliding scale  SubCutaneous every 6 hours  norepinephrine Infusion 0.02MICROgram(s)/kG/Min IV Continuous <Continuous>  vancomycin  IVPB  IV Intermittent   vancomycin  IVPB 1000milliGRAM(s) IV Intermittent every 24 hours      VITALS:  ICU Vital Signs Last 24 Hrs  T(C): 37.9, Max: 38 (06-16 @ 23:00)  T(F): 100.2, Max: 100.4 (06-16 @ 23:00)  HR: 96 (67 - 101)  BP: --  BP(mean): --  ABP: 138/58 (64/49 - 161/73)  ABP(mean): 89 (57 - 107)  RR: 24 (16 - 32)  SpO2: 100% (93% - 100%)        I and O's:  I & Os for 24h ending 06-17 @ 07:00  =============================================  IN: 4233.4 ml / OUT: 3795 ml / NET: 438.4 ml    I & Os for current day (as of 06-17 @ 10:23)  =============================================  IN: 1221.5 ml / OUT: 350 ml / NET: 871.5 ml        PHYSICAL EXAM:    Constitutional: NAD  HEENT: Atraumatic, Intubated     Neck:  No JVD  Respiratory: Coarse breath sounds B/L  Cardiovascular: S1 and S2  Gastrointestinal: Dressing to abomen  Extremities: Generalized edema  Neurological: Awake, alert  : Snyder catheter in place  Access: Not applicable    LABS:                        8.0    8.4   )-----------( 87       ( 17 Jun 2017 04:11 )             23.4     06-17    143  |  109<H>  |  45<H>  ----------------------------<  145<H>  3.9   |  22  |  1.23    Ca    7.6<L>      17 Jun 2017 04:11  Phos  2.5     06-17  Mg     2.2     06-17 16 Jun 2017 12:20    143    |  110<H>  |  45<H>  ----------------------------<  170<H>  4.1     |  21<L>  |  1.38<H>    Ca    7.6<L>      17 Jun 2017 04:11  Ca    7.8<L>      16 Jun 2017 12:20  Phos  2.5       17 Jun 2017 04:11  Phos  2.1<L>     16 Jun 2017 12:20  Mg     2.2       17 Jun 2017 04:11  Mg     2.5       16 Jun 2017 12:20    TPro  4.6<L>  /  Alb  1.8<L>  /  TBili  1.0    /  DBili  x      /  AST  20     /  ALT  18     /  AlkPhos  135<H>  17 Jun 2017 04:11        RADIOLOGY & ADDITIONAL STUDIES:    Assessment and Plan:   · Assessment		  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  Stable CKDIII  TPN ongoing.    TPN ongoing.  Can Phos be added to the TPN?.     Problem/Plan - 2:  ·  Problem: Hypotension, unspecified hypotension type.  Plan: Maintain MAP of 60  Taper pressors as tolerated by BP.     Problem/Plan - 3:  ·  Problem: Acute respiratory failure with hypoxia.  Plan: Vent management per SICU. Wean as tolerated    Problem/Plan - 4:  ·  Problem: Hypothyroidism, unspecified type.  Plan: c/w IV Synthroid.

## 2017-06-17 NOTE — PROGRESS NOTE ADULT - ATTENDING COMMENTS
Patient continues to remain critically ill on small dose of vasopressors.  1)Mentation improved. Patient is following commands and agreeing to treatments such as getting mobilized.      -dilaudid and tylenol PRN for pain  2)Acute hypoxemic respiratory failure-  Underwent a spontaneous breathing trial this morning and the patient did very well. She was successfully weaned to extubation     -patient is oxygenating appropriately     -CXR appears stable from prior, clear      -will give lasix 20 mg again because patient appears hypervolemic     -keep SpO2 >92%  3) Hypotension-levophed on small amount this am; sirs response to the MRSA pneumonia  4)Acute on chronic kidney insufficiency stage 3- this appears to be improving and will continue to monitor urine out with goal 0.05ml/h or greater     -adequate  urine output    5) atrial fibrillation earlier but now normal sinus rhythm- amiodarone discontinued today     6) anemia of chronic disease-stable     -goal to keep Hb >7  7) MRSA pneumonia-  right lower lobe       -begun on vancomycin  Budding yeast on UA, continue on fluconazole   ID consulted and recommendation to stop the Daptomycin    9) Hyperglycemia- continue insulin sliding scale   10) HIT- on fondaparineux for DVT prophylaxis  11)- protein calorie malnutrition- continue TPN       -LFTs appear normal, will continue to trend      -high NGT output        -ostomy slightly odorous and ischemic appearing, non functional will continue to monitor    12) thrombocytopenia- improving and no signs of bleeding  Plan for CT scan in the next 24-48 hours. Plan has been discussed with her daughter, Fe who is bedside.        I have spent 45 minutes of critical care time

## 2017-06-17 NOTE — CONSULT NOTE ADULT - SUBJECTIVE AND OBJECTIVE BOX
HPI:  76y Female with PMH of COPD, CHF, h/o DVT/PE s/p IVC filter (later removed), GERD, Hypothyroidism, h/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 Diff, and PSH of diverticulitis s/p Ruben's, recurrent SBO s/p SBR, ECF at small bowel anastomosis site s/p takedown, wound dehiscence, and abdominal reconstruction complicated by parastomal hernia and incisional hernia. She is currently hospitalized for recurrent SBO associated with intermittent abdominal pain and loss of ostomy function. She was managed conservatively but then was taken to the OR  evening for unresolving SBO.   Pt s/p exploratory laparotomy, extensive lysis of adhesions, take down of ECF, repair of serosal tear on cecum, partial right salpingoophorectomy, colostomy revision, parastomal and incisional hernia repair with strattice mesh. Since then, Pt has been hypotensive, requiring pressors. Pr has been on broad spectrum antibiotics emperically since - (Daptomycin, aztreonam, flagyl) and since - Vancomycin. Pt s/p trach on .   Pt with 1st fever on  of 100.5. Pt has had cultures sent of which BCx remained NGTD but combicath Cx from - MRSA.     ID consulted for the same.   Per Pt's daughter, Pt seems to be doing better than previously- more awake and alert. Pt still requiring pressors. Per RN- area of old ostomy- purulent drainage- was changed and packed this morning.        PAST MEDICAL & SURGICAL HISTORY:  Pulmonary embolism  Enterocutaneous fistula  Enterocutaneous fistula  Chronic diastolic CHF (congestive heart failure): mild diastolic dysfunction  Osteoarthritis of both knees, unspecified osteoarthritis type  Peripheral neuropathy  Clostridium difficile infection  HIT (heparin-induced thrombocytopenia)  Diverticulitis of intestine with abscess without bleedin  DVT (deep venous thrombosis): s/p IVC filter, which was later removed  Orthostatic hypotension  GERD (gastroesophageal reflux disease)  CHF (congestive heart failure)  Hypothyroid  Hypertension  COPD (chronic obstructive pulmonary disease)  CHF (congestive heart failure)  H/O: hypertension  Chronic obstructive pulmonary disease (COPD)  Colostomy in place: s/p duarte procedure for diverticulitis  Status post Ruben procedure: for diverticulitis  S/P exploratory laparotomy: EC fistula complicated with pelvic abscesses  Wound dehiscence: Wound dehiscence and evisceration, s/p abdominal reconstruction with biologic mesh  Sigmoid diverticulitis: Ex lap, sigmoid resection, creation of colostomy.  Intestinal perforation: 2015, s/p closure with strattice mesh  S/P colostomy      Allergies  azithromycin (Unknown)  codeine (Unknown)  heparin (Other)  PC Pen VK (Unknown)  penicillin (Unknown)        ANTIMICROBIALS:  metroNIDAZOLE  IVPB 500 every 8 hours (- onwards)  aztreonam  IVPB 1000 every 8 hours ( onwards)  DAPTOmycin IVPB  ( , , )  fluconAZOLE IVPB    DAPTOmycin IVPB 450 every 48 hours  fluconAZOLE IVPB 100 every 24 hours (- onwards)  vancomycin  IVPB    vancomycin  IVPB 1000 every 24 hours      OTHER MEDS: MEDICATIONS  (STANDING):  levothyroxine Injectable 44 daily  pantoprazole  Injectable 40 daily  ondansetron Injectable 4 every 6 hours PRN  diphenhydrAMINE   Injectable 25 every 4 hours PRN  HYDROmorphone  Injectable 0.2 every 2 hours PRN  ALBUTerol/ipratropium for Nebulization 3 every 6 hours PRN  dexmedetomidine Infusion 1 <Continuous>  fondaparinux Injectable 2.5 every 24 hours  insulin lispro (HumaLOG) corrective regimen sliding scale  every 6 hours  amiodarone Infusion 0.5 <Continuous>  norepinephrine Infusion 0.02 <Continuous>      SOCIAL HISTORY:  [ ] etoh [ ] tobacco [ ] former smoker [ ] IVDU    FAMILY HISTORY:  No pertinent family history in first degree relatives      REVIEW OF SYSTEMS  [  ] ROS unobtainable because:    [ x ] All other systems negative except as noted below:	    Constitutional:  [ ] fever [ ] weight loss  Skin:  [ ] rash [ ] phlebitis	  Eyes: [ ] icterus [ ] inflammation	  ENMT: [ ] discharge [ ] thrush [ ] ulcers [ ] exudates  Respiratory: [ ] dyspnea [ ] hemoptysis [ ] cough [ ] sputum	  Cardiovascular:  [ ] chest pain [ ] palpitations [ ] edema	  Gastrointestinal:  [ ] nausea [ ] vomiting [ ] diarrhea [ ] constipation [ ] pain	  Genitourinary:  [ ] dysuria [ ] frequency [ ] hematuria [ ] discharge [ ] flank pain  Musculoskeletal:  [ ] myalgias [ ] arthralgias [ ] arthritis	  Neurological:  [ ] headache [ ] seizures	  Psychiatric:  [ ] anxiety [ ] depression	  Hematology/Lymphatics:  [ ] lymphadenopathy  Endocrine:  [ ] adrenal [ ] thyroid  Allergic/Immunologic:	 [ ] transplant [ ] seasonal    Vital Signs Last 24 Hrs  T(F): 100.2, Max: 100.5 ( @ 10:00)  Wt(kg): --    Vital Signs Last 24 Hrs  HR: 80 (67 - 101)  BP: --  RR: 26  SpO2: 100% (93% - 100%)  Wt(kg): --    PHYSICAL EXAM:  General: NAD, sitting out of bed to chair  HEAD/EYES: anicteric, PERRL  ENT:  trach connected to mech vent.  Cardiovascular:   S1, S2  Respiratory:  mechanical BS b/l  GI:  obese- midline incision site, with staples- clean, no discharge. Colostomy +, LLQ- old ostomy- open area with exposed tissue- no drainage currently (recently changed dressing)  :  Snyder +  Skin:  no rash  Lymph: no lymphadenopathy  Vascular:  Rt IJ CL, Left PICC. Rt Fem A-line                                8.0    8.4   )-----------( 87       ( 2017 04:11 )             23.4           143  |  109<H>  |  45<H>  ----------------------------<  145<H>  3.9   |  22  |  1.23    Ca    7.6<L>      2017 04:11  Phos  2.5       Mg     2.2         TPro  4.6<L>  /  Alb  1.8<L>  /  TBili  1.0  /  DBili  x   /  AST  20  /  ALT  18  /  AlkPhos  135<H>            MICROBIOLOGY:  .Blood Blood-Venous  17   No growth to date.  --  --      .Blood Blood-Peripheral  17   No growth to date.  --  --      .Broncial Combicath  17   Moderate Methicillin resistant Staphylococcus aureus  Normal Respiratory Francesca present  --  Methicillin resistant Staphylococcus aureus      .Urine Catheterized  17   <10,000 CFU/ml  Normal Urogenital francesca present  --  --      .Blood Blood-Peripheral  17   Growth in anaerobic bottle: Coag Negative Staphylococcus  Single set isolate, possible contaminant. Contact  Microbiology if susceptibility testing clinically  indicated.  --    Growth in anaerobic bottle: Gram Positive Cocci in Clusters      .Blood Blood-Peripheral  17   No growth to date.  --  --      .Blood Blood  06-10-17   No growth at 5 days.  --  --              Vancomycin Level, Trough: 8.9 ug/mL ( @ 04:11)  v            RADIOLOGY: HPI:  76y Female with PMH of COPD, CHF, h/o DVT/PE s/p IVC filter (later removed), GERD, Hypothyroidism, h/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 Diff, and PSH of diverticulitis s/p Ruben's, recurrent SBO s/p SBR, ECF at small bowel anastomosis site s/p takedown, wound dehiscence, and abdominal reconstruction complicated by parastomal hernia and incisional hernia. She is currently hospitalized for recurrent SBO associated with intermittent abdominal pain and loss of ostomy function. She was managed conservatively but then was taken to the OR  evening for unresolving SBO.   Pt s/p exploratory laparotomy, extensive lysis of adhesions, take down of ECF, repair of serosal tear on cecum, partial right salpingoophorectomy, colostomy revision, parastomal and incisional hernia repair with strattice mesh. Since then, Pt has been hypotensive, requiring pressors. Pr has been on broad spectrum antibiotics emperically since - (Daptomycin, aztreonam, flagyl) and since - Vancomycin. Pt s/p trach on .   Pt with 1st fever on  of 100.5. Pt has had cultures sent of which BCx remained NGTD but combicath Cx from - MRSA.     ID consulted for the same.   Per Pt's daughter, Pt seems to be doing better than previously- more awake and alert. Pt still requiring pressors. Per RN- area of old ostomy- purulent drainage- was changed and packed this morning.        PAST MEDICAL & SURGICAL HISTORY:  Pulmonary embolism  Enterocutaneous fistula  Enterocutaneous fistula  Chronic diastolic CHF (congestive heart failure): mild diastolic dysfunction  Osteoarthritis of both knees, unspecified osteoarthritis type  Peripheral neuropathy  Clostridium difficile infection  HIT (heparin-induced thrombocytopenia)  Diverticulitis of intestine with abscess without bleedin  DVT (deep venous thrombosis): s/p IVC filter, which was later removed  Orthostatic hypotension  GERD (gastroesophageal reflux disease)  CHF (congestive heart failure)  Hypothyroid  Hypertension  COPD (chronic obstructive pulmonary disease)  CHF (congestive heart failure)  H/O: hypertension  Chronic obstructive pulmonary disease (COPD)  Colostomy in place: s/p duarte procedure for diverticulitis  Status post Ruben procedure: for diverticulitis  S/P exploratory laparotomy: EC fistula complicated with pelvic abscesses  Wound dehiscence: Wound dehiscence and evisceration, s/p abdominal reconstruction with biologic mesh  Sigmoid diverticulitis: Ex lap, sigmoid resection, creation of colostomy.  Intestinal perforation: 2015, s/p closure with strattice mesh  S/P colostomy      Allergies  azithromycin (Unknown)  codeine (Unknown)  heparin (Other)  PC Pen VK (Unknown)  penicillin (Unknown)        ANTIMICROBIALS:  metroNIDAZOLE  IVPB 500 every 8 hours (- onwards)  aztreonam  IVPB 1000 every 8 hours ( onwards)  DAPTOmycin IVPB  ( , , )  fluconAZOLE IVPB    DAPTOmycin IVPB 450 every 48 hours  fluconAZOLE IVPB 100 every 24 hours (- onwards)  vancomycin  IVPB    vancomycin  IVPB 1000 every 24 hours      OTHER MEDS: MEDICATIONS  (STANDING):  levothyroxine Injectable 44 daily  pantoprazole  Injectable 40 daily  ondansetron Injectable 4 every 6 hours PRN  diphenhydrAMINE   Injectable 25 every 4 hours PRN  HYDROmorphone  Injectable 0.2 every 2 hours PRN  ALBUTerol/ipratropium for Nebulization 3 every 6 hours PRN  dexmedetomidine Infusion 1 <Continuous>  fondaparinux Injectable 2.5 every 24 hours  insulin lispro (HumaLOG) corrective regimen sliding scale  every 6 hours  amiodarone Infusion 0.5 <Continuous>  norepinephrine Infusion 0.02 <Continuous>      SOCIAL HISTORY:  [ ] etoh [ ] tobacco [ ] former smoker [ ] IVDU    FAMILY HISTORY:  No pertinent family history in first degree relatives      REVIEW OF SYSTEMS  [  ] ROS unobtainable because:    [ x ] All other systems negative except as noted below:	    Constitutional:  [ ] fever [ ] weight loss  Skin:  [ ] rash [ ] phlebitis	  Eyes: [ ] icterus [ ] inflammation	  ENMT: [ ] discharge [ ] thrush [ ] ulcers [ ] exudates  Respiratory: [ ] dyspnea [ ] hemoptysis [ ] cough [ ] sputum	  Cardiovascular:  [ ] chest pain [ ] palpitations [ ] edema	  Gastrointestinal:  [ ] nausea [ ] vomiting [ ] diarrhea [ ] constipation [ ] pain	  Genitourinary:  [ ] dysuria [ ] frequency [ ] hematuria [ ] discharge [ ] flank pain  Musculoskeletal:  [ ] myalgias [ ] arthralgias [ ] arthritis	  Neurological:  [ ] headache [ ] seizures	  Psychiatric:  [ ] anxiety [ ] depression	  Hematology/Lymphatics:  [ ] lymphadenopathy  Endocrine:  [ ] adrenal [ ] thyroid  Allergic/Immunologic:	 [ ] transplant [ ] seasonal    Vital Signs Last 24 Hrs  T(F): 100.2, Max: 100.5 ( @ 10:00)  Wt(kg): --    Vital Signs Last 24 Hrs  HR: 80 (67 - 101)  BP: --  RR: 26  SpO2: 100% (93% - 100%)  Wt(kg): --    PHYSICAL EXAM:  General: NAD, sitting out of bed to chair  HEAD/EYES: anicteric, PERRL  ENT:  trach connected to mech vent.  Cardiovascular:   S1, S2  Respiratory:  mechanical BS b/l  GI:  obese- midline incision site, with staples- clean, no discharge. Colostomy +, LLQ- old ostomy- open area with exposed tissue- no drainage currently (recently changed dressing)  :  Snyder +  Skin:  no rash  Lymph: no lymphadenopathy  Vascular:  Rt IJ CL, Left PICC. Rt Fem A-line                                8.0    8.4   )-----------( 87       ( 2017 04:11 )             23.4           143  |  109<H>  |  45<H>  ----------------------------<  145<H>  3.9   |  22  |  1.23    Ca    7.6<L>      2017 04:11  Phos  2.5       Mg     2.2         TPro  4.6<L>  /  Alb  1.8<L>  /  TBili  1.0  /  DBili  x   /  AST  20  /  ALT  18  /  AlkPhos  135<H>            MICROBIOLOGY:  .Blood Blood-Venous  17   No growth to date.  --  --      .Blood Blood-Peripheral  17   No growth to date.  --  --      .Broncial Combicath  17   Moderate Methicillin resistant Staphylococcus aureus  Normal Respiratory Francesca present  --  Methicillin resistant Staphylococcus aureus      .Urine Catheterized  17   <10,000 CFU/ml  Normal Urogenital francesca present  --  --      .Blood Blood-Peripheral  17   Growth in anaerobic bottle: Coag Negative Staphylococcus  Single set isolate, possible contaminant. Contact  Microbiology if susceptibility testing clinically  indicated.  --    Growth in anaerobic bottle: Gram Positive Cocci in Clusters      .Blood Blood-Peripheral  17   No growth to date.  --  --      .Blood Blood  06-10-17   No growth at 5 days.  --  --              Vancomycin Level, Trough: 8.9 ug/mL ( @ 04:11)  v      RADIOLOGY:    EXAM:  CT ABDOMEN AND PELVIS OC IC                            PROCEDURE DATE:  2017            IMPRESSION:   1. Mild postoperative ileus. Trace free fluid.  2. No abscess.      RADIOLOGY: HPI:  76y Female with PMH of COPD, CHF, h/o DVT/PE s/p IVC filter (later removed), GERD, Hypothyroidism, h/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 Diff, and PSH of diverticulitis s/p Ruben's, recurrent SBO s/p SBR, ECF at small bowel anastomosis site s/p takedown, wound dehiscence, and abdominal reconstruction complicated by parastomal hernia and incisional hernia. She is currently hospitalized for recurrent SBO associated with intermittent abdominal pain and loss of ostomy function. She was managed conservatively but then was taken to the OR  evening for unresolving SBO.   Pt s/p exploratory laparotomy, extensive lysis of adhesions, take down of ECF, repair of serosal tear on cecum, partial right salpingoophorectomy, colostomy revision, parastomal and incisional hernia repair with strattice mesh. Since then, Pt has been hypotensive, requiring pressors. Pr has been on broad spectrum antibiotics emperically since - (Daptomycin, aztreonam, flagyl) and since - Vancomycin. Pt s/p trach on .   Pt with 1st fever on  of 100.5. Pt has had cultures sent of which BCx remained NGTD but combicath Cx from - MRSA.     ID consulted for the same.   Per Pt's daughter, Pt seems to be doing better than previously- more awake and alert. Pt still requiring pressors. Per RN- area of old ostomy- purulent drainage- was changed and packed this morning.        PAST MEDICAL & SURGICAL HISTORY:  Pulmonary embolism  Enterocutaneous fistula  Enterocutaneous fistula  Chronic diastolic CHF (congestive heart failure): mild diastolic dysfunction  Osteoarthritis of both knees, unspecified osteoarthritis type  Peripheral neuropathy  Clostridium difficile infection  HIT (heparin-induced thrombocytopenia)  Diverticulitis of intestine with abscess without bleedin  DVT (deep venous thrombosis): s/p IVC filter, which was later removed  Orthostatic hypotension  GERD (gastroesophageal reflux disease)  CHF (congestive heart failure)  Hypothyroid  Hypertension  COPD (chronic obstructive pulmonary disease)  CHF (congestive heart failure)  H/O: hypertension  Chronic obstructive pulmonary disease (COPD)  Colostomy in place: s/p duarte procedure for diverticulitis  Status post Ruben procedure: for diverticulitis  S/P exploratory laparotomy: EC fistula complicated with pelvic abscesses  Wound dehiscence: Wound dehiscence and evisceration, s/p abdominal reconstruction with biologic mesh  Sigmoid diverticulitis: Ex lap, sigmoid resection, creation of colostomy.  Intestinal perforation: 2015, s/p closure with strattice mesh  S/P colostomy      Allergies  azithromycin (Unknown)  codeine (Unknown)  heparin (Other)  PC Pen VK (Unknown)  penicillin - anaphylaxis        ANTIMICROBIALS:  metroNIDAZOLE  IVPB 500 every 8 hours (- onwards)  aztreonam  IVPB 1000 every 8 hours ( onwards)  DAPTOmycin IVPB  ( , , )  fluconAZOLE IVPB    DAPTOmycin IVPB 450 every 48 hours  fluconAZOLE IVPB 100 every 24 hours (- onwards)  vancomycin  IVPB    vancomycin  IVPB 1000 every 24 hours      OTHER MEDS: MEDICATIONS  (STANDING):  levothyroxine Injectable 44 daily  pantoprazole  Injectable 40 daily  ondansetron Injectable 4 every 6 hours PRN  diphenhydrAMINE   Injectable 25 every 4 hours PRN  HYDROmorphone  Injectable 0.2 every 2 hours PRN  ALBUTerol/ipratropium for Nebulization 3 every 6 hours PRN  dexmedetomidine Infusion 1 <Continuous>  fondaparinux Injectable 2.5 every 24 hours  insulin lispro (HumaLOG) corrective regimen sliding scale  every 6 hours  amiodarone Infusion 0.5 <Continuous>  norepinephrine Infusion 0.02 <Continuous>      SOCIAL HISTORY:  [ ] etoh [ ] tobacco [x ] former smoker [ ] IVDU    FAMILY HISTORY:  No pertinent family history in first degree relatives      REVIEW OF SYSTEMS  [x ] ROS unobtainable because:  Pt with trach, lethargic  [  ] All other systems negative except as noted below:	    Constitutional:  [ ] fever [ ] weight loss  Skin:  [ ] rash [ ] phlebitis	  Eyes: [ ] icterus [ ] inflammation	  ENMT: [ ] discharge [ ] thrush [ ] ulcers [ ] exudates  Respiratory: [ ] dyspnea [ ] hemoptysis [ ] cough [ ] sputum	  Cardiovascular:  [ ] chest pain [ ] palpitations [ ] edema	  Gastrointestinal:  [ ] nausea [ ] vomiting [ ] diarrhea [ ] constipation [ ] pain	  Genitourinary:  [ ] dysuria [ ] frequency [ ] hematuria [ ] discharge [ ] flank pain  Musculoskeletal:  [ ] myalgias [ ] arthralgias [ ] arthritis	  Neurological:  [ ] headache [ ] seizures	  Psychiatric:  [ ] anxiety [ ] depression	  Hematology/Lymphatics:  [ ] lymphadenopathy  Endocrine:  [ ] adrenal [ ] thyroid  Allergic/Immunologic:	 [ ] transplant [ ] seasonal    Vital Signs Last 24 Hrs  T(F): 100.2, Max: 100.5 ( @ 10:00)  Wt(kg): --    Vital Signs Last 24 Hrs  HR: 80 (67 - 101)  BP: --  RR: 26  SpO2: 100% (93% - 100%)  Wt(kg): --    PHYSICAL EXAM:  General: NAD, sitting out of bed to chair  HEAD/EYES: anicteric, PERRL  ENT:  trach connected to mech vent.  Cardiovascular:   S1, S2  Respiratory:  mechanical BS b/l  GI:  obese- midline incision site, with staples- clean, no discharge. Colostomy +, LLQ- old ostomy- open area with exposed tissue- no drainage currently (recently changed dressing)  :  Snyder +  Skin:  no rash  Lymph: no lymphadenopathy  Vascular:  Rt IJ CL, Left PICC. Rt Fem A-line. b/l edema all extremities                                8.0    8.4   )-----------( 87       ( 2017 04:11 )             23.4           143  |  109<H>  |  45<H>  ----------------------------<  145<H>  3.9   |  22  |  1.23    Ca    7.6<L>      2017 04:11  Phos  2.5       Mg     2.2         TPro  4.6<L>  /  Alb  1.8<L>  /  TBili  1.0  /  DBili  x   /  AST  20  /  ALT  18  /  AlkPhos  135<H>            MICROBIOLOGY:  .Blood Blood-Venous  17   No growth to date.  --  --      .Blood Blood-Peripheral  17   No growth to date.  --  --      .Broncial Combicath  17   Moderate Methicillin resistant Staphylococcus aureus  Normal Respiratory Francesca present  --  Methicillin resistant Staphylococcus aureus      .Urine Catheterized  17   <10,000 CFU/ml  Normal Urogenital francesca present  --  --      .Blood Blood-Peripheral  17   Growth in anaerobic bottle: Coag Negative Staphylococcus  Single set isolate, possible contaminant. Contact  Microbiology if susceptibility testing clinically  indicated.  --    Growth in anaerobic bottle: Gram Positive Cocci in Clusters      .Blood Blood-Peripheral  17   No growth to date.  --  --      .Blood Blood  06-10-17   No growth at 5 days.  --  --              Vancomycin Level, Trough: 8.9 ug/mL ( @ 04:11)  v      RADIOLOGY:    EXAM:  CT ABDOMEN AND PELVIS OC IC                            PROCEDURE DATE:  2017            IMPRESSION:   1. Mild postoperative ileus. Trace free fluid.  2. No abscess. HPI:  76y Female with PMH of COPD, CHF, h/o DVT/PE s/p IVC filter (later removed), GERD, Hypothyroidism, h/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 Diff, and PSH of diverticulitis s/p Ruben's, recurrent SBO s/p SBR, ECF at small bowel anastomosis site s/p takedown, wound dehiscence, and abdominal reconstruction complicated by parastomal hernia and incisional hernia. She is currently hospitalized for recurrent SBO associated with intermittent abdominal pain and loss of ostomy function. She was managed conservatively but then was taken to the OR  evening for unresolving SBO.   Pt s/p exploratory laparotomy, extensive lysis of adhesions, take down of ECF, repair of serosal tear on cecum, partial right salpingoophorectomy, colostomy revision, parastomal and incisional hernia repair with strattice mesh. Since then, Pt has been hypotensive, requiring pressors. Pr has been on broad spectrum antibiotics emperically since - (Daptomycin, aztreonam, flagyl) and since - Vancomycin. Pt s/p trach on .   Pt with 1st fever on  of 100.5. Pt has had cultures sent of which BCx remained NGTD but combicath Cx from - MRSA.     ID consulted for the same.   Per Pt's daughter, Pt seems to be doing better than previously- more awake and alert. Pt still requiring pressors. Per RN- area of old ostomy- purulent drainage- was changed and packed this morning.        PAST MEDICAL & SURGICAL HISTORY:  Pulmonary embolism  Enterocutaneous fistula  Enterocutaneous fistula  Chronic diastolic CHF (congestive heart failure): mild diastolic dysfunction  Osteoarthritis of both knees, unspecified osteoarthritis type  Peripheral neuropathy  Clostridium difficile infection  HIT (heparin-induced thrombocytopenia)  Diverticulitis of intestine with abscess without bleedin  DVT (deep venous thrombosis): s/p IVC filter, which was later removed  Orthostatic hypotension  GERD (gastroesophageal reflux disease)  CHF (congestive heart failure)  Hypothyroid  Hypertension  COPD (chronic obstructive pulmonary disease)  CHF (congestive heart failure)  H/O: hypertension  Chronic obstructive pulmonary disease (COPD)  Colostomy in place: s/p duarte procedure for diverticulitis  Status post Ruben procedure: for diverticulitis  S/P exploratory laparotomy: EC fistula complicated with pelvic abscesses  Wound dehiscence: Wound dehiscence and evisceration, s/p abdominal reconstruction with biologic mesh  Sigmoid diverticulitis: Ex lap, sigmoid resection, creation of colostomy.  Intestinal perforation: 2015, s/p closure with strattice mesh  S/P colostomy      Allergies  azithromycin (Unknown)  codeine (Unknown)  heparin (Other)  PC Pen VK (Unknown)  penicillin - anaphylaxis        ANTIMICROBIALS:  metroNIDAZOLE  IVPB 500 every 8 hours (- onwards)  aztreonam  IVPB 1000 every 8 hours ( onwards)  DAPTOmycin IVPB  ( , , )  fluconAZOLE IVPB    DAPTOmycin IVPB 450 every 48 hours  fluconAZOLE IVPB 100 every 24 hours (- onwards)  vancomycin  IVPB    vancomycin  IVPB 1000 every 24 hours      OTHER MEDS: MEDICATIONS  (STANDING):  levothyroxine Injectable 44 daily  pantoprazole  Injectable 40 daily  ondansetron Injectable 4 every 6 hours PRN  diphenhydrAMINE   Injectable 25 every 4 hours PRN  HYDROmorphone  Injectable 0.2 every 2 hours PRN  ALBUTerol/ipratropium for Nebulization 3 every 6 hours PRN  dexmedetomidine Infusion 1 <Continuous>  fondaparinux Injectable 2.5 every 24 hours  insulin lispro (HumaLOG) corrective regimen sliding scale  every 6 hours  amiodarone Infusion 0.5 <Continuous>  norepinephrine Infusion 0.02 <Continuous>      SOCIAL HISTORY:  [ ] etoh [ ] tobacco [x ] former smoker [ ] IVDU    FAMILY HISTORY:  No pertinent family history in first degree relatives      REVIEW OF SYSTEMS  [x ] ROS unobtainable because:  Pt with trach, lethargic   According to daughter- increased secretions from mouth than ET tube  Pt more responsive today  [  ] All other systems negative except as noted below:	    Constitutional:  [ ] fever [ ] weight loss  Skin:  [ ] rash [ ] phlebitis	  Eyes: [ ] icterus [ ] inflammation	  ENMT: [ ] discharge [ ] thrush [ ] ulcers [ ] exudates  Respiratory: [ ] dyspnea [ ] hemoptysis [ ] cough [ ] sputum	  Cardiovascular:  [ ] chest pain [ ] palpitations [ ] edema	  Gastrointestinal:  [ ] nausea [ ] vomiting [ ] diarrhea [ ] constipation [ ] pain	  Genitourinary:  [ ] dysuria [ ] frequency [ ] hematuria [ ] discharge [ ] flank pain  Musculoskeletal:  [ ] myalgias [ ] arthralgias [ ] arthritis	  Neurological:  [ ] headache [ ] seizures	  Psychiatric:  [ ] anxiety [ ] depression	  Hematology/Lymphatics:  [ ] lymphadenopathy  Endocrine:  [ ] adrenal [ ] thyroid  Allergic/Immunologic:	 [ ] transplant [ ] seasonal    Vital Signs Last 24 Hrs  T(F): 100.2, Max: 100.5 ( @ 10:00)  Wt(kg): --    Vital Signs Last 24 Hrs  HR: 80 (67 - 101)  BP: --  RR: 26  SpO2: 100% (93% - 100%)  Wt(kg): --    PHYSICAL EXAM:  General: NAD, sitting out of bed to chair  HEAD/EYES: anicteric, PERRL  ENT:  trach connected to mech vent.  Cardiovascular:   S1, S2  Respiratory:  mechanical BS b/l  GI:  obese- midline incision site, with staples- clean, no discharge. Colostomy +, LLQ- old ostomy- open area with exposed tissue- no drainage currently (recently changed dressing)  Ostomy with some fibrinous exudate, slightly dusky?  ostomy functioning  :  Snyder +  Skin:  no rash  Lymph: no lymphadenopathy  Vascular:  Rt IJ CL, Left PICC. Rt Fem A-line. b/l edema all extremities                                8.0    8.4   )-----------( 87       ( 2017 04:11 )             23.4           143  |  109<H>  |  45<H>  ----------------------------<  145<H>  3.9   |  22  |  1.23    Ca    7.6<L>      2017 04:11  Phos  2.5       Mg     2.2         TPro  4.6<L>  /  Alb  1.8<L>  /  TBili  1.0  /  DBili  x   /  AST  20  /  ALT  18  /  AlkPhos  135<H>            MICROBIOLOGY:  .Blood Blood-Venous  17   No growth to date.  --  --      .Blood Blood-Peripheral  17   No growth to date.  --  --      .Broncial Combicath  17   Moderate Methicillin resistant Staphylococcus aureus  Normal Respiratory Francesca present  --  Methicillin resistant Staphylococcus aureus      .Urine Catheterized  17   <10,000 CFU/ml  Normal Urogenital francesca present  --  --      .Blood Blood-Peripheral  17   Growth in anaerobic bottle: Coag Negative Staphylococcus  Single set isolate, possible contaminant. Contact  Microbiology if susceptibility testing clinically  indicated.  --    Growth in anaerobic bottle: Gram Positive Cocci in Clusters      .Blood Blood-Peripheral  17   No growth to date.  --  --      .Blood Blood  06-10-17   No growth at 5 days.  --  --              Vancomycin Level, Trough: 8.9 ug/mL ( @ 04:11)  v      RADIOLOGY:    EXAM:  CT ABDOMEN AND PELVIS OC IC                            PROCEDURE DATE:  2017            IMPRESSION:   1. Mild postoperative ileus. Trace free fluid.  2. No abscess.

## 2017-06-17 NOTE — PROGRESS NOTE ADULT - SUBJECTIVE AND OBJECTIVE BOX
[facility] Trauma/Acute Care [place] Progress Note    HOSPITAL DAY #:26  POST OPERATIVE DAY #:  8  STATUS POST:  exploratory laparotomy, extensive lysis of adhesions, takedown of enterocutaneous fistula, repair of serosal tear on cecum, partial right salpingooophorectomy, colostomy revision, parastomal and incisional hernia repair with strattice mesh                    INTERVAL EVENTS: No events o/n. Patient resting in bed.    SUBJECTIVE: Pt is intubated. Unable to obtain ROS.     OBJECTIVE:     Constitutional: Intubated, eyes open spontaneously.  Respiratory: A/C vent; PEEP 5 / FiO2 30%  Cardiovascular: sinus  Gastrointestinal: soft, distended, ostomy mucosa appears congested, dark but viable, no fxn. NGT in place: bilious, old colostomy site, packed  Ext: b/l UE edema      Vital Signs Last 24 Hrs    I&O's Detail      LABS: [facility] Trauma/Acute Care [place] Progress Note    HOSPITAL DAY #:26  POST OPERATIVE DAY #:  8  STATUS POST:  exploratory laparotomy, extensive lysis of adhesions, takedown of enterocutaneous fistula, repair of serosal tear on cecum, partial right salpingooophorectomy, colostomy revision, parastomal and incisional hernia repair with strattice mesh                    INTERVAL EVENTS: No events o/n. Patient resting in bed.    SUBJECTIVE: Pt is intubated. Unable to obtain ROS.     OBJECTIVE:     Constitutional: Intubated, eyes open spontaneously.  Respiratory: A/C vent; PEEP 5 / FiO2 30%  Cardiovascular: sinus  Gastrointestinal: soft, distended, ostomy mucosa appears congested, dark but viable, no fxn. NGT in place: bilious, old colostomy site, packed  Ext: b/l UE edema    MEDICATIONS  (STANDING):  lactated ringers. 1000milliLiter(s) IV Continuous <Continuous>  levothyroxine Injectable 44MICROGram(s) IV Push daily  pantoprazole  Injectable 40milliGRAM(s) IV Push daily  metroNIDAZOLE  IVPB 500milliGRAM(s) IV Intermittent every 8 hours  aztreonam  IVPB 1000milliGRAM(s) IV Intermittent every 8 hours  DAPTOmycin IVPB     fluconAZOLE IVPB  IV Intermittent   DAPTOmycin IVPB 450milliGRAM(s) IV Intermittent every 48 hours  fluconAZOLE IVPB 100milliGRAM(s) IV Intermittent every 24 hours  dexmedetomidine Infusion 1MICROgram(s)/kG/Hr IV Continuous <Continuous>  chlorhexidine 0.12% Liquid 15milliLiter(s) Swish and Spit every 8 hours  fondaparinux Injectable 2.5milliGRAM(s) SubCutaneous every 24 hours  insulin lispro (HumaLOG) corrective regimen sliding scale  SubCutaneous every 6 hours  amiodarone Infusion 0.5mG/Min IV Continuous <Continuous>  norepinephrine Infusion 0.02MICROgram(s)/kG/Min IV Continuous <Continuous>  vancomycin  IVPB  IV Intermittent   vancomycin  IVPB 1000milliGRAM(s) IV Intermittent every 24 hours  potassium chloride  10 mEq/100 mL IVPB 10milliEquivalent(s) IV Intermittent every 1 hour  sodium phosphate IVPB 15milliMole(s) IV Intermittent once  HYDROmorphone  Injectable 0.2milliGRAM(s) IV Push once    MEDICATIONS  (PRN):  ondansetron Injectable 4milliGRAM(s) IV Push every 6 hours PRN Nausea  diphenhydrAMINE   Injectable 25milliGRAM(s) IV Push every 4 hours PRN Pruritus  HYDROmorphone  Injectable 0.2milliGRAM(s) IV Push every 2 hours PRN Breakthru Pain  ALBUTerol/ipratropium for Nebulization 3milliLiter(s) Nebulizer every 6 hours PRN Shortness of Breath and/or Wheezing      Vital Signs Last 24 Hrs  T(C): 38, Max: 38.1 (-16 @ 10:00)  T(F): 100.4, Max: 100.5 (06-16 @ 10:00)  HR: 84 (67 - 95)  BP: --  BP(mean): --  RR: 26 (22 - 37)  SpO2: 100% (96% - 100%)    I&O's Detail  I & Os for 24h ending 2017 07:00  =============================================  IN:    TPN (Total Parenteral Nutrition): 1488 ml    lactated ringers.: 1030 ml    amiodarone Infusion: 400.8 ml    Solution: 300 ml    Solution: 200 ml    dexmedetomidine Infusion: 138.2 ml    Solution: 125 ml    norepinephrine Infusion: 48 ml    norepinephrine Infusion: 32.8 ml    Total IN: 3762.8 ml  ---------------------------------------------  OUT:    Indwelling Catheter - Urethral: 2400 ml    Nasoenteral Tube: 1200 ml    Total OUT: 3600 ml  ---------------------------------------------  Total NET: 162.8 ml    I & Os for current day (as of 2017 06:51)  =============================================  IN:    TPN (Total Parenteral Nutrition): 1364 ml    lactated ringers.: 630 ml    amiodarone Infusion: 367.4 ml    Solution: 300 ml    Solution: 250 ml    Solution: 250 ml    dexmedetomidine Infusion: 206.8 ml    Solution: 200 ml    norepinephrine Infusion: 195 ml    Solution: 100 ml    Solution: 100 ml    Solution: 50 ml    Total IN: 4013.2 ml  ---------------------------------------------  OUT:    Indwelling Catheter - Urethral: 2725 ml    Nasoenteral Tube: 400 ml    Total OUT: 3125 ml  ---------------------------------------------  Total NET: 888.2 ml      Daily     Daily Weight in k.6 (2017 01:00)    LABS:                        8.0    8.4   )-----------( 87       ( 2017 04:11 )             23.4     06    143  |  109<H>  |  45<H>  ----------------------------<  145<H>  3.9   |  22  |  1.23    Ca    7.6<L>      2017 04:11  Phos  2.5       Mg     2.2         TPro  4.6<L>  /  Alb  1.8<L>  /  TBili  1.0  /  DBili  x   /  AST  20  /  ALT  18  /  AlkPhos  135<H>  17    PT/INR - ( 2017 04:11 )   PT: 18.3 sec;   INR: 1.66 ratio         PTT - ( 2017 04:11 )  PTT:35.7 sec

## 2017-06-18 LAB
ANION GAP SERPL CALC-SCNC: 13 MMOL/L — SIGNIFICANT CHANGE UP (ref 5–17)
APTT BLD: 30.4 SEC — SIGNIFICANT CHANGE UP (ref 27.5–37.4)
BLD GP AB SCN SERPL QL: NEGATIVE — SIGNIFICANT CHANGE UP
BUN SERPL-MCNC: 45 MG/DL — HIGH (ref 7–23)
CALCIUM SERPL-MCNC: 7.6 MG/DL — LOW (ref 8.4–10.5)
CHLORIDE SERPL-SCNC: 105 MMOL/L — SIGNIFICANT CHANGE UP (ref 96–108)
CO2 SERPL-SCNC: 22 MMOL/L — SIGNIFICANT CHANGE UP (ref 22–31)
CREAT SERPL-MCNC: 1.16 MG/DL — SIGNIFICANT CHANGE UP (ref 0.5–1.3)
GLUCOSE SERPL-MCNC: 153 MG/DL — HIGH (ref 70–99)
HCT VFR BLD CALC: 21.4 % — LOW (ref 34.5–45)
HCT VFR BLD CALC: 27.7 % — LOW (ref 34.5–45)
HGB BLD-MCNC: 7.3 G/DL — LOW (ref 11.5–15.5)
HGB BLD-MCNC: 9 G/DL — LOW (ref 11.5–15.5)
INR BLD: 1.59 RATIO — HIGH (ref 0.88–1.16)
MAGNESIUM SERPL-MCNC: 2.1 MG/DL — SIGNIFICANT CHANGE UP (ref 1.6–2.6)
MCHC RBC-ENTMCNC: 31.8 PG — SIGNIFICANT CHANGE UP (ref 27–34)
MCHC RBC-ENTMCNC: 32.4 GM/DL — SIGNIFICANT CHANGE UP (ref 32–36)
MCHC RBC-ENTMCNC: 34.1 GM/DL — SIGNIFICANT CHANGE UP (ref 32–36)
MCHC RBC-ENTMCNC: 35 PG — HIGH (ref 27–34)
MCV RBC AUTO: 103 FL — HIGH (ref 80–100)
MCV RBC AUTO: 98.1 FL — SIGNIFICANT CHANGE UP (ref 80–100)
PHOSPHATE SERPL-MCNC: 3.2 MG/DL — SIGNIFICANT CHANGE UP (ref 2.5–4.5)
PLATELET # BLD AUTO: 69 K/UL — LOW (ref 150–400)
PLATELET # BLD AUTO: 79 K/UL — LOW (ref 150–400)
POTASSIUM SERPL-MCNC: 4.2 MMOL/L — SIGNIFICANT CHANGE UP (ref 3.5–5.3)
POTASSIUM SERPL-SCNC: 4.2 MMOL/L — SIGNIFICANT CHANGE UP (ref 3.5–5.3)
PROTHROM AB SERPL-ACNC: 17.3 SEC — HIGH (ref 9.8–12.7)
RBC # BLD: 2.08 M/UL — LOW (ref 3.8–5.2)
RBC # BLD: 2.83 M/UL — LOW (ref 3.8–5.2)
RBC # FLD: 15.2 % — HIGH (ref 10.3–14.5)
RBC # FLD: 16 % — HIGH (ref 10.3–14.5)
RH IG SCN BLD-IMP: POSITIVE — SIGNIFICANT CHANGE UP
SODIUM SERPL-SCNC: 140 MMOL/L — SIGNIFICANT CHANGE UP (ref 135–145)
WBC # BLD: 6 K/UL — SIGNIFICANT CHANGE UP (ref 3.8–10.5)
WBC # BLD: 6.7 K/UL — SIGNIFICANT CHANGE UP (ref 3.8–10.5)
WBC # FLD AUTO: 6 K/UL — SIGNIFICANT CHANGE UP (ref 3.8–10.5)
WBC # FLD AUTO: 6.7 K/UL — SIGNIFICANT CHANGE UP (ref 3.8–10.5)

## 2017-06-18 PROCEDURE — 71010: CPT | Mod: 26

## 2017-06-18 PROCEDURE — 99232 SBSQ HOSP IP/OBS MODERATE 35: CPT

## 2017-06-18 PROCEDURE — 99233 SBSQ HOSP IP/OBS HIGH 50: CPT

## 2017-06-18 PROCEDURE — 71010: CPT | Mod: 26,77

## 2017-06-18 PROCEDURE — 74000: CPT | Mod: 26

## 2017-06-18 PROCEDURE — 93010 ELECTROCARDIOGRAM REPORT: CPT

## 2017-06-18 RX ORDER — HYDROMORPHONE HYDROCHLORIDE 2 MG/ML
0.2 INJECTION INTRAMUSCULAR; INTRAVENOUS; SUBCUTANEOUS ONCE
Qty: 0 | Refills: 0 | Status: DISCONTINUED | OUTPATIENT
Start: 2017-06-18 | End: 2017-06-18

## 2017-06-18 RX ORDER — TRAMADOL HYDROCHLORIDE 50 MG/1
50 TABLET ORAL ONCE
Qty: 0 | Refills: 0 | Status: DISCONTINUED | OUTPATIENT
Start: 2017-06-18 | End: 2017-06-18

## 2017-06-18 RX ORDER — FUROSEMIDE 40 MG
20 TABLET ORAL ONCE
Qty: 0 | Refills: 0 | Status: COMPLETED | OUTPATIENT
Start: 2017-06-18 | End: 2017-06-18

## 2017-06-18 RX ORDER — IPRATROPIUM/ALBUTEROL SULFATE 18-103MCG
3 AEROSOL WITH ADAPTER (GRAM) INHALATION ONCE
Qty: 0 | Refills: 0 | Status: COMPLETED | OUTPATIENT
Start: 2017-06-18 | End: 2017-06-18

## 2017-06-18 RX ADMIN — Medication 3 MILLILITER(S): at 17:26

## 2017-06-18 RX ADMIN — HYDROMORPHONE HYDROCHLORIDE 0.5 MILLIGRAM(S): 2 INJECTION INTRAMUSCULAR; INTRAVENOUS; SUBCUTANEOUS at 08:00

## 2017-06-18 RX ADMIN — HYDROMORPHONE HYDROCHLORIDE 0.2 MILLIGRAM(S): 2 INJECTION INTRAMUSCULAR; INTRAVENOUS; SUBCUTANEOUS at 02:31

## 2017-06-18 RX ADMIN — HYDROMORPHONE HYDROCHLORIDE 0.5 MILLIGRAM(S): 2 INJECTION INTRAMUSCULAR; INTRAVENOUS; SUBCUTANEOUS at 21:35

## 2017-06-18 RX ADMIN — HYDROMORPHONE HYDROCHLORIDE 0.5 MILLIGRAM(S): 2 INJECTION INTRAMUSCULAR; INTRAVENOUS; SUBCUTANEOUS at 04:07

## 2017-06-18 RX ADMIN — Medication 100 MILLIGRAM(S): at 14:22

## 2017-06-18 RX ADMIN — Medication 50 MILLIGRAM(S): at 21:38

## 2017-06-18 RX ADMIN — Medication 50 MILLIGRAM(S): at 06:46

## 2017-06-18 RX ADMIN — HYDROMORPHONE HYDROCHLORIDE 0.5 MILLIGRAM(S): 2 INJECTION INTRAMUSCULAR; INTRAVENOUS; SUBCUTANEOUS at 04:22

## 2017-06-18 RX ADMIN — Medication 250 MILLIGRAM(S): at 11:47

## 2017-06-18 RX ADMIN — Medication 44 MICROGRAM(S): at 06:46

## 2017-06-18 RX ADMIN — HYDROMORPHONE HYDROCHLORIDE 0.5 MILLIGRAM(S): 2 INJECTION INTRAMUSCULAR; INTRAVENOUS; SUBCUTANEOUS at 01:20

## 2017-06-18 RX ADMIN — Medication 3 MILLILITER(S): at 11:44

## 2017-06-18 RX ADMIN — Medication 3 MILLILITER(S): at 06:20

## 2017-06-18 RX ADMIN — HYDROMORPHONE HYDROCHLORIDE 0.5 MILLIGRAM(S): 2 INJECTION INTRAMUSCULAR; INTRAVENOUS; SUBCUTANEOUS at 08:15

## 2017-06-18 RX ADMIN — HYDROMORPHONE HYDROCHLORIDE 0.2 MILLIGRAM(S): 2 INJECTION INTRAMUSCULAR; INTRAVENOUS; SUBCUTANEOUS at 02:17

## 2017-06-18 RX ADMIN — PANTOPRAZOLE SODIUM 40 MILLIGRAM(S): 20 TABLET, DELAYED RELEASE ORAL at 11:47

## 2017-06-18 RX ADMIN — Medication 100 MILLIGRAM(S): at 06:47

## 2017-06-18 RX ADMIN — HYDROMORPHONE HYDROCHLORIDE 0.5 MILLIGRAM(S): 2 INJECTION INTRAMUSCULAR; INTRAVENOUS; SUBCUTANEOUS at 11:45

## 2017-06-18 RX ADMIN — FONDAPARINUX SODIUM 2.5 MILLIGRAM(S): 2.5 INJECTION, SOLUTION SUBCUTANEOUS at 11:44

## 2017-06-18 RX ADMIN — FLUCONAZOLE 100 MILLIGRAM(S): 150 TABLET ORAL at 14:22

## 2017-06-18 RX ADMIN — HYDROMORPHONE HYDROCHLORIDE 0.5 MILLIGRAM(S): 2 INJECTION INTRAMUSCULAR; INTRAVENOUS; SUBCUTANEOUS at 21:50

## 2017-06-18 RX ADMIN — Medication 20 MILLIGRAM(S): at 11:38

## 2017-06-18 RX ADMIN — Medication 50 MILLIGRAM(S): at 14:22

## 2017-06-18 RX ADMIN — Medication 100 MILLIGRAM(S): at 21:37

## 2017-06-18 RX ADMIN — Medication 3 MILLILITER(S): at 02:01

## 2017-06-18 RX ADMIN — Medication 400 MILLIGRAM(S): at 11:37

## 2017-06-18 RX ADMIN — HYDROMORPHONE HYDROCHLORIDE 0.5 MILLIGRAM(S): 2 INJECTION INTRAMUSCULAR; INTRAVENOUS; SUBCUTANEOUS at 18:30

## 2017-06-18 RX ADMIN — HYDROMORPHONE HYDROCHLORIDE 0.5 MILLIGRAM(S): 2 INJECTION INTRAMUSCULAR; INTRAVENOUS; SUBCUTANEOUS at 00:09

## 2017-06-18 RX ADMIN — Medication 400 MILLIGRAM(S): at 04:14

## 2017-06-18 RX ADMIN — HYDROMORPHONE HYDROCHLORIDE 0.5 MILLIGRAM(S): 2 INJECTION INTRAMUSCULAR; INTRAVENOUS; SUBCUTANEOUS at 19:31

## 2017-06-18 NOTE — PROGRESS NOTE ADULT - ATTENDING COMMENTS
Patient was extubated yesterday and the vasopressors discontinued.   1)Mentation improved. Patient complaining of not being able to sleep.     -dilaudid and tylenol PRN for pain  2)Extubated yesterday. Currently on room air.      -patient is oxygenating appropriately     -CXR pending      -will give lasix 20 mg again because patient appears hypervolemic     -keep SpO2 >92%  3) Hypotension improved and patient is normotensive at this time.   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 Plan has been discussed with her daughter who is bedside.        WIll continue to monitor and continues in a guarded position.

## 2017-06-18 NOTE — PROGRESS NOTE ADULT - ASSESSMENT
76y Female with PMH of COPD, CHF, h/o DVT/PE s/p IVC filter (later removed), GERD, Hypothyroidism, h/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 Diff, and PSH of diverticulitis s/p Ruben's, recurrent SBO s/p SBR, ECF at small bowel anastomosis site s/p takedown, wound dehiscence, and abdominal reconstruction complicated by parastomal hernia and incisional hernia. She is currently hospitalized for recurrent SBO associated with intermittent abdominal pain and loss of ostomy function. She was managed conservatively but then was taken to the OR 6/9 evening for unresolving SBO.   Pt s/p exploratory laparotomy, extensive lysis of adhesions, take down of ECF, repair of serosal tear on cecum, partial right salpingoophorectomy, colostomy revision, parastomal and incisional hernia repair with strattice mesh. Since then, Pt has been hypotensive, requiring pressors. Pr has been on broad spectrum antibiotics emperically since 6/9- (Daptomycin, aztreonam, flagyl) and since 6/16- Vancomycin. Pt s/p trach on 6/12.   Pt with 1st fever on 6/16 of 100.5. Pt has had cultures sent of which BCx remained NGTD but combicath Cx from 6/12- MRSA.   Patient now extubated and temps ok  Day # 8flagyl, aztreonam  day # 7 diflucan  day # 3 vanco    will discuss duration of antibiotics with team  Pt clinically improving- now extubated , afebrile  follow status of wound and ostomy

## 2017-06-18 NOTE — PROGRESS NOTE ADULT - ASSESSMENT
ASSESSMENT  76y female with high volume ECF s/p exploratory laparotomy, extensive lysis of adhesions, takedown of enterocutaneous fistula, repair of serosal tear on cecum, partial right salpingooophorectomy, colostomy revision, parastomal and incisional hernia repair with strattice mesh, POD 9.     PLAN  - wean pressors  - Cont. NGT to suction  - Broad spectrum abx   - Appreciate support care of the SICU  - Amio gtt for afib      Pager: [phone]

## 2017-06-18 NOTE — PROGRESS NOTE ADULT - SUBJECTIVE AND OBJECTIVE BOX
Interval History/ROS:Patient is a 76y old  Female who presents with a chief complaint of Abdominal pain (26 May 2017 11:33)  Pt now extubated sitting in chair        PAST MEDICAL & SURGICAL HISTORY:  Pulmonary embolism  Enterocutaneous fistula  Enterocutaneous fistula  Chronic diastolic CHF (congestive heart failure): mild diastolic dysfunction  Osteoarthritis of both knees, unspecified osteoarthritis type  Peripheral neuropathy  Clostridium difficile infection  HIT (heparin-induced thrombocytopenia)  Diverticulitis of intestine with abscess without bleedin  DVT (deep venous thrombosis): s/p IVC filter, which was later removed  Orthostatic hypotension  GERD (gastroesophageal reflux disease)  CHF (congestive heart failure)  Hypothyroid  Hypertension  COPD (chronic obstructive pulmonary disease)  CHF (congestive heart failure)  H/O: hypertension  Chronic obstructive pulmonary disease (COPD)  Colostomy in place: s/p duarte procedure for diverticulitis  Status post Ruben procedure: for diverticulitis  S/P exploratory laparotomy: EC fistula complicated with pelvic abscesses  Wound dehiscence: Wound dehiscence and evisceration, s/p abdominal reconstruction with biologic mesh  Sigmoid diverticulitis: Ex lap, sigmoid resection, creation of colostomy.  Intestinal perforation: 2015, s/p closure with strattice mesh  S/P colostomy      Allergies    azithromycin (Unknown)  codeine (Unknown)  heparin (Other)  PC Pen VK (Unknown)  penicillin (Unknown)    Intolerances        ANTIMICROBIALS:  metroNIDAZOLE  IVPB 500 every 8 hours  aztreonam  IVPB 1000 every 8 hours  fluconAZOLE IVPB    fluconAZOLE IVPB 100 every 24 hours  vancomycin  IVPB    vancomycin  IVPB 1000 every 24 hours      OTHER MEDS:  levothyroxine Injectable 44MICROGram(s) IV Push daily  pantoprazole  Injectable 40milliGRAM(s) IV Push daily  ondansetron Injectable 4milliGRAM(s) IV Push every 6 hours PRN  fondaparinux Injectable 2.5milliGRAM(s) SubCutaneous every 24 hours  insulin lispro (HumaLOG) corrective regimen sliding scale  SubCutaneous every 6 hours  ALBUTerol/ipratropium for Nebulization 3milliLiter(s) Nebulizer every 6 hours  HYDROmorphone  Injectable 0.5milliGRAM(s) IV Push every 3 hours PRN      Vital Signs Last 24 Hrs  T(C): 37.1, Max: 37.6 (-18 @ 06:00)  T(F): 98.8, Max: 99.6 (18 @ 06:00)  HR: 106 (88 - 110)  BP: 120/45 (120/45 - 120/45)  BP(mean): 75 (75 - 75)  RR: 22 (15 - 26)  SpO2: 97% (89% - 100%)                                  7.3    6.0   )-----------( 69       ( 2017 05:13 )             21.4       -18    140  |  105  |  45<H>  ----------------------------<  153<H>  4.2   |  22  |  1.16    Ca    7.6<L>      2017 05:13  Phos  3.2     18  Mg     2.1     18    TPro  4.6<L>  /  Alb  1.8<L>  /  TBili  1.0  /  DBili  x   /  AST  20  /  ALT  18  /  AlkPhos  135<H>      LIVER FUNCTIONS - ( 2017 04:11 )  Alb: 1.8 g/dL / Pro: 4.6 g/dL / ALK PHOS: 135 U/L / ALT: 18 U/L RC / AST: 20 U/L / GGT: x             Vancomycin Level, Trough: 8.9 ug/mL ( @ 04:11)          MICROBIOLOGY:    RECENT CULTURES:   @ 02:28 .Blood Blood-Venous            No growth to date.     @ 02:27 .Blood Blood-Peripheral            No growth to date.     @ 17:50 .Broncial Combicath   MEIR   Numerous polymorphonuclear leukocytes per low power field  Few Squamous epithelial cells per low power field  Few Gram positive cocci in pairs per oil power field   Methicillin resistant Staphylococcus aureus  Methicillin resistant Staphylococcus aureus   Moderate Methicillin resistant Staphylococcus aureus  Normal Respiratory Amanda present     @ 18:32 .Urine Catheterized            <10,000 CFU/ml  Normal Urogenital amanda present     @ 18:22 .Blood Blood-Peripheral      Growth in anaerobic bottle: Gram Positive Cocci in Clusters        Growth in anaerobic bottle: Coag Negative Staphylococcus  Single set isolate, possible contaminant. Contact  Microbiology if susceptibility testing clinically  indicated.     @ 15:42 .Blood Blood-Peripheral            No growth at 5 days.                    Vancomycin Level, Trough: 8.9 ug/mL ( @ 04:11)      RADIOLOGY:

## 2017-06-18 NOTE — PROGRESS NOTE ADULT - ASSESSMENT
77 yo F SICU day #10, POD #9 s/p ex-lap, extensive NANETTE, takedown of EC fistula, repair of cecal serosal tear, partial R salpingectomy, colostomy revision, parastomal and incisional hernia repair with strattice mesh, complicated by MRSA pneumonia requiring reintubation and pressor support, now extubated and improving, hemodynamically stable, with ongoing pain control concerns.    Neuro: significant chronic and postoperative pain, on standing IV acetaminophen, with dilaudid PRN  CV: remains in sinus rhythm, amiodarone drip stopped yesterday after load completed, mildly hypotensive but MAP appropriately greater than 65, no need for pressor  Pulm: encourage pulmonary hygiene, with duonebs as needed for COPD  GI/Nutrition: NPO, minimal ostomy output, continue TPN for nutritional support, await GI function  /Renal: acute on chronic kidney disease, TREVA improving, trend electrolytes, follow urine output   ID: on broad spectrum antibiotics, followed by ID team, continue vancomycin for MRSA pneumonia, diflucan for candiduria, consider de-escalating aztreonam/flagyl empiric therapy  Tubes/Lines/Drains: PICC in place for TPN, consider removing central line and arterial line when persistently hemodynamically stable  Endocrine: euglycemic, continue q6h fingersticks and insulin coverage  Skin: continue turning and repositioning every 2 hours for skin protection  Prophylaxis: VTE ppx with fondaparinux for history of HIT  Dispo: full code, remain in SICU

## 2017-06-18 NOTE — PROGRESS NOTE ADULT - SUBJECTIVE AND OBJECTIVE BOX
76y Female with history significant for COPD, h/o DVT s/p IVC filter (now removed), GERD, hypothyroidism, HIT and diverticulitis s/p Ruben's, recurrent SBO s/p ex-lap small bowel resection, EC fistula s/p takedown, complicated by wound dehiscence, abdominal reconstruction underwent ex-lap, extensive NANETTE, EC fistula takedown, colostomy revision, and parastomal/incisional hernia repair, admitted to SICU intubated post operatively, extubated, and reintubated for respiratory distress. Also requiring vasopressor support for hypotension likely secondary to sepsis. 76y Female with history significant for COPD, h/o DVT s/p IVC filter (now removed), GERD, hypothyroidism, HIT and diverticulitis s/p Ruben's, recurrent SBO s/p ex-lap small bowel resection, EC fistula s/p takedown, complicated by wound dehiscence, abdominal reconstruction underwent ex-lap, extensive NANETTE, EC fistula takedown, colostomy revision, and parastomal/incisional hernia repair, admitted to SICU intubated post operatively, extubated, and reintubated with MRSA pneumonia, on antibiotics and now off pressors. HISTORY  75 yo Female with history significant for COPD, h/o DVT s/p IVC filter (now removed), GERD, hypothyroidism, HIT and diverticulitis s/p Ruben's, recurrent SBO s/p ex-lap small bowel resection, EC fistula s/p takedown, complicated by wound dehiscence, abdominal reconstruction underwent ex-lap, extensive NANETTE, EC fistula takedown, colostomy revision, and parastomal/incisional hernia repair, admitted to SICU intubated post operatively, extubated, and reintubated with MRSA pneumonia, extubated, on antibiotics and now off pressors.    24 HOUR EVENTS: Extubated yesterday morning, off pressors, daptomycin stopped, on aztreonam, flagyl, vancomycin. IV locked, given lasix 20 for diuresis, amio drip stopped after load completed.    SUBJECTIVE/ROS: pain in abdomen and back  [x] A ten-point review of systems was otherwise negative except as noted.  [ ] Due to altered mental status/intubation, subjective information were not able to be obtained from the patient. History was obtained, to the extent possible, from review of the chart and collateral sources of information.      NEURO  RASS:     GCS:     CAM ICU: negative  Exam: awake, alert, oriented  Meds: ondansetron Injectable 4milliGRAM(s) IV Push every 6 hours PRN Nausea  acetaminophen  IVPB 1000milliGRAM(s) IV Intermittent once  HYDROmorphone  Injectable 0.5milliGRAM(s) IV Push every 3 hours PRN Severe Pain (7 - 10)  [x] Adequacy of sedation and pain control has been assessed and adjusted      RESPIRATORY  RR: 22 (15 - 29)  SpO2: 97% (89% - 100%)  Exam: unlabored, end expiratory wheezes  Mechanical Ventilation: --  ABG - ( 17 Jun 2017 14:18 )  pH: 7.41  /  pCO2: 38    /  pO2: 163   / HCO3: 24    / Base Excess: -.1   /  SaO2: 100     Lactate: x      [N/A] Extubation Readiness Assessed  Meds: diphenhydrAMINE   Injectable 25milliGRAM(s) IV Push every 4 hours PRN Pruritus  ALBUTerol/ipratropium for Nebulization 3milliLiter(s) Nebulizer every 6 hours      CARDIOVASCULAR  HR: 106 (80 - 110)  BP: 120/45 (120/45 - 120/45)  BP(mean): 75 (75 - 75)  ABP: 113/51 (94/65 - 157/78)  ABP(mean): 76 (66 - 110)  Exam: regular, mildly tachycardic  Cardiac Rhythm: sinus  Perfusion     [x]Adequate   [ ]Inadequate  Mentation   [x]Normal       [ ]Reduced  Extremities  [x]Warm         [ ]Cool  Volume Status [ ]Hypervolemic [x]Euvolemic [ ]Hypovolemic  Meds: --      GI/NUTRITION  Exam: soft, nontender, nondistended, incision C/D/I  Diet: NPO, on TPN  Meds: pantoprazole  Injectable 40milliGRAM(s) IV Push daily      GENITOURINARY  I&O's Detail  I & Os for current day (as of 06-18 @ 09:17)  =============================================  IN:    TPN (Total Parenteral Nutrition): 1488 ml    Solution: 400 ml    Solution: 350 ml    Solution: 300 ml    Solution: 250 ml    Solution: 200 ml    Solution: 100 ml    norepinephrine Infusion: 76.6 ml    lactated ringers.: 60 ml    Solution: 50 ml    amiodarone Infusion: 33.4 ml    dexmedetomidine Infusion: 14.1 ml    Total IN: 3322.1 ml  ---------------------------------------------  OUT:    Indwelling Catheter - Urethral: 2825 ml    Nasoenteral Tube: 900 ml    Colostomy: 10 ml    Total OUT: 3735 ml  ---------------------------------------------  Total NET: -412.9 ml      06-18    140  |  105  |  45<H>  ----------------------------<  153<H>  4.2   |  22  |  1.16    Ca    7.6<L>      18 Jun 2017 05:13  Phos  3.2     06-18  Mg     2.1     06-18    TPro  4.6<L>  /  Alb  1.8<L>  /  TBili  1.0  /  DBili  x   /  AST  20  /  ALT  18  /  AlkPhos  135<H>  06-17    [x] Snyder catheter, indication: urine output monitoring in critically ill patient  Meds: --      HEMATOLOGIC  Meds: fondaparinux Injectable 2.5milliGRAM(s) SubCutaneous every 24 hours  [x] VTE Prophylaxis with arixtra (previous HIT)                        7.3    6.0   )-----------( 69       ( 18 Jun 2017 05:13 )             21.4     PT/INR - ( 18 Jun 2017 07:14 )   PT: 17.3 sec;   INR: 1.59 ratio    PTT - ( 18 Jun 2017 07:14 )  PTT:30.4 sec  Transfusion     [ ] PRBC   [ ] Platelets   [ ] FFP   [ ] Cryoprecipitate      INFECTIOUS DISEASES  T(C): 37.1, Max: 37.6 (06-18 @ 06:00)  WBC Count: 6.0 K/uL (06-18 @ 05:13)  WBC Count: 7.5 K/uL (06-17 @ 16:19)    Recent Cultures:  Specimen Source: .Blood Blood-Venous, 06-16 @ 02:28; Results   No growth to date.; Gram Stain: --; Organism: --  Specimen Source: .Blood Blood-Peripheral, 06-16 @ 02:27; Results   No growth to date.; Gram Stain: --; Organism: --  Specimen Source: .[facility], 06-13 @ 17:50; Results   Moderate Methicillin resistant Staphylococcus aureus  Normal Respiratory Francesca present; Gram Stain:   Numerous polymorphonuclear leukocytes per low power field  Few Squamous epithelial cells per low power field  Few Gram positive cocci in pairs per oil power field; Organism: Methicillin resistant Staphylococcus aureus  Specimen Source: .Urine Catheterized, 06-12 @ 18:32; Results   <10,000 CFU/ml  Normal Urogenital francesca present; Gram Stain: --; Organism: --  Specimen Source: .Blood Blood-Peripheral, 06-12 @ 18:22; Results   Growth in anaerobic bottle: Coag Negative Staphylococcus  Single set isolate, possible contaminant. Contact  Microbiology if susceptibility testing clinically  indicated.; Gram Stain:   Growth in anaerobic bottle: Gram Positive Cocci in Clusters; Organism: --  Specimen Source: .Blood Blood-Peripheral, 06-12 @ 15:42; Results   No growth at 5 days.; Gram Stain: --; Organism: --    Meds: metroNIDAZOLE  IVPB 500milliGRAM(s) IV Intermittent every 8 hours  aztreonam  IVPB 1000milliGRAM(s) IV Intermittent every 8 hours  fluconAZOLE IVPB  IV Intermittent   fluconAZOLE IVPB 100milliGRAM(s) IV Intermittent every 24 hours  vancomycin  IVPB  IV Intermittent   vancomycin  IVPB 1000milliGRAM(s) IV Intermittent every 24 hours      ENDOCRINE  Capillary Blood Glucose  144 (17 Jun 2017 23:00)  163 (17 Jun 2017 11:30)  Meds: levothyroxine Injectable 44MICROGram(s) IV Push daily  insulin lispro (HumaLOG) corrective regimen sliding scale  SubCutaneous every 6 hours      ACCESS DEVICES:  [x] Peripheral IV  [x] Central Venous Line	[x] R	[ ] L	[x] IJ	[ ] Fem	[ ] SC	Placed:   [x] Arterial Line		[x] R	[ ] L	[x] Fem	[ ] Rad	[ ] Ax	Placed:   [ ] PICC:					[ ] Mediport  [ ] Urinary Catheter, Date Placed:   [x] Necessity of urinary, arterial, and venous catheters discussed    OTHER MEDICATIONS: --    CODE STATUS: full code    IMAGING:

## 2017-06-18 NOTE — PROGRESS NOTE ADULT - SUBJECTIVE AND OBJECTIVE BOX
NEPHROLOGY-NSN  Lin Cagle NP      Patient seen and examined. OOB to chair. Extubated, off pressor support. Awake, alert, oriented. Denies sob, chest pain, n/v, no abd pain.  Daughter present.    MEDICATIONS  (STANDING):  levothyroxine Injectable 44MICROGram(s) IV Push daily  pantoprazole  Injectable 40milliGRAM(s) IV Push daily  metroNIDAZOLE  IVPB 500milliGRAM(s) IV Intermittent every 8 hours  aztreonam  IVPB 1000milliGRAM(s) IV Intermittent every 8 hours  fluconAZOLE IVPB  IV Intermittent   fluconAZOLE IVPB 100milliGRAM(s) IV Intermittent every 24 hours  fondaparinux Injectable 2.5milliGRAM(s) SubCutaneous every 24 hours  insulin lispro (HumaLOG) corrective regimen sliding scale  SubCutaneous every 6 hours  vancomycin  IVPB  IV Intermittent   vancomycin  IVPB 1000milliGRAM(s) IV Intermittent every 24 hours  ALBUTerol/ipratropium for Nebulization 3milliLiter(s) Nebulizer every 6 hours  acetaminophen  IVPB 1000milliGRAM(s) IV Intermittent once      VITAL:  T(C): , Max: 37.6 (06-18 @ 06:00)  T(F): , Max: 99.6 (06-18 @ 06:00)  HR: 106  BP: 120/45  BP(mean): 75  RR: 22  SpO2: 97%  Wt(kg): --    I and O's:    I & Os for current day (as of 06-18 @ 09:42)  =============================================  IN: 3322.1 ml / OUT: 3735 ml / NET: -412.9 ml        PHYSICAL EXAM:    Constitutional: NAD  HEENT: Atraumatic, (+) NGT-->LWS    Neck:  No JVD  Respiratory: CTAB/L  Cardiovascular: S1 and S2  Gastrointestinal: Dressing to abd dry and intact  Extremities: B/L LE edema  Neurological: A/O x 3, no focal deficits  Psychiatric: Normal mood, normal affect  : (+) baldwin  Skin: No rashes      LABS:                        7.3    6.0   )-----------( 69       ( 18 Jun 2017 05:13 )             21.4     18 Jun 2017 05:13    140    |  105    |  45<H>  ----------------------------<  153<H>  4.2     |  22     |  1.16   17 Jun 2017 16:19    140    |  106    |  45<H>  ----------------------------<  173<H>  4.1     |  22     |  1.23     Ca    7.6<L>      18 Jun 2017 05:13  Ca    7.5<L>      17 Jun 2017 16:19  Phos  3.2       18 Jun 2017 05:13  Phos  2.9       17 Jun 2017 16:19  Mg     2.1       18 Jun 2017 05:13  Mg     2.1       17 Jun 2017 16:19    TPro  4.6<L>  /  Alb  1.8<L>  /  TBili  1.0    /  DBili  x      /  AST  20     /  ALT  18     /  AlkPhos  135<H>  17 Jun 2017 04:11      RADIOLOGY & ADDITIONAL STUDIES:      Assessment and Plan:   · Assessment		  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  Stable CKDIII  TPN ongoing.    TPN ongoing.  Can Phos be added to the TPN?.     Problem/Plan - 2:  ·  Problem: Hypotension,  Plan: Hypotension resolved, now off pressor support    Problem/Plan - 3:  ·  Problem: Acute respiratory failure with hypoxia- resolved, extubated yesterday    Problem/Plan - 4:  ·  Problem: Hypothyroidism, unspecified type.  Plan: c/w IV Synthroid.

## 2017-06-18 NOTE — PROGRESS NOTE ADULT - SUBJECTIVE AND OBJECTIVE BOX
Day #18 of PN  PN infusion at 62  VS  T(C): 37.1, Max: 37.6 (06-18 @ 06:00)  HR: 103 (80 - 110)  BP: 120/45 (120/45 - 120/45)  RR: 17 (15 - 29)  SpO2: 97% (89% - 100%)    Labs   06-18    140  |  105  |  45<H>  ----------------------------<  153<H>  4.2   |  22  |  1.16    Ca    7.6<L>      18 Jun 2017 05:13  Phos  3.2     06-18  Mg     2.1     06-18    TPro  4.6<L>  /  Alb  1.8<L>  /  TBili  1.0  /  DBili  x   /  AST  20  /  ALT  18  /  AlkPhos  135<H>  06-17      LIVER FUNCTIONS - ( 17 Jun 2017 04:11 )  Alb: 1.8 g/dL / Pro: 4.6 g/dL / ALK PHOS: 135 U/L / ALT: 18 U/L RC / AST: 20 U/L / GGT: x           CAPILLARY BLOOD GLUCOSE  144 (17 Jun 2017 23:00)  163 (17 Jun 2017 11:30)    I&O's Detail    I & Os for current day (as of 18 Jun 2017 09:03)  =============================================  IN:    TPN (Total Parenteral Nutrition): 1488 ml    Solution: 400 ml    Solution: 350 ml    Solution: 300 ml    Solution: 250 ml    Solution: 200 ml    Solution: 100 ml    norepinephrine Infusion: 76.6 ml    lactated ringers.: 60 ml    Solution: 50 ml    amiodarone Infusion: 33.4 ml    dexmedetomidine Infusion: 14.1 ml    Total IN: 3322.1 ml  ---------------------------------------------  OUT:    Indwelling Catheter - Urethral: 2825 ml    Nasoenteral Tube: 900 ml    Colostomy: 10 ml    Total OUT: 3735 ml  ---------------------------------------------  Total NET: -412.9 ml

## 2017-06-18 NOTE — PROGRESS NOTE ADULT - SUBJECTIVE AND OBJECTIVE BOX
[facility]uma/Ac[facility] [place] Progress Note    HOSPITAL DAY #:27  POST OPERATIVE DAY #:  9  STATUS POST:  exploratory laparotomy, extensive lysis of adhesions, takedown of enterocutaneous fistula, repair of serosal tear on cecum, partial right salpingooophorectomy, colostomy revision, parastomal and incisional hernia repair with strattice mesh                    SUBJECTIVE:    OBJECTIVE:     Vital Signs Last 24 Hrs    Constitutional: Extuabted,   Respiratory:   Cardiovascular: sinus  Gastrointestinal: soft, distended, ostomy mucosa appears congested, dark but viable, no fxn. NGT in place: bilious, old colostomy site, packed  Ext: b/l UE edema            I&O's Detail        LABS: [facility] Tra[facility]/Ac[facility] [place] Progress Note    HOSPITAL DAY #:27  POST OPERATIVE DAY #:  9  STATUS POST:  exploratory laparotomy, extensive lysis of adhesions, takedown of enterocutaneous fistula, repair of serosal tear on cecum, partial right salpingooophorectomy, colostomy revision, parastomal and incisional hernia repair with strattice mesh                    SUBJECTIVE:  denies n/v/f/c, denies SOB or CP     OBJECTIVE:     Constitutional: Extuabted,   Respiratory:   Cardiovascular: sinus  Gastrointestinal: soft, distended, ostomy mucosa appears congested, dark but viable, no fxn. NGT in place: bilious, old colostomy site, packed  Ext: b/l UE edema    MEDICATIONS  (STANDING):  levothyroxine Injectable 44MICROGram(s) IV Push daily  pantoprazole  Injectable 40milliGRAM(s) IV Push daily  metroNIDAZOLE  IVPB 500milliGRAM(s) IV Intermittent every 8 hours  aztreonam  IVPB 1000milliGRAM(s) IV Intermittent every 8 hours  fluconAZOLE IVPB  IV Intermittent   fluconAZOLE IVPB 100milliGRAM(s) IV Intermittent every 24 hours  fondaparinux Injectable 2.5milliGRAM(s) SubCutaneous every 24 hours  insulin lispro (HumaLOG) corrective regimen sliding scale  SubCutaneous every 6 hours  vancomycin  IVPB  IV Intermittent   vancomycin  IVPB 1000milliGRAM(s) IV Intermittent every 24 hours  ALBUTerol/ipratropium for Nebulization 3milliLiter(s) Nebulizer every 6 hours  acetaminophen  IVPB 1000milliGRAM(s) IV Intermittent once    MEDICATIONS  (PRN):  ondansetron Injectable 4milliGRAM(s) IV Push every 6 hours PRN Nausea  diphenhydrAMINE   Injectable 25milliGRAM(s) IV Push every 4 hours PRN Pruritus  HYDROmorphone  Injectable 0.5milliGRAM(s) IV Push every 3 hours PRN Severe Pain (7 - 10)      Vital Signs Last 24 Hrs  T(C): 37, Max: 37.9 (06-17 @ 07:00)  T(F): 98.6, Max: 100.2 (06-17 @ 07:00)  HR: 90 (75 - 110)  BP: 120/45 (120/45 - 120/45)  BP(mean): 75 (75 - 75)  RR: 19 (15 - 29)  SpO2: 100% (89% - 100%)    I&O's Detail  I & Os for 24h ending 17 Jun 2017 07:00  =============================================  IN:    TPN (Total Parenteral Nutrition): 1488 ml    lactated ringers.: 660 ml    amiodarone Infusion: 400.8 ml    Solution: 300 ml    Solution: 250 ml    Solution: 250 ml    dexmedetomidine Infusion: 225.6 ml    norepinephrine Infusion: 209 ml    Solution: 200 ml    Solution: 100 ml    Solution: 100 ml    Solution: 50 ml    Total IN: 4233.4 ml  ---------------------------------------------  OUT:    Indwelling Catheter - Urethral: 2895 ml    Nasoenteral Tube: 900 ml    Total OUT: 3795 ml  ---------------------------------------------  Total NET: 438.4 ml    I & Os for current day (as of 18 Jun 2017 06:56)  =============================================  IN:    TPN (Total Parenteral Nutrition): 1054 ml    Solution: 400 ml    Solution: 350 ml    Solution: 250 ml    Solution: 200 ml    Solution: 150 ml    Solution: 100 ml    norepinephrine Infusion: 76.6 ml    lactated ringers.: 60 ml    Solution: 50 ml    amiodarone Infusion: 33.4 ml    dexmedetomidine Infusion: 14.1 ml    Total IN: 2738.1 ml  ---------------------------------------------  OUT:    Indwelling Catheter - Urethral: 2215 ml    Nasoenteral Tube: 500 ml    Total OUT: 2715 ml  ---------------------------------------------  Total NET: 23.1 ml      Daily     Daily     LABS:                        7.3    6.0   )-----------( 69       ( 18 Jun 2017 05:13 )             21.4     06-18    140  |  105  |  45<H>  ----------------------------<  153<H>  4.2   |  22  |  1.16    Ca    7.6<L>      18 Jun 2017 05:13  Phos  3.2     06-18  Mg     2.1     06-18    TPro  4.6<L>  /  Alb  1.8<L>  /  TBili  1.0  /  DBili  x   /  AST  20  /  ALT  18  /  AlkPhos  135<H>  06-17    PT/INR - ( 17 Jun 2017 04:11 )   PT: 18.3 sec;   INR: 1.66 ratio         PTT - ( 17 Jun 2017 04:11 )  PTT:35.7 sec

## 2017-06-19 DIAGNOSIS — E83.51 HYPOCALCEMIA: ICD-10-CM

## 2017-06-19 LAB
ALBUMIN SERPL ELPH-MCNC: 1.6 G/DL — LOW (ref 3.3–5)
ALP SERPL-CCNC: 96 U/L — SIGNIFICANT CHANGE UP (ref 40–120)
ALT FLD-CCNC: 16 U/L RC — SIGNIFICANT CHANGE UP (ref 10–45)
ANION GAP SERPL CALC-SCNC: 12 MMOL/L — SIGNIFICANT CHANGE UP (ref 5–17)
APTT BLD: 38.8 SEC — HIGH (ref 27.5–37.4)
AST SERPL-CCNC: 13 U/L — SIGNIFICANT CHANGE UP (ref 10–40)
BILIRUB SERPL-MCNC: 0.8 MG/DL — SIGNIFICANT CHANGE UP (ref 0.2–1.2)
BUN SERPL-MCNC: 39 MG/DL — HIGH (ref 7–23)
CA-I BLD-SCNC: 1.07 MMOL/L — LOW (ref 1.12–1.3)
CALCIUM SERPL-MCNC: 7.5 MG/DL — LOW (ref 8.4–10.5)
CHLORIDE SERPL-SCNC: 105 MMOL/L — SIGNIFICANT CHANGE UP (ref 96–108)
CO2 SERPL-SCNC: 22 MMOL/L — SIGNIFICANT CHANGE UP (ref 22–31)
CREAT SERPL-MCNC: 1.05 MG/DL — SIGNIFICANT CHANGE UP (ref 0.5–1.3)
GLUCOSE SERPL-MCNC: 133 MG/DL — HIGH (ref 70–99)
HCT VFR BLD CALC: 25.2 % — LOW (ref 34.5–45)
HCT VFR BLD CALC: 26.5 % — LOW (ref 34.5–45)
HGB BLD-MCNC: 8.5 G/DL — LOW (ref 11.5–15.5)
HGB BLD-MCNC: 8.9 G/DL — LOW (ref 11.5–15.5)
INR BLD: 1.65 RATIO — HIGH (ref 0.88–1.16)
MAGNESIUM SERPL-MCNC: 2.1 MG/DL — SIGNIFICANT CHANGE UP (ref 1.6–2.6)
MCHC RBC-ENTMCNC: 33 PG — SIGNIFICANT CHANGE UP (ref 27–34)
MCHC RBC-ENTMCNC: 33.6 GM/DL — SIGNIFICANT CHANGE UP (ref 32–36)
MCHC RBC-ENTMCNC: 33.6 PG — SIGNIFICANT CHANGE UP (ref 27–34)
MCHC RBC-ENTMCNC: 33.7 GM/DL — SIGNIFICANT CHANGE UP (ref 32–36)
MCV RBC AUTO: 98.4 FL — SIGNIFICANT CHANGE UP (ref 80–100)
MCV RBC AUTO: 99.8 FL — SIGNIFICANT CHANGE UP (ref 80–100)
PHOSPHATE SERPL-MCNC: 2.8 MG/DL — SIGNIFICANT CHANGE UP (ref 2.5–4.5)
PLATELET # BLD AUTO: 87 K/UL — LOW (ref 150–400)
PLATELET # BLD AUTO: 88 K/UL — LOW (ref 150–400)
POTASSIUM SERPL-MCNC: 4 MMOL/L — SIGNIFICANT CHANGE UP (ref 3.5–5.3)
POTASSIUM SERPL-SCNC: 4 MMOL/L — SIGNIFICANT CHANGE UP (ref 3.5–5.3)
PROT SERPL-MCNC: 4.9 G/DL — LOW (ref 6–8.3)
PROTHROM AB SERPL-ACNC: 18.2 SEC — HIGH (ref 9.8–12.7)
RBC # BLD: 2.56 M/UL — LOW (ref 3.8–5.2)
RBC # BLD: 2.66 M/UL — LOW (ref 3.8–5.2)
RBC # FLD: 15.6 % — HIGH (ref 10.3–14.5)
RBC # FLD: 16.2 % — HIGH (ref 10.3–14.5)
SODIUM SERPL-SCNC: 139 MMOL/L — SIGNIFICANT CHANGE UP (ref 135–145)
VANCOMYCIN TROUGH SERPL-MCNC: 14.6 UG/ML — SIGNIFICANT CHANGE UP (ref 10–20)
WBC # BLD: 5.2 K/UL — SIGNIFICANT CHANGE UP (ref 3.8–10.5)
WBC # BLD: 5.4 K/UL — SIGNIFICANT CHANGE UP (ref 3.8–10.5)
WBC # FLD AUTO: 5.2 K/UL — SIGNIFICANT CHANGE UP (ref 3.8–10.5)
WBC # FLD AUTO: 5.4 K/UL — SIGNIFICANT CHANGE UP (ref 3.8–10.5)

## 2017-06-19 PROCEDURE — 99232 SBSQ HOSP IP/OBS MODERATE 35: CPT

## 2017-06-19 PROCEDURE — 71010: CPT | Mod: 26

## 2017-06-19 RX ORDER — FUROSEMIDE 40 MG
20 TABLET ORAL ONCE
Qty: 0 | Refills: 0 | Status: COMPLETED | OUTPATIENT
Start: 2017-06-19 | End: 2017-06-19

## 2017-06-19 RX ORDER — DIPHENHYDRAMINE HCL 50 MG
25 CAPSULE ORAL ONCE
Qty: 0 | Refills: 0 | Status: COMPLETED | OUTPATIENT
Start: 2017-06-19 | End: 2017-06-19

## 2017-06-19 RX ORDER — CALCIUM GLUCONATE 100 MG/ML
1 VIAL (ML) INTRAVENOUS ONCE
Qty: 1 | Refills: 0 | Status: COMPLETED | OUTPATIENT
Start: 2017-06-19 | End: 2017-06-19

## 2017-06-19 RX ORDER — ACETAMINOPHEN 500 MG
1000 TABLET ORAL ONCE
Qty: 0 | Refills: 0 | Status: COMPLETED | OUTPATIENT
Start: 2017-06-19 | End: 2017-06-19

## 2017-06-19 RX ADMIN — Medication 2: at 12:19

## 2017-06-19 RX ADMIN — HYDROMORPHONE HYDROCHLORIDE 0.5 MILLIGRAM(S): 2 INJECTION INTRAMUSCULAR; INTRAVENOUS; SUBCUTANEOUS at 12:30

## 2017-06-19 RX ADMIN — HYDROMORPHONE HYDROCHLORIDE 0.5 MILLIGRAM(S): 2 INJECTION INTRAMUSCULAR; INTRAVENOUS; SUBCUTANEOUS at 15:10

## 2017-06-19 RX ADMIN — HYDROMORPHONE HYDROCHLORIDE 0.5 MILLIGRAM(S): 2 INJECTION INTRAMUSCULAR; INTRAVENOUS; SUBCUTANEOUS at 04:23

## 2017-06-19 RX ADMIN — Medication 200 GRAM(S): at 10:23

## 2017-06-19 RX ADMIN — Medication 50 MILLIGRAM(S): at 05:23

## 2017-06-19 RX ADMIN — FONDAPARINUX SODIUM 2.5 MILLIGRAM(S): 2.5 INJECTION, SOLUTION SUBCUTANEOUS at 12:06

## 2017-06-19 RX ADMIN — Medication 1000 MILLIGRAM(S): at 16:55

## 2017-06-19 RX ADMIN — Medication 3 MILLILITER(S): at 17:31

## 2017-06-19 RX ADMIN — Medication 400 MILLIGRAM(S): at 01:24

## 2017-06-19 RX ADMIN — Medication 100 MILLIGRAM(S): at 05:23

## 2017-06-19 RX ADMIN — Medication 1000 MILLIGRAM(S): at 01:39

## 2017-06-19 RX ADMIN — Medication 400 MILLIGRAM(S): at 16:25

## 2017-06-19 RX ADMIN — HYDROMORPHONE HYDROCHLORIDE 0.5 MILLIGRAM(S): 2 INJECTION INTRAMUSCULAR; INTRAVENOUS; SUBCUTANEOUS at 04:38

## 2017-06-19 RX ADMIN — HYDROMORPHONE HYDROCHLORIDE 0.5 MILLIGRAM(S): 2 INJECTION INTRAMUSCULAR; INTRAVENOUS; SUBCUTANEOUS at 08:49

## 2017-06-19 RX ADMIN — Medication 50 MILLIGRAM(S): at 13:20

## 2017-06-19 RX ADMIN — HYDROMORPHONE HYDROCHLORIDE 0.5 MILLIGRAM(S): 2 INJECTION INTRAMUSCULAR; INTRAVENOUS; SUBCUTANEOUS at 18:10

## 2017-06-19 RX ADMIN — HYDROMORPHONE HYDROCHLORIDE 0.5 MILLIGRAM(S): 2 INJECTION INTRAMUSCULAR; INTRAVENOUS; SUBCUTANEOUS at 21:19

## 2017-06-19 RX ADMIN — Medication 250 MILLIGRAM(S): at 11:01

## 2017-06-19 RX ADMIN — Medication 3 MILLILITER(S): at 05:07

## 2017-06-19 RX ADMIN — FLUCONAZOLE 100 MILLIGRAM(S): 150 TABLET ORAL at 12:06

## 2017-06-19 RX ADMIN — Medication 44 MICROGRAM(S): at 05:22

## 2017-06-19 RX ADMIN — Medication 25 MILLIGRAM(S): at 01:24

## 2017-06-19 RX ADMIN — Medication 3 MILLILITER(S): at 23:23

## 2017-06-19 RX ADMIN — Medication 3 MILLILITER(S): at 13:06

## 2017-06-19 RX ADMIN — HYDROMORPHONE HYDROCHLORIDE 0.5 MILLIGRAM(S): 2 INJECTION INTRAMUSCULAR; INTRAVENOUS; SUBCUTANEOUS at 18:25

## 2017-06-19 RX ADMIN — Medication 100 MILLIGRAM(S): at 13:20

## 2017-06-19 RX ADMIN — Medication 100 MILLIGRAM(S): at 22:19

## 2017-06-19 RX ADMIN — HYDROMORPHONE HYDROCHLORIDE 0.5 MILLIGRAM(S): 2 INJECTION INTRAMUSCULAR; INTRAVENOUS; SUBCUTANEOUS at 09:04

## 2017-06-19 RX ADMIN — Medication 50 MILLIGRAM(S): at 22:20

## 2017-06-19 RX ADMIN — HYDROMORPHONE HYDROCHLORIDE 0.5 MILLIGRAM(S): 2 INJECTION INTRAMUSCULAR; INTRAVENOUS; SUBCUTANEOUS at 22:11

## 2017-06-19 RX ADMIN — HYDROMORPHONE HYDROCHLORIDE 0.5 MILLIGRAM(S): 2 INJECTION INTRAMUSCULAR; INTRAVENOUS; SUBCUTANEOUS at 12:15

## 2017-06-19 RX ADMIN — Medication 20 MILLIGRAM(S): at 12:07

## 2017-06-19 RX ADMIN — HYDROMORPHONE HYDROCHLORIDE 0.5 MILLIGRAM(S): 2 INJECTION INTRAMUSCULAR; INTRAVENOUS; SUBCUTANEOUS at 15:25

## 2017-06-19 RX ADMIN — PANTOPRAZOLE SODIUM 40 MILLIGRAM(S): 20 TABLET, DELAYED RELEASE ORAL at 12:07

## 2017-06-19 NOTE — PROGRESS NOTE ADULT - SUBJECTIVE AND OBJECTIVE BOX
Day #19 of PN  PN infusion at 64    Vital Signs  T(C): 37, Max: 37.6 (06-18 @ 15:00)  HR: 109 (104 - 117)  BP: 133/68 (133/68 - 133/68)  RR: 17 (15 - 29)  SpO2: 95% (94% - 100%    Labs   06-19    139  |  105  |  39<H>  ----------------------------<  133<H>  4.0   |  22  |  1.05    Ca    7.5<L>      19 Jun 2017 04:42  Phos  2.8     06-19  Mg     2.1     06-19    TPro  4.9<L>  /  Alb  1.6<L>  /  TBili  0.8  /  DBili  x   /  AST  13  /  ALT  16  /  AlkPhos  96  06-19      LIVER FUNCTIONS - ( 19 Jun 2017 04:42 )  Alb: 1.6 g/dL / Pro: 4.9 g/dL / ALK PHOS: 96 U/L / ALT: 16 U/L RC / AST: 13 U/L / GGT: x           CAPILLARY BLOOD GLUCOSE  133 (19 Jun 2017 05:00)    I&O's Detail    I & Os for current day (as of 19 Jun 2017 08:53)  =============================================  IN:    TPN (Total Parenteral Nutrition): 1488 ml    Solution: 620 ml    Solution: 250 ml    Solution: 200 ml    Solution: 200 ml    Solution: 100 ml    Total IN: 2858 ml  ---------------------------------------------  OUT:    Indwelling Catheter - Urethral: 3320 ml    Nasoenteral Tube: 900 ml    Colostomy: 60 ml    Total OUT: 4280 ml  ---------------------------------------------  Total NET: -1422 ml

## 2017-06-19 NOTE — PROGRESS NOTE ADULT - ASSESSMENT
76y Female with PMH of COPD, CHF, h/o DVT/PE s/p IVC filter (later removed), GERD, Hypothyroidism, h/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 Diff, and PSH of diverticulitis s/p Ruben's, recurrent SBO s/p SBR, ECF at small bowel anastomosis site s/p takedown, wound dehiscence, and abdominal reconstruction complicated by parastomal hernia and incisional hernia. She is currently hospitalized for recurrent SBO associated with intermittent abdominal pain and loss of ostomy function. She was managed conservatively but then was taken to the OR 6/9 evening for unresolving SBO.   Pt s/p exploratory laparotomy, extensive lysis of adhesions, take down of ECF, repair of serosal tear on cecum, partial right salpingoophorectomy, colostomy revision, parastomal and incisional hernia repair with strattice mesh. Since then, Pt has been hypotensive, requiring pressors. Pr has been on broad spectrum antibiotics emperically since 6/9- (Daptomycin, aztreonam, flagyl) and since 6/16- Vancomycin. Pt s/p trach on 6/12.   Pt with 1st fever on 6/16 of 100.5. Pt has had cultures sent of which BCx remained NGTD but combicath Cx from 6/12- MRSA.   Patient now extubated and temps ok  Day # 9 flagyl, aztreonam  day # 8 diflucan  day # 4 vanco    will discuss duration of antibiotics with team- possibly complete vanco/aztreonam tomorrow  Pt clinically improving- now extubated , afebrile  follow status of wound and ostomy

## 2017-06-19 NOTE — PROGRESS NOTE ADULT - PROBLEM SELECTOR PLAN 1
She is volume overloaded but pressors will prevent large diuresis  TPN ongoing.  Can Phos be added to the TPN? She is volume overloaded but largely improved.  Off pressors   TPN ongoing.

## 2017-06-19 NOTE — PROGRESS NOTE ADULT - ASSESSMENT
ASSESSMENT  76y female with high volume ECF s/p exploratory laparotomy, extensive lysis of adhesions, takedown of enterocutaneous fistula, repair of serosal tear on cecum, partial right salpingooophorectomy, colostomy revision, parastomal and incisional hernia repair with strattice mesh, POD 10.     PLAN  - wean pressors  - Cont. NGT to suction  - Broad spectrum abx   - Appreciate support care of the SICU  - Amio gtt for afib      Pager: [phone] ASSESSMENT  76y female with high volume ECF s/p exploratory laparotomy, extensive lysis of adhesions, takedown of enterocutaneous fistula, repair of serosal tear on cecum, partial right salpingooophorectomy, colostomy revision, parastomal and incisional hernia repair with strattice mesh, POD 10.     PLAN  - c/w NGT to suction  - await GI function  - Broad spectrum abx   - TLC d/c-ed today  - Appreciate support care of the SICU      Pager: [phone]

## 2017-06-19 NOTE — PROGRESS NOTE ADULT - SUBJECTIVE AND OBJECTIVE BOX
NEPHROLOGY-[facility] [phone]        Patient seen and examined         MEDICATIONS  (STANDING):  levothyroxine Injectable 44MICROGram(s) IV Push daily  pantoprazole  Injectable 40milliGRAM(s) IV Push daily  metroNIDAZOLE  IVPB 500milliGRAM(s) IV Intermittent every 8 hours  aztreonam  IVPB 1000milliGRAM(s) IV Intermittent every 8 hours  fluconAZOLE IVPB  IV Intermittent   fluconAZOLE IVPB 100milliGRAM(s) IV Intermittent every 24 hours  fondaparinux Injectable 2.5milliGRAM(s) SubCutaneous every 24 hours  insulin lispro (HumaLOG) corrective regimen sliding scale  SubCutaneous every 6 hours  vancomycin  IVPB  IV Intermittent   vancomycin  IVPB 1000milliGRAM(s) IV Intermittent every 24 hours  ALBUTerol/ipratropium for Nebulization 3milliLiter(s) Nebulizer every 6 hours  calcium gluconate IVPB 1Gram(s) IV Intermittent once      VITAL:  T(C): , Max: 37.6 (06-18 @ 15:00)  T(F): , Max: 99.7 (06-18 @ 15:00)  HR: 109  BP: 133/68  BP(mean): 92  RR: 17  SpO2: 95%  Wt(kg): --    I and O's:    I & Os for current day (as of 06-19 @ 08:45)  =============================================  IN: 2858 ml / OUT: 4280 ml / NET: -1422 ml        PHYSICAL EXAM:    Constitutional: NAD  HEENT: PERRLA    Neck:  No JVD  Respiratory: CTAB/L  Cardiovascular: S1 and S2  Gastrointestinal: BS+, soft, NT/ND  Extremities: No peripheral edema  Neurological: A/O x 3, no focal deficits  Psychiatric: Normal mood, normal affect  : No Snyder  Skin: No rashes  Access: Not applicable    LABS:                        8.5    5.2   )-----------( 87       ( 19 Jun 2017 04:42 )             25.2     06-19    139  |  105  |  39<H>  ----------------------------<  133<H>  4.0   |  22  |  1.05    Ca    7.5<L>      19 Jun 2017 04:42  Phos  2.8     06-19  Mg     2.1     06-19    TPro  4.9<L>  /  Alb  1.6<L>  /  TBili  0.8  /  DBili  x   /  AST  13  /  ALT  16  /  AlkPhos  96  06-19          Urine Studies:          RADIOLOGY & ADDITIONAL STUDIES: NEPHROLOGY-[facility] [phone]        Patient seen and examined in bed.  She had an uneventful night        MEDICATIONS  (STANDING):  levothyroxine Injectable 44MICROGram(s) IV Push daily  pantoprazole  Injectable 40milliGRAM(s) IV Push daily  metroNIDAZOLE  IVPB 500milliGRAM(s) IV Intermittent every 8 hours  aztreonam  IVPB 1000milliGRAM(s) IV Intermittent every 8 hours  fluconAZOLE IVPB  IV Intermittent   fluconAZOLE IVPB 100milliGRAM(s) IV Intermittent every 24 hours  fondaparinux Injectable 2.5milliGRAM(s) SubCutaneous every 24 hours  insulin lispro (HumaLOG) corrective regimen sliding scale  SubCutaneous every 6 hours  vancomycin  IVPB  IV Intermittent   vancomycin  IVPB 1000milliGRAM(s) IV Intermittent every 24 hours  ALBUTerol/ipratropium for Nebulization 3milliLiter(s) Nebulizer every 6 hours  calcium gluconate IVPB 1Gram(s) IV Intermittent once      VITAL:  T(C): , Max: 37.6 (06-18 @ 15:00)  T(F): , Max: 99.7 (06-18 @ 15:00)  HR: 109  BP: 133/68  BP(mean): 92  RR: 17  SpO2: 95%  Wt(kg): --    I and O's:    I & Os for current day (as of 06-19 @ 08:45)  =============================================  IN: 2858 ml / OUT: 4280 ml / NET: -1422 ml        PHYSICAL EXAM:    Constitutional: NAD  HEENT: PERRLA    Neck:  No JVD  Respiratory: poor effort  Cardiovascular: S1 and S2  Gastrointestinal:+surgical scar n ostomy; hypoactive  Extremities: +peripheral edema  Neurological: A/O x 3, no focal deficits  Psychiatric: Normal mood, normal affect  : No Snyder  Skin: No rashes  Access: Not applicable    LABS:                        8.5    5.2   )-----------( 87       ( 19 Jun 2017 04:42 )             25.2     06-19    139  |  105  |  39<H>  ----------------------------<  133<H>  4.0   |  22  |  1.05    Ca    7.5<L>      19 Jun 2017 04:42  Phos  2.8     06-19  Mg     2.1     06-19    TPro  4.9<L>  /  Alb  1.6<L>  /  TBili  0.8  /  DBili  x   /  AST  13  /  ALT  16  /  AlkPhos  96  06-19

## 2017-06-19 NOTE — PROGRESS NOTE ADULT - SUBJECTIVE AND OBJECTIVE BOX
HISTORY  75 yo Female with history significant for COPD, h/o DVT s/p IVC filter (now removed), GERD, hypothyroidism, HIT and diverticulitis s/p Ruben's, recurrent SBO s/p ex-lap small bowel resection, EC fistula s/p takedown, complicated by wound dehiscence, abdominal reconstruction underwent ex-lap, extensive NANETTE, EC fistula takedown, colostomy revision, and parastomal/incisional hernia repair, admitted to SICU intubated post operatively, extubated, and reintubated with MRSA pneumonia, extubated, on antibiotics and now off pressors.    24 HOUR EVENTS: NGT fell out during the day yesterday, was monitored initially with tube out but abdomen became distended and patient was uncomfortable. NGT was then replaced and patient was subsequently more comfortable.     SUBJECTIVE/ROS:  [ ] A ten-point review of systems was otherwise negative except as noted.  [ ] Due to altered mental status/intubation, subjective information were not able to be obtained from the patient. History was obtained, to the extent possible, from review of the chart and collateral sources of information.      NEURO  RASS:     GCS:     CAM ICU:  Exam: awake, alert, oriented  Meds: ondansetron Injectable 4milliGRAM(s) IV Push every 6 hours PRN Nausea  HYDROmorphone  Injectable 0.5milliGRAM(s) IV Push every 3 hours PRN Severe Pain (7 - 10)    [x] Adequacy of sedation and pain control has been assessed and adjusted      RESPIRATORY  RR: 17 (15 - 29)  SpO2: 96% (94% - 100%)  Wt(kg): --  Exam: unlabored, clear to auscultation bilaterally  Mechanical Ventilation:   ABG - ( 17 Jun 2017 14:18 )  pH: 7.41  /  pCO2: 38    /  pO2: 163   / HCO3: 24    / Base Excess: -.1   /  SaO2: 100     Lactate: x                [N/A] Extubation Readiness Assessed  Meds: ALBUTerol/ipratropium for Nebulization 3milliLiter(s) Nebulizer every 6 hours        CARDIOVASCULAR  HR: 106 (103 - 117)  BP: 133/68 (133/68 - 133/68)  BP(mean): 92 (92 - 92)  ABP: 121/50 (94/65 - 143/60)  ABP(mean): 77 (68 - 94)  Wt(kg): --  CVP(cm H2O): --      Exam: regular rate and rhythm  Cardiac Rhythm: sinus  Perfusion     [x]Adequate   [ ]Inadequate  Mentation   [x]Normal       [ ]Reduced  Extremities  [x]Warm         [ ]Cool  Volume Status [ ]Hypervolemic [x]Euvolemic [ ]Hypovolemic  Meds:       GI/NUTRITION  Exam: soft, nontender, nondistended, incision C/D/I  Diet:  Meds: pantoprazole  Injectable 40milliGRAM(s) IV Push daily      GENITOURINARY  I&O's Detail  I & Os for 24h ending 06-18 @ 07:00  =============================================  IN:    TPN (Total Parenteral Nutrition): 1488 ml    Solution: 400 ml    Solution: 350 ml    Solution: 300 ml    Solution: 250 ml    Solution: 200 ml    Solution: 100 ml    norepinephrine Infusion: 76.6 ml    lactated ringers.: 60 ml    Solution: 50 ml    amiodarone Infusion: 33.4 ml    dexmedetomidine Infusion: 14.1 ml    Total IN: 3322.1 ml  ---------------------------------------------  OUT:    Indwelling Catheter - Urethral: 2825 ml    Nasoenteral Tube: 900 ml    Colostomy: 10 ml    Total OUT: 3735 ml  ---------------------------------------------  Total NET: -412.9 ml    I & Os for current day (as of 06-19 @ 06:06)  =============================================  IN:    TPN (Total Parenteral Nutrition): 1364 ml    Solution: 520 ml    Solution: 250 ml    Solution: 200 ml    Solution: 150 ml    Solution: 100 ml    Total IN: 2584 ml  ---------------------------------------------  OUT:    Indwelling Catheter - Urethral: 2980 ml    Nasoenteral Tube: 900 ml    Colostomy: 50 ml    Total OUT: 3930 ml  ---------------------------------------------  Total NET: -1346 ml      06-19    139  |  105  |  39<H>  ----------------------------<  133<H>  4.0   |  22  |  1.05    Ca    7.5<L>      19 Jun 2017 04:42  Phos  2.8     06-19  Mg     2.1     06-19    TPro  4.9<L>  /  Alb  1.6<L>  /  TBili  0.8  /  DBili  x   /  AST  13  /  ALT  16  /  AlkPhos  96  06-19    [ ] Snyder catheter, indication: N/A  Meds:       HEMATOLOGIC  Meds: fondaparinux Injectable 2.5milliGRAM(s) SubCutaneous every 24 hours    [x] VTE Prophylaxis                        8.5    5.2   )-----------( 87       ( 19 Jun 2017 04:42 )             25.2     PT/INR - ( 19 Jun 2017 04:42 )   PT: 18.2 sec;   INR: 1.65 ratio         PTT - ( 19 Jun 2017 04:42 )  PTT:38.8 sec  Transfusion     [ ] PRBC   [ ] Platelets   [ ] FFP   [ ] Cryoprecipitate      INFECTIOUS DISEASES  WBC Count: 5.2 K/uL (06-19 @ 04:42)  WBC Count: 6.7 K/uL (06-18 @ 18:03)    RECENT CULTURES:  Specimen Source: .Blood Blood-Venous  Date/Time: 06-16 @ 02:28  Culture Results:   No growth to date.  Gram Stain: --  Organism: --  Specimen Source: .Blood Blood-Peripheral  Date/Time: 06-16 @ 02:27  Culture Results:   No growth to date.  Gram Stain: --  Organism: --    Meds: metroNIDAZOLE  IVPB 500milliGRAM(s) IV Intermittent every 8 hours  aztreonam  IVPB 1000milliGRAM(s) IV Intermittent every 8 hours  fluconAZOLE IVPB  IV Intermittent   fluconAZOLE IVPB 100milliGRAM(s) IV Intermittent every 24 hours  vancomycin  IVPB  IV Intermittent   vancomycin  IVPB 1000milliGRAM(s) IV Intermittent every 24 hours        ENDOCRINE  CAPILLARY BLOOD GLUCOSE  133 (19 Jun 2017 05:00)    Meds: levothyroxine Injectable 44MICROGram(s) IV Push daily  insulin lispro (HumaLOG) corrective regimen sliding scale  SubCutaneous every 6 hours        ACCESS DEVICES:  [ ] Peripheral IV  [ ] Central Venous Line	[ ] R	[ ] L	[ ] IJ	[ ] Fem	[ ] SC	Placed:   [ ] Arterial Line		[ ] R	[ ] L	[ ] Fem	[ ] Rad	[ ] Ax	Placed:   [ ] PICC:					[ ] Mediport  [ ] Urinary Catheter, Date Placed:   [x] Necessity of urinary, arterial, and venous catheters discussed    OTHER MEDICATIONS:      CODE STATUS:      IMAGING: HISTORY  77 yo Female with history significant for COPD, h/o DVT s/p IVC filter (now removed), GERD, hypothyroidism, HIT and diverticulitis s/p Ruben's, recurrent SBO s/p ex-lap small bowel resection, EC fistula s/p takedown, complicated by wound dehiscence, abdominal reconstruction underwent ex-lap, extensive NANETTE, EC fistula takedown, colostomy revision, and parastomal/incisional hernia repair, admitted to SICU intubated post operatively, extubated, and reintubated with MRSA pneumonia, extubated, on antibiotics and now off pressors.    24 HOUR EVENTS: NGT fell out during the day yesterday, was monitored initially with tube out but abdomen became distended and patient was uncomfortable. NGT was then replaced and patient was subsequently more comfortable.     SUBJECTIVE/ROS:  [x ] A ten-point review of systems was otherwise negative except as noted.  [ ] Due to altered mental status/intubation, subjective information were not able to be obtained from the patient. History was obtained, to the extent possible, from review of the chart and collateral sources of information.      NEURO  RASS:     GCS:     CAM ICU:  Exam: awake, alert, oriented  Meds: ondansetron Injectable 4milliGRAM(s) IV Push every 6 hours PRN Nausea  HYDROmorphone  Injectable 0.5milliGRAM(s) IV Push every 3 hours PRN Severe Pain (7 - 10)    [x] Adequacy of sedation and pain control has been assessed and adjusted      RESPIRATORY  RR: 17 (15 - 29)  SpO2: 96% (94% - 100%)  Wt(kg): --  Exam: unlabored, clear to auscultation bilaterally  Mechanical Ventilation:   ABG - ( 17 Jun 2017 14:18 )  pH: 7.41  /  pCO2: 38    /  pO2: 163   / HCO3: 24    / Base Excess: -.1   /  SaO2: 100     Lactate: x                [N/A] Extubation Readiness Assessed  Meds: ALBUTerol/ipratropium for Nebulization 3milliLiter(s) Nebulizer every 6 hours        CARDIOVASCULAR  HR: 106 (103 - 117)  BP: 133/68 (133/68 - 133/68)  BP(mean): 92 (92 - 92)  ABP: 121/50 (94/65 - 143/60)  ABP(mean): 77 (68 - 94)  Wt(kg): --  CVP(cm H2O): --      Exam: regular rate and rhythm  Cardiac Rhythm: sinus  Perfusion     [x]Adequate   [ ]Inadequate  Mentation   [x]Normal       [ ]Reduced  Extremities  [x]Warm         [ ]Cool  Volume Status [ ]Hypervolemic [x]Euvolemic [ ]Hypovolemic  Meds:       GI/NUTRITION  Exam: soft, nontender, nondistended, incision C/D/I  Diet:  Meds: pantoprazole  Injectable 40milliGRAM(s) IV Push daily      GENITOURINARY  I&O's Detail  I & Os for 24h ending 06-18 @ 07:00  =============================================  IN:    TPN (Total Parenteral Nutrition): 1488 ml    Solution: 400 ml    Solution: 350 ml    Solution: 300 ml    Solution: 250 ml    Solution: 200 ml    Solution: 100 ml    norepinephrine Infusion: 76.6 ml    lactated ringers.: 60 ml    Solution: 50 ml    amiodarone Infusion: 33.4 ml    dexmedetomidine Infusion: 14.1 ml    Total IN: 3322.1 ml  ---------------------------------------------  OUT:    Indwelling Catheter - Urethral: 2825 ml    Nasoenteral Tube: 900 ml    Colostomy: 10 ml    Total OUT: 3735 ml  ---------------------------------------------  Total NET: -412.9 ml    I & Os for current day (as of 06-19 @ 06:06)  =============================================  IN:    TPN (Total Parenteral Nutrition): 1364 ml    Solution: 520 ml    Solution: 250 ml    Solution: 200 ml    Solution: 150 ml    Solution: 100 ml    Total IN: 2584 ml  ---------------------------------------------  OUT:    Indwelling Catheter - Urethral: 2980 ml    Nasoenteral Tube: 900 ml    Colostomy: 50 ml    Total OUT: 3930 ml  ---------------------------------------------  Total NET: -1346 ml      06-19    139  |  105  |  39<H>  ----------------------------<  133<H>  4.0   |  22  |  1.05    Ca    7.5<L>      19 Jun 2017 04:42  Phos  2.8     06-19  Mg     2.1     06-19    TPro  4.9<L>  /  Alb  1.6<L>  /  TBili  0.8  /  DBili  x   /  AST  13  /  ALT  16  /  AlkPhos  96  06-19    [ ] Snyder catheter, indication: N/A  Meds:       HEMATOLOGIC  Meds: fondaparinux Injectable 2.5milliGRAM(s) SubCutaneous every 24 hours    [x] VTE Prophylaxis                        8.5    5.2   )-----------( 87       ( 19 Jun 2017 04:42 )             25.2     PT/INR - ( 19 Jun 2017 04:42 )   PT: 18.2 sec;   INR: 1.65 ratio         PTT - ( 19 Jun 2017 04:42 )  PTT:38.8 sec  Transfusion     [ ] PRBC   [ ] Platelets   [ ] FFP   [ ] Cryoprecipitate      INFECTIOUS DISEASES  WBC Count: 5.2 K/uL (06-19 @ 04:42)  WBC Count: 6.7 K/uL (06-18 @ 18:03)    RECENT CULTURES:  Specimen Source: .Blood Blood-Venous  Date/Time: 06-16 @ 02:28  Culture Results:   No growth to date.  Gram Stain: --  Organism: --  Specimen Source: .Blood Blood-Peripheral  Date/Time: 06-16 @ 02:27  Culture Results:   No growth to date.  Gram Stain: --  Organism: --    Meds: metroNIDAZOLE  IVPB 500milliGRAM(s) IV Intermittent every 8 hours  aztreonam  IVPB 1000milliGRAM(s) IV Intermittent every 8 hours  fluconAZOLE IVPB  IV Intermittent   fluconAZOLE IVPB 100milliGRAM(s) IV Intermittent every 24 hours  vancomycin  IVPB  IV Intermittent   vancomycin  IVPB 1000milliGRAM(s) IV Intermittent every 24 hours        ENDOCRINE  CAPILLARY BLOOD GLUCOSE  133 (19 Jun 2017 05:00)    Meds: levothyroxine Injectable 44MICROGram(s) IV Push daily  insulin lispro (HumaLOG) corrective regimen sliding scale  SubCutaneous every 6 hours        ACCESS DEVICES:  [ x] Peripheral IV  [ x] Central Venous Line	[ ] R	[ ] L	[ ] IJ	[ ] Fem	[ ] SC	Placed:   [x ] Arterial Line		[ ] R	[ ] L	[ ] Fem	[ ] Rad	[ ] Ax	Placed:   [ ] PICC:					[ ] Mediport  [ x] Urinary Catheter, Date Placed:   [x] Necessity of urinary, arterial, and venous catheters discussed    OTHER MEDICATIONS:      CODE STATUS:      IMAGING: HISTORY  75 yo Female with history significant for COPD, h/o DVT s/p IVC filter (now removed), GERD, hypothyroidism, HIT and diverticulitis s/p Ruben's, recurrent SBO s/p ex-lap small bowel resection, EC fistula s/p takedown, complicated by wound dehiscence, abdominal reconstruction underwent ex-lap, extensive NANETTE, EC fistula takedown, colostomy revision, and parastomal/incisional hernia repair, admitted to SICU intubated post operatively, extubated, and reintubated with MRSA pneumonia, extubated, on antibiotics and now off pressors.    24 HOUR EVENTS: NGT fell out during the day yesterday, was monitored initially with tube out but abdomen became distended and patient was uncomfortable. NGT was then replaced and patient was subsequently more comfortable.     SUBJECTIVE/ROS:  [x ] A ten-point review of systems was otherwise negative except as noted.  [ ] Due to altered mental status/intubation, subjective information were not able to be obtained from the patient. History was obtained, to the extent possible, from review of the chart and collateral sources of information.      NEURO  RASS:     GCS:     CAM ICU:  Exam: awake, alert, oriented  Meds: ondansetron Injectable 4milliGRAM(s) IV Push every 6 hours PRN Nausea  HYDROmorphone  Injectable 0.5milliGRAM(s) IV Push every 3 hours PRN Severe Pain (7 - 10)    [x] Adequacy of sedation and pain control has been assessed and adjusted      RESPIRATORY  RR: 17 (15 - 29)  SpO2: 96% (94% - 100%)  Wt(kg): --  Exam: unlabored, clear to auscultation bilaterally  Mechanical Ventilation:   ABG - ( 17 Jun 2017 14:18 )  pH: 7.41  /  pCO2: 38    /  pO2: 163   / HCO3: 24    / Base Excess: -.1   /  SaO2: 100     Lactate: x                [N/A] Extubation Readiness Assessed  Meds: ALBUTerol/ipratropium for Nebulization 3milliLiter(s) Nebulizer every 6 hours        CARDIOVASCULAR  HR: 106 (103 - 117)  BP: 133/68 (133/68 - 133/68)  BP(mean): 92 (92 - 92)  ABP: 121/50 (94/65 - 143/60)  ABP(mean): 77 (68 - 94)  Wt(kg): --  CVP(cm H2O): --      Exam: regular rate and rhythm  Cardiac Rhythm: sinus  Perfusion     [x]Adequate   [ ]Inadequate  Mentation   [x]Normal       [ ]Reduced  Extremities  [x]Warm         [ ]Cool  Volume Status [ ]Hypervolemic [x]Euvolemic [ ]Hypovolemic  Intake: 2584ml/ Out: 3920 ml/ NET: -1346 ml  Meds:       GI/NUTRITION  Exam: soft, nontender, nondistended, incision C/D/I  Diet:  Meds: pantoprazole  Injectable 40milliGRAM(s) IV Push daily      GENITOURINARY    I & Os for current day (as of 19 Jun 2017 07:14)  =============================================  IN:    TPN (Total Parenteral Nutrition): 1364 ml    Solution: 520 ml    Solution: 250 ml    Solution: 200 ml    Solution: 150 ml    Solution: 100 ml    Total IN: 2584 ml  ---------------------------------------------  OUT:    Indwelling Catheter - Urethral: 2980 ml    Nasoenteral Tube: 900 ml    Colostomy: 50 ml    Total OUT: 3930 ml  ---------------------------------------------  Total NET: -1346 ml        06-19    139  |  105  |  39<H>  ----------------------------<  133<H>  4.0   |  22  |  1.05    Ca    7.5<L>      19 Jun 2017 04:42  Phos  2.8     06-19  Mg     2.1     06-19    TPro  4.9<L>  /  Alb  1.6<L>  /  TBili  0.8  /  DBili  x   /  AST  13  /  ALT  16  /  AlkPhos  96  06-19    [ ] Snyder catheter, indication: N/A  Meds:       HEMATOLOGIC  Meds: fondaparinux Injectable 2.5milliGRAM(s) SubCutaneous every 24 hours    [x] VTE Prophylaxis                        8.5    5.2   )-----------( 87       ( 19 Jun 2017 04:42 )             25.2     PT/INR - ( 19 Jun 2017 04:42 )   PT: 18.2 sec;   INR: 1.65 ratio         PTT - ( 19 Jun 2017 04:42 )  PTT:38.8 sec  Transfusion     [ ] PRBC   [ ] Platelets   [ ] FFP   [ ] Cryoprecipitate      INFECTIOUS DISEASES  WBC Count: 5.2 K/uL (06-19 @ 04:42)  WBC Count: 6.7 K/uL (06-18 @ 18:03)    RECENT CULTURES:  Specimen Source: .Blood Blood-Venous  Date/Time: 06-16 @ 02:28  Culture Results:   No growth to date.  Gram Stain: --  Organism: --  Specimen Source: .Blood Blood-Peripheral  Date/Time: 06-16 @ 02:27  Culture Results:   No growth to date.  Gram Stain: --  Organism: --    Meds: metroNIDAZOLE  IVPB 500milliGRAM(s) IV Intermittent every 8 hours  aztreonam  IVPB 1000milliGRAM(s) IV Intermittent every 8 hours  fluconAZOLE IVPB  IV Intermittent   fluconAZOLE IVPB 100milliGRAM(s) IV Intermittent every 24 hours  vancomycin  IVPB  IV Intermittent   vancomycin  IVPB 1000milliGRAM(s) IV Intermittent every 24 hours        ENDOCRINE  CAPILLARY BLOOD GLUCOSE  133 (19 Jun 2017 05:00)    Meds: levothyroxine Injectable 44MICROGram(s) IV Push daily  insulin lispro (HumaLOG) corrective regimen sliding scale  SubCutaneous every 6 hours        ACCESS DEVICES:  [ x] Peripheral IV  [ x] Central Venous Line	[ ] R	[ ] L	[ ] IJ	[ ] Fem	[ ] SC	Placed:   [x ] Arterial Line		[ ] R	[ ] L	[ ] Fem	[ ] Rad	[ ] Ax	Placed:   [ ] PICC:					[ ] Mediport  [ x] Urinary Catheter, Date Placed:   [x] Necessity of urinary, arterial, and venous catheters discussed    OTHER MEDICATIONS:      CODE STATUS:      IMAGING:

## 2017-06-19 NOTE — CHART NOTE - NSCHARTNOTEFT_GEN_A_CORE
SICU RD f/u: Pt c diverticulitis S/P Dunham's/ostomy creation, recurrent SBOs, enterocutaneous fistula now admitted c SBO, abdominal pain, loss of ostomy function, dislodged fistula plug. Pt now S/P 6/9  exploratory laparotomy, extensive lysis of adhesions, takedown of enterocutaneous fistula, repair of serosal tear on cecum, partial right salpingooophorectomy, colostomy revision, parastomal and incisional hernia repair with strattice mesh.   Pt reintubated 6/12 after CT scan, out of concern for aspiration; extubated 6/17; NGT fell out and replaced 6/18 with ongoing high output; pasty output from colostomy.    Source: Patient [ ]    Family [ ]     other [X ]: team rounds, medical record    Diet : NPO with TPN      Patient reports [ ] nausea  [ ] vomiting [ ] diarrhea [ ] constipation  [ ]chewing problems [ ] swallowing issues  [ ] other: NGT output x 24 hours = 900ml; colostomy output x 24 hours = 60ml.     PO intake:  < 50% [ ] 50-75% [ ]   % [ ]  other : n/a     Source for PO intake [ ] Patient [ ] family [ ] chart [ ] staff [ ] other     Enteral /Parenteral Nutrition: PN infusing at 62ml/hr (84Gm amino acids, 214Gm dextrose, 213ml 20%lipids) to provide 1490 clarke (20cal/Kg, 1.1Gm prot/Kg per dosing wt 74.4Kg). Of note, calcium, and phosphorus removed from PN, potassium decreased to 30mEq; acetate adjusted to 100mEq, chloride adjusted to 50mEq. MVI 9+3 10cc added, MTE omitted.      Current Weight: 92.8Kg (6/19), 74.4Kg (5/23)  % Weight Change: 125% with edema    Edema: 3+ generalized, L/R hand    Pertinent Medications: MEDICATIONS  (STANDING):  pantoprazole  Injectable 40milliGRAM(s) IV Push daily  insulin lispro (HumaLOG) corrective regimen sliding scale  SubCutaneous every 6 hours    MEDICATIONS  (PRN):  ondansetron Injectable 4milliGRAM(s) IV Push every 6 hours PRN Nausea  HYDROmorphone  Injectable 0.5milliGRAM(s) IV Push every 3 hours PRN Severe Pain (7 - 10)    Pertinent Labs:  06-19 Na139 mmol/L Glu 133 mg/dL<H> K+ 4.0 mmol/L Cr  1.05 mg/dL BUN 39 mg/dL<H> Phos 2.8 mg/dL Alb 1.6 g/dL<L>       Skin: no pressure injuries noted    Estimated Needs:   [X ] no change since previous assessment  [ ] recalculated:       Previous Nutrition Diagnosis:      [X ]Inadequate Oral Intake        Nutrition Diagnosis is [X ] ongoing  [ ] resolved [ ] not applicable          New Nutrition Diagnosis: [X ] not applicable       Interventions:     Recommend    [ ] Change Diet To: Pending extubation and diet advancement, recommend clear liquids; advance to low fiber diet as tolerated and medically feasible    [ ] Nutrition Supplement    [ ] Nutrition Support: PN per Dr. Carias    [ ] Other:        Monitoring and Evaluation:     [ ] PO intake [ ] Tolerance to diet prescription [X ] weights [X ] follow up per protocol    [X ] other: monitor PN provision and tolerance, ability to advance diet

## 2017-06-19 NOTE — PROGRESS NOTE ADULT - ASSESSMENT
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  Acute on chronic renal failure  hypokalemia 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  Acute on chronic renal failure  Anemia  Hypocalcemia

## 2017-06-19 NOTE — PROGRESS NOTE ADULT - ASSESSMENT
Renal function improving  Hypercalcemia improving  Low serum albumin is most likely secondary to stress status and is not a reflection of nutritional status

## 2017-06-19 NOTE — PROGRESS NOTE ADULT - SUBJECTIVE AND OBJECTIVE BOX
Interval History/ROS:Patient is a 76y old  Female who presents with a chief complaint of Abdominal pain (26 May 2017 11:33)  76y Female with PMH of COPD, CHF, h/o DVT/PE s/p IVC filter (later removed), GERD, Hypothyroidism, h/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 Diff, and PSH of diverticulitis s/p Ruben's, recurrent SBO s/p SBR, ECF at small bowel anastomosis site s/p takedown, wound dehiscence, and abdominal reconstruction complicated by parastomal hernia and incisional hernia. She is currently hospitalized for recurrent SBO associated with intermittent abdominal pain and loss of ostomy function. She was managed conservatively but then was taken to the OR  evening for unresolving SBO.   Pt s/p exploratory laparotomy, extensive lysis of adhesions, take down of ECF, repair of serosal tear on cecum, partial right salpingoophorectomy, colostomy revision, parastomal and incisional hernia repair with strattice mesh. Since then, Pt has been hypotensive, requiring pressors. Pr has been on broad spectrum antibiotics emperically since - (Daptomycin, aztreonam, flagyl) and since - Vancomycin. Pt s/p trach on .   Pt with 1st fever on  of 100.5. Pt has had cultures sent of which BCx remained NGTD but combicath Cx from - MRSA.   Patient now extubated and temps ok    pt feeling a little better    remains extubated.  mild discomfort  still with productive cough      PAST MEDICAL & SURGICAL HISTORY:  Pulmonary embolism  Enterocutaneous fistula  Enterocutaneous fistula  Chronic diastolic CHF (congestive heart failure): mild diastolic dysfunction  Osteoarthritis of both knees, unspecified osteoarthritis type  Peripheral neuropathy  Clostridium difficile infection  HIT (heparin-induced thrombocytopenia)  Diverticulitis of intestine with abscess without bleedin  DVT (deep venous thrombosis): s/p IVC filter, which was later removed  Orthostatic hypotension  GERD (gastroesophageal reflux disease)  CHF (congestive heart failure)  Hypothyroid  Hypertension  COPD (chronic obstructive pulmonary disease)  CHF (congestive heart failure)  H/O: hypertension  Chronic obstructive pulmonary disease (COPD)  Colostomy in place: s/p duarte procedure for diverticulitis  Status post Ruben procedure: for diverticulitis  S/P exploratory laparotomy: EC fistula complicated with pelvic abscesses  Wound dehiscence: Wound dehiscence and evisceration, s/p abdominal reconstruction with biologic mesh  Sigmoid diverticulitis: Ex lap, sigmoid resection, creation of colostomy.  Intestinal perforation: 2015, s/p closure with strattice mesh  S/P colostomy      Allergies    azithromycin (Unknown)  codeine (Unknown)  heparin (Other)  PC Pen VK (Unknown)  penicillin (Unknown)    Intolerances        ANTIMICROBIALS:  metroNIDAZOLE  IVPB 500 every 8 hours  aztreonam  IVPB 1000 every 8 hours  fluconAZOLE IVPB    fluconAZOLE IVPB 100 every 24 hours  vancomycin  IVPB    vancomycin  IVPB 1000 every 24 hours      OTHER MEDS:  levothyroxine Injectable 44MICROGram(s) IV Push daily  pantoprazole  Injectable 40milliGRAM(s) IV Push daily  ondansetron Injectable 4milliGRAM(s) IV Push every 6 hours PRN  fondaparinux Injectable 2.5milliGRAM(s) SubCutaneous every 24 hours  insulin lispro (HumaLOG) corrective regimen sliding scale  SubCutaneous every 6 hours  ALBUTerol/ipratropium for Nebulization 3milliLiter(s) Nebulizer every 6 hours  HYDROmorphone  Injectable 0.5milliGRAM(s) IV Push every 3 hours PRN      Vital Signs Last 24 Hrs  T(C): 37, Max: 37.5 (-18 @ 23:00)  T(F): 98.6, Max: 99.5 (06-18 @ 23:00)  HR: 112 (101 - 116)  BP: 117/55 (117/55 - 133/68)  BP(mean): 79 (79 - 92)  RR: 19 (15 - 25)  SpO2: 97% (95% - 100%)    PHYSICAL EXAM:  extubated    HEENT: WNL    Respiratory:clear ant    Cardiovascular:S!S2    Gastrointestinal: ostomy still dusky but o worse    Extremities:edema    Neurological:awake , alert    Skin:no rash                                8.9    5.4   )-----------( 88       ( 2017 15:45 )             26.5       -    139  |  105  |  39<H>  ----------------------------<  133<H>  4.0   |  22  |  1.05    Ca    7.5<L>      2017 04:42  Phos  2.8       Mg     2.1         TPro  4.9<L>  /  Alb  1.6<L>  /  TBili  0.8  /  DBili  x   /  AST  13  /  ALT  16  /  AlkPhos  96      LIVER FUNCTIONS - ( 2017 04:42 )  Alb: 1.6 g/dL / Pro: 4.9 g/dL / ALK PHOS: 96 U/L / ALT: 16 U/L RC / AST: 13 U/L / GGT: x             Vancomycin Level, Trough: 14.6 ug/mL ( @ 10:11)          MICROBIOLOGY:    RECENT CULTURES:   @ 02:28 .Blood Blood-Venous            No growth to date.     @ 02:27 .Blood Blood-Peripheral            No growth to date.     @ 17:50 .Broncial Combicath   MEIR   Numerous polymorphonuclear leukocytes per low power field  Few Squamous epithelial cells per low power field  Few Gram positive cocci in pairs per oil power field   Methicillin resistant Staphylococcus aureus  Methicillin resistant Staphylococcus aureus   Moderate Methicillin resistant Staphylococcus aureus  Normal Respiratory Amanda present     @ 18:32 .Urine Catheterized            <10,000 CFU/ml  Normal Urogenital amanda present     @ 18:22 .Blood Blood-Peripheral      Growth in anaerobic bottle: Gram Positive Cocci in Clusters        Growth in anaerobic bottle: Coag Negative Staphylococcus  Single set isolate, possible contaminant. Contact  Microbiology if susceptibility testing clinically  indicated.                    Vancomycin Level, Trough: 14.6 ug/mL ( @ 10:11)      RADIOLOGY:

## 2017-06-19 NOTE — PROGRESS NOTE ADULT - SUBJECTIVE AND OBJECTIVE BOX
[facility] Trauma/Acute Care [place] Progress Note    HOSPITAL DAY #:28  POST OPERATIVE DAY #:  10  STATUS POST:  exploratory laparotomy, extensive lysis of adhesions, takedown of enterocutaneous fistula, repair of serosal tear on cecum, partial right salpingooophorectomy, colostomy revision, parastomal and incisional hernia repair with strattice mesh                    SUBJECTIVE:  denies n/v/f/c, denies SOB or CP     OBJECTIVE:     VITALS:    Constitutional:    Respiratory:   Cardiovascular: sinus  Gastrointestinal: soft, distended, ostomy mucosa appears congested, dark but viable, no fxn. NGT in place: bilious, old colostomy site, packed  Ext: b/l UE edema      SpO2: 100% (89% - 100%)    I&O's         LABS: [facility] Trauma/Acute Care [place] Progress Note    HOSPITAL DAY #:28  POST OPERATIVE DAY #:  10  STATUS POST:  exploratory laparotomy, extensive lysis of adhesions, takedown of enterocutaneous fistula, repair of serosal tear on cecum, partial right salpingooophorectomy, colostomy revision, parastomal and incisional hernia repair with strattice mesh                    SUBJECTIVE:  denies n/v/f/c, denies SOB or CP. She reports gas within her ostomy.     OBJECTIVE:     VITALS:  Vital Signs Last 24 Hrs  T(C): 37, Max: 37.5 (06-18 @ 23:00)  T(F): 98.6, Max: 99.5 (06-18 @ 23:00)  HR: 116 (101 - 116)  BP: 110/53 (110/53 - 133/68)  BP(mean): 76 (76 - 92)  RR: 25 (16 - 25)  SpO2: 96% (95% - 100%)    I&O's Detail  I & Os for 24h ending 19 Jun 2017 07:00  =============================================  IN:    TPN (Total Parenteral Nutrition): 1488 ml    Solution: 620 ml    Solution: 250 ml    Solution: 200 ml    Solution: 200 ml    Solution: 100 ml    Total IN: 2858 ml  ---------------------------------------------  OUT:    Indwelling Catheter - Urethral: 3320 ml    Nasoenteral Tube: 900 ml    Colostomy: 60 ml    Total OUT: 4280 ml  ---------------------------------------------  Total NET: -1422 ml    I & Os for current day (as of 19 Jun 2017 20:18)  =============================================  IN:    TPN (Total Parenteral Nutrition): 744 ml    Total IN: 744 ml  ---------------------------------------------  OUT:    Indwelling Catheter - Urethral: 2585 ml    Nasoenteral Tube: 500 ml    Total OUT: 3085 ml  ---------------------------------------------  Total NET: -2341 ml                            8.9    5.4   )-----------( 88       ( 19 Jun 2017 15:45 )             26.5       06-19    139  |  105  |  39<H>  ----------------------------<  133<H>  4.0   |  22  |  1.05    Ca    7.5<L>      19 Jun 2017 04:42  Phos  2.8     06-19  Mg     2.1     06-19    TPro  4.9<L>  /  Alb  1.6<L>  /  TBili  0.8  /  DBili  x   /  AST  13  /  ALT  16  /  AlkPhos  96  06-19      Constitutional:    Respiratory:   Cardiovascular: sinus  Gastrointestinal: soft, distended, ostomy mucosa appears congested, dark but viable with thin liquid. old colostomy site, packed  Ext: b/l UE edema      SpO2: 100% (89% - 100%)    I&O's         LABS:

## 2017-06-19 NOTE — PROGRESS NOTE ADULT - ASSESSMENT
75 yo F SICU day #10, POD #9 s/p ex-lap, extensive NANETTE, takedown of EC fistula, repair of cecal serosal tear, partial R salpingectomy, colostomy revision, parastomal and incisional hernia repair with strattice mesh, complicated by MRSA pneumonia requiring reintubation and pressor support, now extubated and improving, hemodynamically stable, with ongoing pain control concerns.    Neuro: significant chronic and postoperative pain, on standing IV acetaminophen, with dilaudid PRN  CV: remains in sinus rhythm, amiodarone drip stopped yesterday after load completed, mildly hypotensive but MAP appropriately greater than 65, no need for pressor  Pulm: encourage pulmonary hygiene, with duonebs as needed for COPD  GI/Nutrition: NPO, minimal ostomy output, continue TPN for nutritional support, await GI function  /Renal: acute on chronic kidney disease, TREVA improving, trend electrolytes, follow urine output   ID: on broad spectrum antibiotics, followed by ID team, continue vancomycin for MRSA pneumonia, diflucan for candiduria, consider de-escalating aztreonam/flagyl empiric therapy  Tubes/Lines/Drains: PICC in place for TPN, consider removing central line and arterial line when persistently hemodynamically stable  Endocrine: euglycemic, continue q6h fingersticks and insulin coverage  Skin: continue turning and repositioning every 2 hours for skin protection  Prophylaxis: VTE ppx with fondaparinux for history of HIT  Dispo: full code, remain in SICU 77 yo F SICU day #10, POD #9 s/p ex-lap, extensive NANETTE, takedown of EC fistula, repair of cecal serosal tear, partial R salpingectomy, colostomy revision, parastomal and incisional hernia repair with strattice mesh, complicated by MRSA pneumonia requiring reintubation and pressor support, now extubated and improving, hemodynamically stable, with ongoing pain control concerns.    Neuro: significant chronic and postoperative pain, on standing IV acetaminophen, with dilaudid PRN  CV: remains in sinus rhythm, amiodarone drip stopped yesterday after load completed, mildly hypotensive but MAP appropriately greater than 65, no need for pressor  Pulm: encourage pulmonary hygiene, with duonebs as needed for COPD  GI/Nutrition: NPO, minimal ostomy output, continue TPN for nutritional support, await GI function, consider repeat CT scan  /Renal: acute on chronic kidney disease, TREVA improving, trend electrolytes, follow urine output   ID: on broad spectrum antibiotics, followed by ID team, continue vancomycin for MRSA pneumonia, diflucan for candiduria, consider de-escalating aztreonam/flagyl empiric therapy  Tubes/Lines/Drains: PICC in place for TPN, consider removing central line and arterial line when persistently hemodynamically stable  Endocrine: euglycemic, continue q6h fingersticks and insulin coverage  Skin: continue turning and repositioning every 2 hours for skin protection  Prophylaxis: VTE ppx with fondaparinux for history of HIT  Dispo: full code, remain in SICU

## 2017-06-20 LAB
ANION GAP SERPL CALC-SCNC: 13 MMOL/L — SIGNIFICANT CHANGE UP (ref 5–17)
APTT BLD: 39.4 SEC — HIGH (ref 27.5–37.4)
BUN SERPL-MCNC: 37 MG/DL — HIGH (ref 7–23)
CALCIUM SERPL-MCNC: 8.1 MG/DL — LOW (ref 8.4–10.5)
CHLORIDE SERPL-SCNC: 105 MMOL/L — SIGNIFICANT CHANGE UP (ref 96–108)
CO2 SERPL-SCNC: 23 MMOL/L — SIGNIFICANT CHANGE UP (ref 22–31)
CREAT SERPL-MCNC: 1.1 MG/DL — SIGNIFICANT CHANGE UP (ref 0.5–1.3)
GLUCOSE SERPL-MCNC: 107 MG/DL — HIGH (ref 70–99)
HCT VFR BLD CALC: 26.7 % — LOW (ref 34.5–45)
HGB BLD-MCNC: 8.8 G/DL — LOW (ref 11.5–15.5)
INR BLD: 1.6 RATIO — HIGH (ref 0.88–1.16)
MAGNESIUM SERPL-MCNC: 2 MG/DL — SIGNIFICANT CHANGE UP (ref 1.6–2.6)
MCHC RBC-ENTMCNC: 33 PG — SIGNIFICANT CHANGE UP (ref 27–34)
MCHC RBC-ENTMCNC: 33.2 GM/DL — SIGNIFICANT CHANGE UP (ref 32–36)
MCV RBC AUTO: 99.3 FL — SIGNIFICANT CHANGE UP (ref 80–100)
PHOSPHATE SERPL-MCNC: 2.6 MG/DL — SIGNIFICANT CHANGE UP (ref 2.5–4.5)
PLATELET # BLD AUTO: 104 K/UL — LOW (ref 150–400)
POTASSIUM SERPL-MCNC: 4.3 MMOL/L — SIGNIFICANT CHANGE UP (ref 3.5–5.3)
POTASSIUM SERPL-SCNC: 4.3 MMOL/L — SIGNIFICANT CHANGE UP (ref 3.5–5.3)
PROCALCITONIN SERPL-MCNC: 3.31 NG/ML — HIGH (ref 0–0.04)
PROTHROM AB SERPL-ACNC: 17.4 SEC — HIGH (ref 9.8–12.7)
RBC # BLD: 2.68 M/UL — LOW (ref 3.8–5.2)
RBC # FLD: 16 % — HIGH (ref 10.3–14.5)
SODIUM SERPL-SCNC: 141 MMOL/L — SIGNIFICANT CHANGE UP (ref 135–145)
WBC # BLD: 4.6 K/UL — SIGNIFICANT CHANGE UP (ref 3.8–10.5)
WBC # FLD AUTO: 4.6 K/UL — SIGNIFICANT CHANGE UP (ref 3.8–10.5)

## 2017-06-20 PROCEDURE — 71010: CPT | Mod: 26

## 2017-06-20 PROCEDURE — 99232 SBSQ HOSP IP/OBS MODERATE 35: CPT

## 2017-06-20 RX ORDER — HYDROMORPHONE HYDROCHLORIDE 2 MG/ML
0.5 INJECTION INTRAMUSCULAR; INTRAVENOUS; SUBCUTANEOUS ONCE
Qty: 0 | Refills: 0 | Status: DISCONTINUED | OUTPATIENT
Start: 2017-06-20 | End: 2017-06-20

## 2017-06-20 RX ORDER — MAGNESIUM SULFATE 500 MG/ML
2 VIAL (ML) INJECTION ONCE
Qty: 0 | Refills: 0 | Status: COMPLETED | OUTPATIENT
Start: 2017-06-20 | End: 2017-06-20

## 2017-06-20 RX ORDER — ACETAMINOPHEN 500 MG
1000 TABLET ORAL ONCE
Qty: 0 | Refills: 0 | Status: COMPLETED | OUTPATIENT
Start: 2017-06-20 | End: 2017-06-21

## 2017-06-20 RX ORDER — BUDESONIDE AND FORMOTEROL FUMARATE DIHYDRATE 160; 4.5 UG/1; UG/1
2 AEROSOL RESPIRATORY (INHALATION)
Qty: 0 | Refills: 0 | Status: DISCONTINUED | OUTPATIENT
Start: 2017-06-20 | End: 2017-06-30

## 2017-06-20 RX ORDER — ACETAMINOPHEN 500 MG
1000 TABLET ORAL ONCE
Qty: 0 | Refills: 0 | Status: COMPLETED | OUTPATIENT
Start: 2017-06-20 | End: 2017-06-20

## 2017-06-20 RX ADMIN — HYDROMORPHONE HYDROCHLORIDE 0.5 MILLIGRAM(S): 2 INJECTION INTRAMUSCULAR; INTRAVENOUS; SUBCUTANEOUS at 11:40

## 2017-06-20 RX ADMIN — Medication 100 MILLIGRAM(S): at 05:15

## 2017-06-20 RX ADMIN — Medication 3 MILLILITER(S): at 18:17

## 2017-06-20 RX ADMIN — FONDAPARINUX SODIUM 2.5 MILLIGRAM(S): 2.5 INJECTION, SOLUTION SUBCUTANEOUS at 10:26

## 2017-06-20 RX ADMIN — Medication 3 MILLILITER(S): at 06:16

## 2017-06-20 RX ADMIN — PANTOPRAZOLE SODIUM 40 MILLIGRAM(S): 20 TABLET, DELAYED RELEASE ORAL at 12:09

## 2017-06-20 RX ADMIN — Medication 3 MILLILITER(S): at 11:16

## 2017-06-20 RX ADMIN — HYDROMORPHONE HYDROCHLORIDE 0.5 MILLIGRAM(S): 2 INJECTION INTRAMUSCULAR; INTRAVENOUS; SUBCUTANEOUS at 05:30

## 2017-06-20 RX ADMIN — HYDROMORPHONE HYDROCHLORIDE 0.5 MILLIGRAM(S): 2 INJECTION INTRAMUSCULAR; INTRAVENOUS; SUBCUTANEOUS at 21:04

## 2017-06-20 RX ADMIN — HYDROMORPHONE HYDROCHLORIDE 0.5 MILLIGRAM(S): 2 INJECTION INTRAMUSCULAR; INTRAVENOUS; SUBCUTANEOUS at 00:33

## 2017-06-20 RX ADMIN — Medication 44 MICROGRAM(S): at 05:14

## 2017-06-20 RX ADMIN — Medication 50 GRAM(S): at 07:56

## 2017-06-20 RX ADMIN — HYDROMORPHONE HYDROCHLORIDE 0.5 MILLIGRAM(S): 2 INJECTION INTRAMUSCULAR; INTRAVENOUS; SUBCUTANEOUS at 05:15

## 2017-06-20 RX ADMIN — FLUCONAZOLE 100 MILLIGRAM(S): 150 TABLET ORAL at 12:10

## 2017-06-20 RX ADMIN — HYDROMORPHONE HYDROCHLORIDE 0.5 MILLIGRAM(S): 2 INJECTION INTRAMUSCULAR; INTRAVENOUS; SUBCUTANEOUS at 11:55

## 2017-06-20 RX ADMIN — HYDROMORPHONE HYDROCHLORIDE 0.5 MILLIGRAM(S): 2 INJECTION INTRAMUSCULAR; INTRAVENOUS; SUBCUTANEOUS at 17:55

## 2017-06-20 RX ADMIN — Medication 250 MILLIGRAM(S): at 10:25

## 2017-06-20 RX ADMIN — Medication 1000 MILLIGRAM(S): at 08:11

## 2017-06-20 RX ADMIN — HYDROMORPHONE HYDROCHLORIDE 0.5 MILLIGRAM(S): 2 INJECTION INTRAMUSCULAR; INTRAVENOUS; SUBCUTANEOUS at 18:05

## 2017-06-20 RX ADMIN — Medication 400 MILLIGRAM(S): at 07:56

## 2017-06-20 RX ADMIN — Medication 50 MILLIGRAM(S): at 06:36

## 2017-06-20 RX ADMIN — HYDROMORPHONE HYDROCHLORIDE 0.5 MILLIGRAM(S): 2 INJECTION INTRAMUSCULAR; INTRAVENOUS; SUBCUTANEOUS at 06:51

## 2017-06-20 RX ADMIN — HYDROMORPHONE HYDROCHLORIDE 0.5 MILLIGRAM(S): 2 INJECTION INTRAMUSCULAR; INTRAVENOUS; SUBCUTANEOUS at 21:20

## 2017-06-20 RX ADMIN — HYDROMORPHONE HYDROCHLORIDE 0.5 MILLIGRAM(S): 2 INJECTION INTRAMUSCULAR; INTRAVENOUS; SUBCUTANEOUS at 14:54

## 2017-06-20 RX ADMIN — Medication 2: at 12:27

## 2017-06-20 RX ADMIN — HYDROMORPHONE HYDROCHLORIDE 0.5 MILLIGRAM(S): 2 INJECTION INTRAMUSCULAR; INTRAVENOUS; SUBCUTANEOUS at 00:47

## 2017-06-20 RX ADMIN — HYDROMORPHONE HYDROCHLORIDE 0.5 MILLIGRAM(S): 2 INJECTION INTRAMUSCULAR; INTRAVENOUS; SUBCUTANEOUS at 15:09

## 2017-06-20 RX ADMIN — HYDROMORPHONE HYDROCHLORIDE 0.5 MILLIGRAM(S): 2 INJECTION INTRAMUSCULAR; INTRAVENOUS; SUBCUTANEOUS at 06:36

## 2017-06-20 NOTE — PROGRESS NOTE ADULT - ASSESSMENT
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  Acute on chronic renal failure  Anemia  Hypocalcemia

## 2017-06-20 NOTE — PROGRESS NOTE ADULT - ASSESSMENT
76y Female with PMH of COPD, CHF, h/o DVT/PE s/p IVC filter (later removed), GERD, Hypothyroidism, h/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 Diff, and PSH of diverticulitis s/p Ruben's, recurrent SBO s/p SBR, ECF at small bowel anastomosis site s/p takedown, wound dehiscence, and abdominal reconstruction complicated by parastomal hernia and incisional hernia. She is currently hospitalized for recurrent SBO associated with intermittent abdominal pain and loss of ostomy function. She was managed conservatively but then was taken to the OR 6/9 evening for unresolving SBO.   Pt s/p exploratory laparotomy, extensive lysis of adhesions, take down of ECF, repair of serosal tear on cecum, partial right salpingoophorectomy, colostomy revision, parastomal and incisional hernia repair with strattice mesh. Since then, Pt has been hypotensive, requiring pressors. Pr has been on broad spectrum antibiotics empirically since 6/9- (Daptomycin, aztreonam, flagyl) and since 6/16- Vancomycin. Pt s/p trach on 6/12.   Pt with 1st fever on 6/16 of 100.5. Pt has had cultures sent of which BCx remained NGTD but combicath Cx from 6/12- MRSA.   Patient now extubated and temps ok  Day #10 flagyl, aztreonam  day # 9diflucan  day # 5 vanco    will discuss duration of antibiotics with team-  completed vanco/aztreonam today   Pt clinically improving- now extubated , afebrile  follow status of wound and ostomy  5 more days of vancomycin. follow levels    ID will follow the patient PRN. Please recontact ID if we can be of further assistance . [phone]

## 2017-06-20 NOTE — PROGRESS NOTE ADULT - ASSESSMENT
75 yo F SICU day #11, POD #10 s/p ex-lap, extensive NANETTE, takedown of EC fistula, repair of cecal serosal tear, partial R salpingectomy, colostomy revision, parastomal and incisional hernia repair with strattice mesh, complicated by MRSA pneumonia requiring reintubation and pressor support, now extubated and improving, hemodynamically stable, with ongoing pain control concerns.    Neuro: Significant chronic and postoperative pain  -Continue standing IV acetaminophen, with dilaudid PRN    CV: Hemodynamically stable  - Remains in sinus rhythm  -Continue to monitor vital signs    Pulm: Atelectasis  -Encourage pulmonary hygiene, incentive spirometry, OOB  -Continue Duonebs as needed for COPD    GI/Nutrition: Malnutrition  -NPO, continue TPN for nutritional support  -Minimal ostomy output, await GI function    /Renal: Acute on chronic kidney disease  -TREVA improving, trend electrolytes, follow urine output     ID: MRSA pneumonia, Candiduria  -continue vancomycin for MRSA pneumonia  -Diflucan for candiduria  -Consider de-escalating aztreonam/flagyl empiric therapy. F/u with ID today    Tubes/Lines/Drains: PICC in place for TPN    Endocrine: Euglycemic  -continue q6h fingersticks and insulin coverage    Skin: continue turning and repositioning every 2 hours for skin protection    Prophylaxis: VTE ppx with fondaparinux for history of HIT    Dispo: Full code, consider transfer to floor

## 2017-06-20 NOTE — PROGRESS NOTE ADULT - SUBJECTIVE AND OBJECTIVE BOX
Interval History/ROS:Patient is a 76y old  Female who presents with a chief complaint of Abdominal pain (26 May 2017 11:33)    Pt s/p exploratory laparotomy, extensive lysis of adhesions, take down of ECF, repair of serosal tear on cecum, partial right salpingoophorectomy, colostomy revision, parastomal and incisional hernia repair with strattice mesh. Since then, Pt has been hypotensive, requiring pressors. Pr has been on broad spectrum antibiotics emperically since - (Daptomycin, aztreonam, flagyl) and since - Vancomycin. Pt s/p trach on .   Pt with 1st fever on  of 100.5. Pt has had cultures sent of which BCx remained NGTD but combicath Cx from - MRSA.   Patient now extubated and temps ok      pt feeling "better"  still some resp secretions. feel smore comfortable       PAST MEDICAL & SURGICAL HISTORY:  Pulmonary embolism  Enterocutaneous fistula  Enterocutaneous fistula  Chronic diastolic CHF (congestive heart failure): mild diastolic dysfunction  Osteoarthritis of both knees, unspecified osteoarthritis type  Peripheral neuropathy  Clostridium difficile infection  HIT (heparin-induced thrombocytopenia)  Diverticulitis of intestine with abscess without bleedin  DVT (deep venous thrombosis): s/p IVC filter, which was later removed  Orthostatic hypotension  GERD (gastroesophageal reflux disease)  CHF (congestive heart failure)  Hypothyroid  Hypertension  COPD (chronic obstructive pulmonary disease)  CHF (congestive heart failure)  H/O: hypertension  Chronic obstructive pulmonary disease (COPD)  Colostomy in place: s/p duarte procedure for diverticulitis  Status post Ruben procedure: for diverticulitis  S/P exploratory laparotomy: EC fistula complicated with pelvic abscesses  Wound dehiscence: Wound dehiscence and evisceration, s/p abdominal reconstruction with biologic mesh  Sigmoid diverticulitis: Ex lap, sigmoid resection, creation of colostomy.  Intestinal perforation: 2015, s/p closure with strattice mesh  S/P colostomy      Allergies    azithromycin (Unknown)  codeine (Unknown)  heparin (Other)  PC Pen VK (Unknown)  penicillin (Unknown)    Intolerances        ANTIMICROBIALS:  vancomycin  IVPB    vancomycin  IVPB 1000 every 24 hours      OTHER MEDS:  levothyroxine Injectable 44MICROGram(s) IV Push daily  pantoprazole  Injectable 40milliGRAM(s) IV Push daily  ondansetron Injectable 4milliGRAM(s) IV Push every 6 hours PRN  fondaparinux Injectable 2.5milliGRAM(s) SubCutaneous every 24 hours  insulin lispro (HumaLOG) corrective regimen sliding scale  SubCutaneous every 6 hours  ALBUTerol/ipratropium for Nebulization 3milliLiter(s) Nebulizer every 6 hours  HYDROmorphone  Injectable 0.5milliGRAM(s) IV Push every 3 hours PRN  buDESOnide 160 MICROgram(s)/formoterol 4.5 MICROgram(s) Inhaler 2Puff(s) Inhalation two times a day      Vital Signs Last 24 Hrs  T(C): 36.7, Max: 37.4 ( @ 07:00)  T(F): 98.1, Max: 99.4 ( @ 07:00)  HR: 109 (10 - 117)  BP: 112/53 (91/44 - 134/60)  BP(mean): 77 (64 - 88)  RR: 19 (15 - 29)  SpO2: 100% (95% - 100%)    PHYSICAL EXAM:  extubated    HEENT: WNL    Respiratory:clear ant    Cardiovascular:S!S2    Gastrointestinal: ostomy still dusky but o worse    Extremities:edema    Neurological: awake , alert    Skin:no rash   some necrosis along base of wound unchanged wound packed in areas                            8.8    4.6   )-----------( 104      ( 2017 04:33 )             26.7           141  |  105  |  37<H>  ----------------------------<  107<H>  4.3   |  23  |  1.10    Ca    8.1<L>      2017 04:32  Phos  2.6       Mg     2.0         TPro  4.9<L>  /  Alb  1.6<L>  /  TBili  0.8  /  DBili  x   /  AST  13  /  ALT  16  /  AlkPhos  96      LIVER FUNCTIONS - ( 2017 04:42 )  Alb: 1.6 g/dL / Pro: 4.9 g/dL / ALK PHOS: 96 U/L / ALT: 16 U/L RC / AST: 13 U/L / GGT: x             Vancomycin Level, Trough: 14.6 ug/mL ( @ 10:11)          MICROBIOLOGY:    RECENT CULTURES:   @ 02:28 .Blood Blood-Venous            No growth to date.     @ 02:27 .Blood Blood-Peripheral            No growth to date.                    Vancomycin Level, Trough: 14.6 ug/mL ( @ 10:11)      RADIOLOGY:

## 2017-06-20 NOTE — PROGRESS NOTE ADULT - SUBJECTIVE AND OBJECTIVE BOX
NEPHROLOGY-[facility], [place] [phone]        Patient seen and examined         MEDICATIONS  (STANDING):  levothyroxine Injectable 44MICROGram(s) IV Push daily  pantoprazole  Injectable 40milliGRAM(s) IV Push daily  metroNIDAZOLE  IVPB 500milliGRAM(s) IV Intermittent every 8 hours  aztreonam  IVPB 1000milliGRAM(s) IV Intermittent every 8 hours  fluconAZOLE IVPB  IV Intermittent   fluconAZOLE IVPB 100milliGRAM(s) IV Intermittent every 24 hours  fondaparinux Injectable 2.5milliGRAM(s) SubCutaneous every 24 hours  insulin lispro (HumaLOG) corrective regimen sliding scale  SubCutaneous every 6 hours  vancomycin  IVPB  IV Intermittent   vancomycin  IVPB 1000milliGRAM(s) IV Intermittent every 24 hours  ALBUTerol/ipratropium for Nebulization 3milliLiter(s) Nebulizer every 6 hours      VITAL:  T(C): , Max: 37 (06-19 @ 15:00)  T(F): , Max: 98.6 (06-19 @ 15:00)  HR: 117  BP: 118/57  BP(mean): 82  RR: 19  SpO2: 95%  Wt(kg): --    I and O's:    I & Os for current day (as of 06-20 @ 08:18)  =============================================  IN: 1838 ml / OUT: 4829 ml / NET: -2991 ml        PHYSICAL EXAM:     Constitutional: NAD  HEENT: PERRLA    Neck:  No JVD  Respiratory: poor effort  Cardiovascular: S1 and S2  Gastrointestinal:+surgical scar n ostomy; hypoactive  Extremities: +peripheral edema  Neurological: A/O x 3, no focal deficits  Psychiatric: Normal mood, normal affect  : No Snyder  Skin: No rashes  Access: Not applicable      LABS:                        8.8    4.6   )-----------( 104      ( 20 Jun 2017 04:33 )             26.7     06-20    141  |  105  |  37<H>  ----------------------------<  107<H>  4.3   |  23  |  1.10    Ca    8.1<L>      20 Jun 2017 04:32  Phos  2.6     06-20  Mg     2.0     06-20    TPro  4.9<L>  /  Alb  1.6<L>  /  TBili  0.8  /  DBili  x   /  AST  13  /  ALT  16  /  AlkPhos  96  06-19          Urine Studies:          RADIOLOGY & ADDITIONAL STUDIES: NEPHROLOGY-[facility] [phone]        Patient seen and examined         MEDICATIONS  (STANDING):  levothyroxine Injectable 44MICROGram(s) IV Push daily  pantoprazole  Injectable 40milliGRAM(s) IV Push daily  metroNIDAZOLE  IVPB 500milliGRAM(s) IV Intermittent every 8 hours  aztreonam  IVPB 1000milliGRAM(s) IV Intermittent every 8 hours  fluconAZOLE IVPB  IV Intermittent   fluconAZOLE IVPB 100milliGRAM(s) IV Intermittent every 24 hours  fondaparinux Injectable 2.5milliGRAM(s) SubCutaneous every 24 hours  insulin lispro (HumaLOG) corrective regimen sliding scale  SubCutaneous every 6 hours  vancomycin  IVPB  IV Intermittent   vancomycin  IVPB 1000milliGRAM(s) IV Intermittent every 24 hours  ALBUTerol/ipratropium for Nebulization 3milliLiter(s) Nebulizer every 6 hours      VITAL:  T(C): , Max: 37 (06-19 @ 15:00)  T(F): , Max: 98.6 (06-19 @ 15:00)  HR: 117  BP: 118/57  BP(mean): 82  RR: 19  SpO2: 95%  Wt(kg): --    I and O's:    I & Os for current day (as of 06-20 @ 08:18)  =============================================  IN: 1838 ml / OUT: 4829 ml / NET: -2991 ml        PHYSICAL EXAM:     Constitutional: NAD  HEENT: PERRLA    Neck:  No JVD  Respiratory: poor effort  Cardiovascular: S1 and S2  Gastrointestinal:+surgical scar n ostomy; hypoactive  Extremities: +peripheral edema  Neurological: A/O x 3, no focal deficits  Psychiatric: Normal mood, normal affect  : No Snyder  Skin: No rashes  Access: Not applicable      LABS:                        8.8    4.6   )-----------( 104      ( 20 Jun 2017 04:33 )             26.7     06-20    141  |  105  |  37<H>  ----------------------------<  107<H>  4.3   |  23  |  1.10    Ca    8.1<L>      20 Jun 2017 04:32  Phos  2.6     06-20  Mg     2.0     06-20    TPro  4.9<L>  /  Alb  1.6<L>  /  TBili  0.8  /  DBili  x   /  AST  13  /  ALT  16  /  AlkPhos  96  06-19          Urine Studies:          RADIOLOGY & ADDITIONAL STUDIES: NEPHROLOGY-[facility] [phone]        Patient seen and examined in the chair.  She was in much better spirit        MEDICATIONS  (STANDING):  levothyroxine Injectable 44MICROGram(s) IV Push daily  pantoprazole  Injectable 40milliGRAM(s) IV Push daily  metroNIDAZOLE  IVPB 500milliGRAM(s) IV Intermittent every 8 hours  aztreonam  IVPB 1000milliGRAM(s) IV Intermittent every 8 hours  fluconAZOLE IVPB  IV Intermittent   fluconAZOLE IVPB 100milliGRAM(s) IV Intermittent every 24 hours  fondaparinux Injectable 2.5milliGRAM(s) SubCutaneous every 24 hours  insulin lispro (HumaLOG) corrective regimen sliding scale  SubCutaneous every 6 hours  vancomycin  IVPB  IV Intermittent   vancomycin  IVPB 1000milliGRAM(s) IV Intermittent every 24 hours  ALBUTerol/ipratropium for Nebulization 3milliLiter(s) Nebulizer every 6 hours      VITAL:  T(C): , Max: 37 (06-19 @ 15:00)  T(F): , Max: 98.6 (06-19 @ 15:00)  HR: 117  BP: 118/57  BP(mean): 82  RR: 19  SpO2: 95%  Wt(kg): --    I and O's:    I & Os for current day (as of 06-20 @ 08:18)  =============================================  IN: 1838 ml / OUT: 4829 ml / NET: -2991 ml        PHYSICAL EXAM:    Constitutional: NAD  HEENT: PERRLA    Neck:  No JVD  Respiratory: poor effort  Cardiovascular: S1 and S2  Gastrointestinal:+surgical scar n ostomy; hypoactive  Extremities: +peripheral edema  Neurological: A/O x 3, no focal deficits  Psychiatric: Normal mood, normal affect  : + Snyder  Skin: No rashes  Access: Not applicable      LABS:                        8.8    4.6   )-----------( 104      ( 20 Jun 2017 04:33 )             26.7     06-20    141  |  105  |  37<H>  ----------------------------<  107<H>  4.3   |  23  |  1.10    Ca    8.1<L>      20 Jun 2017 04:32  Phos  2.6     06-20  Mg     2.0     06-20    TPro  4.9<L>  /  Alb  1.6<L>  /  TBili  0.8  /  DBili  x   /  AST  13  /  ALT  16  /  AlkPhos  96  06-19                 RADIOLOGY & ADDITIONAL STUDIES:          EXAM:  CHEST PORTABLE ROUTINE                            PROCEDURE DATE:  06/19/2017            INTERPRETATION:  AP chest x-ray at 0450 hours on 19 June.    Clinical information: Status post enterocutaneous fistula takedown,   atelectasis.    Comparison: Chest x-ray at 1817 hours on 18 June.    Findings: Enteric tube, and right internal jugular vein and left   subclavian vein central venous catheters persist.    There is persistent linear atelectasis at the left lung base. The right   lung is clear.    Impression: Left basilar atelectasis.

## 2017-06-20 NOTE — PROGRESS NOTE ADULT - PROBLEM SELECTOR PLAN 1
She is volume overloaded but largely improved.  Off pressors   TPN ongoing. She is volume overloaded but largely improved.  Off pressors   TPN ongoing.  Stable renal function

## 2017-06-20 NOTE — PROGRESS NOTE ADULT - SUBJECTIVE AND OBJECTIVE BOX
Day #10 of PN  PN infusion at 64    Vital Signs    T(C): 37.4, Max: 37.4 (06-20 @ 07:00)  HR: 117 (10 - 117)  BP: 118/57 (97/49 - 134/60)  RR: 19 (15 - 25)  SpO2: 95% (94% - 100%)    Labs   06-20    141  |  105  |  37<H>  ----------------------------<  107<H>  4.3   |  23  |  1.10    Ca    8.1<L>      20 Jun 2017 04:32  Phos  2.6     06-20  Mg     2.0     06-20    TPro  4.9<L>  /  Alb  1.6<L>  /  TBili  0.8  /  DBili  x   /  AST  13  /  ALT  16  /  AlkPhos  96  06-19      LIVER FUNCTIONS - ( 19 Jun 2017 04:42 )  Alb: 1.6 g/dL / Pro: 4.9 g/dL / ALK PHOS: 96 U/L / ALT: 16 U/L RC / AST: 13 U/L / GGT: x           CAPILLARY BLOOD GLUCOSE  111 (20 Jun 2017 06:00)  131 (20 Jun 2017 00:00)  144 (19 Jun 2017 18:00)  156 (19 Jun 2017 12:00)    I&O's Detail    I & Os for current day (as of 20 Jun 2017 08:20)  =============================================  IN:    TPN (Total Parenteral Nutrition): 1488 ml    Solution: 200 ml    Solution: 150 ml    Total IN: 1838 ml  ---------------------------------------------  OUT:    Indwelling Catheter - Urethral: 3829 ml    Nasoenteral Tube: 1000 ml    Total OUT: 4829 ml  ---------------------------------------------  Total NET: -2991 ml      T(C): 37.4, Max: 37.4 (06-20 @ 07:00)  HR: 117 (10 - 117)  BP: 118/57 (97/49 - 134/60)  RR: 19 (15 - 25)  SpO2: 95% (94% - 100%)  Wt(kg): --    Labs   06-20    141  |  105  |  37<H>  ----------------------------<  107<H>  4.3   |  23  |  1.10    Ca    8.1<L>      20 Jun 2017 04:32  Phos  2.6     06-20  Mg     2.0     06-20    TPro  4.9<L>  /  Alb  1.6<L>  /  TBili  0.8  /  DBili  x   /  AST  13  /  ALT  16  /  AlkPhos  96  06-19      LIVER FUNCTIONS - ( 19 Jun 2017 04:42 )  Alb: 1.6 g/dL / Pro: 4.9 g/dL / ALK PHOS: 96 U/L / ALT: 16 U/L RC / AST: 13 U/L / GGT: x           CAPILLARY BLOOD GLUCOSE  111 (20 Jun 2017 06:00)  131 (20 Jun 2017 00:00)  144 (19 Jun 2017 18:00)  156 (19 Jun 2017 12:00)    I&O's Detail    I & Os for current day (as of 20 Jun 2017 08:20)  =============================================  IN:    TPN (Total Parenteral Nutrition): 1488 ml    Solution: 200 ml    Solution: 150 ml    Total IN: 1838 ml  ---------------------------------------------  OUT:    Indwelling Catheter - Urethral: 3829 ml    Nasoenteral Tube: 1000 ml    Total OUT: 4829 ml  ---------------------------------------------  Total NET: -2991 ml

## 2017-06-20 NOTE — PROGRESS NOTE ADULT - SUBJECTIVE AND OBJECTIVE BOX
[facility] SICU Progress Note    HISTORY  75 yo Female with history significant for COPD, h/o DVT s/p IVC filter (now removed), GERD, hypothyroidism, HIT and diverticulitis s/p Ruben's, recurrent SBO s/p ex-lap small bowel resection, EC fistula s/p takedown, complicated by wound dehiscence, abdominal reconstruction underwent ex-lap, extensive NANETTE, EC fistula takedown, colostomy revision, and parastomal/incisional hernia repair, She was admitted to SICU intubated post operatively and was successfully extubated. After getting CT with oral contrast, pt went into respiratory distress and was reintubated, found to have MRSA pneumonia likely aspiration in nature. She was subsequently extubated, and now off pressors.     24 HOUR EVENTS: Yesterday, pt has central line and arterial line removed. She received 20mg IV Lasix and diuresed well.     SUBJECTIVE/ROS: Pt denies pain. A ten-point review of systems was otherwise negative except as noted.  [ ]  Due to altered mental status/intubation, subjective information were not able to be obtained from the patient. History was obtained, to the extent possible, from review of the chart and collateral sources of information.    NEURO  RASS: 0    GCS:     CAM ICU: Negative  Exam: awake, alert, oriented  Meds: ondansetron Injectable 4milliGRAM(s) IV Push every 6 hours PRN Nausea  HYDROmorphone  Injectable 0.5milliGRAM(s) IV Push every 3 hours PRN Severe Pain (7 - 10)  acetaminophen  IVPB. 1000milliGRAM(s) IV Intermittent once    [x] Adequacy of sedation and pain control has been assessed and adjusted    RESPIRATORY  RR: 20 (15 - 25)  SpO2: 98% (94% - 100%)  Wt(kg): --  Exam: unlabored, clear to auscultation bilaterally  Mechanical Ventilation:     [N/A] Extubation Readiness Assessed  Meds: ALBUTerol/ipratropium for Nebulization 3milliLiter(s) Nebulizer every 6 hours      CARDIOVASCULAR  HR: 108 (10 - 116)  BP: 121/59 (97/49 - 134/60)  BP(mean): 85 (71 - 88)  ABP: 132/54 (104/47 - 145/62)  ABP(mean): 83 (70 - 96)  Wt(kg): --  CVP(cm H2O): --      Exam: regular rate and rhythm  Cardiac Rhythm: sinus  Perfusion     [x]Adequate   [ ]Inadequate  Mentation   [x]Normal       [ ]Reduced  Extremities  [x]Warm         [ ]Cool  I & Os for 24h ending 06-19 @ 07:00  =============================================  IN: 2858 ml / OUT: 4280 ml / NET: -1422 ml    Volume Status [x ]Hypervolemic [ ]Euvolemic [ ]Hypovolemic  Meds: x    GI/NUTRITION  Exam: soft, nontender, nondistended  Diet: NPO  Meds: pantoprazole  Injectable 40milliGRAM(s) IV Push daily        GENITOURINARY  I&O's Detail  I & Os for 24h ending 06-19 @ 07:00  =============================================  IN:    TPN (Total Parenteral Nutrition): 1488 ml    Solution: 620 ml    Solution: 250 ml    Solution: 200 ml    Solution: 200 ml    Solution: 100 ml    Total IN: 2858 ml  ---------------------------------------------  OUT:    Indwelling Catheter - Urethral: 3320 ml    Nasoenteral Tube: 900 ml    Colostomy: 60 ml    Total OUT: 4280 ml  ---------------------------------------------  Total NET: -1422 ml    I & Os for current day (as of 06-20 @ 06:48)  =============================================  IN:    TPN (Total Parenteral Nutrition): 1488 ml    Solution: 200 ml    Solution: 150 ml    Total IN: 1838 ml  ---------------------------------------------  OUT:    Indwelling Catheter - Urethral: 3829 ml    Nasoenteral Tube: 1000 ml    Total OUT: 4829 ml  ---------------------------------------------  Total NET: -2991 ml      06-20    141  |  105  |  37<H>  ----------------------------<  107<H>  4.3   |  23  |  1.10    Ca    8.1<L>      20 Jun 2017 04:32  Phos  2.6     06-20  Mg     2.0     06-20    TPro  4.9<L>  /  Alb  1.6<L>  /  TBili  0.8  /  DBili  x   /  AST  13  /  ALT  16  /  AlkPhos  96  06-19    Snyder catheter indication: Urine output monitoring with diuresis  Meds: magnesium sulfate  IVPB 2Gram(s) IV Intermittent once      HEMATOLOGIC  Meds: fondaparinux Injectable 2.5milliGRAM(s) SubCutaneous every 24 hours    [x] VTE Prophylaxis: Arixtra                        8.8    4.6   )-----------( 104      ( 20 Jun 2017 04:33 )             26.7     PT/INR - ( 20 Jun 2017 04:33 )   PT: 17.4 sec;   INR: 1.60 ratio         PTT - ( 20 Jun 2017 04:33 )  PTT:39.4 sec  Transfusion: none     [ ] PRBC   [ ] Platelets   [ ] FFP   [ ] Cryoprecipitate    INFECTIOUS DISEASES  WBC Count: 4.6 K/uL (06-20 @ 04:33)  WBC Count: 5.4 K/uL (06-19 @ 15:45)    RECENT CULTURES:  Specimen Source: .Blood Blood-Venous  Date/Time: 06-16 @ 02:28  Culture Results:   No growth to date.  Gram Stain: --  Organism: --  Specimen Source: .Blood Blood-Peripheral  Date/Time: 06-16 @ 02:27  Culture Results:   No growth to date.  Gram Stain: --  Organism: --    Meds: metroNIDAZOLE  IVPB 500milliGRAM(s) IV Intermittent every 8 hours  aztreonam  IVPB 1000milliGRAM(s) IV Intermittent every 8 hours  fluconAZOLE IVPB  IV Intermittent   fluconAZOLE IVPB 100milliGRAM(s) IV Intermittent every 24 hours  vancomycin  IVPB  IV Intermittent   vancomycin  IVPB 1000milliGRAM(s) IV Intermittent every 24 hours      ENDOCRINE  CAPILLARY BLOOD GLUCOSE  111 (20 Jun 2017 06:00)  131 (20 Jun 2017 00:00)  144 (19 Jun 2017 18:00)  156 (19 Jun 2017 12:00)    Meds: levothyroxine Injectable 44MICROGram(s) IV Push daily  insulin lispro (HumaLOG) corrective regimen sliding scale  SubCutaneous every 6 hours      ACCESS DEVICES:  [x ] Peripheral IV  [ ] Central Venous Line	[ ] R	[ ] L	[ ] IJ	[ ] Fem	[ ] SC	Placed:   [ ] Arterial Line		[ ] R	[ ] L	[ ] Fem	[ ] Rad	[ ] Ax	Placed:   [ ] PICC:					[ ] Mediport  [ x] Urinary Catheter  [x] Necessity of urinary, arterial, and venous catheters discussed    OTHER MEDICATIONS: x       CODE STATUS: Full code      IMAGING: x [facility] Progress Note    HISTORY  77 yo Female with history significant for COPD, h/o DVT s/p IVC filter (now removed), GERD, hypothyroidism, HIT and diverticulitis s/p Ruben's, recurrent SBO s/p ex-lap small bowel resection, EC fistula s/p takedown, complicated by wound dehiscence, abdominal reconstruction underwent ex-lap, extensive NANETTE, EC fistula takedown, colostomy revision, and parastomal/incisional hernia repair, She was admitted to SICU intubated post operatively and was successfully extubated. After getting CT with oral contrast, pt went into respiratory distress and was reintubated, found to have MRSA pneumonia likely aspiration in nature. She was subsequently extubated, and now off pressors.     24 HOUR EVENTS: Yesterday, pt has central line and arterial line removed. She received 20mg IV Lasix and diuresed well.     SUBJECTIVE/ROS: Pt denies pain. A ten-point review of systems was otherwise negative except as noted.  [ ]  Due to altered mental status/intubation, subjective information were not able to be obtained from the patient. History was obtained, to the extent possible, from review of the chart and collateral sources of information.    NEURO  RASS: 0    GCS:     CAM ICU: Negative  Exam: awake, alert, oriented  Meds: ondansetron Injectable 4milliGRAM(s) IV Push every 6 hours PRN Nausea  HYDROmorphone  Injectable 0.5milliGRAM(s) IV Push every 3 hours PRN Severe Pain (7 - 10)  acetaminophen  IVPB. 1000milliGRAM(s) IV Intermittent once    [x] Adequacy of sedation and pain control has been assessed and adjusted    RESPIRATORY  RR: 20 (15 - 25)  SpO2: 98% (94% - 100%)  Wt(kg): --  Exam: unlabored, clear to auscultation bilaterally  Mechanical Ventilation:     [N/A] Extubation Readiness Assessed  Meds: ALBUTerol/ipratropium for Nebulization 3milliLiter(s) Nebulizer every 6 hours      CARDIOVASCULAR  HR: 108 (10 - 116)  BP: 121/59 (97/49 - 134/60)  BP(mean): 85 (71 - 88)  ABP: 132/54 (104/47 - 145/62)  ABP(mean): 83 (70 - 96)  Wt(kg): --  CVP(cm H2O): --      Exam: regular rate and rhythm  Cardiac Rhythm: sinus  Perfusion     [x]Adequate   [ ]Inadequate  Mentation   [x]Normal       [ ]Reduced  Extremities  [x]Warm         [ ]Cool  I & Os for 24h ending 06-19 @ 07:00  =============================================  IN: 2858 ml / OUT: 4280 ml / NET: -1422 ml    Volume Status [x ]Hypervolemic [ ]Euvolemic [ ]Hypovolemic  Meds: x    GI/NUTRITION  Exam: soft, nontender, nondistended, ostomy dusky with liquid stool in bag  Diet: NPO  Meds: pantoprazole  Injectable 40milliGRAM(s) IV Push daily        GENITOURINARY  I&O's Detail  I & Os for 24h ending 06-19 @ 07:00  =============================================  IN:    TPN (Total Parenteral Nutrition): 1488 ml    Solution: 620 ml    Solution: 250 ml    Solution: 200 ml    Solution: 200 ml    Solution: 100 ml    Total IN: 2858 ml  ---------------------------------------------  OUT:    Indwelling Catheter - Urethral: 3320 ml    Nasoenteral Tube: 900 ml    Colostomy: 60 ml    Total OUT: 4280 ml  ---------------------------------------------  Total NET: -1422 ml    I & Os for current day (as of 06-20 @ 06:48)  =============================================  IN:    TPN (Total Parenteral Nutrition): 1488 ml    Solution: 200 ml    Solution: 150 ml    Total IN: 1838 ml  ---------------------------------------------  OUT:    Indwelling Catheter - Urethral: 3829 ml    Nasoenteral Tube: 1000 ml    Total OUT: 4829 ml  ---------------------------------------------  Total NET: -2991 ml      06-20    141  |  105  |  37<H>  ----------------------------<  107<H>  4.3   |  23  |  1.10    Ca    8.1<L>      20 Jun 2017 04:32  Phos  2.6     06-20  Mg     2.0     06-20    TPro  4.9<L>  /  Alb  1.6<L>  /  TBili  0.8  /  DBili  x   /  AST  13  /  ALT  16  /  AlkPhos  96  06-19    Snyder catheter indication: Urine output monitoring with diuresis  Meds: magnesium sulfate  IVPB 2Gram(s) IV Intermittent once      HEMATOLOGIC  Meds: fondaparinux Injectable 2.5milliGRAM(s) SubCutaneous every 24 hours    [x] VTE Prophylaxis: Arixtra                        8.8    4.6   )-----------( 104      ( 20 Jun 2017 04:33 )             26.7     PT/INR - ( 20 Jun 2017 04:33 )   PT: 17.4 sec;   INR: 1.60 ratio         PTT - ( 20 Jun 2017 04:33 )  PTT:39.4 sec  Transfusion: none     [ ] PRBC   [ ] Platelets   [ ] FFP   [ ] Cryoprecipitate    INFECTIOUS DISEASES  WBC Count: 4.6 K/uL (06-20 @ 04:33)  WBC Count: 5.4 K/uL (06-19 @ 15:45)    RECENT CULTURES:  Specimen Source: .Blood Blood-Venous  Date/Time: 06-16 @ 02:28  Culture Results:   No growth to date.  Gram Stain: --  Organism: --  Specimen Source: .Blood Blood-Peripheral  Date/Time: 06-16 @ 02:27  Culture Results:   No growth to date.  Gram Stain: --  Organism: --    Meds: metroNIDAZOLE  IVPB 500milliGRAM(s) IV Intermittent every 8 hours  aztreonam  IVPB 1000milliGRAM(s) IV Intermittent every 8 hours  fluconAZOLE IVPB  IV Intermittent   fluconAZOLE IVPB 100milliGRAM(s) IV Intermittent every 24 hours  vancomycin  IVPB  IV Intermittent   vancomycin  IVPB 1000milliGRAM(s) IV Intermittent every 24 hours      ENDOCRINE  CAPILLARY BLOOD GLUCOSE  111 (20 Jun 2017 06:00)  131 (20 Jun 2017 00:00)  144 (19 Jun 2017 18:00)  156 (19 Jun 2017 12:00)    Meds: levothyroxine Injectable 44MICROGram(s) IV Push daily  insulin lispro (HumaLOG) corrective regimen sliding scale  SubCutaneous every 6 hours      ACCESS DEVICES:  [x ] Peripheral IV  [ ] Central Venous Line	[ ] R	[ ] L	[ ] IJ	[ ] Fem	[ ] SC	Placed:   [ ] Arterial Line		[ ] R	[ ] L	[ ] Fem	[ ] Rad	[ ] Ax	Placed:   [ ] PICC:					[ ] Mediport  [ x] Urinary Catheter  [x] Necessity of urinary, arterial, and venous catheters discussed    OTHER MEDICATIONS: x       CODE STATUS: Full code      IMAGING: x [facility] SICU Progress Note    HISTORY  75 yo Female with history significant for COPD, h/o DVT s/p IVC filter (now removed), GERD, hypothyroidism, HIT and diverticulitis s/p Ruben's, recurrent SBO s/p ex-lap small bowel resection, EC fistula s/p takedown, complicated by wound dehiscence, abdominal reconstruction underwent ex-lap, extensive NANETTE, EC fistula takedown, colostomy revision, and parastomal/incisional hernia repair, She was admitted to SICU intubated post operatively and was successfully extubated. After getting CT with oral contrast, pt went into respiratory distress and was reintubated, found to have MRSA pneumonia likely aspiration in nature. She was subsequently extubated, and now off pressors.     24 HOUR EVENTS: Yesterday, pt has central line and arterial line removed. She received 20mg IV Lasix and diuresed well.     SUBJECTIVE/ROS: Pt denies pain. A ten-point review of systems was otherwise negative except as noted.  [ ]  Due to altered mental status/intubation, subjective information were not able to be obtained from the patient. History was obtained, to the extent possible, from review of the chart and collateral sources of information.    NEURO  RASS: 0    GCS:     CAM ICU: Negative  Exam: awake, alert, oriented  Meds: ondansetron Injectable 4milliGRAM(s) IV Push every 6 hours PRN Nausea  HYDROmorphone  Injectable 0.5milliGRAM(s) IV Push every 3 hours PRN Severe Pain (7 - 10)  acetaminophen  IVPB. 1000milliGRAM(s) IV Intermittent once    [x] Adequacy of sedation and pain control has been assessed and adjusted    RESPIRATORY  RR: 20 (15 - 25)  SpO2: 98% (94% - 100%)  Wt(kg): --  Exam: unlabored, clear to auscultation bilaterally  Mechanical Ventilation:     [N/A] Extubation Readiness Assessed  Meds: ALBUTerol/ipratropium for Nebulization 3milliLiter(s) Nebulizer every 6 hours      CARDIOVASCULAR  HR: 108 (10 - 116)  BP: 121/59 (97/49 - 134/60)  BP(mean): 85 (71 - 88)  ABP: 132/54 (104/47 - 145/62)  ABP(mean): 83 (70 - 96)  Wt(kg): --  CVP(cm H2O): --      Exam: regular rate and rhythm  Cardiac Rhythm: sinus  Perfusion     [x]Adequate   [ ]Inadequate  Mentation   [x]Normal       [ ]Reduced  Extremities  [x]Warm         [ ]Cool  Volume Status [x ]Hypervolemic [ ]Euvolemic [ ]Hypovolemic  Meds: x    GI/NUTRITION  Exam: soft, nontender, nondistended, ostomy dusky with liquid stool in bag  Diet: NPO  Meds: pantoprazole  Injectable 40milliGRAM(s) IV Push daily        GENITOURINARY  I&O's Detail  I & Os for current day (as of 20 Jun 2017 07:08)  =============================================  IN:    TPN (Total Parenteral Nutrition): 1488 ml    Solution: 200 ml    Solution: 150 ml    Total IN: 1838 ml  ---------------------------------------------  OUT:    Indwelling Catheter - Urethral: 3829 ml    Nasoenteral Tube: 1000 ml    Total OUT: 4829 ml  ---------------------------------------------  Total NET: -2991 ml          06-20    141  |  105  |  37<H>  ----------------------------<  107<H>  4.3   |  23  |  1.10    Ca    8.1<L>      20 Jun 2017 04:32  Phos  2.6     06-20  Mg     2.0     06-20    TPro  4.9<L>  /  Alb  1.6<L>  /  TBili  0.8  /  DBili  x   /  AST  13  /  ALT  16  /  AlkPhos  96  06-19    Snyder catheter indication: Urine output monitoring with diuresis  Meds: magnesium sulfate  IVPB 2Gram(s) IV Intermittent once      HEMATOLOGIC  Meds: fondaparinux Injectable 2.5milliGRAM(s) SubCutaneous every 24 hours    [x] VTE Prophylaxis: Arixtra                        8.8    4.6   )-----------( 104      ( 20 Jun 2017 04:33 )             26.7     PT/INR - ( 20 Jun 2017 04:33 )   PT: 17.4 sec;   INR: 1.60 ratio         PTT - ( 20 Jun 2017 04:33 )  PTT:39.4 sec  Transfusion: none     [ ] PRBC   [ ] Platelets   [ ] FFP   [ ] Cryoprecipitate    INFECTIOUS DISEASES  WBC Count: 4.6 K/uL (06-20 @ 04:33)  WBC Count: 5.4 K/uL (06-19 @ 15:45)    RECENT CULTURES:  Specimen Source: .Blood Blood-Venous  Date/Time: 06-16 @ 02:28  Culture Results:   No growth to date.  Gram Stain: --  Organism: --  Specimen Source: .Blood Blood-Peripheral  Date/Time: 06-16 @ 02:27  Culture Results:   No growth to date.  Gram Stain: --  Organism: --    Meds: metroNIDAZOLE  IVPB 500milliGRAM(s) IV Intermittent every 8 hours  aztreonam  IVPB 1000milliGRAM(s) IV Intermittent every 8 hours  fluconAZOLE IVPB  IV Intermittent   fluconAZOLE IVPB 100milliGRAM(s) IV Intermittent every 24 hours  vancomycin  IVPB  IV Intermittent   vancomycin  IVPB 1000milliGRAM(s) IV Intermittent every 24 hours      ENDOCRINE  CAPILLARY BLOOD GLUCOSE  111 (20 Jun 2017 06:00)  131 (20 Jun 2017 00:00)  144 (19 Jun 2017 18:00)  156 (19 Jun 2017 12:00)    Meds: levothyroxine Injectable 44MICROGram(s) IV Push daily  insulin lispro (HumaLOG) corrective regimen sliding scale  SubCutaneous every 6 hours      ACCESS DEVICES:  [x ] Peripheral IV  [ ] Central Venous Line	[ ] R	[ ] L	[ ] IJ	[ ] Fem	[ ] SC	Placed:   [ ] Arterial Line		[ ] R	[ ] L	[ ] Fem	[ ] Rad	[ ] Ax	Placed:   [ ] PICC:					[ ] Mediport  [ x] Urinary Catheter  [x] Necessity of urinary, arterial, and venous catheters discussed    OTHER MEDICATIONS: x       CODE STATUS: Full code      IMAGING: x

## 2017-06-21 LAB
ANION GAP SERPL CALC-SCNC: 11 MMOL/L — SIGNIFICANT CHANGE UP (ref 5–17)
APTT BLD: 37.7 SEC — HIGH (ref 27.5–37.4)
BUN SERPL-MCNC: 37 MG/DL — HIGH (ref 7–23)
CALCIUM SERPL-MCNC: 7.7 MG/DL — LOW (ref 8.4–10.5)
CHLORIDE SERPL-SCNC: 106 MMOL/L — SIGNIFICANT CHANGE UP (ref 96–108)
CO2 SERPL-SCNC: 25 MMOL/L — SIGNIFICANT CHANGE UP (ref 22–31)
CREAT SERPL-MCNC: 1.15 MG/DL — SIGNIFICANT CHANGE UP (ref 0.5–1.3)
CULTURE RESULTS: SIGNIFICANT CHANGE UP
CULTURE RESULTS: SIGNIFICANT CHANGE UP
GLUCOSE SERPL-MCNC: 115 MG/DL — HIGH (ref 70–99)
HCT VFR BLD CALC: 24.1 % — LOW (ref 34.5–45)
HGB BLD-MCNC: 7.8 G/DL — LOW (ref 11.5–15.5)
INR BLD: 1.59 RATIO — HIGH (ref 0.88–1.16)
MAGNESIUM SERPL-MCNC: 2.5 MG/DL — SIGNIFICANT CHANGE UP (ref 1.6–2.6)
MCHC RBC-ENTMCNC: 32.3 GM/DL — SIGNIFICANT CHANGE UP (ref 32–36)
MCHC RBC-ENTMCNC: 32.3 PG — SIGNIFICANT CHANGE UP (ref 27–34)
MCV RBC AUTO: 99.9 FL — SIGNIFICANT CHANGE UP (ref 80–100)
PHOSPHATE SERPL-MCNC: 2.6 MG/DL — SIGNIFICANT CHANGE UP (ref 2.5–4.5)
PLATELET # BLD AUTO: 128 K/UL — LOW (ref 150–400)
POTASSIUM SERPL-MCNC: 4.1 MMOL/L — SIGNIFICANT CHANGE UP (ref 3.5–5.3)
POTASSIUM SERPL-SCNC: 4.1 MMOL/L — SIGNIFICANT CHANGE UP (ref 3.5–5.3)
PROTHROM AB SERPL-ACNC: 17.5 SEC — HIGH (ref 9.8–12.7)
RBC # BLD: 2.42 M/UL — LOW (ref 3.8–5.2)
RBC # FLD: 15.9 % — HIGH (ref 10.3–14.5)
SODIUM SERPL-SCNC: 142 MMOL/L — SIGNIFICANT CHANGE UP (ref 135–145)
SPECIMEN SOURCE: SIGNIFICANT CHANGE UP
SPECIMEN SOURCE: SIGNIFICANT CHANGE UP
WBC # BLD: 4.4 K/UL — SIGNIFICANT CHANGE UP (ref 3.8–10.5)
WBC # FLD AUTO: 4.4 K/UL — SIGNIFICANT CHANGE UP (ref 3.8–10.5)

## 2017-06-21 PROCEDURE — 71010: CPT | Mod: 26

## 2017-06-21 RX ORDER — ACETAMINOPHEN 500 MG
1000 TABLET ORAL ONCE
Qty: 0 | Refills: 0 | Status: DISCONTINUED | OUTPATIENT
Start: 2017-06-22 | End: 2017-06-22

## 2017-06-21 RX ORDER — ACETYLCYSTEINE 200 MG/ML
5 VIAL (ML) MISCELLANEOUS EVERY 6 HOURS
Qty: 0 | Refills: 0 | Status: DISCONTINUED | OUTPATIENT
Start: 2017-06-21 | End: 2017-06-23

## 2017-06-21 RX ORDER — ACETAMINOPHEN 500 MG
1000 TABLET ORAL ONCE
Qty: 0 | Refills: 0 | Status: COMPLETED | OUTPATIENT
Start: 2017-06-21 | End: 2017-06-21

## 2017-06-21 RX ORDER — ACETAMINOPHEN 500 MG
1000 TABLET ORAL ONCE
Qty: 0 | Refills: 0 | Status: COMPLETED | OUTPATIENT
Start: 2017-06-22 | End: 2017-06-22

## 2017-06-21 RX ORDER — ACETAMINOPHEN 500 MG
1000 TABLET ORAL ONCE
Qty: 0 | Refills: 0 | Status: COMPLETED | OUTPATIENT
Start: 2017-06-22 | End: 2017-06-21

## 2017-06-21 RX ORDER — HYDROMORPHONE HYDROCHLORIDE 2 MG/ML
0.5 INJECTION INTRAMUSCULAR; INTRAVENOUS; SUBCUTANEOUS
Qty: 0 | Refills: 0 | Status: DISCONTINUED | OUTPATIENT
Start: 2017-06-21 | End: 2017-06-22

## 2017-06-21 RX ORDER — ACETAMINOPHEN 500 MG
1000 TABLET ORAL EVERY 6 HOURS
Qty: 0 | Refills: 0 | Status: COMPLETED | OUTPATIENT
Start: 2017-06-21 | End: 2017-06-21

## 2017-06-21 RX ORDER — LIDOCAINE 4 G/100G
1 CREAM TOPICAL EVERY 24 HOURS
Qty: 0 | Refills: 0 | Status: DISCONTINUED | OUTPATIENT
Start: 2017-06-21 | End: 2017-06-24

## 2017-06-21 RX ADMIN — Medication 3 MILLILITER(S): at 11:04

## 2017-06-21 RX ADMIN — HYDROMORPHONE HYDROCHLORIDE 0.5 MILLIGRAM(S): 2 INJECTION INTRAMUSCULAR; INTRAVENOUS; SUBCUTANEOUS at 05:42

## 2017-06-21 RX ADMIN — LIDOCAINE 1 PATCH: 4 CREAM TOPICAL at 18:03

## 2017-06-21 RX ADMIN — HYDROMORPHONE HYDROCHLORIDE 0.5 MILLIGRAM(S): 2 INJECTION INTRAMUSCULAR; INTRAVENOUS; SUBCUTANEOUS at 08:40

## 2017-06-21 RX ADMIN — Medication 1000 MILLIGRAM(S): at 18:34

## 2017-06-21 RX ADMIN — Medication 3 MILLILITER(S): at 00:11

## 2017-06-21 RX ADMIN — HYDROMORPHONE HYDROCHLORIDE 0.5 MILLIGRAM(S): 2 INJECTION INTRAMUSCULAR; INTRAVENOUS; SUBCUTANEOUS at 00:05

## 2017-06-21 RX ADMIN — PANTOPRAZOLE SODIUM 40 MILLIGRAM(S): 20 TABLET, DELAYED RELEASE ORAL at 11:24

## 2017-06-21 RX ADMIN — Medication 44 MICROGRAM(S): at 05:27

## 2017-06-21 RX ADMIN — Medication 5 MILLILITER(S): at 18:03

## 2017-06-21 RX ADMIN — Medication 1000 MILLIGRAM(S): at 09:00

## 2017-06-21 RX ADMIN — HYDROMORPHONE HYDROCHLORIDE 0.5 MILLIGRAM(S): 2 INJECTION INTRAMUSCULAR; INTRAVENOUS; SUBCUTANEOUS at 23:44

## 2017-06-21 RX ADMIN — Medication 3 MILLILITER(S): at 05:18

## 2017-06-21 RX ADMIN — Medication 400 MILLIGRAM(S): at 18:04

## 2017-06-21 RX ADMIN — HYDROMORPHONE HYDROCHLORIDE 0.5 MILLIGRAM(S): 2 INJECTION INTRAMUSCULAR; INTRAVENOUS; SUBCUTANEOUS at 19:40

## 2017-06-21 RX ADMIN — BUDESONIDE AND FORMOTEROL FUMARATE DIHYDRATE 2 PUFF(S): 160; 4.5 AEROSOL RESPIRATORY (INHALATION) at 05:18

## 2017-06-21 RX ADMIN — Medication 400 MILLIGRAM(S): at 08:45

## 2017-06-21 RX ADMIN — HYDROMORPHONE HYDROCHLORIDE 0.5 MILLIGRAM(S): 2 INJECTION INTRAMUSCULAR; INTRAVENOUS; SUBCUTANEOUS at 21:40

## 2017-06-21 RX ADMIN — HYDROMORPHONE HYDROCHLORIDE 0.5 MILLIGRAM(S): 2 INJECTION INTRAMUSCULAR; INTRAVENOUS; SUBCUTANEOUS at 08:55

## 2017-06-21 RX ADMIN — HYDROMORPHONE HYDROCHLORIDE 0.5 MILLIGRAM(S): 2 INJECTION INTRAMUSCULAR; INTRAVENOUS; SUBCUTANEOUS at 16:21

## 2017-06-21 RX ADMIN — BUDESONIDE AND FORMOTEROL FUMARATE DIHYDRATE 2 PUFF(S): 160; 4.5 AEROSOL RESPIRATORY (INHALATION) at 21:41

## 2017-06-21 RX ADMIN — Medication 400 MILLIGRAM(S): at 00:05

## 2017-06-21 RX ADMIN — HYDROMORPHONE HYDROCHLORIDE 0.5 MILLIGRAM(S): 2 INJECTION INTRAMUSCULAR; INTRAVENOUS; SUBCUTANEOUS at 05:27

## 2017-06-21 RX ADMIN — HYDROMORPHONE HYDROCHLORIDE 0.5 MILLIGRAM(S): 2 INJECTION INTRAMUSCULAR; INTRAVENOUS; SUBCUTANEOUS at 16:06

## 2017-06-21 RX ADMIN — HYDROMORPHONE HYDROCHLORIDE 0.5 MILLIGRAM(S): 2 INJECTION INTRAMUSCULAR; INTRAVENOUS; SUBCUTANEOUS at 19:55

## 2017-06-21 RX ADMIN — Medication 1000 MILLIGRAM(S): at 00:25

## 2017-06-21 RX ADMIN — HYDROMORPHONE HYDROCHLORIDE 0.5 MILLIGRAM(S): 2 INJECTION INTRAMUSCULAR; INTRAVENOUS; SUBCUTANEOUS at 22:10

## 2017-06-21 RX ADMIN — FONDAPARINUX SODIUM 2.5 MILLIGRAM(S): 2.5 INJECTION, SOLUTION SUBCUTANEOUS at 11:24

## 2017-06-21 RX ADMIN — Medication 3 MILLILITER(S): at 20:29

## 2017-06-21 RX ADMIN — HYDROMORPHONE HYDROCHLORIDE 0.5 MILLIGRAM(S): 2 INJECTION INTRAMUSCULAR; INTRAVENOUS; SUBCUTANEOUS at 12:35

## 2017-06-21 RX ADMIN — HYDROMORPHONE HYDROCHLORIDE 0.5 MILLIGRAM(S): 2 INJECTION INTRAMUSCULAR; INTRAVENOUS; SUBCUTANEOUS at 00:25

## 2017-06-21 RX ADMIN — Medication 250 MILLIGRAM(S): at 11:24

## 2017-06-21 RX ADMIN — Medication 400 MILLIGRAM(S): at 23:43

## 2017-06-21 NOTE — PROGRESS NOTE ADULT - SUBJECTIVE AND OBJECTIVE BOX
NEPHROLOGY-[facility] [phone]        Patient seen and examined in the chair.  SHe was in good spirits.  There is ostomy output        MEDICATIONS  (STANDING):  levothyroxine Injectable 44MICROGram(s) IV Push daily  pantoprazole  Injectable 40milliGRAM(s) IV Push daily  fondaparinux Injectable 2.5milliGRAM(s) SubCutaneous every 24 hours  insulin lispro (HumaLOG) corrective regimen sliding scale  SubCutaneous every 6 hours  vancomycin  IVPB  IV Intermittent   vancomycin  IVPB 1000milliGRAM(s) IV Intermittent every 24 hours  ALBUTerol/ipratropium for Nebulization 3milliLiter(s) Nebulizer every 6 hours  buDESOnide 160 MICROgram(s)/formoterol 4.5 MICROgram(s) Inhaler 2Puff(s) Inhalation two times a day      VITAL:  T(C): , Max: 37.7 (06-20 @ 23:00)  T(F): , Max: 99.9 (06-20 @ 23:00)  HR: 106  BP: 127/62  BP(mean): 81  RR: 21  SpO2: 98%  Wt(kg): --    I and O's:  I & Os for 24h ending 06-21 @ 07:00  =============================================  IN: 2038 ml / OUT: 3495 ml / NET: -1457 ml    I & Os for current day (as of 06-21 @ 09:38)  =============================================  IN: 0 ml / OUT: 150 ml / NET: -150 ml        PHYSICAL EXAM:  Constitutional: NAD  HEENT: PERRLA    Neck:  No JVD  Respiratory: poor effort  Cardiovascular: S1 and S2  Gastrointestinal:+surgical scar n ostomy; hypoactive  Extremities: +peripheral edema  Neurological: A/O x 3, no focal deficits  Psychiatric: Normal mood, normal affect  : No Snyder  Skin: No rashes  Access: Not applicable         LABS:                        7.8    4.4   )-----------( 128      ( 21 Jun 2017 04:42 )             24.1     06-21    142  |  106  |  37<H>  ----------------------------<  115<H>  4.1   |  25  |  1.15    Ca    7.7<L>      21 Jun 2017 04:42  Phos  2.6     06-21  Mg     2.5     06-21            Urine Studies:          RADIOLOGY & ADDITIONAL STUDIES:

## 2017-06-21 NOTE — PROGRESS NOTE ADULT - ASSESSMENT
77 yo F SICU day #13, POD #12 s/p ex-lap, extensive NANETTE, takedown of EC fistula, repair of cecal serosal tear, partial R salpingectomy, colostomy revision, parastomal and incisional hernia repair with strattice mesh, complicated by MRSA pneumonia requiring reintubation and pressor support; extubated and hemodynamically stable off pressors, with ongoing pain control concerns.    Neuro: acute on chronic pain  - continue standing IV acetaminophen, with dilaudid PRN    CV: hemodynamically stable, remains in sinus rhythm  - continue to monitor    Pulm: atelectasis, COPD, improving pneumonia  - encourage pulmonary hygiene, incentive spirometry, OOB as tolerated  - continue inhaled medications for COPD    GI/Nutrition: Malnutrition  -NPO, continue TPN for nutritional support  -Minimal pasty stool in ostomy, follow GI function    /Renal: chronic kidney disease (stage III), TREVA resolved  - trend electrolytes, follow urine output  - lasix given PRN to optimize volume status    ID: MRSA pneumonia, candiduria  - continue vancomycin for MRSA pneumonia for total of 10 day course  - candiduria treated adequately    Endocrine: DM II, euglycemic  - continue q6h fingersticks with appropriately scaled insulin coverage    Skin: intact  - continue OOB as tolerated, with turning and repositioning every 2 hours for skin protection    Prophylaxis  - VTE ppx with fondaparinux for history of HIT    Dispo:  - full code   - consider transfer to floor

## 2017-06-21 NOTE — PROGRESS NOTE ADULT - SUBJECTIVE AND OBJECTIVE BOX
TRANSFER ACCEPT NOTE    HOSPITAL DAY #30  POST OPERATIVE DAY #:  12  STATUS POST:  exploratory laparotomy, extensive lysis of adhesions, takedown of enterocutaneous fistula, repair of serosal tear on cecum, partial right salpingooophorectomy, colostomy revision, parastomal and incisional hernia repair with strattice mesh          Subjective: Patient with complaints of back pain.     Objective:     Constitutional:    Respiratory:   Cardiovascular: sinus  Gastrointestinal: soft, distended, ostomy with gas and stool. old colostomy site, packed. NGT with bilious output  Ext: b/l UE edema      Vital Signs Last 24 Hrs  T(C): 36.9, Max: 37.7 (06-20 @ 23:00)  T(F): 98.5, Max: 99.9 (06-20 @ 23:00)  HR: 104 (103 - 112)  BP: 128/80 (91/49 - 128/80)  BP(mean): 81 (66 - 89)  RR: 18 (16 - 29)  SpO2: 98% (94% - 100%)    I&O's Detail  I & Os for 24h ending 21 Jun 2017 07:00  =============================================  IN:    TPN (Total Parenteral Nutrition): 1488 ml    Solution: 250 ml    Solution: 200 ml    Solution: 50 ml    Solution: 50 ml    Total IN: 2038 ml  ---------------------------------------------  OUT:    Indwelling Catheter - Urethral: 2770 ml    Nasoenteral Tube: 650 ml    Colostomy: 75 ml    Total OUT: 3495 ml  ---------------------------------------------  Total NET: -1457 ml    I & Os for current day (as of 21 Jun 2017 18:42)  =============================================  IN:    TPN (Total Parenteral Nutrition): 310 ml    Solution: 250 ml    Solution: 100 ml    Total IN: 660 ml  ---------------------------------------------  OUT:    Indwelling Catheter - Urethral: 150 ml    Nasoenteral Tube: 150 ml    Total OUT: 300 ml  ---------------------------------------------  Total NET: 360 ml                            7.8    4.4   )-----------( 128      ( 21 Jun 2017 04:42 )             24.1       06-21    142  |  106  |  37<H>  ----------------------------<  115<H>  4.1   |  25  |  1.15    Ca    7.7<L>      21 Jun 2017 04:42  Phos  2.6     06-21  Mg     2.5     06-21

## 2017-06-21 NOTE — PROGRESS NOTE ADULT - ASSESSMENT
ASSESSMENT  76y female with high volume ECF s/p exploratory laparotomy, extensive lysis of adhesions, takedown of enterocutaneous fistula, repair of serosal tear on cecum, partial right salpingooophorectomy, colostomy revision, parastomal and incisional hernia repair with strattice mesh, POD 12    PLAN  - NGT clamp trial today; if NGT output < 150 mL will d/c and advance diet to sips  - NPO; may advance to sips if NGT d/c-ed  - c/w vanc; vanc trough 6/22 am  - RTC IV tylenol; will add lidocaine patch      KRYSTAL Ansari PGY1  Pager: [phone]

## 2017-06-21 NOTE — PROGRESS NOTE ADULT - SUBJECTIVE AND OBJECTIVE BOX
Day # 21 of PN  PN infusion at 62 ccs/hr    Vital signs  T(C): 37, Max: 37.7 (06-20 @ 23:00)  HR: 106 (98 - 112)  BP: 113/53 (91/44 - 125/60)  RR: 16 (15 - 29)  SpO2: 98% (94% - 100%)  Wt(kg): --    Labs   06-21    142  |  106  |  37<H>  ----------------------------<  115<H>  4.1   |  25  |  1.15    Ca    7.7<L>      21 Jun 2017 04:42  Phos  2.6     06-21  Mg     2.5     06-21          CAPILLARY BLOOD GLUCOSE  115 (21 Jun 2017 06:00)  131 (20 Jun 2017 23:38)  114 (20 Jun 2017 18:00)  187 (20 Jun 2017 12:00)    I&O's Detail    I & Os for current day (as of 21 Jun 2017 08:59)  =============================================  IN:    TPN (Total Parenteral Nutrition): 1488 ml    Solution: 250 ml    Solution: 200 ml    Solution: 50 ml    Solution: 50 ml    Total IN: 2038 ml  ---------------------------------------------  OUT:    Indwelling Catheter - Urethral: 2770 ml    Nasoenteral Tube: 650 ml    Colostomy: 75 ml    Total OUT: 3495 ml  ---------------------------------------------  Total NET: -1457 ml

## 2017-06-21 NOTE — PROGRESS NOTE ADULT - SUBJECTIVE AND OBJECTIVE BOX
HISTORY  75 yo F SICU day #13, POD #12 s/p ex-lap, extensive NANETTE, takedown of EC fistula, repair of cecal serosal tear, partial R salpingectomy, colostomy revision, parastomal and incisional hernia repair with strattice mesh, complicated by MRSA pneumonia requiring reintubation and pressor support; extubated and hemodynamically stable off pressors, with ongoing pain control concerns.    24 HOUR EVENTS: No acute events, stopped aztreonam and flagyl. Listed for floor.    SUBJECTIVE/ROS:  [x ] A ten-point review of systems was otherwise negative except as noted.  [ ] Due to altered mental status/intubation, subjective information were not able to be obtained from the patient. History was obtained, to the extent possible, from review of the chart and collateral sources of information.      NEURO  RASS:  0   GCS:     CAM ICU: Negative  Exam: awake, alert, oriented  Meds: ondansetron Injectable 4milliGRAM(s) IV Push every 6 hours PRN Nausea  HYDROmorphone  Injectable 0.5milliGRAM(s) IV Push every 3 hours PRN Severe Pain (7 - 10)    [x] Adequacy of sedation and pain control has been assessed and adjusted      RESPIRATORY  RR: 20 (15 - 29)  SpO2: 98% (94% - 100%)  Wt(kg): --  Exam: unlabored, clear to auscultation bilaterally  Mechanical Ventilation:     [N/A] Extubation Readiness Assessed  Meds: ALBUTerol/ipratropium for Nebulization 3milliLiter(s) Nebulizer every 6 hours  buDESOnide 160 MICROgram(s)/formoterol 4.5 MICROgram(s) Inhaler 2Puff(s) Inhalation two times a day        CARDIOVASCULAR  HR: 106 (98 - 117)  BP: 106/55 (91/44 - 125/60)  BP(mean): 77 (64 - 86)  ABP: --  ABP(mean): --  Wt(kg): --  CVP(cm H2O): --      Exam: regular rate and rhythm  Cardiac Rhythm: sinus  Perfusion     [x]Adequate   [ ]Inadequate  Mentation   [x]Normal       [ ]Reduced  Extremities  [x]Warm         [ ]Cool  Volume Status [ ]Hypervolemic [x]Euvolemic [ ]Hypovolemic  Meds:       GI/NUTRITION  Exam: soft, nontender, nondistended, incision C/D/I. Ostomy looks dark with some areas of pink mucosa  Diet: NPO  Meds: pantoprazole  Injectable 40milliGRAM(s) IV Push daily      GENITOURINARY  I&O's Detail  I & Os for 24h ending 06-20 @ 07:00  =============================================  IN:    TPN (Total Parenteral Nutrition): 1488 ml    Solution: 200 ml    Solution: 150 ml    Total IN: 1838 ml  ---------------------------------------------  OUT:    Indwelling Catheter - Urethral: 3829 ml    Nasoenteral Tube: 1000 ml    Total OUT: 4829 ml  ---------------------------------------------  Total NET: -2991 ml    I & Os for current day (as of 06-21 @ 06:55)  =============================================  IN:    TPN (Total Parenteral Nutrition): 1488 ml    Solution: 250 ml    Solution: 200 ml    Solution: 50 ml    Solution: 50 ml    Total IN: 2038 ml  ---------------------------------------------  OUT:    Indwelling Catheter - Urethral: 2720 ml    Nasoenteral Tube: 650 ml    Colostomy: 75 ml    Total OUT: 3445 ml  ---------------------------------------------  Total NET: -1407 ml      06-21    142  |  106  |  37<H>  ----------------------------<  115<H>  4.1   |  25  |  1.15    Ca    7.7<L>      21 Jun 2017 04:42  Phos  2.6     06-21  Mg     2.5     06-21      [x ] Snyder catheter, indication: Fluid monitoring in critically ill  Meds: x      HEMATOLOGIC  Meds: fondaparinux Injectable 2.5milliGRAM(s) SubCutaneous every 24 hours    [x] VTE Prophylaxis                        7.8    4.4   )-----------( 128      ( 21 Jun 2017 04:42 )             24.1     PT/INR - ( 21 Jun 2017 04:42 )   PT: 17.5 sec;   INR: 1.59 ratio         PTT - ( 21 Jun 2017 04:42 )  PTT:37.7 sec  Transfusion     [ ] PRBC   [ ] Platelets   [ ] FFP   [ ] Cryoprecipitate      INFECTIOUS DISEASES  WBC Count: 4.4 K/uL (06-21 @ 04:42)    RECENT CULTURES:    Meds: vancomycin  IVPB  IV Intermittent   vancomycin  IVPB 1000milliGRAM(s) IV Intermittent every 24 hours        ENDOCRINE  CAPILLARY BLOOD GLUCOSE  115 (21 Jun 2017 06:00)  131 (20 Jun 2017 23:38)  114 (20 Jun 2017 18:00)  187 (20 Jun 2017 12:00)    Meds: levothyroxine Injectable 44MICROGram(s) IV Push daily  insulin lispro (HumaLOG) corrective regimen sliding scale  SubCutaneous every 6 hours        ACCESS DEVICES:  [x ] Peripheral IV  [ ] Central Venous Line	[ ] R	[ ] L	[ ] IJ	[ ] Fem	[ ] SC	Placed:   [ ] Arterial Line		[ ] R	[ ] L	[ ] Fem	[ ] Rad	[ ] Ax	Placed:   [ ] PICC:					[ ] Mediport  [ x] Urinary Catheter, Date Placed:   [x] Necessity of urinary, arterial, and venous catheters discussed    OTHER MEDICATIONS:      CODE STATUS:Full Code      IMAGING:x HISTORY  77 yo F SICU day #13, POD #12 s/p ex-lap, extensive NANETTE, takedown of EC fistula, repair of cecal serosal tear, partial R salpingectomy, colostomy revision, parastomal and incisional hernia repair with strattice mesh, complicated by MRSA pneumonia requiring reintubation and pressor support; extubated and hemodynamically stable off pressors, with ongoing pain control concerns.    24 HOUR EVENTS: No acute events, stopped aztreonam and flagyl. Listed for floor.    SUBJECTIVE/ROS:  [x ] A ten-point review of systems was otherwise negative except as noted.  [ ] Due to altered mental status/intubation, subjective information were not able to be obtained from the patient. History was obtained, to the extent possible, from review of the chart and collateral sources of information.      NEURO  RASS:  0   GCS:     CAM ICU: Negative  Exam: awake, alert, oriented  Meds: ondansetron Injectable 4milliGRAM(s) IV Push every 6 hours PRN Nausea  HYDROmorphone  Injectable 0.5milliGRAM(s) IV Push every 3 hours PRN Severe Pain (7 - 10)    [x] Adequacy of sedation and pain control has been assessed and adjusted      RESPIRATORY  RR: 20 (15 - 29)  SpO2: 98% (94% - 100%)  Wt(kg): --  Exam: unlabored, clear to auscultation bilaterally  Mechanical Ventilation:     [N/A] Extubation Readiness Assessed  Meds: ALBUTerol/ipratropium for Nebulization 3milliLiter(s) Nebulizer every 6 hours  buDESOnide 160 MICROgram(s)/formoterol 4.5 MICROgram(s) Inhaler 2Puff(s) Inhalation two times a day        CARDIOVASCULAR  HR: 106 (98 - 117)  BP: 106/55 (91/44 - 125/60)  BP(mean): 77 (64 - 86)  ABP: --  ABP(mean): --  Wt(kg): --  CVP(cm H2O): --      Exam: regular rate and rhythm  Cardiac Rhythm: sinus  Perfusion     [x]Adequate   [ ]Inadequate  Mentation   [x]Normal       [ ]Reduced  Extremities  [x]Warm         [ ]Cool  Volume Status [ ]Hypervolemic [x]Euvolemic [ ]Hypovolemic  Meds:       GI/NUTRITION  Exam: soft, nontender, nondistended, incision C/D/I. Ostomy looks dark with some areas of pink mucosa  Diet: NPO  Meds: pantoprazole  Injectable 40milliGRAM(s) IV Push daily      GENITOURINARY  I&O's Detail    I & Os for current day (as of 21 Jun 2017 07:29)  =============================================  IN:    TPN (Total Parenteral Nutrition): 1488 ml    Solution: 250 ml    Solution: 200 ml    Solution: 50 ml    Solution: 50 ml    Total IN: 2038 ml  ---------------------------------------------  OUT:    Indwelling Catheter - Urethral: 2770 ml    Nasoenteral Tube: 650 ml    Colostomy: 75 ml    Total OUT: 3495 ml  ---------------------------------------------  Total NET: -1457 ml    06-21    142  |  106  |  37<H>  ----------------------------<  115<H>  4.1   |  25  |  1.15    Ca    7.7<L>      21 Jun 2017 04:42  Phos  2.6     06-21  Mg     2.5     06-21      [x ] Snyder catheter, indication: Fluid monitoring in critically ill  Meds: x      HEMATOLOGIC  Meds: fondaparinux Injectable 2.5milliGRAM(s) SubCutaneous every 24 hours    [x] VTE Prophylaxis                        7.8    4.4   )-----------( 128      ( 21 Jun 2017 04:42 )             24.1     PT/INR - ( 21 Jun 2017 04:42 )   PT: 17.5 sec;   INR: 1.59 ratio         PTT - ( 21 Jun 2017 04:42 )  PTT:37.7 sec  Transfusion     [ ] PRBC   [ ] Platelets   [ ] FFP   [ ] Cryoprecipitate      INFECTIOUS DISEASES  WBC Count: 4.4 K/uL (06-21 @ 04:42)    RECENT CULTURES:    Meds: vancomycin  IVPB  IV Intermittent   vancomycin  IVPB 1000milliGRAM(s) IV Intermittent every 24 hours        ENDOCRINE  CAPILLARY BLOOD GLUCOSE  115 (21 Jun 2017 06:00)  131 (20 Jun 2017 23:38)  114 (20 Jun 2017 18:00)  187 (20 Jun 2017 12:00)    Meds: levothyroxine Injectable 44MICROGram(s) IV Push daily  insulin lispro (HumaLOG) corrective regimen sliding scale  SubCutaneous every 6 hours        ACCESS DEVICES:  [x ] Peripheral IV  [ ] Central Venous Line	[ ] R	[ ] L	[ ] IJ	[ ] Fem	[ ] SC	Placed:   [ ] Arterial Line		[ ] R	[ ] L	[ ] Fem	[ ] Rad	[ ] Ax	Placed:   [ ] PICC:					[ ] Mediport  [ x] Urinary Catheter, Date Placed:   [x] Necessity of urinary, arterial, and venous catheters discussed    OTHER MEDICATIONS:      CODE STATUS:Full Code      IMAGING:x

## 2017-06-22 LAB
ALBUMIN SERPL ELPH-MCNC: 1.9 G/DL — LOW (ref 3.3–5)
ALP SERPL-CCNC: 149 U/L — HIGH (ref 40–120)
ALT FLD-CCNC: 13 U/L RC — SIGNIFICANT CHANGE UP (ref 10–45)
ANION GAP SERPL CALC-SCNC: 12 MMOL/L — SIGNIFICANT CHANGE UP (ref 5–17)
ANION GAP SERPL CALC-SCNC: 14 MMOL/L — SIGNIFICANT CHANGE UP (ref 5–17)
APTT BLD: 42.6 SEC — HIGH (ref 27.5–37.4)
AST SERPL-CCNC: 17 U/L — SIGNIFICANT CHANGE UP (ref 10–40)
BASOPHILS # BLD AUTO: 0 K/UL — SIGNIFICANT CHANGE UP (ref 0–0.2)
BASOPHILS NFR BLD AUTO: 0.2 % — SIGNIFICANT CHANGE UP (ref 0–2)
BILIRUB SERPL-MCNC: 0.7 MG/DL — SIGNIFICANT CHANGE UP (ref 0.2–1.2)
BUN SERPL-MCNC: 30 MG/DL — HIGH (ref 7–23)
BUN SERPL-MCNC: 35 MG/DL — HIGH (ref 7–23)
CALCIUM SERPL-MCNC: 7.8 MG/DL — LOW (ref 8.4–10.5)
CALCIUM SERPL-MCNC: 8 MG/DL — LOW (ref 8.4–10.5)
CHLORIDE SERPL-SCNC: 105 MMOL/L — SIGNIFICANT CHANGE UP (ref 96–108)
CHLORIDE SERPL-SCNC: 105 MMOL/L — SIGNIFICANT CHANGE UP (ref 96–108)
CO2 SERPL-SCNC: 20 MMOL/L — LOW (ref 22–31)
CO2 SERPL-SCNC: 23 MMOL/L — SIGNIFICANT CHANGE UP (ref 22–31)
CREAT SERPL-MCNC: 1.01 MG/DL — SIGNIFICANT CHANGE UP (ref 0.5–1.3)
CREAT SERPL-MCNC: 1.19 MG/DL — SIGNIFICANT CHANGE UP (ref 0.5–1.3)
EOSINOPHIL # BLD AUTO: 0.2 K/UL — SIGNIFICANT CHANGE UP (ref 0–0.5)
EOSINOPHIL NFR BLD AUTO: 5.6 % — SIGNIFICANT CHANGE UP (ref 0–6)
GAS PNL BLDA: SIGNIFICANT CHANGE UP
GLUCOSE SERPL-MCNC: 119 MG/DL — HIGH (ref 70–99)
GLUCOSE SERPL-MCNC: 142 MG/DL — HIGH (ref 70–99)
HCT VFR BLD CALC: 24.4 % — LOW (ref 34.5–45)
HCT VFR BLD CALC: 25.2 % — LOW (ref 34.5–45)
HGB BLD-MCNC: 7.9 G/DL — LOW (ref 11.5–15.5)
HGB BLD-MCNC: 8 G/DL — LOW (ref 11.5–15.5)
INR BLD: 1.39 RATIO — HIGH (ref 0.88–1.16)
LYMPHOCYTES # BLD AUTO: 1.2 K/UL — SIGNIFICANT CHANGE UP (ref 1–3.3)
LYMPHOCYTES # BLD AUTO: 26.3 % — SIGNIFICANT CHANGE UP (ref 13–44)
MAGNESIUM SERPL-MCNC: 2.2 MG/DL — SIGNIFICANT CHANGE UP (ref 1.6–2.6)
MAGNESIUM SERPL-MCNC: 2.3 MG/DL — SIGNIFICANT CHANGE UP (ref 1.6–2.6)
MCHC RBC-ENTMCNC: 30.9 PG — SIGNIFICANT CHANGE UP (ref 27–34)
MCHC RBC-ENTMCNC: 31.3 GM/DL — LOW (ref 32–36)
MCHC RBC-ENTMCNC: 32.7 GM/DL — SIGNIFICANT CHANGE UP (ref 32–36)
MCHC RBC-ENTMCNC: 32.9 PG — SIGNIFICANT CHANGE UP (ref 27–34)
MCV RBC AUTO: 100 FL — SIGNIFICANT CHANGE UP (ref 80–100)
MCV RBC AUTO: 98.4 FL — SIGNIFICANT CHANGE UP (ref 80–100)
MONOCYTES # BLD AUTO: 0.7 K/UL — SIGNIFICANT CHANGE UP (ref 0–0.9)
MONOCYTES NFR BLD AUTO: 14.9 % — HIGH (ref 2–14)
NEUTROPHILS # BLD AUTO: 2.4 K/UL — SIGNIFICANT CHANGE UP (ref 1.8–7.4)
NEUTROPHILS NFR BLD AUTO: 53 % — SIGNIFICANT CHANGE UP (ref 43–77)
PHOSPHATE SERPL-MCNC: 2 MG/DL — LOW (ref 2.5–4.5)
PHOSPHATE SERPL-MCNC: 2.2 MG/DL — LOW (ref 2.5–4.5)
PLATELET # BLD AUTO: 191 K/UL — SIGNIFICANT CHANGE UP (ref 150–400)
PLATELET # BLD AUTO: 200 K/UL — SIGNIFICANT CHANGE UP (ref 150–400)
POTASSIUM SERPL-MCNC: 4.3 MMOL/L — SIGNIFICANT CHANGE UP (ref 3.5–5.3)
POTASSIUM SERPL-MCNC: 4.4 MMOL/L — SIGNIFICANT CHANGE UP (ref 3.5–5.3)
POTASSIUM SERPL-SCNC: 4.3 MMOL/L — SIGNIFICANT CHANGE UP (ref 3.5–5.3)
POTASSIUM SERPL-SCNC: 4.4 MMOL/L — SIGNIFICANT CHANGE UP (ref 3.5–5.3)
PROCALCITONIN SERPL-MCNC: 2.35 NG/ML — HIGH (ref 0–0.04)
PROT SERPL-MCNC: 5.9 G/DL — LOW (ref 6–8.3)
PROTHROM AB SERPL-ACNC: 15.8 SEC — HIGH (ref 10–13.1)
RBC # BLD: 2.43 M/UL — LOW (ref 3.8–5.2)
RBC # BLD: 2.56 M/UL — LOW (ref 3.8–5.2)
RBC # FLD: 16 % — HIGH (ref 10.3–14.5)
RBC # FLD: 17.9 % — HIGH (ref 10.3–14.5)
SODIUM SERPL-SCNC: 139 MMOL/L — SIGNIFICANT CHANGE UP (ref 135–145)
SODIUM SERPL-SCNC: 140 MMOL/L — SIGNIFICANT CHANGE UP (ref 135–145)
VANCOMYCIN TROUGH SERPL-MCNC: 18.1 UG/ML — SIGNIFICANT CHANGE UP (ref 10–20)
WBC # BLD: 3.94 K/UL — SIGNIFICANT CHANGE UP (ref 3.8–10.5)
WBC # BLD: 4.4 K/UL — SIGNIFICANT CHANGE UP (ref 3.8–10.5)
WBC # FLD AUTO: 3.94 K/UL — SIGNIFICANT CHANGE UP (ref 3.8–10.5)
WBC # FLD AUTO: 4.4 K/UL — SIGNIFICANT CHANGE UP (ref 3.8–10.5)

## 2017-06-22 PROCEDURE — 71010: CPT | Mod: 26

## 2017-06-22 PROCEDURE — 99233 SBSQ HOSP IP/OBS HIGH 50: CPT

## 2017-06-22 RX ORDER — ACETAMINOPHEN 500 MG
1000 TABLET ORAL
Qty: 0 | Refills: 0 | Status: COMPLETED | OUTPATIENT
Start: 2017-06-23 | End: 2017-06-23

## 2017-06-22 RX ORDER — ACETAMINOPHEN 500 MG
1000 TABLET ORAL
Qty: 0 | Refills: 0 | Status: COMPLETED | OUTPATIENT
Start: 2017-06-23 | End: 2017-06-22

## 2017-06-22 RX ORDER — ACETAMINOPHEN 500 MG
1000 TABLET ORAL
Qty: 0 | Refills: 0 | Status: COMPLETED | OUTPATIENT
Start: 2017-06-22 | End: 2017-06-22

## 2017-06-22 RX ORDER — ONDANSETRON 8 MG/1
4 TABLET, FILM COATED ORAL ONCE
Qty: 0 | Refills: 0 | Status: COMPLETED | OUTPATIENT
Start: 2017-06-22 | End: 2017-06-22

## 2017-06-22 RX ORDER — HYDROMORPHONE HYDROCHLORIDE 2 MG/ML
0.5 INJECTION INTRAMUSCULAR; INTRAVENOUS; SUBCUTANEOUS
Qty: 0 | Refills: 0 | Status: DISCONTINUED | OUTPATIENT
Start: 2017-06-22 | End: 2017-06-29

## 2017-06-22 RX ORDER — IMIPENEM AND CILASTATIN 250; 250 MG/100ML; MG/100ML
500 INJECTION, POWDER, FOR SOLUTION INTRAVENOUS EVERY 8 HOURS
Qty: 0 | Refills: 0 | Status: DISCONTINUED | OUTPATIENT
Start: 2017-06-22 | End: 2017-06-27

## 2017-06-22 RX ORDER — IMIPENEM AND CILASTATIN 250; 250 MG/100ML; MG/100ML
INJECTION, POWDER, FOR SOLUTION INTRAVENOUS
Qty: 0 | Refills: 0 | Status: DISCONTINUED | OUTPATIENT
Start: 2017-06-22 | End: 2017-06-27

## 2017-06-22 RX ORDER — SODIUM CHLORIDE 9 MG/ML
1000 INJECTION, SOLUTION INTRAVENOUS
Qty: 0 | Refills: 0 | Status: DISCONTINUED | OUTPATIENT
Start: 2017-06-22 | End: 2017-06-26

## 2017-06-22 RX ORDER — IMIPENEM AND CILASTATIN 250; 250 MG/100ML; MG/100ML
500 INJECTION, POWDER, FOR SOLUTION INTRAVENOUS ONCE
Qty: 0 | Refills: 0 | Status: COMPLETED | OUTPATIENT
Start: 2017-06-22 | End: 2017-06-22

## 2017-06-22 RX ORDER — PHENYLEPHRINE HYDROCHLORIDE 10 MG/ML
0.4 INJECTION INTRAVENOUS
Qty: 80 | Refills: 0 | Status: DISCONTINUED | OUTPATIENT
Start: 2017-06-22 | End: 2017-06-24

## 2017-06-22 RX ORDER — HYDROMORPHONE HYDROCHLORIDE 2 MG/ML
0.5 INJECTION INTRAMUSCULAR; INTRAVENOUS; SUBCUTANEOUS
Qty: 0 | Refills: 0 | Status: DISCONTINUED | OUTPATIENT
Start: 2017-06-22 | End: 2017-06-24

## 2017-06-22 RX ORDER — HYDROMORPHONE HYDROCHLORIDE 2 MG/ML
0.25 INJECTION INTRAMUSCULAR; INTRAVENOUS; SUBCUTANEOUS ONCE
Qty: 0 | Refills: 0 | Status: DISCONTINUED | OUTPATIENT
Start: 2017-06-22 | End: 2017-06-22

## 2017-06-22 RX ADMIN — PANTOPRAZOLE SODIUM 40 MILLIGRAM(S): 20 TABLET, DELAYED RELEASE ORAL at 12:24

## 2017-06-22 RX ADMIN — Medication 1000 MILLIGRAM(S): at 14:20

## 2017-06-22 RX ADMIN — HYDROMORPHONE HYDROCHLORIDE 0.5 MILLIGRAM(S): 2 INJECTION INTRAMUSCULAR; INTRAVENOUS; SUBCUTANEOUS at 05:33

## 2017-06-22 RX ADMIN — Medication 5 MILLILITER(S): at 12:24

## 2017-06-22 RX ADMIN — PHENYLEPHRINE HYDROCHLORIDE 11.16 MICROGRAM(S)/KG/MIN: 10 INJECTION INTRAVENOUS at 18:19

## 2017-06-22 RX ADMIN — Medication 1000 MILLIGRAM(S): at 18:08

## 2017-06-22 RX ADMIN — HYDROMORPHONE HYDROCHLORIDE 0.5 MILLIGRAM(S): 2 INJECTION INTRAMUSCULAR; INTRAVENOUS; SUBCUTANEOUS at 00:44

## 2017-06-22 RX ADMIN — Medication 1000 MILLIGRAM(S): at 23:36

## 2017-06-22 RX ADMIN — HYDROMORPHONE HYDROCHLORIDE 0.5 MILLIGRAM(S): 2 INJECTION INTRAMUSCULAR; INTRAVENOUS; SUBCUTANEOUS at 20:35

## 2017-06-22 RX ADMIN — Medication 5 MILLILITER(S): at 05:04

## 2017-06-22 RX ADMIN — Medication 44 MICROGRAM(S): at 05:31

## 2017-06-22 RX ADMIN — HYDROMORPHONE HYDROCHLORIDE 0.5 MILLIGRAM(S): 2 INJECTION INTRAMUSCULAR; INTRAVENOUS; SUBCUTANEOUS at 20:50

## 2017-06-22 RX ADMIN — Medication 400 MILLIGRAM(S): at 13:24

## 2017-06-22 RX ADMIN — ONDANSETRON 4 MILLIGRAM(S): 8 TABLET, FILM COATED ORAL at 17:55

## 2017-06-22 RX ADMIN — HYDROMORPHONE HYDROCHLORIDE 0.5 MILLIGRAM(S): 2 INJECTION INTRAMUSCULAR; INTRAVENOUS; SUBCUTANEOUS at 14:20

## 2017-06-22 RX ADMIN — HYDROMORPHONE HYDROCHLORIDE 0.5 MILLIGRAM(S): 2 INJECTION INTRAMUSCULAR; INTRAVENOUS; SUBCUTANEOUS at 13:24

## 2017-06-22 RX ADMIN — Medication 3 MILLILITER(S): at 20:58

## 2017-06-22 RX ADMIN — Medication 2: at 12:25

## 2017-06-22 RX ADMIN — Medication 5 MILLILITER(S): at 19:13

## 2017-06-22 RX ADMIN — Medication 400 MILLIGRAM(S): at 05:03

## 2017-06-22 RX ADMIN — FONDAPARINUX SODIUM 2.5 MILLIGRAM(S): 2.5 INJECTION, SOLUTION SUBCUTANEOUS at 12:25

## 2017-06-22 RX ADMIN — Medication 3 MILLILITER(S): at 13:32

## 2017-06-22 RX ADMIN — HYDROMORPHONE HYDROCHLORIDE 0.5 MILLIGRAM(S): 2 INJECTION INTRAMUSCULAR; INTRAVENOUS; SUBCUTANEOUS at 11:25

## 2017-06-22 RX ADMIN — Medication 3 MILLILITER(S): at 05:30

## 2017-06-22 RX ADMIN — BUDESONIDE AND FORMOTEROL FUMARATE DIHYDRATE 2 PUFF(S): 160; 4.5 AEROSOL RESPIRATORY (INHALATION) at 11:30

## 2017-06-22 RX ADMIN — Medication 1000 MILLIGRAM(S): at 00:13

## 2017-06-22 RX ADMIN — Medication 400 MILLIGRAM(S): at 23:21

## 2017-06-22 RX ADMIN — Medication 250 MILLIGRAM(S): at 11:30

## 2017-06-22 RX ADMIN — SODIUM CHLORIDE 75 MILLILITER(S): 9 INJECTION, SOLUTION INTRAVENOUS at 18:19

## 2017-06-22 RX ADMIN — HYDROMORPHONE HYDROCHLORIDE 0.5 MILLIGRAM(S): 2 INJECTION INTRAMUSCULAR; INTRAVENOUS; SUBCUTANEOUS at 10:55

## 2017-06-22 RX ADMIN — HYDROMORPHONE HYDROCHLORIDE 0.5 MILLIGRAM(S): 2 INJECTION INTRAMUSCULAR; INTRAVENOUS; SUBCUTANEOUS at 08:59

## 2017-06-22 RX ADMIN — IMIPENEM AND CILASTATIN 100 MILLIGRAM(S): 250; 250 INJECTION, POWDER, FOR SOLUTION INTRAVENOUS at 17:28

## 2017-06-22 RX ADMIN — HYDROMORPHONE HYDROCHLORIDE 0.5 MILLIGRAM(S): 2 INJECTION INTRAMUSCULAR; INTRAVENOUS; SUBCUTANEOUS at 05:03

## 2017-06-22 RX ADMIN — Medication 400 MILLIGRAM(S): at 17:27

## 2017-06-22 RX ADMIN — LIDOCAINE 1 PATCH: 4 CREAM TOPICAL at 16:45

## 2017-06-22 RX ADMIN — Medication 1000 MILLIGRAM(S): at 05:33

## 2017-06-22 RX ADMIN — HYDROMORPHONE HYDROCHLORIDE 0.25 MILLIGRAM(S): 2 INJECTION INTRAMUSCULAR; INTRAVENOUS; SUBCUTANEOUS at 16:10

## 2017-06-22 NOTE — PROGRESS NOTE ADULT - SUBJECTIVE AND OBJECTIVE BOX
Follow Up:      Interval History/ROS:Patient is a 76y old  Female who presents with a chief complaint of Abdominal pain (26 May 2017 11:33)  pt was d/c'd from SICU yesterday but returns now with SOB, hypotension, copious brown/purulent drainage from wound.    she is planned for CT A/P and possibly elective intubation.  she is on pressors.  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, but has tolerated  a week of CTX and a week of carbapenems in the past year    Allergies    azithromycin (Unknown)  codeine (Unknown)  heparin (Other)  PC Pen VK (Unknown)  penicillin (Unknown)    ANTIMICROBIALS:  vancomycin  IVPB    vancomycin  IVPB 1000 every 24 hours  imipenem/cilastatin  IVPB        OTHER MEDS:  levothyroxine Injectable 44MICROGram(s) IV Push daily  pantoprazole  Injectable 40milliGRAM(s) IV Push daily  ondansetron Injectable 4milliGRAM(s) IV Push every 6 hours PRN  fondaparinux Injectable 2.5milliGRAM(s) SubCutaneous every 24 hours  insulin lispro (HumaLOG) corrective regimen sliding scale  SubCutaneous every 6 hours  ALBUTerol/ipratropium for Nebulization 3milliLiter(s) Nebulizer every 6 hours  buDESOnide 160 MICROgram(s)/formoterol 4.5 MICROgram(s) Inhaler 2Puff(s) Inhalation two times a day  acetylcysteine 20% Inhalation 5milliLiter(s) Inhalation every 6 hours  lidocaine   Patch 1Patch Transdermal every 24 hours  HYDROmorphone  Injectable 0.5milliGRAM(s) IV Push every 2 hours PRN  acetaminophen  IVPB. 1000milliGRAM(s) IV Intermittent <User Schedule>  phenylephrine    Infusion 0.4MICROgram(s)/kG/Min IV Continuous <Continuous>      Vital Signs Last 24 Hrs  T(C): 36.8, Max: 37.7 (06-22 @ 00:06)  T(F): 98.2, Max: 99.9 (06-22 @ 00:06)  HR: 105 (105 - 121)  BP: 125/59 (85/47 - 142/84)  BP(mean): 85 (60 - 85)  RR: 29 (16 - 33)  SpO2: 100% (92% - 100%)    PHYSICAL EXAM:    Constitutional: on NRB, can speak in full sentences    Eyes: non-icteric    Neck: no KAMILAH    Respiratory: clear to auscultation b/l    Cardiovascular: S1/S2, no murmur    Gastrointestinal: soft, NT/ND, normal BS, large midline wound with copious brown, purulent drainage    Genitourinary: no baldwin    Vascular: no edema    Neurological: non-focal    Skin: no rash, no phlebitis    Psychiatric: alert and oriented, affect appropriate                          8.0    4.4   )-----------( 200      ( 22 Jun 2017 15:03 )             24.4       06-22    139  |  105  |  35<H>  ----------------------------<  142<H>  4.4   |  20<L>  |  1.19    Ca    8.0<L>      22 Jun 2017 15:03  Phos  2.0     06-22  Mg     2.2     06-22    TPro  5.9<L>  /  Alb  1.9<L>  /  TBili  0.7  /  DBili  x   /  AST  17  /  ALT  13  /  AlkPhos  149<H>  06-22      Vancomycin Level, Trough: 18.1 ug/mL (06-22 @ 11:21)      MICROBIOLOGY:  RECENT CULTURES:  06-16 @ 02:28 .Blood Blood-Venous     No growth at 5 days.    06-16 @ 02:27 .Blood Blood-Peripheral     No growth at 5 days.    RESPIRATORY CULTURES:  Culture - Bronchial (06.13.17 @ 17:50)    -  Ampicillin/Sulbactam: R <=8/4    -  Erythromycin: R >4    -  Gentamicin: S <=1    -  Ciprofloxacin: R >2    -  Levofloxacin: R >4    -  Linezolid: S 2    -  Penicillin: R >8    -  Vancomycin: S 2    -  Cefazolin: R >16    -  Clindamycin: R >4    -  Moxifloxacin(Aerobic): R >4    -  Oxacillin: R >2    -  RIF- Rifampin: S <=1    -  Tetra/Doxy: S <=1    -  Trimethoprim/Sulfamethoxazole: S <=0.5/9.5    Specimen Source: .Broncial Combicath    Culture Results:   Moderate Methicillin resistant Staphylococcus aureus      RADIOLOGY:    CT A/P ordered

## 2017-06-22 NOTE — PROCEDURE NOTE - NSSITEPREP_SKIN_A_CORE
Adherence to aseptic technique: hand hygiene prior to donning barriers (gown, gloves), don cap and mask, sterile drape over patient/chlorhexidine
chlorhexidine
chlorhexidine/Adherence to aseptic technique: hand hygiene prior to donning barriers (gown, gloves), don cap and mask, sterile drape over patient

## 2017-06-22 NOTE — PROCEDURE NOTE - NSINFORMCONSENT_GEN_A_CORE
This was an emergent procedure.
This was an emergent procedure.
Benefits, risks, and possible complications of procedure explained to patient/caregiver who verbalized understanding and gave verbal consent.
Benefits, risks, and possible complications of procedure explained to patient/caregiver who verbalized understanding and gave verbal consent.

## 2017-06-22 NOTE — PROGRESS NOTE ADULT - PROBLEM SELECTOR PLAN 1
She is volume overloaded but largely improved.  Off pressors   TPN ongoing.  Repeat CBC today She is volume overloaded but largely improved.  Off pressors

## 2017-06-22 NOTE — PROGRESS NOTE ADULT - SUBJECTIVE AND OBJECTIVE BOX
TRANSFER ACCEPT NOTE    HOSPITAL DAY #30  POST OPERATIVE DAY #:  13  STATUS POST:  exploratory laparotomy, extensive lysis of adhesions, takedown of enterocutaneous fistula, repair of serosal tear on cecum, partial right salpingooophorectomy, colostomy revision, parastomal and incisional hernia repair with strattice mesh          Subjective: Patient with complaints of back pain.     Objective:     Constitutional:    Respiratory:   Cardiovascular: sinus  Gastrointestinal: soft, distended, ostomy with gas and stool. old colostomy site, packed. Wound foul smelling. NGT with bilious output  Ext: b/l UE edema    Vital Signs Last 24 Hrs  T(C): 36.8, Max: 37.7 (06-22 @ 00:06)  T(F): 98.2, Max: 99.9 (06-22 @ 00:06)  HR: 111 (106 - 121)  BP: 90/55 (85/47 - 142/84)  BP(mean): 68 (60 - 68)  RR: 29 (16 - 33)  SpO2: 99% (92% - 99%)    I&O's Detail  I & Os for 24h ending 22 Jun 2017 07:00  =============================================  IN:    TPN (Total Parenteral Nutrition): 1550 ml    Solution: 300 ml    Solution: 250 ml    Total IN: 2100 ml  ---------------------------------------------  OUT:    Nasoenteral Tube: 750 ml    Voided: 300 ml    Indwelling Catheter - Urethral: 150 ml    Colostomy: 50 ml    Total OUT: 1250 ml  ---------------------------------------------  Total NET: 850 ml    I & Os for current day (as of 22 Jun 2017 16:00)  =============================================  IN:    Total IN: 0 ml  ---------------------------------------------  OUT:    Colostomy: 5 ml    Total OUT: 5 ml  ---------------------------------------------  Total NET: -5 ml                            8.0    4.4   )-----------( 200      ( 22 Jun 2017 15:03 )             24.4       06-22    139  |  105  |  35<H>  ----------------------------<  142<H>  4.4   |  20<L>  |  1.19    Ca    8.0<L>      22 Jun 2017 15:03  Phos  2.0     06-22  Mg     2.2     06-22    TPro  5.9<L>  /  Alb  1.9<L>  /  TBili  0.7  /  DBili  x   /  AST  17  /  ALT  13  /  AlkPhos  149<H>  06-22 HOSPITAL DAY #30  POST OPERATIVE DAY #:  13  STATUS POST:  exploratory laparotomy, extensive lysis of adhesions, takedown of enterocutaneous fistula, repair of serosal tear on cecum, partial right salpingooophorectomy, colostomy revision, parastomal and incisional hernia repair with strattice mesh          Subjective: Patient with complaints of back pain.     Objective:     Constitutional:    Respiratory:   Cardiovascular: sinus  Gastrointestinal: soft, distended, ostomy with gas and stool. old colostomy site, packed. Wound foul smelling. NGT with bilious output  Ext: b/l UE edema    Vital Signs Last 24 Hrs  T(C): 36.8, Max: 37.7 (06-22 @ 00:06)  T(F): 98.2, Max: 99.9 (06-22 @ 00:06)  HR: 111 (106 - 121)  BP: 90/55 (85/47 - 142/84)  BP(mean): 68 (60 - 68)  RR: 29 (16 - 33)  SpO2: 99% (92% - 99%)    I&O's Detail  I & Os for 24h ending 22 Jun 2017 07:00  =============================================  IN:    TPN (Total Parenteral Nutrition): 1550 ml    Solution: 300 ml    Solution: 250 ml    Total IN: 2100 ml  ---------------------------------------------  OUT:    Nasoenteral Tube: 750 ml    Voided: 300 ml    Indwelling Catheter - Urethral: 150 ml    Colostomy: 50 ml    Total OUT: 1250 ml  ---------------------------------------------  Total NET: 850 ml    I & Os for current day (as of 22 Jun 2017 16:00)  =============================================  IN:    Total IN: 0 ml  ---------------------------------------------  OUT:    Colostomy: 5 ml    Total OUT: 5 ml  ---------------------------------------------  Total NET: -5 ml                            8.0    4.4   )-----------( 200      ( 22 Jun 2017 15:03 )             24.4       06-22    139  |  105  |  35<H>  ----------------------------<  142<H>  4.4   |  20<L>  |  1.19    Ca    8.0<L>      22 Jun 2017 15:03  Phos  2.0     06-22  Mg     2.2     06-22    TPro  5.9<L>  /  Alb  1.9<L>  /  TBili  0.7  /  DBili  x   /  AST  17  /  ALT  13  /  AlkPhos  149<H>  06-22

## 2017-06-22 NOTE — PROGRESS NOTE ADULT - ASSESSMENT
ASSESSMENT  76y female with high volume ECF s/p exploratory laparotomy, extensive lysis of adhesions, takedown of enterocutaneous fistula, repair of serosal tear on cecum, partial right salpingooophorectomy, colostomy revision, parastomal and incisional hernia repair with strattice mesh, POD 13    PLAN  - Patient transferred to SICU for respiratory support  - wean dinesh as tolerated; give 1 L bolus now  - Respiratory support with Bipap  - Tachycardia; concern for sepsis. Continue with vancomycin, f/u trough   - RTC IV tylenol   - care per SICU    KRYSTAL Ansari PGY1  Pager: [phone]

## 2017-06-22 NOTE — PROCEDURE NOTE - NSPROCDETAILS_GEN_ALL_CORE
patient pre-oxygenated, tube inserted, placement confirmed/connected to ventilator
lumen(s) aspirated and flushed/sterile dressing applied/ultrasound guidance/sterile technique, catheter placed/guidewire recovered
all materials/supplies accounted for at end of procedure/positive blood return obtained via catheter/Seldinger technique/sutured in place/connected to a pressurized flush line/hemostasis with direct pressure, dressing applied/ultrasound guidance/location identified, draped/prepped, sterile technique used, needle inserted/introduced
connected to a pressurized flush line/ultrasound guidance/positive blood return obtained via catheter/sutured in place/all materials/supplies accounted for at end of procedure/location identified, draped/prepped, sterile technique used, needle inserted/introduced/hemostasis with direct pressure, dressing applied/Seldinger technique

## 2017-06-22 NOTE — PROCEDURE NOTE - NSINDICATIONS_GEN_A_CORE
venous access/critical illness/hemodynamic monitoring
arterial puncture to obtain ABG's/monitoring purposes
cannulation purposes/arterial puncture to obtain ABG's
respiratory distress

## 2017-06-22 NOTE — PROGRESS NOTE ADULT - SUBJECTIVE AND OBJECTIVE BOX
Day #22 of PN  PN infusion at 62  Vital Signs  T(C): 36.9, Max: 37.7 (06-22 @ 00:06)  HR: 114 (103 - 116)  BP: 142/84 (112/74 - 142/84)  RR: 20 (16 - 25)  SpO2: 95% (94% - 99%)  Wt(kg): --    Labs   06-21    142  |  106  |  37<H>  ----------------------------<  115<H>  4.1   |  25  |  1.15    Ca    7.7<L>      21 Jun 2017 04:42  Phos  2.6     06-21  Mg     2.5     06-21          CAPILLARY BLOOD GLUCOSE  143 (22 Jun 2017 06:05)  120 (22 Jun 2017 00:06)  130 (21 Jun 2017 18:53)  144 (21 Jun 2017 11:00)    I&O's Detail    I & Os for current day (as of 22 Jun 2017 08:46)  =============================================  IN:    TPN (Total Parenteral Nutrition): 1550 ml    Solution: 300 ml    Solution: 250 ml    Total IN: 2100 ml  ---------------------------------------------  OUT:    Nasoenteral Tube: 750 ml    Voided: 300 ml    Indwelling Catheter - Urethral: 150 ml    Colostomy: 50 ml    Total OUT: 1250 ml  ---------------------------------------------  Total NET: 850 ml

## 2017-06-22 NOTE — PROGRESS NOTE ADULT - SUBJECTIVE AND OBJECTIVE BOX
[facility] SICU Readmission Note     Pt well-known to SICU service, recently transferred to [address] [place] [address].  SICU reconsulted for concern for new abdominal fistula and tachypnea.  Pt noted to be increasingly tachycardic over past day.  Upon initial exam pt found to be tachypnea to 20s with diffuse wheezes B/L, receiving duoneb treatment.  Pt endorses shortness of breath and stable abdominal pain.  CXR shows increased pulmonary edema from previous study.  Pt transferred to SICU and placed on non-invasive positive pressure ventilatory support - BiPap settings 10/5, 40% with improvement.  Full set of labs sent and pt started on Enzo gtt for hypotension.  Lactate 1.6.  ID reconsulted and Imipenem added to Vanco for empiric coverage.  Plan for CTAP tonight vs. tomorrow.     SUBJECTIVE/ROS: Pt admits to shortness of breath, abdominal pain.  Pt denies HA, CP, N/V, fevers/chills.   [ ]  Due to altered mental status/intubation, subjective information were not able to be obtained from the patient. History was obtained, to the extent possible, from review of the chart and collateral sources of information.    CODE STATUS: Full code   [x]      ALLERGIES:  azithromycin (Unknown)  codeine (Unknown)  heparin (Other)  PC Pen VK (Unknown)  penicillin (Unknown)      VITAL SIGNS:  ICU Vital Signs Last 24 Hrs  T(C): 36.8, Max: 37.7 (06-22 @ 00:06)  T(F): 98.2, Max: 99.9 (06-22 @ 00:06)  HR: 105 (102 - 121)  BP: 107/52 (85/47 - 142/84)  BP(mean): 73 (60 - 85)  ABP: --  ABP(mean): --  RR: 19 (13 - 33)  SpO2: 100% (92% - 100%)      NEUROLOGY:  ondansetron Injectable 4milliGRAM(s) IV Push every 6 hours PRN Nausea  HYDROmorphone  Injectable 0.25milliGRAM(s) IV Push once  HYDROmorphone  Injectable 0.5milliGRAM(s) IV Push every 3 hours PRN Moderate Pain (4 - 6)    Exam: Awake, alert, following commands. A&O x 3. Slightly anxious.   CAM ICU:  [ x] Adequacy of sedation and pain control has been assessed and adjusted  Comments:    RESPIRATORY:   ALBUTerol/ipratropium for Nebulization 3milliLiter(s) Nebulizer every 6 hours  buDESOnide 160 MICROgram(s)/formoterol 4.5 MICROgram(s) Inhaler 2Puff(s) Inhalation two times a day  acetylcysteine 20% Inhalation 5milliLiter(s) Inhalation every 6 hours    Exam: Diffuse wheezes B/L.   Mechanical Ventilation: BiPap 10/5, 40%   ABG - ( 22 Jun 2017 15:10 )  pH: 7.44  /  pCO2: 36    /  pO2: 61    / HCO3: 24    / Base Excess: .4    /  SaO2: 92      Lactate: x          CARDIOVASCULAR  phenylephrine    Infusion 0.4MICROgram(s)/kG/Min IV Continuous <Continuous>      Exam: Tachycardic, regular rhythm. +S1, +S2.     Cardiac Rhythm: Sinus tachycardia   ECHO:  Comments:    GI:   pantoprazole  Injectable 40milliGRAM(s) IV Push daily    Exam: Abdominal dressing in place with minimal drainage (recently changed by surgical attending at bedside). Ostomy with stool in the bag, dusky.   Diet: NPO  Comments:    :  sodium phosphate IVPB 15milliMole(s) IV Intermittent every 1 hour  dextrose 5% + lactated ringers. 1000milliLiter(s) IV Continuous <Continuous>    I&O's Detail  I & Os for 24h ending 06-22 @ 07:00  =============================================  IN:    TPN (Total Parenteral Nutrition): 1550 ml    Solution: 300 ml    Solution: 250 ml    Total IN: 2100 ml  ---------------------------------------------  OUT:    Nasoenteral Tube: 750 ml    Voided: 300 ml    Indwelling Catheter - Urethral: 150 ml    Colostomy: 50 ml    Total OUT: 1250 ml  ---------------------------------------------  Total NET: 850 ml    I & Os for current day (as of 06-22 @ 18:08)  =============================================  IN:    TPN (Total Parenteral Nutrition): 310 ml    phenylephrine   Infusion: 100.1 ml    Total IN: 410.1 ml  ---------------------------------------------  OUT:    Nasoenteral Tube: 200 ml    Indwelling Catheter - Urethral: 90 ml    Colostomy: 5 ml    Total OUT: 295 ml  ---------------------------------------------  Total NET: 115.1 ml      06-22    139  |  105  |  35<H>  ----------------------------<  142<H>  4.4   |  20<L>  |  1.19    Ca    8.0<L>      22 Jun 2017 15:03  Phos  2.0     06-22  Mg     2.2     06-22    TPro  5.9<L>  /  Alb  1.9<L>  /  TBili  0.7  /  DBili  x   /  AST  17  /  ALT  13  /  AlkPhos  149<H>  06-22    Snyder catheter indication: Critically ill patient    HEMATOLOGIC:  fondaparinux Injectable 2.5milliGRAM(s) SubCutaneous every 24 hours    [x ] DVT Prophylaxis:                        8.0    4.4   )-----------( 200      ( 22 Jun 2017 15:03 )             24.4     PT/INR - ( 22 Jun 2017 08:35 )   PT: 15.8 sec;   INR: 1.39 ratio         PTT - ( 22 Jun 2017 08:35 )  PTT:42.6 sec  Transfusions:	[ ] PRBC	[ ] Platelets	[ ] FFP		[ ] Cryoprecipitate  Comments:    INFECTIOUS DISEASE:  vancomycin  IVPB  IV Intermittent   vancomycin  IVPB 1000milliGRAM(s) IV Intermittent every 24 hours  imipenem/cilastatin  IVPB  IV Intermittent   imipenem/cilastatin  IVPB 500milliGRAM(s) IV Intermittent every 8 hours    RECENT CULTURES:      ENDOCRINE:  levothyroxine Injectable 44MICROGram(s) IV Push daily  insulin lispro (HumaLOG) corrective regimen sliding scale  SubCutaneous every 6 hours    CAPILLARY BLOOD GLUCOSE  161 (22 Jun 2017 11:50)  143 (22 Jun 2017 06:05)  120 (22 Jun 2017 00:06)  130 (21 Jun 2017 18:53)      PATIENT CARE ACCESS DEVICES:  [ ] Peripheral IV  [ ] Central Venous Line	[ ] R	[ ] L	[ ] IJ	[ ] Fem	[ ] SC	Placed:   [x ] Arterial Line		[ ] R	[x ] L	[x ] Fem	[ ] Rad	[ ] Ax	Placed:   [x ] PICC:	LUE				[ ] Mediport  [ x] Urinary Catheter, Date Placed: 6/22  [x ] Necessity of urinary, arterial, and venous catheters discussed    OTHER MEDICATIONS:  lidocaine   Patch 1Patch Transdermal every 24 hours      IMAGING STUDIES:  CXR: increased pulmonary vascular congestion, will f/u official read     HISTORY OF PRESENT ILLNESS:  SANJAY SANTOS is a 76y Female who    ASSESSMENT/PLAN:  Neuro: Pain control  -Dilaudid prn, IV Tylenol    CV: Sepsis   -Enzo gtt, wean as tolerated  -trend lactate    Pulm:     GI/Nutrition:    /Renal:    ID:    Lines/Tubes:    Endo:    Skin:    Proph:    Dispo:    Critical Care Time Spent: [] [facility] SICU Readmission Note     Pt well-known to SICU service, recently transferred to [address] [place] [address].  SICU reconsulted for concern for new abdominal fistula and tachypnea.  Pt noted to be increasingly tachycardic over past day.  Upon initial exam pt found to be tachypnea to 20s with diffuse wheezes B/L, receiving duoneb treatment.  Pt endorses shortness of breath and stable abdominal pain.  CXR shows increased pulmonary edema from previous study.  Pt transferred to SICU and placed on non-invasive positive pressure ventilatory support - BiPap settings 10/5, 40% with improvement.  Full set of labs sent and pt started on Enzo gtt for hypotension.  Lactate 1.6.  ID reconsulted and Imipenem added to Vanco for empiric coverage.  Plan for CTAP tonight vs. tomorrow.     SUBJECTIVE/ROS: Pt admits to shortness of breath, abdominal pain.  Pt denies HA, CP, N/V, fevers/chills.   [ ]  Due to altered mental status/intubation, subjective information were not able to be obtained from the patient. History was obtained, to the extent possible, from review of the chart and collateral sources of information.    CODE STATUS: Full code   [x]      ALLERGIES:  azithromycin (Unknown)  codeine (Unknown)  heparin (Other)  PC Pen VK (Unknown)  penicillin (Unknown)      VITAL SIGNS:  ICU Vital Signs Last 24 Hrs  T(C): 36.8, Max: 37.7 (06-22 @ 00:06)  T(F): 98.2, Max: 99.9 (06-22 @ 00:06)  HR: 105 (102 - 121)  BP: 107/52 (85/47 - 142/84)  BP(mean): 73 (60 - 85)  ABP: --  ABP(mean): --  RR: 19 (13 - 33)  SpO2: 100% (92% - 100%)      NEUROLOGY:  ondansetron Injectable 4milliGRAM(s) IV Push every 6 hours PRN Nausea  HYDROmorphone  Injectable 0.25milliGRAM(s) IV Push once  HYDROmorphone  Injectable 0.5milliGRAM(s) IV Push every 3 hours PRN Moderate Pain (4 - 6)    Exam: Awake, alert, following commands. A&O x 3. Slightly anxious.   CAM ICU:  [ x] Adequacy of sedation and pain control has been assessed and adjusted  Comments:    RESPIRATORY:   ALBUTerol/ipratropium for Nebulization 3milliLiter(s) Nebulizer every 6 hours  buDESOnide 160 MICROgram(s)/formoterol 4.5 MICROgram(s) Inhaler 2Puff(s) Inhalation two times a day  acetylcysteine 20% Inhalation 5milliLiter(s) Inhalation every 6 hours    Exam: Diffuse wheezes B/L.   Mechanical Ventilation: BiPap 10/5, 40%   ABG - ( 22 Jun 2017 15:10 )  pH: 7.44  /  pCO2: 36    /  pO2: 61    / HCO3: 24    / Base Excess: .4    /  SaO2: 92      Lactate: x          CARDIOVASCULAR  phenylephrine    Infusion 0.4MICROgram(s)/kG/Min IV Continuous <Continuous>      Exam: Tachycardic, regular rhythm. +S1, +S2.     Cardiac Rhythm: Sinus tachycardia   ECHO:  Comments:    GI:   pantoprazole  Injectable 40milliGRAM(s) IV Push daily    Exam: Abdominal dressing in place with minimal drainage (recently changed by surgical attending at bedside). Ostomy with stool in the bag, dusky.   Diet: NPO  Comments:    :  sodium phosphate IVPB 15milliMole(s) IV Intermittent every 1 hour  dextrose 5% + lactated ringers. 1000milliLiter(s) IV Continuous <Continuous>    I&O's Detail  I & Os for 24h ending 06-22 @ 07:00  =============================================  IN:    TPN (Total Parenteral Nutrition): 1550 ml    Solution: 300 ml    Solution: 250 ml    Total IN: 2100 ml  ---------------------------------------------  OUT:    Nasoenteral Tube: 750 ml    Voided: 300 ml    Indwelling Catheter - Urethral: 150 ml    Colostomy: 50 ml    Total OUT: 1250 ml  ---------------------------------------------  Total NET: 850 ml    I & Os for current day (as of 06-22 @ 18:08)  =============================================  IN:    TPN (Total Parenteral Nutrition): 310 ml    phenylephrine   Infusion: 100.1 ml    Total IN: 410.1 ml  ---------------------------------------------  OUT:    Nasoenteral Tube: 200 ml    Indwelling Catheter - Urethral: 90 ml    Colostomy: 5 ml    Total OUT: 295 ml  ---------------------------------------------  Total NET: 115.1 ml      06-22    139  |  105  |  35<H>  ----------------------------<  142<H>  4.4   |  20<L>  |  1.19    Ca    8.0<L>      22 Jun 2017 15:03  Phos  2.0     06-22  Mg     2.2     06-22    TPro  5.9<L>  /  Alb  1.9<L>  /  TBili  0.7  /  DBili  x   /  AST  17  /  ALT  13  /  AlkPhos  149<H>  06-22    Snyder catheter indication: Critically ill patient    HEMATOLOGIC:  fondaparinux Injectable 2.5milliGRAM(s) SubCutaneous every 24 hours    [x ] DVT Prophylaxis:                        8.0    4.4   )-----------( 200      ( 22 Jun 2017 15:03 )             24.4     PT/INR - ( 22 Jun 2017 08:35 )   PT: 15.8 sec;   INR: 1.39 ratio         PTT - ( 22 Jun 2017 08:35 )  PTT:42.6 sec  Transfusions:	[ ] PRBC	[ ] Platelets	[ ] FFP		[ ] Cryoprecipitate  Comments:    INFECTIOUS DISEASE:  vancomycin  IVPB  IV Intermittent   vancomycin  IVPB 1000milliGRAM(s) IV Intermittent every 24 hours  imipenem/cilastatin  IVPB  IV Intermittent   imipenem/cilastatin  IVPB 500milliGRAM(s) IV Intermittent every 8 hours    RECENT CULTURES:      ENDOCRINE:  levothyroxine Injectable 44MICROGram(s) IV Push daily  insulin lispro (HumaLOG) corrective regimen sliding scale  SubCutaneous every 6 hours    CAPILLARY BLOOD GLUCOSE  161 (22 Jun 2017 11:50)  143 (22 Jun 2017 06:05)  120 (22 Jun 2017 00:06)  130 (21 Jun 2017 18:53)      PATIENT CARE ACCESS DEVICES:  [ ] Peripheral IV  [ ] Central Venous Line	[ ] R	[ ] L	[ ] IJ	[ ] Fem	[ ] SC	Placed:   [x ] Arterial Line		[ ] R	[x ] L	[x ] Fem	[ ] Rad	[ ] Ax	Placed:   [x ] PICC:	LUE				[ ] Mediport  [ x] Urinary Catheter, Date Placed: 6/22  [x ] Necessity of urinary, arterial, and venous catheters discussed    OTHER MEDICATIONS:  lidocaine   Patch 1Patch Transdermal every 24 hours      IMAGING STUDIES:  CXR: increased pulmonary vascular congestion, will f/u official read     HISTORY OF PRESENT ILLNESS:  SANJAY SANTOS is a 76y Female with PMH of COPD, CHF, h/o DVT/PE s/p IVC filter (later removed), GERD, Hypothyroidism, h/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 Diff, and PSH of diverticulitis s/p Ruben's, recurrent SBO s/p SBR, ECF at small bowel anastomosis site s/p takedown, wound dehiscence, and abdominal reconstruction complicated by parastomal hernia and incisional hernia with unresolving SBO now POD#1 s/p exploratory laparotomy, extensive lysis of adhesions, take down of ECF, repair of serosal tear on cecum, partial right salpingoophorectomy, colostomy revision, parastomal and incisional hernia repair with strattice mesh.    ASSESSMENT/PLAN:  Neuro: Pain control  -Dilaudid prn, IV Tylenol    CV: Sepsis, h/o CHF  -Enzo gtt, wean as tolerated  -trend lactate  -repeat bedside ultrasound/echo     Pulm: h/o COPD  -Duonebs, Budesonide  -Bipap, f/u am CXR/ABG    GI/Nutrition: Severe malnutrition secondary to EC fistula, now with possible new fistula  -NPO/NGT  -Hold TPN in setting of sepsis   -Protonix     /Renal:  -Start D5LR @ 75mL/hr  -f/u repeat labs, replete lytes as needed  -Claudio for strict I's and O's    ID: h/o MRSA PNA  -Vanco  -Imipenem added, ID following     Lines/Tubes:  -LUE PICC  -L femoral A-line    Endo:   -ISS q 6hrs      Heme: h/o HIT  -Fondaparinux    Dispo: SICU care     Critical Care Time Spent: []

## 2017-06-22 NOTE — PROGRESS NOTE ADULT - ATTENDING COMMENTS
I saw pt at 11am. She was sitting out of bed in the chair, and was tachypneic but able to speak. On exam, her wound had drainage of thick, brown fluid. I opened staples at the inferior aspect of her wound. The underlying Strattice is intact, but there was some necrotic subcutaneous tissue over it, which I sharply debrided at the bedside. There is a small defect between the Strattice and the fascia on the lower left aspect of the Strattice, which expresses the same thick brown fluid. This is highly concerning for a recurrent fistula or anastomotic breakdown.  was transferred to SICU and I spoke with her daughter Kathie to explain my concerns and that pt is being moved back to ICU. Currently, pt on BiPAP, with improving respiratory status, but also hypotensive. Arterial line to be placed, and fluid resuscitation begun. Neosynephrine started. Plan to resuscitate and restart antibiotics. If pt's tachypnea (likely secondary to new sepsis with tachycardia and WBC<4) and hypotension improve, plan to wait for tomorrow to obtain CT a/p (currently pt's respiratory status makes transport and lying pt flat for CT a risk for aspiration and worsening respiratory status). If pt worsens, plan to intubate and then scan. Will restart abx. ID appreciated. D/w  and .

## 2017-06-22 NOTE — PROGRESS NOTE ADULT - ASSESSMENT
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  Acute on chronic renal failure  Anemia  Hypocalcemia 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  Acute on chronic renal failure  Anemia  Hypocalcemia/Hypophosphatemia

## 2017-06-22 NOTE — PROGRESS NOTE ADULT - SUBJECTIVE AND OBJECTIVE BOX
[facility] [phone]        Patient seen and examined         MEDICATIONS  (STANDING):  levothyroxine Injectable 44MICROGram(s) IV Push daily  pantoprazole  Injectable 40milliGRAM(s) IV Push daily  fondaparinux Injectable 2.5milliGRAM(s) SubCutaneous every 24 hours  insulin lispro (HumaLOG) corrective regimen sliding scale  SubCutaneous every 6 hours  vancomycin  IVPB  IV Intermittent   vancomycin  IVPB 1000milliGRAM(s) IV Intermittent every 24 hours  ALBUTerol/ipratropium for Nebulization 3milliLiter(s) Nebulizer every 6 hours  buDESOnide 160 MICROgram(s)/formoterol 4.5 MICROgram(s) Inhaler 2Puff(s) Inhalation two times a day  acetylcysteine 20% Inhalation 5milliLiter(s) Inhalation every 6 hours  lidocaine   Patch 1Patch Transdermal every 24 hours  acetaminophen  IVPB. 1000milliGRAM(s) IV Intermittent once      VITAL:  T(C): , Max: 37.7 (06-22 @ 00:06)  T(F): , Max: 99.9 (06-22 @ 00:06)  HR: 114  BP: 142/84  BP(mean): 81  RR: 20  SpO2: 95%  Wt(kg): --    I and O's:    I & Os for current day (as of 06-22 @ 08:05)  =============================================  IN: 2100 ml / OUT: 1250 ml / NET: 850 ml        PHYSICAL EXAM:    Constitutional: NAD  HEENT: PERRLA    Neck:  No JVD  Respiratory: CTAB/L  Cardiovascular: S1 and S2  Gastrointestinal: BS+, soft, NT/ND  Extremities: No peripheral edema  Neurological: A/O x 3, no focal deficits  Psychiatric: Normal mood, normal affect  : No Snyder  Skin: No rashes  Access: Not applicable    LABS:                        7.8    4.4   )-----------( 128      ( 21 Jun 2017 04:42 )             24.1     06-21    142  |  106  |  37<H>  ----------------------------<  115<H>  4.1   |  25  |  1.15    Ca    7.7<L>      21 Jun 2017 04:42  Phos  2.6     06-21  Mg     2.5     06-21            Urine Studies:          RADIOLOGY & ADDITIONAL STUDIES: [facility] [phone]        Patient seen and examined in the bed.  She was in good spirits        MEDICATIONS  (STANDING):  levothyroxine Injectable 44MICROGram(s) IV Push daily  pantoprazole  Injectable 40milliGRAM(s) IV Push daily  fondaparinux Injectable 2.5milliGRAM(s) SubCutaneous every 24 hours  insulin lispro (HumaLOG) corrective regimen sliding scale  SubCutaneous every 6 hours  vancomycin  IVPB  IV Intermittent   vancomycin  IVPB 1000milliGRAM(s) IV Intermittent every 24 hours  ALBUTerol/ipratropium for Nebulization 3milliLiter(s) Nebulizer every 6 hours  buDESOnide 160 MICROgram(s)/formoterol 4.5 MICROgram(s) Inhaler 2Puff(s) Inhalation two times a day  acetylcysteine 20% Inhalation 5milliLiter(s) Inhalation every 6 hours  lidocaine   Patch 1Patch Transdermal every 24 hours  acetaminophen  IVPB. 1000milliGRAM(s) IV Intermittent once      VITAL:  T(C): , Max: 37.7 (06-22 @ 00:06)  T(F): , Max: 99.9 (06-22 @ 00:06)  HR: 114  BP: 142/84  BP(mean): 81  RR: 20  SpO2: 95%  Wt(kg): --    I and O's:    I & Os for current day (as of 06-22 @ 08:05)  =============================================  IN: 2100 ml / OUT: 1250 ml / NET: 850 ml        PHYSICAL EXAM:  Constitutional: NAD  HEENT: PERRLA    Neck:  No JVD  Respiratory: poor effort  Cardiovascular: S1 and S2  Gastrointestinal:+surgical scar n ostomy; hypoactive  Extremities: +peripheral edema  Neurological: A/O x 3, no focal deficits  Psychiatric: Normal mood, normal affect  : No Snyder  Skin: No rashes  Access: Not applicable       LABS:                        7.8    4.4   )-----------( 128      ( 21 Jun 2017 04:42 )             24.1     06-21    142  |  106  |  37<H>  ----------------------------<  115<H>  4.1   |  25  |  1.15    Ca    7.7<L>      21 Jun 2017 04:42  Phos  2.6     06-21  Mg     2.5     06-21            Urine Studies:          RADIOLOGY & ADDITIONAL STUDIES:

## 2017-06-22 NOTE — PROGRESS NOTE ADULT - ASSESSMENT
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 OR 6/9 evening for unresolving SBO. had exploratory laparotomy, extensive lysis of adhesions, take down of ECF, repair of serosal tear on cecum, partial right salpingoophorectomy, colostomy revision, parastomal and incisional hernia repair with strattice mesh. pt improved after empiric course of aztreonam/flagyl for wound and vanco for MRSA in sputum.  pt now returns to SICU for sepsis from worsening ECF    1. continue vanco  2. add imipenem 500mg IV q6 (she has tolerated previously)  3. CT A/P  4. surgical plan per primary team

## 2017-06-23 DIAGNOSIS — R06.02 SHORTNESS OF BREATH: ICD-10-CM

## 2017-06-23 LAB
ANION GAP SERPL CALC-SCNC: 13 MMOL/L — SIGNIFICANT CHANGE UP (ref 5–17)
APTT BLD: 45.2 SEC — HIGH (ref 27.5–37.4)
BUN SERPL-MCNC: 32 MG/DL — HIGH (ref 7–23)
CA-I BLD-SCNC: 1.13 MMOL/L — SIGNIFICANT CHANGE UP (ref 1.12–1.3)
CALCIUM SERPL-MCNC: 7.9 MG/DL — LOW (ref 8.4–10.5)
CHLORIDE SERPL-SCNC: 105 MMOL/L — SIGNIFICANT CHANGE UP (ref 96–108)
CO2 SERPL-SCNC: 22 MMOL/L — SIGNIFICANT CHANGE UP (ref 22–31)
CREAT SERPL-MCNC: 1.21 MG/DL — SIGNIFICANT CHANGE UP (ref 0.5–1.3)
GAS PNL BLDA: SIGNIFICANT CHANGE UP
GLUCOSE SERPL-MCNC: 103 MG/DL — HIGH (ref 70–99)
HCT VFR BLD CALC: 21.9 % — LOW (ref 34.5–45)
HCT VFR BLD CALC: 23.7 % — LOW (ref 34.5–45)
HGB BLD-MCNC: 7.3 G/DL — LOW (ref 11.5–15.5)
HGB BLD-MCNC: 7.7 G/DL — LOW (ref 11.5–15.5)
INR BLD: 1.5 RATIO — HIGH (ref 0.88–1.16)
MAGNESIUM SERPL-MCNC: 2 MG/DL — SIGNIFICANT CHANGE UP (ref 1.6–2.6)
MCHC RBC-ENTMCNC: 32.6 GM/DL — SIGNIFICANT CHANGE UP (ref 32–36)
MCHC RBC-ENTMCNC: 32.6 PG — SIGNIFICANT CHANGE UP (ref 27–34)
MCHC RBC-ENTMCNC: 33.3 PG — SIGNIFICANT CHANGE UP (ref 27–34)
MCHC RBC-ENTMCNC: 33.4 GM/DL — SIGNIFICANT CHANGE UP (ref 32–36)
MCV RBC AUTO: 100 FL — SIGNIFICANT CHANGE UP (ref 80–100)
MCV RBC AUTO: 99.8 FL — SIGNIFICANT CHANGE UP (ref 80–100)
PHOSPHATE SERPL-MCNC: 4.3 MG/DL — SIGNIFICANT CHANGE UP (ref 2.5–4.5)
PLATELET # BLD AUTO: 218 K/UL — SIGNIFICANT CHANGE UP (ref 150–400)
PLATELET # BLD AUTO: 267 K/UL — SIGNIFICANT CHANGE UP (ref 150–400)
POTASSIUM SERPL-MCNC: 4.3 MMOL/L — SIGNIFICANT CHANGE UP (ref 3.5–5.3)
POTASSIUM SERPL-SCNC: 4.3 MMOL/L — SIGNIFICANT CHANGE UP (ref 3.5–5.3)
PROTHROM AB SERPL-ACNC: 16.5 SEC — HIGH (ref 9.8–12.7)
RBC # BLD: 2.19 M/UL — LOW (ref 3.8–5.2)
RBC # BLD: 2.37 M/UL — LOW (ref 3.8–5.2)
RBC # FLD: 16 % — HIGH (ref 10.3–14.5)
RBC # FLD: 16.1 % — HIGH (ref 10.3–14.5)
SODIUM SERPL-SCNC: 140 MMOL/L — SIGNIFICANT CHANGE UP (ref 135–145)
WBC # BLD: 4.6 K/UL — SIGNIFICANT CHANGE UP (ref 3.8–10.5)
WBC # BLD: 4.7 K/UL — SIGNIFICANT CHANGE UP (ref 3.8–10.5)
WBC # FLD AUTO: 4.6 K/UL — SIGNIFICANT CHANGE UP (ref 3.8–10.5)
WBC # FLD AUTO: 4.7 K/UL — SIGNIFICANT CHANGE UP (ref 3.8–10.5)

## 2017-06-23 PROCEDURE — 99232 SBSQ HOSP IP/OBS MODERATE 35: CPT

## 2017-06-23 PROCEDURE — 93306 TTE W/DOPPLER COMPLETE: CPT | Mod: 26

## 2017-06-23 PROCEDURE — 74176 CT ABD & PELVIS W/O CONTRAST: CPT | Mod: 26

## 2017-06-23 PROCEDURE — 71010: CPT | Mod: 26

## 2017-06-23 PROCEDURE — 36620 INSERTION CATHETER ARTERY: CPT

## 2017-06-23 RX ORDER — ACETAMINOPHEN 500 MG
1000 TABLET ORAL ONCE
Qty: 0 | Refills: 0 | Status: COMPLETED | OUTPATIENT
Start: 2017-06-23 | End: 2017-06-23

## 2017-06-23 RX ORDER — SODIUM CHLORIDE 9 MG/ML
500 INJECTION, SOLUTION INTRAVENOUS ONCE
Qty: 0 | Refills: 0 | Status: COMPLETED | OUTPATIENT
Start: 2017-06-23 | End: 2017-06-23

## 2017-06-23 RX ORDER — NOREPINEPHRINE BITARTRATE/D5W 8 MG/250ML
0.01 PLASTIC BAG, INJECTION (ML) INTRAVENOUS
Qty: 8 | Refills: 0 | Status: DISCONTINUED | OUTPATIENT
Start: 2017-06-23 | End: 2017-06-27

## 2017-06-23 RX ADMIN — Medication 3 MILLILITER(S): at 02:55

## 2017-06-23 RX ADMIN — Medication 3 MILLILITER(S): at 18:26

## 2017-06-23 RX ADMIN — BUDESONIDE AND FORMOTEROL FUMARATE DIHYDRATE 2 PUFF(S): 160; 4.5 AEROSOL RESPIRATORY (INHALATION) at 18:27

## 2017-06-23 RX ADMIN — Medication 1000 MILLIGRAM(S): at 07:33

## 2017-06-23 RX ADMIN — Medication 250 MILLIGRAM(S): at 10:25

## 2017-06-23 RX ADMIN — HYDROMORPHONE HYDROCHLORIDE 0.5 MILLIGRAM(S): 2 INJECTION INTRAMUSCULAR; INTRAVENOUS; SUBCUTANEOUS at 23:55

## 2017-06-23 RX ADMIN — HYDROMORPHONE HYDROCHLORIDE 0.5 MILLIGRAM(S): 2 INJECTION INTRAMUSCULAR; INTRAVENOUS; SUBCUTANEOUS at 20:25

## 2017-06-23 RX ADMIN — SODIUM CHLORIDE 2000 MILLILITER(S): 9 INJECTION, SOLUTION INTRAVENOUS at 22:22

## 2017-06-23 RX ADMIN — HYDROMORPHONE HYDROCHLORIDE 0.5 MILLIGRAM(S): 2 INJECTION INTRAMUSCULAR; INTRAVENOUS; SUBCUTANEOUS at 23:40

## 2017-06-23 RX ADMIN — Medication 44 MICROGRAM(S): at 06:44

## 2017-06-23 RX ADMIN — SODIUM CHLORIDE 75 MILLILITER(S): 9 INJECTION, SOLUTION INTRAVENOUS at 06:44

## 2017-06-23 RX ADMIN — PANTOPRAZOLE SODIUM 40 MILLIGRAM(S): 20 TABLET, DELAYED RELEASE ORAL at 12:20

## 2017-06-23 RX ADMIN — HYDROMORPHONE HYDROCHLORIDE 0.5 MILLIGRAM(S): 2 INJECTION INTRAMUSCULAR; INTRAVENOUS; SUBCUTANEOUS at 03:30

## 2017-06-23 RX ADMIN — FONDAPARINUX SODIUM 2.5 MILLIGRAM(S): 2.5 INJECTION, SOLUTION SUBCUTANEOUS at 14:00

## 2017-06-23 RX ADMIN — IMIPENEM AND CILASTATIN 100 MILLIGRAM(S): 250; 250 INJECTION, POWDER, FOR SOLUTION INTRAVENOUS at 18:17

## 2017-06-23 RX ADMIN — HYDROMORPHONE HYDROCHLORIDE 0.5 MILLIGRAM(S): 2 INJECTION INTRAMUSCULAR; INTRAVENOUS; SUBCUTANEOUS at 17:00

## 2017-06-23 RX ADMIN — IMIPENEM AND CILASTATIN 100 MILLIGRAM(S): 250; 250 INJECTION, POWDER, FOR SOLUTION INTRAVENOUS at 02:15

## 2017-06-23 RX ADMIN — Medication 400 MILLIGRAM(S): at 06:44

## 2017-06-23 RX ADMIN — SODIUM CHLORIDE 2000 MILLILITER(S): 9 INJECTION, SOLUTION INTRAVENOUS at 20:40

## 2017-06-23 RX ADMIN — ONDANSETRON 4 MILLIGRAM(S): 8 TABLET, FILM COATED ORAL at 07:26

## 2017-06-23 RX ADMIN — Medication 3 MILLILITER(S): at 11:26

## 2017-06-23 RX ADMIN — HYDROMORPHONE HYDROCHLORIDE 0.5 MILLIGRAM(S): 2 INJECTION INTRAMUSCULAR; INTRAVENOUS; SUBCUTANEOUS at 10:45

## 2017-06-23 RX ADMIN — Medication 400 MILLIGRAM(S): at 18:41

## 2017-06-23 RX ADMIN — HYDROMORPHONE HYDROCHLORIDE 0.5 MILLIGRAM(S): 2 INJECTION INTRAMUSCULAR; INTRAVENOUS; SUBCUTANEOUS at 20:40

## 2017-06-23 RX ADMIN — PHENYLEPHRINE HYDROCHLORIDE 11.16 MICROGRAM(S)/KG/MIN: 10 INJECTION INTRAVENOUS at 13:41

## 2017-06-23 RX ADMIN — HYDROMORPHONE HYDROCHLORIDE 0.5 MILLIGRAM(S): 2 INJECTION INTRAMUSCULAR; INTRAVENOUS; SUBCUTANEOUS at 13:20

## 2017-06-23 RX ADMIN — HYDROMORPHONE HYDROCHLORIDE 0.5 MILLIGRAM(S): 2 INJECTION INTRAMUSCULAR; INTRAVENOUS; SUBCUTANEOUS at 17:15

## 2017-06-23 RX ADMIN — PHENYLEPHRINE HYDROCHLORIDE 11.16 MICROGRAM(S)/KG/MIN: 10 INJECTION INTRAVENOUS at 06:44

## 2017-06-23 RX ADMIN — IMIPENEM AND CILASTATIN 100 MILLIGRAM(S): 250; 250 INJECTION, POWDER, FOR SOLUTION INTRAVENOUS at 11:32

## 2017-06-23 RX ADMIN — HYDROMORPHONE HYDROCHLORIDE 0.5 MILLIGRAM(S): 2 INJECTION INTRAMUSCULAR; INTRAVENOUS; SUBCUTANEOUS at 10:24

## 2017-06-23 RX ADMIN — HYDROMORPHONE HYDROCHLORIDE 0.5 MILLIGRAM(S): 2 INJECTION INTRAMUSCULAR; INTRAVENOUS; SUBCUTANEOUS at 03:15

## 2017-06-23 RX ADMIN — HYDROMORPHONE HYDROCHLORIDE 0.5 MILLIGRAM(S): 2 INJECTION INTRAMUSCULAR; INTRAVENOUS; SUBCUTANEOUS at 13:45

## 2017-06-23 NOTE — PROGRESS NOTE ADULT - SUBJECTIVE AND OBJECTIVE BOX
Follow Up:      Interval History/ROS:Patient is a 76y old  Female who presents with a chief complaint of Abdominal pain (26 May 2017 11:33)  on BiPAP and pressors, for CT today, still with drainage from wound, now with fevers, blood cultures in lab    Allergies    azithromycin (Unknown)  codeine (Unknown)  heparin (Other)  PC Pen VK (Unknown)  penicillin (Unknown)      ANTIMICROBIALS:  vancomycin  IVPB    vancomycin  IVPB 1000 every 24 hours  imipenem/cilastatin  IVPB    imipenem/cilastatin  IVPB 500 every 8 hours      OTHER MEDS:  levothyroxine Injectable 44MICROGram(s) IV Push daily  pantoprazole  Injectable 40milliGRAM(s) IV Push daily  ondansetron Injectable 4milliGRAM(s) IV Push every 6 hours PRN  fondaparinux Injectable 2.5milliGRAM(s) SubCutaneous every 24 hours  insulin lispro (HumaLOG) corrective regimen sliding scale  SubCutaneous every 6 hours  ALBUTerol/ipratropium for Nebulization 3milliLiter(s) Nebulizer every 6 hours  buDESOnide 160 MICROgram(s)/formoterol 4.5 MICROgram(s) Inhaler 2Puff(s) Inhalation two times a day  acetylcysteine 20% Inhalation 5milliLiter(s) Inhalation every 6 hours  lidocaine   Patch 1Patch Transdermal every 24 hours  phenylephrine    Infusion 0.4MICROgram(s)/kG/Min IV Continuous <Continuous>  sodium phosphate IVPB 15milliMole(s) IV Intermittent every 1 hour  dextrose 5% + lactated ringers. 1000milliLiter(s) IV Continuous <Continuous>  HYDROmorphone  Injectable 0.5milliGRAM(s) IV Push every 3 hours PRN  HYDROmorphone  Injectable 0.5milliGRAM(s) IV Push every 2 hours PRN      Vital Signs Last 24 Hrs  T(C): 39.1, Max: 39.1 (06-23 @ 13:00)  T(F): 102.4, Max: 102.4 (06-23 @ 13:00)  HR: 117 (91 - 122)  BP: 100/52 (69/37 - 125/59)  BP(mean): 75 (49 - 85)  RR: 20 (13 - 33)  SpO2: 100% (92% - 100%)      PHYSICAL EXAM:    Constitutional: BiPAP    Eyes: non-icteric    ENMT: NGT to suction    Neck: no KAMILAH    Respiratory: clear to auscultation b/l    Cardiovascular: S1/S2, no murmur    Gastrointestinal: soft, wound with drain to suction    Genitourinary: baldwin    Vascular: edema    Neurological: non-focal    Skin: no rash                              7.7    4.6   )-----------( 267      ( 23 Jun 2017 07:33 )             23.7       06-23    140  |  105  |  32<H>  ----------------------------<  103<H>  4.3   |  22  |  1.21    Ca    7.9<L>      23 Jun 2017 03:27  Phos  4.3     06-23  Mg     2.0     06-23    TPro  5.9<L>  /  Alb  1.9<L>  /  TBili  0.7  /  DBili  x   /  AST  17  /  ALT  13  /  AlkPhos  149<H>  06-22      MICROBIOLOGY:  RECENT CULTURES:  blood cultures x 2 in lab    RADIOLOGY:  CT A/P ordered

## 2017-06-23 NOTE — PROGRESS NOTE ADULT - SUBJECTIVE AND OBJECTIVE BOX
HISTORY  77 y/o female with history significant for COPD, h/o DVT s/p IVC filter (now removed), GERD, hypothyroidism, HIT and diverticulitis s/p Ruben's, recurrent SBO s/p ex-lap small bowel resection, EC fistula s/p takedown, complicated by wound dehiscence, abdominal reconstruction underwent ex-lap, extensive NANETTE, EC fistula takedown, colostomy revision, and parastomal/incisional hernia repair, She was admitted to SICU intubated post operatively and was successfully extubated. After getting CT with oral contrast, pt went into respiratory distress and was reintubated, found to have MRSA pneumonia likely aspiration in nature. She was subsequently extubated and weaned off pressors.    24 HOUR EVENTS: Patient was readmitted yesterday afternoon as she was tachypneic, tachycardic, and hypotensive on the floor. CXR revealed pulmonary edema and she was subsequently placed on BiPAP, which improved her tachypnea and tachycardia. Patient's wound was draining thick brown fluid so inferior portion of wound was opened at bedside, revealing a defect in the left lower aspect of the Strattice that is draining the same thick brown fluid concerning for another EC fistula vs. anastomotic breakdown so imipenem was started. Planning for CT scan today. Since the patient was hypotensive but in fluid overload, Levophed and D5LR @ 75 cc/hr was started. Left femoral a-line started. TPN held in the setting of possible sepsis.    SUBJECTIVE/ROS:  [x] A ten-point review of systems was otherwise negative except as noted.  [ ] Due to altered mental status/intubation, subjective information were not able to be obtained from the patient. History was obtained, to the extent possible, from review of the chart and collateral sources of information.    NEURO  CAM ICU: negative  Exam: awake, alert, oriented x4  Meds:  ·	HYDROmorphone  Injectable 0.5milliGRAM(s) IV Push every 3 hours PRN Moderate Pain (4 - 6)  ·	HYDROmorphone  Injectable 0.5milliGRAM(s) IV Push every 2 hours PRN breakthrough Pain  ·	lidocaine   Patch 1Patch Transdermal every 24 hours  [x] Adequacy of sedation and pain control has been assessed and adjusted    RESPIRATORY  RR: 17 (13 - 33)  SpO2: 100% (92% - 100%)  Exam: mild crackles in all lung fields  Mechanical Ventilation: BiPAP, IPAP 10, CPAP 5, FiO2 40%  [N/A] Extubation Readiness Assessed  Meds:  ·	ALBUTerol/ipratropium for Nebulization 3milliLiter(s) Nebulizer every 6 hours  ·	buDESOnide 160 MICROgram(s)/formoterol 4.5 MICROgram(s) Inhaler 2Puff(s) Inhalation two times a day  ·	acetylcysteine 20% Inhalation 5milliLiter(s) Inhalation every 6 hours    CARDIOVASCULAR  HR: 97 (91 - 121)  BP: 106/54 (85/47 - 133/81)  BP(mean): 77 (60 - 85)  ABP: 123/57 (85/36 - 155/64)  ABP(mean): 82 (54 - 102)  Exam: regular rate and rhythm  Cardiac Rhythm: sinus  Perfusion     [x]Adequate   [ ]Inadequate  Mentation    [x]Normal       [ ]Reduced  Extremities  [x]Warm         [ ]Cool  Volume Status [x]Hypervolemic [ ]Euvolemic [ ]Hypovolemic  Meds: phenylephrine    Infusion 0.4MICROgram(s)/kG/Min IV Continuous <Continuous>    GI/NUTRITION  Exam: soft, nontender, nondistended, incision C/D/I  Diet: NPO  Meds:  ·	pantoprazole  Injectable 40milliGRAM(s) IV Push daily  ·	ondansetron Injectable 4milliGRAM(s) IV Push every 6 hours PRN Nausea    GENITOURINARY    140  |  105  |  32<H>  ----------------------------<  103<H>  4.3   |  22  |  1.21    Ca    7.9<L>      23 Jun 2017 03:27  Phos  4.3  Mg     2.0    [ ] Synder catheter, indication: N/A  Meds: dextrose 5% + lactated ringers. 1000milliLiter(s) IV Continuous infuse at 75 mL/Hr    HEMATOLOGIC  Meds: fondaparinux Injectable 2.5milliGRAM(s) SubCutaneous every 24 hours  [x] VTE Prophylaxis                        7.3    4.7   )-----------( 218      ( 23 Jun 2017 03:27 )             21.9     PT/INR - ( 23 Jun 2017 03:27 )   PT: 16.5 sec;   INR: 1.50 ratio    PTT - ( 23 Jun 2017 03:27 )  PTT:45.2 sec    INFECTIOUS DISEASES  WBC Count  ·	4.7 K/uL (06-23 @ 03:27)  ·	4.4 K/uL (06-22 @ 15:03)  ·	3.94 K/uL (06-22 @ 08:35)  RECENT CULTURES: none  Meds:  ·	vancomycin  IVPB 1000milliGRAM(s) IV Intermittent every 24 hours  ·	imipenem/cilastatin  IVPB 500milliGRAM(s) IV Intermittent every 8 hours    ENDOCRINE  CAPILLARY BLOOD GLUCOSE:  117 (22 Jun 2017 18:30)  161 (22 Jun 2017 11:50)  Meds:  ·	levothyroxine Injectable 44MICROGram(s) IV Push daily  ·	insulin lispro (HumaLOG) corrective regimen sliding scale  SubCutaneous every 6 hours    ACCESS DEVICES:  [ ] Peripheral IV  [ ] Central Venous Line	[ ] R	[ ] L	[ ] IJ	[ ] Fem	[ ] SC	Placed:   [x] Arterial Line		[ ] R	[x] L	[x] Fem	[ ] Rad	[ ] Ax	Placed: 6/22/2017  [x] PICC placed 5/31/2017				[ ] Mediport  [x] Urinary Catheter, Date Placed: 6/22/2017  [x] Necessity of urinary, arterial, and venous catheters discussed    OTHER MEDICATIONS: none    CODE STATUS: full code    IMAGING: HISTORY  75 y/o female with history significant for COPD, h/o DVT s/p IVC filter (now removed), GERD, hypothyroidism, HIT and diverticulitis s/p Ruben's, recurrent SBO s/p ex-lap small bowel resection, EC fistula s/p takedown, complicated by wound dehiscence, abdominal reconstruction underwent ex-lap, extensive NANETTE, EC fistula takedown, colostomy revision, and parastomal/incisional hernia repair, She was admitted to SICU intubated post operatively and was successfully extubated. After getting CT with oral contrast, pt went into respiratory distress and was reintubated, found to have MRSA pneumonia likely aspiration in nature. She was subsequently extubated and weaned off pressors.    24 HOUR EVENTS: Patient was readmitted yesterday afternoon as she was tachypneic, tachycardic, and hypotensive on the floor. CXR revealed pulmonary edema and she was subsequently placed on BiPAP, which improved her tachypnea and tachycardia. Patient's wound was draining thick brown fluid so inferior portion of wound was opened at bedside, revealing a defect in the left lower aspect of the Strattice that is draining the same thick brown fluid concerning for another EC fistula vs. anastomotic breakdown so imipenem was started. Planning for CT scan today. Since the patient was hypotensive but in fluid overload, Levophed and D5LR @ 75 cc/hr was started. Left femoral a-line started. TPN held in the setting of possible sepsis.    SUBJECTIVE/ROS:  [x] A ten-point review of systems was otherwise negative except as noted.  [ ] Due to altered mental status/intubation, subjective information were not able to be obtained from the patient. History was obtained, to the extent possible, from review of the chart and collateral sources of information.    NEURO  CAM ICU: negative  Exam: awake, alert, oriented x4  Meds:  ·	HYDROmorphone  Injectable 0.5milliGRAM(s) IV Push every 3 hours PRN Moderate Pain (4 - 6)  ·	HYDROmorphone  Injectable 0.5milliGRAM(s) IV Push every 2 hours PRN breakthrough Pain  ·	lidocaine   Patch 1Patch Transdermal every 24 hours  [x] Adequacy of sedation and pain control has been assessed and adjusted    RESPIRATORY  RR: 17 (13 - 33)  SpO2: 100% (92% - 100%)  Exam: mild crackles in all lung fields  Mechanical Ventilation: BiPAP, IPAP 10, CPAP 5, FiO2 40%  [N/A] Extubation Readiness Assessed  Meds:  ·	ALBUTerol/ipratropium for Nebulization 3milliLiter(s) Nebulizer every 6 hours  ·	buDESOnide 160 MICROgram(s)/formoterol 4.5 MICROgram(s) Inhaler 2Puff(s) Inhalation two times a day  ·	acetylcysteine 20% Inhalation 5milliLiter(s) Inhalation every 6 hours    CARDIOVASCULAR  HR: 97 (91 - 121)  BP: 106/54 (85/47 - 133/81)  BP(mean): 77 (60 - 85)  ABP: 123/57 (85/36 - 155/64)  ABP(mean): 82 (54 - 102)  Exam: regular rate and rhythm  Cardiac Rhythm: sinus  Perfusion     [x]Adequate   [ ]Inadequate  Mentation    [x]Normal       [ ]Reduced  Extremities  [x]Warm         [ ]Cool  Volume Status [x]Hypervolemic [ ]Euvolemic [ ]Hypovolemic  Meds: phenylephrine    Infusion 0.4MICROgram(s)/kG/Min IV Continuous <Continuous>    GI/NUTRITION  Exam: soft, nontender, nondistended, incision C/D/I  Diet: NPO  Meds:  ·	pantoprazole  Injectable 40milliGRAM(s) IV Push daily  ·	ondansetron Injectable 4milliGRAM(s) IV Push every 6 hours PRN Nausea    GENITOURINARY  I & Os for 24h ending 23 Jun 2017 07:00  =============================================  IN:    dextrose 5% + lactated ringers.: 975 ml    phenylephrine   Infusion: 356.3 ml    TPN (Total Parenteral Nutrition): 310 ml    Total IN: 1641.3 ml  ---------------------------------------------  OUT:    Nasoenteral Tube: 1500 ml    Indwelling Catheter - Urethral: 660 ml    Colostomy: 25 ml    Total OUT: 2185 ml  ---------------------------------------------  Total NET: -543.7 ml    I & Os for current day (as of 23 Jun 2017 08:03)  =============================================  IN:    dextrose 5% + lactated ringers.: 75 ml    Total IN: 75 ml  ---------------------------------------------  OUT:    Indwelling Catheter - Urethral: 75 ml    Total OUT: 75 ml  ---------------------------------------------  Total NET: 0 ml      140  |  105  |  32<H>  ----------------------------<  103<H>  4.3   |  22  |  1.21    Ca    7.9<L>      23 Jun 2017 03:27  Phos  4.3  Mg     2.0    [ ] Snyder catheter, indication: N/A  Meds: dextrose 5% + lactated ringers. 1000milliLiter(s) IV Continuous infuse at 75 mL/Hr    HEMATOLOGIC  Meds: fondaparinux Injectable 2.5milliGRAM(s) SubCutaneous every 24 hours  [x] VTE Prophylaxis                        7.3    4.7   )-----------( 218      ( 23 Jun 2017 03:27 )             21.9     PT/INR - ( 23 Jun 2017 03:27 )   PT: 16.5 sec;   INR: 1.50 ratio    PTT - ( 23 Jun 2017 03:27 )  PTT:45.2 sec    INFECTIOUS DISEASES  WBC Count  ·	4.7 K/uL (06-23 @ 03:27)  ·	4.4 K/uL (06-22 @ 15:03)  ·	3.94 K/uL (06-22 @ 08:35)  RECENT CULTURES: none  Meds:  ·	vancomycin  IVPB 1000milliGRAM(s) IV Intermittent every 24 hours  ·	imipenem/cilastatin  IVPB 500milliGRAM(s) IV Intermittent every 8 hours    ENDOCRINE  CAPILLARY BLOOD GLUCOSE:  117 (22 Jun 2017 18:30)  161 (22 Jun 2017 11:50)  Meds:  ·	levothyroxine Injectable 44MICROGram(s) IV Push daily  ·	insulin lispro (HumaLOG) corrective regimen sliding scale  SubCutaneous every 6 hours    ACCESS DEVICES:  [ ] Peripheral IV  [ ] Central Venous Line	[ ] R	[ ] L	[ ] IJ	[ ] Fem	[ ] SC	Placed:   [x] Arterial Line		[ ] R	[x] L	[x] Fem	[ ] Rad	[ ] Ax	Placed: 6/22/2017  [x] PICC placed 5/31/2017				[ ] Mediport  [x] Urinary Catheter, Date Placed: 6/22/2017  [x] Necessity of urinary, arterial, and venous catheters discussed    OTHER MEDICATIONS: none    CODE STATUS: full code    IMAGING:

## 2017-06-23 NOTE — PROGRESS NOTE ADULT - SUBJECTIVE AND OBJECTIVE BOX
ACS DAILY PROGRESS NOTE    HOSPITAL DAY #32  POST OPERATIVE DAY #:  14    STATUS POST:  exploratory laparotomy, extensive lysis of adhesions, takedown of enterocutaneous fistula, repair of serosal tear on cecum, partial right salpingooophorectomy, colostomy revision, parastomal and incisional hernia repair with strattice mesh          INTERVAL EVENTS: Patient transferred to SICU yesterday as she was tachypneic, tachycardic and hypotensive on the floor. CXR revealed pulmonary edema and she was subsequently placed on BiPAP which improved her tachypnea and tachycardia. Patient's wound was opened at inferior aspect yesterday, with necrotic tissue sharply debrided. Levophed and IVF initiated for hypotension and TPN held in context of possible sepsis.     OBJECTIVE:     Constitutional: Awake, alert, AOx3  Respiratory: normal respiratory pattern on BiPAP  Cardiovascular: sinus  Gastrointestinal: soft, distended, ostomy with gas and stool. old colostomy site, packed. Strattice with defect in left lower aspect with malodorous, feculent output. NGT with bilious output  Ext: b/l UE edema     Vital Signs Last 24 Hrs  T(C): 38.2, Max: 38.4 (06-23 @ 07:00)  T(F): 100.8, Max: 101.1 (06-23 @ 07:00)  HR: 114 (91 - 121)  BP: 100/52 (69/37 - 125/59)  BP(mean): 75 (49 - 85)  RR: 19 (13 - 33)  SpO2: 100% (92% - 100%)    I&O's Detail  I & Os for 24h ending 23 Jun 2017 07:00  =============================================  IN:    dextrose 5% + lactated ringers.: 975 ml    phenylephrine   Infusion: 356.3 ml    TPN (Total Parenteral Nutrition): 310 ml    Total IN: 1641.3 ml  ---------------------------------------------  OUT:    Nasoenteral Tube: 1500 ml    Indwelling Catheter - Urethral: 660 ml    Colostomy: 25 ml    Total OUT: 2185 ml  ---------------------------------------------  Total NET: -543.7 ml    I & Os for current day (as of 23 Jun 2017 11:34)  =============================================  IN:    dextrose 5% + lactated ringers.: 300 ml    Solution: 250 ml    phenylephrine   Infusion: 128.3 ml    Solution: 100 ml    Total IN: 778.3 ml  ---------------------------------------------  OUT:    Indwelling Catheter - Urethral: 210 ml    Total OUT: 210 ml  ---------------------------------------------  Total NET: 568.3 ml                            7.7    4.6   )-----------( 267      ( 23 Jun 2017 07:33 )             23.7       06-23    140  |  105  |  32<H>  ----------------------------<  103<H>  4.3   |  22  |  1.21    Ca    7.9<L>      23 Jun 2017 03:27  Phos  4.3     06-23  Mg     2.0     06-23    TPro  5.9<L>  /  Alb  1.9<L>  /  TBili  0.7  /  DBili  x   /  AST  17  /  ALT  13  /  AlkPhos  149<H>  06-22

## 2017-06-23 NOTE — PROGRESS NOTE ADULT - SUBJECTIVE AND OBJECTIVE BOX
PN on hold  =============================================  IN:    dextrose 5% + lactated ringers.: 975 ml    phenylephrine   Infusion: 356.3 ml    TPN (Total Parenteral Nutrition): 310 ml    Total IN: 1641.3 ml  ---------------------------------------------  OUT:    Nasoenteral Tube: 1500 ml    Indwelling Catheter - Urethral: 660 ml    Colostomy: 25 ml    Total OUT: 2185 ml  ---------------------------------------------  Total NET: -543.7 ml    T(C): 37.9, Max: 38.4 (06-23 @ 07:00)  HR: 97 (91 - 121)  BP: 69/37 (69/37 - 125/59)  RR: 19 (13 - 33)  SpO2: 100% (92% - 100%)  Wt(kg): --    Labs   06-23    140  |  105  |  32<H>  ----------------------------<  103<H>  4.3   |  22  |  1.21    Ca    7.9<L>      23 Jun 2017 03:27  Phos  4.3     06-23  Mg     2.0     06-23    TPro  5.9<L>  /  Alb  1.9<L>  /  TBili  0.7  /  DBili  x   /  AST  17  /  ALT  13  /  AlkPhos  149<H>  06-22      LIVER FUNCTIONS - ( 22 Jun 2017 15:03 )  Alb: 1.9 g/dL / Pro: 5.9 g/dL / ALK PHOS: 149 U/L / ALT: 13 U/L RC / AST: 17 U/L / GGT: x           CAPILLARY BLOOD GLUCOSE  117 (22 Jun 2017 18:30)  161 (22 Jun 2017 11:50)    I&O's Detail  I & Os for 24h ending 23 Jun 2017 07:00  =============================================  IN:    dextrose 5% + lactated ringers.: 975 ml    phenylephrine   Infusion: 356.3 ml    TPN (Total Parenteral Nutrition): 310 ml    Total IN: 1641.3 ml  ---------------------------------------------  OUT:    Nasoenteral Tube: 1500 ml    Indwelling Catheter - Urethral: 660 ml    Colostomy: 25 ml    Total OUT: 2185 ml  ---------------------------------------------  Total NET: -543.7 ml    I & Os for current day (as of 23 Jun 2017 09:50)  =============================================  IN:    dextrose 5% + lactated ringers.: 225 ml    phenylephrine   Infusion: 83.7 ml    Total IN: 308.7 ml  ---------------------------------------------  OUT:    Indwelling Catheter - Urethral: 110 ml    Total OUT: 110 ml  ---------------------------------------------  Total NET: 198.7 ml

## 2017-06-23 NOTE — PROGRESS NOTE ADULT - ASSESSMENT
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 OR 6/9 evening for unresolving SBO. had exploratory laparotomy, extensive lysis of adhesions, take down of ECF, repair of serosal tear on cecum, partial right salpingoophorectomy, colostomy revision, parastomal and incisional hernia repair with strattice mesh. pt improved after empiric course of aztreonam/flagyl for wound and vanco for MRSA in sputum.  pt now returns to SICU for sepsis from worsening ECF, febrile today    1. continue vanco, imipenem 500mg IV q6   2. f/u CT A/P  3. surgical plan per primary team

## 2017-06-23 NOTE — PROGRESS NOTE ADULT - PROBLEM SELECTOR PLAN 1
She is volume overloaded but largely improved.  Off pressors She is volume overloaded but now on pressors

## 2017-06-23 NOTE — PROVIDER CONTACT NOTE (OTHER) - SITUATION
pt temp via urinary bladder was 101.1 via uriniary bladder and has been slowly increasing t/o shift accompanied with tachycardia

## 2017-06-23 NOTE — PROGRESS NOTE ADULT - SUBJECTIVE AND OBJECTIVE BOX
NEPHROLOGY-[facility] [phone]        Patient seen and examined         MEDICATIONS  (STANDING):  levothyroxine Injectable 44MICROGram(s) IV Push daily  pantoprazole  Injectable 40milliGRAM(s) IV Push daily  fondaparinux Injectable 2.5milliGRAM(s) SubCutaneous every 24 hours  insulin lispro (HumaLOG) corrective regimen sliding scale  SubCutaneous every 6 hours  vancomycin  IVPB  IV Intermittent   vancomycin  IVPB 1000milliGRAM(s) IV Intermittent every 24 hours  ALBUTerol/ipratropium for Nebulization 3milliLiter(s) Nebulizer every 6 hours  buDESOnide 160 MICROgram(s)/formoterol 4.5 MICROgram(s) Inhaler 2Puff(s) Inhalation two times a day  acetylcysteine 20% Inhalation 5milliLiter(s) Inhalation every 6 hours  lidocaine   Patch 1Patch Transdermal every 24 hours  phenylephrine    Infusion 0.4MICROgram(s)/kG/Min IV Continuous <Continuous>  imipenem/cilastatin  IVPB  IV Intermittent   imipenem/cilastatin  IVPB 500milliGRAM(s) IV Intermittent every 8 hours  sodium phosphate IVPB 15milliMole(s) IV Intermittent every 1 hour  dextrose 5% + lactated ringers. 1000milliLiter(s) IV Continuous <Continuous>      VITAL:  T(C): , Max: 38.4 (06-23 @ 07:00)  T(F): , Max: 101.1 (06-23 @ 07:00)  HR: 96  BP: 69/37  BP(mean): 49  RR: 20  SpO2: 100%  Wt(kg): --    I and O's:  I & Os for 24h ending 06-23 @ 07:00  =============================================  IN: 1641.3 ml / OUT: 2185 ml / NET: -543.7 ml    I & Os for current day (as of 06-23 @ 08:30)  =============================================  IN: 75 ml / OUT: 75 ml / NET: 0 ml        PHYSICAL EXAM:    Constitutional: NAD  HEENT: PERRLA    Neck:  No JVD  Respiratory: CTAB/L  Cardiovascular: S1 and S2  Gastrointestinal: BS+, soft, NT/ND  Extremities: No peripheral edema  Neurological: A/O x 3, no focal deficits  Psychiatric: Normal mood, normal affect  : No Snyder  Skin: No rashes  Access: Not applicable    LABS:                        7.7    4.6   )-----------( 267      ( 23 Jun 2017 07:33 )             23.7     06-23    140  |  105  |  32<H>  ----------------------------<  103<H>  4.3   |  22  |  1.21    Ca    7.9<L>      23 Jun 2017 03:27  Phos  4.3     06-23  Mg     2.0     06-23    TPro  5.9<L>  /  Alb  1.9<L>  /  TBili  0.7  /  DBili  x   /  AST  17  /  ALT  13  /  AlkPhos  149<H>  06-22          Urine Studies:          RADIOLOGY & ADDITIONAL STUDIES: NEPHROLOGY-[facility] [phone]        Patient seen and examined and she was moved to the SICU.  She is on pressors as well.  Low grade fever  and on Bipap        MEDICATIONS  (STANDING):  levothyroxine Injectable 44MICROGram(s) IV Push daily  pantoprazole  Injectable 40milliGRAM(s) IV Push daily  fondaparinux Injectable 2.5milliGRAM(s) SubCutaneous every 24 hours  insulin lispro (HumaLOG) corrective regimen sliding scale  SubCutaneous every 6 hours  vancomycin  IVPB  IV Intermittent   vancomycin  IVPB 1000milliGRAM(s) IV Intermittent every 24 hours  ALBUTerol/ipratropium for Nebulization 3milliLiter(s) Nebulizer every 6 hours  buDESOnide 160 MICROgram(s)/formoterol 4.5 MICROgram(s) Inhaler 2Puff(s) Inhalation two times a day  acetylcysteine 20% Inhalation 5milliLiter(s) Inhalation every 6 hours  lidocaine   Patch 1Patch Transdermal every 24 hours  phenylephrine    Infusion 0.4MICROgram(s)/kG/Min IV Continuous <Continuous>  imipenem/cilastatin  IVPB  IV Intermittent   imipenem/cilastatin  IVPB 500milliGRAM(s) IV Intermittent every 8 hours  sodium phosphate IVPB 15milliMole(s) IV Intermittent every 1 hour  dextrose 5% + lactated ringers. 1000milliLiter(s) IV Continuous <Continuous>      VITAL:  T(C): , Max: 38.4 (06-23 @ 07:00)  T(F): , Max: 101.1 (06-23 @ 07:00)  HR: 96  BP: 69/37  BP(mean): 49  RR: 20  SpO2: 100%  Wt(kg): --    I and O's:  I & Os for 24h ending 06-23 @ 07:00  =============================================  IN: 1641.3 ml / OUT: 2185 ml / NET: -543.7 ml    I & Os for current day (as of 06-23 @ 08:30)  =============================================  IN: 75 ml / OUT: 75 ml / NET: 0 ml        PHYSICAL EXAM:     Constitutional: NAD  HEENT: PERRLA    Neck:  No JVD  Respiratory: poor effort  Cardiovascular: S1 and S2  Gastrointestinal:+surgical scar n ostomy; hypoactive  Extremities: +peripheral edema  Neurological: A/O x 3, no focal deficits  Psychiatric: Normal mood, normal affect  : No Snyder  Skin: No rashes  Access: Not applicable    LABS:                        7.7    4.6   )-----------( 267      ( 23 Jun 2017 07:33 )             23.7     06-23    140  |  105  |  32<H>  ----------------------------<  103<H>  4.3   |  22  |  1.21    Ca    7.9<L>      23 Jun 2017 03:27  Phos  4.3     06-23  Mg     2.0     06-23    TPro  5.9<L>  /  Alb  1.9<L>  /  TBili  0.7  /  DBili  x   /  AST  17  /  ALT  13  /  AlkPhos  149<H>  06-22               RADIOLOGY & ADDITIONAL STUDIES:        EXAM:  CHEST-PORTABLE URGENT                            PROCEDURE DATE:  06/22/2017            INTERPRETATION:  EXAMINATION: CHEST-PORTABLE URGENT    CLINICAL INDICATION: Enteric tube    TECHNIQUE: Frontal radiograph of the chest was obtained.    COMPARISON: 6/21/2017.    FINDINGS:     Cardiac silhouette normal in size. Enteric tube tip below diaphragm.   Left-sided central venous catheter with tip overlying SVC. Left lower   lobe subsegmental atelectasis. Lungs otherwise clear. No pleural effusion   or pneumothorax.    IMPRESSION:   No change from prior study.

## 2017-06-23 NOTE — PROGRESS NOTE ADULT - PROBLEM SELECTOR PLAN 4
Can more Ca+ be added to the TPN?  If the Phos remains low then NaPhos Can more Ca+ be added to the TPN?    NaPhos x 1 now

## 2017-06-23 NOTE — PROGRESS NOTE ADULT - ASSESSMENT
ASSESSMENT  76y female with high volume ECF s/p exploratory laparotomy, extensive lysis of adhesions, takedown of enterocutaneous fistula, repair of serosal tear on cecum, partial right salpingooophorectomy, colostomy revision, parastomal and incisional hernia repair with strattice mesh, POD 14    PLAN  - wean dinesh as tolerated  - wean BiPAP as tolerated  - ID recommendations appreciated; will c/w primaxin and vancomycin and f/u trough  - CT a/p today to evaluate for ECF   - care per SICKAREL Ansari PGY1  Pager: [phone]

## 2017-06-23 NOTE — PROGRESS NOTE ADULT - ASSESSMENT
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  Acute on chronic renal failure  Anemia  Hypocalcemia/Hypophosphatemia

## 2017-06-23 NOTE — PROGRESS NOTE ADULT - ASSESSMENT
77 yo F s/p ex-lap, extensive NANETTE, takedown of EC fistula, repair of cecal serosal tear, partial R salpingectomy, colostomy revision, parastomal and incisional hernia repair with strattice mesh, complicated by MRSA pneumonia requiring reintubation and pressor support, was extubated and hemodynamically stable but was readmitted for pulmonary edema and hypotension requiring vasopressor support w/ concern for a new/recurrent EC fistula vs. anastomotic breakdown.    NEURO:  ·	Continue pain control with IV acetaminophen and Dilaudid PRN  CV:  ·	Monitor vital signs.  ·	Wean vasopressor as tolerated for goal MAP greater than 65.  ·	Follow-up echocardiogram as patient is in pulmonary edema and has not had an echocardiogram this admission.  RESP:  ·	Monitor pulse oximeter  ·	BiPAP in the setting of pulmonary edema  ·	Continue Duoneb and Symbicort for COPD  ·	Mucomyst for secretions  GI/NUTRITION:  ·	NPO due to concern for new/recurrent EC fistula vs. 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  ·	CT scan today to evaluate for new/recurrent EC fistula vs. anastomotic breakdown  ·	MRSA pneumonia - continue vancomycin  ·	New/recurrent EC fistula vs. anastomotic breakdown - continue imipenem  ENDO:  ·	Monitor fingersticks q6hrs and ISS for glycemic control.  ·	Synthroid for hypothyroidism      Rosmery Kovacs PA-C   [id number]

## 2017-06-24 LAB
ALBUMIN SERPL ELPH-MCNC: 1.7 G/DL — LOW (ref 3.3–5)
ALP SERPL-CCNC: 119 U/L — SIGNIFICANT CHANGE UP (ref 40–120)
ALT FLD-CCNC: 10 U/L RC — SIGNIFICANT CHANGE UP (ref 10–45)
ANION GAP SERPL CALC-SCNC: 10 MMOL/L — SIGNIFICANT CHANGE UP (ref 5–17)
APPEARANCE UR: CLEAR — SIGNIFICANT CHANGE UP
APTT BLD: 44 SEC — HIGH (ref 27.5–37.4)
AST SERPL-CCNC: 15 U/L — SIGNIFICANT CHANGE UP (ref 10–40)
BACTERIA # UR AUTO: ABNORMAL /HPF
BILIRUB SERPL-MCNC: 0.8 MG/DL — SIGNIFICANT CHANGE UP (ref 0.2–1.2)
BILIRUB UR-MCNC: NEGATIVE — SIGNIFICANT CHANGE UP
BUN SERPL-MCNC: 24 MG/DL — HIGH (ref 7–23)
CA-I BLD-SCNC: 1.14 MMOL/L — SIGNIFICANT CHANGE UP (ref 1.12–1.3)
CALCIUM SERPL-MCNC: 7.6 MG/DL — LOW (ref 8.4–10.5)
CHLORIDE SERPL-SCNC: 108 MMOL/L — SIGNIFICANT CHANGE UP (ref 96–108)
CO2 SERPL-SCNC: 20 MMOL/L — LOW (ref 22–31)
COLOR SPEC: YELLOW — SIGNIFICANT CHANGE UP
CREAT SERPL-MCNC: 1.26 MG/DL — SIGNIFICANT CHANGE UP (ref 0.5–1.3)
DIFF PNL FLD: ABNORMAL
EPI CELLS # UR: SIGNIFICANT CHANGE UP /HPF
GAS PNL BLDA: SIGNIFICANT CHANGE UP
GLUCOSE SERPL-MCNC: 116 MG/DL — HIGH (ref 70–99)
GLUCOSE UR QL: NEGATIVE — SIGNIFICANT CHANGE UP
HCT VFR BLD CALC: 23.7 % — LOW (ref 34.5–45)
HGB BLD-MCNC: 7.9 G/DL — LOW (ref 11.5–15.5)
INR BLD: 1.54 RATIO — HIGH (ref 0.88–1.16)
KETONES UR-MCNC: NEGATIVE — SIGNIFICANT CHANGE UP
LEUKOCYTE ESTERASE UR-ACNC: NEGATIVE — SIGNIFICANT CHANGE UP
MAGNESIUM SERPL-MCNC: 1.8 MG/DL — SIGNIFICANT CHANGE UP (ref 1.6–2.6)
MCHC RBC-ENTMCNC: 33.5 GM/DL — SIGNIFICANT CHANGE UP (ref 32–36)
MCHC RBC-ENTMCNC: 34 PG — SIGNIFICANT CHANGE UP (ref 27–34)
MCV RBC AUTO: 102 FL — HIGH (ref 80–100)
NITRITE UR-MCNC: NEGATIVE — SIGNIFICANT CHANGE UP
PH UR: 7 — SIGNIFICANT CHANGE UP (ref 5–8)
PHOSPHATE SERPL-MCNC: 4.2 MG/DL — SIGNIFICANT CHANGE UP (ref 2.5–4.5)
PLATELET # BLD AUTO: 356 K/UL — SIGNIFICANT CHANGE UP (ref 150–400)
POTASSIUM SERPL-MCNC: 4.6 MMOL/L — SIGNIFICANT CHANGE UP (ref 3.5–5.3)
POTASSIUM SERPL-SCNC: 4.6 MMOL/L — SIGNIFICANT CHANGE UP (ref 3.5–5.3)
PROT SERPL-MCNC: 5.4 G/DL — LOW (ref 6–8.3)
PROT UR-MCNC: 100 MG/DL
PROTHROM AB SERPL-ACNC: 16.8 SEC — HIGH (ref 9.8–12.7)
RBC # BLD: 2.34 M/UL — LOW (ref 3.8–5.2)
RBC # FLD: 16.1 % — HIGH (ref 10.3–14.5)
RBC CASTS # UR COMP ASSIST: SIGNIFICANT CHANGE UP /HPF (ref 0–2)
SODIUM SERPL-SCNC: 138 MMOL/L — SIGNIFICANT CHANGE UP (ref 135–145)
SP GR SPEC: 1.01 — SIGNIFICANT CHANGE UP (ref 1.01–1.02)
UROBILINOGEN FLD QL: 4
WBC # BLD: 5.6 K/UL — SIGNIFICANT CHANGE UP (ref 3.8–10.5)
WBC # FLD AUTO: 5.6 K/UL — SIGNIFICANT CHANGE UP (ref 3.8–10.5)
WBC UR QL: SIGNIFICANT CHANGE UP /HPF (ref 0–5)

## 2017-06-24 PROCEDURE — 99232 SBSQ HOSP IP/OBS MODERATE 35: CPT

## 2017-06-24 PROCEDURE — 99291 CRITICAL CARE FIRST HOUR: CPT

## 2017-06-24 PROCEDURE — 71010: CPT | Mod: 26

## 2017-06-24 RX ADMIN — IMIPENEM AND CILASTATIN 100 MILLIGRAM(S): 250; 250 INJECTION, POWDER, FOR SOLUTION INTRAVENOUS at 10:12

## 2017-06-24 RX ADMIN — BUDESONIDE AND FORMOTEROL FUMARATE DIHYDRATE 2 PUFF(S): 160; 4.5 AEROSOL RESPIRATORY (INHALATION) at 22:36

## 2017-06-24 RX ADMIN — ONDANSETRON 4 MILLIGRAM(S): 8 TABLET, FILM COATED ORAL at 16:00

## 2017-06-24 RX ADMIN — PANTOPRAZOLE SODIUM 40 MILLIGRAM(S): 20 TABLET, DELAYED RELEASE ORAL at 12:05

## 2017-06-24 RX ADMIN — HYDROMORPHONE HYDROCHLORIDE 0.5 MILLIGRAM(S): 2 INJECTION INTRAMUSCULAR; INTRAVENOUS; SUBCUTANEOUS at 23:45

## 2017-06-24 RX ADMIN — HYDROMORPHONE HYDROCHLORIDE 0.5 MILLIGRAM(S): 2 INJECTION INTRAMUSCULAR; INTRAVENOUS; SUBCUTANEOUS at 12:00

## 2017-06-24 RX ADMIN — HYDROMORPHONE HYDROCHLORIDE 0.5 MILLIGRAM(S): 2 INJECTION INTRAMUSCULAR; INTRAVENOUS; SUBCUTANEOUS at 19:45

## 2017-06-24 RX ADMIN — Medication 250 MILLIGRAM(S): at 10:12

## 2017-06-24 RX ADMIN — Medication 3 MILLILITER(S): at 17:02

## 2017-06-24 RX ADMIN — HYDROMORPHONE HYDROCHLORIDE 0.5 MILLIGRAM(S): 2 INJECTION INTRAMUSCULAR; INTRAVENOUS; SUBCUTANEOUS at 07:50

## 2017-06-24 RX ADMIN — Medication 1.4 MICROGRAM(S)/KG/MIN: at 15:22

## 2017-06-24 RX ADMIN — IMIPENEM AND CILASTATIN 100 MILLIGRAM(S): 250; 250 INJECTION, POWDER, FOR SOLUTION INTRAVENOUS at 02:55

## 2017-06-24 RX ADMIN — HYDROMORPHONE HYDROCHLORIDE 0.5 MILLIGRAM(S): 2 INJECTION INTRAMUSCULAR; INTRAVENOUS; SUBCUTANEOUS at 16:00

## 2017-06-24 RX ADMIN — FONDAPARINUX SODIUM 2.5 MILLIGRAM(S): 2.5 INJECTION, SOLUTION SUBCUTANEOUS at 11:18

## 2017-06-24 RX ADMIN — HYDROMORPHONE HYDROCHLORIDE 0.5 MILLIGRAM(S): 2 INJECTION INTRAMUSCULAR; INTRAVENOUS; SUBCUTANEOUS at 08:05

## 2017-06-24 RX ADMIN — SODIUM CHLORIDE 75 MILLILITER(S): 9 INJECTION, SOLUTION INTRAVENOUS at 06:35

## 2017-06-24 RX ADMIN — Medication 3 MILLILITER(S): at 05:56

## 2017-06-24 RX ADMIN — HYDROMORPHONE HYDROCHLORIDE 0.5 MILLIGRAM(S): 2 INJECTION INTRAMUSCULAR; INTRAVENOUS; SUBCUTANEOUS at 12:15

## 2017-06-24 RX ADMIN — HYDROMORPHONE HYDROCHLORIDE 0.5 MILLIGRAM(S): 2 INJECTION INTRAMUSCULAR; INTRAVENOUS; SUBCUTANEOUS at 23:26

## 2017-06-24 RX ADMIN — HYDROMORPHONE HYDROCHLORIDE 0.5 MILLIGRAM(S): 2 INJECTION INTRAMUSCULAR; INTRAVENOUS; SUBCUTANEOUS at 19:30

## 2017-06-24 RX ADMIN — Medication 3 MILLILITER(S): at 23:36

## 2017-06-24 RX ADMIN — HYDROMORPHONE HYDROCHLORIDE 0.5 MILLIGRAM(S): 2 INJECTION INTRAMUSCULAR; INTRAVENOUS; SUBCUTANEOUS at 03:30

## 2017-06-24 RX ADMIN — HYDROMORPHONE HYDROCHLORIDE 0.5 MILLIGRAM(S): 2 INJECTION INTRAMUSCULAR; INTRAVENOUS; SUBCUTANEOUS at 03:15

## 2017-06-24 RX ADMIN — IMIPENEM AND CILASTATIN 100 MILLIGRAM(S): 250; 250 INJECTION, POWDER, FOR SOLUTION INTRAVENOUS at 18:25

## 2017-06-24 RX ADMIN — ONDANSETRON 4 MILLIGRAM(S): 8 TABLET, FILM COATED ORAL at 07:50

## 2017-06-24 RX ADMIN — Medication 44 MICROGRAM(S): at 06:34

## 2017-06-24 RX ADMIN — HYDROMORPHONE HYDROCHLORIDE 0.5 MILLIGRAM(S): 2 INJECTION INTRAMUSCULAR; INTRAVENOUS; SUBCUTANEOUS at 16:15

## 2017-06-24 NOTE — PROGRESS NOTE ADULT - SUBJECTIVE AND OBJECTIVE BOX
Follow Up:      Interval History/ROS:Patient is a 76y old  Female who presents with a chief complaint of Abdominal pain (26 May 2017 11:33)  on BiPAP and pressors, s/p repeat CT a/p yesterday, still with drainage from wound, now with fevers, blood cultures in lab.  BIPAP in place. Pt reports pain is controlled with medication.    Allergies    azithromycin (Unknown)  codeine (Unknown)  heparin (Other)  PC Pen VK (Unknown)  penicillin (Unknown)      ANTIMICROBIALS:    vancomycin  IVPB    vancomycin  IVPB 1000 every 24 hours  imipenem/cilastatin  IVPB    imipenem/cilastatin  IVPB 500 every 8 hours        OTHER MEDS:   MEDICATIONS  (STANDING):  levothyroxine Injectable 44 daily  pantoprazole  Injectable 40 daily  ondansetron Injectable 4 every 6 hours PRN  fondaparinux Injectable 2.5 every 24 hours  insulin lispro (HumaLOG) corrective regimen sliding scale  every 6 hours  ALBUTerol/ipratropium for Nebulization 3 every 6 hours  buDESOnide 160 MICROgram(s)/formoterol 4.5 MICROgram(s) Inhaler 2 two times a day  HYDROmorphone  Injectable 0.5 every 3 hours PRN  HYDROmorphone  Injectable 0.5 every 2 hours PRN  norepinephrine Infusion 0.01 <Continuous>      Vital Signs Last 24 Hrs  T(F): 101.5, Max: 102.4 (06-23 @ 13:00)  HR: 109  BP: 100/52  RR: 15  SpO2: 100% (100% - 100%)  Wt(kg): --  Vital Signs Last 24 Hrs  T(C): 39.1, Max: 39.1 (06-23 @ 13:00)  T(F): 102.4, Max: 102.4 (06-23 @ 13:00)  HR: 117 (91 - 122)  BP: 100/52 (69/37 - 125/59)  BP(mean): 75 (49 - 85)  RR: 20 (13 - 33)  SpO2: 100% (92% - 100%)      PHYSICAL EXAM:    Constitutional: BiPAP    Eyes: non-icteric    ENMT: NGT to suction    Neck: no KAMILAH    Respiratory: clear to auscultation b/l    Cardiovascular: S1/S2, no murmur    Gastrointestinal: soft, wound with drain to suction    Genitourinary: baldwin    Vascular: edema    Neurological: non-focal    Skin: no rash                                          7.9    5.6   )-----------( 356      ( 24 Jun 2017 04:41 )             23.7 06-24    138  |  108  |  24  ----------------------------<  116  4.6   |  20  |  1.26  Ca    7.6      24 Jun 2017 04:41Phos  4.2     06-24Mg     1.8     06-24  TPro  5.4  /  Alb  1.7  /  TBili  0.8  /  DBili  x   /  AST  15  /  ALT  10  /  AlkPhos  119  06-24    Vancomycin Level, Trough -Pre 3rd Dose, order if dosed q24/36/48h (06.22.17 @ 11:21)    Vancomycin Level, Trough: 18.1:       MICROBIOLOGY:  RECENT CULTURES:  Culture - Blood (06.23.17 @ 07:16)    Specimen Source: .Blood Blood-Arterial    Culture Results:   No growth to date.    Culture - Blood (06.23.17 @ 07:16)    Specimen Source: .Blood Blood-Venous    Culture Results:   No growth to date.      Culture - Bronchial (06.13.17 @ 17:50)    -  Ampicillin/Sulbactam: R <=8/4    -  Erythromycin: R >4    -  Gentamicin: S <=1    -  Ciprofloxacin: R >2    -  Levofloxacin: R >4    -  Linezolid: S 2    -  Penicillin: R >8    -  Vancomycin: S 2    Gram Stain:   Numerous polymorphonuclear leukocytes per low power field  Few Squamous epithelial cells per low power field  Few Gram positive cocci in pairs per oil power field    -  Cefazolin: R >16    -  Clindamycin: R >4    -  Moxifloxacin(Aerobic): R >4    -  Oxacillin: R >2    -  RIF- Rifampin: S <=1    -  Tetra/Doxy: S <=1    -  Trimethoprim/Sulfamethoxazole: S <=0.5/9.5    Specimen Source: .Broncial Combicath    Culture Results:   Moderate Methicillin resistant Staphylococcus aureus  Normal Respiratory Francesca present    Organism Identification: Methicillin resistant Staphylococcus aureus    Organism: Methicillin resistant Staphylococcus aureus    Method Type: MEIR    RADIOLOGY:  EXAM:  CT ABDOMEN AND PELVIS                        PROCEDURE DATE:  06/23/2017      FINDINGS:      BOWEL/ABDOMINAL WALL: Enteric tube is in the stomach. Status post   Dunham's procedure with colostomy in the left lower quadrant. 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 A catheter has been placed within the cutaneous   defect. There is persistent distention of the small bowel with relative   collapse of the terminal ileum,, unchanged from prior study and   suspicious for distal small bowel obstruction. Small bowel surgical   sutures noted subjacent to the abdominal wall defect.  Anterior and   bilateral subcutaneous soft tissue stranding is also present. There is a   small amount of ill-defined fluid in the anterior peritoneum near the   defect. Anterior abdominal wall surgical skin staples. Large ventral   abdominal wall mesh noted.  PERITONEUM: No ascites.  VESSELS:  IVC filter is noted. Atherosclerotic calcification of the   abdominal aorta and its branches.  RETROPERITONEUM: No lymphadenopathy.    BONES: Generalized osteopenia. Degenerative changes. Chronic L3 endplate   deformity.    IMPRESSION: Loops of matted small bowel are seen in close apposition to   the anterior lower abdominal wall defect, concerning for enterocutaneous   fistula, however evaluation is limited without enteric contrast..    Persistent dilated small bowel with relative collapse of the terminal   ileum raising suspicion for small bowel obstruction.      EXAM:  CHEST PORTABLE ROUTINE                        PROCEDURE DATE:  06/24/2017      IMPRESSION:    Left lower lung subsegmental atelectasis. Follow Up:      Interval History/ROS:Patient is a 76y old  Female who presents with a chief complaint of Abdominal pain (26 May 2017 11:33)  on BiPAP and pressors, s/p repeat CT a/p yesterday, still with drainage from wound, now with fevers, blood cultures in lab.  BIPAP in place. Pt reports pain is controlled with medication.    Allergies    azithromycin (Unknown)  codeine (Unknown)  heparin (Other)  PC Pen VK (Unknown)  penicillin (Unknown)      ANTIMICROBIALS:    vancomycin  IVPB    vancomycin  IVPB 1000 every 24 hours  imipenem/cilastatin  IVPB    imipenem/cilastatin  IVPB 500 every 8 hours        OTHER MEDS:   MEDICATIONS  (STANDING):  levothyroxine Injectable 44 daily  pantoprazole  Injectable 40 daily  ondansetron Injectable 4 every 6 hours PRN  fondaparinux Injectable 2.5 every 24 hours  insulin lispro (HumaLOG) corrective regimen sliding scale  every 6 hours  ALBUTerol/ipratropium for Nebulization 3 every 6 hours  buDESOnide 160 MICROgram(s)/formoterol 4.5 MICROgram(s) Inhaler 2 two times a day  HYDROmorphone  Injectable 0.5 every 3 hours PRN  HYDROmorphone  Injectable 0.5 every 2 hours PRN  norepinephrine Infusion 0.01 <Continuous>      Vital Signs Last 24 Hrs  T(F): 101.5, Max: 102.4 (06-23 @ 13:00)  HR: 109  BP: 100/52  RR: 15  SpO2: 100% (100% - 100%)  Wt(kg): --  Vital Signs Last 24 Hrs  T(C): 39.1, Max: 39.1 (06-23 @ 13:00)  T(F): 102.4, Max: 102.4 (06-23 @ 13:00)  HR: 117 (91 - 122)  BP: 100/52 (69/37 - 125/59)  BP(mean): 75 (49 - 85)  RR: 20 (13 - 33)  SpO2: 100% (92% - 100%)      PHYSICAL EXAM:    Constitutional: BiPAP    Eyes: non-icteric    ENMT: NGT to suction    Neck: no KAMILAH    Respiratory: clear to auscultation b/l    Cardiovascular: S1/S2, no murmur    Gastrointestinal: soft, wound with drain to suction    Genitourinary: baldwin    Vascular: edema. L fem 6/22, LUE PICC    Neurological: non-focal    Skin: no rash                                          7.9    5.6   )-----------( 356      ( 24 Jun 2017 04:41 )             23.7 06-24    138  |  108  |  24  ----------------------------<  116  4.6   |  20  |  1.26  Ca    7.6      24 Jun 2017 04:41Phos  4.2     06-24Mg     1.8     06-24  TPro  5.4  /  Alb  1.7  /  TBili  0.8  /  DBili  x   /  AST  15  /  ALT  10  /  AlkPhos  119  06-24    Vancomycin Level, Trough -Pre 3rd Dose, order if dosed q24/36/48h (06.22.17 @ 11:21)    Vancomycin Level, Trough: 18.1:       MICROBIOLOGY:  RECENT CULTURES:  Culture - Blood (06.23.17 @ 07:16)    Specimen Source: .Blood Blood-Arterial    Culture Results:   No growth to date.    Culture - Blood (06.23.17 @ 07:16)    Specimen Source: .Blood Blood-Venous    Culture Results:   No growth to date.      Culture - Bronchial (06.13.17 @ 17:50)    -  Ampicillin/Sulbactam: R <=8/4    -  Erythromycin: R >4    -  Gentamicin: S <=1    -  Ciprofloxacin: R >2    -  Levofloxacin: R >4    -  Linezolid: S 2    -  Penicillin: R >8    -  Vancomycin: S 2    Gram Stain:   Numerous polymorphonuclear leukocytes per low power field  Few Squamous epithelial cells per low power field  Few Gram positive cocci in pairs per oil power field    -  Cefazolin: R >16    -  Clindamycin: R >4    -  Moxifloxacin(Aerobic): R >4    -  Oxacillin: R >2    -  RIF- Rifampin: S <=1    -  Tetra/Doxy: S <=1    -  Trimethoprim/Sulfamethoxazole: S <=0.5/9.5    Specimen Source: .Broncial Combicath    Culture Results:   Moderate Methicillin resistant Staphylococcus aureus  Normal Respiratory Francesca present    Organism Identification: Methicillin resistant Staphylococcus aureus    Organism: Methicillin resistant Staphylococcus aureus    Method Type: MEIR    RADIOLOGY:  EXAM:  CT ABDOMEN AND PELVIS                        PROCEDURE DATE:  06/23/2017      FINDINGS:      BOWEL/ABDOMINAL WALL: Enteric tube is in the stomach. Status post   Dunham's procedure with colostomy in the left lower quadrant. 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 A catheter has been placed within the cutaneous   defect. There is persistent distention of the small bowel with relative   collapse of the terminal ileum,, unchanged from prior study and   suspicious for distal small bowel obstruction. Small bowel surgical   sutures noted subjacent to the abdominal wall defect.  Anterior and   bilateral subcutaneous soft tissue stranding is also present. There is a   small amount of ill-defined fluid in the anterior peritoneum near the   defect. Anterior abdominal wall surgical skin staples. Large ventral   abdominal wall mesh noted.  PERITONEUM: No ascites.  VESSELS:  IVC filter is noted. Atherosclerotic calcification of the   abdominal aorta and its branches.  RETROPERITONEUM: No lymphadenopathy.    BONES: Generalized osteopenia. Degenerative changes. Chronic L3 endplate   deformity.    IMPRESSION: Loops of matted small bowel are seen in close apposition to   the anterior lower abdominal wall defect, concerning for enterocutaneous   fistula, however evaluation is limited without enteric contrast..    Persistent dilated small bowel with relative collapse of the terminal   ileum raising suspicion for small bowel obstruction.      EXAM:  CHEST PORTABLE ROUTINE                        PROCEDURE DATE:  06/24/2017      IMPRESSION:    Left lower lung subsegmental atelectasis.

## 2017-06-24 NOTE — PROGRESS NOTE ADULT - ASSESSMENT
ASSESSMENT  76y female with high volume ECF s/p exploratory laparotomy, extensive lysis of adhesions, takedown of enterocutaneous fistula, repair of serosal tear on cecum, partial right salpingooophorectomy, colostomy revision, parastomal and incisional hernia repair with strattice mesh, POD 15    PLAN  - wean dinesh as tolerated  - wean BiPAP as tolerated  - ID recommendations appreciated; will c/w primaxin and vancomycin and f/u trough  - CT a/p today to evaluate for ECF   - care per SICKAREL Ansari PGY1  Pager: [phone]

## 2017-06-24 NOTE — PROGRESS NOTE ADULT - SUBJECTIVE AND OBJECTIVE BOX
77 y/o female with history significant for COPD, h/o DVT s/p IVC filter (now removed), GERD, hypothyroidism, HIT and diverticulitis s/p Ruben's, recurrent SBO s/p ex-lap small bowel resection, EC fistula s/p takedown, complicated by wound dehiscence, abdominal reconstruction underwent ex-lap, extensive NANETTE, EC fistula takedown, colostomy revision, and parastomal/incisional hernia repair on 6/9. She was admitted to SICU intubated post-operatively and was successfully extubated. However, after getting a CT with oral contrast, patient went into respiratory distress and was re-intubated and found to have MRSA pneumonia, likely aspiration in nature. She was subsequently extubated, weaned off pressors, and transferred to the floors. On the floor on 6/22, patient went into respiratory distress again likely secondary to pulmonary vascular congestion requiring BiPAP and became hypotensive requiring vasopressor support. Her wound also began draining thick brown fluid concerning for a new EC fistula vs. anastomotic breakdown and is currently on imipenem.        CTAP w/o contrast obtained showing loops of matted small bowel in close apposition to the anterior lower abdominal wall defect concerning for EC fistula but evaluation limited without PO contrast and persistent dilated small bowel with relative collapse of the terminal ileum raising suspicion for SBO. Patient was weaned off BiPAP and ABG was  off BiPAP but patient requested to keep it on for comfort. Echo w/ Definity obtained and showed a hyperdynamic LV, grossly normal RV systolic function, & borderline pulm HTN. Still requiring vasopressor support but was changed from phenylephrine to norepinephrine. Was given a 500 cc bolus overnight based on bedside echo finding of a ~1.5 cm IVC that collapsed upon inspiration and formal echo findings. HISTORY  77 y/o female with history significant for COPD, h/o DVT s/p IVC filter (now removed), GERD, hypothyroidism, HIT and diverticulitis s/p Ruben's, recurrent SBO s/p ex-lap small bowel resection, EC fistula s/p takedown, complicated by wound dehiscence, abdominal reconstruction underwent ex-lap, extensive NANETTE, EC fistula takedown, colostomy revision, and parastomal/incisional hernia repair on 6/9. She was admitted to SICU intubated post-operatively and was successfully extubated. However, after getting a CT with oral contrast, patient went into respiratory distress and was re-intubated and found to have MRSA pneumonia, likely aspiration in nature. She was subsequently extubated, weaned off pressors, and transferred to the floors. On the floor on 6/22, patient went into respiratory distress again likely secondary to pulmonary vascular congestion requiring BiPAP and became hypotensive requiring vasopressor support. Her wound also began draining thick brown fluid concerning for a new EC fistula vs. anastomotic breakdown and is currently on imipenem.    24 HOUR EVENTS: CTAP w/o contrast obtained showing loops of matted small bowel in close apposition to the anterior lower abdominal wall defect concerning for EC fistula but evaluation limited without PO contrast and persistent dilated small bowel with relative collapse of the terminal ileum raising suspicion for SBO. Patient was weaned off BiPAP and ABG was  off BiPAP but patient requested to keep it on for comfort. Echo w/ Definity obtained and showed a hyperdynamic LV, grossly normal RV systolic function, & borderline pulm HTN. Still requiring vasopressor support but was changed from phenylephrine to norepinephrine. Was given a 500 cc bolus overnight based on bedside echo finding of a ~1.5 cm IVC that collapsed upon inspiration and formal echo findings.    SUBJECTIVE/ROS:  [x] A ten-point review of systems was otherwise negative except as noted.  [ ] Due to altered mental status/intubation, subjective information were not able to be obtained from the patient. History was obtained, to the extent possible, from review of the chart and collateral sources of information.    NEURO  CAM ICU: negative  Exam: awake, alert, oriented  Meds:  ·	lidocaine   Patch 1Patch Transdermal every 24 hours  ·	HYDROmorphone  Injectable 0.5milliGRAM(s) IV Push every 3 hours PRN Moderate Pain (4 - 6)  ·	HYDROmorphone  Injectable 0.5milliGRAM(s) IV Push every 2 hours PRN breakthrough Pain  [x] Adequacy of sedation and pain control has been assessed and adjusted    RESPIRATORY  RR: 15 (13 - 36)  SpO2: 100% (100% - 100%)  Exam: clear to auscultation bilaterally  Mechanical Ventilation: BiPAP on IPAP 10, CPAP 5, FiO2 40%  [N/A] Extubation Readiness Assessed  ABG - ( 24 Jun 2017 04:35 )  pH: 7.36  /  pCO2: 42    /  pO2: 120   / HCO3: 23    / Base Excess: -1.9  /  SaO2: 99      Lactate: 0.8  Meds:  ·	ALBUTerol/ipratropium for Nebulization 3milliLiter(s) Nebulizer every 6 hours  ·	buDESOnide 160 MICROgram(s)/formoterol 4.5 MICROgram(s) Inhaler 2Puff(s) Inhalation two times a day    CARDIOVASCULAR  HR: 109 (95 - 123)  BP: 100/52 (69/37 - 100/52)  BP(mean): 75 (49 - 75)  ABP: 108/41 (83/38 - 129/52)  ABP(mean): 66 (52 - 83)  Exam: regular rate and rhythm  Cardiac Rhythm: sinus  Perfusion     [x]Adequate   [ ]Inadequate  Mentation    [x]Normal       [ ]Reduced  Extremities  [x]Warm         [ ]Cool  Volume Status [ ]Hypervolemic [x]Euvolemic [ ]Hypovolemic  Meds: norepinephrine Infusion 0.01MICROgram(s)/kG/Min IV Continuous    GI/NUTRITION  Exam: soft, nontender, nondistended, incision C/D/I, ostomy w/ bowel sweat only, wound draining thick brown fluid, NGT to LWS draining bilious fluid  Diet: NPO  Meds:  ·	pantoprazole  Injectable 40milliGRAM(s) IV Push daily  ·	ondansetron Injectable 4milliGRAM(s) IV Push every 6 hours PRN Nausea      GENITOURINARY    138  |  108  |  24<H>  ----------------------------<  116<H>  4.6   |  20<L>  |  1.26    Ca    7.6<L>      24 Jun 2017 04:41  Phos  4.2  Mg     1.8  TPro  5.4<L>  /  Alb  1.7<L>  /  TBili  0.8  /  DBili  x   /  AST  15  /  ALT  10  /  AlkPhos  119  [ ] Snyder catheter, indication: N/A  Meds: dextrose 5% + lactated ringers. 1000milliLiter(s) IV Continuous infuse at 75 mL/Hr    HEMATOLOGIC  Meds: fondaparinux Injectable 2.5milliGRAM(s) SubCutaneous every 24 hours  [x] VTE Prophylaxis                        7.9    5.6   )-----------( 356      ( 24 Jun 2017 04:41 )             23.7     PT/INR - ( 24 Jun 2017 04:41 )   PT: 16.8 sec;   INR: 1.54 ratio    PTT - ( 24 Jun 2017 04:41 )  PTT:44.0 sec    INFECTIOUS DISEASES  WBC Count: 5.6 K/uL (06-24 @ 04:41)  WBC Count: 4.6 K/uL (06-23 @ 07:33)    RECENT CULTURES: none    Meds:  ·	vancomycin  IVPB 1000milliGRAM(s) IV Intermittent every 24 hours  ·	imipenem/cilastatin  IVPB 500milliGRAM(s) IV Intermittent every 8 hours    ENDOCRINE  CAPILLARY BLOOD GLUCOSE  72 (23 Jun 2017 12:30)    Meds:  ·	levothyroxine Injectable 44MICROGram(s) IV Push daily  ·	insulin lispro (HumaLOG) corrective regimen sliding scale  SubCutaneous every 6 hours    ACCESS DEVICES:  [ ] Peripheral IV  [ ] Central Venous Line	[ ] R	[ ] L	[ ] IJ	[ ] Fem	[ ] SC	Placed:   [x] Arterial Line		[ ] R	[x] L	[x] Fem	[ ] Rad	[ ] Ax	Placed: 6/22/2017  [x] PICC placed 5/31/2017				[ ] Mediport  [x] Urinary Catheter, Date Placed: 6/22/2017  [x] Necessity of urinary, arterial, and venous catheters discussed    OTHER MEDICATIONS: none    CODE STATUS: full code    IMAGING: HISTORY  75 y/o female with history significant for COPD, h/o DVT s/p IVC filter (now removed), GERD, hypothyroidism, HIT and diverticulitis s/p Ruben's, recurrent SBO s/p ex-lap small bowel resection, EC fistula s/p takedown, complicated by wound dehiscence, abdominal reconstruction underwent ex-lap, extensive NANETTE, EC fistula takedown, colostomy revision, and parastomal/incisional hernia repair on 6/9. She was admitted to SICU intubated post-operatively and was successfully extubated. However, after getting a CT with oral contrast, patient went into respiratory distress and was re-intubated and found to have MRSA pneumonia, likely aspiration in nature. She was subsequently extubated, weaned off pressors, and transferred to the floors. On the floor on 6/22, patient went into respiratory distress again likely secondary to pulmonary vascular congestion requiring BiPAP and became hypotensive requiring vasopressor support. Her wound also began draining thick brown fluid concerning for a new EC fistula vs. anastomotic breakdown and is currently on imipenem.    24 HOUR EVENTS: CTAP w/o contrast obtained showing loops of matted small bowel in close apposition to the anterior lower abdominal wall defect concerning for EC fistula but evaluation limited without PO contrast and persistent dilated small bowel with relative collapse of the terminal ileum raising suspicion for SBO. Patient was weaned off BiPAP and ABG was  off BiPAP but patient requested to keep it on for comfort. Echo w/ Definity obtained and showed a hyperdynamic LV, grossly normal RV systolic function, & borderline pulm HTN. Still requiring vasopressor support but was changed from phenylephrine to norepinephrine. Was given a 500 cc bolus overnight based on bedside echo finding of a ~1.5 cm IVC that collapsed upon inspiration and formal echo findings.    SUBJECTIVE/ROS:  [x] A ten-point review of systems was otherwise negative except as noted.  [ ] Due to altered mental status/intubation, subjective information were not able to be obtained from the patient. History was obtained, to the extent possible, from review of the chart and collateral sources of information.    NEURO  CAM ICU: negative  Exam: awake, alert, oriented  Meds:  ·	lidocaine   Patch 1Patch Transdermal every 24 hours  ·	HYDROmorphone  Injectable 0.5milliGRAM(s) IV Push every 3 hours PRN Moderate Pain (4 - 6)  ·	HYDROmorphone  Injectable 0.5milliGRAM(s) IV Push every 2 hours PRN breakthrough Pain  [x] Adequacy of sedation and pain control has been assessed and adjusted    RESPIRATORY  RR: 15 (13 - 36)  SpO2: 100% (100% - 100%)  Exam: clear to auscultation bilaterally  Mechanical Ventilation: BiPAP on IPAP 10, CPAP 5, FiO2 40%  [N/A] Extubation Readiness Assessed  ABG - ( 24 Jun 2017 04:35 )  pH: 7.36  /  pCO2: 42    /  pO2: 120   / HCO3: 23    / Base Excess: -1.9  /  SaO2: 99      Lactate: 0.8  Meds:  ·	ALBUTerol/ipratropium for Nebulization 3milliLiter(s) Nebulizer every 6 hours  ·	buDESOnide 160 MICROgram(s)/formoterol 4.5 MICROgram(s) Inhaler 2Puff(s) Inhalation two times a day    CARDIOVASCULAR  HR: 109 (95 - 123)  BP: 100/52 (69/37 - 100/52)  BP(mean): 75 (49 - 75)  ABP: 108/41 (83/38 - 129/52)  ABP(mean): 66 (52 - 83)  Exam: regular rate and rhythm  Cardiac Rhythm: sinus  Perfusion     [x]Adequate   [ ]Inadequate  Mentation    [x]Normal       [ ]Reduced  Extremities  [x]Warm         [ ]Cool  Volume Status [ ]Hypervolemic [x]Euvolemic [ ]Hypovolemic  Meds: norepinephrine Infusion 0.01MICROgram(s)/kG/Min IV Continuous    GI/NUTRITION  Exam: soft, nontender, nondistended, incision C/D/I, ostomy w/ bowel sweat only, wound draining thick brown fluid, NGT to LWS draining bilious fluid  Diet: NPO  Meds:  ·	pantoprazole  Injectable 40milliGRAM(s) IV Push daily  ·	ondansetron Injectable 4milliGRAM(s) IV Push every 6 hours PRN Nausea      GENITOURINARY    I & Os for current day (as of 24 Jun 2017 07:15)  =============================================  IN:    dextrose 5% + lactated ringers.: 1650 ml    Lactated Ringers IV Bolus: 500 ml    norepinephrine Infusion: 314.2 ml    phenylephrine   Infusion: 254 ml    Solution: 250 ml    Solution: 200 ml    Solution: 100 ml    Total IN: 3268.2 ml  ---------------------------------------------  OUT:    Indwelling Catheter - Urethral: 1100 ml    Drain: 250 ml    Nasoenteral Tube: 250 ml    Total OUT: 1600 ml  ---------------------------------------------  Total NET: 1668.2 ml    138  |  108  |  24<H>  ----------------------------<  116<H>  4.6   |  20<L>  |  1.26    Ca    7.6<L>      24 Jun 2017 04:41  Phos  4.2  Mg     1.8  TPro  5.4<L>  /  Alb  1.7<L>  /  TBili  0.8  /  DBili  x   /  AST  15  /  ALT  10  /  AlkPhos  119  [ ] Snyder catheter, indication: N/A  Meds: dextrose 5% + lactated ringers. 1000milliLiter(s) IV Continuous infuse at 75 mL/Hr    HEMATOLOGIC  Meds: fondaparinux Injectable 2.5milliGRAM(s) SubCutaneous every 24 hours  [x] VTE Prophylaxis                        7.9    5.6   )-----------( 356      ( 24 Jun 2017 04:41 )             23.7     PT/INR - ( 24 Jun 2017 04:41 )   PT: 16.8 sec;   INR: 1.54 ratio    PTT - ( 24 Jun 2017 04:41 )  PTT:44.0 sec    INFECTIOUS DISEASES  WBC Count: 5.6 K/uL (06-24 @ 04:41)  WBC Count: 4.6 K/uL (06-23 @ 07:33)    RECENT CULTURES: none    Meds:  ·	vancomycin  IVPB 1000milliGRAM(s) IV Intermittent every 24 hours  ·	imipenem/cilastatin  IVPB 500milliGRAM(s) IV Intermittent every 8 hours    ENDOCRINE  CAPILLARY BLOOD GLUCOSE  72 (23 Jun 2017 12:30)    Meds:  ·	levothyroxine Injectable 44MICROGram(s) IV Push daily  ·	insulin lispro (HumaLOG) corrective regimen sliding scale  SubCutaneous every 6 hours    ACCESS DEVICES:  [ ] Peripheral IV  [ ] Central Venous Line	[ ] R	[ ] L	[ ] IJ	[ ] Fem	[ ] SC	Placed:   [x] Arterial Line		[ ] R	[x] L	[x] Fem	[ ] Rad	[ ] Ax	Placed: 6/22/2017  [x] PICC placed 5/31/2017				[ ] Mediport  [x] Urinary Catheter, Date Placed: 6/22/2017  [x] Necessity of urinary, arterial, and venous catheters discussed    OTHER MEDICATIONS: none    CODE STATUS: full code    IMAGING:

## 2017-06-24 NOTE — PROGRESS NOTE ADULT - ASSESSMENT
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 OR 6/9 evening for unresolving SBO. Had exploratory laparotomy, extensive lysis of adhesions, take down of ECF, repair of serosal tear on cecum, partial right salpingoophorectomy, colostomy revision, parastomal and incisional hernia repair with strattice mesh. pt improved after empiric course of aztreonam/flagyl for wound and vanco for MRSA in sputum.  Pt now returns to SICU for sepsis from worsening ECF, persistent fevers.  POD #15.      1. continue vanco, imipenem 500mg IV q6. Check repeat vanco trough.    2.   3. 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 OR 6/9 evening for unresolving SBO. Had exploratory laparotomy, extensive lysis of adhesions, take down of ECF, repair of serosal tear on cecum, partial right salpingoophorectomy, colostomy revision, parastomal and incisional hernia repair with strattice mesh. pt improved after empiric course of aztreonam/flagyl for wound and vanco for MRSA in sputum.  Pt now returns to SICU for sepsis from worsening ECF, persistent fevers.  POD #15.  New ECF vs anastomotic breakdown.    1. Continue vanco and imipenem 500mg IV q6. Check repeat vanco trough.    2.   3. 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 OR 6/9 evening for unresolving SBO. Had exploratory laparotomy, extensive lysis of adhesions, take down of ECF, repair of serosal tear on cecum, partial right salpingoophorectomy, colostomy revision, parastomal and incisional hernia repair with strattice mesh. pt improved after empiric course of aztreonam/flagyl for wound and vanco for MRSA in sputum.  Pt now returns to SICU for sepsis from worsening ECF, persistent fevers.  POD #15.  New ECF vs anastomotic breakdown.    1. Continue vanco and imipenem 500mg IV q6. Check repeat vanco trough prior to next dose.    2. Follow cultures.  3. surgical plan per primary team    Carmen Marino MD  [phone] (pager)  [phone] (office)

## 2017-06-24 NOTE — PROGRESS NOTE ADULT - SUBJECTIVE AND OBJECTIVE BOX
[facility]-Nan Curiel, NP- PROGRESS NOTE    Patient seen and examined in bed on pressors in SICU      Hospital Medications:   MEDICATIONS  (STANDING):  levothyroxine Injectable 44MICROGram(s) IV Push daily  pantoprazole  Injectable 40milliGRAM(s) IV Push daily  fondaparinux Injectable 2.5milliGRAM(s) SubCutaneous every 24 hours  insulin lispro (HumaLOG) corrective regimen sliding scale  SubCutaneous every 6 hours  vancomycin  IVPB  IV Intermittent   vancomycin  IVPB 1000milliGRAM(s) IV Intermittent every 24 hours  ALBUTerol/ipratropium for Nebulization 3milliLiter(s) Nebulizer every 6 hours  buDESOnide 160 MICROgram(s)/formoterol 4.5 MICROgram(s) Inhaler 2Puff(s) Inhalation two times a day  imipenem/cilastatin  IVPB  IV Intermittent   imipenem/cilastatin  IVPB 500milliGRAM(s) IV Intermittent every 8 hours  sodium phosphate IVPB 15milliMole(s) IV Intermittent every 1 hour  dextrose 5% + lactated ringers. 1000milliLiter(s) IV Continuous <Continuous>  norepinephrine Infusion 0.01MICROgram(s)/kG/Min IV Continuous <Continuous>      REVIEW OF SYSTEMS:  As per HPI, 8 out of 10 ROS were unremarkable    VITALS:  T(F): 100.4, Max: 101.5 ( @ 07:15)  HR: 116  BP: --  RR: 18  SpO2: 100%  Wt(kg): --  I & Os for 24h ending  @ 07:00  =============================================  IN: 3287.7 ml / OUT: 2410 ml / NET: 877.7 ml    I & Os for current day (as of  @ 20:52)  =============================================  IN: 1453.2 ml / OUT: 1355 ml / NET: 98.2 ml        PHYSICAL EXAM:  Constitutional: NAD  HEENT: PERRLA  Neck: No JVD  Respiratory: CTAB, no wheezes, rales or rhonchi  Cardiovascular: S1, S2, RRR  Gastrointestinal: - BS, +NGT  Extremities: Tr Lloyd LE edema  Neurological: A/O x 3, no focal deficits  Psychiatric: Normal mood, normal affect  : No CVA tenderness. + baldwin.   Skin: No rashes      LABS:  Blood Gas Arterial - Lytes,H+H,iCa,Lact: Performed In Lab ( @ 04:35)  Blood Gas Source Arterial: Arterial ( 04:35)  Blood Gas Arterial - Calcium, Ionized: 1.18 mmoL/L ( 04:35)  Blood Gas Arterial - Chloride: 109 mmoL/L ( 04:35)  Blood Gas Arterial - Glucose: 118 mg/dL (:35)  Blood Gas Arterial - Lytes,H+H,iCa,Lact: Performed In Lab ( @ 07:32)  Blood Gas Source Arterial: Arterial (:32)  Blood Gas Arterial - Calcium, Ionized: 1.16 mmoL/L ( 07:32)  Blood Gas Arterial - Chloride: 106 mmoL/L ( 07:32)  Blood Gas Arterial - Glucose: 80 mg/dL ( 07:32)  Blood Gas Arterial - Lytes,H+H,iCa,Lact: Performed In Lab ( @ 15:10)  Blood Gas Source Arterial: Arterial ( @ 15:10)  Blood Gas Arterial - Calcium, Ionized: 1.15 mmoL/L ( @ 15:10)  Blood Gas Arterial - Chloride: 106 mmoL/L ( @ 15:10)  Blood Gas Arterial - Glucose: 137 mg/dL ( @ 15:10)          138  |  108  |  24<H>  ----------------------------<  116<H>  4.6   |  20<L>  |  1.26      140  |  105  |  32<H>  ----------------------------<  103<H>  4.3   |  22  |  1.21      139  |  105  |  35<H>  ----------------------------<  142<H>  4.4   |  20<L>  |  1.19    Ca    7.6<L>      2017 04:41  Phos  4.2     06-24  Mg     1.8     06-24  Mg     2.0     06-23  Mg     2.2     06-22    TPro  5.4<L>  /  Alb  1.7<L>  /  TBili  0.8  /  DBili  x   /  AST  15  /  ALT  10  /  AlkPhos  119  06-24  TPro  5.9<L>  /  Alb  1.9<L>  /  TBili  0.7  /  DBili  x   /  AST  17  /  ALT  13  /  AlkPhos  149<H>  06-22                            7.9    5.6   )-----------( 356      ( 2017 04:41 )             23.7       Urine Studies:  Urinalysis Basic - ( 2017 11:58 )    Color: Yellow / Appearance: Clear / S.013 / pH: x  Gluc: x / Ketone: Negative  / Bili: Negative / Urobili: 4   Blood: x / Protein: 100 mg/dL / Nitrite: Negative   Leuk Esterase: Negative / RBC: 3-5 /HPF / WBC 3-5 /HPF   Sq Epi: x / Non Sq Epi: OCC /HPF / Bacteria: Few /HPF

## 2017-06-24 NOTE — PROGRESS NOTE ADULT - ATTENDING COMMENTS
septic hypotension  patient has an entero-atmospheric fistula  The is a devise attached to suction which is controlling the effluent  the patient was on non-invasive positive pressure ventilation but has since been able to come off ventilator support with good effect    The patient does remain hypotensive requiring Levophed .1 to .2 microgram per kilogram per minute  antibiotic therapy is vancomycin and Imipenem.  Followed by infectious disease    The goal is to control the sepsis and established a controlled fistula

## 2017-06-24 NOTE — PROGRESS NOTE ADULT - SUBJECTIVE AND OBJECTIVE BOX
ACS DAILY PROGRESS NOTE    HOSPITAL DAY #33  POST OPERATIVE DAY #: 15    STATUS POST:  exploratory laparotomy, extensive lysis of adhesions, takedown of enterocutaneous fistula, repair of serosal tear on cecum, partial right salpingooophorectomy, colostomy revision, parastomal and incisional hernia repair with strattice mesh          INTERVAL EVENTS:       OBJECTIVE:   Vital Signs Last 24 Hrs  T(C): 38.1, Max: 39.1 (06-23 @ 13:00)  T(F): 100.6, Max: 102.4 (06-23 @ 13:00)  HR: 109 (95 - 123)  BP: 100/52 (69/37 - 100/52)  BP(mean): 75 (49 - 75)  RR: 15 (13 - 36)  SpO2: 100% (100% - 100%)    I&O's Detail  I & Os for 24h ending 23 Jun 2017 07:00  =============================================  IN:    dextrose 5% + lactated ringers.: 975 ml    phenylephrine   Infusion: 356.3 ml    TPN (Total Parenteral Nutrition): 310 ml    Total IN: 1641.3 ml  ---------------------------------------------  OUT:    Nasoenteral Tube: 1500 ml    Indwelling Catheter - Urethral: 660 ml    Colostomy: 25 ml    Total OUT: 2185 ml  ---------------------------------------------  Total NET: -543.7 ml    I & Os for current day (as of 24 Jun 2017 06:54)  =============================================  IN:    dextrose 5% + lactated ringers.: 1650 ml    Lactated Ringers IV Bolus: 500 ml    norepinephrine Infusion: 314.2 ml    phenylephrine   Infusion: 254 ml    Solution: 250 ml    Solution: 200 ml    Solution: 100 ml    Total IN: 3268.2 ml  ---------------------------------------------  OUT:    Indwelling Catheter - Urethral: 1100 ml    Drain: 250 ml    Nasoenteral Tube: 250 ml    Total OUT: 1600 ml  ---------------------------------------------  Total NET: 1668.2 ml    Physical Exam:  Constitutional: Awake, alert, AOx3  Respiratory: normal respiratory pattern on BiPAP  Cardiovascular: sinus  Gastrointestinal: soft, distended, ostomy with gas and stool. old colostomy site, packed. Strattice with defect in left lower aspect with malodorous, feculent output. NGT with bilious output  Ext: b/l UE edema     CBC Full  -  ( 24 Jun 2017 04:41 )  WBC Count : 5.6 K/uL  Hemoglobin : 7.9 g/dL  Hematocrit : 23.7 %  Platelet Count - Automated : 356 K/uL  Mean Cell Volume : 102.0 fl  Mean Cell Hemoglobin : 34.0 pg  Mean Cell Hemoglobin Concentration : 33.5 gm/dL  Auto Neutrophil # : x  Auto Lymphocyte # : x  Auto Monocyte # : x  Auto Eosinophil # : x  Auto Basophil # : x  Auto Neutrophil % : x  Auto Lymphocyte % : x  Auto Monocyte % : x  Auto Eosinophil % : x  Auto Basophil % : x    06-24    138  |  108  |  24<H>  ----------------------------<  116<H>  4.6   |  20<L>  |  1.26    Ca    7.6<L>      24 Jun 2017 04:41  Phos  4.2     06-24  Mg     1.8     06-24    TPro  5.4<L>  /  Alb  1.7<L>  /  TBili  0.8  /  DBili  x   /  AST  15  /  ALT  10  /  AlkPhos  119  06-24    LIVER FUNCTIONS - ( 24 Jun 2017 04:41 )  Alb: 1.7 g/dL / Pro: 5.4 g/dL / ALK PHOS: 119 U/L / ALT: 10 U/L RC / AST: 15 U/L / GGT: x           PT/INR - ( 24 Jun 2017 04:41 )   PT: 16.8 sec;   INR: 1.54 ratio         PTT - ( 24 Jun 2017 04:41 )  PTT:44.0 sec

## 2017-06-24 NOTE — PROGRESS NOTE ADULT - ASSESSMENT
75 y/o female SICU day #2, POD #14 s/p ex-lap, extensive NANETTE, takedown of EC fistula, repair of cecal serosal tear, partial R salpingectomy, colostomy revision, parastomal and incisional hernia repair with Strattice mesh complicated by MRSA pneumonia requiring reintubation & vasopressor support but was extubated, hemodynamically stable, and transferred to the floors but was readmitted for respiratory distress likely from pulmonary vascular congestion and hypotension requiring vasopressor support w/ concern for a new/recurrent EC fistula vs. anastomotic breakdown.    NEURO:  Continue pain control with IV acetaminophen and Dilaudid PRN  CV:  Monitor vital signs.  Wean vasopressor as tolerated for goal MAP greater than 65.  Follow-up echocardiogram as patient is in pulmonary edema and has not had an echocardiogram this admission.  RESP:  Monitor pulse oximeter  BiPAP in the setting of pulmonary edema  Continue Duoneb and Symbicort for COPD  Mucomyst for secretions  GI/NUTRITION:  NPO due to concern for new/recurrent EC fistula vs. 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  CT scan today to evaluate for new/recurrent EC fistula vs. anastomotic breakdown  MRSA pneumonia - continue vancomycin  New/recurrent EC fistula vs. anastomotic breakdown - continue imipenem  ENDO:  Monitor fingersticks q6hrs and ISS for glycemic control.  Synthroid for hypothyroidism      Rosmery Kovacs PA-C   [id number] 75 y/o female SICU day #3, POD #15 s/p ex-lap, extensive NANETTE, takedown of EC fistula, repair of cecal serosal tear, partial R salpingectomy, colostomy revision, parastomal and incisional hernia repair with Strattice mesh complicated by MRSA pneumonia requiring reintubation & vasopressor support but was extubated, hemodynamically stable, and transferred to the floors but was readmitted 6/22 for respiratory distress likely from pulmonary vascular congestion and hypotension requiring vasopressor support w/ concern for a new/recurrent EC fistula vs. anastomotic breakdown.    NEURO:  ·	Continue pain control with IV acetaminophen and Dilaudid PRN due to recurrent SBO.  CV:  ·	Monitor vital signs.  ·	Wean vasopressor as tolerated for goal MAP greater than 65.  RESP:  ·	Monitor pulse oximeter.  ·	Wean BiPAP as tolerated.  ·	Continue Duoneb and Symbicort for COPD.  GI/NUTRITION:  ·	NPO w/ NG tube to low wall suction due to concern for recurrent SBO and new/recurrent EC fistula vs. anastomotic breakdown.  ·	Monitor NG tube output and quality. Monitor ostomy for GI function.  ·	Holding TPN in the setting of sepsis.  ·	CT abdomen & pelvis w/ PO and possibly IV contrast to fully evaluate new/recurrent EC fistula vs. anastomotic breakdown - will administer PO contrast, clamp NG tube for 4 hrs, then place NG tube back on low wall suction to minimize the risk of aspiration.  RENAL:  ·	TREVA improving.  ·	Monitor intake & output.  ·	Monitor electrolytes and replete as necessary.  ·	D5LR @ 75 cc/hr as patient is hypotensive and NPO - will increase for 12 hrs if patient does get IV contrast for her CT.  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  ID  ·	Follow-up blood cultures.  ·	Repeat CT scan today.  ·	MRSA pneumonia - continue vancomycin.  ·	New/recurrent EC fistula vs. anastomotic breakdown - continue imipenem.  ENDO:  ·	Monitor fingersticks q6hrs and ISS for glycemic control.  ·	Synthroid IV for hypothyroidism as patient has another SBO.      Rosmery Kovacs PA-C   [id number] 77 y/o female SICU day #3, POD #15 s/p ex-lap, extensive NANETTE, takedown of EC fistula, repair of cecal serosal tear, partial R salpingectomy, colostomy revision, parastomal and incisional hernia repair with Strattice mesh complicated by MRSA pneumonia requiring reintubation & vasopressor support but was extubated, hemodynamically stable, and transferred to the floors but was readmitted 6/22 for respiratory distress likely from pulmonary vascular congestion and hypotension requiring vasopressor support w/ concern for a new/recurrent EC fistula vs. anastomotic breakdown.    NEURO:  ·	Continue pain control with IV acetaminophen and Dilaudid PRN due to recurrent SBO.  CV:  ·	Monitor vital signs.  ·	Wean vasopressor as tolerated for goal MAP greater than 65.  RESP:  ·	Monitor pulse oximeter.  ·	Wean BiPAP as tolerated.  ·	Continue Duoneb and Symbicort for COPD.  GI/NUTRITION:  ·	NPO w/ NG tube to low wall suction due to concern for recurrent SBO and new/recurrent EC fistula vs. anastomotic breakdown.  ·	Monitor NG tube output and quality. Monitor ostomy for GI function.  ·	Holding TPN in the setting of sepsis.  ·	CT abdomen & pelvis w/ PO and possibly IV contrast to fully evaluate new/recurrent EC fistula vs. anastomotic breakdown - will administer PO contrast, clamp NG tube for 4 hrs, then place NG tube back on low wall suction to minimize the risk of aspiration.  RENAL:  ·	TREVA improving.  ·	Monitor intake & output.  ·	Monitor electrolytes and replete as necessary.  ·	D5LR @ 75 cc/hr as patient is hypotensive and NPO - will increase for 12 hrs if patient does get IV contrast for her CT.  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  ID  ·	Follow-up blood cultures.  ·	Repeat CT scan today.  ·	MRSA pneumonia - continue vancomycin.  ·	New/recurrent EC fistula vs. anastomotic breakdown - continue imipenem.  ENDO:  ·	Monitor fingersticks q6hrs and ISS for glycemic control.  ·	Synthroid IV for hypothyroidism as patient has another SBO.  DISPOSITION  ·	Full code.  ·	Will remain in SICU as patient continues to require BiPAP and vasopressor support.      Rosmery Kovacs PA-C   [id number]

## 2017-06-25 LAB
ALBUMIN SERPL ELPH-MCNC: 1.7 G/DL — LOW (ref 3.3–5)
ALP SERPL-CCNC: 99 U/L — SIGNIFICANT CHANGE UP (ref 40–120)
ALT FLD-CCNC: 7 U/L RC — LOW (ref 10–45)
ANION GAP SERPL CALC-SCNC: 14 MMOL/L — SIGNIFICANT CHANGE UP (ref 5–17)
APTT BLD: 43.5 SEC — HIGH (ref 27.5–37.4)
AST SERPL-CCNC: 13 U/L — SIGNIFICANT CHANGE UP (ref 10–40)
BILIRUB DIRECT SERPL-MCNC: 0.4 MG/DL — HIGH (ref 0–0.2)
BILIRUB INDIRECT FLD-MCNC: 0.2 MG/DL — SIGNIFICANT CHANGE UP (ref 0.2–1)
BILIRUB SERPL-MCNC: 0.6 MG/DL — SIGNIFICANT CHANGE UP (ref 0.2–1.2)
BUN SERPL-MCNC: 22 MG/DL — SIGNIFICANT CHANGE UP (ref 7–23)
CALCIUM SERPL-MCNC: 8 MG/DL — LOW (ref 8.4–10.5)
CHLORIDE SERPL-SCNC: 108 MMOL/L — SIGNIFICANT CHANGE UP (ref 96–108)
CO2 SERPL-SCNC: 19 MMOL/L — LOW (ref 22–31)
CREAT ?TM UR-MCNC: 27 MG/DL — SIGNIFICANT CHANGE UP
CREAT SERPL-MCNC: 1.44 MG/DL — HIGH (ref 0.5–1.3)
CULTURE RESULTS: NO GROWTH — SIGNIFICANT CHANGE UP
GAS PNL BLDA: SIGNIFICANT CHANGE UP
GLUCOSE SERPL-MCNC: 98 MG/DL — SIGNIFICANT CHANGE UP (ref 70–99)
HCT VFR BLD CALC: 23.1 % — LOW (ref 34.5–45)
HGB BLD-MCNC: 7.1 G/DL — LOW (ref 11.5–15.5)
INR BLD: 1.51 RATIO — HIGH (ref 0.88–1.16)
MAGNESIUM SERPL-MCNC: 1.8 MG/DL — SIGNIFICANT CHANGE UP (ref 1.6–2.6)
MCHC RBC-ENTMCNC: 30.9 GM/DL — LOW (ref 32–36)
MCHC RBC-ENTMCNC: 31.6 PG — SIGNIFICANT CHANGE UP (ref 27–34)
MCV RBC AUTO: 102 FL — HIGH (ref 80–100)
OSMOLALITY UR: 242 MOS/KG — LOW (ref 300–900)
PHOSPHATE SERPL-MCNC: 5 MG/DL — HIGH (ref 2.5–4.5)
PLATELET # BLD AUTO: 445 K/UL — HIGH (ref 150–400)
POTASSIUM SERPL-MCNC: 4.5 MMOL/L — SIGNIFICANT CHANGE UP (ref 3.5–5.3)
POTASSIUM SERPL-SCNC: 4.5 MMOL/L — SIGNIFICANT CHANGE UP (ref 3.5–5.3)
PROT SERPL-MCNC: 5.4 G/DL — LOW (ref 6–8.3)
PROTHROM AB SERPL-ACNC: 16.6 SEC — HIGH (ref 9.8–12.7)
RBC # BLD: 2.26 M/UL — LOW (ref 3.8–5.2)
RBC # FLD: 15.9 % — HIGH (ref 10.3–14.5)
SODIUM SERPL-SCNC: 141 MMOL/L — SIGNIFICANT CHANGE UP (ref 135–145)
SODIUM UR-SCNC: 57 MMOL/L — SIGNIFICANT CHANGE UP
SPECIMEN SOURCE: SIGNIFICANT CHANGE UP
WBC # BLD: 6.4 K/UL — SIGNIFICANT CHANGE UP (ref 3.8–10.5)
WBC # FLD AUTO: 6.4 K/UL — SIGNIFICANT CHANGE UP (ref 3.8–10.5)

## 2017-06-25 PROCEDURE — 99291 CRITICAL CARE FIRST HOUR: CPT

## 2017-06-25 PROCEDURE — 71010: CPT | Mod: 26

## 2017-06-25 RX ORDER — ALBUMIN HUMAN 25 %
500 VIAL (ML) INTRAVENOUS ONCE
Qty: 0 | Refills: 0 | Status: COMPLETED | OUTPATIENT
Start: 2017-06-25 | End: 2017-06-25

## 2017-06-25 RX ORDER — MAGNESIUM SULFATE 500 MG/ML
2 VIAL (ML) INJECTION ONCE
Qty: 0 | Refills: 0 | Status: COMPLETED | OUTPATIENT
Start: 2017-06-25 | End: 2017-06-25

## 2017-06-25 RX ADMIN — Medication 250 MILLIGRAM(S): at 10:45

## 2017-06-25 RX ADMIN — Medication 1.4 MICROGRAM(S)/KG/MIN: at 18:52

## 2017-06-25 RX ADMIN — HYDROMORPHONE HYDROCHLORIDE 0.5 MILLIGRAM(S): 2 INJECTION INTRAMUSCULAR; INTRAVENOUS; SUBCUTANEOUS at 15:56

## 2017-06-25 RX ADMIN — Medication 250 MILLILITER(S): at 11:00

## 2017-06-25 RX ADMIN — HYDROMORPHONE HYDROCHLORIDE 0.5 MILLIGRAM(S): 2 INJECTION INTRAMUSCULAR; INTRAVENOUS; SUBCUTANEOUS at 23:06

## 2017-06-25 RX ADMIN — Medication 3 MILLILITER(S): at 11:09

## 2017-06-25 RX ADMIN — HYDROMORPHONE HYDROCHLORIDE 0.5 MILLIGRAM(S): 2 INJECTION INTRAMUSCULAR; INTRAVENOUS; SUBCUTANEOUS at 11:50

## 2017-06-25 RX ADMIN — ONDANSETRON 4 MILLIGRAM(S): 8 TABLET, FILM COATED ORAL at 17:00

## 2017-06-25 RX ADMIN — IMIPENEM AND CILASTATIN 100 MILLIGRAM(S): 250; 250 INJECTION, POWDER, FOR SOLUTION INTRAVENOUS at 10:45

## 2017-06-25 RX ADMIN — HYDROMORPHONE HYDROCHLORIDE 0.5 MILLIGRAM(S): 2 INJECTION INTRAMUSCULAR; INTRAVENOUS; SUBCUTANEOUS at 19:43

## 2017-06-25 RX ADMIN — HYDROMORPHONE HYDROCHLORIDE 0.5 MILLIGRAM(S): 2 INJECTION INTRAMUSCULAR; INTRAVENOUS; SUBCUTANEOUS at 11:35

## 2017-06-25 RX ADMIN — Medication 3 MILLILITER(S): at 23:40

## 2017-06-25 RX ADMIN — Medication 44 MICROGRAM(S): at 05:29

## 2017-06-25 RX ADMIN — HYDROMORPHONE HYDROCHLORIDE 0.5 MILLIGRAM(S): 2 INJECTION INTRAMUSCULAR; INTRAVENOUS; SUBCUTANEOUS at 04:05

## 2017-06-25 RX ADMIN — BUDESONIDE AND FORMOTEROL FUMARATE DIHYDRATE 2 PUFF(S): 160; 4.5 AEROSOL RESPIRATORY (INHALATION) at 11:23

## 2017-06-25 RX ADMIN — Medication 3 MILLILITER(S): at 17:05

## 2017-06-25 RX ADMIN — HYDROMORPHONE HYDROCHLORIDE 0.5 MILLIGRAM(S): 2 INJECTION INTRAMUSCULAR; INTRAVENOUS; SUBCUTANEOUS at 04:15

## 2017-06-25 RX ADMIN — IMIPENEM AND CILASTATIN 100 MILLIGRAM(S): 250; 250 INJECTION, POWDER, FOR SOLUTION INTRAVENOUS at 02:18

## 2017-06-25 RX ADMIN — BUDESONIDE AND FORMOTEROL FUMARATE DIHYDRATE 2 PUFF(S): 160; 4.5 AEROSOL RESPIRATORY (INHALATION) at 23:40

## 2017-06-25 RX ADMIN — HYDROMORPHONE HYDROCHLORIDE 0.5 MILLIGRAM(S): 2 INJECTION INTRAMUSCULAR; INTRAVENOUS; SUBCUTANEOUS at 07:35

## 2017-06-25 RX ADMIN — HYDROMORPHONE HYDROCHLORIDE 0.5 MILLIGRAM(S): 2 INJECTION INTRAMUSCULAR; INTRAVENOUS; SUBCUTANEOUS at 19:28

## 2017-06-25 RX ADMIN — IMIPENEM AND CILASTATIN 100 MILLIGRAM(S): 250; 250 INJECTION, POWDER, FOR SOLUTION INTRAVENOUS at 18:25

## 2017-06-25 RX ADMIN — HYDROMORPHONE HYDROCHLORIDE 0.5 MILLIGRAM(S): 2 INJECTION INTRAMUSCULAR; INTRAVENOUS; SUBCUTANEOUS at 07:21

## 2017-06-25 RX ADMIN — PANTOPRAZOLE SODIUM 40 MILLIGRAM(S): 20 TABLET, DELAYED RELEASE ORAL at 12:52

## 2017-06-25 RX ADMIN — Medication 3 MILLILITER(S): at 05:41

## 2017-06-25 RX ADMIN — Medication 50 GRAM(S): at 05:29

## 2017-06-25 RX ADMIN — SODIUM CHLORIDE 75 MILLILITER(S): 9 INJECTION, SOLUTION INTRAVENOUS at 18:54

## 2017-06-25 RX ADMIN — Medication 1.4 MICROGRAM(S)/KG/MIN: at 14:07

## 2017-06-25 RX ADMIN — HYDROMORPHONE HYDROCHLORIDE 0.5 MILLIGRAM(S): 2 INJECTION INTRAMUSCULAR; INTRAVENOUS; SUBCUTANEOUS at 23:21

## 2017-06-25 RX ADMIN — HYDROMORPHONE HYDROCHLORIDE 0.5 MILLIGRAM(S): 2 INJECTION INTRAMUSCULAR; INTRAVENOUS; SUBCUTANEOUS at 16:05

## 2017-06-25 RX ADMIN — FONDAPARINUX SODIUM 2.5 MILLIGRAM(S): 2.5 INJECTION, SOLUTION SUBCUTANEOUS at 11:05

## 2017-06-25 NOTE — PROGRESS NOTE ADULT - ASSESSMENT
ASSESSMENT  76y female with high volume ECF s/p exploratory laparotomy, extensive lysis of adhesions, takedown of enterocutaneous fistula, repair of serosal tear on cecum, partial right salpingooophorectomy, colostomy revision, parastomal and incisional hernia repair with strattice mesh, POD 16    PLAN  -   -   - care per SICKAREL Ansari PGY1  Pager: [phone] ASSESSMENT  76y female with high volume ECF s/p exploratory laparotomy, extensive lysis of adhesions, takedown of enterocutaneous fistula, repair of serosal tear on cecum, partial right salpingooophorectomy, colostomy revision, parastomal and incisional hernia repair with strattice mesh, POD 16    PLAN  - c/w primaxin and vanco  - wean pressors as tolerated  - Atrixtra for DVT prophylaxis with h/o HIT  - care per SICKAREL Ansari PGY1  Pager: [phone]

## 2017-06-25 NOTE — PROGRESS NOTE ADULT - SUBJECTIVE AND OBJECTIVE BOX
TPN on hold  =============================================  IN:    dextrose 5% + lactated ringers.: 1725 ml    norepinephrine Infusion: 433.1 ml    Solution: 250 ml    Solution: 100 ml    Solution: 50 ml    Total IN: 2558.1 ml  ---------------------------------------------  OUT:    Indwelling Catheter - Urethral: 1635 ml    Nasoenteral Tube: 800 ml    Drain: 100 ml    Colostomy: 30 ml    Total OUT: 2565 ml  ---------------------------------------------  Total NET: -6.9 ml    T(C): 37.5, Max: 38.2 (06-24 @ 10:15)  HR: 109 (101 - 121)  BP: 85/53 (85/53 - 85/53)  RR: 26 (11 - 38)  SpO2: 100% (100% - 100%)  Wt(kg): --    Labs   06-25    141  |  108  |  22  ----------------------------<  98  4.5   |  19<L>  |  1.44<H>    Ca    8.0<L>      25 Jun 2017 02:57  Phos  5.0     06-25  Mg     1.8     06-25    TPro  5.4<L>  /  Alb  1.7<L>  /  TBili  0.6  /  DBili  0.4<H>  /  AST  13  /  ALT  7<L>  /  AlkPhos  99  06-25      LIVER FUNCTIONS - ( 25 Jun 2017 02:57 )  Alb: 1.7 g/dL / Pro: 5.4 g/dL / ALK PHOS: 99 U/L / ALT: 7 U/L RC / AST: 13 U/L / GGT: x           CAPILLARY BLOOD GLUCOSE  104 (25 Jun 2017 05:30)  84 (25 Jun 2017 00:00)  74 (24 Jun 2017 19:00)  103 (24 Jun 2017 12:00)    I&O's Detail  I & Os for 24h ending 25 Jun 2017 07:00  =============================================  IN:    dextrose 5% + lactated ringers.: 1725 ml    norepinephrine Infusion: 433.1 ml    Solution: 250 ml    Solution: 100 ml    Solution: 50 ml    Total IN: 2558.1 ml  ---------------------------------------------  OUT:    Indwelling Catheter - Urethral: 1635 ml    Nasoenteral Tube: 800 ml    Drain: 100 ml    Colostomy: 30 ml    Total OUT: 2565 ml  ---------------------------------------------  Total NET: -6.9 ml    I & Os for current day (as of 25 Jun 2017 09:59)  =============================================  IN:    dextrose 5% + lactated ringers.: 135 ml    norepinephrine Infusion: 24.4 ml    Total IN: 159.4 ml  ---------------------------------------------  OUT:    Indwelling Catheter - Urethral: 75 ml    Total OUT: 75 ml  ---------------------------------------------  Total NET: 84.4 ml

## 2017-06-25 NOTE — PROVIDER CONTACT NOTE (OTHER) - ASSESSMENT
Pt's colostomy stoma appears dusky with no output; Midline abd incision drainage appears to be stool; Pt treated for abdominal pain; Pt tachycardic (-117); Levo gtt maintained for MAP > 65

## 2017-06-25 NOTE — PROGRESS NOTE ADULT - SUBJECTIVE AND OBJECTIVE BOX
HISTORY  75 y/o female with history significant for COPD, h/o DVT s/p IVC filter (now removed), GERD, hypothyroidism, HIT and diverticulitis s/p Ruben's, recurrent SBO s/p ex-lap small bowel resection, EC fistula s/p takedown, complicated by wound dehiscence, abdominal reconstruction underwent ex-lap, extensive NANETTE, EC fistula takedown, colostomy revision, and parastomal/incisional hernia repair on 6/9. She was admitted to SICU intubated post-operatively and was successfully extubated. However, after getting a CT with oral contrast, patient went into respiratory distress and was re-intubated and found to have MRSA pneumonia, likely aspiration in nature. She was subsequently extubated, weaned off pressors, and transferred to the floors. On the floor on 6/22, patient went into respiratory distress again likely secondary to pulmonary vascular congestion requiring BiPAP and became hypotensive requiring vasopressor support. Her wound also began draining thick brown fluid concerning for a new EC fistula vs. anastomotic breakdown and is currently on imipenem.    24 HOUR EVENTS: Weaned off of bipap to nasal cannula without issue. Output from wound has started to decrease. Levophed dose trending downwards.    SUBJECTIVE/ROS:  [x ] A ten-point review of systems was otherwise negative except as noted.  [ ] Due to altered mental status/intubation, subjective information were not able to be obtained from the patient. History was obtained, to the extent possible, from review of the chart and collateral sources of information.      NEURO  RASS:  0   GCS:     CAM ICU: negative  Exam: awake, alert, oriented, affect mildly depressed  Meds: ondansetron Injectable 4milliGRAM(s) IV Push every 6 hours PRN Nausea  HYDROmorphone  Injectable 0.5milliGRAM(s) IV Push every 3 hours PRN Moderate Pain (4 - 6)    [x] Adequacy of sedation and pain control has been assessed and adjusted      RESPIRATORY  RR: 20 (11 - 38)  SpO2: 100% (100% - 100%)  Wt(kg): --  Exam: unlabored, clear to auscultation bilaterally  Mechanical Ventilation:   ABG - ( 25 Jun 2017 02:47 )  pH: 7.36  /  pCO2: 37    /  pO2: 118   / HCO3: 21    / Base Excess: -3.9  /  SaO2: 99      Lactate: x        [N/A] Extubation Readiness Assessed  Meds: ALBUTerol/ipratropium for Nebulization 3milliLiter(s) Nebulizer every 6 hours  buDESOnide 160 MICROgram(s)/formoterol 4.5 MICROgram(s) Inhaler 2Puff(s) Inhalation two times a day        CARDIOVASCULAR  HR: 109 (101 - 121)  BP: 85/53 (85/53 - 85/53)  BP(mean): 65 (65 - 65)  ABP: 109/48 (97/41 - 139/57)  ABP(mean): 72 (62 - 89)  Wt(kg): --  CVP(cm H2O): --      Exam: regular rate and rhythm  Cardiac Rhythm: sinus  Perfusion     [x]Adequate   [ ]Inadequate  Mentation   [x]Normal       [ ]Reduced  Extremities  [x]Warm         [ ]Cool  Volume Status [ ]Hypervolemic [x]Euvolemic [ ]Hypovolemic  Meds: norepinephrine Infusion 0.01MICROgram(s)/kG/Min IV Continuous <Continuous>        GI/NUTRITION  Exam: soft, tender around open wound, there is a large open wound with bilious fluid draining from inferior portion  Diet: NPO  Meds: pantoprazole  Injectable 40milliGRAM(s) IV Push daily      GENITOURINARY  I&O's Detail  I & Os for 24h ending 06-24 @ 07:00  =============================================  IN:    dextrose 5% + lactated ringers.: 1650 ml    Lactated Ringers IV Bolus: 500 ml    norepinephrine Infusion: 333.7 ml    phenylephrine   Infusion: 254 ml    Solution: 250 ml    Solution: 200 ml    Solution: 100 ml    Total IN: 3287.7 ml  ---------------------------------------------  OUT:    Indwelling Catheter - Urethral: 1250 ml    Drain: 650 ml    Nasoenteral Tube: 500 ml    Colostomy: 10 ml    Total OUT: 2410 ml  ---------------------------------------------  Total NET: 877.7 ml    I & Os for current day (as of 06-25 @ 05:44)  =============================================  IN:    dextrose 5% + lactated ringers.: 1500 ml    norepinephrine Infusion: 343.9 ml    Solution: 250 ml    Solution: 100 ml    Total IN: 2193.9 ml  ---------------------------------------------  OUT:    Indwelling Catheter - Urethral: 1285 ml    Nasoenteral Tube: 450 ml    Drain: 50 ml    Colostomy: 20 ml    Total OUT: 1805 ml  ---------------------------------------------  Total NET: 388.9 ml      06-25    141  |  108  |  22  ----------------------------<  98  4.5   |  19<L>  |  1.44<H>    Ca    8.0<L>      25 Jun 2017 02:57  Phos  5.0     06-25  Mg     1.8     06-25    TPro  5.4<L>  /  Alb  1.7<L>  /  TBili  0.6  /  DBili  0.4<H>  /  AST  13  /  ALT  7<L>  /  AlkPhos  99  06-25    [x ] Snyder catheter, indication: urine monitoring in critically ill  Meds: sodium phosphate IVPB 15milliMole(s) IV Intermittent every 1 hour  dextrose 5% + lactated ringers. 1000milliLiter(s) IV Continuous <Continuous>        HEMATOLOGIC  Meds: fondaparinux Injectable 2.5milliGRAM(s) SubCutaneous every 24 hours    [x] VTE Prophylaxis                        7.1    6.4   )-----------( 445      ( 25 Jun 2017 02:57 )             23.1     PT/INR - ( 25 Jun 2017 02:57 )   PT: 16.6 sec;   INR: 1.51 ratio         PTT - ( 25 Jun 2017 02:57 )  PTT:43.5 sec  Transfusion     [ ] PRBC   [ ] Platelets   [ ] FFP   [ ] Cryoprecipitate      INFECTIOUS DISEASES  WBC Count: 6.4 K/uL (06-25 @ 02:57)    RECENT CULTURES:  Specimen Source: .Blood Blood-Venous  Date/Time: 06-23 @ 07:16  Culture Results:   No growth to date.  Gram Stain: --  Organism: --    Meds: vancomycin  IVPB  IV Intermittent   vancomycin  IVPB 1000milliGRAM(s) IV Intermittent every 24 hours  imipenem/cilastatin  IVPB  IV Intermittent   imipenem/cilastatin  IVPB 500milliGRAM(s) IV Intermittent every 8 hours        ENDOCRINE  CAPILLARY BLOOD GLUCOSE  84 (25 Jun 2017 00:00)    Meds: levothyroxine Injectable 44MICROGram(s) IV Push daily  insulin lispro (HumaLOG) corrective regimen sliding scale  SubCutaneous every 6 hours        ACCESS DEVICES:  [x ] Peripheral IV  [ ] Central Venous Line	[ ] R	[ ] L	[ ] IJ	[ ] Fem	[ ] SC	Placed:   [x ] Arterial Line		[ ] R	[ ] L	[ ] Fem	[ ] Rad	[ ] Ax	Placed:   [ ] PICC:					[ ] Mediport  [x ] Urinary Catheter, Date Placed:   [x] Necessity of urinary, arterial, and venous catheters discussed    OTHER MEDICATIONS: x       CODE STATUS:  Full Code    IMAGING: x HISTORY  75 y/o female with history significant for COPD, h/o DVT s/p IVC filter (now removed), GERD, hypothyroidism, HIT and diverticulitis s/p Ruben's, recurrent SBO s/p ex-lap small bowel resection, EC fistula s/p takedown, complicated by wound dehiscence, abdominal reconstruction underwent ex-lap, extensive NANETTE, EC fistula takedown, colostomy revision, and parastomal/incisional hernia repair on 6/9. She was admitted to SICU intubated post-operatively and was successfully extubated. However, after getting a CT with oral contrast, patient went into respiratory distress and was re-intubated and found to have MRSA pneumonia, likely aspiration in nature. She was subsequently extubated, weaned off pressors, and transferred to the floors. On the floor on 6/22, patient went into respiratory distress again likely secondary to pulmonary vascular congestion requiring BiPAP and became hypotensive requiring vasopressor support. Her wound also began draining thick brown fluid concerning for a new EC fistula vs. anastomotic breakdown and is currently on imipenem.    24 HOUR EVENTS: Weaned off of bipap to nasal cannula without issue. Output from wound has started to decrease. Levophed dose trending downwards.    SUBJECTIVE/ROS:  [x ] A ten-point review of systems was otherwise negative except as noted.  [ ] Due to altered mental status/intubation, subjective information were not able to be obtained from the patient. History was obtained, to the extent possible, from review of the chart and collateral sources of information.      NEURO  RASS:  0   GCS:     CAM ICU: negative  Exam: awake, alert, oriented, affect mildly depressed  Meds: ondansetron Injectable 4milliGRAM(s) IV Push every 6 hours PRN Nausea  HYDROmorphone  Injectable 0.5milliGRAM(s) IV Push every 3 hours PRN Moderate Pain (4 - 6)    [x] Adequacy of sedation and pain control has been assessed and adjusted      RESPIRATORY  RR: 20 (11 - 38)  SpO2: 100% (100% - 100%)  Wt(kg): --  Exam: unlabored, clear to auscultation bilaterally  Mechanical Ventilation:   ABG - ( 25 Jun 2017 02:47 )  pH: 7.36  /  pCO2: 37    /  pO2: 118   / HCO3: 21    / Base Excess: -3.9  /  SaO2: 99      Lactate: x        [N/A] Extubation Readiness Assessed  Meds: ALBUTerol/ipratropium for Nebulization 3milliLiter(s) Nebulizer every 6 hours  buDESOnide 160 MICROgram(s)/formoterol 4.5 MICROgram(s) Inhaler 2Puff(s) Inhalation two times a day        CARDIOVASCULAR  HR: 109 (101 - 121)  BP: 85/53 (85/53 - 85/53)  BP(mean): 65 (65 - 65)  ABP: 109/48 (97/41 - 139/57)  ABP(mean): 72 (62 - 89)  Wt(kg): --  CVP(cm H2O): --      Exam: regular rate and rhythm  Cardiac Rhythm: sinus  Perfusion     [x]Adequate   [ ]Inadequate  Mentation   [x]Normal       [ ]Reduced  Extremities  [x]Warm         [ ]Cool  Volume Status [ ]Hypervolemic [x]Euvolemic [ ]Hypovolemic  Meds: norepinephrine Infusion 0.01MICROgram(s)/kG/Min IV Continuous <Continuous>        GI/NUTRITION  Exam: soft, tender around open wound, there is a large open wound with bilious fluid draining from inferior portion  Diet: NPO  Meds: pantoprazole  Injectable 40milliGRAM(s) IV Push daily      GENITOURINARY  I&O's Detail    I & Os for current day (as of 06-25 @ 07:15)  =============================================  IN:    dextrose 5% + lactated ringers.: 1725 ml    norepinephrine Infusion: 433.1 ml    Solution: 250 ml    Solution: 100 ml    Solution: 50 ml    Total IN: 2558.1 ml  ---------------------------------------------  OUT:    Indwelling Catheter - Urethral: 1635 ml    Nasoenteral Tube: 800 ml    Drain: 100 ml    Colostomy: 30 ml    Total OUT: 2565 ml  ---------------------------------------------  Total NET: -6.9 ml        06-25    141  |  108  |  22  ----------------------------<  98  4.5   |  19<L>  |  1.44<H>    Ca    8.0<L>      25 Jun 2017 02:57  Phos  5.0     06-25  Mg     1.8     06-25    TPro  5.4<L>  /  Alb  1.7<L>  /  TBili  0.6  /  DBili  0.4<H>  /  AST  13  /  ALT  7<L>  /  AlkPhos  99  06-25    [x ] Snyder catheter, indication: urine monitoring in critically ill  Meds: sodium phosphate IVPB 15milliMole(s) IV Intermittent every 1 hour  dextrose 5% + lactated ringers. 1000milliLiter(s) IV Continuous <Continuous>        HEMATOLOGIC  Meds: fondaparinux Injectable 2.5milliGRAM(s) SubCutaneous every 24 hours    [x] VTE Prophylaxis                        7.1    6.4   )-----------( 445      ( 25 Jun 2017 02:57 )             23.1     PT/INR - ( 25 Jun 2017 02:57 )   PT: 16.6 sec;   INR: 1.51 ratio         PTT - ( 25 Jun 2017 02:57 )  PTT:43.5 sec  Transfusion     [ ] PRBC   [ ] Platelets   [ ] FFP   [ ] Cryoprecipitate      INFECTIOUS DISEASES  WBC Count: 6.4 K/uL (06-25 @ 02:57)    RECENT CULTURES:  Specimen Source: .Blood Blood-Venous  Date/Time: 06-23 @ 07:16  Culture Results:   No growth to date.  Gram Stain: --  Organism: --    Meds: vancomycin  IVPB  IV Intermittent   vancomycin  IVPB 1000milliGRAM(s) IV Intermittent every 24 hours  imipenem/cilastatin  IVPB  IV Intermittent   imipenem/cilastatin  IVPB 500milliGRAM(s) IV Intermittent every 8 hours        ENDOCRINE  CAPILLARY BLOOD GLUCOSE  84 (25 Jun 2017 00:00)    Meds: levothyroxine Injectable 44MICROGram(s) IV Push daily  insulin lispro (HumaLOG) corrective regimen sliding scale  SubCutaneous every 6 hours        ACCESS DEVICES:  [x ] Peripheral IV  [ ] Central Venous Line	[ ] R	[ ] L	[ ] IJ	[ ] Fem	[ ] SC	Placed:   [x ] Arterial Line		[ ] R	[ ] L	[ ] Fem	[ ] Rad	[ ] Ax	Placed:   [ ] PICC:					[ ] Mediport  [x ] Urinary Catheter, Date Placed:   [x] Necessity of urinary, arterial, and venous catheters discussed    OTHER MEDICATIONS: x       CODE STATUS:  Full Code    IMAGING: x

## 2017-06-25 NOTE — PROGRESS NOTE ADULT - SUBJECTIVE AND OBJECTIVE BOX
ACS DAILY PROGRESS NOTE    HOSPITAL DAY #34  POST OPERATIVE DAY #: 16    STATUS POST:  exploratory laparotomy, extensive lysis of adhesions, takedown of enterocutaneous fistula, repair of serosal tear on cecum, partial right salpingooophorectomy, colostomy revision, parastomal and incisional hernia repair with strattice mesh          INTERVAL EVENTS:       OBJECTIVE:   Vital Signs    I&O's     Physical Exam:  GEN: NAD  us, feculent output. NGT with bilious output  Ext: b/l UE edema       LABS: ACS DAILY PROGRESS NOTE    HOSPITAL DAY #34  POST OPERATIVE DAY #: 16    STATUS POST:  exploratory laparotomy, extensive lysis of adhesions, takedown of enterocutaneous fistula, repair of serosal tear on cecum, partial right salpingooophorectomy, colostomy revision, parastomal and incisional hernia repair with strattice mesh          INTERVAL EVENTS: Weaned off of bipap to nasal cannula without issue. Output from wound has started to decrease. Levophed dose trending downwards.      OBJECTIVE:   Physical Exam:  GEN: NAD  us, feculent output. NGT with bilious output  Ext: b/l UE edema     Vital Signs Last 24 Hrs  T(C): 38, Max: 38.6 (06-24 @ 07:15)  T(F): 100.4, Max: 101.5 (06-24 @ 07:15)  HR: 116 (101 - 121)  BP: 85/53 (85/53 - 85/53)  BP(mean): 65 (65 - 65)  RR: 30 (11 - 38)  SpO2: 100% (100% - 100%)    I&O's Detail  I & Os for 24h ending 24 Jun 2017 07:00  =============================================  IN:    dextrose 5% + lactated ringers.: 1650 ml    Lactated Ringers IV Bolus: 500 ml    norepinephrine Infusion: 333.7 ml    phenylephrine   Infusion: 254 ml    Solution: 250 ml    Solution: 200 ml    Solution: 100 ml    Total IN: 3287.7 ml  ---------------------------------------------  OUT:    Indwelling Catheter - Urethral: 1250 ml    Drain: 650 ml    Nasoenteral Tube: 500 ml    Colostomy: 10 ml    Total OUT: 2410 ml  ---------------------------------------------  Total NET: 877.7 ml    I & Os for current day (as of 25 Jun 2017 06:55)  =============================================  IN:    dextrose 5% + lactated ringers.: 1725 ml    norepinephrine Infusion: 433.1 ml    Solution: 250 ml    Solution: 100 ml    Solution: 50 ml    Total IN: 2558.1 ml  ---------------------------------------------  OUT:    Indwelling Catheter - Urethral: 1585 ml    Nasoenteral Tube: 800 ml    Drain: 100 ml    Colostomy: 30 ml    Total OUT: 2515 ml  ---------------------------------------------  Total NET: 43.1 ml                            7.1    6.4   )-----------( 445      ( 25 Jun 2017 02:57 )             23.1       06-25    141  |  108  |  22  ----------------------------<  98  4.5   |  19<L>  |  1.44<H>    Ca    8.0<L>      25 Jun 2017 02:57  Phos  5.0     06-25  Mg     1.8     06-25    TPro  5.4<L>  /  Alb  1.7<L>  /  TBili  0.6  /  DBili  0.4<H>  /  AST  13  /  ALT  7<L>  /  AlkPhos  99  06-25 ACS DAILY PROGRESS NOTE    HOSPITAL DAY #34  POST OPERATIVE DAY #: 16    STATUS POST:  exploratory laparotomy, extensive lysis of adhesions, takedown of enterocutaneous fistula, repair of serosal tear on cecum, partial right salpingooophorectomy, colostomy revision, parastomal and incisional hernia repair with strattice mesh          INTERVAL EVENTS: Weaned off of bipap to nasal cannula without issue. Output from wound has started to decrease. Levophed dose 0.17 this morning.       OBJECTIVE:   Physical Exam:  GEN: NAD  Pulm: Normal respiratory pattern on NC at 2L  Abd: Wound with feculent output. NGT with bilious output  Ext: b/l UE edema     Vital Signs Last 24 Hrs  T(C): 38, Max: 38.6 (06-24 @ 07:15)  T(F): 100.4, Max: 101.5 (06-24 @ 07:15)  HR: 116 (101 - 121)  BP: 85/53 (85/53 - 85/53)  BP(mean): 65 (65 - 65)  RR: 30 (11 - 38)  SpO2: 100% (100% - 100%)    I&O's Detail  I & Os for 24h ending 24 Jun 2017 07:00  =============================================  IN:    dextrose 5% + lactated ringers.: 1650 ml    Lactated Ringers IV Bolus: 500 ml    norepinephrine Infusion: 333.7 ml    phenylephrine   Infusion: 254 ml    Solution: 250 ml    Solution: 200 ml    Solution: 100 ml    Total IN: 3287.7 ml  ---------------------------------------------  OUT:    Indwelling Catheter - Urethral: 1250 ml    Drain: 650 ml    Nasoenteral Tube: 500 ml    Colostomy: 10 ml    Total OUT: 2410 ml  ---------------------------------------------  Total NET: 877.7 ml    I & Os for current day (as of 25 Jun 2017 06:55)  =============================================  IN:    dextrose 5% + lactated ringers.: 1725 ml    norepinephrine Infusion: 433.1 ml    Solution: 250 ml    Solution: 100 ml    Solution: 50 ml    Total IN: 2558.1 ml  ---------------------------------------------  OUT:    Indwelling Catheter - Urethral: 1585 ml    Nasoenteral Tube: 800 ml    Drain: 100 ml    Colostomy: 30 ml    Total OUT: 2515 ml  ---------------------------------------------  Total NET: 43.1 ml                            7.1    6.4   )-----------( 445      ( 25 Jun 2017 02:57 )             23.1       06-25    141  |  108  |  22  ----------------------------<  98  4.5   |  19<L>  |  1.44<H>    Ca    8.0<L>      25 Jun 2017 02:57  Phos  5.0     06-25  Mg     1.8     06-25    TPro  5.4<L>  /  Alb  1.7<L>  /  TBili  0.6  /  DBili  0.4<H>  /  AST  13  /  ALT  7<L>  /  AlkPhos  99  06-25

## 2017-06-25 NOTE — PROGRESS NOTE ADULT - ASSESSMENT
75 y/o female SICU day #4, POD #16 s/p ex-lap, extensive NANETTE, takedown of EC fistula, repair of cecal serosal tear, partial R salpingectomy, colostomy revision, parastomal and incisional hernia repair with Strattice mesh complicated by MRSA pneumonia requiring reintubation & vasopressor support but was extubated, hemodynamically stable, and transferred to the floors but was readmitted 6/22 for respiratory distress likely from pulmonary vascular congestion and hypotension requiring vasopressor support w/ concern for a new/recurrent EC fistula vs. anastomotic breakdown.    NEURO:  ·	Continue pain control with IV acetaminophen and Dilaudid PRN due to recurrent SBO.  CV:  ·	Monitor vital signs.  ·	Wean vasopressor as tolerated for goal MAP greater than 65.  RESP:  ·	Monitor pulse oximeter.  ·	pulmonary toilet, incentive spirometry  ·	Continue nasal cannula,  ·	Continue Duoneb and Symbicort for COPD.  GI/NUTRITION:  ·	NPO w/ NG tube to low wall suction due to concern for recurrent SBO and new/recurrent EC fistula vs. anastomotic breakdown.  ·	Monitor NG tube output and quality. Monitor ostomy for GI function.  ·	Consider restarting TPN   ·	Will continue discussion for need for CT abdomen & pelvis w/ PO and possibly IV contrast to fully evaluate new/recurrent EC fistula vs. anastomotic breakdown  ·	Continue drain to abdominal wound   RENAL:  ·	TREVA improving.  ·	Monitor intake & output.  ·	Monitor electrolytes and replete as necessary.  ·	D5LR @ 75 cc/hr as patient is hypotensive and NPO - will increase for 12 hrs if patient does get IV contrast for her CT.  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  ID  ·	Follow-up blood cultures.  ·	Repeat CT scan today.  ·	MRSA pneumonia - continue vancomycin.  ·	New/recurrent EC fistula vs. anastomotic breakdown - continue imipenem.  ENDO:  ·	Monitor fingersticks q6hrs and ISS for glycemic control.  ·	Synthroid IV for hypothyroidism as patient has another SBO.  DISPOSITION  ·	Full code.  ·	Will remain in SICU as patient continues to require BiPAP and vasopressor support.

## 2017-06-25 NOTE — PROGRESS NOTE ADULT - PROBLEM SELECTOR PLAN 1
renal function worsening, likely r/t hemodynamics renal function worsening, likely r/t hemodynamics. Continue IVF D5LR @75cc/h and albumin ordered. Monitor BMP daily.

## 2017-06-25 NOTE — PROGRESS NOTE ADULT - SUBJECTIVE AND OBJECTIVE BOX
[facility]-Nan Curiel, NP- PROGRESS NOTE    Patient seen and examined       Hospital Medications:   MEDICATIONS  (STANDING):  levothyroxine Injectable 44MICROGram(s) IV Push daily  pantoprazole  Injectable 40milliGRAM(s) IV Push daily  fondaparinux Injectable 2.5milliGRAM(s) SubCutaneous every 24 hours  insulin lispro (HumaLOG) corrective regimen sliding scale  SubCutaneous every 6 hours  ALBUTerol/ipratropium for Nebulization 3milliLiter(s) Nebulizer every 6 hours  buDESOnide 160 MICROgram(s)/formoterol 4.5 MICROgram(s) Inhaler 2Puff(s) Inhalation two times a day  imipenem/cilastatin  IVPB  IV Intermittent   imipenem/cilastatin  IVPB 500milliGRAM(s) IV Intermittent every 8 hours  sodium phosphate IVPB 15milliMole(s) IV Intermittent every 1 hour  dextrose 5% + lactated ringers. 1000milliLiter(s) IV Continuous <Continuous>  norepinephrine Infusion 0.01MICROgram(s)/kG/Min IV Continuous <Continuous>      REVIEW OF SYSTEMS:  As per HPI, 8 out of 10 ROS were unremarkable    VITALS:  T(F): 99.9, Max: 100.8 ( @ 20:00)  HR: 110  BP: 85/53  RR: 21  SpO2: 100%  Wt(kg): --  I & Os for 24h ending  @ 07:00  =============================================  IN: 2558.1 ml / OUT: 2565 ml / NET: -6.9 ml    I & Os for current day (as of  @ 19:25)  =============================================  IN: 2042.8 ml / OUT: 1935 ml / NET: 107.8 ml        PHYSICAL EXAM:  Constitutional: NAD  HEENT: PERRL  Neck: No JVD  Respiratory: CTAB, no wheezes, rales or rhonchi  Cardiovascular: S1, S2, RRR  Gastrointestinal:  absent BS, +NGT  Extremities: + joe UE edema  Neurological: A/O x 3, no focal deficits  Psychiatric: Normal mood, normal affect  : No CVA tenderness.+ baldwin.   Skin: No rashes      LABS:        141  |  108  |  22  ----------------------------<  98  4.5   |  19<L>  |  1.44<H>      138  |  108  |  24<H>  ----------------------------<  116<H>  4.6   |  20<L>  |  1.26      140  |  105  |  32<H>  ----------------------------<  103<H>  4.3   |  22  |  1.21    Ca    8.0<L>      2017 02:57  Phos  5.0     -  Mg     1.8       Mg     1.8       Mg     2.0         TPro  5.4<L>  /  Alb  1.7<L>  /  TBili  0.6  /  DBili  0.4<H>  /  AST  13  /  ALT  7<L>  /  AlkPhos  99    TPro  5.4<L>  /  Alb  1.7<L>  /  TBili  0.8  /  DBili  x   /  AST  15  /  ALT  10  /  AlkPhos  119  -24                            7.1    6.4   )-----------( 445      ( 2017 02:57 )             23.1       Urine Studies:  Urinalysis Basic - ( 2017 11:58 )    Color: Yellow / Appearance: Clear / S.013 / pH: x  Gluc: x / Ketone: Negative  / Bili: Negative / Urobili: 4   Blood: x / Protein: 100 mg/dL / Nitrite: Negative   Leuk Esterase: Negative / RBC: 3-5 /HPF / WBC 3-5 /HPF   Sq Epi: x / Non Sq Epi: OCC /HPF / Bacteria: Few /HPF      Sodium, Random Urine: 57 mmol/L ( @ 08:15)  Osmolality, Random Urine: 242 mos/kg (06-25 @ 08:15)  Creatinine, Random Urine: 27 mg/dL ( @ 08:15) [facility]-Nan Curiel, NP- PROGRESS NOTE    Patient seen and examined in bed c/o abdominal pain.      Hospital Medications:   MEDICATIONS  (STANDING):  levothyroxine Injectable 44MICROGram(s) IV Push daily  pantoprazole  Injectable 40milliGRAM(s) IV Push daily  fondaparinux Injectable 2.5milliGRAM(s) SubCutaneous every 24 hours  insulin lispro (HumaLOG) corrective regimen sliding scale  SubCutaneous every 6 hours  ALBUTerol/ipratropium for Nebulization 3milliLiter(s) Nebulizer every 6 hours  buDESOnide 160 MICROgram(s)/formoterol 4.5 MICROgram(s) Inhaler 2Puff(s) Inhalation two times a day  imipenem/cilastatin  IVPB  IV Intermittent   imipenem/cilastatin  IVPB 500milliGRAM(s) IV Intermittent every 8 hours  sodium phosphate IVPB 15milliMole(s) IV Intermittent every 1 hour  dextrose 5% + lactated ringers. 1000milliLiter(s) IV Continuous <Continuous>  norepinephrine Infusion 0.01MICROgram(s)/kG/Min IV Continuous <Continuous>      REVIEW OF SYSTEMS:  As per HPI, 8 out of 10 ROS were unremarkable    VITALS:  T(F): 99.9, Max: 100.8 ( @ 20:00)  HR: 110  BP: 85/53  RR: 21  SpO2: 100%  Wt(kg): --  I & Os for 24h ending  @ 07:00  =============================================  IN: 2558.1 ml / OUT: 2565 ml / NET: -6.9 ml    I & Os for current day (as of  @ 19:25)  =============================================  IN: 2042.8 ml / OUT: 1935 ml / NET: 107.8 ml        PHYSICAL EXAM:  Constitutional: NAD  HEENT: PERRL  Neck: No JVD  Respiratory: CTAB, no wheezes, rales or rhonchi  Cardiovascular: S1, S2, RRR  Gastrointestinal:  absent BS, +NGT, dusky color stoma  Extremities: + joe UE edema L>R  Neurological: A/O x 3, no focal deficits  Psychiatric: Normal mood, normal affect  : No CVA tenderness.+ baldwin.   Skin: No rashes      LABS:        141  |  108  |  22  ----------------------------<  98  4.5   |  19<L>  |  1.44<H>      138  |  108  |  24<H>  ----------------------------<  116<H>  4.6   |  20<L>  |  1.26      140  |  105  |  32<H>  ----------------------------<  103<H>  4.3   |  22  |  1.21    Ca    8.0<L>      2017 02:57  Phos  5.0     06-25  Mg     1.8     06-25  Mg     1.8     06-24  Mg     2.0         TPro  5.4<L>  /  Alb  1.7<L>  /  TBili  0.6  /  DBili  0.4<H>  /  AST  13  /  ALT  7<L>  /  AlkPhos  99  -  TPro  5.4<L>  /  Alb  1.7<L>  /  TBili  0.8  /  DBili  x   /  AST  15  /  ALT  10  /  AlkPhos  119  06-24                            7.1    6.4   )-----------( 445      ( 2017 02:57 )             23.1       Urine Studies:  Urinalysis Basic - ( 2017 11:58 )    Color: Yellow / Appearance: Clear / S.013 / pH: x  Gluc: x / Ketone: Negative  / Bili: Negative / Urobili: 4   Blood: x / Protein: 100 mg/dL / Nitrite: Negative   Leuk Esterase: Negative / RBC: 3-5 /HPF / WBC 3-5 /HPF   Sq Epi: x / Non Sq Epi: OCC /HPF / Bacteria: Few /HPF      Sodium, Random Urine: 57 mmol/L ( @ 08:15)  Osmolality, Random Urine: 242 mos/kg ( @ 08:15)  Creatinine, Random Urine: 27 mg/dL ( @ 08:15)

## 2017-06-25 NOTE — PROGRESS NOTE ADULT - ATTENDING COMMENTS
breathing comfortable.  the patient is on low to moderate dose of levophed for hypotension  abdominal sepsis on broad spectrum antibiotic   control of fistula with pouch as set up by the general surgery team  On my assessment patient is adequately resuscitated with a normal lactate and good urine output  Would continue to balance ins and outs.    Will administer 5% albumin to see if pressor requirements can be reduced

## 2017-06-26 LAB
ALBUMIN SERPL ELPH-MCNC: 2 G/DL — LOW (ref 3.3–5)
ALP SERPL-CCNC: 89 U/L — SIGNIFICANT CHANGE UP (ref 40–120)
ALT FLD-CCNC: 7 U/L RC — LOW (ref 10–45)
ANION GAP SERPL CALC-SCNC: 7 MMOL/L — SIGNIFICANT CHANGE UP (ref 5–17)
APTT BLD: 43 SEC — HIGH (ref 27.5–37.4)
AST SERPL-CCNC: 12 U/L — SIGNIFICANT CHANGE UP (ref 10–40)
BILIRUB DIRECT SERPL-MCNC: 0.4 MG/DL — HIGH (ref 0–0.2)
BILIRUB INDIRECT FLD-MCNC: 0.3 MG/DL — SIGNIFICANT CHANGE UP (ref 0.2–1)
BILIRUB SERPL-MCNC: 0.7 MG/DL — SIGNIFICANT CHANGE UP (ref 0.2–1.2)
BLD GP AB SCN SERPL QL: NEGATIVE — SIGNIFICANT CHANGE UP
BUN SERPL-MCNC: 14 MG/DL — SIGNIFICANT CHANGE UP (ref 7–23)
CA-I BLD-SCNC: 1.18 MMOL/L — SIGNIFICANT CHANGE UP (ref 1.12–1.3)
CALCIUM SERPL-MCNC: 8.2 MG/DL — LOW (ref 8.4–10.5)
CHLORIDE SERPL-SCNC: 115 MMOL/L — HIGH (ref 96–108)
CO2 SERPL-SCNC: 25 MMOL/L — SIGNIFICANT CHANGE UP (ref 22–31)
CREAT SERPL-MCNC: 1.41 MG/DL — HIGH (ref 0.5–1.3)
GAS PNL BLDA: SIGNIFICANT CHANGE UP
GLUCOSE SERPL-MCNC: 147 MG/DL — HIGH (ref 70–99)
HCT VFR BLD CALC: 21.4 % — LOW (ref 34.5–45)
HCT VFR BLD CALC: 25.7 % — LOW (ref 34.5–45)
HGB BLD-MCNC: 7.2 G/DL — LOW (ref 11.5–15.5)
HGB BLD-MCNC: 8.6 G/DL — LOW (ref 11.5–15.5)
INR BLD: 1.3 RATIO — HIGH (ref 0.88–1.16)
MAGNESIUM SERPL-MCNC: 2.3 MG/DL — SIGNIFICANT CHANGE UP (ref 1.6–2.6)
MCHC RBC-ENTMCNC: 32.9 PG — SIGNIFICANT CHANGE UP (ref 27–34)
MCHC RBC-ENTMCNC: 33.4 GM/DL — SIGNIFICANT CHANGE UP (ref 32–36)
MCHC RBC-ENTMCNC: 33.4 GM/DL — SIGNIFICANT CHANGE UP (ref 32–36)
MCHC RBC-ENTMCNC: 33.9 PG — SIGNIFICANT CHANGE UP (ref 27–34)
MCV RBC AUTO: 101 FL — HIGH (ref 80–100)
MCV RBC AUTO: 98.7 FL — SIGNIFICANT CHANGE UP (ref 80–100)
PHOSPHATE SERPL-MCNC: 4.1 MG/DL — SIGNIFICANT CHANGE UP (ref 2.5–4.5)
PLATELET # BLD AUTO: 415 K/UL — HIGH (ref 150–400)
PLATELET # BLD AUTO: 446 K/UL — HIGH (ref 150–400)
POTASSIUM SERPL-MCNC: 4 MMOL/L — SIGNIFICANT CHANGE UP (ref 3.5–5.3)
POTASSIUM SERPL-SCNC: 4 MMOL/L — SIGNIFICANT CHANGE UP (ref 3.5–5.3)
PROT SERPL-MCNC: 5.6 G/DL — LOW (ref 6–8.3)
PROTHROM AB SERPL-ACNC: 14.1 SEC — HIGH (ref 9.8–12.7)
RBC # BLD: 2.11 M/UL — LOW (ref 3.8–5.2)
RBC # BLD: 2.6 M/UL — LOW (ref 3.8–5.2)
RBC # FLD: 15.8 % — HIGH (ref 10.3–14.5)
RBC # FLD: 16 % — HIGH (ref 10.3–14.5)
RH IG SCN BLD-IMP: POSITIVE — SIGNIFICANT CHANGE UP
SODIUM SERPL-SCNC: 147 MMOL/L — HIGH (ref 135–145)
WBC # BLD: 5.4 K/UL — SIGNIFICANT CHANGE UP (ref 3.8–10.5)
WBC # BLD: 6.5 K/UL — SIGNIFICANT CHANGE UP (ref 3.8–10.5)
WBC # FLD AUTO: 5.4 K/UL — SIGNIFICANT CHANGE UP (ref 3.8–10.5)
WBC # FLD AUTO: 6.5 K/UL — SIGNIFICANT CHANGE UP (ref 3.8–10.5)

## 2017-06-26 PROCEDURE — 99291 CRITICAL CARE FIRST HOUR: CPT

## 2017-06-26 PROCEDURE — 71010: CPT | Mod: 26

## 2017-06-26 PROCEDURE — 99232 SBSQ HOSP IP/OBS MODERATE 35: CPT

## 2017-06-26 RX ORDER — INSULIN LISPRO 100/ML
VIAL (ML) SUBCUTANEOUS EVERY 4 HOURS
Qty: 0 | Refills: 0 | Status: DISCONTINUED | OUTPATIENT
Start: 2017-06-26 | End: 2017-06-28

## 2017-06-26 RX ADMIN — IMIPENEM AND CILASTATIN 100 MILLIGRAM(S): 250; 250 INJECTION, POWDER, FOR SOLUTION INTRAVENOUS at 10:40

## 2017-06-26 RX ADMIN — Medication 1.4 MICROGRAM(S)/KG/MIN: at 12:23

## 2017-06-26 RX ADMIN — BUDESONIDE AND FORMOTEROL FUMARATE DIHYDRATE 2 PUFF(S): 160; 4.5 AEROSOL RESPIRATORY (INHALATION) at 21:42

## 2017-06-26 RX ADMIN — Medication 3 MILLILITER(S): at 05:32

## 2017-06-26 RX ADMIN — Medication 3 MILLILITER(S): at 23:35

## 2017-06-26 RX ADMIN — IMIPENEM AND CILASTATIN 100 MILLIGRAM(S): 250; 250 INJECTION, POWDER, FOR SOLUTION INTRAVENOUS at 03:02

## 2017-06-26 RX ADMIN — HYDROMORPHONE HYDROCHLORIDE 0.5 MILLIGRAM(S): 2 INJECTION INTRAMUSCULAR; INTRAVENOUS; SUBCUTANEOUS at 09:30

## 2017-06-26 RX ADMIN — HYDROMORPHONE HYDROCHLORIDE 0.5 MILLIGRAM(S): 2 INJECTION INTRAMUSCULAR; INTRAVENOUS; SUBCUTANEOUS at 23:11

## 2017-06-26 RX ADMIN — BUDESONIDE AND FORMOTEROL FUMARATE DIHYDRATE 2 PUFF(S): 160; 4.5 AEROSOL RESPIRATORY (INHALATION) at 11:48

## 2017-06-26 RX ADMIN — HYDROMORPHONE HYDROCHLORIDE 0.5 MILLIGRAM(S): 2 INJECTION INTRAMUSCULAR; INTRAVENOUS; SUBCUTANEOUS at 06:00

## 2017-06-26 RX ADMIN — IMIPENEM AND CILASTATIN 100 MILLIGRAM(S): 250; 250 INJECTION, POWDER, FOR SOLUTION INTRAVENOUS at 17:18

## 2017-06-26 RX ADMIN — HYDROMORPHONE HYDROCHLORIDE 0.5 MILLIGRAM(S): 2 INJECTION INTRAMUSCULAR; INTRAVENOUS; SUBCUTANEOUS at 20:19

## 2017-06-26 RX ADMIN — HYDROMORPHONE HYDROCHLORIDE 0.5 MILLIGRAM(S): 2 INJECTION INTRAMUSCULAR; INTRAVENOUS; SUBCUTANEOUS at 19:59

## 2017-06-26 RX ADMIN — Medication 3 MILLILITER(S): at 17:12

## 2017-06-26 RX ADMIN — FONDAPARINUX SODIUM 2.5 MILLIGRAM(S): 2.5 INJECTION, SOLUTION SUBCUTANEOUS at 10:40

## 2017-06-26 RX ADMIN — HYDROMORPHONE HYDROCHLORIDE 0.5 MILLIGRAM(S): 2 INJECTION INTRAMUSCULAR; INTRAVENOUS; SUBCUTANEOUS at 23:31

## 2017-06-26 RX ADMIN — HYDROMORPHONE HYDROCHLORIDE 0.5 MILLIGRAM(S): 2 INJECTION INTRAMUSCULAR; INTRAVENOUS; SUBCUTANEOUS at 12:45

## 2017-06-26 RX ADMIN — SODIUM CHLORIDE 75 MILLILITER(S): 9 INJECTION, SOLUTION INTRAVENOUS at 12:22

## 2017-06-26 RX ADMIN — HYDROMORPHONE HYDROCHLORIDE 0.5 MILLIGRAM(S): 2 INJECTION INTRAMUSCULAR; INTRAVENOUS; SUBCUTANEOUS at 17:15

## 2017-06-26 RX ADMIN — HYDROMORPHONE HYDROCHLORIDE 0.5 MILLIGRAM(S): 2 INJECTION INTRAMUSCULAR; INTRAVENOUS; SUBCUTANEOUS at 02:20

## 2017-06-26 RX ADMIN — Medication 3 MILLILITER(S): at 11:47

## 2017-06-26 RX ADMIN — PANTOPRAZOLE SODIUM 40 MILLIGRAM(S): 20 TABLET, DELAYED RELEASE ORAL at 12:22

## 2017-06-26 RX ADMIN — HYDROMORPHONE HYDROCHLORIDE 0.5 MILLIGRAM(S): 2 INJECTION INTRAMUSCULAR; INTRAVENOUS; SUBCUTANEOUS at 12:30

## 2017-06-26 RX ADMIN — HYDROMORPHONE HYDROCHLORIDE 0.5 MILLIGRAM(S): 2 INJECTION INTRAMUSCULAR; INTRAVENOUS; SUBCUTANEOUS at 17:00

## 2017-06-26 RX ADMIN — HYDROMORPHONE HYDROCHLORIDE 0.5 MILLIGRAM(S): 2 INJECTION INTRAMUSCULAR; INTRAVENOUS; SUBCUTANEOUS at 02:35

## 2017-06-26 RX ADMIN — ONDANSETRON 4 MILLIGRAM(S): 8 TABLET, FILM COATED ORAL at 12:27

## 2017-06-26 RX ADMIN — HYDROMORPHONE HYDROCHLORIDE 0.5 MILLIGRAM(S): 2 INJECTION INTRAMUSCULAR; INTRAVENOUS; SUBCUTANEOUS at 06:15

## 2017-06-26 RX ADMIN — HYDROMORPHONE HYDROCHLORIDE 0.5 MILLIGRAM(S): 2 INJECTION INTRAMUSCULAR; INTRAVENOUS; SUBCUTANEOUS at 09:45

## 2017-06-26 RX ADMIN — Medication 44 MICROGRAM(S): at 06:03

## 2017-06-26 RX ADMIN — ONDANSETRON 4 MILLIGRAM(S): 8 TABLET, FILM COATED ORAL at 20:10

## 2017-06-26 NOTE — PROGRESS NOTE ADULT - SUBJECTIVE AND OBJECTIVE BOX
ACS DAILY PROGRESS NOTE    HOSPITAL DAY #35  POST OPERATIVE DAY #: 17    STATUS POST:  exploratory laparotomy, extensive lysis of adhesions, takedown of enterocutaneous fistula, repair of serosal tear on cecum, partial right salpingooophorectomy, colostomy revision, parastomal and incisional hernia repair with strattice mesh          INTERVAL EVENTS:  Output from wound has started to decrease. Levophed dose 0.17 this morning.       OBJECTIVE:     Vital Signs     I&O's       Physical Exam:  GEN: NAD  Pulm: Normal respiratory pattern on NC at 2L  Abd: Wound with feculent output. NGT with bilious output  Ext: b/l UE edema           LABS:

## 2017-06-26 NOTE — PROGRESS NOTE ADULT - SUBJECTIVE AND OBJECTIVE BOX
Interval History/ROS:Patient is a 76y old  Female who presents with a chief complaint of Abdominal pain (26 May 2017 11:33)  events noted. Pt is back in SICU now on road spectrum antibiotics      REVIEW OF SYSTEMS:  [place]  abdomnal pain  imroved  productive cough  baldwin  remainder 10 point ROS negative      PAST MEDICAL & SURGICAL HISTORY:  Pulmonary embolism  Enterocutaneous fistula  Enterocutaneous fistula  Chronic diastolic CHF (congestive heart failure): mild diastolic dysfunction  Osteoarthritis of both knees, unspecified osteoarthritis type  Peripheral neuropathy  Clostridium difficile infection  HIT (heparin-induced thrombocytopenia)  Diverticulitis of intestine with abscess without bleedin  DVT (deep venous thrombosis): s/p IVC filter, which was later removed  Orthostatic hypotension  GERD (gastroesophageal reflux disease)  CHF (congestive heart failure)  Hypothyroid  Hypertension  COPD (chronic obstructive pulmonary disease)  CHF (congestive heart failure)  H/O: hypertension  Chronic obstructive pulmonary disease (COPD)  Colostomy in place: s/p duarte procedure for diverticulitis  Status post Ruben procedure: for diverticulitis  S/P exploratory laparotomy: EC fistula complicated with pelvic abscesses  Wound dehiscence: Wound dehiscence and evisceration, s/p abdominal reconstruction with biologic mesh  Sigmoid diverticulitis: Ex lap, sigmoid resection, creation of colostomy.  Intestinal perforation: 2015, s/p closure with strattice mesh  S/P colostomy      Allergies    azithromycin (Unknown)  codeine (Unknown)  heparin (Other)  PC Pen VK (Unknown)  penicillin (Unknown)    Intolerances        ANTIMICROBIALS:  imipenem/cilastatin  IVPB    imipenem/cilastatin  IVPB 500 every 8 hours      OTHER MEDS:  levothyroxine Injectable 44MICROGram(s) IV Push daily  pantoprazole  Injectable 40milliGRAM(s) IV Push daily  ondansetron Injectable 4milliGRAM(s) IV Push every 6 hours PRN  fondaparinux Injectable 2.5milliGRAM(s) SubCutaneous every 24 hours  insulin lispro (HumaLOG) corrective regimen sliding scale  SubCutaneous every 6 hours  ALBUTerol/ipratropium for Nebulization 3milliLiter(s) Nebulizer every 6 hours  buDESOnide 160 MICROgram(s)/formoterol 4.5 MICROgram(s) Inhaler 2Puff(s) Inhalation two times a day  sodium phosphate IVPB 15milliMole(s) IV Intermittent every 1 hour  dextrose 5% + lactated ringers. 1000milliLiter(s) IV Continuous <Continuous>  HYDROmorphone  Injectable 0.5milliGRAM(s) IV Push every 3 hours PRN  norepinephrine Infusion 0.01MICROgram(s)/kG/Min IV Continuous <Continuous>      Vital Signs Last 24 Hrs  T(C): 36.5, Max: 37.8 ( @ 19:45)  T(F): 97.7, Max: 100 ( @ 19:45)  HR: 99 (92 - 112)  BP: 103/51 (103/51 - 103/51)  BP(mean): 73 (73 - 73)  RR: 32 (12 - 39)  SpO2: 99% (96% - 100%)                          8.6    5.4   )-----------( 415      ( 2017 13:21 )             25.7           147<H>  |  115<H>  |  14  ----------------------------<  147<H>  4.0   |  25  |  1.41<H>    Ca    8.2<L>      2017 03:03  Phos  4.1       Mg     2.3         TPro  5.6<L>  /  Alb  2.0<L>  /  TBili  0.7  /  DBili  0.4<H>  /  AST  12  /  ALT  7<L>  /  AlkPhos  89      LIVER FUNCTIONS - ( 2017 03:03 )  Alb: 2.0 g/dL / Pro: 5.6 g/dL / ALK PHOS: 89 U/L / ALT: 7 U/L RC / AST: 12 U/L / GGT: x                     MICROBIOLOGY:    RECENT CULTURES:   @ 17:48 .Urine Catheterized            No growth     @ 07:16 .Blood Blood-Venous            No growth to date.                        RADIOLOGY:

## 2017-06-26 NOTE — PROGRESS NOTE ADULT - ASSESSMENT
75 y/o female SICU day #5, POD #17 s/p ex-lap, extensive NANETTE, takedown of EC fistula, repair of cecal serosal tear, partial R salpingectomy, colostomy revision, parastomal and incisional hernia repair with Strattice mesh complicated by MRSA pneumonia requiring reintubation & vasopressor support but was extubated, hemodynamically stable, and transferred to the floors but was readmitted 6/22 for respiratory distress likely from pulmonary vascular congestion and hypotension requiring vasopressor support w/ concern for a new/recurrent EC fistula vs. anastomotic breakdown.    NEURO:  ·	Continue pain control with IV acetaminophen and Dilaudid PRN  CV:  ·	Monitor vital signs.  ·	Wean levophed as tolerated for goal MAP greater than 65.  RESP:  ·	Monitor pulse oximeter.  ·	pulmonary toilet, incentive spirometry  ·	Continue nasal cannula,  ·	Continue Duoneb and Symbicort for COPD.  GI/NUTRITION:  ·	NPO w/ NG tube for persistent post-operative ileus  ·	Monitor NG tube output and quality. Monitor ostomy for GI function.  ·	Eakins pouch to abdominal wound, monitor output  ·	Restart TPN     RENAL:  ·	TREVA improving.  ·	Monitor intake & output.  ·	Monitor electrolytes and replete as necessary.  ·	Continue with IVF  HEME:  ·	Arixtra for VTE prophylaxis in the setting of HIT history  ID  ·	Follow-up blood cultures.  ·	F/u sputum culture to ensure MRSA clearance  ·	New/recurrent EC fistula vs. anastomotic breakdown - continue imipenem.  ENDO:  ·	Monitor fingersticks q6hrs and ISS for glycemic control.  ·	Synthroid IV for hypothyroidism  DISPOSITION  ·	Full code.  ·	Will remain in SICU as patient continues to require vasopressor support.

## 2017-06-26 NOTE — PROGRESS NOTE ADULT - ASSESSMENT
ASSESSMENT  76y female with high volume ECF s/p exploratory laparotomy, extensive lysis of adhesions, takedown of enterocutaneous fistula, repair of serosal tear on cecum, partial right salpingooophorectomy, colostomy revision, parastomal and incisional hernia repair with strattice mesh, POD 16    PLAN  - c/w primaxin and vanco  - wean pressors as tolerated  - Atrixtra for DVT prophylaxis with h/o HIT  - care per SICKAREL Ansari PGY1  Pager: [phone]

## 2017-06-26 NOTE — PROGRESS NOTE ADULT - ASSESSMENT
75 y/o female SICU day #5, POD #17 s/p ex-lap, extensive NANETTE, takedown of EC fistula, repair of cecal serosal tear, partial R salpingectomy, colostomy revision, parastomal and incisional hernia repair with Strattice mesh complicated by MRSA pneumonia requiring reintubation & vasopressor support but was extubated, hemodynamically stable, and transferred to the floors but was readmitted 6/22 for respiratory distress likely from pulmonary vascular congestion and hypotension requiring vasopressor support w/ concern for a new/recurrent EC fistula vs. anastomotic breakdown.  will continue Imipenem  Team to address surgical options  await final cultures  team to address ostomy

## 2017-06-26 NOTE — PROGRESS NOTE ADULT - ASSESSMENT
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  Acute on chronic renal failure  Anemia  Hypernatremia

## 2017-06-26 NOTE — PROGRESS NOTE ADULT - ATTENDING COMMENTS
Chr pain control  Saturating well on 2l, continue ICS/bronchodilators  Septic shock hypotensive, titrate levo  Abx  NPO, restart TPN  TREVA resolving  H/o HIT on Arextra, platelets stable

## 2017-06-26 NOTE — PROGRESS NOTE ADULT - SUBJECTIVE AND OBJECTIVE BOX
Vital Signs  T(C): 36.7, Max: 37.8 (06-25 @ 19:45)  HR: 95 (93 - 113)  BP: 103/51 (103/51 - 103/51)  RR: 23 (13 - 39)  SpO2: 100% (91% - 100%)      Labs   06-26    147<H>  |  115<H>  |  14  ----------------------------<  147<H>  4.0   |  25  |  1.41<H>    Ca    8.2<L>      26 Jun 2017 03:03  Phos  4.1     06-26  Mg     2.3     06-26    TPro  5.6<L>  /  Alb  2.0<L>  /  TBili  0.7  /  DBili  0.4<H>  /  AST  12  /  ALT  7<L>  /  AlkPhos  89  06-26      LIVER FUNCTIONS - ( 26 Jun 2017 03:03 )  Alb: 2.0 g/dL / Pro: 5.6 g/dL / ALK PHOS: 89 U/L / ALT: 7 U/L RC / AST: 12 U/L / GGT: x           CAPILLARY BLOOD GLUCOSE  128 (26 Jun 2017 06:00)  112 (26 Jun 2017 00:00)    I&O's Detail    I & Os for current day (as of 26 Jun 2017 09:09)  =============================================  IN:    dextrose 5% + lactated ringers.: 1800 ml    norepinephrine Infusion: 585.6 ml    Solution: 500 ml    Solution: 250 ml    Solution: 100 ml    Total IN: 3235.6 ml  ---------------------------------------------  OUT:    Indwelling Catheter - Urethral: 3225 ml    Nasoenteral Tube: 900 ml    Drain: 400 ml    Total OUT: 4525 ml  ---------------------------------------------  Total NET: -1289.4 ml

## 2017-06-26 NOTE — PROGRESS NOTE ADULT - SUBJECTIVE AND OBJECTIVE BOX
NEPHROLOGY-[facility], [place] [phone]        Patient seen and examined in bed.  Off TPN.  Is on pressors        MEDICATIONS  (STANDING):  levothyroxine Injectable 44MICROGram(s) IV Push daily  pantoprazole  Injectable 40milliGRAM(s) IV Push daily  fondaparinux Injectable 2.5milliGRAM(s) SubCutaneous every 24 hours  insulin lispro (HumaLOG) corrective regimen sliding scale  SubCutaneous every 6 hours  ALBUTerol/ipratropium for Nebulization 3milliLiter(s) Nebulizer every 6 hours  buDESOnide 160 MICROgram(s)/formoterol 4.5 MICROgram(s) Inhaler 2Puff(s) Inhalation two times a day  imipenem/cilastatin  IVPB  IV Intermittent   imipenem/cilastatin  IVPB 500milliGRAM(s) IV Intermittent every 8 hours  sodium phosphate IVPB 15milliMole(s) IV Intermittent every 1 hour  dextrose 5% + lactated ringers. 1000milliLiter(s) IV Continuous <Continuous>  norepinephrine Infusion 0.01MICROgram(s)/kG/Min IV Continuous <Continuous>      VITAL:  T(C): , Max: 37.8 ( @ 19:45)  T(F): , Max: 100 ( @ 19:45)  HR: 95  BP: 103/51  BP(mean): 73  RR: 20  SpO2: 100%  Wt(kg): --    I and O's:  I & Os for 24h ending  @ 07:00  =============================================  IN: 3235.6 ml / OUT: 4525 ml / NET: -1289.4 ml    I & Os for current day (as of  @ 09:59)  =============================================  IN: 198.8 ml / OUT: 275 ml / NET: -76.2 ml        PHYSICAL EXAM:    Constitutional: NAD  HEENT: PERRLA    Neck:  No JVD  Respiratory: decreased breath sounds  Cardiovascular: S1 and S2  Gastrointestinal:  no bowel sounds, soft, + ostomy  Extremities: +  peripheral edema  Neurological: A/O x 3, no focal deficits  Psychiatric: Normal mood, normal affect  : +  Snyder  Skin: No rashes  Access: Not applicable    LABS:                        7.2    6.5   )-----------( 446      ( 2017 03:03 )             21.4     -    147<H>  |  115<H>  |  14  ----------------------------<  147<H>  4.0   |  25  |  1.41<H>    Ca    8.2<L>      2017 03:03  Phos  4.1       Mg     2.3         TPro  5.6<L>  /  Alb  2.0<L>  /  TBili  0.7  /  DBili  0.4<H>  /  AST  12  /  ALT  7<L>  /  AlkPhos  89            Urine Studies:  Urinalysis Basic - ( 2017 11:58 )    Color: Yellow / Appearance: Clear / S.013 / pH: x  Gluc: x / Ketone: Negative  / Bili: Negative / Urobili: 4   Blood: x / Protein: 100 mg/dL / Nitrite: Negative   Leuk Esterase: Negative / RBC: 3-5 /HPF / WBC 3-5 /HPF   Sq Epi: x / Non Sq Epi: OCC /HPF / Bacteria: Few /HPF      Sodium, Random Urine: 57 mmol/L ( @ 08:15)  Osmolality, Random Urine: 242 mos/kg ( @ 08:15)  Creatinine, Random Urine: 27 mg/dL ( @ 08:15)        RADIOLOGY & ADDITIONAL STUDIES:

## 2017-06-26 NOTE — PROGRESS NOTE ADULT - SUBJECTIVE AND OBJECTIVE BOX
HISTORY  75 y/o female with history significant for COPD, h/o DVT s/p IVC filter (now removed), GERD, hypothyroidism, HIT and diverticulitis s/p Ruben's, recurrent SBO s/p ex-lap small bowel resection, EC fistula s/p takedown, complicated by wound dehiscence, abdominal reconstruction underwent ex-lap, extensive NANETTE, EC fistula takedown, colostomy revision, and parastomal/incisional hernia repair on 6/9. She was admitted to SICU intubated post-operatively and was successfully extubated. However, after getting a CT with oral contrast, patient went into respiratory distress and was re-intubated and found to have MRSA pneumonia, likely aspiration in nature. She was subsequently extubated, weaned off pressors, and transferred to the floors. On the floor on 6/22, patient went into respiratory distress again likely secondary to pulmonary vascular congestion requiring BiPAP and became hypotensive requiring vasopressor support. Her wound also began draining thick brown fluid concerning for a new EC fistula vs. anastomotic breakdown and is currently on imipenem.    24 HOUR EVENTS: Given 500cc of 5% albumin for persistent pressor requirements w/ rising creatinine. Completed course of vancomycin for MRSA pneumonia yesterday. Eakins pouch placed on abdominal wound and connected to low continuous wall suction. Repeat sputum culture ordered overnight.    SUBJECTIVE/ROS: Pain controlled. No breathing difficulty.  [ ] A ten-point review of systems was otherwise negative except as noted.  [ ] Due to altered mental status/intubation, subjective information were not able to be obtained from the patient. History was obtained, to the extent possible, from review of the chart and collateral sources of information.      NEURO  RASS:     GCS:     CAM ICU:  Exam: awake, alert, oriented  Meds: ondansetron Injectable 4milliGRAM(s) IV Push every 6 hours PRN Nausea  HYDROmorphone  Injectable 0.5milliGRAM(s) IV Push every 3 hours PRN Moderate Pain (4 - 6)    [x] Adequacy of sedation and pain control has been assessed and adjusted      RESPIRATORY  RR: 15 (13 - 39)  SpO2: 100% (91% - 100%)  Exam: Non-labored  Mechanical Ventilation:   ABG - ( 26 Jun 2017 02:59 )  pH: 7.38  /  pCO2: 40    /  pO2: 137   / HCO3: 24    / Base Excess: -.8   /  SaO2: 99      Lactate: x          [N/A] Extubation Readiness Assessed  Meds: ALBUTerol/ipratropium for Nebulization 3milliLiter(s) Nebulizer every 6 hours  buDESOnide 160 MICROgram(s)/formoterol 4.5 MICROgram(s) Inhaler 2Puff(s) Inhalation two times a day        CARDIOVASCULAR  HR: 93 (93 - 121)  BP: 103/51 (103/51 - 103/51)  BP(mean): 73 (73 - 73)  ABP: 121/53 (94/42 - 127/53)  ABP(mean): 80 (62 - 83)      Exam: regular rate and rhythm  Cardiac Rhythm: sinus  Perfusion     [x]Adequate   [ ]Inadequate  Mentation   [x]Normal       [ ]Reduced  Extremities  [x]Warm         [ ]Cool  Volume Status [ ]Hypervolemic [x]Euvolemic [ ]Hypovolemic  Meds: norepinephrine Infusion 0.01MICROgram(s)/kG/Min IV Continuous <Continuous>      GI/NUTRITION  Exam: soft, nontender, Eakins pouch in place draining enteric contents  Diet: NPO w/ NGT  Meds: pantoprazole  Injectable 40milliGRAM(s) IV Push daily      GENITOURINARY  I&O's Detail  I & Os for 24h ending 06-25 @ 07:00  =============================================  IN:    dextrose 5% + lactated ringers.: 1725 ml    norepinephrine Infusion: 433.1 ml    Solution: 250 ml    Solution: 100 ml    Solution: 50 ml    Total IN: 2558.1 ml  ---------------------------------------------  OUT:    Indwelling Catheter - Urethral: 1635 ml    Nasoenteral Tube: 800 ml    Drain: 100 ml    Colostomy: 30 ml    Total OUT: 2565 ml  ---------------------------------------------  Total NET: -6.9 ml    I & Os for current day (as of 06-26 @ 05:34)  =============================================  IN:    dextrose 5% + lactated ringers.: 1650 ml    norepinephrine Infusion: 536.8 ml    Solution: 500 ml    Solution: 250 ml    Solution: 100 ml    Total IN: 3036.8 ml  ---------------------------------------------  OUT:    Indwelling Catheter - Urethral: 2950 ml    Nasoenteral Tube: 350 ml    Drain: 100 ml    Total OUT: 3400 ml  ---------------------------------------------  Total NET: -363.2 ml      06-26    147<H>  |  115<H>  |  14  ----------------------------<  147<H>  4.0   |  25  |  1.41<H>    Ca    8.2<L>      26 Jun 2017 03:03  Phos  4.1     06-26  Mg     2.3     06-26    TPro  5.6<L>  /  Alb  2.0<L>  /  TBili  0.7  /  DBili  0.4<H>  /  AST  12  /  ALT  7<L>  /  AlkPhos  89  06-26    [X] Snyder catheter, indication: Monitoring in critically ill  Meds: sodium phosphate IVPB 15milliMole(s) IV Intermittent every 1 hour  dextrose 5% + lactated ringers. 1000milliLiter(s) IV Continuous <Continuous>        HEMATOLOGIC  Meds: fondaparinux Injectable 2.5milliGRAM(s) SubCutaneous every 24 hours    [x] VTE Prophylaxis                        7.2    6.5   )-----------( 446      ( 26 Jun 2017 03:03 )             21.4     PT/INR - ( 26 Jun 2017 03:03 )   PT: 14.1 sec;   INR: 1.30 ratio         PTT - ( 26 Jun 2017 03:03 )  PTT:43.0 sec  Transfusion     [ ] PRBC   [ ] Platelets   [ ] FFP   [ ] Cryoprecipitate      INFECTIOUS DISEASES  WBC Count: 6.5 K/uL (06-26 @ 03:03)    RECENT CULTURES:  Specimen Source: .Urine Catheterized  Date/Time: 06-24 @ 17:48  Culture Results:   No growth  Gram Stain: --  Organism: --  Specimen Source: .Blood Blood-Venous  Date/Time: 06-23 @ 07:16  Culture Results:   No growth to date.  Gram Stain: --  Organism: --    Meds: imipenem/cilastatin  IVPB  IV Intermittent   imipenem/cilastatin  IVPB 500milliGRAM(s) IV Intermittent every 8 hours        ENDOCRINE  CAPILLARY BLOOD GLUCOSE  112 (26 Jun 2017 00:00)    Meds: levothyroxine Injectable 44MICROGram(s) IV Push daily  insulin lispro (HumaLOG) corrective regimen sliding scale  SubCutaneous every 6 hours        ACCESS DEVICES:  [X] Peripheral IV  [ ] Central Venous Line	[ ] R	[ ] L	[ ] IJ	[ ] Fem	[ ] SC	Placed:   [X] Arterial Line		[ ] R	[X] L	[X] Fem	[ ] Rad	[ ] Ax	Placed:   [X] PICC:					[ ] Mediport  [ ] Urinary Catheter, Date Placed:   [x] Necessity of urinary, arterial, and venous catheters discussed    OTHER MEDICATIONS:      CODE STATUS: Full Code      IMAGING: HISTORY  75 y/o female with history significant for COPD, h/o DVT s/p IVC filter (now removed), GERD, hypothyroidism, HIT and diverticulitis s/p Ruben's, recurrent SBO s/p ex-lap small bowel resection, EC fistula s/p takedown, complicated by wound dehiscence, abdominal reconstruction underwent ex-lap, extensive NANETTE, EC fistula takedown, colostomy revision, and parastomal/incisional hernia repair on 6/9. She was admitted to SICU intubated post-operatively and was successfully extubated. However, after getting a CT with oral contrast, patient went into respiratory distress and was re-intubated and found to have MRSA pneumonia, likely aspiration in nature. She was subsequently extubated, weaned off pressors, and transferred to the floors. On the floor on 6/22, patient went into respiratory distress again likely secondary to pulmonary vascular congestion requiring BiPAP and became hypotensive requiring vasopressor support. Her wound also began draining thick brown fluid concerning for a new EC fistula vs. anastomotic breakdown and is currently on imipenem.    24 HOUR EVENTS: Given 500cc of 5% albumin for persistent pressor requirements w/ rising creatinine. Completed course of vancomycin for MRSA pneumonia yesterday. Eakins pouch placed on abdominal wound and connected to low continuous wall suction. Repeat sputum culture ordered overnight.    SUBJECTIVE/ROS: Pain controlled. No breathing difficulty.  [ ] A ten-point review of systems was otherwise negative except as noted.  [ ] Due to altered mental status/intubation, subjective information were not able to be obtained from the patient. History was obtained, to the extent possible, from review of the chart and collateral sources of information.      NEURO  RASS:   0  GCS:     CAM ICU: negative  Exam: awake, alert, oriented  Meds: ondansetron Injectable 4milliGRAM(s) IV Push every 6 hours PRN Nausea  HYDROmorphone  Injectable 0.5milliGRAM(s) IV Push every 3 hours PRN Moderate Pain (4 - 6)    [x] Adequacy of sedation and pain control has been assessed and adjusted      RESPIRATORY  RR: 15 (13 - 39)  SpO2: 100% (91% - 100%)  Exam: Non-labored  Mechanical Ventilation:   ABG - ( 26 Jun 2017 02:59 )  pH: 7.38  /  pCO2: 40    /  pO2: 137   / HCO3: 24    / Base Excess: -.8   /  SaO2: 99      Lactate: x          [N/A] Extubation Readiness Assessed  Meds: ALBUTerol/ipratropium for Nebulization 3milliLiter(s) Nebulizer every 6 hours  buDESOnide 160 MICROgram(s)/formoterol 4.5 MICROgram(s) Inhaler 2Puff(s) Inhalation two times a day        CARDIOVASCULAR  HR: 93 (93 - 121)  BP: 103/51 (103/51 - 103/51)  BP(mean): 73 (73 - 73)  ABP: 121/53 (94/42 - 127/53)  ABP(mean): 80 (62 - 83)      Exam: regular rate and rhythm  Cardiac Rhythm: sinus  Perfusion     [x]Adequate   [ ]Inadequate  Mentation   [x]Normal       [ ]Reduced  Extremities  [x]Warm         [ ]Cool  Volume Status [ ]Hypervolemic [x]Euvolemic [ ]Hypovolemic  Meds: norepinephrine Infusion 0.01MICROgram(s)/kG/Min IV Continuous <Continuous>      GI/NUTRITION  Exam: soft, nontender, Eakins pouch in place draining enteric contents  Diet: NPO w/ NGT  Meds: pantoprazole  Injectable 40milliGRAM(s) IV Push daily      GENITOURINARY  I&O's Detail    I & Os for current day (as of 26 Jun 2017 07:26)  =============================================  IN:    dextrose 5% + lactated ringers.: 1800 ml    norepinephrine Infusion: 585.6 ml    Solution: 500 ml    Solution: 250 ml    Solution: 100 ml    Total IN: 3235.6 ml  ---------------------------------------------  OUT:    Indwelling Catheter - Urethral: 3225 ml    Nasoenteral Tube: 900 ml    Drain: 400 ml    Total OUT: 4525 ml  ---------------------------------------------  Total NET: -1289.4 ml        06-26    147<H>  |  115<H>  |  14  ----------------------------<  147<H>  4.0   |  25  |  1.41<H>    Ca    8.2<L>      26 Jun 2017 03:03  Phos  4.1     06-26  Mg     2.3     06-26    TPro  5.6<L>  /  Alb  2.0<L>  /  TBili  0.7  /  DBili  0.4<H>  /  AST  12  /  ALT  7<L>  /  AlkPhos  89  06-26    [X] Snyder catheter, indication: Monitoring in critically ill  Meds: sodium phosphate IVPB 15milliMole(s) IV Intermittent every 1 hour  dextrose 5% + lactated ringers. 1000milliLiter(s) IV Continuous <Continuous>        HEMATOLOGIC  Meds: fondaparinux Injectable 2.5milliGRAM(s) SubCutaneous every 24 hours    [x] VTE Prophylaxis                        7.2    6.5   )-----------( 446      ( 26 Jun 2017 03:03 )             21.4     PT/INR - ( 26 Jun 2017 03:03 )   PT: 14.1 sec;   INR: 1.30 ratio         PTT - ( 26 Jun 2017 03:03 )  PTT:43.0 sec  Transfusion     [ ] PRBC   [ ] Platelets   [ ] FFP   [ ] Cryoprecipitate      INFECTIOUS DISEASES  WBC Count: 6.5 K/uL (06-26 @ 03:03)    RECENT CULTURES:  Specimen Source: .Urine Catheterized  Date/Time: 06-24 @ 17:48  Culture Results:   No growth  Gram Stain: --  Organism: --  Specimen Source: .Blood Blood-Venous  Date/Time: 06-23 @ 07:16  Culture Results:   No growth to date.  Gram Stain: --  Organism: --    Meds: imipenem/cilastatin  IVPB  IV Intermittent   imipenem/cilastatin  IVPB 500milliGRAM(s) IV Intermittent every 8 hours        ENDOCRINE  CAPILLARY BLOOD GLUCOSE  112 (26 Jun 2017 00:00)    Meds: levothyroxine Injectable 44MICROGram(s) IV Push daily  insulin lispro (HumaLOG) corrective regimen sliding scale  SubCutaneous every 6 hours        ACCESS DEVICES:  [X] Peripheral IV  [ ] Central Venous Line	[ ] R	[ ] L	[ ] IJ	[ ] Fem	[ ] SC	Placed:   [X] Arterial Line		[ ] R	[X] L	[X] Fem	[ ] Rad	[ ] Ax	Placed:   [X] PICC:					[ ] Mediport  [ ] Urinary Catheter, Date Placed:   [x] Necessity of urinary, arterial, and venous catheters discussed    OTHER MEDICATIONS:      CODE STATUS: Full Code      IMAGING:

## 2017-06-27 DIAGNOSIS — E87.0 HYPEROSMOLALITY AND HYPERNATREMIA: ICD-10-CM

## 2017-06-27 LAB
ANION GAP SERPL CALC-SCNC: 10 MMOL/L — SIGNIFICANT CHANGE UP (ref 5–17)
APTT BLD: 38.5 SEC — HIGH (ref 27.5–37.4)
BUN SERPL-MCNC: 14 MG/DL — SIGNIFICANT CHANGE UP (ref 7–23)
CALCIUM SERPL-MCNC: 8.4 MG/DL — SIGNIFICANT CHANGE UP (ref 8.4–10.5)
CHLORIDE SERPL-SCNC: 116 MMOL/L — HIGH (ref 96–108)
CO2 SERPL-SCNC: 23 MMOL/L — SIGNIFICANT CHANGE UP (ref 22–31)
CREAT SERPL-MCNC: 1.29 MG/DL — SIGNIFICANT CHANGE UP (ref 0.5–1.3)
GLUCOSE SERPL-MCNC: 142 MG/DL — HIGH (ref 70–99)
HCT VFR BLD CALC: 26.4 % — LOW (ref 34.5–45)
HGB BLD-MCNC: 8.9 G/DL — LOW (ref 11.5–15.5)
INR BLD: 1.25 RATIO — HIGH (ref 0.88–1.16)
MAGNESIUM SERPL-MCNC: 2 MG/DL — SIGNIFICANT CHANGE UP (ref 1.6–2.6)
MCHC RBC-ENTMCNC: 33.4 PG — SIGNIFICANT CHANGE UP (ref 27–34)
MCHC RBC-ENTMCNC: 33.6 GM/DL — SIGNIFICANT CHANGE UP (ref 32–36)
MCV RBC AUTO: 99.4 FL — SIGNIFICANT CHANGE UP (ref 80–100)
PHOSPHATE SERPL-MCNC: 3 MG/DL — SIGNIFICANT CHANGE UP (ref 2.5–4.5)
PLATELET # BLD AUTO: 387 K/UL — SIGNIFICANT CHANGE UP (ref 150–400)
POTASSIUM SERPL-MCNC: 3.5 MMOL/L — SIGNIFICANT CHANGE UP (ref 3.5–5.3)
POTASSIUM SERPL-SCNC: 3.5 MMOL/L — SIGNIFICANT CHANGE UP (ref 3.5–5.3)
PROTHROM AB SERPL-ACNC: 13.7 SEC — HIGH (ref 9.8–12.7)
RBC # BLD: 2.66 M/UL — LOW (ref 3.8–5.2)
RBC # FLD: 15.9 % — HIGH (ref 10.3–14.5)
SODIUM SERPL-SCNC: 149 MMOL/L — HIGH (ref 135–145)
WBC # BLD: 5.1 K/UL — SIGNIFICANT CHANGE UP (ref 3.8–10.5)
WBC # FLD AUTO: 5.1 K/UL — SIGNIFICANT CHANGE UP (ref 3.8–10.5)

## 2017-06-27 PROCEDURE — 71010: CPT | Mod: 26

## 2017-06-27 PROCEDURE — 99232 SBSQ HOSP IP/OBS MODERATE 35: CPT

## 2017-06-27 PROCEDURE — 99291 CRITICAL CARE FIRST HOUR: CPT

## 2017-06-27 RX ORDER — NOREPINEPHRINE BITARTRATE/D5W 8 MG/250ML
0.01 PLASTIC BAG, INJECTION (ML) INTRAVENOUS
Qty: 8 | Refills: 0 | Status: DISCONTINUED | OUTPATIENT
Start: 2017-06-27 | End: 2017-06-27

## 2017-06-27 RX ORDER — FLUTICASONE PROPIONATE 50 MCG
1 SPRAY, SUSPENSION NASAL
Qty: 0 | Refills: 0 | Status: DISCONTINUED | OUTPATIENT
Start: 2017-06-27 | End: 2017-06-30

## 2017-06-27 RX ORDER — IMIPENEM AND CILASTATIN 250; 250 MG/100ML; MG/100ML
500 INJECTION, POWDER, FOR SOLUTION INTRAVENOUS EVERY 6 HOURS
Qty: 0 | Refills: 0 | Status: DISCONTINUED | OUTPATIENT
Start: 2017-06-27 | End: 2017-06-30

## 2017-06-27 RX ORDER — FENTANYL CITRATE 50 UG/ML
1 INJECTION INTRAVENOUS
Qty: 0 | Refills: 0 | Status: DISCONTINUED | OUTPATIENT
Start: 2017-06-27 | End: 2017-06-30

## 2017-06-27 RX ADMIN — HYDROMORPHONE HYDROCHLORIDE 0.5 MILLIGRAM(S): 2 INJECTION INTRAMUSCULAR; INTRAVENOUS; SUBCUTANEOUS at 12:21

## 2017-06-27 RX ADMIN — HYDROMORPHONE HYDROCHLORIDE 0.5 MILLIGRAM(S): 2 INJECTION INTRAMUSCULAR; INTRAVENOUS; SUBCUTANEOUS at 23:17

## 2017-06-27 RX ADMIN — Medication 44 MICROGRAM(S): at 05:23

## 2017-06-27 RX ADMIN — HYDROMORPHONE HYDROCHLORIDE 0.5 MILLIGRAM(S): 2 INJECTION INTRAMUSCULAR; INTRAVENOUS; SUBCUTANEOUS at 09:00

## 2017-06-27 RX ADMIN — FENTANYL CITRATE 1 PATCH: 50 INJECTION INTRAVENOUS at 11:07

## 2017-06-27 RX ADMIN — HYDROMORPHONE HYDROCHLORIDE 0.5 MILLIGRAM(S): 2 INJECTION INTRAMUSCULAR; INTRAVENOUS; SUBCUTANEOUS at 02:46

## 2017-06-27 RX ADMIN — IMIPENEM AND CILASTATIN 100 MILLIGRAM(S): 250; 250 INJECTION, POWDER, FOR SOLUTION INTRAVENOUS at 12:38

## 2017-06-27 RX ADMIN — BUDESONIDE AND FORMOTEROL FUMARATE DIHYDRATE 2 PUFF(S): 160; 4.5 AEROSOL RESPIRATORY (INHALATION) at 21:04

## 2017-06-27 RX ADMIN — Medication 1.4 MICROGRAM(S)/KG/MIN: at 07:47

## 2017-06-27 RX ADMIN — IMIPENEM AND CILASTATIN 100 MILLIGRAM(S): 250; 250 INJECTION, POWDER, FOR SOLUTION INTRAVENOUS at 17:58

## 2017-06-27 RX ADMIN — Medication 3 MILLILITER(S): at 11:36

## 2017-06-27 RX ADMIN — Medication 3 MILLILITER(S): at 06:01

## 2017-06-27 RX ADMIN — HYDROMORPHONE HYDROCHLORIDE 0.5 MILLIGRAM(S): 2 INJECTION INTRAMUSCULAR; INTRAVENOUS; SUBCUTANEOUS at 16:15

## 2017-06-27 RX ADMIN — Medication 3 MILLILITER(S): at 17:09

## 2017-06-27 RX ADMIN — IMIPENEM AND CILASTATIN 100 MILLIGRAM(S): 250; 250 INJECTION, POWDER, FOR SOLUTION INTRAVENOUS at 02:26

## 2017-06-27 RX ADMIN — HYDROMORPHONE HYDROCHLORIDE 0.5 MILLIGRAM(S): 2 INJECTION INTRAMUSCULAR; INTRAVENOUS; SUBCUTANEOUS at 19:53

## 2017-06-27 RX ADMIN — PANTOPRAZOLE SODIUM 40 MILLIGRAM(S): 20 TABLET, DELAYED RELEASE ORAL at 11:08

## 2017-06-27 RX ADMIN — IMIPENEM AND CILASTATIN 100 MILLIGRAM(S): 250; 250 INJECTION, POWDER, FOR SOLUTION INTRAVENOUS at 23:23

## 2017-06-27 RX ADMIN — HYDROMORPHONE HYDROCHLORIDE 0.5 MILLIGRAM(S): 2 INJECTION INTRAMUSCULAR; INTRAVENOUS; SUBCUTANEOUS at 23:37

## 2017-06-27 RX ADMIN — HYDROMORPHONE HYDROCHLORIDE 0.5 MILLIGRAM(S): 2 INJECTION INTRAMUSCULAR; INTRAVENOUS; SUBCUTANEOUS at 05:23

## 2017-06-27 RX ADMIN — Medication 1 SPRAY(S): at 17:58

## 2017-06-27 RX ADMIN — FONDAPARINUX SODIUM 2.5 MILLIGRAM(S): 2.5 INJECTION, SOLUTION SUBCUTANEOUS at 11:08

## 2017-06-27 RX ADMIN — HYDROMORPHONE HYDROCHLORIDE 0.5 MILLIGRAM(S): 2 INJECTION INTRAMUSCULAR; INTRAVENOUS; SUBCUTANEOUS at 16:00

## 2017-06-27 RX ADMIN — BUDESONIDE AND FORMOTEROL FUMARATE DIHYDRATE 2 PUFF(S): 160; 4.5 AEROSOL RESPIRATORY (INHALATION) at 11:36

## 2017-06-27 RX ADMIN — HYDROMORPHONE HYDROCHLORIDE 0.5 MILLIGRAM(S): 2 INJECTION INTRAMUSCULAR; INTRAVENOUS; SUBCUTANEOUS at 08:30

## 2017-06-27 RX ADMIN — HYDROMORPHONE HYDROCHLORIDE 0.5 MILLIGRAM(S): 2 INJECTION INTRAMUSCULAR; INTRAVENOUS; SUBCUTANEOUS at 05:43

## 2017-06-27 RX ADMIN — Medication 1.4 MICROGRAM(S)/KG/MIN: at 02:26

## 2017-06-27 RX ADMIN — HYDROMORPHONE HYDROCHLORIDE 0.5 MILLIGRAM(S): 2 INJECTION INTRAMUSCULAR; INTRAVENOUS; SUBCUTANEOUS at 19:33

## 2017-06-27 RX ADMIN — HYDROMORPHONE HYDROCHLORIDE 0.5 MILLIGRAM(S): 2 INJECTION INTRAMUSCULAR; INTRAVENOUS; SUBCUTANEOUS at 02:26

## 2017-06-27 RX ADMIN — HYDROMORPHONE HYDROCHLORIDE 0.5 MILLIGRAM(S): 2 INJECTION INTRAMUSCULAR; INTRAVENOUS; SUBCUTANEOUS at 12:32

## 2017-06-27 NOTE — CONSULT NOTE ADULT - SUBJECTIVE AND OBJECTIVE BOX
CC:Decreased hearing bilaterally    HPI: Patient is a 76y old  Female who presents with a chief complaint of Abdominal pain admitted with recurrent SBO. Pt. required exploratory lap for the small bowel obstruction. Pt. now c/o new onset hearing loss bilaterally..    PAST MEDICAL & SURGICAL HISTORY:  Pulmonary embolism  Enterocutaneous fistula  Chronic diastolic CHF (congestive heart failure): mild diastolic dysfunction  Osteoarthritis of both knees, unspecified osteoarthritis type  Peripheral neuropathy  Clostridium difficile infection  HIT (heparin-induced thrombocytopenia)  Diverticulitis of intestine with abscess without bleedin  DVT (deep venous thrombosis): s/p IVC filter, which was later removed  Orthostatic hypotension  GERD (gastroesophageal reflux disease)  Hypothyroid  Chronic obstructive pulmonary disease (COPD)  Colostomy in place: s/p duarte procedure for diverticulitis  Status post Ruben procedure: for diverticulitis  S/P exploratory laparotomy: EC fistula complicated with pelvic abscesses  Wound dehiscence: Wound dehiscence and evisceration, s/p abdominal reconstruction with biologic mesh  Sigmoid diverticulitis: Ex lap, sigmoid resection, creation of colostomy.  Intestinal perforation: 2015, s/p closure with strattice mesh    Allergies    azithromycin (Unknown)  codeine (Unknown)  heparin (Other)  PC Pen VK (Unknown)  penicillin (Unknown)        MEDICATIONS  (STANDING):  levothyroxine Injectable 44 MICROGram(s) IV Push daily  pantoprazole  Injectable 40 milliGRAM(s) IV Push daily  fondaparinux Injectable 2.5 milliGRAM(s) SubCutaneous every 24 hours  ALBUTerol/ipratropium for Nebulization 3 milliLiter(s) Nebulizer every 6 hours  buDESOnide 160 MICROgram(s)/formoterol 4.5 MICROgram(s) Inhaler 2 Puff(s) Inhalation two times a day  insulin lispro (HumaLOG) corrective regimen sliding scale   SubCutaneous every 4 hours  norepinephrine Infusion 0.01 MICROgram(s)/kG/Min (1.395 mL/Hr) IV Continuous <Continuous>  imipenem/cilastatin  IVPB 500 milliGRAM(s) IV Intermittent every 6 hours  fentaNYL   Patch  50 MICROgram(s)/Hr. 1 Patch Transdermal every 48 hours    MEDICATIONS  (PRN):  ondansetron Injectable 4 milliGRAM(s) IV Push every 6 hours PRN Nausea  HYDROmorphone  Injectable 0.5 milliGRAM(s) IV Push every 3 hours PRN Moderate Pain (4 - 6)    Social History: ????    ROS: ENT-bilateral hearing loss, GI-recurrent SBO,GERD, , CV-pulmonary embolus, Pulm, Neuro, Psych, MS, Heme, Endo, Constitional; all negative except as noted in HPI    Vital Signs Last 24 Hrs  T(C): 36.4 (2017 11:00), Max: 37 (2017 03:00)  T(F): 97.6 (2017 11:00), Max: 98.6 (2017 03:00)  HR: 110 (2017 13:00) (93 - 110)  BP: 97/46 (2017 20:00) (97/46 - 97/46)  BP(mean): 66 (2017 20:00) (66 - 66)  RR: 26 (2017 13:00) (13 - 37)  SpO2: 91% (2017 13:00) (91% - 100%)                          8.9    5.1   )-----------( 387      ( 2017 02:38 )             26.4    06-    149<H>  |  116<H>  |  14  ----------------------------<  142<H>  3.5   |  23  |  1.29    Ca    8.4      2017 02:38  Phos  3.0       Mg     2.0     27    TPro  5.6<L>  /  Alb  2.0<L>  /  TBili  0.7  /  DBili  0.4<H>  /  AST  12  /  ALT  7<L>  /  AlkPhos  89  06-26   PT/INR - ( 2017 02:38 )   PT: 13.7 sec;   INR: 1.25 ratio         PTT - ( 2017 02:38 )  PTT:38.5 sec    PHYSICAL EXAM:  Gen: NAD, well-developed  Head: Normocephalic, Atraumatic  Face: no edema/erythema/fluctuance, parotid glands soft without mass  Eyes: PERRL, EOMI, no scleral injection  Ears: Right - ear canal clear, TM intact without effusion            Left - ear canal clear, TM intact without effusion  Nose: Nares bilaterally patent, no discharge  Mouth: Mucosa moist, tongue/uvula midline, oropharynx clear  Neck: Flat, supple, no lymphadenopathy, trachea midline, no masses  Resp: breathing easily, no stridor  CV: no peripheral edema/cyanosis CC:Decreased hearing bilaterally    HPI: Patient is a 76y old  Female who presents with a chief complaint of Abdominal pain admitted with recurrent SBO. Pt. required exploratory lap for the small bowel obstruction. Pt. now c/o new onset hearing loss bilaterally since her surgery. Per her daughter she is always hard of hearing but patient feels it is worse acutely. She was on vanco but ID stopped this a couple days ago. No tinnitus or pain or drainage or vertigo.    PAST MEDICAL & SURGICAL HISTORY:  Pulmonary embolism  Enterocutaneous fistula  Chronic diastolic CHF (congestive heart failure): mild diastolic dysfunction  Osteoarthritis of both knees, unspecified osteoarthritis type  Peripheral neuropathy  Clostridium difficile infection  HIT (heparin-induced thrombocytopenia)  Diverticulitis of intestine with abscess without bleedin  DVT (deep venous thrombosis): s/p IVC filter, which was later removed  Orthostatic hypotension  GERD (gastroesophageal reflux disease)  Hypothyroid  Chronic obstructive pulmonary disease (COPD)  Colostomy in place: s/p duarte procedure for diverticulitis  Status post Ruben procedure: for diverticulitis  S/P exploratory laparotomy: EC fistula complicated with pelvic abscesses  Wound dehiscence: Wound dehiscence and evisceration, s/p abdominal reconstruction with biologic mesh  Sigmoid diverticulitis: Ex lap, sigmoid resection, creation of colostomy.  Intestinal perforation: 2015, s/p closure with strattice mesh    Allergies    azithromycin (Unknown)  codeine (Unknown)  heparin (Other)  PC Pen VK (Unknown)  penicillin (Unknown)        MEDICATIONS  (STANDING):  levothyroxine Injectable 44 MICROGram(s) IV Push daily  pantoprazole  Injectable 40 milliGRAM(s) IV Push daily  fondaparinux Injectable 2.5 milliGRAM(s) SubCutaneous every 24 hours  ALBUTerol/ipratropium for Nebulization 3 milliLiter(s) Nebulizer every 6 hours  buDESOnide 160 MICROgram(s)/formoterol 4.5 MICROgram(s) Inhaler 2 Puff(s) Inhalation two times a day  insulin lispro (HumaLOG) corrective regimen sliding scale   SubCutaneous every 4 hours  norepinephrine Infusion 0.01 MICROgram(s)/kG/Min (1.395 mL/Hr) IV Continuous <Continuous>  imipenem/cilastatin  IVPB 500 milliGRAM(s) IV Intermittent every 6 hours  fentaNYL   Patch  50 MICROgram(s)/Hr. 1 Patch Transdermal every 48 hours    MEDICATIONS  (PRN):  ondansetron Injectable 4 milliGRAM(s) IV Push every 6 hours PRN Nausea  HYDROmorphone  Injectable 0.5 milliGRAM(s) IV Push every 3 hours PRN Moderate Pain (4 - 6)    Social History: no tobacco use, no etoh    ROS: ENT-bilateral hearing loss, GI-recurrent SBO,GERD, , CV-pulmonary embolus, Pulm, Neuro, Psych, MS, Heme, Endo, Constitional; all negative except as noted in HPI    Vital Signs Last 24 Hrs  T(C): 36.4 (2017 11:00), Max: 37 (2017 03:00)  T(F): 97.6 (2017 11:00), Max: 98.6 (2017 03:00)  HR: 110 (2017 13:00) (93 - 110)  BP: 97/46 (2017 20:00) (97/46 - 97/46)  BP(mean): 66 (2017 20:00) (66 - 66)  RR: 26 (2017 13:00) (13 - 37)  SpO2: 91% (2017 13:00) (91% - 100%)                          8.9    5.1   )-----------( 387      ( 2017 02:38 )             26.4    06-27    149<H>  |  116<H>  |  14  ----------------------------<  142<H>  3.5   |  23  |  1.29    Ca    8.4      2017 02:38  Phos  3.0     06-  Mg     2.0     06-27    TPro  5.6<L>  /  Alb  2.0<L>  /  TBili  0.7  /  DBili  0.4<H>  /  AST  12  /  ALT  7<L>  /  AlkPhos  89  06-26   PT/INR - ( 2017 02:38 )   PT: 13.7 sec;   INR: 1.25 ratio         PTT - ( 2017 02:38 )  PTT:38.5 sec    PHYSICAL EXAM:  Gen: NAD, well-developed  Head: Normocephalic, Atraumatic  Face: no edema/erythema/fluctuance, parotid glands soft without mass  Eyes: PERRL, EOMI, no scleral injection  Ears: Right - ear canal clear, TM intact with jake effusion            Left - ear canal clear, TM intact with jake effusion  Nose: Nares bilaterally patent, no discharge, left nasal NG tube  Mouth: Mucosa dry, tongue/uvula midline, oropharynx clear  Neck: Flat, supple, no lymphadenopathy, trachea midline, no masses  Resp: breathing easily, no stridor  CV: no peripheral edema/cyanosis

## 2017-06-27 NOTE — PROGRESS NOTE ADULT - ATTENDING COMMENTS
Chr pain control  Saturating well on 2l, continue ICS/bronchodilators  Septic shock hypotensive, titrate levo  Abx  NPO, restart TPN  TREVA resolving  H/o HIT on Arextra, platelets stable.     I was physically present for the key portions of the evaluation and management (E/M) service provided.  I agree with the above history, physical, and plan which I have reviewed and edited where appropriate.     35 minutes spent on total encounter; more than 50% of the visit was spent counseling and/or coordinating care by the attending physician. Chr pain control, start fentanyl patch  Saturating well on 2l, continue ICS/bronchodilators  Septic shock hypotensive, titrate levo  Abx  NPO,  TPN  Developing hypernatremia, needs adj of TPN, Dr Carias aware  TREVA resolving  H/o HIT on Arixtra, platelets stable.     I was physically present for the key portions of the evaluation and management (E/M) service provided.  I agree with the above history, physical, and plan which I have reviewed and edited where appropriate.     35 minutes spent on total encounter; more than 50% of the visit was spent counseling and/or coordinating care by the attending physician.

## 2017-06-27 NOTE — PROGRESS NOTE ADULT - SUBJECTIVE AND OBJECTIVE BOX
Day #1 of restarted PN  PN infusion at 62  06-26 @ 07:01  -  06-27 @ 07:00  --------------------------------------------------------  IN:    dextrose 5% + lactated ringers.: 1125 mL    norepinephrine Infusion: 218.8 mL    Solution: 100 mL    TPN (Total Parenteral Nutrition): 808 mL  Total IN: 2251.8 mL    OUT:    Drain: 550 mL    Indwelling Catheter - Urethral: 1790 mL    Nasoenteral Tube: 825 mL  Total OUT: 3165 mL    Total NET: -913.2 mL        T(C): 36.4 (06-27-17 @ 07:00), Max: 37 (06-27-17 @ 03:00)  HR: 100 (06-27-17 @ 09:00) (93 - 110)  BP: 97/46 (06-26-17 @ 20:00) (97/46 - 97/46)  RR: 34 (06-27-17 @ 09:00) (12 - 37)  SpO2: 93% (06-27-17 @ 09:00) (91% - 100%)  Wt(kg): --    Labs   06-27    149<H>  |  116<H>  |  14  ----------------------------<  142<H>  3.5   |  23  |  1.29    Ca    8.4      27 Jun 2017 02:38  Phos  3.0     06-27  Mg     2.0     06-27    TPro  5.6<L>  /  Alb  2.0<L>  /  TBili  0.7  /  DBili  0.4<H>  /  AST  12  /  ALT  7<L>  /  AlkPhos  89  06-26      LIVER FUNCTIONS - ( 26 Jun 2017 03:03 )  Alb: 2.0 g/dL / Pro: 5.6 g/dL / ALK PHOS: 89 U/L / ALT: 7 U/L RC / AST: 12 U/L / GGT: x           CAPILLARY BLOOD GLUCOSE  125 (27 Jun 2017 06:00)  142 (27 Jun 2017 02:30)  129 (26 Jun 2017 22:00)  113 (26 Jun 2017 12:00)        I&O's Detail    26 Jun 2017 07:01  -  27 Jun 2017 07:00  --------------------------------------------------------  IN:    dextrose 5% + lactated ringers.: 1125 mL    norepinephrine Infusion: 218.8 mL    Solution: 100 mL    TPN (Total Parenteral Nutrition): 808 mL  Total IN: 2251.8 mL    OUT:    Drain: 550 mL    Indwelling Catheter - Urethral: 1790 mL    Nasoenteral Tube: 825 mL  Total OUT: 3165 mL    Total NET: -913.2 mL

## 2017-06-27 NOTE — PROGRESS NOTE ADULT - SUBJECTIVE AND OBJECTIVE BOX
Pt seen and examined  Chart reviewed  Resident note confirmed  Plan of care discussed with Dr. Harmon  Management per SICU team

## 2017-06-27 NOTE — PROGRESS NOTE ADULT - PROBLEM SELECTOR PLAN 1
renal function is stable.  Monitor BMP daily.  Last salt load from IV abx(almost 4 grams)  Can give D5W @ 80 c/hr for 18 hours for hypernatremia

## 2017-06-27 NOTE — PROGRESS NOTE ADULT - SUBJECTIVE AND OBJECTIVE BOX
NEPHROLOGY-[facility] [phone]        Patient seen and examined in the bed.  She recently got pain medications.  Pressors are still on        MEDICATIONS  (STANDING):  levothyroxine Injectable 44 MICROGram(s) IV Push daily  pantoprazole  Injectable 40 milliGRAM(s) IV Push daily  fondaparinux Injectable 2.5 milliGRAM(s) SubCutaneous every 24 hours  ALBUTerol/ipratropium for Nebulization 3 milliLiter(s) Nebulizer every 6 hours  buDESOnide 160 MICROgram(s)/formoterol 4.5 MICROgram(s) Inhaler 2 Puff(s) Inhalation two times a day  insulin lispro (HumaLOG) corrective regimen sliding scale   SubCutaneous every 4 hours  norepinephrine Infusion 0.01 MICROgram(s)/kG/Min (1.395 mL/Hr) IV Continuous <Continuous>  imipenem/cilastatin  IVPB 500 milliGRAM(s) IV Intermittent every 6 hours  fentaNYL   Patch  50 MICROgram(s)/Hr. 1 Patch Transdermal every 48 hours      VITAL:  T(C): , Max: 37 (06-27-17 @ 03:00)  T(F): , Max: 98.6 (06-27-17 @ 03:00)  HR: 105 (06-27-17 @ 10:00)  BP: 97/46 (06-26-17 @ 20:00)  BP(mean): 66 (06-26-17 @ 20:00)  RR: 27 (06-27-17 @ 10:00)  SpO2: 95% (06-27-17 @ 10:00)  Wt(kg): --    I and O's:    06-26 @ 07:01  -  06-27 @ 07:00  --------------------------------------------------------  IN: 2251.8 mL / OUT: 3165 mL / NET: -913.2 mL    06-27 @ 07:01  -  06-27 @ 10:21  --------------------------------------------------------  IN: 200.4 mL / OUT: 100 mL / NET: 100.4 mL          PHYSICAL EXAM:    Constitutional: NAD  HEENT: PERRLA    Neck:  No JVD  Respiratory: poor effort  Cardiovascular: S1 and S2  Gastrointestinal:hypoactive bowel sounds, soft, NT/ND; + ostomy  Extremities: + 1 peripheral edema  Neurological: A/O x 3, no focal deficits  Psychiatric: Normal mood, normal affect  : + Snyder  Skin: No rashes  Access: Not applicable    LABS:                        8.9    5.1   )-----------( 387      ( 27 Jun 2017 02:38 )             26.4     06-27    149<H>  |  116<H>  |  14  ----------------------------<  142<H>  3.5   |  23  |  1.29    Ca    8.4      27 Jun 2017 02:38  Phos  3.0     06-27  Mg     2.0     06-27    TPro  5.6<L>  /  Alb  2.0<L>  /  TBili  0.7  /  DBili  0.4<H>  /  AST  12  /  ALT  7<L>  /  AlkPhos  89  06-26          Urine Studies:          RADIOLOGY & ADDITIONAL STUDIES:

## 2017-06-27 NOTE — PROGRESS NOTE ADULT - ASSESSMENT
75 y/o female SICU day #5, POD #17 s/p ex-lap, extensive NANETTE, takedown of EC fistula, repair of cecal serosal tear, partial R salpingectomy, colostomy revision, parastomal and incisional hernia repair with Strattice mesh complicated by MRSA pneumonia requiring reintubation & vasopressor support but was extubated, hemodynamically stable, and transferred to the floors but was readmitted 6/22 for respiratory distress likely from pulmonary vascular congestion and hypotension requiring vasopressor support w/ concern for a new/recurrent EC fistula vs. anastomotic breakdown.  team to address ostomy  on TPN  on Imipenem for now. will adjust to renal function. afebrile with no WBC

## 2017-06-27 NOTE — PROGRESS NOTE ADULT - SUBJECTIVE AND OBJECTIVE BOX
HISTORY  76 year old female with history significant for COPD, prior DVT s/p IVC filter (now removed), GERD, hypothyroidism, HIT and diverticulitis s/p Ruben's, and recurrent SBO requiring ex-lap small bowel resection, EC fistula s/p takedown that was complicated by wound dehiscence and abdominal reconstruction, who underwent ex-lap, extensive NANETTE, EC fistula takedown, colostomy revision, and parastomal/incisional hernia repair on 6/9. She was admitted to SICU intubated post-operatively and was successfully extubated. However, after getting a CT with oral contrast, patient went into respiratory distress and was re-intubated and found to have MRSA pneumonia, likely aspiration in nature. She was subsequently extubated, weaned off pressors, and transferred to the floors. On the floor on 6/22, patient went into respiratory distress again likely secondary to pulmonary vascular congestion requiring BiPAP and became hypotensive requiring vasopressor support. Her wound also began draining thick brown fluid concerning for a new EC fistula vs. anastomotic breakdown and is currently on imipenem. Pressor needs decreasing, wound pouched with some improvement in clinical status, remains extubated, intermittently requiring BiPAP    24 HOUR EVENTS: transfused 1 u PRBC, sputum culture requested, finished vancomycin for MRSA pneumonia    SUBJECTIVE/ROS:  [ ] A ten-point review of systems was otherwise negative except as noted.  [x] Due to altered mental status/intubation, subjective information were not able to be obtained from the patient. History was obtained, to the extent possible, from review of the chart and collateral sources of information.      NEURO  RASS:     GCS:     CAM ICU:  Exam: alert, responsive  Meds: ondansetron Injectable 4 milliGRAM(s) IV Push every 6 hours PRN Nausea  HYDROmorphone  Injectable 0.5 milliGRAM(s) IV Push every 3 hours PRN Moderate Pain (4 - 6)  [x] Adequacy of sedation and pain control has been assessed and adjusted      RESPIRATORY  RR: 35 (06-27-17 @ 08:00) (12 - 37)  SpO2: 93% (06-27-17 @ 08:00) (91% - 100%)  Exam: tachypneic, no rhonchi, expiratory wheeze L > R  Mechanical Ventilation: --  ABG - ( 26 Jun 2017 02:59 )  pH: 7.38  /  pCO2: 40    /  pO2: 137   / HCO3: 24    / Base Excess: -.8   /  SaO2: 99      Lactate: x      [N/A] Extubation Readiness Assessed  Meds: ALBUTerol/ipratropium for Nebulization 3 milliLiter(s) Nebulizer every 6 hours  buDESOnide 160 MICROgram(s)/formoterol 4.5 MICROgram(s) Inhaler 2 Puff(s) Inhalation two times a day      CARDIOVASCULAR  HR: 106 (06-27-17 @ 08:00) (92 - 110)  BP: 97/46 (06-26-17 @ 20:00) (97/46 - 97/46)  BP(mean): 66 (06-26-17 @ 20:00) (66 - 66)  ABP: 115/46 (06-27-17 @ 08:00) (84/56 - 162/67)  ABP(mean): 75 (06-27-17 @ 08:00) (63 - 109)  Exam: mildly tachycardic, regular  Cardiac Rhythm: sinus  Perfusion     [x]Adequate   [ ]Inadequate  Mentation   [x]Normal       [ ]Reduced  Extremities  [x]Warm         [ ]Cool  Volume Status [ ]Hypervolemic [x]Euvolemic [ ]Hypovolemic  Meds: norepinephrine Infusion 0.01 MICROgram(s)/kG/Min IV Continuous <Continuous>      GI/NUTRITION  Exam: soft, nontender, nondistended, incision C/D/I  Diet: TPN, NG tube to suction  Meds: pantoprazole  Injectable 40 milliGRAM(s) IV Push daily      GENITOURINARY  I&O's Detail    06-26 @ 07:01  -  06-27 @ 07:00  --------------------------------------------------------  IN:    dextrose 5% + lactated ringers.: 1125 mL    norepinephrine Infusion: 218.8 mL    Solution: 100 mL    TPN (Total Parenteral Nutrition): 808 mL  Total IN: 2251.8 mL    OUT:    Drain: 550 mL    Indwelling Catheter - Urethral: 1790 mL    Nasoenteral Tube: 825 mL  Total OUT: 3165 mL    Total NET: -913.2 mL    06-27    149<H>  |  116<H>  |  14  ----------------------------<  142<H>  3.5   |  23  |  1.29    Ca    8.4      27 Jun 2017 02:38  Phos  3.0     06-27  Mg     2.0     06-27    TPro  5.6<L>  /  Alb  2.0<L>  /  TBili  0.7  /  DBili  0.4<H>  /  AST  12  /  ALT  7<L>  /  AlkPhos  89  06-26    [x] Snyder catheter, indication: urine output monitoring in critically ill patient   Meds: --      HEMATOLOGIC  Meds: fondaparinux Injectable 2.5 milliGRAM(s) SubCutaneous every 24 hours    [x] VTE Prophylaxis                        8.9    5.1   )-----------( 387      ( 27 Jun 2017 02:38 )             26.4     PT/INR - ( 27 Jun 2017 02:38 )   PT: 13.7 sec;   INR: 1.25 ratio    PTT - ( 27 Jun 2017 02:38 )  PTT:38.5 sec  Transfusion     [1] PRBC   [ ] Platelets   [ ] FFP   [ ] Cryoprecipitate      INFECTIOUS DISEASES  T(C): 36.4 (06-27-17 @ 07:00), Max: 37 (06-27-17 @ 03:00)  Wt(kg): --  WBC Count: 5.1 K/uL (06-27 @ 02:38)  WBC Count: 5.4 K/uL (06-26 @ 13:21)  Recent Cultures: --  Meds: imipenem/cilastatin  IVPB   IV Intermittent   imipenem/cilastatin  IVPB 500 milliGRAM(s) IV Intermittent every 8 hours      ENDOCRINE  Capillary Blood Glucose  125 (27 Jun 2017 06:00)  142 (27 Jun 2017 02:30)  129 (26 Jun 2017 22:00)  113 (26 Jun 2017 12:00)  Meds: levothyroxine Injectable 44 MICROGram(s) IV Push daily  insulin lispro (HumaLOG) corrective regimen sliding scale   SubCutaneous every 4 hours      ACCESS DEVICES:  [ ] Peripheral IV  [ ] Central Venous Line	[ ] R	[ ] L	[ ] IJ	[ ] Fem	[ ] SC	Placed:   [x] Arterial Line		[ ] R	[x] L	[x] Fem	[ ] Rad	[ ] Ax	Placed:   [x] PICC:					[ ] Mediport  [x] Urinary Catheter, Date Placed:   [x] Necessity of urinary, arterial, and venous catheters discussed      OTHER MEDICATIONS: --      CODE STATUS: full code      IMAGING:

## 2017-06-27 NOTE — PROGRESS NOTE ADULT - ASSESSMENT
Pt hemodynamically stable enough to tolerate PN  Serum Na is rising will monitor and adjust PN as indicated

## 2017-06-27 NOTE — PROGRESS NOTE ADULT - ASSESSMENT
76 year old female SICU day #6, POD #18 s/p ex-lap, extensive NANETTE, takedown of EC fistula, repair of cecal serosal tear, partial R salpingectomy, colostomy revision, parastomal and incisional hernia repair with Strattice mesh complicated by MRSA pneumonia requiring reintubation and vasopressor support; extubated, hemodynamically stable, and transferred to the floors, but readmitted to SICU 6/22 for respiratory distress from pulmonary vascular congestion, associated with hypotension requiring pressors, likely secondary to sepsis with concern for a new entero-atmospheric fistula versus anastomotic leak, now with decreasing pressor requirements and stable respiratory status.    Neuro: chronic pain and postoperative pain associated with abdominal contamination  - continue multimodal pain control with acetaminophen, dilaudid  - request chronic wound consult for improved pain management strategy    CV: hypotensive secondary to sepsis, requiring pressors  - wean levophed as tolerated    Pulm: COPD  - continue inhaled meds for COPD  - encourage pulmonary hygiene  - noninvasive positive pressure as needed    GI/Nutrition: EC fistula, ostomy dysfunction  - NPO to control fistula output  - continue pouching to help control abdominal contamination  - may require revision of ostomy  - on TPN via PICC    /Renal: TREVA on CKD  - trend electrolytes, replete as needed  - monitor urine output    ID: pneumonia, abdominal contamination  - MRSA pneumonia s/p course of vancomycin, follow up sputum culture  - maintain pouch to help control fistula  - continue imipenem for sepsis secondary to fistula contamination of abdomen    Endocrine: hypothyroid, euglycemic  - no active concerns, monitor glycemic control on BMP  - continue synthroid    Skin: intact  - turn and position every 2 hours to promote skin protection    Prophylaxis  - VTE ppx with arixtra (prior HIT)    Dispo  - full code  - remain in SICU    --RENZO Samson, PGY-2

## 2017-06-27 NOTE — CHART NOTE - NSCHARTNOTEFT_GEN_A_CORE
SICU RD f/u: Pt c diverticulitis S/P Dunham's/ostomy creation, recurrent SBOs, enterocutaneous fistula; admitted c SBO, abdominal pain, loss of ostomy function, dislodged fistula plug. Pt now S/P 6/9  exploratory laparotomy, extensive lysis of adhesions, takedown of enterocutaneous fistula, repair of serosal tear on cecum, partial right salpingooophorectomy, colostomy revision, parastomal and incisional hernia repair with strattice mesh.     Pt returned to SICU 6/22 with evidence of fistula; placed on Bipap; contact isolation for MRSA. Per chart, colostomy remains dusky with no output; will require revision.    TPN held over the weekend due to pressors; TPN restarted 6/26. High NGT output remains.    Source: Patient [ ]    Family [ ]     other [X ]: medical record, team rounds    Diet : NPO with TPN      Patient reports [ ] nausea  [ ] vomiting [ ] diarrhea [ ] constipation  [ ]chewing problems [ ] swallowing issues  [X ] other:   No colostomy output; colostomy sweat noted.  NGT to low suction; 24-hour output = 825ml (6/27), 900ml (6/26).     PO intake:  < 50% [ ] 50-75% [ ]   % [ ]  other : n/a     Source for PO intake [ ] Patient [ ] family [ ] chart [ ] staff [ ] other    Enteral /Parenteral Nutrition: PN infusing at 62ml/hr (84Gm amino acids, 214Gm dextrose, 213ml 20%lipids) to provide 1490 clarke (20cal/Kg, 1.1Gm prot/Kg per dosing wt 74.4Kg). Of note, phosphorus removed from PN, calcium decreased to 5mEq, potassium decreased to 30mEq; acetate adjusted to 100mEq, chloride adjusted to 50mEq. MVI 9+3 10cc added + MTE 3cc.    Current Weight: 83.5Kg (6/24), 74.4Kg (dosing 5/23)  % Weight Change: 112% with edema    Edema: 3+ generalized    Pertinent Medications: MEDICATIONS  (STANDING):  pantoprazole  Injectable 40 milliGRAM(s) IV Push daily  ALBUTerol/ipratropium for Nebulization 3 milliLiter(s) Nebulizer every 6 hours  buDESOnide 160 MICROgram(s)/formoterol 4.5 MICROgram(s) Inhaler 2 Puff(s) Inhalation two times a day  insulin lispro (HumaLOG) corrective regimen sliding scale   SubCutaneous every 4 hours  norepinephrine Infusion 0.01 MICROgram(s)/kG/Min (1.395 mL/Hr) IV Continuous <Continuous>  fentaNYL   Patch  50 MICROgram(s)/Hr. 1 Patch Transdermal every 48 hours    MEDICATIONS  (PRN):  ondansetron Injectable 4 milliGRAM(s) IV Push every 6 hours PRN Nausea  HYDROmorphone  Injectable 0.5 milliGRAM(s) IV Push every 3 hours PRN Moderate Pain (4 - 6)    Pertinent Labs:  06-27 Na149 mmol/L<H> Glu 142 mg/dL<H> K+ 3.5 mmol/L Cr  1.29 mg/dL BUN 14 mg/dL Phos 3.0 mg/dL   Fingersticks x 24 hours: 113-145mg/dL    Skin: no pressure injuries noted    Estimated Needs:   [X ] no change since previous assessment  [ ] recalculated:       Previous Nutrition Diagnosis:   [ X]Inadequate Oral Intake       Nutrition Diagnosis is [X ] ongoing  [ ] resolved [ ] not applicable     New Nutrition Diagnosis: [X ] not applicable       Interventions:     Recommend    [X ] Change Diet To: Pending medical course and diet advancement, recommend clear liquids; advance to low fiber diet as tolerated and medically feasible    [ ] Nutrition Supplement    [ X] Nutrition Support: PN per Dr. Carias    [ ] Other:        Monitoring and Evaluation:     [ ] PO intake [ ] Tolerance to diet prescription [X ] weights [ X] follow up per protocol    [X ] other: monitor PN provision, ability to advance diet

## 2017-06-27 NOTE — PROGRESS NOTE ADULT - SUBJECTIVE AND OBJECTIVE BOX
Interval History/ROS:Patient is a 76y old  Female who presents with a chief complaint of Abdominal pain (26 May 2017 11:33)  77 y/o female SICU day #5, POD #17 s/p ex-lap, extensive NANETTE, takedown of EC fistula, repair of cecal serosal tear, partial R salpingectomy, colostomy revision, parastomal and incisional hernia repair with Strattice mesh complicated by MRSA pneumonia requiring reintubation & vasopressor support but was extubated, hemodynamically stable, and transferred to the floors but was readmitted  for respiratory distress likely from pulmonary vascular congestion and hypotension requiring vasopressor support w/ concern for a new/recurrent EC fistula vs. anastomotic breakdown.        REVIEW OF SYSTEMS:  CONSTITUTIONAL: No fever, weight loss, or fatigue  EYES: No eye pain, visual disturbances, or discharge  ENMT:  patient c/o worsening hearing over past two days  NECK: No pain or stiffness  RESPIRATORY: denies cough,No wheezing, chills or hemoptysis; No shortness of breath  CARDIOVASCULAR: No chest pain,No palpitations, dizziness, or leg swelling  GASTROINTESTINAL: ostomy not functioning.  ON TPN  GENITOURINARY:VAZQUEZ  NEUROLOGICAL: No headaches, memory loss, loss of strength, numbness, or tremors  SKIN: No itching, burning, rashes, or lesions   LYMPH NODES: No enlarged glands  ENDOCRINE: No heat or cold intolerance; No hair loss  MUSCULOSKELETAL: No joint pain or swelling; No muscle, back, or extremity pain  PSYCHIATRIC: No depression, anxiety, mood swings, or difficulty sleeping  HEME/LYMPH: No easy bruising, or bleeding gums  ALLERGY AND IMMUNOLOGIC: No hives or eczema      PAST MEDICAL & SURGICAL HISTORY:  Pulmonary embolism  Enterocutaneous fistula  Chronic diastolic CHF (congestive heart failure): mild diastolic dysfunction  Osteoarthritis of both knees, unspecified osteoarthritis type  Peripheral neuropathy  Clostridium difficile infection  HIT (heparin-induced thrombocytopenia)  Diverticulitis of intestine with abscess without bleedin  DVT (deep venous thrombosis): s/p IVC filter, which was later removed  Orthostatic hypotension  GERD (gastroesophageal reflux disease)  Hypothyroid  Chronic obstructive pulmonary disease (COPD)  Colostomy in place: s/p duarte procedure for diverticulitis  Status post Ruben procedure: for diverticulitis  S/P exploratory laparotomy: EC fistula complicated with pelvic abscesses  Wound dehiscence: Wound dehiscence and evisceration, s/p abdominal reconstruction with biologic mesh  Sigmoid diverticulitis: Ex lap, sigmoid resection, creation of colostomy.  Intestinal perforation: 2015, s/p closure with strattice mesh      Allergies    azithromycin (Unknown)  codeine (Unknown)  heparin (Other)  PC Pen VK (Unknown)  penicillin (Unknown)    Intolerances        ANTIMICROBIALS:  imipenem/cilastatin  IVPB 500 every 6 hours      OTHER MEDS:  levothyroxine Injectable 44 MICROGram(s) IV Push daily  pantoprazole  Injectable 40 milliGRAM(s) IV Push daily  ondansetron Injectable 4 milliGRAM(s) IV Push every 6 hours PRN  fondaparinux Injectable 2.5 milliGRAM(s) SubCutaneous every 24 hours  ALBUTerol/ipratropium for Nebulization 3 milliLiter(s) Nebulizer every 6 hours  buDESOnide 160 MICROgram(s)/formoterol 4.5 MICROgram(s) Inhaler 2 Puff(s) Inhalation two times a day  HYDROmorphone  Injectable 0.5 milliGRAM(s) IV Push every 3 hours PRN  insulin lispro (HumaLOG) corrective regimen sliding scale   SubCutaneous every 4 hours  norepinephrine Infusion 0.01 MICROgram(s)/kG/Min IV Continuous <Continuous>  fentaNYL   Patch  50 MICROgram(s)/Hr. 1 Patch Transdermal every 48 hours  fluticasone propionate 50 MICROgram(s)/spray Nasal Spray 1 Spray(s) Both Nostrils two times a day      Vital Signs Last 24 Hrs  T(C): 36.7 (2017 15:00), Max: 37 (2017 03:00)  T(F): 98 (2017 15:00), Max: 98.6 (2017 03:00)  HR: 110 (2017 15:00) (93 - 119)  BP: 97/46 (2017 20:00) (97/46 - 97/46)  BP(mean): 66 (2017 20:00) (66 - 66)  RR: 34 (2017 15:00) (13 - 37)  SpO2: 95% (2017 15:00) (91% - 100%)                              8.9    5.1   )-----------( 387      ( 2017 02:38 )             26.4       06-27    149<H>  |  116<H>  |  14  ----------------------------<  142<H>  3.5   |  23  |  1.29    Ca    8.4      2017 02:38  Phos  3.0       Mg     2.0         TPro  5.6<L>  /  Alb  2.0<L>  /  TBili  0.7  /  DBili  0.4<H>  /  AST  12  /  ALT  7<L>  /  AlkPhos  89      LIVER FUNCTIONS - ( 2017 03:03 )  Alb: 2.0 g/dL / Pro: 5.6 g/dL / ALK PHOS: 89 U/L / ALT: 7 U/L RC / AST: 12 U/L / GGT: x                     MICROBIOLOGY:    RECENT CULTURES:      Culture - Blood (17 @ 06:39)    Specimen Source: .Blood Blood-Peripheral    Culture Results:   No growth to date.    Culture - Blood (17 @ 06:38)    Specimen Source: .Blood Blood-Peripheral    Culture Results:   No growth to date.    Culture - Urine (17 @ 17:48)    Specimen Source: .Urine Catheterized    Culture Results:   No growth        < from: Xray Chest 1 View AP -PORTABLE-Routine (17 @ 06:40) >  EXAM:  CHEST PORTABLE ROUTINE                            PROCEDURE DATE:  2017            INTERPRETATION:  A single chest x-ray was obtained on 2017.    Indication: Assess for pulmonary vascular congestion.    Impression:    The heart is minimally enlarged. The lungs are clear. No radiographic   evidence for pulmonary vascular congestion. Left lower lobe pneumonia   and/or atelectasis. NG tube is in the stomach. A central line seen on the   left and the tip is in superior vena cava. No pneumothorax.                              RADIOLOGY:

## 2017-06-28 LAB
ANION GAP SERPL CALC-SCNC: 11 MMOL/L — SIGNIFICANT CHANGE UP (ref 5–17)
APTT BLD: 36.2 SEC — SIGNIFICANT CHANGE UP (ref 27.5–37.4)
BUN SERPL-MCNC: 20 MG/DL — SIGNIFICANT CHANGE UP (ref 7–23)
CALCIUM SERPL-MCNC: 8.8 MG/DL — SIGNIFICANT CHANGE UP (ref 8.4–10.5)
CHLORIDE SERPL-SCNC: 115 MMOL/L — HIGH (ref 96–108)
CO2 SERPL-SCNC: 24 MMOL/L — SIGNIFICANT CHANGE UP (ref 22–31)
CREAT SERPL-MCNC: 1.33 MG/DL — HIGH (ref 0.5–1.3)
CULTURE RESULTS: SIGNIFICANT CHANGE UP
CULTURE RESULTS: SIGNIFICANT CHANGE UP
GLUCOSE SERPL-MCNC: 148 MG/DL — HIGH (ref 70–99)
HCT VFR BLD CALC: 25.9 % — LOW (ref 34.5–45)
HGB BLD-MCNC: 8.5 G/DL — LOW (ref 11.5–15.5)
INR BLD: 1.19 RATIO — HIGH (ref 0.88–1.16)
MAGNESIUM SERPL-MCNC: 2.2 MG/DL — SIGNIFICANT CHANGE UP (ref 1.6–2.6)
MCHC RBC-ENTMCNC: 32.5 PG — SIGNIFICANT CHANGE UP (ref 27–34)
MCHC RBC-ENTMCNC: 32.7 GM/DL — SIGNIFICANT CHANGE UP (ref 32–36)
MCV RBC AUTO: 99.4 FL — SIGNIFICANT CHANGE UP (ref 80–100)
PHOSPHATE SERPL-MCNC: 2.2 MG/DL — LOW (ref 2.5–4.5)
PLATELET # BLD AUTO: 376 K/UL — SIGNIFICANT CHANGE UP (ref 150–400)
POTASSIUM SERPL-MCNC: 3.3 MMOL/L — LOW (ref 3.5–5.3)
POTASSIUM SERPL-SCNC: 3.3 MMOL/L — LOW (ref 3.5–5.3)
PROTHROM AB SERPL-ACNC: 13 SEC — HIGH (ref 9.8–12.7)
RBC # BLD: 2.61 M/UL — LOW (ref 3.8–5.2)
RBC # FLD: 16.3 % — HIGH (ref 10.3–14.5)
SODIUM SERPL-SCNC: 150 MMOL/L — HIGH (ref 135–145)
SPECIMEN SOURCE: SIGNIFICANT CHANGE UP
SPECIMEN SOURCE: SIGNIFICANT CHANGE UP
WBC # BLD: 4.2 K/UL — SIGNIFICANT CHANGE UP (ref 3.8–10.5)
WBC # FLD AUTO: 4.2 K/UL — SIGNIFICANT CHANGE UP (ref 3.8–10.5)

## 2017-06-28 PROCEDURE — 99232 SBSQ HOSP IP/OBS MODERATE 35: CPT

## 2017-06-28 PROCEDURE — 99233 SBSQ HOSP IP/OBS HIGH 50: CPT

## 2017-06-28 PROCEDURE — 71010: CPT | Mod: 26

## 2017-06-28 RX ORDER — ACETAMINOPHEN 500 MG
1000 TABLET ORAL ONCE
Qty: 0 | Refills: 0 | Status: COMPLETED | OUTPATIENT
Start: 2017-06-28 | End: 2017-06-28

## 2017-06-28 RX ORDER — ACETAMINOPHEN 500 MG
1000 TABLET ORAL ONCE
Qty: 0 | Refills: 0 | Status: COMPLETED | OUTPATIENT
Start: 2017-06-29 | End: 2017-06-29

## 2017-06-28 RX ORDER — POTASSIUM CHLORIDE 20 MEQ
10 PACKET (EA) ORAL
Qty: 0 | Refills: 0 | Status: COMPLETED | OUTPATIENT
Start: 2017-06-28 | End: 2017-06-28

## 2017-06-28 RX ORDER — POTASSIUM PHOSPHATE, MONOBASIC POTASSIUM PHOSPHATE, DIBASIC 236; 224 MG/ML; MG/ML
15 INJECTION, SOLUTION INTRAVENOUS ONCE
Qty: 0 | Refills: 0 | Status: COMPLETED | OUTPATIENT
Start: 2017-06-28 | End: 2017-06-28

## 2017-06-28 RX ORDER — INSULIN LISPRO 100/ML
VIAL (ML) SUBCUTANEOUS EVERY 6 HOURS
Qty: 0 | Refills: 0 | Status: DISCONTINUED | OUTPATIENT
Start: 2017-06-28 | End: 2017-06-30

## 2017-06-28 RX ADMIN — Medication 400 MILLIGRAM(S): at 15:07

## 2017-06-28 RX ADMIN — Medication 1 SPRAY(S): at 05:40

## 2017-06-28 RX ADMIN — FONDAPARINUX SODIUM 2.5 MILLIGRAM(S): 2.5 INJECTION, SOLUTION SUBCUTANEOUS at 12:17

## 2017-06-28 RX ADMIN — Medication 3 MILLILITER(S): at 06:06

## 2017-06-28 RX ADMIN — Medication 44 MICROGRAM(S): at 05:40

## 2017-06-28 RX ADMIN — HYDROMORPHONE HYDROCHLORIDE 0.5 MILLIGRAM(S): 2 INJECTION INTRAMUSCULAR; INTRAVENOUS; SUBCUTANEOUS at 02:30

## 2017-06-28 RX ADMIN — HYDROMORPHONE HYDROCHLORIDE 0.5 MILLIGRAM(S): 2 INJECTION INTRAMUSCULAR; INTRAVENOUS; SUBCUTANEOUS at 06:23

## 2017-06-28 RX ADMIN — BUDESONIDE AND FORMOTEROL FUMARATE DIHYDRATE 2 PUFF(S): 160; 4.5 AEROSOL RESPIRATORY (INHALATION) at 11:35

## 2017-06-28 RX ADMIN — IMIPENEM AND CILASTATIN 100 MILLIGRAM(S): 250; 250 INJECTION, POWDER, FOR SOLUTION INTRAVENOUS at 17:12

## 2017-06-28 RX ADMIN — Medication 400 MILLIGRAM(S): at 21:55

## 2017-06-28 RX ADMIN — HYDROMORPHONE HYDROCHLORIDE 0.5 MILLIGRAM(S): 2 INJECTION INTRAMUSCULAR; INTRAVENOUS; SUBCUTANEOUS at 20:30

## 2017-06-28 RX ADMIN — Medication 3 MILLILITER(S): at 11:18

## 2017-06-28 RX ADMIN — Medication 400 MILLIGRAM(S): at 10:00

## 2017-06-28 RX ADMIN — Medication 3 MILLILITER(S): at 00:07

## 2017-06-28 RX ADMIN — Medication 3 MILLILITER(S): at 17:13

## 2017-06-28 RX ADMIN — HYDROMORPHONE HYDROCHLORIDE 0.5 MILLIGRAM(S): 2 INJECTION INTRAMUSCULAR; INTRAVENOUS; SUBCUTANEOUS at 15:07

## 2017-06-28 RX ADMIN — Medication 1000 MILLIGRAM(S): at 22:10

## 2017-06-28 RX ADMIN — HYDROMORPHONE HYDROCHLORIDE 0.5 MILLIGRAM(S): 2 INJECTION INTRAMUSCULAR; INTRAVENOUS; SUBCUTANEOUS at 09:18

## 2017-06-28 RX ADMIN — Medication 100 MILLIEQUIVALENT(S): at 06:03

## 2017-06-28 RX ADMIN — IMIPENEM AND CILASTATIN 100 MILLIGRAM(S): 250; 250 INJECTION, POWDER, FOR SOLUTION INTRAVENOUS at 11:56

## 2017-06-28 RX ADMIN — HYDROMORPHONE HYDROCHLORIDE 0.5 MILLIGRAM(S): 2 INJECTION INTRAMUSCULAR; INTRAVENOUS; SUBCUTANEOUS at 02:45

## 2017-06-28 RX ADMIN — HYDROMORPHONE HYDROCHLORIDE 0.5 MILLIGRAM(S): 2 INJECTION INTRAMUSCULAR; INTRAVENOUS; SUBCUTANEOUS at 20:15

## 2017-06-28 RX ADMIN — Medication 3 MILLILITER(S): at 23:56

## 2017-06-28 RX ADMIN — HYDROMORPHONE HYDROCHLORIDE 0.5 MILLIGRAM(S): 2 INJECTION INTRAMUSCULAR; INTRAVENOUS; SUBCUTANEOUS at 09:03

## 2017-06-28 RX ADMIN — HYDROMORPHONE HYDROCHLORIDE 0.5 MILLIGRAM(S): 2 INJECTION INTRAMUSCULAR; INTRAVENOUS; SUBCUTANEOUS at 12:17

## 2017-06-28 RX ADMIN — Medication 100 MILLIEQUIVALENT(S): at 05:40

## 2017-06-28 RX ADMIN — PANTOPRAZOLE SODIUM 40 MILLIGRAM(S): 20 TABLET, DELAYED RELEASE ORAL at 11:56

## 2017-06-28 RX ADMIN — POTASSIUM PHOSPHATE, MONOBASIC POTASSIUM PHOSPHATE, DIBASIC 62.5 MILLIMOLE(S): 236; 224 INJECTION, SOLUTION INTRAVENOUS at 06:49

## 2017-06-28 RX ADMIN — Medication 1000 MILLIGRAM(S): at 15:15

## 2017-06-28 RX ADMIN — BUDESONIDE AND FORMOTEROL FUMARATE DIHYDRATE 2 PUFF(S): 160; 4.5 AEROSOL RESPIRATORY (INHALATION) at 22:12

## 2017-06-28 RX ADMIN — IMIPENEM AND CILASTATIN 100 MILLIGRAM(S): 250; 250 INJECTION, POWDER, FOR SOLUTION INTRAVENOUS at 05:40

## 2017-06-28 RX ADMIN — Medication 1 SPRAY(S): at 17:12

## 2017-06-28 RX ADMIN — Medication 100 MILLIEQUIVALENT(S): at 04:34

## 2017-06-28 RX ADMIN — HYDROMORPHONE HYDROCHLORIDE 0.5 MILLIGRAM(S): 2 INJECTION INTRAMUSCULAR; INTRAVENOUS; SUBCUTANEOUS at 11:56

## 2017-06-28 RX ADMIN — HYDROMORPHONE HYDROCHLORIDE 0.5 MILLIGRAM(S): 2 INJECTION INTRAMUSCULAR; INTRAVENOUS; SUBCUTANEOUS at 15:15

## 2017-06-28 RX ADMIN — ONDANSETRON 4 MILLIGRAM(S): 8 TABLET, FILM COATED ORAL at 07:54

## 2017-06-28 RX ADMIN — HYDROMORPHONE HYDROCHLORIDE 0.5 MILLIGRAM(S): 2 INJECTION INTRAMUSCULAR; INTRAVENOUS; SUBCUTANEOUS at 06:03

## 2017-06-28 RX ADMIN — Medication 1000 MILLIGRAM(S): at 10:30

## 2017-06-28 NOTE — PROGRESS NOTE ADULT - ATTENDING COMMENTS
Septic shock resolved, off Levo  Chr pain control,  fentanyl patch  Saturating well on 2l, continue ICS/bronchodilators  Abx  NPO,  TPN  Developing hypernatremia hypophosphatemia, TPN adjusted   TREVA resolving  H/o HIT on Arixtra, platelets stable.   Course of abx  Possible OR for revision of stoma    I was physically present for the key portions of the evaluation and management (E/M) service provided.  I agree with the above history, physical, and plan which I have reviewed and edited where appropriate. Septic shock resolved, off Levo  Chr pain control,  fentanyl patch  Saturating well on 2l, continue ICS/bronchodilators  Abx  NPO,  TPN  Hypernatremia hyperchloremia resolving with adjustment of TPN  TREVA resolving  H/o HIT on Arixtra, platelets stable.   Course of abx  Possible OR for revision of stoma    I was physically present for the key portions of the evaluation and management (E/M) service provided.  I agree with the above history, physical, and plan which I have reviewed and edited where appropriate.

## 2017-06-28 NOTE — PROGRESS NOTE ADULT - SUBJECTIVE AND OBJECTIVE BOX
HISTORY  76 year old female with history significant for COPD, prior DVT s/p IVC filter (now removed), GERD, hypothyroidism, HIT and diverticulitis s/p Ruben's, and recurrent SBO requiring ex-lap small bowel resection, EC fistula s/p takedown that was complicated by wound dehiscence and abdominal reconstruction, who underwent ex-lap, extensive NANETTE, EC fistula takedown, colostomy revision, and parastomal/incisional hernia repair on 6/9. She was admitted to SICU intubated post-operatively and was successfully extubated. However, after getting a CT with oral contrast, patient went into respiratory distress and was re-intubated and found to have MRSA pneumonia, likely aspiration in nature. She was subsequently extubated, weaned off pressors, and transferred to the floors. On the floor on 6/22, patient went into respiratory distress again likely secondary to pulmonary vascular congestion requiring BiPAP and became hypotensive requiring vasopressor support. Her wound also began draining thick brown fluid concerning for a new EC fistula vs. anastomotic breakdown and is currently on imipenem. Pressor needs decreasing, wound pouched with some improvement in clinical status, remains extubated, intermittently requiring BiPAP    24 HOUR EVENTS: ENT consulted for decreased hearing. She was found to have bilateral middle ear effusions. Levophed was weaned off entirely. Fentanyl patch started    SUBJECTIVE/ROS:  [x ] A ten-point review of systems was otherwise negative except as noted.  [ ] Due to altered mental status/intubation, subjective information were not able to be obtained from the patient. History was obtained, to the extent possible, from review of the chart and collateral sources of information.      NEURO  RASS: 0    GCS:     CAM ICU: neg  Exam: awake, alert, oriented  Meds: ondansetron Injectable 4 milliGRAM(s) IV Push every 6 hours PRN Nausea  HYDROmorphone  Injectable 0.5 milliGRAM(s) IV Push every 3 hours PRN Moderate Pain (4 - 6)  fentaNYL   Patch  50 MICROgram(s)/Hr. 1 Patch Transdermal every 48 hours    [x] Adequacy of sedation and pain control has been assessed and adjusted      RESPIRATORY  RR: 12 (06-28-17 @ 04:00) (12 - 35)  SpO2: 97% (06-28-17 @ 04:00) (91% - 100%)  Wt(kg): --  Exam: unlabored, clear to auscultation bilaterally  Mechanical Ventilation:     [N/A] Extubation Readiness Assessed  Meds: ALBUTerol/ipratropium for Nebulization 3 milliLiter(s) Nebulizer every 6 hours  buDESOnide 160 MICROgram(s)/formoterol 4.5 MICROgram(s) Inhaler 2 Puff(s) Inhalation two times a day      CARDIOVASCULAR  HR: 90 (06-28-17 @ 04:00) (90 - 119)  BP: 115/64 (06-27-17 @ 19:00) (115/64 - 115/64)  BP(mean): 84 (06-27-17 @ 19:00) (84 - 84)  ABP: 127/52 (06-28-17 @ 04:00) (101/40 - 132/53)  ABP(mean): 84 (06-28-17 @ 04:00) (64 - 87)  Wt(kg): --  CVP(cm H2O): --      Exam: regular rate and rhythm  Cardiac Rhythm: sinus  Perfusion     [x]Adequate   [ ]Inadequate  Mentation   [x]Normal       [ ]Reduced  Extremities  [x]Warm         [ ]Cool  Volume Status [ ]Hypervolemic [x]Euvolemic [ ]Hypovolemic  Meds:       GI/NUTRITION  Exam: soft, nontender, nondistended, Fausto's pouch in place, draining bilious fluid  Diet: NPO  Meds: pantoprazole  Injectable 40 milliGRAM(s) IV Push daily      GENITOURINARY  I&O's Detail    06-26 @ 07:01  -  06-27 @ 07:00  --------------------------------------------------------  IN:    dextrose 5% + lactated ringers.: 1125 mL    norepinephrine Infusion: 218.8 mL    Solution: 100 mL    TPN (Total Parenteral Nutrition): 808 mL  Total IN: 2251.8 mL    OUT:    Drain: 550 mL    Indwelling Catheter - Urethral: 1790 mL    Nasoenteral Tube: 825 mL  Total OUT: 3165 mL    Total NET: -913.2 mL      06-27 @ 07:01  -  06-28 @ 04:27  --------------------------------------------------------  IN:    norepinephrine Infusion: 14.4 mL    Solution: 300 mL    TPN (Total Parenteral Nutrition): 1240 mL  Total IN: 1554.4 mL    OUT:    Drain: 200 mL    Indwelling Catheter - Urethral: 1360 mL    Nasoenteral Tube: 450 mL  Total OUT: 2010 mL    Total NET: -455.6 mL          06-28    150<H>  |  115<H>  |  20  ----------------------------<  148<H>  3.3<L>   |  24  |  1.33<H>    Ca    8.8      28 Jun 2017 02:25  Phos  2.2     06-28  Mg     2.2     06-28      [x ] Snyder catheter, indication: urine monitoring in critically ill patient  Meds: sodium phosphate IVPB 15 milliMole(s) IV Intermittent every 1 hour  potassium chloride  10 mEq/100 mL IVPB 10 milliEquivalent(s) IV Intermittent every 1 hour        HEMATOLOGIC  Meds: fondaparinux Injectable 2.5 milliGRAM(s) SubCutaneous every 24 hours    [x] VTE Prophylaxis                        8.5    4.2   )-----------( 376      ( 28 Jun 2017 02:25 )             25.9     PT/INR - ( 28 Jun 2017 02:25 )   PT: 13.0 sec;   INR: 1.19 ratio         PTT - ( 28 Jun 2017 02:25 )  PTT:36.2 sec  Transfusion     [ ] PRBC   [ ] Platelets   [ ] FFP   [ ] Cryoprecipitate      INFECTIOUS DISEASES  WBC Count: 4.2 K/uL (06-28 @ 02:25)    RECENT CULTURES:    Meds: imipenem/cilastatin  IVPB 500 milliGRAM(s) IV Intermittent every 6 hours        ENDOCRINE  CAPILLARY BLOOD GLUCOSE  148 (28 Jun 2017 02:00)  121 (27 Jun 2017 22:00)  101 (27 Jun 2017 18:00)  146 (27 Jun 2017 15:00)  145 (27 Jun 2017 10:00)  125 (27 Jun 2017 06:00)        Meds: levothyroxine Injectable 44 MICROGram(s) IV Push daily  insulin lispro (HumaLOG) corrective regimen sliding scale   SubCutaneous every 4 hours        ACCESS DEVICES:  [x ] Peripheral IV  [ ] Central Venous Line	[ ] R	[ ] L	[ ] IJ	[ ] Fem	[ ] SC	Placed:   [ ] Arterial Line		[ ] R	[ ] L	[ ] Fem	[ ] Rad	[ ] Ax	Placed:   [ ] PICC:					[ ] Mediport  [ ] Urinary Catheter, Date Placed:   [x] Necessity of urinary, arterial, and venous catheters discussed    OTHER MEDICATIONS: x  fluticasone propionate 50 MICROgram(s)/spray Nasal Spray 1 Spray(s) Both Nostrils two times a day       CODE STATUS:  Full Code    IMAGING: x HISTORY  76 year old female with history significant for COPD, prior DVT s/p IVC filter (now removed), GERD, hypothyroidism, HIT and diverticulitis s/p Ruben's, and recurrent SBO requiring ex-lap small bowel resection, EC fistula s/p takedown that was complicated by wound dehiscence and abdominal reconstruction, who underwent ex-lap, extensive NANETTE, EC fistula takedown, colostomy revision, and parastomal/incisional hernia repair on 6/9. She was admitted to SICU intubated post-operatively and was successfully extubated. However, after getting a CT with oral contrast, patient went into respiratory distress and was re-intubated and found to have MRSA pneumonia, likely aspiration in nature. She was subsequently extubated, weaned off pressors, and transferred to the floors. On the floor on 6/22, patient went into respiratory distress again likely secondary to pulmonary vascular congestion requiring BiPAP and became hypotensive requiring vasopressor support. Her wound also began draining thick brown fluid concerning for a new EC fistula vs. anastomotic breakdown and is currently on imipenem. Pressor needs decreasing, wound pouched with some improvement in clinical status, remains extubated, intermittently requiring BiPAP    24 HOUR EVENTS: ENT consulted for decreased hearing. She was found to have bilateral middle ear effusions. Levophed was weaned off entirely. Fentanyl patch started    SUBJECTIVE/ROS:  [x ] A ten-point review of systems was otherwise negative except as noted.  [ ] Due to altered mental status/intubation, subjective information were not able to be obtained from the patient. History was obtained, to the extent possible, from review of the chart and collateral sources of information.      NEURO  RASS: 0    GCS:     CAM ICU: neg  Exam: awake, alert, oriented  Meds: ondansetron Injectable 4 milliGRAM(s) IV Push every 6 hours PRN Nausea  HYDROmorphone  Injectable 0.5 milliGRAM(s) IV Push every 3 hours PRN Moderate Pain (4 - 6)  fentaNYL   Patch  50 MICROgram(s)/Hr. 1 Patch Transdermal every 48 hours    [x] Adequacy of sedation and pain control has been assessed and adjusted      RESPIRATORY  RR: 12 (06-28-17 @ 04:00) (12 - 35)  SpO2: 97% (06-28-17 @ 04:00) (91% - 100%)  Wt(kg): --  Exam: unlabored, clear to auscultation bilaterally  Mechanical Ventilation:     [N/A] Extubation Readiness Assessed  Meds: ALBUTerol/ipratropium for Nebulization 3 milliLiter(s) Nebulizer every 6 hours  buDESOnide 160 MICROgram(s)/formoterol 4.5 MICROgram(s) Inhaler 2 Puff(s) Inhalation two times a day      CARDIOVASCULAR  HR: 90 (06-28-17 @ 04:00) (90 - 119)  BP: 115/64 (06-27-17 @ 19:00) (115/64 - 115/64)  BP(mean): 84 (06-27-17 @ 19:00) (84 - 84)  ABP: 127/52 (06-28-17 @ 04:00) (101/40 - 132/53)  ABP(mean): 84 (06-28-17 @ 04:00) (64 - 87)  Wt(kg): --  CVP(cm H2O): --      Exam: regular rate and rhythm  Cardiac Rhythm: sinus  Perfusion     [x]Adequate   [ ]Inadequate  Mentation   [x]Normal       [ ]Reduced  Extremities  [x]Warm         [ ]Cool  Volume Status [ ]Hypervolemic [x]Euvolemic [ ]Hypovolemic  Meds:       GI/NUTRITION  Exam: soft, nontender, nondistended, Fausto's pouch in place, draining bilious fluid  Diet: NPO  Meds: pantoprazole  Injectable 40 milliGRAM(s) IV Push daily      GENITOURINARY  I&O's Detail    27 Jun 2017 07:01  -  28 Jun 2017 07:00  --------------------------------------------------------  IN:    norepinephrine Infusion: 14.4 mL    Solution: 400 mL    Solution: 300 mL    Solution: 62.5 mL    TPN (Total Parenteral Nutrition): 1426 mL  Total IN: 2202.9 mL    OUT:    Drain: 200 mL    Indwelling Catheter - Urethral: 1735 mL    Nasoenteral Tube: 450 mL  Total OUT: 2385 mL    Total NET: -182.1 mL        06-28    150<H>  |  115<H>  |  20  ----------------------------<  148<H>  3.3<L>   |  24  |  1.33<H>    Ca    8.8      28 Jun 2017 02:25  Phos  2.2     06-28  Mg     2.2     06-28      [x ] Snyder catheter, indication: urine monitoring in critically ill patient  Meds: sodium phosphate IVPB 15 milliMole(s) IV Intermittent every 1 hour  potassium chloride  10 mEq/100 mL IVPB 10 milliEquivalent(s) IV Intermittent every 1 hour        HEMATOLOGIC  Meds: fondaparinux Injectable 2.5 milliGRAM(s) SubCutaneous every 24 hours    [x] VTE Prophylaxis                        8.5    4.2   )-----------( 376      ( 28 Jun 2017 02:25 )             25.9     PT/INR - ( 28 Jun 2017 02:25 )   PT: 13.0 sec;   INR: 1.19 ratio         PTT - ( 28 Jun 2017 02:25 )  PTT:36.2 sec  Transfusion     [ ] PRBC   [ ] Platelets   [ ] FFP   [ ] Cryoprecipitate      INFECTIOUS DISEASES  WBC Count: 4.2 K/uL (06-28 @ 02:25)    RECENT CULTURES:    Meds: imipenem/cilastatin  IVPB 500 milliGRAM(s) IV Intermittent every 6 hours        ENDOCRINE  CAPILLARY BLOOD GLUCOSE  148 (28 Jun 2017 02:00)  121 (27 Jun 2017 22:00)  101 (27 Jun 2017 18:00)  146 (27 Jun 2017 15:00)  145 (27 Jun 2017 10:00)  125 (27 Jun 2017 06:00)        Meds: levothyroxine Injectable 44 MICROGram(s) IV Push daily  insulin lispro (HumaLOG) corrective regimen sliding scale   SubCutaneous every 4 hours        ACCESS DEVICES:  [x ] Peripheral IV  [ ] Central Venous Line	[ ] R	[ ] L	[ ] IJ	[ ] Fem	[ ] SC	Placed:   [ ] Arterial Line		[ ] R	[ ] L	[ ] Fem	[ ] Rad	[ ] Ax	Placed:   [ ] PICC:					[ ] Mediport  [ ] Urinary Catheter, Date Placed:   [x] Necessity of urinary, arterial, and venous catheters discussed    OTHER MEDICATIONS: x  fluticasone propionate 50 MICROgram(s)/spray Nasal Spray 1 Spray(s) Both Nostrils two times a day       CODE STATUS:  Full Code    IMAGING: x

## 2017-06-28 NOTE — CONSULT NOTE ADULT - SUBJECTIVE AND OBJECTIVE BOX
PULMONARY CONSULT    HISTORY  76 year old female with history significant for COPD, prior DVT s/p IVC filter (now removed), GERD, hypothyroidism, HIT and diverticulitis s/p Ruben's, and recurrent SBO requiring ex-lap small bowel resection, EC fistula s/p takedown that was complicated by wound dehiscence and abdominal reconstruction, who underwent ex-lap, extensive NANETTE, EC fistula takedown, colostomy revision, and parastomal/incisional hernia repair on . She was admitted to SICU intubated post-operatively and was successfully extubated. However, after getting a CT with oral contrast, patient went into respiratory distress and was re-intubated and found to have MRSA pneumonia, likely aspiration in nature. She was subsequently extubated, weaned off pressors, and transferred to the floors. On the floor on , patient went into respiratory distress again likely secondary to pulmonary vascular congestion requiring BiPAP and became hypotensive requiring vasopressor support. Her wound also began draining thick brown fluid concerning for a new EC fistula vs. anastomotic breakdown and is currently on imipenem. Pressor needs decreasing, wound pouched with some improvement in clinical status, remains extubated, intermittently requiring BiPAP, now with decreased hearing. Called to eval resting comfortably in bed, no sob,-reno, -cp-, - pleuritc chest pain, ngt-suction, no abd pain + abd distension, no fever, no n/v/d ...          PAST MEDICAL & SURGICAL HISTORY:  Pulmonary embolism  Enterocutaneous fistula  Chronic diastolic CHF (congestive heart failure): mild diastolic dysfunction  Osteoarthritis of both knees, unspecified osteoarthritis type  Peripheral neuropathy  Clostridium difficile infection  HIT (heparin-induced thrombocytopenia)  Diverticulitis of intestine with abscess without bleedin  DVT (deep venous thrombosis): s/p IVC filter, which was later removed  Orthostatic hypotension  GERD (gastroesophageal reflux disease)  Hypothyroid  Chronic obstructive pulmonary disease (COPD)  Colostomy in place: s/p duarte procedure for diverticulitis  Status post Ruben procedure: for diverticulitis  S/P exploratory laparotomy: EC fistula complicated with pelvic abscesses  Wound dehiscence: Wound dehiscence and evisceration, s/p abdominal reconstruction with biologic mesh  Sigmoid diverticulitis: Ex lap, sigmoid resection, creation of colostomy.  Intestinal perforation: 2015, s/p closure with strattice mesh    Allergies    azithromycin (Unknown)  codeine (Unknown)  heparin (Other)  PC Pen VK (Unknown)  penicillin (Unknown)    Intolerances      FAMILY HISTORY:  No pertinent family history in first degree relatives    Social history: former smoker, multiple hosp admits    Review of Systems:  CONSTITUTIONAL: No fever, chills, or fatigue  EYES: No eye pain, visual disturbances, or discharge  ENMT:  No difficulty hearing, tinnitus, vertigo; No sinus or throat pain  NECK: No pain or stiffness  RESPIRATORY: Per above  CARDIOVASCULAR: No chest pain, palpitations, dizziness, or leg swelling  GASTROINTESTINAL: No abdominal or epigastric pain. No nausea, vomiting, or hematemesis; No diarrhea or constipation. No melena or hematochezia. + abd distention  GENITOURINARY: No dysuria, frequency, hematuria, or incontinence  NEUROLOGICAL: No headaches, memory loss, loss of strength, numbness, or tremors  SKIN: No itching, burning, rashes, or lesions   MUSCULOSKELETAL: No joint pain or swelling; No muscle, back, or extremity pain  PSYCHIATRIC: No depression, anxiety, mood swings, or difficulty sleeping      Medications:  MEDICATIONS  (STANDING):  levothyroxine Injectable 44 MICROGram(s) IV Push daily  pantoprazole  Injectable 40 milliGRAM(s) IV Push daily  fondaparinux Injectable 2.5 milliGRAM(s) SubCutaneous every 24 hours  ALBUTerol/ipratropium for Nebulization 3 milliLiter(s) Nebulizer every 6 hours  buDESOnide 160 MICROgram(s)/formoterol 4.5 MICROgram(s) Inhaler 2 Puff(s) Inhalation two times a day  imipenem/cilastatin  IVPB 500 milliGRAM(s) IV Intermittent every 6 hours  fentaNYL   Patch  50 MICROgram(s)/Hr. 1 Patch Transdermal every 48 hours  fluticasone propionate 50 MICROgram(s)/spray Nasal Spray 1 Spray(s) Both Nostrils two times a day  acetaminophen  IVPB. 1000 milliGRAM(s) IV Intermittent once  insulin lispro (HumaLOG) corrective regimen sliding scale   SubCutaneous every 6 hours    MEDICATIONS  (PRN):  ondansetron Injectable 4 milliGRAM(s) IV Push every 6 hours PRN Nausea  HYDROmorphone  Injectable 0.5 milliGRAM(s) IV Push every 3 hours PRN Moderate Pain (4 - 6)      oxygen - room air -o2 sat 96%      Vital Signs Last 24 Hrs  T(C): 36.1 (2017 12:00), Max: 37 (2017 23:00)  T(F): 97 (2017 12:00), Max: 98.6 (2017 23:00)  HR: 90 (2017 16:00) (90 - 111)  BP: 130/54 (2017 08:00) (115/64 - 130/54)  BP(mean): 88 (2017 08:00) (84 - 88)  RR: 11 (2017 16:00) (11 - 30)  SpO2: 97% (2017 16:00) (93% - 100%)             @ 07:01  -   @ 07:00  --------------------------------------------------------  IN: 2202.9 mL / OUT: 2385 mL / NET: -182.1 mL          LABS:                        8.5    4.2   )-----------( 376      ( 2017 02:25 )             25.9         150<H>  |  115<H>  |  20  ----------------------------<  148<H>  3.3<L>   |  24  |  1.33<H>    Ca    8.8      2017 02:25  Phos  2.2       Culture - Urine (17 @ 17:48)    Specimen Source: .Urine Catheterized    Culture Results:   No growth    Mg     2.2                 CAPILLARY BLOOD GLUCOSE  127 (2017 06:00)        PT/INR - ( 2017 02:25 )   PT: 13.0 sec;   INR: 1.19 ratio         PTT - ( 2017 02:25 )  PTT:36.2 sec              CULTURES: Culture - Blood (17 @ 06:39)    Specimen Source: .Blood Blood-Peripheral    Culture Results:   No growth to date.    Culture - Blood (17 @ 06:39)    Specimen Source: .Blood Blood-Peripheral    Culture Results:   No growth to date    Culture - Urine (17 @ 17:48)    Specimen Source: .Urine Catheterized    Culture Results:   No growth    Culture - Bronchial (17 @ 17:50)    -  Levofloxacin: R >4    -  Linezolid: S 2    -  Penicillin: R >8    -  Vancomycin: S 2    Gram Stain:   Numerous polymorphonuclear leukocytes per low power field  Few Squamous epithelial cells per low power field  Few Gram positive cocci in pairs per oil power field    -  Cefazolin: R >16    -  Clindamycin: R >4    -  Moxifloxacin(Aerobic): R >4    -  Oxacillin: R >2    -  RIF- Rifampin: S <=1    -  Tetra/Doxy: S <=1    -  Trimethoprim/Sulfamethoxazole: S <=0.5/9.5    -  Ampicillin/Sulbactam: R <=8/4    -  Erythromycin: R >4    -  Gentamicin: S <=1    -  Ciprofloxacin: R >2    Specimen Source: .Broncial Combicath    Culture Results:   Moderate Methicillin resistant Staphylococcus aureus  Normal Respiratory Francesca present    Organism Identification: Methicillin resistant Staphylococcus aureus    Organism: Methicillin resistant Staphylococcus aureus    Method Type: [facility]            Physical Examination:    General: No acute distress.      HEENT: Pupils equal, reactive to light.  Symmetric.    PULM: decreased bs bilat, no significant sputum production    CVS: S1, S2    ABD: distended, hypobowel sounds    EXT: No edema, nontender    SKIN: Warm and well perfused, no rashes noted.    NEURO: Alert, oriented, interactive, nonfocal          CXR< from: Xray Chest 1 View AP -PORTABLE-Routine (17 @ 07:12) >      PROCEDURE DATE:  2017            INTERPRETATION:  CLINICAL INFORMATION: Symptoms of pulmonary vascular   congestion.    A single portable view of the chest was obtained.     Comparison:Chest x-ray 2017.     The mediastinal cardiac silhouette is unremarkable. Nasogastric tube can   be traced to the stomach fundus.  Left PICC line with tip in superior vena cava.    Mild obscuration of the left hemidiaphragm and left costophrenicangle,   unchanged. Question atelectasis and or effusion. No evidence for   pulmonary edema.    No acute osseous finding.     IMPRESSION:    No change from prior.    <   CT chest:    TTE: PULMONARY CONSULT    HISTORY  76 year old female with history significant for COPD, prior DVT s/p IVC filter (now removed), GERD, hypothyroidism, HIT and diverticulitis s/p Ruben's, and recurrent SBO requiring ex-lap small bowel resection, EC fistula s/p takedown that was complicated by wound dehiscence and abdominal reconstruction, who underwent ex-lap, extensive NANETTE, EC fistula takedown, colostomy revision, and parastomal/incisional hernia repair on . She was admitted to SICU intubated post-operatively and was successfully extubated. However, after getting a CT with oral contrast, patient went into respiratory distress and was re-intubated and found to have MRSA pneumonia, likely aspiration in nature. She was subsequently extubated, weaned off pressors, and transferred to the floors. On the floor on , patient went into respiratory distress again likely secondary to pulmonary vascular congestion requiring BiPAP and became hypotensive requiring vasopressor support. Her wound also began draining thick brown fluid concerning for a new EC fistula vs. anastomotic breakdown and is currently on imipenem. Pressor needs decreasing, wound pouched with some improvement in clinical status, remains extubated, intermittently requiring BiPAP, now with decreased hearing. Called to eval resting comfortably in bed, no sob,-reno, -cp-, - pleuritc chest pain, ngt-suction, no abd pain + abd distension, no fever, no n/v/d ...          PAST MEDICAL & SURGICAL HISTORY:  Pulmonary embolism  Enterocutaneous fistula  Chronic diastolic CHF (congestive heart failure): mild diastolic dysfunction  Osteoarthritis of both knees, unspecified osteoarthritis type  Peripheral neuropathy  Clostridium difficile infection  HIT (heparin-induced thrombocytopenia)  Diverticulitis of intestine with abscess without bleedin  DVT (deep venous thrombosis): s/p IVC filter, which was later removed  Orthostatic hypotension  GERD (gastroesophageal reflux disease)  Hypothyroid  Chronic obstructive pulmonary disease (COPD)  Colostomy in place: s/p dunham procedure for diverticulitis  Status post Ruben procedure: for diverticulitis  S/P exploratory laparotomy: EC fistula complicated with pelvic abscesses  Wound dehiscence: Wound dehiscence and evisceration, s/p abdominal reconstruction with biologic mesh  Sigmoid diverticulitis: Ex lap, sigmoid resection, creation of colostomy.  Intestinal perforation: 2015, s/p closure with strattice mesh    Allergies    azithromycin (Unknown)  codeine (Unknown)  heparin (Other)  PC Pen VK (Unknown)  penicillin (Unknown)    Intolerances      FAMILY HISTORY:  No pertinent family history in first degree relatives    Social history: former smoker, multiple hosp admits    Review of Systems:  CONSTITUTIONAL: No fever, chills, or fatigue  EYES: No eye pain, visual disturbances, or discharge  ENMT:  No difficulty hearing, tinnitus, vertigo; No sinus or throat pain  NECK: No pain or stiffness  RESPIRATORY: Per above  CARDIOVASCULAR: No chest pain, palpitations, dizziness, or leg swelling  GASTROINTESTINAL: No abdominal or epigastric pain. No nausea, vomiting, or hematemesis; No diarrhea or constipation. No melena or hematochezia. + abd distention  GENITOURINARY: No dysuria, frequency, hematuria, or incontinence  NEUROLOGICAL: No headaches, memory loss, loss of strength, numbness, or tremors  SKIN: No itching, burning, rashes, or lesions   MUSCULOSKELETAL: No joint pain or swelling; No muscle, back, or extremity pain  PSYCHIATRIC: No depression, anxiety, mood swings, or difficulty sleeping      Medications:  MEDICATIONS  (STANDING):  levothyroxine Injectable 44 MICROGram(s) IV Push daily  pantoprazole  Injectable 40 milliGRAM(s) IV Push daily  fondaparinux Injectable 2.5 milliGRAM(s) SubCutaneous every 24 hours  ALBUTerol/ipratropium for Nebulization 3 milliLiter(s) Nebulizer every 6 hours  buDESOnide 160 MICROgram(s)/formoterol 4.5 MICROgram(s) Inhaler 2 Puff(s) Inhalation two times a day  imipenem/cilastatin  IVPB 500 milliGRAM(s) IV Intermittent every 6 hours  fentaNYL   Patch  50 MICROgram(s)/Hr. 1 Patch Transdermal every 48 hours  fluticasone propionate 50 MICROgram(s)/spray Nasal Spray 1 Spray(s) Both Nostrils two times a day  acetaminophen  IVPB. 1000 milliGRAM(s) IV Intermittent once  insulin lispro (HumaLOG) corrective regimen sliding scale   SubCutaneous every 6 hours    MEDICATIONS  (PRN):  ondansetron Injectable 4 milliGRAM(s) IV Push every 6 hours PRN Nausea  HYDROmorphone  Injectable 0.5 milliGRAM(s) IV Push every 3 hours PRN Moderate Pain (4 - 6)      oxygen - room air -o2 sat 96%      Vital Signs Last 24 Hrs  T(C): 36.1 (2017 12:00), Max: 37 (2017 23:00)  T(F): 97 (2017 12:00), Max: 98.6 (2017 23:00)  HR: 90 (2017 16:00) (90 - 111)  BP: 130/54 (2017 08:00) (115/64 - 130/54)  BP(mean): 88 (2017 08:00) (84 - 88)  RR: 11 (2017 16:00) (11 - 30)  SpO2: 97% (2017 16:00) (93% - 100%)             @ 07:01  -   @ 07:00  --------------------------------------------------------  IN: 2202.9 mL / OUT: 2385 mL / NET: -182.1 mL          LABS:                        8.5    4.2   )-----------( 376      ( 2017 02:25 )             25.9         150<H>  |  115<H>  |  20  ----------------------------<  148<H>  3.3<L>   |  24  |  1.33<H>    Ca    8.8      2017 02:25  Phos  2.2       Culture - Urine (17 @ 17:48)    Specimen Source: .Urine Catheterized    Culture Results:   No growth    Mg     2.2                 CAPILLARY BLOOD GLUCOSE  127 (2017 06:00)        PT/INR - ( 2017 02:25 )   PT: 13.0 sec;   INR: 1.19 ratio         PTT - ( 2017 02:25 )  PTT:36.2 sec              CULTURES: Culture - Blood (17 @ 06:39)    Specimen Source: .Blood Blood-Peripheral    Culture Results:   No growth to date.    Culture - Blood (17 @ 06:39)    Specimen Source: .Blood Blood-Peripheral    Culture Results:   No growth to date    Culture - Urine (17 @ 17:48)    Specimen Source: .Urine Catheterized    Culture Results:   No growth    Culture - Bronchial (17 @ 17:50)    -  Levofloxacin: R >4    -  Linezolid: S 2    -  Penicillin: R >8    -  Vancomycin: S 2    Gram Stain:   Numerous polymorphonuclear leukocytes per low power field  Few Squamous epithelial cells per low power field  Few Gram positive cocci in pairs per oil power field    -  Cefazolin: R >16    -  Clindamycin: R >4    -  Moxifloxacin(Aerobic): R >4    -  Oxacillin: R >2    -  RIF- Rifampin: S <=1    -  Tetra/Doxy: S <=1    -  Trimethoprim/Sulfamethoxazole: S <=0.5/9.5    -  Ampicillin/Sulbactam: R <=8/4    -  Erythromycin: R >4    -  Gentamicin: S <=1    -  Ciprofloxacin: R >2    Specimen Source: .Broncial Combicath    Culture Results:   Moderate Methicillin resistant Staphylococcus aureus  Normal Respiratory Francesca present    Organism Identification: Methicillin resistant Staphylococcus aureus    Organism: Methicillin resistant Staphylococcus aureus    Method Type: [facility]            Physical Examination:    General: No acute distress.      HEENT: Pupils equal, reactive to light.  Symmetric.    PULM: decreased bs bilat, no significant sputum production    CVS: S1, S2    ABD: distended, hypobowel sounds    EXT: No edema, nontender    SKIN: Warm and well perfused, no rashes noted.    NEURO: Alert, oriented, interactive, nonfocal          CXR< from: Xray Chest 1 View AP -PORTABLE-Routine (17 @ 07:12) >      PROCEDURE DATE:  2017            INTERPRETATION:  CLINICAL INFORMATION: Symptoms of pulmonary vascular   congestion.    A single portable view of the chest was obtained.     Comparison:Chest x-ray 2017.     The mediastinal cardiac silhouette is unremarkable. Nasogastric tube can   be traced to the stomach fundus.  Left PICC line with tip in superior vena cava.    Mild obscuration of the left hemidiaphragm and left costophrenicangle,   unchanged. Question atelectasis and or effusion. No evidence for   pulmonary edema.    No acute osseous finding.     IMPRESSION:    No change from prior.    <   CT abd< from: CT Abdomen and Pelvis No Cont (17 @ 16:16) >  INTERPRETATION:  CLINICAL INFORMATION: Enteral drainage from lower   portion of wound. Rule out abdominalcollection, new fistula.    COMPARISON: CT abdomen and pelvis 2017    PROCEDURE:   CT of the Abdomen and Pelvis was performed without intravenous contrast.   Intravenous contrast: None.  Oral contrast: None.  Sagittal and coronal reformats were performed.    FINDINGS:    LOWER CHEST: Trace left pleural effusion with bibasilar dependent   atelectasis, left greater than right.    LIVER: Within normal limits.  BILE DUCTS: Normal caliber.  GALLBLADDER: Status post cholecystectomy.  SPLEEN: Within normal limits.  PANCREAS: Within normal limits.  ADRENALS: Unchanged small bilateral adrenal nodules.  KIDNEYS/URETERS: 1.7 cm right renal cyst. No hydronephrosis.    BLADDER: Collapsed around a Snydre catheter.  REPRODUCTIVE ORGANS: The endometrial canal appears mildly thickened   measuring 2.5 cm.    BOWEL/ABDOMINAL WALL: Enteric tube is in the stomach. Status post   Dunham's procedure with colostomy in the left lower quadrant. 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 A catheter has been placed within the cutaneous   defect. There is persistent distention of the small bowel with relative   collapse of the terminal ileum,, unchanged from prior study and   suspicious for distal small bowel obstruction. Small bowel surgical   sutures noted subjacent to the abdominal wall defect.  Anterior and   bilateral subcutaneous soft tissue stranding is also present. There is a   small amount of ill-defined fluid in the anterior peritoneum near the   defect. Anterior abdominal wall surgical skin staples. Large ventral   abdominal wall mesh noted.  PERITONEUM: No ascites.  VESSELS:  IVC filter is noted. Atherosclerotic calcification of the   abdominal aorta and its branches.  RETROPERITONEUM: No lymphadenopathy.    BONES: Generalized osteopenia. Degenerative changes. Chronic L3 endplate   deformity.    IMPRESSION: Loops of matted small bowel are seen in close apposition to   the anterior lower abdominal wall defect, concerning for enterocutaneous   fistula, however evaluation is limited without enteric contrast..    Persistent dilated small bowel with relative collapse of the terminal   ileum raising suspicion for small bowel obstruction.    < end of copied text >  < from: CT Abdomen and Pelvis No Cont (17 @ 16:16) >  OMPARISON: CT abdomen and pelvis 2017    < end of copied text >    < from: TTE with Doppler (w/Cont) (17 @ 16:28) >  ------------------------------------------------------------------------  Observations:  Mitral Valve: Normal mitral valve.  Aortic Valve/Aorta: Aortic valve not visualized. Peak left  ventricular outflow tract gradient equals 7 mm Hg.  Left Atrium: Normal left atrium.  Left Ventricle: Hyperdynamic left ventricle.   Endocardial  visualization enhanced with intravenous injection of echo  contrast (Definity).  Right Heart: Normal right atrium. The right ventricle is  not well visualized; grossly normal right ventricular  systolic function.  Pericardium/Pleura: Normal pericardium with no pericardial  effusion.  Hemodynamic: Estimated right atrial pressure is 8 mm Hg.  Estimated right ventricular systolic pressureequals 37 mm  Hg, assuming right atrial pressure equals 8 mm Hg,  consistent with borderline pulmonary hypertension.  ------------------------------------------------------------------------  Conclusions:  1. Hyperdynamic left ventricle.   Endocardial visualization  enhanced with intravenous injection of echo contrast  (Definity).    < end of copied text >  < from: TTE with Doppler (w/Cont) (06.23.17 @ 16:28) >  Study Date: 2017    < end of copied text >      TTE: PULMONARY CONSULT    HISTORY  76 year old female with history significant for COPD, prior DVT s/p IVC filter, GERD, hypothyroidism, HIT and diverticulitis s/p Ruben's, and recurrent SBO requiring ex-lap small bowel resection, EC fistula s/p takedown that was complicated by wound dehiscence and abdominal reconstruction, who underwent ex-lap, extensive NANETTE, EC fistula takedown, colostomy revision, and parastomal/incisional hernia repair on . She was admitted to SICU intubated post-operatively and was successfully extubated. However, after getting a CT with oral contrast, patient went into respiratory distress and was re-intubated and found to have MRSA pneumonia, likely aspiration in nature. She was subsequently extubated, weaned off pressors, and transferred to the floors. On the floor on , patient went into respiratory distress again likely secondary to pulmonary vascular congestion requiring BiPAP and became hypotensive requiring vasopressor support. Her wound also began draining thick brown fluid concerning for a new EC fistula vs. anastomotic breakdown and is currently on imipenem. Pressor needs decreasing, wound pouched with some improvement in clinical status, remains extubated, intermittently requiring BiPAP, now with decreased hearing. Called to eval resting comfortably in bed, no sob,-reno, -cp-, - pleuritc chest pain, ngt-suction, no abd pain + abd distension, no fever, no n/v/d ...          PAST MEDICAL & SURGICAL HISTORY:  Pulmonary embolism  Enterocutaneous fistula  Chronic diastolic CHF (congestive heart failure): mild diastolic dysfunction  Osteoarthritis of both knees, unspecified osteoarthritis type  Peripheral neuropathy  Clostridium difficile infection  HIT (heparin-induced thrombocytopenia)  Diverticulitis of intestine with abscess without bleedin  DVT (deep venous thrombosis): s/p IVC filter, which was later removed  Orthostatic hypotension  GERD (gastroesophageal reflux disease)  Hypothyroid  Chronic obstructive pulmonary disease (COPD)  Colostomy in place: s/p dunham procedure for diverticulitis  Status post Ruben procedure: for diverticulitis  S/P exploratory laparotomy: EC fistula complicated with pelvic abscesses  Wound dehiscence: Wound dehiscence and evisceration, s/p abdominal reconstruction with biologic mesh  Sigmoid diverticulitis: Ex lap, sigmoid resection, creation of colostomy.  Intestinal perforation: 2015, s/p closure with strattice mesh    Allergies    azithromycin (Unknown)  codeine (Unknown)  heparin (Other)  PC Pen VK (Unknown)  penicillin (Unknown)    Intolerances      FAMILY HISTORY:  No pertinent family history in first degree relatives    Social history: former smoker, multiple hosp admits    Review of Systems:  CONSTITUTIONAL: No fever, chills, or fatigue  EYES: No eye pain, visual disturbances, or discharge  ENMT:  No difficulty hearing, tinnitus, vertigo; No sinus or throat pain  NECK: No pain or stiffness  RESPIRATORY: Per above  CARDIOVASCULAR: No chest pain, palpitations, dizziness, or leg swelling  GASTROINTESTINAL: No abdominal or epigastric pain. No nausea, vomiting, or hematemesis; No diarrhea or constipation. No melena or hematochezia. + abd distention  GENITOURINARY: No dysuria, frequency, hematuria, or incontinence  NEUROLOGICAL: No headaches, memory loss, loss of strength, numbness, or tremors  SKIN: No itching, burning, rashes, or lesions   MUSCULOSKELETAL: No joint pain or swelling; No muscle, back, or extremity pain  PSYCHIATRIC: No depression, anxiety, mood swings, or difficulty sleeping      Medications:  MEDICATIONS  (STANDING):  levothyroxine Injectable 44 MICROGram(s) IV Push daily  pantoprazole  Injectable 40 milliGRAM(s) IV Push daily  fondaparinux Injectable 2.5 milliGRAM(s) SubCutaneous every 24 hours  ALBUTerol/ipratropium for Nebulization 3 milliLiter(s) Nebulizer every 6 hours  buDESOnide 160 MICROgram(s)/formoterol 4.5 MICROgram(s) Inhaler 2 Puff(s) Inhalation two times a day  imipenem/cilastatin  IVPB 500 milliGRAM(s) IV Intermittent every 6 hours  fentaNYL   Patch  50 MICROgram(s)/Hr. 1 Patch Transdermal every 48 hours  fluticasone propionate 50 MICROgram(s)/spray Nasal Spray 1 Spray(s) Both Nostrils two times a day  acetaminophen  IVPB. 1000 milliGRAM(s) IV Intermittent once  insulin lispro (HumaLOG) corrective regimen sliding scale   SubCutaneous every 6 hours    MEDICATIONS  (PRN):  ondansetron Injectable 4 milliGRAM(s) IV Push every 6 hours PRN Nausea  HYDROmorphone  Injectable 0.5 milliGRAM(s) IV Push every 3 hours PRN Moderate Pain (4 - 6)      oxygen - room air -o2 sat 96%      Vital Signs Last 24 Hrs  T(C): 36.1 (2017 12:00), Max: 37 (2017 23:00)  T(F): 97 (2017 12:00), Max: 98.6 (2017 23:00)  HR: 90 (2017 16:00) (90 - 111)  BP: 130/54 (2017 08:00) (115/64 - 130/54)  BP(mean): 88 (2017 08:00) (84 - 88)  RR: 11 (2017 16:00) (11 - 30)  SpO2: 97% (2017 16:00) (93% - 100%)             @ 07:01  -   @ 07:00  --------------------------------------------------------  IN: 2202.9 mL / OUT: 2385 mL / NET: -182.1 mL          LABS:                        8.5    4.2   )-----------( 376      ( 2017 02:25 )             25.9         150<H>  |  115<H>  |  20  ----------------------------<  148<H>  3.3<L>   |  24  |  1.33<H>    Ca    8.8      2017 02:25  Phos  2.2       Culture - Urine (17 @ 17:48)    Specimen Source: .Urine Catheterized    Culture Results:   No growth    Mg     2.2                 CAPILLARY BLOOD GLUCOSE  127 (2017 06:00)        PT/INR - ( 2017 02:25 )   PT: 13.0 sec;   INR: 1.19 ratio         PTT - ( 2017 02:25 )  PTT:36.2 sec              CULTURES: Culture - Blood (17 @ 06:39)    Specimen Source: .Blood Blood-Peripheral    Culture Results:   No growth to date.    Culture - Blood (17 @ 06:39)    Specimen Source: .Blood Blood-Peripheral    Culture Results:   No growth to date    Culture - Urine (17 @ 17:48)    Specimen Source: .Urine Catheterized    Culture Results:   No growth    Culture - Bronchial (17 @ 17:50)    -  Levofloxacin: R >4    -  Linezolid: S 2    -  Penicillin: R >8    -  Vancomycin: S 2    Gram Stain:   Numerous polymorphonuclear leukocytes per low power field  Few Squamous epithelial cells per low power field  Few Gram positive cocci in pairs per oil power field    -  Cefazolin: R >16    -  Clindamycin: R >4    -  Moxifloxacin(Aerobic): R >4    -  Oxacillin: R >2    -  RIF- Rifampin: S <=1    -  Tetra/Doxy: S <=1    -  Trimethoprim/Sulfamethoxazole: S <=0.5/9.5    -  Ampicillin/Sulbactam: R <=8/4    -  Erythromycin: R >4    -  Gentamicin: S <=1    -  Ciprofloxacin: R >2    Specimen Source: .Broncial Combicath    Culture Results:   Moderate Methicillin resistant Staphylococcus aureus  Normal Respiratory Francesca present    Organism Identification: Methicillin resistant Staphylococcus aureus    Organism: Methicillin resistant Staphylococcus aureus    Method Type: [facility]            Physical Examination:    General: No acute distress.      HEENT: Pupils equal, reactive to light.  Symmetric.    PULM: decreased bs bilat, no significant sputum production    CVS: S1, S2    ABD: distended, hypobowel sounds    EXT: No edema, nontender    SKIN: Warm and well perfused, no rashes noted.    NEURO: Alert, oriented, interactive, nonfocal          CXR< from: Xray Chest 1 View AP -PORTABLE-Routine (17 @ 07:12) >      PROCEDURE DATE:  2017            INTERPRETATION:  CLINICAL INFORMATION: Symptoms of pulmonary vascular   congestion.    A single portable view of the chest was obtained.     Comparison:Chest x-ray 2017.     The mediastinal cardiac silhouette is unremarkable. Nasogastric tube can   be traced to the stomach fundus.  Left PICC line with tip in superior vena cava.    Mild obscuration of the left hemidiaphragm and left costophrenicangle,   unchanged. Question atelectasis and or effusion. No evidence for   pulmonary edema.    No acute osseous finding.     IMPRESSION:    No change from prior.    <   CT abd< from: CT Abdomen and Pelvis No Cont (17 @ 16:16) >  INTERPRETATION:  CLINICAL INFORMATION: Enteral drainage from lower   portion of wound. Rule out abdominalcollection, new fistula.    COMPARISON: CT abdomen and pelvis 2017    PROCEDURE:   CT of the Abdomen and Pelvis was performed without intravenous contrast.   Intravenous contrast: None.  Oral contrast: None.  Sagittal and coronal reformats were performed.    FINDINGS:    LOWER CHEST: Trace left pleural effusion with bibasilar dependent   atelectasis, left greater than right.    LIVER: Within normal limits.  BILE DUCTS: Normal caliber.  GALLBLADDER: Status post cholecystectomy.  SPLEEN: Within normal limits.  PANCREAS: Within normal limits.  ADRENALS: Unchanged small bilateral adrenal nodules.  KIDNEYS/URETERS: 1.7 cm right renal cyst. No hydronephrosis.    BLADDER: Collapsed around a Snyder catheter.  REPRODUCTIVE ORGANS: The endometrial canal appears mildly thickened   measuring 2.5 cm.    BOWEL/ABDOMINAL WALL: Enteric tube is in the stomach. Status post   Dunham's procedure with colostomy in the left lower quadrant. 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 A catheter has been placed within the cutaneous   defect. There is persistent distention of the small bowel with relative   collapse of the terminal ileum,, unchanged from prior study and   suspicious for distal small bowel obstruction. Small bowel surgical   sutures noted subjacent to the abdominal wall defect.  Anterior and   bilateral subcutaneous soft tissue stranding is also present. There is a   small amount of ill-defined fluid in the anterior peritoneum near the   defect. Anterior abdominal wall surgical skin staples. Large ventral   abdominal wall mesh noted.  PERITONEUM: No ascites.  VESSELS:  IVC filter is noted. Atherosclerotic calcification of the   abdominal aorta and its branches.  RETROPERITONEUM: No lymphadenopathy.    BONES: Generalized osteopenia. Degenerative changes. Chronic L3 endplate   deformity.    IMPRESSION: Loops of matted small bowel are seen in close apposition to   the anterior lower abdominal wall defect, concerning for enterocutaneous   fistula, however evaluation is limited without enteric contrast..    Persistent dilated small bowel with relative collapse of the terminal   ileum raising suspicion for small bowel obstruction.    < end of copied text >  < from: CT Abdomen and Pelvis No Cont (17 @ 16:16) >  OMPARISON: CT abdomen and pelvis 2017    < end of copied text >    < from: TTE with Doppler (w/Cont) (17 @ 16:28) >  ------------------------------------------------------------------------  Observations:  Mitral Valve: Normal mitral valve.  Aortic Valve/Aorta: Aortic valve not visualized. Peak left  ventricular outflow tract gradient equals 7 mm Hg.  Left Atrium: Normal left atrium.  Left Ventricle: Hyperdynamic left ventricle.   Endocardial  visualization enhanced with intravenous injection of echo  contrast (Definity).  Right Heart: Normal right atrium. The right ventricle is  not well visualized; grossly normal right ventricular  systolic function.  Pericardium/Pleura: Normal pericardium with no pericardial  effusion.  Hemodynamic: Estimated right atrial pressure is 8 mm Hg.  Estimated right ventricular systolic pressureequals 37 mm  Hg, assuming right atrial pressure equals 8 mm Hg,  consistent with borderline pulmonary hypertension.  ------------------------------------------------------------------------  Conclusions:  1. Hyperdynamic left ventricle.   Endocardial visualization  enhanced with intravenous injection of echo contrast  (Definity).    < end of copied text >  < from: TTE with Doppler (w/Cont) (.17 @ 16:28) >  Study Date: 2017    < end of copied text >      TTE:

## 2017-06-28 NOTE — PROGRESS NOTE ADULT - SUBJECTIVE AND OBJECTIVE BOX
ACS DAILY PROGRESS NOTE    HOSPITAL DAY #35  POST OPERATIVE DAY #: 17    STATUS POST:  exploratory laparotomy, extensive lysis of adhesions, takedown of enterocutaneous fistula, repair of serosal tear on cecum, partial right salpingooophorectomy, colostomy revision, parastomal and incisional hernia repair with strattice mesh          INTERVAL EVENTS:  Output from wound has started to decrease. Levophed dose 0.17 this morning.       OBJECTIVE:     Vital Signs     I&O's       Physical Exam:  GEN: NAD  Pulm: Normal respiratory pattern on NC at 2L  Abd: Wound with feculent output. NGT with bilious output  Ext: b/l UE edema           LABS: ACUTE CARE SURGERY ([facility] - #[id number]) PROGRESS NOTE  ---------------------------------------------------------------------------------------------     HOSPITAL DAY #37  POST OPERATIVE DAY #: 19    STATUS POST:  exploratory laparotomy, extensive lysis of adhesions, takedown of enterocutaneous fistula, repair of serosal tear on cecum, partial right salpingooophorectomy, colostomy revision, parastomal and incisional hernia repair with Strattice mesh          INTERVAL EVENTS:  Wound remains pouched. Off pressors.  Found to have b/l middle ear effusions.      OBJECTIVE: Feels well, is out of bed to chair this am.      Vital Signs   T(C): 36.1 (06-28-17 @ 12:00), Max: 37 (06-27-17 @ 23:00)  HR: 101 (06-28-17 @ 13:00) (90 - 111)  BP: 130/54 (06-28-17 @ 08:00) (115/64 - 130/54)  BP(mean): 88 (06-28-17 @ 08:00) (84 - 88)  RR: 30 (06-28-17 @ 13:00) (12 - 34)  SpO2: 95% (06-28-17 @ 13:00) (93% - 100%)  Wt(kg): --  CAPILLARY BLOOD GLUCOSE  127 (28 Jun 2017 06:00)  148 (28 Jun 2017 02:00)  121 (27 Jun 2017 22:00)  101 (27 Jun 2017 18:00)  146 (27 Jun 2017 15:00)      I&O's     06-27 @ 07:01  -  06-28 @ 07:00  --------------------------------------------------------  IN:    norepinephrine Infusion: 14.4 mL    Solution: 400 mL    Solution: 300 mL    Solution: 62.5 mL    TPN (Total Parenteral Nutrition): 1426 mL  Total IN: 2202.9 mL    OUT:    Drain: 200 mL    Indwelling Catheter - Urethral: 1735 mL    Nasoenteral Tube: 450 mL  Total OUT: 2385 mL    Total NET: -182.1 mL      06-28 @ 07:01  -  06-28 @ 14:05  --------------------------------------------------------  IN:    Solution: 62.5 mL    Solution: 100 mL    TPN (Total Parenteral Nutrition): 310 mL  Total IN: 472.5 mL    OUT:    Indwelling Catheter - Urethral: 350 mL  Total OUT: 350 mL    Total NET: 122.5 mL      Physical Exam:  GEN: NAD  Pulm: Normal respiratory pattern on NC at 2L  Abd: Wound with bilious output, pouched and on suction.  NGT with bilious output.    Ext: b/l UE edema    LABS:  CBC (06-28 @ 02:25)                              8.5<L>                         4.2     )----------------(  376        --    % Neutrophils, --    % Lymphocytes, ANC: --                                  25.9<L>  CBC (06-27 @ 02:38)                              8.9<L>                         5.1     )----------------(  387        --    % Neutrophils, --    % Lymphocytes, ANC: --                                  26.4<L>    BMP (06-28 @ 02:25)             150<H>  |  115<H>  |  20    		Ca++ --      Ca 8.8                ---------------------------------( 148<H>		Mg 2.2                3.3<L>  |  24      |  1.33<H>			Ph 2.2<L>  BMP (06-27 @ 02:38)             149<H>  |  116<H>  |  14    		Ca++ --      Ca 8.4                ---------------------------------( 142<H>		Mg 2.0                3.5     |  23      |  1.29  			Ph 3.0         Coags (06-28 @ 02:25)  aPTT 36.2 / INR 1.19<H> / PT 13.0<H>  Coags (06-27 @ 02:38)  aPTT 38.5<H> / INR 1.25<H> / PT 13.7<H>

## 2017-06-28 NOTE — PROGRESS NOTE ADULT - SUBJECTIVE AND OBJECTIVE BOX
Interval History/ROS:Patient is a 76y old  Female who presents with a chief complaint of Abdominal pain (26 May 2017 11:33)  76 year old female with history significant for COPD, prior DVT s/p IVC filter, GERD, hypothyroidism, HIT and diverticulitis s/p Ruben's, and recurrent SBO requiring ex-lap small bowel resection, EC fistula s/p takedown that was complicated by wound dehiscence and abdominal reconstruction, who underwent ex-lap, extensive NANETTE, EC fistula takedown, colostomy revision, and parastomal/incisional hernia repair on . She was admitted to SICU intubated post-operatively and was successfully extubated. However, after getting a CT with oral contrast, patient went into respiratory distress and was re-intubated and found to have MRSA pneumonia, likely aspiration in nature. She was subsequently extubated, weaned off pressors, and transferred to the floors. On the floor on , patient went into respiratory distress again likely secondary to pulmonary vascular congestion requiring BiPAP and became hypotensive requiring vasopressor support. Her wound also began draining thick brown fluid concerning for a new EC fistula vs. anastomotic breakdown and is currently on imipenem    Pt c/o abdominal pain and left hand pain  c/o hearing loss since surgery  has baldwin catheter  minimal cough  no rash  alert  off oxygen - on room air      PAST MEDICAL & SURGICAL HISTORY:  Pulmonary embolism  Enterocutaneous fistula  Chronic diastolic CHF (congestive heart failure): mild diastolic dysfunction  Osteoarthritis of both knees, unspecified osteoarthritis type  Peripheral neuropathy  Clostridium difficile infection  HIT (heparin-induced thrombocytopenia)  Diverticulitis of intestine with abscess without bleedin  DVT (deep venous thrombosis): s/p IVC filter, which was later removed  Orthostatic hypotension  GERD (gastroesophageal reflux disease)  Hypothyroid  Chronic obstructive pulmonary disease (COPD)  Colostomy in place: s/p duarte procedure for diverticulitis  Status post Ruben procedure: for diverticulitis  S/P exploratory laparotomy: EC fistula complicated with pelvic abscesses  Wound dehiscence: Wound dehiscence and evisceration, s/p abdominal reconstruction with biologic mesh  Sigmoid diverticulitis: Ex lap, sigmoid resection, creation of colostomy.  Intestinal perforation: 2015, s/p closure with strattice mesh      Allergies    azithromycin (Unknown)  codeine (Unknown)  heparin (Other)  PC Pen VK (Unknown)  penicillin (Unknown)    Intolerances        ANTIMICROBIALS:  imipenem/cilastatin  IVPB 500 every 6 hours      OTHER MEDS:  levothyroxine Injectable 44 MICROGram(s) IV Push daily  pantoprazole  Injectable 40 milliGRAM(s) IV Push daily  ondansetron Injectable 4 milliGRAM(s) IV Push every 6 hours PRN  fondaparinux Injectable 2.5 milliGRAM(s) SubCutaneous every 24 hours  ALBUTerol/ipratropium for Nebulization 3 milliLiter(s) Nebulizer every 6 hours  buDESOnide 160 MICROgram(s)/formoterol 4.5 MICROgram(s) Inhaler 2 Puff(s) Inhalation two times a day  HYDROmorphone  Injectable 0.5 milliGRAM(s) IV Push every 3 hours PRN  fentaNYL   Patch  50 MICROgram(s)/Hr. 1 Patch Transdermal every 48 hours  fluticasone propionate 50 MICROgram(s)/spray Nasal Spray 1 Spray(s) Both Nostrils two times a day  acetaminophen  IVPB. 1000 milliGRAM(s) IV Intermittent once  insulin lispro (HumaLOG) corrective regimen sliding scale   SubCutaneous every 6 hours      Vital Signs Last 24 Hrs  T(C): 36 (2017 16:00), Max: 37 (2017 23:00)  T(F): 96.8 (2017 16:00), Max: 98.6 (2017 23:00)  HR: 91 (2017 17:14) (87 - 111)  BP: 130/54 (2017 08:00) (115/64 - 130/54)  BP(mean): 88 (2017 08:00) (84 - 88)  RR: 13 (2017 17:00) (11 - 30)  SpO2: 99% (2017 17:14) (93% - 99%)                          8.5    4.2   )-----------( 376      ( 2017 02:25 )             25.9       06-28    150<H>  |  115<H>  |  20  ----------------------------<  148<H>  3.3<L>   |  24  |  1.33<H>    Ca    8.8      2017 02:25  Phos  2.2     06-28  Mg     2.2     -28                  MICROBIOLOGY:    RECENT CULTURES:                      RADIOLOGY:

## 2017-06-28 NOTE — AUDIOLOGICAL ASSESSMENT - COMMENTS
SPEECH THERAPY:  Pt scheduled for cog comm eval today.  Pt was admitted with (L) MCA territory CVA.  During Swallow Eval yesterday, pt reported continuing \"foggy\" thinking and had difficulty with sentence length speech.  RN reported that pt was oriented, without slurred speech and has been communicating without difficulty today.    Upon arrival, pt was awake and with daughter at bedside.  He was A&Ox4.  Speech was fluent and without word finding or dysarthria.  Pt and daughter reported complete resolution of earlier language, motor speech and cog comm deficits.  Pt declined evaluation as a result.  Daughter and RN agreeable.  Will sign off at this time.   c/o change in hearing AU, concern for effusion AU.  Results: Severe sloping to profound mixed hearing loss bilaterally.   Recs:1. ENT consult re: middle ear status and hearing loss AU. 2. Audiological re evaluation as medically indicated or full audiological evaluation upon discharge.

## 2017-06-28 NOTE — PROGRESS NOTE ADULT - SUBJECTIVE AND OBJECTIVE BOX
CC:Decreased hearing bilaterally    HPI: Patient is a 76y old  Female who presents with a chief complaint of Abdominal pain admitted with recurrent SBO. Pt. required exploratory lap for the small bowel obstruction. Pt. now c/o new onset hearing loss bilaterally since her surgery. Per her daughter she is always hard of hearing but patient feels it is worse acutely. She was on vanco but ID stopped this a couple days ago. No tinnitus or pain or drainage or vertigo.  has also been on ototoxic medication possible also SN component     PAST MEDICAL & SURGICAL HISTORY:  Pulmonary embolism  Enterocutaneous fistula  Chronic diastolic CHF (congestive heart failure): mild diastolic dysfunction  Osteoarthritis of both knees, unspecified osteoarthritis type  Peripheral neuropathy  Clostridium difficile infection  HIT (heparin-induced thrombocytopenia)  Diverticulitis of intestine with abscess without bleedin  DVT (deep venous thrombosis): s/p IVC filter, which was later removed  Orthostatic hypotension  GERD (gastroesophageal reflux disease)  Hypothyroid  Chronic obstructive pulmonary disease (COPD)  Colostomy in place: s/p duarte procedure for diverticulitis  Status post Ruben procedure: for diverticulitis  S/P exploratory laparotomy: EC fistula complicated with pelvic abscesses  Wound dehiscence: Wound dehiscence and evisceration, s/p abdominal reconstruction with biologic mesh  Sigmoid diverticulitis: Ex lap, sigmoid resection, creation of colostomy.  Intestinal perforation: 2015, s/p closure with strattice mesh    Allergies    azithromycin (Unknown)  codeine (Unknown)  heparin (Other)  PC Pen VK (Unknown)  penicillin (Unknown)        MEDICATIONS  (STANDING):  levothyroxine Injectable 44 MICROGram(s) IV Push daily  pantoprazole  Injectable 40 milliGRAM(s) IV Push daily  fondaparinux Injectable 2.5 milliGRAM(s) SubCutaneous every 24 hours  ALBUTerol/ipratropium for Nebulization 3 milliLiter(s) Nebulizer every 6 hours  buDESOnide 160 MICROgram(s)/formoterol 4.5 MICROgram(s) Inhaler 2 Puff(s) Inhalation two times a day  insulin lispro (HumaLOG) corrective regimen sliding scale   SubCutaneous every 4 hours  norepinephrine Infusion 0.01 MICROgram(s)/kG/Min (1.395 mL/Hr) IV Continuous <Continuous>  imipenem/cilastatin  IVPB 500 milliGRAM(s) IV Intermittent every 6 hours  fentaNYL   Patch  50 MICROgram(s)/Hr. 1 Patch Transdermal every 48 hours    MEDICATIONS  (PRN):  ondansetron Injectable 4 milliGRAM(s) IV Push every 6 hours PRN Nausea  HYDROmorphone  Injectable 0.5 milliGRAM(s) IV Push every 3 hours PRN Moderate Pain (4 - 6)    Social History: no tobacco use, no etoh    ROS: ENT-bilateral hearing loss, GI-recurrent SBO,GERD, , CV-pulmonary embolus, Pulm, Neuro, Psych, MS, Heme, Endo, Constitional; all negative except as noted in HPI         Vital Signs Last 24 Hrs  T(C): 36.1 (2017 12:00), Max: 37 (2017 23:00)  T(F): 97 (2017 12:00), Max: 98.6 (2017 23:00)  HR: 90 (2017 16:00) (90 - 111)  BP: 130/54 (2017 08:00) (115/64 - 130/54)  BP(mean): 88 (2017 08:00) (84 - 88)  RR: 11 (2017 16:00) (11 - 30)  SpO2: 97% (2017 16:00) (93% - 99%)    PHYSICAL EXAM:  Gen: NAD, well-developed  Head: Normocephalic, Atraumatic  Face: no edema/erythema/fluctuance, parotid glands soft without mass  Eyes: PERRL, EOMI, no scleral injection  Ears: Right - ear canal clear, TM intact with jake effusion            Left - ear canal clear, TM intact with jake effusion  Nose: Nares bilaterally patent, no discharge, left nasal NG tube  Mouth: Mucosa dry, tongue/uvula midline, oropharynx clear  Neck: Flat, supple, no lymphadenopathy, trachea midline, no masses  Resp: breathing easily, no stridor  CV: no peripheral edema/cyanosis

## 2017-06-28 NOTE — PROGRESS NOTE ADULT - ASSESSMENT
Hypernatremia increasing  Pt remains without enteral intake and is therefore reliant on PN for nutritional support

## 2017-06-28 NOTE — PROGRESS NOTE ADULT - ASSESSMENT
75 yo female with recurrent episodes of SBO now c/o bilateral hearing loss with b/l RUFINA. possible full cause of worsening of hearing loss on top of baseline SNHL however due to ototoxic medications audiogram ordered. will follow up   discussed plan with pt and family

## 2017-06-28 NOTE — PROGRESS NOTE ADULT - ASSESSMENT
76 year old female with history significant for COPD, prior DVT s/p IVC filter, GERD, hypothyroidism, HIT and diverticulitis s/p Ruben's, and recurrent SBO requiring ex-lap small bowel resection, EC fistula s/p takedown that was complicated by wound dehiscence and abdominal reconstruction, who underwent ex-lap, extensive NANETTE, EC fistula takedown, colostomy revision, and parastomal/incisional hernia repair on 6/9. She was admitted to SICU intubated post-operatively and was successfully extubated. However, after getting a CT with oral contrast, patient went into respiratory distress and was re-intubated and found to have MRSA pneumonia, likely aspiration in nature. She was subsequently extubated, weaned off pressors, and transferred to the floors. On the floor on 6/22, patient went into respiratory distress again likely secondary to pulmonary vascular congestion requiring BiPAP and became hypotensive requiring vasopressor support. Her wound also began draining thick brown fluid concerning for a new EC fistula vs. anastomotic breakdown and is currently on imipenem  for OR to address ostomy  On TPN  continue IV antibiotics for now  hearing evaluation in progress

## 2017-06-28 NOTE — CONSULT NOTE ADULT - ASSESSMENT
HISTORY  76 year old female with history significant for COPD, prior DVT s/p IVC filter (now removed), GERD, hypothyroidism, HIT and diverticulitis s/p Ruben's, and recurrent SBO requiring ex-lap small bowel resection, EC fistula s/p takedown that was complicated by wound dehiscence and abdominal reconstruction, who underwent ex-lap, extensive NANETTE, EC fistula takedown, colostomy revision, and parastomal/incisional hernia repair on 6/9. She was admitted to SICU intubated post-operatively and was successfully extubated. However, after getting a CT with oral contrast, patient went into respiratory distress and was re-intubated and found to have MRSA pneumonia, likely aspiration in nature. She was subsequently extubated, weaned off pressors, and transferred to the floors. On the floor on 6/22, patient went into respiratory distress again likely secondary to pulmonary vascular congestion requiring BiPAP and became hypotensive requiring vasopressor support. Her wound also began draining thick brown fluid concerning for a new EC fistula vs. anastomotic breakdown and is currently on imipenem. Pressor needs decreasing, wound pouched with some improvement in clinical status, remains extubated, intermittently requiring BiPAP, now with decreased hearing.       Respiratory: remains stable on room air. Keep o2 sat>=90% w/ prn o2.  Last wore bipap on 6/24  COPD: not exacerbated. continue with symbicort  HIT/prior PE/DVT/+ivc filter- on fondaparinux -prophy  s/p tx for mrsa pna-observing off abx  Hypernatremia- renal/nutrition-on TPN  TREVA-improved  plan as per sicu HISTORY  76 year old female with history significant for COPD, prior DVT s/p IVC filter (now removed), GERD, hypothyroidism, HIT and diverticulitis s/p Ruben's, and recurrent SBO requiring ex-lap small bowel resection, EC fistula s/p takedown that was complicated by wound dehiscence and abdominal reconstruction, who underwent ex-lap, extensive NANETTE, EC fistula takedown, colostomy revision, and parastomal/incisional hernia repair on 6/9. She was admitted to SICU intubated post-operatively and was successfully extubated. However, after getting a CT with oral contrast, patient went into respiratory distress and was re-intubated and found to have MRSA pneumonia, likely aspiration in nature. She was subsequently extubated, weaned off pressors, and transferred to the floors. On the floor on 6/22, patient went into respiratory distress again likely secondary to pulmonary vascular congestion requiring BiPAP and became hypotensive requiring vasopressor support. Her wound also began draining thick brown fluid concerning for a new EC fistula vs. anastomotic breakdown and is currently on imipenem. Pressor needs decreasing, wound pouched with some improvement in clinical status, remains extubated, intermittently requiring BiPAP, now with decreased hearing.       Respiratory: remains stable on room air. Keep o2 sat>=90% w/ prn o2.  Last wore bipap on 6/24  COPD: not exacerbated. continue with symbicort  HIT/prior PE/DVT/+ivc filter- on fondaparinux -prophy  s/p tx for mrsa pna-observing off abx  Hypernatremia- renal/nutrition-on TPN  TREVA-improved  plan as per sicu  -EC fistula, ostomy dysfunction 76 year old female with history significant for COPD, prior DVT s/p IVC filter (now removed), GERD, hypothyroidism, HIT and diverticulitis s/p Ruben's, and recurrent SBO requiring ex-lap small bowel resection, EC fistula s/p takedown that was complicated by wound dehiscence and abdominal reconstruction, who underwent ex-lap, extensive NANETTE, EC fistula takedown, colostomy revision, and parastomal/incisional hernia repair on 6/9. She was admitted to SICU intubated post-operatively and was successfully extubated. However, after getting a CT with oral contrast, patient went into respiratory distress and was re-intubated and found to have MRSA pneumonia, likely aspiration in nature. She was subsequently extubated, weaned off pressors, and transferred to the floors. On the floor on 6/22, patient went into respiratory distress again likely secondary to pulmonary vascular congestion requiring BiPAP and became hypotensive requiring vasopressor support. Her wound also began draining thick brown fluid concerning for a new EC fistula vs. anastomotic breakdown and is currently on imipenem. Pressor needs decreasing, wound pouched with some improvement in clinical status, remains extubated, intermittently requiring BiPAP, now with decreased hearing.       Respiratory: remains stable on room air. Keep o2 sat>=90% w/ prn o2.  Last wore bipap on 6/24  COPD: not exacerbated. continue with symbicort  HIT/prior PE/DVT/+ivc filter- on fondaparinux -prophy  s/p tx for mrsa pna-observing off abx  Hypernatremia- renal/nutrition-on TPN  TREVA-improved  plan as per sicu  -EC fistula, ostomy dysfunction

## 2017-06-28 NOTE — PROGRESS NOTE ADULT - ASSESSMENT
ASSESSMENT  76y female with high volume ECF s/p exploratory laparotomy, extensive lysis of adhesions, takedown of enterocutaneous fistula, repair of serosal tear on cecum, partial right salpingooophorectomy, colostomy revision, parastomal and incisional hernia repair with strattice mesh, POD 16    PLAN  - c/w primaxin and vanco  - wean pressors as tolerated  - Atrixtra for DVT prophylaxis with h/o HIT  - care per SICKAREL Ansari PGY1  Pager: [phone] ASSESSMENT  76y female with high volume ECF s/p exploratory laparotomy, extensive lysis of adhesions, takedown of enterocutaneous fistula, repair of serosal tear on cecum, partial right salpingooophorectomy, colostomy revision, parastomal and incisional hernia repair with strattice mesh, POD 19    PLAN  - c/w primaxin and vanco  - Atrixtra for DVT prophylaxis with h/o HIT  - Pain control  - cont. with wound care, pouch around wound with suction  - care per SICU    Zachary Serrano MD PGY2  Acute Care Surgery, #[id number]

## 2017-06-28 NOTE — PROGRESS NOTE ADULT - ASSESSMENT
76 year old female SICU day #7, POD #19 s/p ex-lap, extensive NANETTE, takedown of EC fistula, repair of cecal serosal tear, partial R salpingectomy, colostomy revision, parastomal and incisional hernia repair with Strattice mesh complicated by MRSA pneumonia requiring reintubation and vasopressor support; extubated, hemodynamically stable, and transferred to the floors, but readmitted to SICU 6/22 for respiratory distress from pulmonary vascular congestion, associated with hypotension requiring pressors, likely secondary to sepsis with concern for a new entero-atmospheric fistula versus anastomotic leak, now with no pressor requirements and stable respiratory status.    Neuro: chronic pain and postoperative pain associated with abdominal contamination  - continue multimodal pain control with acetaminophen, dilaudid, fentanyl patch  - request chronic wound consult for improved pain management strategy    CV: hypotensive secondary to sepsis, requiring pressors  - Monitor blood pressure,  - MAP goal of 60    Pulm: COPD  - continue inhaled meds for COPD  - encourage pulmonary hygiene  - noninvasive positive pressure as needed    GI/Nutrition: EC fistula, ostomy dysfunction  - NPO to control fistula output  - continue pouching to help control abdominal contamination  - may require revision of ostomy, provisional plan for OR friday  - on TPN via PICC    /Renal: TREVA on CKD  - trend electrolytes, replete as needed  - monitor urine output  - Trend sodium    ID: pneumonia, abdominal contamination  - MRSA pneumonia s/p course of vancomycin, follow up sputum culture  - maintain pouch to help control fistula  - continue imipenem for sepsis secondary to fistula contamination of abdomen    Endocrine: hypothyroid, euglycemic  - no active concerns, monitor glycemic control on BMP  - continue synthroid    Skin: intact  - turn and position every 2 hours to promote skin protection    Prophylaxis  - VTE ppx with arixtra (prior HIT)    Dispo  - full code  - remain in SICU

## 2017-06-28 NOTE — PROGRESS NOTE ADULT - SUBJECTIVE AND OBJECTIVE BOX
Day #2 of PN restart  PN infusion at 62 ccs/hr  06-27 @ 07:01  -  06-28 @ 07:00  --------------------------------------------------------  IN:    norepinephrine Infusion: 14.4 mL    Solution: 400 mL    Solution: 300 mL    Solution: 62.5 mL    TPN (Total Parenteral Nutrition): 1426 mL  Total IN: 2202.9 mL    OUT:    Drain: 200 mL    Indwelling Catheter - Urethral: 1735 mL    Nasoenteral Tube: 450 mL  Total OUT: 2385 mL    Total NET: -182.1 mL        T(C): 36.7 (06-28-17 @ 08:00), Max: 37 (06-27-17 @ 23:00)  HR: 97 (06-28-17 @ 08:00) (90 - 119)  BP: 130/54 (06-28-17 @ 08:00) (115/64 - 130/54)  RR: 20 (06-28-17 @ 08:00) (12 - 34)  SpO2: 99% (06-28-17 @ 08:00) (91% - 100%)  Wt(kg): --    Labs   06-28    150<H>  |  115<H>  |  20  ----------------------------<  148<H>  3.3<L>   |  24  |  1.33<H>    Ca    8.8      28 Jun 2017 02:25  Phos  2.2     06-28  Mg     2.2     06-28          CAPILLARY BLOOD GLUCOSE  127 (28 Jun 2017 06:00)  148 (28 Jun 2017 02:00)  121 (27 Jun 2017 22:00)  101 (27 Jun 2017 18:00)  146 (27 Jun 2017 15:00)  145 (27 Jun 2017 10:00)        I&O's Detail    27 Jun 2017 07:01  -  28 Jun 2017 07:00  --------------------------------------------------------  IN:    norepinephrine Infusion: 14.4 mL    Solution: 400 mL    Solution: 300 mL    Solution: 62.5 mL    TPN (Total Parenteral Nutrition): 1426 mL  Total IN: 2202.9 mL    OUT:    Drain: 200 mL    Indwelling Catheter - Urethral: 1735 mL    Nasoenteral Tube: 450 mL  Total OUT: 2385 mL    Total NET: -182.1 mL      28 Jun 2017 07:01  -  28 Jun 2017 08:35  --------------------------------------------------------  IN:    Solution: 62.5 mL    TPN (Total Parenteral Nutrition): 62 mL  Total IN: 124.5 mL    OUT:    Indwelling Catheter - Urethral: 60 mL  Total OUT: 60 mL    Total NET: 64.5 mL

## 2017-06-29 LAB
ANION GAP SERPL CALC-SCNC: 7 MMOL/L — SIGNIFICANT CHANGE UP (ref 5–17)
BUN SERPL-MCNC: 27 MG/DL — HIGH (ref 7–23)
CALCIUM SERPL-MCNC: 8.9 MG/DL — SIGNIFICANT CHANGE UP (ref 8.4–10.5)
CHLORIDE SERPL-SCNC: 113 MMOL/L — HIGH (ref 96–108)
CO2 SERPL-SCNC: 26 MMOL/L — SIGNIFICANT CHANGE UP (ref 22–31)
CREAT SERPL-MCNC: 1.34 MG/DL — HIGH (ref 0.5–1.3)
GLUCOSE SERPL-MCNC: 141 MG/DL — HIGH (ref 70–99)
HCT VFR BLD CALC: 24.9 % — LOW (ref 34.5–45)
HGB BLD-MCNC: 8.5 G/DL — LOW (ref 11.5–15.5)
MAGNESIUM SERPL-MCNC: 2.1 MG/DL — SIGNIFICANT CHANGE UP (ref 1.6–2.6)
MCHC RBC-ENTMCNC: 33.5 PG — SIGNIFICANT CHANGE UP (ref 27–34)
MCHC RBC-ENTMCNC: 33.9 GM/DL — SIGNIFICANT CHANGE UP (ref 32–36)
MCV RBC AUTO: 98.8 FL — SIGNIFICANT CHANGE UP (ref 80–100)
PHOSPHATE SERPL-MCNC: 3.1 MG/DL — SIGNIFICANT CHANGE UP (ref 2.5–4.5)
PLATELET # BLD AUTO: 378 K/UL — SIGNIFICANT CHANGE UP (ref 150–400)
POTASSIUM SERPL-MCNC: 3.8 MMOL/L — SIGNIFICANT CHANGE UP (ref 3.5–5.3)
POTASSIUM SERPL-SCNC: 3.8 MMOL/L — SIGNIFICANT CHANGE UP (ref 3.5–5.3)
RBC # BLD: 2.52 M/UL — LOW (ref 3.8–5.2)
RBC # FLD: 15.6 % — HIGH (ref 10.3–14.5)
SODIUM SERPL-SCNC: 146 MMOL/L — HIGH (ref 135–145)
WBC # BLD: 5.4 K/UL — SIGNIFICANT CHANGE UP (ref 3.8–10.5)
WBC # FLD AUTO: 5.4 K/UL — SIGNIFICANT CHANGE UP (ref 3.8–10.5)

## 2017-06-29 PROCEDURE — 99233 SBSQ HOSP IP/OBS HIGH 50: CPT

## 2017-06-29 PROCEDURE — 99232 SBSQ HOSP IP/OBS MODERATE 35: CPT

## 2017-06-29 PROCEDURE — 71010: CPT | Mod: 26

## 2017-06-29 PROCEDURE — 99231 SBSQ HOSP IP/OBS SF/LOW 25: CPT | Mod: 25

## 2017-06-29 RX ORDER — POTASSIUM CHLORIDE 20 MEQ
10 PACKET (EA) ORAL ONCE
Qty: 0 | Refills: 0 | Status: COMPLETED | OUTPATIENT
Start: 2017-06-29 | End: 2017-06-29

## 2017-06-29 RX ADMIN — HYDROMORPHONE HYDROCHLORIDE 0.5 MILLIGRAM(S): 2 INJECTION INTRAMUSCULAR; INTRAVENOUS; SUBCUTANEOUS at 19:55

## 2017-06-29 RX ADMIN — Medication 3 MILLILITER(S): at 05:47

## 2017-06-29 RX ADMIN — Medication 3 MILLILITER(S): at 23:57

## 2017-06-29 RX ADMIN — PANTOPRAZOLE SODIUM 40 MILLIGRAM(S): 20 TABLET, DELAYED RELEASE ORAL at 11:25

## 2017-06-29 RX ADMIN — BUDESONIDE AND FORMOTEROL FUMARATE DIHYDRATE 2 PUFF(S): 160; 4.5 AEROSOL RESPIRATORY (INHALATION) at 23:58

## 2017-06-29 RX ADMIN — Medication 3 MILLILITER(S): at 11:44

## 2017-06-29 RX ADMIN — HYDROMORPHONE HYDROCHLORIDE 0.5 MILLIGRAM(S): 2 INJECTION INTRAMUSCULAR; INTRAVENOUS; SUBCUTANEOUS at 08:15

## 2017-06-29 RX ADMIN — IMIPENEM AND CILASTATIN 100 MILLIGRAM(S): 250; 250 INJECTION, POWDER, FOR SOLUTION INTRAVENOUS at 23:04

## 2017-06-29 RX ADMIN — Medication 1 SPRAY(S): at 05:26

## 2017-06-29 RX ADMIN — IMIPENEM AND CILASTATIN 100 MILLIGRAM(S): 250; 250 INJECTION, POWDER, FOR SOLUTION INTRAVENOUS at 17:51

## 2017-06-29 RX ADMIN — HYDROMORPHONE HYDROCHLORIDE 0.5 MILLIGRAM(S): 2 INJECTION INTRAMUSCULAR; INTRAVENOUS; SUBCUTANEOUS at 23:33

## 2017-06-29 RX ADMIN — HYDROMORPHONE HYDROCHLORIDE 0.5 MILLIGRAM(S): 2 INJECTION INTRAMUSCULAR; INTRAVENOUS; SUBCUTANEOUS at 07:49

## 2017-06-29 RX ADMIN — HYDROMORPHONE HYDROCHLORIDE 0.5 MILLIGRAM(S): 2 INJECTION INTRAMUSCULAR; INTRAVENOUS; SUBCUTANEOUS at 16:40

## 2017-06-29 RX ADMIN — IMIPENEM AND CILASTATIN 100 MILLIGRAM(S): 250; 250 INJECTION, POWDER, FOR SOLUTION INTRAVENOUS at 00:55

## 2017-06-29 RX ADMIN — FENTANYL CITRATE 1 PATCH: 50 INJECTION INTRAVENOUS at 11:26

## 2017-06-29 RX ADMIN — Medication 3 MILLILITER(S): at 18:01

## 2017-06-29 RX ADMIN — HYDROMORPHONE HYDROCHLORIDE 0.5 MILLIGRAM(S): 2 INJECTION INTRAMUSCULAR; INTRAVENOUS; SUBCUTANEOUS at 23:18

## 2017-06-29 RX ADMIN — Medication 1 SPRAY(S): at 17:51

## 2017-06-29 RX ADMIN — HYDROMORPHONE HYDROCHLORIDE 0.5 MILLIGRAM(S): 2 INJECTION INTRAMUSCULAR; INTRAVENOUS; SUBCUTANEOUS at 11:15

## 2017-06-29 RX ADMIN — HYDROMORPHONE HYDROCHLORIDE 0.5 MILLIGRAM(S): 2 INJECTION INTRAMUSCULAR; INTRAVENOUS; SUBCUTANEOUS at 04:27

## 2017-06-29 RX ADMIN — HYDROMORPHONE HYDROCHLORIDE 0.5 MILLIGRAM(S): 2 INJECTION INTRAMUSCULAR; INTRAVENOUS; SUBCUTANEOUS at 17:00

## 2017-06-29 RX ADMIN — IMIPENEM AND CILASTATIN 100 MILLIGRAM(S): 250; 250 INJECTION, POWDER, FOR SOLUTION INTRAVENOUS at 11:25

## 2017-06-29 RX ADMIN — FENTANYL CITRATE 1 PATCH: 50 INJECTION INTRAVENOUS at 11:25

## 2017-06-29 RX ADMIN — Medication 44 MICROGRAM(S): at 05:25

## 2017-06-29 RX ADMIN — Medication 1000 MILLIGRAM(S): at 04:42

## 2017-06-29 RX ADMIN — IMIPENEM AND CILASTATIN 100 MILLIGRAM(S): 250; 250 INJECTION, POWDER, FOR SOLUTION INTRAVENOUS at 05:26

## 2017-06-29 RX ADMIN — BUDESONIDE AND FORMOTEROL FUMARATE DIHYDRATE 2 PUFF(S): 160; 4.5 AEROSOL RESPIRATORY (INHALATION) at 12:08

## 2017-06-29 RX ADMIN — Medication 400 MILLIGRAM(S): at 04:27

## 2017-06-29 RX ADMIN — HYDROMORPHONE HYDROCHLORIDE 0.5 MILLIGRAM(S): 2 INJECTION INTRAMUSCULAR; INTRAVENOUS; SUBCUTANEOUS at 04:42

## 2017-06-29 RX ADMIN — HYDROMORPHONE HYDROCHLORIDE 0.5 MILLIGRAM(S): 2 INJECTION INTRAMUSCULAR; INTRAVENOUS; SUBCUTANEOUS at 20:10

## 2017-06-29 RX ADMIN — Medication 100 MILLIEQUIVALENT(S): at 06:18

## 2017-06-29 RX ADMIN — HYDROMORPHONE HYDROCHLORIDE 0.5 MILLIGRAM(S): 2 INJECTION INTRAMUSCULAR; INTRAVENOUS; SUBCUTANEOUS at 11:24

## 2017-06-29 RX ADMIN — FONDAPARINUX SODIUM 2.5 MILLIGRAM(S): 2.5 INJECTION, SOLUTION SUBCUTANEOUS at 12:28

## 2017-06-29 NOTE — PROGRESS NOTE ADULT - SUBJECTIVE AND OBJECTIVE BOX
POD/STATUS POST/ENT ISSUE: hearing loss    INTERVAL HPI: Patient with no changes in hearing. Undergoing further abdominal surgery tomorrow.    Vital Signs Last 24 Hrs  T(C): 36.8 (29 Jun 2017 16:00), Max: 36.8 (29 Jun 2017 16:00)  T(F): 98.2 (29 Jun 2017 16:00), Max: 98.2 (29 Jun 2017 16:00)  HR: 100 (29 Jun 2017 18:10) (88 - 115)  BP: 120/56 (29 Jun 2017 07:00) (117/56 - 120/56)  BP(mean): 82 (29 Jun 2017 07:00) (80 - 82)  RR: 21 (29 Jun 2017 18:00) (12 - 35)  SpO2: 100% (29 Jun 2017 18:10) (94% - 100%)    PHYSICAL EXAM:  Gen: NAD, well-developed  Head: Normocephalic, Atraumatic  Eyes: PERRL, EOMI, no scleral injection  Nose: Nares bilaterally patent, left NG tube in place  Mouth: Mucosa moist, tongue/uvula midline, oropharynx clear  Neck: Flat, supple, no lymphadenopathy, trachea midline, no masses  Resp: breathing easily, no stridor  CV: no peripheral edema/cyanosis    LABS:                        8.5    5.4   )-----------( 378      ( 29 Jun 2017 03:08 )             24.9       IMAGING/ADDITIONAL STUDIES: Audiogram with severe sloping to profound mixed hearing loss bilaterally

## 2017-06-29 NOTE — PROGRESS NOTE ADULT - SUBJECTIVE AND OBJECTIVE BOX
Day #4 of PN  PN infusion at 62  06-28 @ 07:01  -  06-29 @ 07:00  --------------------------------------------------------  IN:    Solution: 200 mL    Solution: 300 mL    Solution: 162.5 mL    TPN (Total Parenteral Nutrition): 1488 mL  Total IN: 2150.5 mL    OUT:    Colostomy: 5 mL    Drain: 450 mL    Indwelling Catheter - Urethral: 1270 mL    Nasoenteral Tube: 1000 mL  Total OUT: 2725 mL    Total NET: -574.5 mL        T(C): 35.8 (06-29-17 @ 08:00), Max: 36.4 (06-28-17 @ 19:00)  HR: 93 (06-29-17 @ 08:00) (87 - 105)  BP: 120/56 (06-29-17 @ 07:00) (117/56 - 120/56)  RR: 15 (06-29-17 @ 08:00) (11 - 30)  SpO2: 97% (06-29-17 @ 08:00) (93% - 100%)  Wt(kg): --    Labs   06-29    146<H>  |  113<H>  |  27<H>  ----------------------------<  141<H>  3.8   |  26  |  1.34<H>    Ca    8.9      29 Jun 2017 03:08  Phos  3.1     06-29  Mg     2.1     06-29          CAPILLARY BLOOD GLUCOSE  145 (29 Jun 2017 05:00)  111 (29 Jun 2017 00:00)  129 (28 Jun 2017 18:00)        I&O's Detail    28 Jun 2017 07:01  -  29 Jun 2017 07:00  --------------------------------------------------------  IN:    Solution: 200 mL    Solution: 300 mL    Solution: 162.5 mL    TPN (Total Parenteral Nutrition): 1488 mL  Total IN: 2150.5 mL    OUT:    Colostomy: 5 mL    Drain: 450 mL    Indwelling Catheter - Urethral: 1270 mL    Nasoenteral Tube: 1000 mL  Total OUT: 2725 mL    Total NET: -574.5 mL      29 Jun 2017 07:01  -  29 Jun 2017 08:59  --------------------------------------------------------  IN:    TPN (Total Parenteral Nutrition): 62 mL  Total IN: 62 mL    OUT:    Indwelling Catheter - Urethral: 35 mL  Total OUT: 35 mL    Total NET: 27 mL

## 2017-06-29 NOTE — PROGRESS NOTE ADULT - ATTENDING COMMENTS
Remained off pressure  Hypernatremia resolving with adj of TPN  Chr pain control  Course of abx  Being evaluated for surgical revision of oseomy

## 2017-06-29 NOTE — PROGRESS NOTE ADULT - ASSESSMENT
Hearing loss:  - as suspected audiogram confirms mixed hearing loss so she has a likely baseline sensorineural with conductive component from her effusions  - the effusions should resolve with removal of NG tube  - continue flonase  - f/u as outpatient in 4-6 weeks; if effusions not resolved can consider tube placement; likely also needs hearing aids

## 2017-06-29 NOTE — PROGRESS NOTE ADULT - ASSESSMENT
76 year old female with history significant for COPD, prior DVT s/p IVC filter, GERD, hypothyroidism, HIT and diverticulitis s/p Ruben's, and recurrent SBO requiring ex-lap small bowel resection, EC fistula s/p takedown that was complicated by wound dehiscence and abdominal reconstruction, who underwent ex-lap, extensive NANETTE, EC fistula takedown, colostomy revision, and parastomal/incisional hernia repair on 6/9. She was admitted to SICU intubated post-operatively and was successfully extubated. However, after getting a CT with oral contrast, patient went into respiratory distress and was re-intubated and found to have MRSA pneumonia, likely aspiration in nature. She was subsequently extubated, weaned off pressors, and transferred to the floors. On the floor on 6/22, patient went into respiratory distress again likely secondary to pulmonary vascular congestion requiring BiPAP and became hypotensive requiring vasopressor support. Her wound also began draining thick brown fluid concerning for a new EC fistula vs. anastomotic breakdown and is currently on imipenem  for OR to address ostomy  On TPN  continue IV antibiotics for now  hearing evaluation in progress    suggest topical treatment for possible oral thrush

## 2017-06-29 NOTE — PROGRESS NOTE ADULT - SUBJECTIVE AND OBJECTIVE BOX
Interval History/ROS:Patient is a 76y old  Female who presents with a chief complaint of Abdominal pain (26 May 2017 11:33)  76 year old female with history significant for COPD, prior DVT s/p IVC filter, GERD, hypothyroidism, HIT and diverticulitis s/p Ruben's, and recurrent SBO requiring ex-lap small bowel resection, EC fistula s/p takedown that was complicated by wound dehiscence and abdominal reconstruction, who underwent ex-lap, extensive NANETTE, EC fistula takedown, colostomy revision, and parastomal/incisional hernia repair on . She was admitted to SICU intubated post-operatively and was successfully extubated. However, after getting a CT with oral contrast, patient went into respiratory distress and was re-intubated and found to have MRSA pneumonia, likely aspiration in nature. She was subsequently extubated, weaned off pressors, and transferred to the floors. On the floor on , patient went into respiratory distress again likely secondary to pulmonary vascular congestion requiring BiPAP and became hypotensive requiring vasopressor support. Her wound also began draining thick brown fluid concerning for a new EC fistula vs. anastomotic breakdown and is currently on imipenem    Pt coughing , secretions feel stuck at times,  left hand unchanged      PAST MEDICAL & SURGICAL HISTORY:  Pulmonary embolism  Enterocutaneous fistula  Chronic diastolic CHF (congestive heart failure): mild diastolic dysfunction  Osteoarthritis of both knees, unspecified osteoarthritis type  Peripheral neuropathy  Clostridium difficile infection  HIT (heparin-induced thrombocytopenia)  Diverticulitis of intestine with abscess without bleedin  DVT (deep venous thrombosis): s/p IVC filter, which was later removed  Orthostatic hypotension  GERD (gastroesophageal reflux disease)  Hypothyroid  Chronic obstructive pulmonary disease (COPD)  Colostomy in place: s/p duarte procedure for diverticulitis  Status post Ruben procedure: for diverticulitis  S/P exploratory laparotomy: EC fistula complicated with pelvic abscesses  Wound dehiscence: Wound dehiscence and evisceration, s/p abdominal reconstruction with biologic mesh  Sigmoid diverticulitis: Ex lap, sigmoid resection, creation of colostomy.  Intestinal perforation: 2015, s/p closure with strattice mesh      Allergies    azithromycin (Unknown)  codeine (Unknown)  heparin (Other)  PC Pen VK (Unknown)  penicillin (Unknown)    Intolerances        ANTIMICROBIALS:  imipenem/cilastatin  IVPB 500 every 6 hours      OTHER MEDS:  levothyroxine Injectable 44 MICROGram(s) IV Push daily  pantoprazole  Injectable 40 milliGRAM(s) IV Push daily  ondansetron Injectable 4 milliGRAM(s) IV Push every 6 hours PRN  fondaparinux Injectable 2.5 milliGRAM(s) SubCutaneous every 24 hours  ALBUTerol/ipratropium for Nebulization 3 milliLiter(s) Nebulizer every 6 hours  buDESOnide 160 MICROgram(s)/formoterol 4.5 MICROgram(s) Inhaler 2 Puff(s) Inhalation two times a day  HYDROmorphone  Injectable 0.5 milliGRAM(s) IV Push every 3 hours PRN  fentaNYL   Patch  50 MICROgram(s)/Hr. 1 Patch Transdermal every 48 hours  fluticasone propionate 50 MICROgram(s)/spray Nasal Spray 1 Spray(s) Both Nostrils two times a day  insulin lispro (HumaLOG) corrective regimen sliding scale   SubCutaneous every 6 hours      Vital Signs Last 24 Hrs  T(C): 36.1 (2017 12:00), Max: 36.4 (2017 19:00)  T(F): 97 (2017 12:00), Max: 97.5 (2017 19:00)  HR: 107 (2017 13:00) (87 - 107)  BP: 120/56 (2017 07:00) (117/56 - 120/56)  BP(mean): 82 (2017 07:00) (80 - 82)  RR: 25 (2017 13:00) (11 - 25)  SpO2: 99% (2017 13:00) (94% - 100%)  PE- pt sitting in chair  coughing , nurse suctioning secretions  mucus in mouth  lungs- decreased BS at base  COR- S1S2  abd- ostomy unchanged, wd with d/ce  ext - edema                    8.5    5.4   )-----------( 378      ( 2017 03:08 )             24.9       06-29    146<H>  |  113<H>  |  27<H>  ----------------------------<  141<H>  3.8   |  26  |  1.34<H>    Ca    8.9      2017 03:08  Phos  3.1     06-29  Mg     2.1     -                  MICROBIOLOGY:    RECENT CULTURES:                      RADIOLOGY:

## 2017-06-29 NOTE — PROGRESS NOTE ADULT - ASSESSMENT
76 year old female with history significant for COPD, prior DVT s/p IVC filter (now removed), GERD, hypothyroidism, HIT and diverticulitis s/p Ruben's, and recurrent SBO requiring ex-lap small bowel resection, EC fistula s/p takedown that was complicated by wound dehiscence and abdominal reconstruction, who underwent ex-lap, extensive NANETTE, EC fistula takedown, colostomy revision, and parastomal/incisional hernia repair on 6/9. She was admitted to SICU intubated post-operatively and was successfully extubated. However, after getting a CT with oral contrast, patient went into respiratory distress and was re-intubated and found to have MRSA pneumonia, likely aspiration in nature. She was subsequently extubated, weaned off pressors, and transferred to the floors. On the floor on 6/22, patient went into respiratory distress again likely secondary to pulmonary vascular congestion requiring BiPAP and became hypotensive requiring vasopressor support. Her wound also began draining thick brown fluid concerning for a new EC fistula vs. anastomotic breakdown and is currently on imipenem. Pressor needs decreasing, wound pouched with some improvement in clinical status, remains extubated, intermittently requiring BiPAP, now with decreased hearing.       Respiratory: remains stable on room air. Keep o2 sat>=90% w/ prn o2.  Last wore bipap on 6/24  COPD: not exacerbated. continue with symbicort  HIT/prior PE/DVT/+ivc filter- on fondaparinux -prophy  s/p tx for mrsa pna-observing off abx  Hypernatremia-improved- renal/nutrition-on TPN  TREVA-improved  plan as per sicu  -EC fistula, ostomy dysfunction-for colostomy revision 6/30 76 year old female with history significant for COPD, prior DVT s/p IVC filter (now removed), GERD, hypothyroidism, HIT and diverticulitis s/p Ruben's, and recurrent SBO requiring ex-lap small bowel resection, EC fistula s/p takedown that was complicated by wound dehiscence and abdominal reconstruction, who underwent ex-lap, extensive NAENTTE, EC fistula takedown, colostomy revision, and parastomal/incisional hernia repair on 6/9. She was admitted to SICU intubated post-operatively and was successfully extubated. However, after getting a CT with oral contrast, patient went into respiratory distress and was re-intubated and found to have MRSA pneumonia, likely aspiration in nature. She was subsequently extubated, weaned off pressors, and transferred to the floors. On the floor on 6/22, patient went into respiratory distress again likely secondary to pulmonary vascular congestion requiring BiPAP and became hypotensive requiring vasopressor support. Her wound also began draining thick brown fluid concerning for a new EC fistula vs. anastomotic breakdown and is currently on imipenem. Pressor needs decreasing, wound pouched with some improvement in clinical status, remains extubated, intermittently requiring BiPAP, now with decreased hearing.       Respiratory: remains stable on room air. Keep o2 sat>=90% w/ prn o2.  Last wore bipap on 6/24  COPD: not exacerbated. continue with symbicort  HIT/prior PE/DVT/+ivc filter- on fondaparinux -prophy  s/p tx for mrsa pna-observing off abx  Hypernatremia-improved- renal/nutrition-on TPN  TREVA-improved  ID-on imipenem   plan as per sicu  -EC fistula, ostomy dysfunction-for colostomy revision 6/30

## 2017-06-29 NOTE — PROGRESS NOTE ADULT - PROBLEM SELECTOR PLAN 1
renal function is stable.  Monitor BMP daily.  Large salt load from IV abx(almost 4 grams)  Hypernatremia is however decreasing(not much Na+ in TPN)

## 2017-06-29 NOTE — PROGRESS NOTE ADULT - SUBJECTIVE AND OBJECTIVE BOX
Follow-up Pulm Progress Note    No new respiratory events overnight.  Denies SOB/CP. on room air -02 sat 95%, off levo    Medications:  MEDICATIONS  (STANDING):  levothyroxine Injectable 44 MICROGram(s) IV Push daily  pantoprazole  Injectable 40 milliGRAM(s) IV Push daily  fondaparinux Injectable 2.5 milliGRAM(s) SubCutaneous every 24 hours  ALBUTerol/ipratropium for Nebulization 3 milliLiter(s) Nebulizer every 6 hours  buDESOnide 160 MICROgram(s)/formoterol 4.5 MICROgram(s) Inhaler 2 Puff(s) Inhalation two times a day  imipenem/cilastatin  IVPB 500 milliGRAM(s) IV Intermittent every 6 hours  fentaNYL   Patch  50 MICROgram(s)/Hr. 1 Patch Transdermal every 48 hours  fluticasone propionate 50 MICROgram(s)/spray Nasal Spray 1 Spray(s) Both Nostrils two times a day  insulin lispro (HumaLOG) corrective regimen sliding scale   SubCutaneous every 6 hours    MEDICATIONS  (PRN):  ondansetron Injectable 4 milliGRAM(s) IV Push every 6 hours PRN Nausea  HYDROmorphone  Injectable 0.5 milliGRAM(s) IV Push every 3 hours PRN Moderate Pain (4 - 6)          Vital Signs Last 24 Hrs  T(C): 36.1 (29 Jun 2017 12:00), Max: 36.4 (28 Jun 2017 19:00)  T(F): 97 (29 Jun 2017 12:00), Max: 97.5 (28 Jun 2017 19:00)  HR: 107 (29 Jun 2017 13:00) (87 - 107)  BP: 120/56 (29 Jun 2017 07:00) (117/56 - 120/56)  BP(mean): 82 (29 Jun 2017 07:00) (80 - 82)  RR: 25 (29 Jun 2017 13:00) (11 - 25)  SpO2: 99% (29 Jun 2017 13:00) (94% - 100%)          06-27 @ 07:01  -  06-28 @ 07:00  --------------------------------------------------------  IN: 2202.9 mL / OUT: 2385 mL / NET: -182.1 mL    06-28 @ 07:01  -  06-29 @ 07:00  --------------------------------------------------------  IN: 2150.5 mL / OUT: 2725 mL / NET: -574.5 mL    06-29 @ 07:01  -  06-29 @ 14:14  --------------------------------------------------------  IN: 372 mL / OUT: 590 mL / NET: -218 mL          LABS:                        8.5    5.4   )-----------( 378      ( 29 Jun 2017 03:08 )             24.9       wbc 5.4  06-29 @ 03:08  wbc 4.2  06-28 @ 02:25  wbc 5.1  06-27 @ 02:38    06-29    146<H>  |  113<H>  |  27<H>  ----------------------------<  141<H>  3.8   |  26  |  1.34<H>    Ca    8.9      29 Jun 2017 03:08  Phos  3.1     06-29  Mg     2.1     06-29      Cr 1.34  06-29 @ 03:08  Cr 1.33  06-28 @ 02:25  Cr 1.29  06-27 @ 02:38          CAPILLARY BLOOD GLUCOSE  145 (29 Jun 2017 05:00)        PT/INR - ( 28 Jun 2017 02:25 )   PT: 13.0 sec;   INR: 1.19 ratio         PTT - ( 28 Jun 2017 02:25 )  PTT:36.2 sec                  CULTURES: Culture - Blood (06.26.17 @ 06:39)    Specimen Source: .Blood Blood-Peripheral    Culture Results:   No growth to date.    Culture - Blood (06.26.17 @ 06:38)    Specimen Source: .Blood Blood-Peripheral    Culture Results:   No growth to date.    Culture - Urine (06.24.17 @ 17:48)    Specimen Source: .Urine Catheterized    Culture Results:   No growth            Physical Examination:  PULM: decreased, no significant sputum production  CVS: S1, S2 heard    RADIOLOGY REVIEWED  CXR: 6/29 LLL atlectasis    CT chest:    TTE:

## 2017-06-29 NOTE — PROGRESS NOTE ADULT - SUBJECTIVE AND OBJECTIVE BOX
HOSPITAL DAY #38      STATUS POST:  exploratory laparotomy, extensive lysis of adhesions, takedown of enterocutaneous fistula, repair of serosal tear on cecum, partial right salpingooophorectomy, colostomy revision, parastomal and incisional hernia repair with Strattice mesh          POD#: 20    INTERVAL EVENTS: none    SUBJECTIVE: Pt seen + examined. She reports feeling very tired. No leakage around wound pouch.       VITALS  T(C): 35.8 (06-29-17 @ 08:00), Max: 36.4 (06-28-17 @ 19:00)  HR: 88 (06-29-17 @ 09:00) (87 - 105)  BP: 120/56 (06-29-17 @ 07:00) (117/56 - 120/56)  BP(mean): 82 (06-29-17 @ 07:00) (80 - 82)  RR: 15 (06-29-17 @ 09:00) (11 - 30)  SpO2: 99% (06-29-17 @ 09:00) (93% - 100%)  Wt(kg): --  CAPILLARY BLOOD GLUCOSE  145 (29 Jun 2017 05:00)  111 (29 Jun 2017 00:00)  129 (28 Jun 2017 18:00)          Is/Os    06-28 @ 07:01  -  06-29 @ 07:00  --------------------------------------------------------  IN:    Solution: 200 mL    Solution: 300 mL    Solution: 162.5 mL    TPN (Total Parenteral Nutrition): 1488 mL  Total IN: 2150.5 mL    OUT:    Colostomy: 5 mL    Drain: 450 mL    Indwelling Catheter - Urethral: 1270 mL    Nasoenteral Tube: 1000 mL  Total OUT: 2725 mL    Total NET: -574.5 mL      06-29 @ 07:01  -  06-29 @ 10:54  --------------------------------------------------------  IN:    TPN (Total Parenteral Nutrition): 186 mL  Total IN: 186 mL    OUT:    Indwelling Catheter - Urethral: 140 mL  Total OUT: 140 mL    Total NET: 46 mL          PHYSICAL EXAM:   General: appears drowsy  Neuro: alert, oriented x3  HEENT: NC/AT, EOMI  Cardio: RRR, nml S1/S2  Resp: Good effort, CTA b/l  GI/Abd: soft, bilious output from fistula    MEDICATIONS (STANDING): levothyroxine Injectable 44 MICROGram(s) IV Push daily  pantoprazole  Injectable 40 milliGRAM(s) IV Push daily  fondaparinux Injectable 2.5 milliGRAM(s) SubCutaneous every 24 hours  ALBUTerol/ipratropium for Nebulization 3 milliLiter(s) Nebulizer every 6 hours  buDESOnide 160 MICROgram(s)/formoterol 4.5 MICROgram(s) Inhaler 2 Puff(s) Inhalation two times a day  imipenem/cilastatin  IVPB 500 milliGRAM(s) IV Intermittent every 6 hours  fentaNYL   Patch  50 MICROgram(s)/Hr. 1 Patch Transdermal every 48 hours  insulin lispro (HumaLOG) corrective regimen sliding scale   SubCutaneous every 6 hours    MEDICATIONS (PRN):ondansetron Injectable 4 milliGRAM(s) IV Push every 6 hours PRN Nausea  HYDROmorphone  Injectable 0.5 milliGRAM(s) IV Push every 3 hours PRN Moderate Pain (4 - 6)      LABS  CBC (06-29 @ 03:08)                              8.5<L>                         5.4     )----------------(  378        --    % Neutrophils, --    % Lymphocytes, ANC: --                                  24.9<L>  CBC (06-28 @ 02:25)                              8.5<L>                         4.2     )----------------(  376        --    % Neutrophils, --    % Lymphocytes, ANC: --                                  25.9<L>    BMP (06-29 @ 03:08)             146<H>  |  113<H>  |  27<H> 		Ca++ --      Ca 8.9                ---------------------------------( 141<H>		Mg 2.1                3.8     |  26      |  1.34<H>			Ph 3.1     BMP (06-28 @ 02:25)             150<H>  |  115<H>  |  20    		Ca++ --      Ca 8.8                ---------------------------------( 148<H>		Mg 2.2                3.3<L>  |  24      |  1.33<H>			Ph 2.2<L>      Coags (06-28 @ 02:25)  aPTT 36.2 / INR 1.19<H> / PT 13.0<H>        ASSESSMENT  76y female with high volume ECF s/p exploratory laparotomy, extensive lysis of adhesions, takedown of enterocutaneous fistula, repair of serosal tear on cecum, partial right salpingooophorectomy, colostomy revision, parastomal and incisional hernia repair with strattice mesh, POD 20    PLAN  - c/w primaxin  - Atrixtra for DVT prophylaxis with h/o HIT  - Pain control  - cont. with wound care, pouch around wound with suction  - OR tomorrow for colostomy revision  - care per RENALDO Ash PGY 4  #[id number]

## 2017-06-29 NOTE — PROGRESS NOTE ADULT - SUBJECTIVE AND OBJECTIVE BOX
HISTORY  76 year old female with history significant for COPD, prior DVT s/p IVC filter (now removed), GERD, hypothyroidism, HIT and diverticulitis s/p Ruben's, and recurrent SBO requiring ex-lap small bowel resection, EC fistula s/p takedown that was complicated by wound dehiscence and abdominal reconstruction, who underwent ex-lap, extensive NANETTE, EC fistula takedown, colostomy revision, and parastomal/incisional hernia repair on 6/9. She was admitted to SICU intubated post-operatively and was successfully extubated. However, after getting a CT with oral contrast, patient went into respiratory distress and was re-intubated and found to have MRSA pneumonia, likely aspiration in nature. She was subsequently extubated, weaned off pressors, and transferred to the floors. On the floor on 6/22, patient went into respiratory distress again likely secondary to pulmonary vascular congestion requiring BiPAP and became hypotensive requiring vasopressor support. Her wound also began draining thick brown fluid concerning for a new EC fistula vs. anastomotic breakdown and is currently on imipenem. Pressor needs decreasing, wound pouched with some improvement in clinical status, remains extubated, intermittently requiring BiPAP    24 HOUR EVENTS: Pain control is adequate w/ current regimen. Remains off vasopressor support. Patient will be going to OR tomorrow for revision of ostomy.    SUBJECTIVE/ROS:  [x] A ten-point review of systems was otherwise negative except as noted.  [ ] Due to altered mental status/intubation, subjective information were not able to be obtained from the patient. History was obtained, to the extent possible, from review of the chart and collateral sources of information.      NEURO  CAM ICU: negative  Exam: awake, alert, oriented x4  Meds:  ·	HYDROmorphone  Injectable 0.5 milliGRAM(s) IV Push every 3 hours PRN Moderate Pain (4 - 6)  ·	fentaNYL   Patch  50 MICROgram(s)/Hr. 1 Patch Transdermal every 48 hours  [x] Adequacy of sedation and pain control has been assessed and adjusted      RESPIRATORY  RR: 15 (06-29-17 @ 08:00) (11 - 30)  SpO2: 97% (06-29-17 @ 08:00) (93% - 100%)  Exam: unlabored, decreased bibasilar breath sounds  Mechanical Ventilation: no  [N/A] Extubation Readiness Assessed  Meds:  ·	ALBUTerol/ipratropium for Nebulization 3 milliLiter(s) Nebulizer every 6 hours  ·	buDESOnide 160 MICROgram(s)/formoterol 4.5 MICROgram(s) Inhaler 2 Puff(s) Inhalation two times a day  ·	fluticasone propionate 50 MICROgram(s)/spray Nasal Spray 1 Spray(s) Both Nostrils two times a day      CARDIOVASCULAR  HR: 93 (06-29-17 @ 08:00) (87 - 105)  BP: 120/56 (06-29-17 @ 07:00) (117/56 - 120/56)  BP(mean): 82 (06-29-17 @ 07:00) (80 - 82)  ABP: 111/47 (06-29-17 @ 08:00) (110/44 - 136/56)  ABP(mean): 73 (06-29-17 @ 08:00) (71 - 92)  Exam: regular rate and rhythm  Cardiac Rhythm: sinus  Perfusion     [x]Adequate   [ ]Inadequate  Mentation    [x]Normal       [ ]Reduced  Extremities  [x]Warm         [ ]Cool  Volume Status [ ]Hypervolemic [x]Euvolemic [ ]Hypovolemic  Meds: none      GI/NUTRITION  Exam: soft, nontender, nondistended, incision C/D/I  Diet: strict NPO w/ TPA at 62 mL/Hr  Meds:  ·	pantoprazole  Injectable 40 milliGRAM(s) IV Push daily  ·	ondansetron Injectable 4 milliGRAM(s) IV Push every 6 hours PRN Nausea      GENITOURINARY  I&O's Detail    06-28 @ 07:01 - 06-29 @ 07:00  --------------------------------------------------------  IN:    Solution: 200 mL    Solution: 300 mL    Solution: 162.5 mL    TPN (Total Parenteral Nutrition): 1488 mL  Total IN: 2150.5 mL    OUT:    Colostomy: 5 mL    Drain: 450 mL    Indwelling Catheter - Urethral: 1270 mL    Nasoenteral Tube: 1000 mL  Total OUT: 2725 mL    Total NET: -574.5 mL      06-29 @ 07:01  -  06-29 @ 08:39  --------------------------------------------------------  IN:    TPN (Total Parenteral Nutrition): 62 mL  Total IN: 62 mL    OUT:    Indwelling Catheter - Urethral: 35 mL  Total OUT: 35 mL    Total NET: 27 mL    29 Jun 2017 03:08  146<H>  |  113<H>  |  27<H>  ----------------------------<  141<H>  3.8   |  26  |  1.34<H>    Ca    8.9        Phos  3.1  Mg     2.1  [ ] Snyder catheter, indication: N/A  Meds:       HEMATOLOGIC  Meds: fondaparinux Injectable 2.5 milliGRAM(s) SubCutaneous every 24 hours    [x] VTE Prophylaxis                        8.5    5.4   )-----------( 378      ( 29 Jun 2017 03:08 )             24.9     PT/INR - ( 28 Jun 2017 02:25 )   PT: 13.0 sec;   INR: 1.19 ratio         PTT - ( 28 Jun 2017 02:25 )  PTT:36.2 sec  Transfusion     [ ] PRBC   [ ] Platelets   [ ] FFP   [ ] Cryoprecipitate      INFECTIOUS DISEASES  WBC Count: 5.4 K/uL (06-29 @ 03:08)    RECENT CULTURES:    Meds: imipenem/cilastatin  IVPB 500 milliGRAM(s) IV Intermittent every 6 hours        ENDOCRINE  CAPILLARY BLOOD GLUCOSE  145 (29 Jun 2017 05:00)  111 (29 Jun 2017 00:00)  129 (28 Jun 2017 18:00)        Meds: levothyroxine Injectable 44 MICROGram(s) IV Push daily  insulin lispro (HumaLOG) corrective regimen sliding scale   SubCutaneous every 6 hours        ACCESS DEVICES:  [x] Peripheral IV  [ ] Central Venous Line	[ ] R	[ ] L	[ ] IJ	[ ] Fem	[ ] SC	Placed:   [x] Arterial Line		[ ] R	[x] L	[x] Fem	[ ] Rad	[ ] Ax	Placed: 6/22/2017  [x] PICC: L upper extremity placed 5/31/2017				[ ] Mediport  [x] Urinary Catheter, Date Placed: 6/22/2017  [x] Necessity of urinary, arterial, and venous catheters discussed    OTHER MEDICATIONS: none    CODE STATUS: full code    IMAGING: none HISTORY  76 year old female with history significant for COPD, prior DVT s/p IVC filter (now removed), GERD, hypothyroidism, HIT and diverticulitis s/p Ruben's, and recurrent SBO requiring ex-lap small bowel resection, EC fistula s/p takedown that was complicated by wound dehiscence and abdominal reconstruction, who underwent ex-lap, extensive NANETTE, EC fistula takedown, colostomy revision, and parastomal/incisional hernia repair on 6/9. She was admitted to SICU intubated post-operatively and was successfully extubated. However, after getting a CT with oral contrast, patient went into respiratory distress and was re-intubated and found to have MRSA pneumonia, likely aspiration in nature. She was subsequently extubated, weaned off pressors, and transferred to the floors. On the floor on 6/22, patient went into respiratory distress again likely secondary to pulmonary vascular congestion requiring BiPAP and became hypotensive requiring vasopressor support. Her wound also began draining thick brown fluid concerning for a new EC fistula vs. anastomotic breakdown and is currently on imipenem. Pressor needs decreasing, wound pouched with some improvement in clinical status, remains extubated, intermittently requiring BiPAP    24 HOUR EVENTS: Pain control is adequate w/ current regimen. Remains off vasopressor support. Patient will be going to OR tomorrow for revision of ostomy.    SUBJECTIVE/ROS:  [x] A ten-point review of systems was otherwise negative except as noted.  [ ] Due to altered mental status/intubation, subjective information were not able to be obtained from the patient. History was obtained, to the extent possible, from review of the chart and collateral sources of information.      NEURO  CAM ICU: negative  Exam: awake, alert, oriented x4  Meds:  ·	HYDROmorphone  Injectable 0.5 milliGRAM(s) IV Push every 3 hours PRN Moderate Pain (4 - 6)  ·	fentaNYL   Patch  50 MICROgram(s)/Hr. 1 Patch Transdermal every 48 hours  [x] Adequacy of sedation and pain control has been assessed and adjusted      RESPIRATORY  RR: 15 (06-29-17 @ 08:00) (11 - 30)  SpO2: 97% (06-29-17 @ 08:00) (93% - 100%)  Exam: unlabored, decreased bibasilar breath sounds  Mechanical Ventilation: no  [N/A] Extubation Readiness Assessed  Meds:  ·	ALBUTerol/ipratropium for Nebulization 3 milliLiter(s) Nebulizer every 6 hours  ·	buDESOnide 160 MICROgram(s)/formoterol 4.5 MICROgram(s) Inhaler 2 Puff(s) Inhalation two times a day  ·	fluticasone propionate 50 MICROgram(s)/spray Nasal Spray 1 Spray(s) Both Nostrils two times a day      CARDIOVASCULAR  HR: 93 (06-29-17 @ 08:00) (87 - 105)  BP: 120/56 (06-29-17 @ 07:00) (117/56 - 120/56)  BP(mean): 82 (06-29-17 @ 07:00) (80 - 82)  ABP: 111/47 (06-29-17 @ 08:00) (110/44 - 136/56)  ABP(mean): 73 (06-29-17 @ 08:00) (71 - 92)  Exam: regular rate and rhythm  Cardiac Rhythm: sinus  Perfusion     [x]Adequate   [ ]Inadequate  Mentation    [x]Normal       [ ]Reduced  Extremities  [x]Warm         [ ]Cool  Volume Status [ ]Hypervolemic [x]Euvolemic [ ]Hypovolemic  Meds: none      GI/NUTRITION  Exam: soft, nontender, nondistended, incision C/D/I, ostomy dusky, pouch with sero-bilious fluid  Diet: strict NPO w/ TPA at 62 mL/Hr  Meds:  ·	pantoprazole  Injectable 40 milliGRAM(s) IV Push daily  ·	ondansetron Injectable 4 milliGRAM(s) IV Push every 6 hours PRN Nausea      GENITOURINARY  I&O's Detail    06-28 @ 07:01 - 06-29 @ 07:00  --------------------------------------------------------  IN:    Solution: 200 mL    Solution: 300 mL    Solution: 162.5 mL    TPN (Total Parenteral Nutrition): 1488 mL  Total IN: 2150.5 mL    OUT:    Colostomy: 5 mL    Drain: 450 mL    Indwelling Catheter - Urethral: 1270 mL    Nasoenteral Tube: 1000 mL  Total OUT: 2725 mL    Total NET: -574.5 mL      06-29 @ 07:01  -  06-29 @ 08:39  --------------------------------------------------------  IN:    TPN (Total Parenteral Nutrition): 62 mL  Total IN: 62 mL    OUT:    Indwelling Catheter - Urethral: 35 mL  Total OUT: 35 mL    Total NET: 27 mL    29 Jun 2017 03:08  146<H>  |  113<H>  |  27<H>  ----------------------------<  141<H>  3.8   |  26  |  1.34<H>    Ca    8.9        Phos  3.1  Mg     2.1  [ ] Snyder catheter, indication: N/A  Meds:       HEMATOLOGIC  Meds: fondaparinux Injectable 2.5 milliGRAM(s) SubCutaneous every 24 hours    [x] VTE Prophylaxis                        8.5    5.4   )-----------( 378      ( 29 Jun 2017 03:08 )             24.9     PT/INR - ( 28 Jun 2017 02:25 )   PT: 13.0 sec;   INR: 1.19 ratio         PTT - ( 28 Jun 2017 02:25 )  PTT:36.2 sec  Transfusion     [ ] PRBC   [ ] Platelets   [ ] FFP   [ ] Cryoprecipitate      INFECTIOUS DISEASES  WBC Count: 5.4 K/uL (06-29 @ 03:08)    RECENT CULTURES:    Meds: imipenem/cilastatin  IVPB 500 milliGRAM(s) IV Intermittent every 6 hours        ENDOCRINE  CAPILLARY BLOOD GLUCOSE  145 (29 Jun 2017 05:00)  111 (29 Jun 2017 00:00)  129 (28 Jun 2017 18:00)        Meds: levothyroxine Injectable 44 MICROGram(s) IV Push daily  insulin lispro (HumaLOG) corrective regimen sliding scale   SubCutaneous every 6 hours        ACCESS DEVICES:  [x] Peripheral IV  [ ] Central Venous Line	[ ] R	[ ] L	[ ] IJ	[ ] Fem	[ ] SC	Placed:   [x] Arterial Line		[ ] R	[x] L	[x] Fem	[ ] Rad	[ ] Ax	Placed: 6/22/2017  [x] PICC: L upper extremity placed 5/31/2017				[ ] Mediport  [x] Urinary Catheter, Date Placed: 6/22/2017  [x] Necessity of urinary, arterial, and venous catheters discussed    OTHER MEDICATIONS: none    CODE STATUS: full code    IMAGING: none

## 2017-06-29 NOTE — PROGRESS NOTE ADULT - SUBJECTIVE AND OBJECTIVE BOX
NEPHROLOGY-[facility] [phone]        Patient seen and examined in the bed.  She remains on TPN.  No pressors        MEDICATIONS  (STANDING):  levothyroxine Injectable 44 MICROGram(s) IV Push daily  pantoprazole  Injectable 40 milliGRAM(s) IV Push daily  fondaparinux Injectable 2.5 milliGRAM(s) SubCutaneous every 24 hours  ALBUTerol/ipratropium for Nebulization 3 milliLiter(s) Nebulizer every 6 hours  buDESOnide 160 MICROgram(s)/formoterol 4.5 MICROgram(s) Inhaler 2 Puff(s) Inhalation two times a day  imipenem/cilastatin  IVPB 500 milliGRAM(s) IV Intermittent every 6 hours  fentaNYL   Patch  50 MICROgram(s)/Hr. 1 Patch Transdermal every 48 hours  fluticasone propionate 50 MICROgram(s)/spray Nasal Spray 1 Spray(s) Both Nostrils two times a day  insulin lispro (HumaLOG) corrective regimen sliding scale   SubCutaneous every 6 hours      VITAL:  T(C): , Max: 36.4 (06-28-17 @ 19:00)  T(F): , Max: 97.5 (06-28-17 @ 19:00)  HR: 88 (06-29-17 @ 09:00)  BP: 120/56 (06-29-17 @ 07:00)  BP(mean): 82 (06-29-17 @ 07:00)  RR: 15 (06-29-17 @ 09:00)  SpO2: 99% (06-29-17 @ 09:00)  Wt(kg): --    I and O's:    06-28 @ 07:01  -  06-29 @ 07:00  --------------------------------------------------------  IN: 2150.5 mL / OUT: 2725 mL / NET: -574.5 mL    06-29 @ 07:01  -  06-29 @ 10:36  --------------------------------------------------------  IN: 186 mL / OUT: 140 mL / NET: 46 mL          PHYSICAL EXAM:    Constitutional: NAD  HEENT: PERRLA    Neck:  No JVD  Respiratory: poor effort  Cardiovascular: S1 and S2  Gastrointestinal: BS+, soft, + ostomy  Extremities: No peripheral edema  Neurological: A/O x 3, no focal deficits  Psychiatric: Normal mood, normal affect  : +  Snyder  Skin: No rashes  Access: Not applicable    LABS:                        8.5    5.4   )-----------( 378      ( 29 Jun 2017 03:08 )             24.9     06-29    146<H>  |  113<H>  |  27<H>  ----------------------------<  141<H>  3.8   |  26  |  1.34<H>    Ca    8.9      29 Jun 2017 03:08  Phos  3.1     06-29  Mg     2.1     06-29            Urine Studies:          RADIOLOGY & ADDITIONAL STUDIES:    < from: Xray Chest 1 View AP -PORTABLE-Routine (06.29.17 @ 06:55) >    EXAM:  CHEST PORTABLE ROUTINE                            PROCEDURE DATE:  06/29/2017            INTERPRETATION:  A single chest x-ray was obtained on June 29, 2017.    Indication: Assess for pulmonary vascular congestion.    Impression:    The heart is slightly enlarged. Left lower lobe pneumonia and/or   atelectasis. The right lung is clear. NG tube is coiled in the stomach. A   central line seen on the left and the tip is superior vena cava. No   pneumothorax.                  < end of copied text >

## 2017-06-29 NOTE — PROGRESS NOTE ADULT - ASSESSMENT
76 year old female SICU day #8, POD #20 s/p ex-lap, extensive NANETTE, takedown of EC fistula, repair of cecal serosal tear, partial R salpingectomy, colostomy revision, parastomal and incisional hernia repair with Strattice mesh complicated by MRSA pneumonia requiring reintubation and vasopressor support; extubated, hemodynamically stable, and transferred to the floors, but readmitted to SICU 6/22 for respiratory distress from pulmonary vascular congestion, associated with hypotension requiring pressors, likely secondary to sepsis with concern for a new entero-atmospheric fistula versus anastomotic leak, now with no pressor requirements and stable respiratory status.    Neuro: chronic pain and postoperative pain associated with abdominal contamination  - continue multimodal pain control with acetaminophen, dilaudid, fentanyl patch  - chronic pain consult for improved pain management strategy    CV:  - Monitor vital signs.  - MAP goal greater than 60    Pulm: COPD  - continue inhaled meds for COPD  - encourage pulmonary hygiene  - noninvasive positive pressure as needed    GI/Nutrition: EC fistula, ostomy dysfunction  - NPO to control fistula output  - continue pouching to help control abdominal contamination  - Plan for revision of ostomy on Friday  - on TPN via PICC    /Renal: TREVA on CKD  - trend electrolytes, replete as needed  - monitor urine output  - Trend sodium    ID: pneumonia, abdominal contamination  - MRSA pneumonia s/p course of vancomycin, follow up sputum culture  - maintain pouch to help control fistula  - continue imipenem for sepsis secondary to fistula contamination of abdomen    Endocrine: hypothyroid, euglycemic  - no active concerns, monitor glycemic control on BMP  - continue synthroid    Skin: intact  - turn and position every 2 hours to promote skin protection    Prophylaxis  - VTE ppx with arixtra (prior HIT)    Dispo  - full code  - remain in SICU      Rosmery Kovacs PA-C   [id number]

## 2017-06-30 ENCOUNTER — RESULT REVIEW (OUTPATIENT)
Age: 77
End: 2017-06-30

## 2017-06-30 LAB
ALBUMIN SERPL ELPH-MCNC: 1.9 G/DL — LOW (ref 3.3–5)
ALBUMIN SERPL ELPH-MCNC: 2.3 G/DL — LOW (ref 3.3–5)
ALP SERPL-CCNC: 128 U/L — HIGH (ref 40–120)
ALP SERPL-CCNC: 97 U/L — SIGNIFICANT CHANGE UP (ref 40–120)
ALT FLD-CCNC: 6 U/L RC — LOW (ref 10–45)
ALT FLD-CCNC: 7 U/L RC — LOW (ref 10–45)
ANION GAP SERPL CALC-SCNC: 10 MMOL/L — SIGNIFICANT CHANGE UP (ref 5–17)
ANION GAP SERPL CALC-SCNC: 11 MMOL/L — SIGNIFICANT CHANGE UP (ref 5–17)
APTT BLD: 44.3 SEC — HIGH (ref 27.5–37.4)
AST SERPL-CCNC: 14 U/L — SIGNIFICANT CHANGE UP (ref 10–40)
AST SERPL-CCNC: 14 U/L — SIGNIFICANT CHANGE UP (ref 10–40)
BILIRUB DIRECT SERPL-MCNC: 0.2 MG/DL — SIGNIFICANT CHANGE UP (ref 0–0.2)
BILIRUB DIRECT SERPL-MCNC: 0.2 MG/DL — SIGNIFICANT CHANGE UP (ref 0–0.2)
BILIRUB INDIRECT FLD-MCNC: 0.1 MG/DL — LOW (ref 0.2–1)
BILIRUB INDIRECT FLD-MCNC: 0.1 MG/DL — LOW (ref 0.2–1)
BILIRUB SERPL-MCNC: 0.3 MG/DL — SIGNIFICANT CHANGE UP (ref 0.2–1.2)
BILIRUB SERPL-MCNC: 0.3 MG/DL — SIGNIFICANT CHANGE UP (ref 0.2–1.2)
BLD GP AB SCN SERPL QL: NEGATIVE — SIGNIFICANT CHANGE UP
BUN SERPL-MCNC: 31 MG/DL — HIGH (ref 7–23)
BUN SERPL-MCNC: 33 MG/DL — HIGH (ref 7–23)
CA-I BLD-SCNC: 1.25 MMOL/L — SIGNIFICANT CHANGE UP (ref 1.12–1.3)
CALCIUM SERPL-MCNC: 7.8 MG/DL — LOW (ref 8.4–10.5)
CALCIUM SERPL-MCNC: 9 MG/DL — SIGNIFICANT CHANGE UP (ref 8.4–10.5)
CHLORIDE SERPL-SCNC: 112 MMOL/L — HIGH (ref 96–108)
CHLORIDE SERPL-SCNC: 113 MMOL/L — HIGH (ref 96–108)
CO2 SERPL-SCNC: 20 MMOL/L — LOW (ref 22–31)
CO2 SERPL-SCNC: 23 MMOL/L — SIGNIFICANT CHANGE UP (ref 22–31)
CREAT ?TM UR-MCNC: 20 MG/DL — SIGNIFICANT CHANGE UP
CREAT SERPL-MCNC: 1.35 MG/DL — HIGH (ref 0.5–1.3)
CREAT SERPL-MCNC: 1.44 MG/DL — HIGH (ref 0.5–1.3)
GLUCOSE SERPL-MCNC: 134 MG/DL — HIGH (ref 70–99)
GLUCOSE SERPL-MCNC: 97 MG/DL — SIGNIFICANT CHANGE UP (ref 70–99)
HCT VFR BLD CALC: 26.2 % — LOW (ref 34.5–45)
HCT VFR BLD CALC: 27 % — LOW (ref 34.5–45)
HGB BLD-MCNC: 8.7 G/DL — LOW (ref 11.5–15.5)
HGB BLD-MCNC: 9.2 G/DL — LOW (ref 11.5–15.5)
INR BLD: 1.33 RATIO — HIGH (ref 0.88–1.16)
MAGNESIUM SERPL-MCNC: 1.9 MG/DL — SIGNIFICANT CHANGE UP (ref 1.6–2.6)
MAGNESIUM SERPL-MCNC: 2.2 MG/DL — SIGNIFICANT CHANGE UP (ref 1.6–2.6)
MCHC RBC-ENTMCNC: 32.8 PG — SIGNIFICANT CHANGE UP (ref 27–34)
MCHC RBC-ENTMCNC: 33.2 GM/DL — SIGNIFICANT CHANGE UP (ref 32–36)
MCHC RBC-ENTMCNC: 33.9 PG — SIGNIFICANT CHANGE UP (ref 27–34)
MCHC RBC-ENTMCNC: 34.2 GM/DL — SIGNIFICANT CHANGE UP (ref 32–36)
MCV RBC AUTO: 98.8 FL — SIGNIFICANT CHANGE UP (ref 80–100)
MCV RBC AUTO: 99.1 FL — SIGNIFICANT CHANGE UP (ref 80–100)
OSMOLALITY UR: 383 MOS/KG — SIGNIFICANT CHANGE UP (ref 300–900)
PHOSPHATE SERPL-MCNC: 3.1 MG/DL — SIGNIFICANT CHANGE UP (ref 2.5–4.5)
PHOSPHATE SERPL-MCNC: 3.5 MG/DL — SIGNIFICANT CHANGE UP (ref 2.5–4.5)
PLATELET # BLD AUTO: 390 K/UL — SIGNIFICANT CHANGE UP (ref 150–400)
PLATELET # BLD AUTO: 390 K/UL — SIGNIFICANT CHANGE UP (ref 150–400)
POTASSIUM SERPL-MCNC: 4.3 MMOL/L — SIGNIFICANT CHANGE UP (ref 3.5–5.3)
POTASSIUM SERPL-MCNC: 4.6 MMOL/L — SIGNIFICANT CHANGE UP (ref 3.5–5.3)
POTASSIUM SERPL-SCNC: 4.3 MMOL/L — SIGNIFICANT CHANGE UP (ref 3.5–5.3)
POTASSIUM SERPL-SCNC: 4.6 MMOL/L — SIGNIFICANT CHANGE UP (ref 3.5–5.3)
PROT SERPL-MCNC: 5.7 G/DL — LOW (ref 6–8.3)
PROT SERPL-MCNC: 6 G/DL — SIGNIFICANT CHANGE UP (ref 6–8.3)
PROTHROM AB SERPL-ACNC: 14.6 SEC — HIGH (ref 9.8–12.7)
RBC # BLD: 2.65 M/UL — LOW (ref 3.8–5.2)
RBC # BLD: 2.72 M/UL — LOW (ref 3.8–5.2)
RBC # FLD: 15.8 % — HIGH (ref 10.3–14.5)
RBC # FLD: 15.9 % — HIGH (ref 10.3–14.5)
RH IG SCN BLD-IMP: POSITIVE — SIGNIFICANT CHANGE UP
SODIUM SERPL-SCNC: 143 MMOL/L — SIGNIFICANT CHANGE UP (ref 135–145)
SODIUM SERPL-SCNC: 146 MMOL/L — HIGH (ref 135–145)
SODIUM UR-SCNC: 120 MMOL/L — SIGNIFICANT CHANGE UP
WBC # BLD: 6.2 K/UL — SIGNIFICANT CHANGE UP (ref 3.8–10.5)
WBC # BLD: 9.4 K/UL — SIGNIFICANT CHANGE UP (ref 3.8–10.5)
WBC # FLD AUTO: 6.2 K/UL — SIGNIFICANT CHANGE UP (ref 3.8–10.5)
WBC # FLD AUTO: 9.4 K/UL — SIGNIFICANT CHANGE UP (ref 3.8–10.5)

## 2017-06-30 PROCEDURE — 97605 NEG PRS WND THER DME<=50SQCM: CPT

## 2017-06-30 PROCEDURE — 71010: CPT | Mod: 26

## 2017-06-30 PROCEDURE — 44345 REVISION OF COLOSTOMY: CPT

## 2017-06-30 PROCEDURE — 99233 SBSQ HOSP IP/OBS HIGH 50: CPT

## 2017-06-30 PROCEDURE — 88304 TISSUE EXAM BY PATHOLOGIST: CPT | Mod: 26

## 2017-06-30 RX ORDER — HYDROMORPHONE HYDROCHLORIDE 2 MG/ML
0.5 INJECTION INTRAMUSCULAR; INTRAVENOUS; SUBCUTANEOUS
Qty: 0 | Refills: 0 | Status: DISCONTINUED | OUTPATIENT
Start: 2017-06-30 | End: 2017-06-30

## 2017-06-30 RX ORDER — HYDROMORPHONE HYDROCHLORIDE 2 MG/ML
0.5 INJECTION INTRAMUSCULAR; INTRAVENOUS; SUBCUTANEOUS
Qty: 0 | Refills: 0 | Status: DISCONTINUED | OUTPATIENT
Start: 2017-06-30 | End: 2017-07-04

## 2017-06-30 RX ORDER — HYDROMORPHONE HYDROCHLORIDE 2 MG/ML
0.25 INJECTION INTRAMUSCULAR; INTRAVENOUS; SUBCUTANEOUS ONCE
Qty: 0 | Refills: 0 | Status: DISCONTINUED | OUTPATIENT
Start: 2017-06-30 | End: 2017-06-30

## 2017-06-30 RX ORDER — SODIUM CHLORIDE 9 MG/ML
1000 INJECTION, SOLUTION INTRAVENOUS
Qty: 0 | Refills: 0 | Status: DISCONTINUED | OUTPATIENT
Start: 2017-06-30 | End: 2017-07-02

## 2017-06-30 RX ORDER — BUDESONIDE AND FORMOTEROL FUMARATE DIHYDRATE 160; 4.5 UG/1; UG/1
2 AEROSOL RESPIRATORY (INHALATION)
Qty: 0 | Refills: 0 | Status: DISCONTINUED | OUTPATIENT
Start: 2017-06-30 | End: 2017-07-14

## 2017-06-30 RX ORDER — FENTANYL CITRATE 50 UG/ML
1 INJECTION INTRAVENOUS
Qty: 0 | Refills: 0 | Status: DISCONTINUED | OUTPATIENT
Start: 2017-06-30 | End: 2017-07-03

## 2017-06-30 RX ORDER — ACETAMINOPHEN 500 MG
1000 TABLET ORAL ONCE
Qty: 0 | Refills: 0 | Status: COMPLETED | OUTPATIENT
Start: 2017-07-01 | End: 2017-07-01

## 2017-06-30 RX ORDER — FLUTICASONE PROPIONATE 50 MCG
1 SPRAY, SUSPENSION NASAL
Qty: 0 | Refills: 0 | Status: DISCONTINUED | OUTPATIENT
Start: 2017-06-30 | End: 2017-07-14

## 2017-06-30 RX ORDER — ACETAMINOPHEN 500 MG
1000 TABLET ORAL ONCE
Qty: 0 | Refills: 0 | Status: COMPLETED | OUTPATIENT
Start: 2017-06-30 | End: 2017-06-30

## 2017-06-30 RX ORDER — FONDAPARINUX SODIUM 2.5 MG/.5ML
2.5 INJECTION, SOLUTION SUBCUTANEOUS EVERY 24 HOURS
Qty: 0 | Refills: 0 | Status: DISCONTINUED | OUTPATIENT
Start: 2017-06-30 | End: 2017-07-06

## 2017-06-30 RX ORDER — ONDANSETRON 8 MG/1
4 TABLET, FILM COATED ORAL EVERY 6 HOURS
Qty: 0 | Refills: 0 | Status: DISCONTINUED | OUTPATIENT
Start: 2017-06-30 | End: 2017-07-04

## 2017-06-30 RX ORDER — IMIPENEM AND CILASTATIN 250; 250 MG/100ML; MG/100ML
500 INJECTION, POWDER, FOR SOLUTION INTRAVENOUS EVERY 6 HOURS
Qty: 0 | Refills: 0 | Status: DISCONTINUED | OUTPATIENT
Start: 2017-06-30 | End: 2017-07-02

## 2017-06-30 RX ORDER — LEVOTHYROXINE SODIUM 125 MCG
44 TABLET ORAL DAILY
Qty: 0 | Refills: 0 | Status: DISCONTINUED | OUTPATIENT
Start: 2017-06-30 | End: 2017-07-06

## 2017-06-30 RX ORDER — IPRATROPIUM/ALBUTEROL SULFATE 18-103MCG
3 AEROSOL WITH ADAPTER (GRAM) INHALATION EVERY 6 HOURS
Qty: 0 | Refills: 0 | Status: DISCONTINUED | OUTPATIENT
Start: 2017-06-30 | End: 2017-07-02

## 2017-06-30 RX ORDER — PANTOPRAZOLE SODIUM 20 MG/1
40 TABLET, DELAYED RELEASE ORAL DAILY
Qty: 0 | Refills: 0 | Status: DISCONTINUED | OUTPATIENT
Start: 2017-06-30 | End: 2017-07-17

## 2017-06-30 RX ADMIN — Medication 1 SPRAY(S): at 05:16

## 2017-06-30 RX ADMIN — PANTOPRAZOLE SODIUM 40 MILLIGRAM(S): 20 TABLET, DELAYED RELEASE ORAL at 11:51

## 2017-06-30 RX ADMIN — HYDROMORPHONE HYDROCHLORIDE 0.25 MILLIGRAM(S): 2 INJECTION INTRAMUSCULAR; INTRAVENOUS; SUBCUTANEOUS at 23:53

## 2017-06-30 RX ADMIN — FONDAPARINUX SODIUM 2.5 MILLIGRAM(S): 2.5 INJECTION, SOLUTION SUBCUTANEOUS at 11:51

## 2017-06-30 RX ADMIN — BUDESONIDE AND FORMOTEROL FUMARATE DIHYDRATE 2 PUFF(S): 160; 4.5 AEROSOL RESPIRATORY (INHALATION) at 12:44

## 2017-06-30 RX ADMIN — IMIPENEM AND CILASTATIN 100 MILLIGRAM(S): 250; 250 INJECTION, POWDER, FOR SOLUTION INTRAVENOUS at 05:15

## 2017-06-30 RX ADMIN — HYDROMORPHONE HYDROCHLORIDE 0.5 MILLIGRAM(S): 2 INJECTION INTRAMUSCULAR; INTRAVENOUS; SUBCUTANEOUS at 05:31

## 2017-06-30 RX ADMIN — HYDROMORPHONE HYDROCHLORIDE 0.5 MILLIGRAM(S): 2 INJECTION INTRAMUSCULAR; INTRAVENOUS; SUBCUTANEOUS at 02:14

## 2017-06-30 RX ADMIN — Medication 3 MILLILITER(S): at 11:16

## 2017-06-30 RX ADMIN — Medication 44 MICROGRAM(S): at 05:16

## 2017-06-30 RX ADMIN — HYDROMORPHONE HYDROCHLORIDE 0.5 MILLIGRAM(S): 2 INJECTION INTRAMUSCULAR; INTRAVENOUS; SUBCUTANEOUS at 23:00

## 2017-06-30 RX ADMIN — Medication 400 MILLIGRAM(S): at 21:40

## 2017-06-30 RX ADMIN — Medication 1000 MILLIGRAM(S): at 22:10

## 2017-06-30 RX ADMIN — HYDROMORPHONE HYDROCHLORIDE 0.5 MILLIGRAM(S): 2 INJECTION INTRAMUSCULAR; INTRAVENOUS; SUBCUTANEOUS at 23:15

## 2017-06-30 RX ADMIN — HYDROMORPHONE HYDROCHLORIDE 0.5 MILLIGRAM(S): 2 INJECTION INTRAMUSCULAR; INTRAVENOUS; SUBCUTANEOUS at 20:00

## 2017-06-30 RX ADMIN — HYDROMORPHONE HYDROCHLORIDE 0.5 MILLIGRAM(S): 2 INJECTION INTRAMUSCULAR; INTRAVENOUS; SUBCUTANEOUS at 05:16

## 2017-06-30 RX ADMIN — Medication 1000 MILLIGRAM(S): at 05:20

## 2017-06-30 RX ADMIN — SODIUM CHLORIDE 30 MILLILITER(S): 9 INJECTION, SOLUTION INTRAVENOUS at 19:35

## 2017-06-30 RX ADMIN — HYDROMORPHONE HYDROCHLORIDE 0.5 MILLIGRAM(S): 2 INJECTION INTRAMUSCULAR; INTRAVENOUS; SUBCUTANEOUS at 12:10

## 2017-06-30 RX ADMIN — HYDROMORPHONE HYDROCHLORIDE 0.25 MILLIGRAM(S): 2 INJECTION INTRAMUSCULAR; INTRAVENOUS; SUBCUTANEOUS at 23:38

## 2017-06-30 RX ADMIN — Medication 400 MILLIGRAM(S): at 04:50

## 2017-06-30 RX ADMIN — HYDROMORPHONE HYDROCHLORIDE 0.5 MILLIGRAM(S): 2 INJECTION INTRAMUSCULAR; INTRAVENOUS; SUBCUTANEOUS at 11:51

## 2017-06-30 RX ADMIN — Medication 1 SPRAY(S): at 19:59

## 2017-06-30 RX ADMIN — Medication 3 MILLILITER(S): at 23:53

## 2017-06-30 RX ADMIN — Medication 3 MILLILITER(S): at 06:07

## 2017-06-30 RX ADMIN — IMIPENEM AND CILASTATIN 100 MILLIGRAM(S): 250; 250 INJECTION, POWDER, FOR SOLUTION INTRAVENOUS at 23:55

## 2017-06-30 RX ADMIN — IMIPENEM AND CILASTATIN 100 MILLIGRAM(S): 250; 250 INJECTION, POWDER, FOR SOLUTION INTRAVENOUS at 11:51

## 2017-06-30 RX ADMIN — HYDROMORPHONE HYDROCHLORIDE 0.5 MILLIGRAM(S): 2 INJECTION INTRAMUSCULAR; INTRAVENOUS; SUBCUTANEOUS at 02:29

## 2017-06-30 RX ADMIN — HYDROMORPHONE HYDROCHLORIDE 0.5 MILLIGRAM(S): 2 INJECTION INTRAMUSCULAR; INTRAVENOUS; SUBCUTANEOUS at 20:15

## 2017-06-30 NOTE — BRIEF OPERATIVE NOTE - POST-OP DX
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

## 2017-06-30 NOTE — PROGRESS NOTE ADULT - ATTENDING COMMENTS
Hemodynamically stable off pressure  NPO, TPN  Hypernatremia stable and better  BS well control  Course of abx  Fondaparinux for DVT prophylaxis, h/o HIT  To OR for stoma revision

## 2017-06-30 NOTE — PROGRESS NOTE ADULT - ASSESSMENT
ASSESSMENT  76y female with high volume ECF s/p exploratory laparotomy, extensive lysis of adhesions, takedown of enterocutaneous fistula, repair of serosal tear on cecum, partial right salpingooophorectomy, colostomy revision, parastomal and incisional hernia repair with strattice mesh, POD 21    PLAN  - c/w primaxin  - OR Today with Dr. Jered Echeverria for DVT prophylaxis with h/o HIT  - Pain control  - cont. with wound care, pouch around wound with suction  - care per SICU    Zachary Serrano MD PGY2  Acute Care Surgery, #[id number]

## 2017-06-30 NOTE — PROGRESS NOTE ADULT - SUBJECTIVE AND OBJECTIVE BOX
HOSPITAL DAY #39    STATUS POST:  exploratory laparotomy, extensive lysis of adhesions, takedown of enterocutaneous fistula, repair of serosal tear on cecum, partial right salpingooophorectomy, colostomy revision, parastomal and incisional hernia repair with Strattice mesh          POD#: 21    INTERVAL EVENTS: Patient planned for RTOR today    SUBJECTIVE: Pt seen + examined. She reports feeling "okay", but still feels weak. No leakage around wound pouch.     VITALS  T(C): 35.8 (06-29-17 @ 08:00), Max: 36.4 (06-28-17 @ 19:00)  HR: 88 (06-29-17 @ 09:00) (87 - 105)  BP: 120/56 (06-29-17 @ 07:00) (117/56 - 120/56)  BP(mean): 82 (06-29-17 @ 07:00) (80 - 82)  RR: 15 (06-29-17 @ 09:00) (11 - 30)  SpO2: 99% (06-29-17 @ 09:00) (93% - 100%)  CAPILLARY BLOOD GLUCOSE  145 (29 Jun 2017 05:00)  111 (29 Jun 2017 00:00)  129 (28 Jun 2017 18:00)      Is/Os    06-28 @ 07:01  -  06-29 @ 07:00  --------------------------------------------------------  IN:    Solution: 200 mL    Solution: 300 mL    Solution: 162.5 mL    TPN (Total Parenteral Nutrition): 1488 mL  Total IN: 2150.5 mL    OUT:    Colostomy: 5 mL    Drain: 450 mL    Indwelling Catheter - Urethral: 1270 mL    Nasoenteral Tube: 1000 mL  Total OUT: 2725 mL    Total NET: -574.5 mL      06-29 @ 07:01  -  06-29 @ 10:54  --------------------------------------------------------  IN:    TPN (Total Parenteral Nutrition): 186 mL  Total IN: 186 mL    OUT:    Indwelling Catheter - Urethral: 140 mL  Total OUT: 140 mL    Total NET: 46 mL      PHYSICAL EXAM:   General: appears drowsy  Neuro: alert, oriented x3  HEENT: NC/AT, EOMI  Cardio: RRR, nml S1/S2  Resp: Good effort, CTA b/l  GI/Abd: soft, bilious output from fistula, wound pouch with good seal    MEDICATIONS (STANDING): levothyroxine Injectable 44 MICROGram(s) IV Push daily  pantoprazole  Injectable 40 milliGRAM(s) IV Push daily  fondaparinux Injectable 2.5 milliGRAM(s) SubCutaneous every 24 hours  ALBUTerol/ipratropium for Nebulization 3 milliLiter(s) Nebulizer every 6 hours  buDESOnide 160 MICROgram(s)/formoterol 4.5 MICROgram(s) Inhaler 2 Puff(s) Inhalation two times a day  imipenem/cilastatin  IVPB 500 milliGRAM(s) IV Intermittent every 6 hours  fentaNYL   Patch  50 MICROgram(s)/Hr. 1 Patch Transdermal every 48 hours  insulin lispro (HumaLOG) corrective regimen sliding scale   SubCutaneous every 6 hours    MEDICATIONS (PRN):ondansetron Injectable 4 milliGRAM(s) IV Push every 6 hours PRN Nausea  HYDROmorphone  Injectable 0.5 milliGRAM(s) IV Push every 3 hours PRN Moderate Pain (4 - 6)      CBC (06-30 @ 04:36)                              8.7<L>                         6.2     )----------------(  390        --    % Neutrophils, --    % Lymphocytes, ANC: --                                  26.2<L>  CBC (06-29 @ 03:08)                              8.5<L>                         5.4     )----------------(  378        --    % Neutrophils, --    % Lymphocytes, ANC: --                                  24.9<L>    BMP (06-30 @ 05:14)             --      |  --      |  --    		Ca++ 1.25    Ca --                 ---------------------------------( --    		Mg --                 --      |  --      |  --    			Ph --      BMP (06-30 @ 04:36)             146<H>  |  113<H>  |  33<H> 		Ca++ --      Ca 9.0                ---------------------------------( 134<H>		Mg 2.2                4.6     |  23      |  1.44<H>			Ph 3.5       LFTs (06-30 @ 04:36)      TPro 6.0 / Alb 2.3<L> / TBili 0.3 / DBili 0.2 / AST 14 / ALT 6<L> / AlkPhos 97    Coags (06-30 @ 04:36)  aPTT 44.3<H> / INR 1.33<H> / PT 14.6<H>

## 2017-06-30 NOTE — PROGRESS NOTE ADULT - ASSESSMENT
76 year old female with history significant for COPD, prior DVT s/p IVC filter (now removed), GERD, hypothyroidism, HIT and diverticulitis s/p Ruben's, and recurrent SBO requiring ex-lap small bowel resection, EC fistula s/p takedown that was complicated by wound dehiscence and abdominal reconstruction, who underwent ex-lap, extensive NANETTE, EC fistula takedown, colostomy revision, and parastomal/incisional hernia repair on 6/9. She was admitted to SICU intubated post-operatively and was successfully extubated. However, after getting a CT with oral contrast, patient went into respiratory distress and was re-intubated and found to have MRSA pneumonia, likely aspiration in nature. She was subsequently extubated, weaned off pressors, and transferred to the floors. On the floor on 6/22, patient went into respiratory distress again likely secondary to pulmonary vascular congestion requiring BiPAP and became hypotensive requiring vasopressor support. Her wound also began draining thick brown fluid concerning for a new EC fistula vs. anastomotic breakdown and is currently on imipenem. Pressor needs decreasing, wound pouched with some improvement in clinical status, remains extubated, intermittently requiring BiPAP, now with decreased hearing.       Respiratory: remains stable on room air. Keep o2 sat>=90% w/ prn o2.  Last wore bipap on 6/24  COPD: not exacerbated. continue with symbicort  HIT/prior PE/DVT/+ivc filter- on fondaparinux -prophy  s/p tx for mrsa pna-observing off abx  Hypernatremia-improved- renal/nutrition-on TPN  TREVA-improved  ID-on imipenem   plan as per sicu  -EC fistula, ostomy dysfunction-for ostomy revision 6/30

## 2017-06-30 NOTE — PROGRESS NOTE ADULT - PROBLEM SELECTOR PLAN 1
renal function is stable.  Monitor BMP daily.  Baseline creatinine 1.4-1.6  Large salt load from IV abx(almost 4 grams)  Hypernatremia is however decreasing(reduced amount of  Na+ in TPN)

## 2017-06-30 NOTE — BRIEF OPERATIVE NOTE - COMMENTS
VAC dressing: clear plastics - white sponge - black sponge, fistula pouch
hypotensive on pressors  intubated

## 2017-06-30 NOTE — PROGRESS NOTE ADULT - SUBJECTIVE AND OBJECTIVE BOX
HISTORY  76 year old female with a PMHx significant for diverticulitis s/p Ruben's c/b recurrent SBO (required an ex lap w/ SBR c/b wound dehiscence s/p abdominal reconstruction, EC fistula s/p takedown, and another EC fistula), COPD, prior DVT s/p IVC filter (now removed), GERD, hypothyroidism, and HIT who initially presented on 5/23 w/ a SBO that did not resolve with medical management so she is s/p ex lap, extensive NANETTE, EC fistula takedown, repair of cecal serosal tear, partial R salpingectomy, colostomy revision, and parastomal & incisional hernia repair w/ Strattice mesh on 6/9. Patient was admitted post-op intubated on vasopressor support. She was subsequently extubated and weaned off vasopressors. Hospital course complicated by lack of ostomy function and respiratory distress after getting PO contrast for a CT requiring re-intubation and Combicath culture positive for MRSA. Patient was subsequently extubated and hemodynamically stable for transfer to the floor. On the floor, she went into respiratory distress again likely secondary to pulmonary vascular congestion requiring BiPAP, became hypotensive requiring vasopressor support, and began draining thick brown fluid from her wound. CT revealed another SBO and and matted small bowel in the anterior lower abdominal wall defect concerning for an EC fistula. She was started on imipenem, had a drain placed in the wound that was subsequently placed on low wall suction & pouched, and was weaned off vasopressor support. Patient currently being evaluated for ostomy revision.    24 HOUR EVENTS: Patient remains hemodynamically stable and is awaiting return to the OR for an ostomy revision tomorrow. HISTORY  76 year old female with a PMHx significant for diverticulitis s/p Ruben's c/b recurrent SBO (required an ex lap w/ SBR c/b wound dehiscence s/p abdominal reconstruction, EC fistula s/p takedown, and another EC fistula), COPD, prior DVT s/p IVC filter (now removed), GERD, hypothyroidism, and HIT who initially presented on 5/23 w/ a SBO that did not resolve with medical management so she is s/p ex lap, extensive NANETTE, EC fistula takedown, repair of cecal serosal tear, partial R salpingectomy, colostomy revision, and parastomal & incisional hernia repair w/ Strattice mesh on 6/9. Patient was admitted post-op intubated on vasopressor support. She was subsequently extubated and weaned off vasopressors. Hospital course complicated by lack of ostomy function and respiratory distress after getting PO contrast for a CT requiring re-intubation and Combicath culture positive for MRSA. Patient was subsequently extubated and hemodynamically stable for transfer to the floor. On the floor, she went into respiratory distress again likely secondary to pulmonary vascular congestion requiring BiPAP, became hypotensive requiring vasopressor support, and began draining thick brown fluid from her wound. CT revealed another SBO and and matted small bowel in the anterior lower abdominal wall defect concerning for an EC fistula. She was started on imipenem, had a drain placed in the wound that was subsequently placed on low wall suction & pouched, and was weaned off vasopressor support. Patient currently pending ostomy revision.    24 HOUR EVENTS: Patient remains hemodynamically stable and is awaiting return to the OR for an ostomy revision today. HISTORY  76 year old female with a PMHx significant for diverticulitis s/p Ruben's c/b recurrent SBO (required an ex lap w/ SBR c/b wound dehiscence s/p abdominal reconstruction, EC fistula s/p takedown, and another EC fistula), COPD, prior DVT s/p IVC filter (now removed), GERD, hypothyroidism, and HIT who initially presented on 5/23 w/ a SBO that did not resolve with medical management so she is s/p ex lap, extensive NANETTE, EC fistula takedown, repair of cecal serosal tear, partial R salpingectomy, colostomy revision, and parastomal & incisional hernia repair w/ Strattice mesh on 6/9. Patient was admitted post-op intubated on vasopressor support. She was subsequently extubated and weaned off vasopressors. Hospital course complicated by lack of ostomy function and respiratory distress after getting PO contrast for a CT requiring re-intubation and Combicath culture positive for MRSA. Patient was subsequently extubated and hemodynamically stable for transfer to the floor. On the floor, she went into respiratory distress again likely secondary to pulmonary vascular congestion requiring BiPAP, became hypotensive requiring vasopressor support, and began draining thick brown fluid from her wound. CT revealed another SBO and and matted small bowel in the anterior lower abdominal wall defect concerning for an EC fistula. She was started on imipenem, had a drain placed in the wound that was subsequently placed on low wall suction & pouched, and was weaned off vasopressor support. Patient currently pending ostomy revision.    24 HOUR EVENTS: Patient remains hemodynamically stable and is awaiting return to the OR for an ostomy revision today.    SUBJECTIVE/ROS:  [x] A ten-point review of systems was otherwise negative except as noted.  [ ] Due to altered mental status/intubation, subjective information were not able to be obtained from the patient. History was obtained, to the extent possible, from review of the chart and collateral sources of information.    NEURO  CAM ICU: negative  Exam: awake, alert, oriented x3  Meds:   ·	fentaNYL   Patch  50 MICROgram(s)/Hr. 1 Patch Transdermal every 48 hours  ·	HYDROmorphone  Injectable 0.5 milliGRAM(s) IV Push every 3 hours PRN Moderate Pain (4 - 6)  [x] Adequacy of sedation and pain control has been assessed and adjusted    RESPIRATORY  RR: 21 (06-30-17 @ 07:00) (12 - 35)  SpO2: 96% (06-30-17 @ 07:00) (95% - 100%)  Exam: unlabored, coarse rhonchi in all lung fields  Mechanical Ventilation: none  [N/A] Extubation Readiness Assessed  Meds:  ·	ALBUTerol/ipratropium for Nebulization 3 milliLiter(s) Nebulizer every 6 hours  ·	buDESOnide 160 MICROgram(s)/formoterol 4.5 MICROgram(s) Inhaler 2 Puff(s) Inhalation two times a day    CARDIOVASCULAR  HR: 92 (06-30-17 @ 07:00) (88 - 117)  BP: 137/62 (06-29-17 @ 19:00) (137/62 - 137/62)  BP(mean): 89 (06-29-17 @ 19:00) (89 - 89)  ABP: 117/53 (06-30-17 @ 07:00) (109/48 - 141/60)  ABP(mean): 79 (06-30-17 @ 07:00) (73 - 95)  Exam: regular rate and rhythm  Cardiac Rhythm: sinus  Perfusion     [x]Adequate   [ ]Inadequate  Mentation    [x]Normal       [ ]Reduced  Extremities  [x]Warm         [ ]Cool  Volume Status [ ]Hypervolemic [x]Euvolemic [ ]Hypovolemic  Meds: none    GI/NUTRITION  Exam: soft, nondistended, mild incisional tenderness, incision C/D/I, drain to low wall suction w/ succus, NGT to low wall suction draining bilious fluid  Diet:  Meds:  ·	pantoprazole  Injectable 40 milliGRAM(s) IV Push daily  ·	ondansetron Injectable 4 milliGRAM(s) IV Push every 6 hours PRN Nausea    GENITOURINARY  I&O's Detail    06-29 @ 07:01  -  06-30 @ 07:00  --------------------------------------------------------  IN:    Solution: 200 mL    TPN (Total Parenteral Nutrition): 1488 mL  Total IN: 1688 mL    OUT:    Drain: 300 mL    Indwelling Catheter - Urethral: 2285 mL    Nasoenteral Tube: 1000 mL  Total OUT: 3585 mL    Total NET: -1897 mL      146<H>  |  113<H>  |  33<H>  ----------------------------<  134<H>  4.6   |  23  |  1.44<H>    Ca    9.0      30 Jun 2017 04:36  Phos  3.5  Mg     2.2  TPro  6.0  /  Alb  2.3<L>  /  TBili  0.3  /  DBili  0.2  /  AST  14  /  ALT  6<L>  /  AlkPhos  97  06-30    [x] Snyder catheter, indication: urine output in the critically ill  Meds: none    HEMATOLOGIC  Meds: fondaparinux Injectable 2.5 milliGRAM(s) SubCutaneous every 24 hours  [x] VTE Prophylaxis                        8.7    6.2   )-----------( 390      ( 30 Jun 2017 04:36 )             26.2     PT/INR - ( 30 Jun 2017 04:36 )   PT: 14.6 sec;   INR: 1.33 ratio    PTT - ( 30 Jun 2017 04:36 )  PTT:44.3 sec      INFECTIOUS DISEASES  WBC Count: 6.2 K/uL (06-30 @ 04:36)  RECENT CULTURES: none  Meds: imipenem/cilastatin  IVPB 500 milliGRAM(s) IV Intermittent every 6 hours    ENDOCRINE  CAPILLARY BLOOD GLUCOSE:  134 (30 Jun 2017 06:00)  100 (30 Jun 2017 00:00)  106 (29 Jun 2017 18:00)  106 (29 Jun 2017 12:00)  Meds:  ·	levothyroxine Injectable 44 MICROGram(s) IV Push daily  ·	insulin lispro (HumaLOG) corrective regimen sliding scale   SubCutaneous every 6 hours      ACCESS DEVICES:  [ ] Peripheral IV  [ ] Central Venous Line	[ ] R	[ ] L	[ ] IJ	[ ] Fem	[ ] SC	Placed:   [x] Arterial Line		[ ] R	[x] L	[x] Fem	[ ] Rad	[ ] Ax	Placed: 6/22/2017  [x] PICC placed 5/31/2017				[ ] Mediport  [x] Urinary Catheter, Date Placed: 6/22/2017  [x] Necessity of urinary, arterial, and venous catheters discussed    OTHER MEDICATIONS: fluticasone propionate 50 MICROgram(s)/spray Nasal Spray 1 Spray(s) Both Nostrils two times a day    CODE STATUS: full code    IMAGING:

## 2017-06-30 NOTE — CHART NOTE - NSCHARTNOTEFT_GEN_A_CORE
Hospital Course: Pt c diverticulitis S/P Dunham's/ostomy creation, recurrent SBOs, enterocutaneous fistula; admitted c SBO, abdominal pain, loss of ostomy function, dislodged fistula plug. Pt now S/P 6/9  exploratory laparotomy, extensive lysis of adhesions, takedown of enterocutaneous fistula, repair of serosal tear on cecum, partial right salpingooophorectomy, colostomy revision, parastomal and incisional hernia repair with strattice mesh.     SICU RD f/u: Pt returned to SICU 6/22 with another SBO and concerning for an EC fistula. Pt currently on nasal cannula; contact isolation for MRSA. Plan for OR for ostomy revision today. Pt remains on TPN with high NGT output and lack of ostomy function.       Source: Patient [ ]    Family [ ]     other [X ]; medical record, team rounds, PN Attending    Diet : NPO with TPN      Patient reports [ ] nausea  [ ] vomiting [ ] diarrhea [ ] constipation  [ ]chewing problems [ ] swallowing issues  [X ] other:   Colostomy output x 24-hours: 5ml  NGT output x 24-hours: 1000ml     PO intake:  < 50% [ ] 50-75% [ ]   % [ ]  other : n/a     Source for PO intake [ ] Patient [ ] family [ ] chart [ ] staff [ ] other    Enteral /Parenteral Nutrition: PN infusing at 62ml/hr (84Gm amino acids, 214Gm dextrose, 213ml 20%lipids) to provide 1490 clarke (20cal/Kg, 1.1Gm prot/Kg per dosing wt 74.4Kg). Of note, sodium decreased to 60mEq, calcium decreased to 5mEq; acetate adjusted to 100mEq, chloride removed. MVI 9+3 10cc added + MTE-5 3cc.    Current Weight: 71Kg (6/30), 74.4Kg (dosing 5/23)  % Weight Change: 95% with edema    Edema: 3+ generalized      Pertinent Medications: MEDICATIONS  (STANDING):  levothyroxine Injectable 44 MICROGram(s) IV Push daily  pantoprazole  Injectable 40 milliGRAM(s) IV Push daily  fondaparinux Injectable 2.5 milliGRAM(s) SubCutaneous every 24 hours  ALBUTerol/ipratropium for Nebulization 3 milliLiter(s) Nebulizer every 6 hours  buDESOnide 160 MICROgram(s)/formoterol 4.5 MICROgram(s) Inhaler 2 Puff(s) Inhalation two times a day  imipenem/cilastatin  IVPB 500 milliGRAM(s) IV Intermittent every 6 hours  fentaNYL   Patch  50 MICROgram(s)/Hr. 1 Patch Transdermal every 48 hours  fluticasone propionate 50 MICROgram(s)/spray Nasal Spray 1 Spray(s) Both Nostrils two times a day  insulin lispro (HumaLOG) corrective regimen sliding scale   SubCutaneous every 6 hours    MEDICATIONS  (PRN):  ondansetron Injectable 4 milliGRAM(s) IV Push every 6 hours PRN Nausea  HYDROmorphone  Injectable 0.5 milliGRAM(s) IV Push every 3 hours PRN Moderate Pain (4 - 6)    Pertinent Labs:  06-30 Na146 mmol/L<H> Glu 134 mg/dL<H> K+ 4.6 mmol/L Cr  1.44 mg/dL<H> BUN 33 mg/dL<H> Phos 3.5 mg/dL   Arterial BG x 24-hours: 100-145mg/dL      Skin: no pressure injuries    Estimated Needs:   [ X] no change since previous assessment  [ ] recalculated:       Previous Nutrition Diagnosis:    [X ]Inadequate Oral Intake     Nutrition Diagnosis is [X ] ongoing  [ ] resolved [ ] not applicable          New Nutrition Diagnosis: [X ] not applicable       Interventions:     Recommend    [X ] Change Diet To: Pending medical course and diet advancement, recommend clear liquids; advance to low fiber diet as tolerated and medically feasible      [ ] Nutrition Supplement    [X ] Nutrition Support: PN per Dr. Carias    [ ] Other:        Monitoring and Evaluation:     [ ] PO intake [ ] Tolerance to diet prescription [X ] weights [ X] follow up per protocol    [X ] other: monitor PN provision, ability to advance diet.

## 2017-06-30 NOTE — PROGRESS NOTE ADULT - ASSESSMENT
76 year old female SICU day #9, POD #21 s/p ex lap, extensive NANETTE, EC fistula takedown, repair of cecal serosal tear, partial R salpingectomy, colostomy revision, and parastomal & incisional hernia repair with Strattice mesh. Patient has had a prolonged hospital course complicated by lack of ostomy function post-op, MRSA pneumonia requiring re-intubation and vasopressor support, pulmonary vascular congestion requiring BiPAP, and septic shock requiring vasopressor support likely secondary to a new EC fistula. She is currently extubated and hemodynamically stable awaiting return to the OR for revision of her ostomy.    NEURO: acute and chronic pain  ·	Continue chronic pain management with fentanyl patch and acute pain management w/ Dilaudid PRN.  ·	Consider chronic pain consult to assist with management of chronic pain, especially in an outpatient setting.    CV: hemodynamically stable  ·	Monitor vital signs.  ·	Goal MAP is greater than 60.    Pulm: COPD  ·	Continue Symbicort and Duoneb for COPD.  ·	Out of bed to chair, ambulate as tolerated, and incentive spirometry to prevent atelectasis.  ·	Flonase for middle ear effusion causing hearing loss likely related to the presence of the NG tube per Dr. Shah of ENT.    GI: EC fistula, SBO, ostomy dysfunction  ·	NPO to control EC fistula output and SBO management. TPN at 62 mL/hr for nutrition.  ·	Keep drain in fistula to low wall suction and maintain pouch over fistula to help control contamination of the abdominal cavity & skin w/ enteric contents  ·	Plan for revision of ostomy today w/ Dr. Lawton.    RENAL: TREVA on CKD stage 3  ·	Monitor intake and output.  ·	Monitor electrolytes and replete as necessary.  ·	Monitor sodium levels as patient was hypernatremic. TPN has been adjusted for the hypernatremia.  ·	If Cr continues to rise, will send urine lytes to determine etiology of TREVA,    HEME: no active issues  ·	Arixtra for DVT prophylaxis as patient has a history of HIT    ID: EC fistula resulting secondary peritonitis  ·	Monitor for clinical evidence of active infection.  ·	Continue imipenem empirically in the setting of a new EC fistula.  ·	Follow-up ID recommendations - Dr. Ricardo    Endocrine: hypothyroidism, glycemic control on TPN  ·	Synthroid IV for hypothyroidism as patient has no GI function.  ·	Monitor fingersticks and moderate ISS for glycemic control as patient is receiving TPN    DISPOSITION  ·	Full code  ·	Will remain in SICU as patient is pending return to the OR today for revision of her ostomy.      Rosmery Kovacs PA-C   [id number] 76 year old female SICU day #9, POD #21 s/p ex lap, extensive NANETTE, EC fistula takedown, repair of cecal serosal tear, partial R salpingectomy, colostomy revision, and parastomal & incisional hernia repair with Strattice mesh. Patient has had a prolonged hospital course complicated by lack of ostomy function post-op, MRSA pneumonia requiring re-intubation and vasopressor support, pulmonary vascular congestion requiring BiPAP, and septic shock requiring vasopressor support likely secondary to a new EC fistula. She is currently extubated and hemodynamically stable awaiting return to the OR for revision of her ostomy.    NEURO: acute and chronic pain  ·	Continue chronic pain management with fentanyl patch and acute pain management w/ Dilaudid PRN.  ·	Consider chronic pain consult to assist with management of chronic pain, especially in an outpatient setting.    CV: hemodynamically stable  ·	Monitor vital signs.  ·	Goal MAP is greater than 60.    Pulm: COPD  ·	Continue Symbicort and Duoneb for COPD.  ·	Out of bed to chair, ambulate as tolerated, and incentive spirometry to prevent atelectasis.  ·	Flonase for middle ear effusion causing hearing loss likely related to the presence of the NG tube per Dr. Shah of ENT.    GI: EC fistula, SBO, ostomy dysfunction  ·	NPO to control EC fistula output and SBO management. TPN at 62 mL/hr for nutrition.  ·	Keep drain in fistula to low wall suction and maintain pouch over fistula to help control contamination of the abdominal cavity & skin w/ enteric contents  ·	Plan for revision of ostomy today w/ Dr. Lawton.    RENAL: TREVA on CKD stage 3  ·	Monitor intake and output.  ·	Monitor electrolytes and replete as necessary.  ·	Monitor sodium levels as patient was hypernatremic. TPN has been adjusted for the hypernatremia.  ·	If Cr continues to rise, will send urine lytes to determine etiology of TREVA,    HEME: no active issues  ·	Arixtra for DVT prophylaxis as patient has a history of HIT    ID: EC fistula resulting secondary peritonitis  ·	Monitor for clinical evidence of active infection.  ·	Continue imipenem empirically in the setting of a new EC fistula.  ·	Follow-up ID recommendations - Dr. Ricardo    ENDOCRINE: hypothyroidism, glycemic control on TPN  ·	Synthroid IV for hypothyroidism as patient has no GI function.  ·	Monitor fingersticks and moderate ISS for glycemic control as patient is receiving TPN    DISPOSITION  ·	Full code  ·	Will remain in SICU as patient is pending return to the OR today for revision of her ostomy.      Rosmery Kovacs PA-C   [id number]

## 2017-06-30 NOTE — PROGRESS NOTE ADULT - SUBJECTIVE AND OBJECTIVE BOX
Day #5 of PN  PN infusion at 62 ccs/hr  06-29 @ 07:01  -  06-30 @ 07:00  --------------------------------------------------------  IN:    Solution: 200 mL    TPN (Total Parenteral Nutrition): 1488 mL  Total IN: 1688 mL    OUT:    Drain: 300 mL    Indwelling Catheter - Urethral: 2285 mL    Nasoenteral Tube: 1000 mL  Total OUT: 3585 mL    Total NET: -1897 mL        T(C): 36.4 (06-30-17 @ 07:00), Max: 37.4 (06-29-17 @ 19:00)  HR: 91 (06-30-17 @ 08:00) (88 - 117)  BP: 102/51 (06-30-17 @ 07:20) (102/51 - 137/62)  RR: 30 (06-30-17 @ 08:00) (12 - 35)  SpO2: 100% (06-30-17 @ 08:00) (95% - 100%)  Wt(kg): --    Labs   06-30    146<H>  |  113<H>  |  33<H>  ----------------------------<  134<H>  4.6   |  23  |  1.44<H>    Ca    9.0      30 Jun 2017 04:36  Phos  3.5     06-30  Mg     2.2     06-30    TPro  6.0  /  Alb  2.3<L>  /  TBili  0.3  /  DBili  0.2  /  AST  14  /  ALT  6<L>  /  AlkPhos  97  06-30      LIVER FUNCTIONS - ( 30 Jun 2017 04:36 )  Alb: 2.3 g/dL / Pro: 6.0 g/dL / ALK PHOS: 97 U/L / ALT: 6 U/L RC / AST: 14 U/L / GGT: x           CAPILLARY BLOOD GLUCOSE  134 (30 Jun 2017 06:00)  100 (30 Jun 2017 00:00)  106 (29 Jun 2017 18:00)  106 (29 Jun 2017 12:00)        I&O's Detail    29 Jun 2017 07:01 - 30 Jun 2017 07:00  --------------------------------------------------------  IN:    Solution: 200 mL    TPN (Total Parenteral Nutrition): 1488 mL  Total IN: 1688 mL    OUT:    Drain: 300 mL    Indwelling Catheter - Urethral: 2285 mL    Nasoenteral Tube: 1000 mL  Total OUT: 3585 mL    Total NET: -1897 mL      30 Jun 2017 07:01  -  30 Jun 2017 08:59  --------------------------------------------------------  IN:    TPN (Total Parenteral Nutrition): 62 mL  Total IN: 62 mL    OUT:  Total OUT: 0 mL    Total NET: 62 mL

## 2017-06-30 NOTE — PROGRESS NOTE ADULT - SUBJECTIVE AND OBJECTIVE BOX
[facility] [phone]        Patient seen and examined in bed.  She offered no complaints        MEDICATIONS  (STANDING):  levothyroxine Injectable 44 MICROGram(s) IV Push daily  pantoprazole  Injectable 40 milliGRAM(s) IV Push daily  fondaparinux Injectable 2.5 milliGRAM(s) SubCutaneous every 24 hours  ALBUTerol/ipratropium for Nebulization 3 milliLiter(s) Nebulizer every 6 hours  buDESOnide 160 MICROgram(s)/formoterol 4.5 MICROgram(s) Inhaler 2 Puff(s) Inhalation two times a day  imipenem/cilastatin  IVPB 500 milliGRAM(s) IV Intermittent every 6 hours  fentaNYL   Patch  50 MICROgram(s)/Hr. 1 Patch Transdermal every 48 hours  fluticasone propionate 50 MICROgram(s)/spray Nasal Spray 1 Spray(s) Both Nostrils two times a day  insulin lispro (HumaLOG) corrective regimen sliding scale   SubCutaneous every 6 hours      VITAL:  T(C): , Max: 37.4 (06-29-17 @ 19:00)  T(F): , Max: 99.3 (06-29-17 @ 19:00)  HR: 91 (06-30-17 @ 09:00)  BP: 102/51 (06-30-17 @ 07:20)  BP(mean): 72 (06-30-17 @ 07:20)  RR: 17 (06-30-17 @ 09:00)  SpO2: 100% (06-30-17 @ 09:00)  Wt(kg): --    I and O's:    06-29 @ 07:01  -  06-30 @ 07:00  --------------------------------------------------------  IN: 1688 mL / OUT: 3585 mL / NET: -1897 mL    06-30 @ 07:01  -  06-30 @ 09:56  --------------------------------------------------------  IN: 124 mL / OUT: 0 mL / NET: 124 mL          PHYSICAL EXAM:    Constitutional: NAD  HEENT: PERRLA    Neck:  No JVD  Respiratory: poor effort  Cardiovascular: S1 and S2  Gastrointestinal: BS+, soft,+ ostomy; surgical scar  Extremities: No peripheral edema  Neurological: A/O x 3, no focal deficits  Psychiatric: Normal mood, normal affect  : +  Snyder  Skin: No rashes  Access: Not applicable    LABS:                        8.7    6.2   )-----------( 390      ( 30 Jun 2017 04:36 )             26.2     06-30    146<H>  |  113<H>  |  33<H>  ----------------------------<  134<H>  4.6   |  23  |  1.44<H>    Ca    9.0      30 Jun 2017 04:36  Phos  3.5     06-30  Mg     2.2     06-30    TPro  6.0  /  Alb  2.3<L>  /  TBili  0.3  /  DBili  0.2  /  AST  14  /  ALT  6<L>  /  AlkPhos  97  06-30          Urine Studies:          RADIOLOGY & ADDITIONAL STUDIES:

## 2017-06-30 NOTE — PROGRESS NOTE ADULT - SUBJECTIVE AND OBJECTIVE BOX
Follow-up Pulm Progress Note    No new respiratory events overnight.  Denies SOB/CP. o2 sat 98% on room air    Medications:  MEDICATIONS  (STANDING):  levothyroxine Injectable 44 MICROGram(s) IV Push daily  pantoprazole  Injectable 40 milliGRAM(s) IV Push daily  fondaparinux Injectable 2.5 milliGRAM(s) SubCutaneous every 24 hours  ALBUTerol/ipratropium for Nebulization 3 milliLiter(s) Nebulizer every 6 hours  buDESOnide 160 MICROgram(s)/formoterol 4.5 MICROgram(s) Inhaler 2 Puff(s) Inhalation two times a day  imipenem/cilastatin  IVPB 500 milliGRAM(s) IV Intermittent every 6 hours  fentaNYL   Patch  50 MICROgram(s)/Hr. 1 Patch Transdermal every 48 hours  fluticasone propionate 50 MICROgram(s)/spray Nasal Spray 1 Spray(s) Both Nostrils two times a day  insulin lispro (HumaLOG) corrective regimen sliding scale   SubCutaneous every 6 hours    MEDICATIONS  (PRN):  ondansetron Injectable 4 milliGRAM(s) IV Push every 6 hours PRN Nausea  HYDROmorphone  Injectable 0.5 milliGRAM(s) IV Push every 3 hours PRN Moderate Pain (4 - 6)          Vital Signs Last 24 Hrs  T(C): 36.4 (30 Jun 2017 11:00), Max: 37.4 (29 Jun 2017 19:00)  T(F): 97.5 (30 Jun 2017 11:00), Max: 99.3 (29 Jun 2017 19:00)  HR: 104 (30 Jun 2017 13:00) (88 - 117)  BP: 102/51 (30 Jun 2017 07:20) (102/51 - 137/62)  BP(mean): 72 (30 Jun 2017 07:20) (72 - 89)  RR: 14 (30 Jun 2017 13:00) (12 - 35)  SpO2: 98% (30 Jun 2017 13:00) (95% - 100%)          06-28 @ 07:01  -  06-29 @ 07:00  --------------------------------------------------------  IN: 2150.5 mL / OUT: 2725 mL / NET: -574.5 mL    06-29 @ 07:01  -  06-30 @ 07:00  --------------------------------------------------------  IN: 1688 mL / OUT: 3585 mL / NET: -1897 mL    06-30 @ 07:01  -  06-30 @ 13:41  --------------------------------------------------------  IN: 410 mL / OUT: 480 mL / NET: -70 mL          LABS:                        8.7    6.2   )-----------( 390      ( 30 Jun 2017 04:36 )             26.2       wbc 6.2  06-30 @ 04:36  wbc 5.4  06-29 @ 03:08  wbc 4.2  06-28 @ 02:25    06-30    146<H>  |  113<H>  |  33<H>  ----------------------------<  134<H>  4.6   |  23  |  1.44<H>    Ca    9.0      30 Jun 2017 04:36  Phos  3.5     06-30  Mg     2.2     06-30    TPro  6.0  /  Alb  2.3<L>  /  TBili  0.3  /  DBili  0.2  /  AST  14  /  ALT  6<L>  /  AlkPhos  97  06-30    Cr 1.44  06-30 @ 04:36  Cr 1.34  06-29 @ 03:08  Cr 1.33  06-28 @ 02:25          CAPILLARY BLOOD GLUCOSE  114 (30 Jun 2017 12:00)        PT/INR - ( 30 Jun 2017 04:36 )   PT: 14.6 sec;   INR: 1.33 ratio         PTT - ( 30 Jun 2017 04:36 )  PTT:44.3 sec                  CULTURES: (if applicable)        Physical Examination:  PULM: Clear to auscultation bilaterally, no significant sputum production  CVS: S1, S2 heard    RADIOLOGY REVIEWED  CXR: < from: Xray Chest 1 View AP -PORTABLE-Routine (06.30.17 @ 06:45) >  Negative for acute lung disease.    < end of copied text >      CT chest:    TTE:

## 2017-07-01 LAB
ALBUMIN SERPL ELPH-MCNC: 1.9 G/DL — LOW (ref 3.3–5)
ALP SERPL-CCNC: 135 U/L — HIGH (ref 40–120)
ALT FLD-CCNC: 6 U/L RC — LOW (ref 10–45)
ANION GAP SERPL CALC-SCNC: 13 MMOL/L — SIGNIFICANT CHANGE UP (ref 5–17)
APTT BLD: 47.2 SEC — HIGH (ref 27.5–37.4)
AST SERPL-CCNC: 12 U/L — SIGNIFICANT CHANGE UP (ref 10–40)
BILIRUB DIRECT SERPL-MCNC: 0.2 MG/DL — SIGNIFICANT CHANGE UP (ref 0–0.2)
BILIRUB INDIRECT FLD-MCNC: 0.2 MG/DL — SIGNIFICANT CHANGE UP (ref 0.2–1)
BILIRUB SERPL-MCNC: 0.4 MG/DL — SIGNIFICANT CHANGE UP (ref 0.2–1.2)
BUN SERPL-MCNC: 36 MG/DL — HIGH (ref 7–23)
CA-I BLD-SCNC: 1.17 MMOL/L — SIGNIFICANT CHANGE UP (ref 1.12–1.3)
CALCIUM SERPL-MCNC: 8.3 MG/DL — LOW (ref 8.4–10.5)
CHLORIDE SERPL-SCNC: 107 MMOL/L — SIGNIFICANT CHANGE UP (ref 96–108)
CO2 SERPL-SCNC: 22 MMOL/L — SIGNIFICANT CHANGE UP (ref 22–31)
CREAT SERPL-MCNC: 1.6 MG/DL — HIGH (ref 0.5–1.3)
CULTURE RESULTS: SIGNIFICANT CHANGE UP
CULTURE RESULTS: SIGNIFICANT CHANGE UP
GLUCOSE SERPL-MCNC: 123 MG/DL — HIGH (ref 70–99)
HCT VFR BLD CALC: 24 % — LOW (ref 34.5–45)
HCT VFR BLD CALC: 28.5 % — LOW (ref 34.5–45)
HCT VFR BLD CALC: 28.8 % — LOW (ref 34.5–45)
HGB BLD-MCNC: 8.1 G/DL — LOW (ref 11.5–15.5)
HGB BLD-MCNC: 9.3 G/DL — LOW (ref 11.5–15.5)
HGB BLD-MCNC: 9.9 G/DL — LOW (ref 11.5–15.5)
INR BLD: 1.39 RATIO — HIGH (ref 0.88–1.16)
MAGNESIUM SERPL-MCNC: 2.1 MG/DL — SIGNIFICANT CHANGE UP (ref 1.6–2.6)
MCHC RBC-ENTMCNC: 32 PG — SIGNIFICANT CHANGE UP (ref 27–34)
MCHC RBC-ENTMCNC: 32.7 GM/DL — SIGNIFICANT CHANGE UP (ref 32–36)
MCHC RBC-ENTMCNC: 33.3 PG — SIGNIFICANT CHANGE UP (ref 27–34)
MCHC RBC-ENTMCNC: 33.4 PG — SIGNIFICANT CHANGE UP (ref 27–34)
MCHC RBC-ENTMCNC: 33.7 GM/DL — SIGNIFICANT CHANGE UP (ref 32–36)
MCHC RBC-ENTMCNC: 34.3 GM/DL — SIGNIFICANT CHANGE UP (ref 32–36)
MCV RBC AUTO: 97.1 FL — SIGNIFICANT CHANGE UP (ref 80–100)
MCV RBC AUTO: 97.9 FL — SIGNIFICANT CHANGE UP (ref 80–100)
MCV RBC AUTO: 99.1 FL — SIGNIFICANT CHANGE UP (ref 80–100)
PHOSPHATE SERPL-MCNC: 3.4 MG/DL — SIGNIFICANT CHANGE UP (ref 2.5–4.5)
PLATELET # BLD AUTO: 341 K/UL — SIGNIFICANT CHANGE UP (ref 150–400)
PLATELET # BLD AUTO: 347 K/UL — SIGNIFICANT CHANGE UP (ref 150–400)
PLATELET # BLD AUTO: 352 K/UL — SIGNIFICANT CHANGE UP (ref 150–400)
POTASSIUM SERPL-MCNC: 4.6 MMOL/L — SIGNIFICANT CHANGE UP (ref 3.5–5.3)
POTASSIUM SERPL-SCNC: 4.6 MMOL/L — SIGNIFICANT CHANGE UP (ref 3.5–5.3)
PROT SERPL-MCNC: 5.7 G/DL — LOW (ref 6–8.3)
PROTHROM AB SERPL-ACNC: 15.2 SEC — HIGH (ref 9.8–12.7)
RBC # BLD: 2.42 M/UL — LOW (ref 3.8–5.2)
RBC # BLD: 2.91 M/UL — LOW (ref 3.8–5.2)
RBC # BLD: 2.96 M/UL — LOW (ref 3.8–5.2)
RBC # FLD: 15.2 % — HIGH (ref 10.3–14.5)
RBC # FLD: 15.3 % — HIGH (ref 10.3–14.5)
RBC # FLD: 15.9 % — HIGH (ref 10.3–14.5)
SODIUM SERPL-SCNC: 142 MMOL/L — SIGNIFICANT CHANGE UP (ref 135–145)
SPECIMEN SOURCE: SIGNIFICANT CHANGE UP
SPECIMEN SOURCE: SIGNIFICANT CHANGE UP
WBC # BLD: 7.5 K/UL — SIGNIFICANT CHANGE UP (ref 3.8–10.5)
WBC # BLD: 8.3 K/UL — SIGNIFICANT CHANGE UP (ref 3.8–10.5)
WBC # BLD: 9.3 K/UL — SIGNIFICANT CHANGE UP (ref 3.8–10.5)
WBC # FLD AUTO: 7.5 K/UL — SIGNIFICANT CHANGE UP (ref 3.8–10.5)
WBC # FLD AUTO: 8.3 K/UL — SIGNIFICANT CHANGE UP (ref 3.8–10.5)
WBC # FLD AUTO: 9.3 K/UL — SIGNIFICANT CHANGE UP (ref 3.8–10.5)

## 2017-07-01 PROCEDURE — 99291 CRITICAL CARE FIRST HOUR: CPT

## 2017-07-01 PROCEDURE — 93010 ELECTROCARDIOGRAM REPORT: CPT

## 2017-07-01 PROCEDURE — 99233 SBSQ HOSP IP/OBS HIGH 50: CPT

## 2017-07-01 PROCEDURE — 71010: CPT | Mod: 26

## 2017-07-01 RX ORDER — PHENYLEPHRINE HYDROCHLORIDE 10 MG/ML
0.2 INJECTION INTRAVENOUS
Qty: 80 | Refills: 0 | Status: DISCONTINUED | OUTPATIENT
Start: 2017-07-01 | End: 2017-07-02

## 2017-07-01 RX ADMIN — PHENYLEPHRINE HYDROCHLORIDE 5.58 MICROGRAM(S)/KG/MIN: 10 INJECTION INTRAVENOUS at 21:34

## 2017-07-01 RX ADMIN — Medication 3 MILLILITER(S): at 05:18

## 2017-07-01 RX ADMIN — HYDROMORPHONE HYDROCHLORIDE 0.5 MILLIGRAM(S): 2 INJECTION INTRAMUSCULAR; INTRAVENOUS; SUBCUTANEOUS at 19:02

## 2017-07-01 RX ADMIN — HYDROMORPHONE HYDROCHLORIDE 0.5 MILLIGRAM(S): 2 INJECTION INTRAMUSCULAR; INTRAVENOUS; SUBCUTANEOUS at 21:53

## 2017-07-01 RX ADMIN — IMIPENEM AND CILASTATIN 100 MILLIGRAM(S): 250; 250 INJECTION, POWDER, FOR SOLUTION INTRAVENOUS at 05:13

## 2017-07-01 RX ADMIN — Medication 1 SPRAY(S): at 17:40

## 2017-07-01 RX ADMIN — PANTOPRAZOLE SODIUM 40 MILLIGRAM(S): 20 TABLET, DELAYED RELEASE ORAL at 12:21

## 2017-07-01 RX ADMIN — BUDESONIDE AND FORMOTEROL FUMARATE DIHYDRATE 2 PUFF(S): 160; 4.5 AEROSOL RESPIRATORY (INHALATION) at 17:03

## 2017-07-01 RX ADMIN — Medication 3 MILLILITER(S): at 11:05

## 2017-07-01 RX ADMIN — HYDROMORPHONE HYDROCHLORIDE 0.5 MILLIGRAM(S): 2 INJECTION INTRAMUSCULAR; INTRAVENOUS; SUBCUTANEOUS at 21:33

## 2017-07-01 RX ADMIN — Medication 44 MICROGRAM(S): at 05:13

## 2017-07-01 RX ADMIN — Medication 1 SPRAY(S): at 05:13

## 2017-07-01 RX ADMIN — FENTANYL CITRATE 1 PATCH: 50 INJECTION INTRAVENOUS at 11:28

## 2017-07-01 RX ADMIN — HYDROMORPHONE HYDROCHLORIDE 0.5 MILLIGRAM(S): 2 INJECTION INTRAMUSCULAR; INTRAVENOUS; SUBCUTANEOUS at 12:21

## 2017-07-01 RX ADMIN — BUDESONIDE AND FORMOTEROL FUMARATE DIHYDRATE 2 PUFF(S): 160; 4.5 AEROSOL RESPIRATORY (INHALATION) at 05:17

## 2017-07-01 RX ADMIN — HYDROMORPHONE HYDROCHLORIDE 0.5 MILLIGRAM(S): 2 INJECTION INTRAMUSCULAR; INTRAVENOUS; SUBCUTANEOUS at 05:28

## 2017-07-01 RX ADMIN — Medication 3 MILLILITER(S): at 17:03

## 2017-07-01 RX ADMIN — IMIPENEM AND CILASTATIN 100 MILLIGRAM(S): 250; 250 INJECTION, POWDER, FOR SOLUTION INTRAVENOUS at 17:40

## 2017-07-01 RX ADMIN — FENTANYL CITRATE 1 PATCH: 50 INJECTION INTRAVENOUS at 15:19

## 2017-07-01 RX ADMIN — SODIUM CHLORIDE 30 MILLILITER(S): 9 INJECTION, SOLUTION INTRAVENOUS at 21:34

## 2017-07-01 RX ADMIN — Medication 400 MILLIGRAM(S): at 02:53

## 2017-07-01 RX ADMIN — HYDROMORPHONE HYDROCHLORIDE 0.5 MILLIGRAM(S): 2 INJECTION INTRAMUSCULAR; INTRAVENOUS; SUBCUTANEOUS at 19:22

## 2017-07-01 RX ADMIN — Medication 400 MILLIGRAM(S): at 09:18

## 2017-07-01 RX ADMIN — HYDROMORPHONE HYDROCHLORIDE 0.5 MILLIGRAM(S): 2 INJECTION INTRAMUSCULAR; INTRAVENOUS; SUBCUTANEOUS at 05:13

## 2017-07-01 RX ADMIN — IMIPENEM AND CILASTATIN 100 MILLIGRAM(S): 250; 250 INJECTION, POWDER, FOR SOLUTION INTRAVENOUS at 12:21

## 2017-07-01 RX ADMIN — FONDAPARINUX SODIUM 2.5 MILLIGRAM(S): 2.5 INJECTION, SOLUTION SUBCUTANEOUS at 17:39

## 2017-07-01 RX ADMIN — HYDROMORPHONE HYDROCHLORIDE 0.5 MILLIGRAM(S): 2 INJECTION INTRAMUSCULAR; INTRAVENOUS; SUBCUTANEOUS at 15:19

## 2017-07-01 RX ADMIN — PHENYLEPHRINE HYDROCHLORIDE 5.58 MICROGRAM(S)/KG/MIN: 10 INJECTION INTRAVENOUS at 00:11

## 2017-07-01 RX ADMIN — Medication 1000 MILLIGRAM(S): at 03:23

## 2017-07-01 RX ADMIN — Medication 1000 MILLIGRAM(S): at 10:25

## 2017-07-01 NOTE — PROGRESS NOTE ADULT - ASSESSMENT
76 year old female with history significant for COPD, prior DVT s/p IVC filter, GERD, hypothyroidism, HIT and diverticulitis s/p Ruben's, and recurrent SBO requiring ex-lap small bowel resection, EC fistula s/p takedown that was complicated by wound dehiscence and abdominal reconstruction, who underwent ex-lap, extensive NANETTE, EC fistula takedown, colostomy revision, and parastomal/incisional hernia repair on 6/9. She was admitted to SICU intubated post-operatively and was successfully extubated. However, after getting a CT with oral contrast, patient went into respiratory distress and was re-intubated and found to have MRSA pneumonia, likely aspiration in nature. She was subsequently extubated, weaned off pressors, and transferred to the floors. On the floor on 6/22, patient went into respiratory distress again likely secondary to pulmonary vascular congestion requiring BiPAP and became hypotensive requiring vasopressor support. Her wound also began draining thick brown fluid concerning for a new EC fistula vs. anastomotic breakdown ,s/p OR yesterday with revision.  On TPN  ARF  Otherwise stable  Adjust Imipenem dose for renal function  follow clinically and cultures  Tailor plan per results,course.  Other plan per SICU team  case d/w SICU team  Infectious Diseases Service will cover over rest of weekend.  Please call [phone] if issues,Dr Ricardo will resume care Monday.

## 2017-07-01 NOTE — PROGRESS NOTE ADULT - ASSESSMENT
76 year old female SICU day #10, POD #22 s/p ex lap, extensive NANETTE, EC fistula takedown, repair of cecal serosal tear, partial R salpingectomy, colostomy revision, and parastomal & incisional hernia repair with Strattice mesh. Patient has had a prolonged hospital course complicated by lack of ostomy function post-op, MRSA pneumonia requiring re-intubation and vasopressor support, pulmonary vascular congestion requiring BiPAP, and septic shock requiring vasopressor support likely secondary to a new EC fistula. She is now status post revision of ostomy and isolation of EC fistula.     NEURO: acute and chronic pain  ·	Continue chronic pain management with fentanyl patch and acute pain management w/ Dilaudid PRN.  ·	Consider chronic pain consult to assist with management of chronic pain, especially in an outpatient setting.    CV: Requiring small amount of neosynephrine  ·	Monitor vital signs.  ·	Goal MAP is greater than 60.    Pulm: COPD  ·	Continue Symbicort and Duoneb for COPD.  ·	Out of bed to chair, ambulate as tolerated, and incentive spirometry to prevent atelectasis.  ·	Flonase for middle ear effusion causing hearing loss likely related to the presence of the NG tube per Dr. Shah of ENT.    GI: EC fistula, SBO, ostomy dysfunction  ·	NPO to control EC fistula output and SBO management. TPN at 62 mL/hr for nutrition.  ·	maintain pouch over fistula to help control contamination of the abdominal cavity & skin w/ enteric contents  ·	Monitor ostomy function    RENAL: TREVA on CKD stage 3  ·	Monitor intake and output.  ·	Monitor electrolytes and replete as necessary.  ·	Monitor sodium levels as patient was hypernatremic. TPN has been adjusted for the hypernatremia.  ·	Follow up urine electrolytes    HEME: no active issues  ·	Arixtra for DVT prophylaxis as patient has a history of HIT    ID: EC fistula resulting secondary peritonitis  ·	Monitor for clinical evidence of active infection.  ·	Continue imipenem empirically in the setting of a new EC fistula.  ·	Follow-up ID recommendations - Dr. Ricardo    ENDOCRINE: hypothyroidism, glycemic control on TPN  ·	Synthroid IV for hypothyroidism as patient has no GI function.  ·	Monitor fingersticks and moderate ISS for glycemic control as patient is receiving TPN    DISPOSITION  ·	Full code  ·	Remain in sicu

## 2017-07-01 NOTE — PROGRESS NOTE ADULT - SUBJECTIVE AND OBJECTIVE BOX
HOSPITAL DAY #39    STATUS POST: revision of colostomy, debridement of open abdominal wound, VAC placement    POD: 1    INTERVAL EVENTS: Patient went to OR for revision of ostomy and isolation of the EC fistula. She returned to the unit extubated and off pressors. Postoperatively her pressures drifted downward, CBC revealed a drop in hematocrit and she received a unit of pRBC. She required neosynephrine which was weaned after the unit but restarted later on.    SUBJECTIVE: Patient reports abdominal pain.     PHYSICAL EXAM:   General: appears drowsy  Neuro: alert, oriented x3  HEENT: NC/AT, EOMI  Cardio: RRR, nml S1/S2  Resp: Good effort, CTA b/l  GI/Abd: Soft, ostomy pink. Vac holding suction. EC fistula pouch in place.     Vital Signs Last 24 Hrs  T(C): 37.5 (01 Jul 2017 03:00), Max: 38.1 (30 Jun 2017 23:00)  T(F): 99.5 (01 Jul 2017 03:00), Max: 100.6 (30 Jun 2017 23:00)  HR: 115 (01 Jul 2017 07:00) (94 - 122)  BP: 143/64 (30 Jun 2017 21:30) (143/64 - 143/64)  BP(mean): 92 (30 Jun 2017 21:30) (92 - 92)  RR: 15 (01 Jul 2017 07:00) (12 - 33)  SpO2: 95% (01 Jul 2017 07:00) (92% - 100%)    I&O's Detail    30 Jun 2017 07:01  -  01 Jul 2017 07:00  --------------------------------------------------------  IN:    lactated ringers.: 330 mL    Packed Red Blood Cells: 350 mL    phenylephrine   Infusion: 64.2 mL    Solution: 200 mL    Solution: 300 mL    TPN (Total Parenteral Nutrition): 1116 mL  Total IN: 2360.2 mL    OUT:    Colostomy: 120 mL    Indwelling Catheter - Urethral: 1295 mL    Nasoenteral Tube: 400 mL    Open/Penrose: 40 mL    VAC (Vacuum Assisted Closure) System: 100 mL  Total OUT: 1955 mL    Total NET: 405.2 mL                        9.3    8.3   )-----------( 341      ( 01 Jul 2017 03:44 )             28.5       07-01    142  |  107  |  36<H>  ----------------------------<  123<H>  4.6   |  22  |  1.60<H>    Ca    8.3<L>      01 Jul 2017 03:44  Phos  3.4     07-01  Mg     2.1     07-01    TPro  5.7<L>  /  Alb  1.9<L>  /  TBili  0.4  /  DBili  0.2  /  AST  12  /  ALT  6<L>  /  AlkPhos  135<H>  07-01

## 2017-07-01 NOTE — PROGRESS NOTE ADULT - ATTENDING COMMENTS
S/P revision of ostomy and isolation of EC fistula.   Post op hypotension, SIRS, possible sepsis  Titrate norsynephrine  NPO TPN  On abx, follow any new culture  Mild worsening of renal function, continue to monitor, not on lasix  Hypernatremia resolved  Glycemic control  Chr pain control  Wound care

## 2017-07-01 NOTE — PROGRESS NOTE ADULT - SUBJECTIVE AND OBJECTIVE BOX
HISTORY  76 year old female with a PMHx significant for diverticulitis s/p Ruben's c/b recurrent SBO (required an ex lap w/ SBR c/b wound dehiscence s/p abdominal reconstruction, EC fistula s/p takedown, and another EC fistula), COPD, prior DVT s/p IVC filter (now removed), GERD, hypothyroidism, and HIT who initially presented on 5/23 w/ a SBO that did not resolve with medical management so she is s/p ex lap, extensive NANETTE, EC fistula takedown, repair of cecal serosal tear, partial R salpingectomy, colostomy revision, and parastomal & incisional hernia repair w/ Strattice mesh on 6/9. Patient was admitted post-op intubated on vasopressor support. She was subsequently extubated and weaned off vasopressors. Hospital course complicated by lack of ostomy function and respiratory distress after getting PO contrast for a CT requiring re-intubation and Combicath culture positive for MRSA. Patient was subsequently extubated and hemodynamically stable for transfer to the floor. On the floor, she went into respiratory distress again likely secondary to pulmonary vascular congestion requiring BiPAP, became hypotensive requiring vasopressor support, and began draining thick brown fluid from her wound. CT revealed another SBO and and matted small bowel in the anterior lower abdominal wall defect concerning for an EC fistula. She was started on imipenem, had a drain placed in the wound that was subsequently placed on low wall suction & pouched, and was weaned off vasopressor support. Patient s/p ostomy revision and isolation of EC fistula with vac and pouch.    24 HOUR EVENTS: Patient went to OR for revision of ostomy and isolation of the EC fistula. She returned to the unit extubated and off pressors. Postoperatively her pressures drifted downward, CBC revealed a drop in hematocrit and she received a unit of pRBC. She required neosynephrine which was weaned after the unit but restarted later on.    SUBJECTIVE/ROS:  [x ] A ten-point review of systems was otherwise negative except as noted.  [ ] Due to altered mental status/intubation, subjective information were not able to be obtained from the patient. History was obtained, to the extent possible, from review of the chart and collateral sources of information.      NEURO  RASS:  +1   GCS:     CAM ICU:  Exam: awake, alert, oriented, somewhat agitated  Meds: ondansetron Injectable 4 milliGRAM(s) IV Push every 6 hours PRN Nausea  fentaNYL   Patch  50 MICROgram(s)/Hr. 1 Patch Transdermal every 48 hours  HYDROmorphone  Injectable 0.5 milliGRAM(s) IV Push every 3 hours PRN Moderate Pain (4 - 6)  acetaminophen  IVPB. 1000 milliGRAM(s) IV Intermittent once    [x] Adequacy of sedation and pain control has been assessed and adjusted      RESPIRATORY  RR: 20 (07-01-17 @ 06:00) (12 - 33)  SpO2: 94% (07-01-17 @ 06:15) (93% - 100%)  Wt(kg): --  Exam: unlabored, clear to auscultation bilaterally  Mechanical Ventilation:     [N/A] Extubation Readiness Assessed  Meds: buDESOnide 160 MICROgram(s)/formoterol 4.5 MICROgram(s) Inhaler 2 Puff(s) Inhalation two times a day  ALBUTerol/ipratropium for Nebulization 3 milliLiter(s) Nebulizer every 6 hours      CARDIOVASCULAR  HR: 112 (07-01-17 @ 06:15) (91 - 122)  BP: 143/64 (06-30-17 @ 21:30) (102/51 - 143/64)  BP(mean): 92 (06-30-17 @ 21:30) (72 - 92)  ABP: 93/40 (07-01-17 @ 06:15) (85/37 - 156/72)  ABP(mean): 61 (07-01-17 @ 06:15) (56 - 106)  Wt(kg): --  CVP(cm H2O): --      Exam: regular rate and rhythm  Cardiac Rhythm: sinus  Perfusion     [x]Adequate   [ ]Inadequate  Mentation   [x]Normal       [ ]Reduced  Extremities  [x]Warm         [ ]Cool  Volume Status [ ]Hypervolemic [x]Euvolemic [ ]Hypovolemic  Meds: phenylephrine    Infusion 0.2 MICROgram(s)/kG/Min IV Continuous <Continuous>        GI/NUTRITION  Exam: soft, ostomy pink, appliance in place. VAC holding suction. EC fistula pouch in place. Mildly distended.  Diet: NPO with NGT, receiving TPN  Meds: pantoprazole  Injectable 40 milliGRAM(s) IV Push daily      GENITOURINARY  I&O's Detail    06-30 @ 07:01  -  07-01 @ 07:00  --------------------------------------------------------  IN:    lactated ringers.: 300 mL    Packed Red Blood Cells: 350 mL    phenylephrine   Infusion: 58.6 mL    Solution: 200 mL    Solution: 300 mL    TPN (Total Parenteral Nutrition): 1054 mL  Total IN: 2262.6 mL    OUT:    Indwelling Catheter - Urethral: 1200 mL  Total OUT: 1200 mL    Total NET: 1062.6 mL          07-01    142  |  107  |  36<H>  ----------------------------<  123<H>  4.6   |  22  |  1.60<H>    Ca    8.3<L>      01 Jul 2017 03:44  Phos  3.4     07-01  Mg     2.1     07-01    TPro  5.7<L>  /  Alb  1.9<L>  /  TBili  0.4  /  DBili  0.2  /  AST  12  /  ALT  6<L>  /  AlkPhos  135<H>  07-01    [ ] Snyder catheter, indication: N/A  Meds: lactated ringers. 1000 milliLiter(s) IV Continuous <Continuous>        HEMATOLOGIC  Meds: fondaparinux Injectable 2.5 milliGRAM(s) SubCutaneous every 24 hours    [x] VTE Prophylaxis                        9.3    8.3   )-----------( 341      ( 01 Jul 2017 03:44 )             28.5     PT/INR - ( 01 Jul 2017 03:44 )   PT: 15.2 sec;   INR: 1.39 ratio         PTT - ( 01 Jul 2017 03:44 )  PTT:47.2 sec  Transfusion     [ ] PRBC   [ ] Platelets   [ ] FFP   [ ] Cryoprecipitate      INFECTIOUS DISEASES  WBC Count: 8.3 K/uL (07-01 @ 03:44)  WBC Count: 7.5 K/uL (06-30 @ 23:51)  WBC Count: 9.4 K/uL (06-30 @ 20:29)    RECENT CULTURES:    Meds: imipenem/cilastatin  IVPB 500 milliGRAM(s) IV Intermittent every 6 hours        ENDOCRINE  CAPILLARY BLOOD GLUCOSE  114 (30 Jun 2017 12:00)        Meds: levothyroxine Injectable 44 MICROGram(s) IV Push daily        ACCESS DEVICES:  [x ] Peripheral IV  [ ] Central Venous Line	[ ] R	[ ] L	[ ] IJ	[ ] Fem	[ ] SC	Placed:   [ x] Arterial Line		[ ] R	[ ] L	[ ] Fem	[ ] Rad	[ ] Ax	Placed:   [ ] PICC:					[ ] Mediport  [ ] Urinary Catheter, Date Placed:   [x] Necessity of urinary, arterial, and venous catheters discussed    OTHER MEDICATIONS: x  fluticasone propionate 50 MICROgram(s)/spray Nasal Spray 1 Spray(s) Both Nostrils two times a day       CODE STATUS:  Full Code    IMAGING: x

## 2017-07-01 NOTE — PROGRESS NOTE ADULT - ASSESSMENT
ASSESSMENT  76y female with high volume ECF s/p exploratory laparotomy, extensive lysis of adhesions, takedown of enterocutaneous fistula, repair of serosal tear on cecum, partial right salpingooophorectomy, colostomy revision, parastomal and incisional hernia repair with strattice mesh, POD 22 now s/p revision of colostomy, debridement of open abdominal wound, VAC placement    PLAN  - c/w primaxin  - Atrixtra for DVT prophylaxis with h/o HIT  - Pain control  - NPO / cont. with wound care, pouch around wound with suction  - care per DELBERTU    KIRK Ansari MD PGY1  Acute Care Surgery, #[id number]

## 2017-07-01 NOTE — PROGRESS NOTE ADULT - SUBJECTIVE AND OBJECTIVE BOX
Day #6 of PN  PN infusion at 62  06-30 @ 07:01  -  07-01 @ 07:00  --------------------------------------------------------  IN:    lactated ringers.: 330 mL    Packed Red Blood Cells: 350 mL    phenylephrine   Infusion: 64.2 mL    Solution: 200 mL    Solution: 300 mL    TPN (Total Parenteral Nutrition): 1116 mL  Total IN: 2360.2 mL    OUT:    Colostomy: 120 mL    Indwelling Catheter - Urethral: 1295 mL    Nasoenteral Tube: 400 mL    Open/Penrose: 40 mL    VAC (Vacuum Assisted Closure) System: 100 mL  Total OUT: 1955 mL    Total NET: 405.2 mL        T(C): 37.5 (07-01-17 @ 03:00), Max: 38.1 (06-30-17 @ 23:00)  HR: 115 (07-01-17 @ 07:00) (94 - 122)  BP: 143/64 (06-30-17 @ 21:30) (143/64 - 143/64)  RR: 15 (07-01-17 @ 07:00) (12 - 33)  SpO2: 95% (07-01-17 @ 07:00) (92% - 100%)  Wt(kg): --    Labs   07-01    142  |  107  |  36<H>  ----------------------------<  123<H>  4.6   |  22  |  1.60<H>    Ca    8.3<L>      01 Jul 2017 03:44  Phos  3.4     07-01  Mg     2.1     07-01    TPro  5.7<L>  /  Alb  1.9<L>  /  TBili  0.4  /  DBili  0.2  /  AST  12  /  ALT  6<L>  /  AlkPhos  135<H>  07-01      LIVER FUNCTIONS - ( 01 Jul 2017 03:44 )  Alb: 1.9 g/dL / Pro: 5.7 g/dL / ALK PHOS: 135 U/L / ALT: 6 U/L RC / AST: 12 U/L / GGT: x           CAPILLARY BLOOD GLUCOSE  114 (30 Jun 2017 12:00)        I&O's Detail    30 Jun 2017 07:01  -  01 Jul 2017 07:00  --------------------------------------------------------  IN:    lactated ringers.: 330 mL    Packed Red Blood Cells: 350 mL    phenylephrine   Infusion: 64.2 mL    Solution: 200 mL    Solution: 300 mL    TPN (Total Parenteral Nutrition): 1116 mL  Total IN: 2360.2 mL    OUT:    Colostomy: 120 mL    Indwelling Catheter - Urethral: 1295 mL    Nasoenteral Tube: 400 mL    Open/Penrose: 40 mL    VAC (Vacuum Assisted Closure) System: 100 mL  Total OUT: 1955 mL    Total NET: 405.2 mL

## 2017-07-01 NOTE — PROGRESS NOTE ADULT - SUBJECTIVE AND OBJECTIVE BOX
ID coverage for Dr tran  Patient is a 76y old  Female who presents with a chief complaint of Abdominal pain (26 May 2017 11:33)    Being followed by ID for sepsis,?abd abscess    Interval history:  s/p OR yesterday with revision of ostomies  No other acute events      ROS:  Has abdominal pain  No cough,SOB,CP  No N/V/ no ostomy output  No urinary complaints  No HA  No joint or limb pain  No other complaints      Antimicrobials:    imipenem/cilastatin  IVPB 500 milliGRAM(s) IV Intermittent every 6 hours      Vital Signs Last 24 Hrs  T(C): 37.5 (07-01-17 @ 03:00), Max: 38.1 (06-30-17 @ 23:00)  T(F): 99.5 (07-01-17 @ 03:00), Max: 100.6 (06-30-17 @ 23:00)  HR: 115 (07-01-17 @ 07:00) (91 - 122)  BP: 143/64 (06-30-17 @ 21:30) (143/64 - 143/64)  BP(mean): 92 (06-30-17 @ 21:30) (92 - 92)  RR: 15 (07-01-17 @ 07:00) (12 - 33)  SpO2: 95% (07-01-17 @ 07:00) (92% - 100%)    Physical Exam:    Constitutional well preserved,comfortable,pleasant    HEENT PERRLA EOMI,No pallor or icterus    No oral exudate or erythema    Neck supple no JVD or LN    Chest Good AE,CTA    CVS RRR S1 S2 WNl No murmur or rub or gallop    Abd soft BS hypo, diffuse mild tenderness,multiple ostomies    Ext No cyanosis clubbing or edema,LUE PICC line no erythema or tenderness    IV site no erythema tenderness or discharge    Joints no swelling or LOM    CNS AAO X 3 no focal    Lab Data:                          9.3    8.3   )-----------( 341      ( 01 Jul 2017 03:44 )             28.5       07-01    142  |  107  |  36<H>  ----------------------------<  123<H>  4.6   |  22  |  1.60<H>    Ca    8.3<L>      01 Jul 2017 03:44  Phos  3.4     07-01  Mg     2.1     07-01    TPro  5.7<L>  /  Alb  1.9<L>  /  TBili  0.4  /  DBili  0.2  /  AST  12  /  ALT  6<L>  /  AlkPhos  135<H>  07-01      Culture - Blood (06.26.17 @ 06:39)    Specimen Source: .Blood Blood-Peripheral    Culture Results:   No growth at 5 days.    Culture - Blood (06.26.17 @ 06:38)    Specimen Source: .Blood Blood-Peripheral    Culture Results:   No growth at 5 days.    Culture - Urine (06.24.17 @ 17:48)    Specimen Source: .Urine Catheterized    Culture Results:   No growth    Culture - Urine (06.24.17 @ 17:48)    Specimen Source: .Urine Catheterized    Culture Results:   No growth      < from: Xray Chest 1 View AP -PORTABLE-Routine (06.30.17 @ 06:45) >    IMPRESSION:   Negative for acute lung disease.      < end of copied text >

## 2017-07-01 NOTE — PROGRESS NOTE ADULT - SUBJECTIVE AND OBJECTIVE BOX
NEPHROLOGY-[facility]  [phone]  Lin Cagle NP      Patient seen and examined. Lying in bed. Denies sob, chest pain, nausea/vomiting.  c/o abdominal pain    MEDICATIONS  (STANDING):  lactated ringers. 1000 milliLiter(s) (30 mL/Hr) IV Continuous <Continuous>  levothyroxine Injectable 44 MICROGram(s) IV Push daily  pantoprazole  Injectable 40 milliGRAM(s) IV Push daily  fondaparinux Injectable 2.5 milliGRAM(s) SubCutaneous every 24 hours  buDESOnide 160 MICROgram(s)/formoterol 4.5 MICROgram(s) Inhaler 2 Puff(s) Inhalation two times a day  fentaNYL   Patch  50 MICROgram(s)/Hr. 1 Patch Transdermal every 48 hours  fluticasone propionate 50 MICROgram(s)/spray Nasal Spray 1 Spray(s) Both Nostrils two times a day  imipenem/cilastatin  IVPB 500 milliGRAM(s) IV Intermittent every 6 hours  ALBUTerol/ipratropium for Nebulization 3 milliLiter(s) Nebulizer every 6 hours  phenylephrine    Infusion 0.2 MICROgram(s)/kG/Min (5.58 mL/Hr) IV Continuous <Continuous>      VITAL:  T(C): , Max: 38.1 (06-30-17 @ 23:00)  T(F): , Max: 100.6 (06-30-17 @ 23:00)  HR: 112 (07-01-17 @ 12:15)  BP: 143/64 (06-30-17 @ 21:30)  BP(mean): 92 (06-30-17 @ 21:30)  RR: 17 (07-01-17 @ 12:15)  SpO2: 94% (07-01-17 @ 12:15)  Wt(kg): --    I and O's:    06-30 @ 07:01  -  07-01 @ 07:00  --------------------------------------------------------  IN: 2360.2 mL / OUT: 1955 mL / NET: 405.2 mL    07-01 @ 07:01  -  07-01 @ 13:20  --------------------------------------------------------  IN: 474 mL / OUT: 255 mL / NET: 219 mL          PHYSICAL EXAM:    Constitutional: NAD    HEENT: PERRLA, EOMI       Neck:  No JVD    Respiratory: CTAB/L    Cardiovascular: S1 and S2    Gastrointestinal: (+) ostomy    Extremities: (+) peripheral edema    : (+) Snyder    Access: Not applicable    LABS:                        9.3    8.3   )-----------( 341      ( 01 Jul 2017 03:44 )             28.5     01 Jul 2017 03:44    142    |  107    |  36<H>  ----------------------------<  123<H>  4.6     |  22     |  1.60<H>  30 Jun 2017 20:29    143    |  112<H>  |  31<H>  ----------------------------<  97     4.3     |  20<L>  |  1.35<H>    Ca    8.3<L>      01 Jul 2017 03:44  Ca    7.8<L>      30 Jun 2017 20:29  Phos  3.4       01 Jul 2017 03:44  Phos  3.1       30 Jun 2017 20:29  Mg     2.1       01 Jul 2017 03:44  Mg     1.9       30 Jun 2017 20:29    TPro  5.7<L>  /  Alb  1.9<L>  /  TBili  0.4    /  DBili  0.2    /  AST  12     /  ALT  6<L>   /  AlkPhos  135<H>  01 Jul 2017 03:44  TPro  5.7<L>  /  Alb  1.9<L>  /  TBili  0.3    /  DBili  0.2    /  AST  14     /  ALT  7<L>   /  AlkPhos  128<H>  30 Jun 2017 20:29        Urine Studies:      RADIOLOGY & ADDITIONAL STUDIES:    Assessment and Plan:   · Assessment		  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  Plan: renal function is stable.  Monitor BMP daily.  Baseline creatinine 1.4-1.6--Creatinine at baseline  Large salt load from IV abx(almost 4 grams)  Hypernatremia is however decreasing(reduced amount of  Na+ in TPN).     Problem/Plan - 2:  ·  Problem: Hypotension, unspecified hypotension type.  Plan: Maintain MAP >60.     Problem/Plan - 3:  ·  Problem: Hypothyroidism, unspecified type.  Plan: IV Synthroid.     Problem/Plan - 4:  ·  Problem: Enterocutaneous fistula.  Plan:

## 2017-07-01 NOTE — CHART NOTE - NSCHARTNOTEFT_GEN_A_CORE
ATP POST OPERATIVE CHECK    PROCEDURE: Colostomy necrosis s/p revision, debridement of open abdominal wound, VAC placement    INTERVAL EVENTS: Post operative hypotension with MAP of 55-59; dinesh initiated at 0.5mcg/kg/min.     SUBJECTIVE: Patient reporting abdominal pain. She denies any nausea or vomiting.     OBJECTIVE:  General: Awake, Oriented x 3.   HEENT: NC/AT, EOMI  Cardio: RRR, nml S1/S2  Resp: Good effort, CTA b/l  GI/Abd: Soft, tender, ND. Wound pouch with good seal, liquid stool. Ostomy pink without gas or output, penrose drain in place. NGT with bilious output. Vac without leak.   : Snyder with clear urine output    Vital Signs Last 24 Hrs  T(C): 38.1 (30 Jun 2017 23:00), Max: 38.1 (30 Jun 2017 23:00)  T(F): 100.6 (30 Jun 2017 23:00), Max: 100.6 (30 Jun 2017 23:00)  HR: 114 (30 Jun 2017 23:54) (88 - 122)  BP: 143/64 (30 Jun 2017 21:30) (102/51 - 143/64)  BP(mean): 92 (30 Jun 2017 21:30) (72 - 92)  RR: 20 (30 Jun 2017 23:00) (12 - 31)  SpO2: 99% (30 Jun 2017 23:54) (94% - 100%)    I&O's Detail    29 Jun 2017 07:01  -  30 Jun 2017 07:00  --------------------------------------------------------  IN:    Solution: 200 mL    TPN (Total Parenteral Nutrition): 1488 mL  Total IN: 1688 mL    OUT:    Drain: 300 mL    Indwelling Catheter - Urethral: 2285 mL    Nasoenteral Tube: 1000 mL  Total OUT: 3585 mL    Total NET: -1897 mL      30 Jun 2017 07:01  -  01 Jul 2017 00:14  --------------------------------------------------------  IN:    lactated ringers.: 150 mL    Solution: 200 mL    Solution: 100 mL    TPN (Total Parenteral Nutrition): 682 mL  Total IN: 1132 mL    OUT:    Indwelling Catheter - Urethral: 925 mL  Total OUT: 925 mL    Total NET: 207 mL                          8.1    7.5   )-----------( 352      ( 30 Jun 2017 23:51 )             24.0       06-30    143  |  112<H>  |  31<H>  ----------------------------<  97  4.3   |  20<L>  |  1.35<H>    Ca    7.8<L>      30 Jun 2017 20:29  Phos  3.1     06-30  Mg     1.9     06-30    TPro  5.7<L>  /  Alb  1.9<L>  /  TBili  0.3  /  DBili  0.2  /  AST  14  /  ALT  7<L>  /  AlkPhos  128<H>  06-30      ASSESSMENT / PLAN:   76y female with high volume ECF s/p exploratory laparotomy, extensive lysis of adhesions, takedown of enterocutaneous fistula, repair of serosal tear on cecum, partial right salpingooophorectomy, colostomy revision, parastomal and incisional hernia repair with strattice mesh, now s/p ostomy revision, debridement of open abdominal wound, VAC placement, POD0    - hypotension, MAP 55- 59 on dinesh. Wean dinesh as tolerated  - post op hct 24 from 27; will transfuse 1 u prbc  - F/u GI function   - NPO / NGT / TPN  - c/w wound care: fistula pouch and wound vac  - SICU care    KRYSTAL Ansari PGY1  Pager: [phone]

## 2017-07-02 DIAGNOSIS — R50.9 FEVER, UNSPECIFIED: ICD-10-CM

## 2017-07-02 LAB
ALBUMIN SERPL ELPH-MCNC: 2.1 G/DL — LOW (ref 3.3–5)
ALP SERPL-CCNC: 137 U/L — HIGH (ref 40–120)
ALT FLD-CCNC: 5 U/L RC — LOW (ref 10–45)
ANION GAP SERPL CALC-SCNC: 12 MMOL/L — SIGNIFICANT CHANGE UP (ref 5–17)
APPEARANCE UR: CLEAR — SIGNIFICANT CHANGE UP
APTT BLD: 40.8 SEC — HIGH (ref 27.5–37.4)
AST SERPL-CCNC: 14 U/L — SIGNIFICANT CHANGE UP (ref 10–40)
BILIRUB DIRECT SERPL-MCNC: 0.2 MG/DL — SIGNIFICANT CHANGE UP (ref 0–0.2)
BILIRUB INDIRECT FLD-MCNC: 0.2 MG/DL — SIGNIFICANT CHANGE UP (ref 0.2–1)
BILIRUB SERPL-MCNC: 0.4 MG/DL — SIGNIFICANT CHANGE UP (ref 0.2–1.2)
BILIRUB UR-MCNC: NEGATIVE — SIGNIFICANT CHANGE UP
BUN SERPL-MCNC: 37 MG/DL — HIGH (ref 7–23)
CA-I BLD-SCNC: 1.25 MMOL/L — SIGNIFICANT CHANGE UP (ref 1.12–1.3)
CALCIUM SERPL-MCNC: 8.3 MG/DL — LOW (ref 8.4–10.5)
CHLORIDE SERPL-SCNC: 109 MMOL/L — HIGH (ref 96–108)
CO2 SERPL-SCNC: 22 MMOL/L — SIGNIFICANT CHANGE UP (ref 22–31)
COLOR SPEC: YELLOW — SIGNIFICANT CHANGE UP
CREAT SERPL-MCNC: 1.68 MG/DL — HIGH (ref 0.5–1.3)
DIFF PNL FLD: NEGATIVE — SIGNIFICANT CHANGE UP
GLUCOSE SERPL-MCNC: 139 MG/DL — HIGH (ref 70–99)
GLUCOSE UR QL: NEGATIVE — SIGNIFICANT CHANGE UP
HCT VFR BLD CALC: 27.9 % — LOW (ref 34.5–45)
HGB BLD-MCNC: 9.4 G/DL — LOW (ref 11.5–15.5)
INR BLD: 1.41 RATIO — HIGH (ref 0.88–1.16)
KETONES UR-MCNC: NEGATIVE — SIGNIFICANT CHANGE UP
LEUKOCYTE ESTERASE UR-ACNC: NEGATIVE — SIGNIFICANT CHANGE UP
MAGNESIUM SERPL-MCNC: 2 MG/DL — SIGNIFICANT CHANGE UP (ref 1.6–2.6)
MCHC RBC-ENTMCNC: 32.8 PG — SIGNIFICANT CHANGE UP (ref 27–34)
MCHC RBC-ENTMCNC: 33.4 GM/DL — SIGNIFICANT CHANGE UP (ref 32–36)
MCV RBC AUTO: 98 FL — SIGNIFICANT CHANGE UP (ref 80–100)
NITRITE UR-MCNC: NEGATIVE — SIGNIFICANT CHANGE UP
PH UR: 8 — SIGNIFICANT CHANGE UP (ref 5–8)
PHOSPHATE SERPL-MCNC: 4.3 MG/DL — SIGNIFICANT CHANGE UP (ref 2.5–4.5)
PLATELET # BLD AUTO: 351 K/UL — SIGNIFICANT CHANGE UP (ref 150–400)
POTASSIUM SERPL-MCNC: 4.9 MMOL/L — SIGNIFICANT CHANGE UP (ref 3.5–5.3)
POTASSIUM SERPL-SCNC: 4.9 MMOL/L — SIGNIFICANT CHANGE UP (ref 3.5–5.3)
PROT SERPL-MCNC: 5.7 G/DL — LOW (ref 6–8.3)
PROT UR-MCNC: 30 MG/DL
PROTHROM AB SERPL-ACNC: 15.5 SEC — HIGH (ref 9.8–12.7)
RBC # BLD: 2.85 M/UL — LOW (ref 3.8–5.2)
RBC # FLD: 15.5 % — HIGH (ref 10.3–14.5)
SODIUM SERPL-SCNC: 143 MMOL/L — SIGNIFICANT CHANGE UP (ref 135–145)
SP GR SPEC: 1.01 — SIGNIFICANT CHANGE UP (ref 1.01–1.02)
UROBILINOGEN FLD QL: 2
WBC # BLD: 10.3 K/UL — SIGNIFICANT CHANGE UP (ref 3.8–10.5)
WBC # FLD AUTO: 10.3 K/UL — SIGNIFICANT CHANGE UP (ref 3.8–10.5)

## 2017-07-02 PROCEDURE — 99291 CRITICAL CARE FIRST HOUR: CPT

## 2017-07-02 PROCEDURE — 71010: CPT | Mod: 26

## 2017-07-02 RX ORDER — PHENYLEPHRINE HYDROCHLORIDE 10 MG/ML
0.2 INJECTION INTRAVENOUS
Qty: 80 | Refills: 0 | Status: DISCONTINUED | OUTPATIENT
Start: 2017-07-02 | End: 2017-07-02

## 2017-07-02 RX ORDER — ACETAMINOPHEN 500 MG
1000 TABLET ORAL ONCE
Qty: 0 | Refills: 0 | Status: COMPLETED | OUTPATIENT
Start: 2017-07-02 | End: 2017-07-02

## 2017-07-02 RX ORDER — IMIPENEM AND CILASTATIN 250; 250 MG/100ML; MG/100ML
500 INJECTION, POWDER, FOR SOLUTION INTRAVENOUS EVERY 12 HOURS
Qty: 0 | Refills: 0 | Status: DISCONTINUED | OUTPATIENT
Start: 2017-07-02 | End: 2017-07-06

## 2017-07-02 RX ORDER — ACETAMINOPHEN 500 MG
1000 TABLET ORAL ONCE
Qty: 0 | Refills: 0 | Status: COMPLETED | OUTPATIENT
Start: 2017-07-01 | End: 2017-07-02

## 2017-07-02 RX ORDER — IPRATROPIUM/ALBUTEROL SULFATE 18-103MCG
3 AEROSOL WITH ADAPTER (GRAM) INHALATION EVERY 6 HOURS
Qty: 0 | Refills: 0 | Status: DISCONTINUED | OUTPATIENT
Start: 2017-07-02 | End: 2017-07-06

## 2017-07-02 RX ORDER — SODIUM CHLORIDE 9 MG/ML
500 INJECTION, SOLUTION INTRAVENOUS ONCE
Qty: 0 | Refills: 0 | Status: COMPLETED | OUTPATIENT
Start: 2017-07-02 | End: 2017-07-02

## 2017-07-02 RX ADMIN — HYDROMORPHONE HYDROCHLORIDE 0.5 MILLIGRAM(S): 2 INJECTION INTRAMUSCULAR; INTRAVENOUS; SUBCUTANEOUS at 20:44

## 2017-07-02 RX ADMIN — PHENYLEPHRINE HYDROCHLORIDE 5.58 MICROGRAM(S)/KG/MIN: 10 INJECTION INTRAVENOUS at 02:13

## 2017-07-02 RX ADMIN — BUDESONIDE AND FORMOTEROL FUMARATE DIHYDRATE 2 PUFF(S): 160; 4.5 AEROSOL RESPIRATORY (INHALATION) at 17:03

## 2017-07-02 RX ADMIN — Medication 400 MILLIGRAM(S): at 05:48

## 2017-07-02 RX ADMIN — HYDROMORPHONE HYDROCHLORIDE 0.5 MILLIGRAM(S): 2 INJECTION INTRAMUSCULAR; INTRAVENOUS; SUBCUTANEOUS at 01:14

## 2017-07-02 RX ADMIN — HYDROMORPHONE HYDROCHLORIDE 0.5 MILLIGRAM(S): 2 INJECTION INTRAMUSCULAR; INTRAVENOUS; SUBCUTANEOUS at 23:34

## 2017-07-02 RX ADMIN — BUDESONIDE AND FORMOTEROL FUMARATE DIHYDRATE 2 PUFF(S): 160; 4.5 AEROSOL RESPIRATORY (INHALATION) at 05:15

## 2017-07-02 RX ADMIN — IMIPENEM AND CILASTATIN 100 MILLIGRAM(S): 250; 250 INJECTION, POWDER, FOR SOLUTION INTRAVENOUS at 05:40

## 2017-07-02 RX ADMIN — Medication 1 SPRAY(S): at 05:47

## 2017-07-02 RX ADMIN — HYDROMORPHONE HYDROCHLORIDE 0.5 MILLIGRAM(S): 2 INJECTION INTRAMUSCULAR; INTRAVENOUS; SUBCUTANEOUS at 05:07

## 2017-07-02 RX ADMIN — Medication 44 MICROGRAM(S): at 05:40

## 2017-07-02 RX ADMIN — HYDROMORPHONE HYDROCHLORIDE 0.5 MILLIGRAM(S): 2 INJECTION INTRAMUSCULAR; INTRAVENOUS; SUBCUTANEOUS at 09:51

## 2017-07-02 RX ADMIN — PANTOPRAZOLE SODIUM 40 MILLIGRAM(S): 20 TABLET, DELAYED RELEASE ORAL at 12:30

## 2017-07-02 RX ADMIN — HYDROMORPHONE HYDROCHLORIDE 0.5 MILLIGRAM(S): 2 INJECTION INTRAMUSCULAR; INTRAVENOUS; SUBCUTANEOUS at 23:54

## 2017-07-02 RX ADMIN — ONDANSETRON 4 MILLIGRAM(S): 8 TABLET, FILM COATED ORAL at 04:57

## 2017-07-02 RX ADMIN — HYDROMORPHONE HYDROCHLORIDE 0.5 MILLIGRAM(S): 2 INJECTION INTRAMUSCULAR; INTRAVENOUS; SUBCUTANEOUS at 17:22

## 2017-07-02 RX ADMIN — HYDROMORPHONE HYDROCHLORIDE 0.5 MILLIGRAM(S): 2 INJECTION INTRAMUSCULAR; INTRAVENOUS; SUBCUTANEOUS at 14:16

## 2017-07-02 RX ADMIN — HYDROMORPHONE HYDROCHLORIDE 0.5 MILLIGRAM(S): 2 INJECTION INTRAMUSCULAR; INTRAVENOUS; SUBCUTANEOUS at 17:42

## 2017-07-02 RX ADMIN — HYDROMORPHONE HYDROCHLORIDE 0.5 MILLIGRAM(S): 2 INJECTION INTRAMUSCULAR; INTRAVENOUS; SUBCUTANEOUS at 04:47

## 2017-07-02 RX ADMIN — IMIPENEM AND CILASTATIN 100 MILLIGRAM(S): 250; 250 INJECTION, POWDER, FOR SOLUTION INTRAVENOUS at 00:24

## 2017-07-02 RX ADMIN — Medication 1 SPRAY(S): at 18:28

## 2017-07-02 RX ADMIN — HYDROMORPHONE HYDROCHLORIDE 0.5 MILLIGRAM(S): 2 INJECTION INTRAMUSCULAR; INTRAVENOUS; SUBCUTANEOUS at 20:24

## 2017-07-02 RX ADMIN — ONDANSETRON 4 MILLIGRAM(S): 8 TABLET, FILM COATED ORAL at 23:42

## 2017-07-02 RX ADMIN — HYDROMORPHONE HYDROCHLORIDE 0.5 MILLIGRAM(S): 2 INJECTION INTRAMUSCULAR; INTRAVENOUS; SUBCUTANEOUS at 01:34

## 2017-07-02 RX ADMIN — Medication 1000 MILLIGRAM(S): at 06:18

## 2017-07-02 RX ADMIN — HYDROMORPHONE HYDROCHLORIDE 0.5 MILLIGRAM(S): 2 INJECTION INTRAMUSCULAR; INTRAVENOUS; SUBCUTANEOUS at 11:04

## 2017-07-02 RX ADMIN — HYDROMORPHONE HYDROCHLORIDE 0.5 MILLIGRAM(S): 2 INJECTION INTRAMUSCULAR; INTRAVENOUS; SUBCUTANEOUS at 16:27

## 2017-07-02 RX ADMIN — IMIPENEM AND CILASTATIN 100 MILLIGRAM(S): 250; 250 INJECTION, POWDER, FOR SOLUTION INTRAVENOUS at 18:28

## 2017-07-02 RX ADMIN — Medication 400 MILLIGRAM(S): at 00:24

## 2017-07-02 RX ADMIN — SODIUM CHLORIDE 2000 MILLILITER(S): 9 INJECTION, SOLUTION INTRAVENOUS at 02:13

## 2017-07-02 NOTE — PROGRESS NOTE ADULT - SUBJECTIVE AND OBJECTIVE BOX
NEPHROLOGY-[facility]  [phone]  Lin Cagle NP      Patient seen and examined. Awake, responsive, feeling weak. Denies sob, chest pain, nausea  On pressor support.    MEDICATIONS  (STANDING):  levothyroxine Injectable 44 MICROGram(s) IV Push daily  pantoprazole  Injectable 40 milliGRAM(s) IV Push daily  fondaparinux Injectable 2.5 milliGRAM(s) SubCutaneous every 24 hours  buDESOnide 160 MICROgram(s)/formoterol 4.5 MICROgram(s) Inhaler 2 Puff(s) Inhalation two times a day  fentaNYL   Patch  50 MICROgram(s)/Hr. 1 Patch Transdermal every 48 hours  fluticasone propionate 50 MICROgram(s)/spray Nasal Spray 1 Spray(s) Both Nostrils two times a day  imipenem/cilastatin  IVPB 500 milliGRAM(s) IV Intermittent every 12 hours      VITAL:  T(C): , Max: 38.2 (17 @ 00:15)  T(F): , Max: 100.8 (17 @ 00:15)  HR: 97 (17 @ 07:00)  BP: 122/56 (17 @ 19:15)  BP(mean): 81 (17 @ 19:15)  RR: 16 (17 @ 07:00)  SpO2: 95% (17 @ 07:00)  Wt(kg): --    I and O's:     @ 07:01  -   @ 07:00  --------------------------------------------------------  IN: 3223.5 mL / OUT: 3285 mL / NET: -61.5 mL          PHYSICAL EXAM:    Constitutional: NAD       Neck:  No JVD    Respiratory: Diminished breath sounds B/L    Cardiovascular: S1 and S2    Gastrointestinal: (+) ostomy, drain, and wound vac therapy    Extremities: Generalized edema    : (+) Snyder    Access: Not applicable    LABS:                        9.4    10.3  )-----------( 351      ( 2017 02:22 )             27.9     2017 02:22    143    |  109<H>  |  37<H>  ----------------------------<  139<H>  4.9     |  22     |  1.68<H>  2017 03:44    142    |  107    |  36<H>  ----------------------------<  123<H>  4.6     |  22     |  1.60<H>    Ca    8.3<L>      2017 02:22  Ca    8.3<L>      2017 03:44  Phos  4.3       2017 02:22  Phos  3.4       2017 03:44  Mg     2.0       2017 02:22  Mg     2.1       2017 03:44    TPro  5.7<L>  /  Alb  2.1<L>  /  TBili  0.4    /  DBili  0.2    /  AST  14     /  ALT  5<L>   /  AlkPhos  137<H>  2017 02:22  TPro  5.7<L>  /  Alb  1.9<L>  /  TBili  0.4    /  DBili  0.2    /  AST  12     /  ALT  6<L>   /  AlkPhos  135<H>  2017 03:44        Urine Studies:  Urinalysis Basic - ( 2017 01:46 )    Color: Yellow / Appearance: Clear / S.014 / pH: x  Gluc: x / Ketone: Negative  / Bili: Negative / Urobili: 2   Blood: x / Protein: 30 mg/dL / Nitrite: Negative   Leuk Esterase: Negative / RBC: 0-2 /HPF / WBC 2-5 /HPF   Sq Epi: x / Non Sq Epi: x / Bacteria: x        RADIOLOGY & ADDITIONAL STUDIES:  Assessment and Plan:   · Assessment		  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  Plan: renal function is stable.  Monitor BMP daily.  Baseline creatinine 1.4-1.6  Creatinine with subtle rise today though overall stable, non-oliguric-rise in creatinine may be hemodynamically mediated from low BP  Trend creatinine daily  No need for Renal US at present  Hypernatremia-serum sodium normalized today     Problem/Plan - 2:  ·  Problem: Hypotension, unspecified hypotension type.  Plan: Maintain MAP >60.   On pressor support currently wean off pressors as able    Problem/Plan - 3:  ·  Problem: Hypothyroidism, unspecified type.  Plan: IV Synthroid.

## 2017-07-02 NOTE — PROGRESS NOTE ADULT - ASSESSMENT
ASSESSMENT  76y female with high volume ECF s/p exploratory laparotomy, extensive lysis of adhesions, takedown of enterocutaneous fistula, repair of serosal tear on cecum, partial right salpingooophorectomy, colostomy revision, parastomal and incisional hernia repair with strattice mesh, POD 22 now s/p revision of colostomy, debridement of open abdominal wound, VAC placement    PLAN  - c/w primaxin  - Atrixtra for DVT prophylaxis with h/o HIT  - Pain control  - NPO / cont. with wound care, pouch around wound with suction  - care per RENALDO Pollard MD PGY1  Acute Care Surgery, #[phone]

## 2017-07-02 NOTE — PROGRESS NOTE ADULT - SUBJECTIVE AND OBJECTIVE BOX
HISTORY  76 year old female with a PMHx significant for diverticulitis s/p Ruben's c/b recurrent SBO (required an ex lap w/ SBR c/b wound dehiscence s/p abdominal reconstruction, EC fistula s/p takedown, and another EC fistula), COPD, prior DVT s/p IVC filter (now removed), GERD, hypothyroidism, and HIT who initially presented on 5/23 w/ a SBO that did not resolve with medical management so she is s/p ex lap, extensive NANETTE, EC fistula takedown, repair of cecal serosal tear, partial R salpingectomy, colostomy revision, and parastomal & incisional hernia repair w/ Strattice mesh on 6/9. Patient was admitted post-op intubated on vasopressor support. She was subsequently extubated and weaned off vasopressors. Hospital course complicated by lack of ostomy function and respiratory distress after getting PO contrast for a CT requiring re-intubation and Combicath culture positive for MRSA. Patient was subsequently extubated and hemodynamically stable for transfer to the floor. On the floor, she went into respiratory distress again likely secondary to pulmonary vascular congestion requiring BiPAP, became hypotensive requiring vasopressor support, and began draining thick brown fluid from her wound. CT revealed another SBO and and matted small bowel in the anterior lower abdominal wall defect concerning for an EC fistula. She was started on imipenem, had a drain placed in the wound that was subsequently placed on low wall suction & pouched, and was weaned off vasopressor support. RTOR on 6/30 for ostomy revision and isolation of EC fistula with vac and pouch requiring phenylephrine post-operatively for hypotension. Patient also noted to be in post-renal TREVA pending a renal ultrasound.    24 HOUR EVENTS: Was weaned off phenylephrine gtt briefly but went back on as patient continued to be hypotensive. Still awaiting renal ultrasound for post-renal TREVA as Cr continues to rise. Plan for VAC change on Mon w/ Dr. Lawton and PT. Awaiting ostomy function. HISTORY  76 year old female with a PMHx significant for diverticulitis s/p Ruben's c/b recurrent SBO (required an ex lap w/ SBR c/b wound dehiscence s/p abdominal reconstruction, EC fistula s/p takedown, and another EC fistula), COPD, prior DVT s/p IVC filter (now removed), GERD, hypothyroidism, and HIT who initially presented on 5/23 w/ a SBO that did not resolve with medical management so she is s/p ex lap, extensive NANETTE, EC fistula takedown, repair of cecal serosal tear, partial R salpingectomy, colostomy revision, and parastomal & incisional hernia repair w/ Strattice mesh on 6/9. Patient was admitted post-op intubated on vasopressor support. She was subsequently extubated and weaned off vasopressors. Hospital course complicated by lack of ostomy function and respiratory distress after getting PO contrast for a CT requiring re-intubation and Combicath culture positive for MRSA. Patient was subsequently extubated and hemodynamically stable for transfer to the floor. On the floor, she went into respiratory distress again likely secondary to pulmonary vascular congestion requiring BiPAP, became hypotensive requiring vasopressor support, and began draining thick brown fluid from her wound. CT revealed another SBO and and matted small bowel in the anterior lower abdominal wall defect concerning for an EC fistula. She was started on imipenem, had a drain placed in the wound that was subsequently placed on low wall suction & pouched, and was weaned off vasopressor support. RTOR on 6/30 for ostomy revision and isolation of EC fistula with vac and pouch requiring phenylephrine post-operatively for hypotension. Patient also noted to be in post-renal TREVA pending a renal ultrasound.    24 HOUR EVENTS: Was weaned off phenylephrine gtt briefly but went back on as patient continued to be hypotensive. Still awaiting renal ultrasound for post-renal TREVA as Cr continues to rise. Plan for VAC change on Mon w/ Dr. Lawton and PT. Awaiting ostomy function.    SUBJECTIVE/ROS:  [x] A ten-point review of systems was otherwise negative except as noted.  [ ] Due to altered mental status/intubation, subjective information were not able to be obtained from the patient. History was obtained, to the extent possible, from review of the chart and collateral sources of information.    NEURO  CAM ICU: negative  Exam: awake, alert, oriented x3  Meds:  ·	fentaNYL   Patch  50 MICROgram(s)/Hr. 1 Patch Transdermal every 48 hours  ·	HYDROmorphone  Injectable 0.5 milliGRAM(s) IV Push every 3 hours PRN Moderate Pain (4 - 6)  [x] Adequacy of sedation and pain control has been assessed and adjusted    RESPIRATORY  RR: 16 (07-02-17 @ 07:00) (14 - 46)  SpO2: 95% (07-02-17 @ 07:00) (93% - 100%)  Exam: unlabored, clear to auscultation bilaterally  Mechanical Ventilation: no  [N/A] Extubation Readiness Assessed  Meds:  ·	buDESOnide 160 MICROgram(s)/formoterol 4.5 MICROgram(s) Inhaler 2 Puff(s) Inhalation two times a day  ·	ALBUTerol/ipratropium for Nebulization 3 milliLiter(s) Nebulizer every 6 hours PRN Shortness of Breath and/or Wheezing  ·	fluticasone propionate 50 MICROgram(s)/spray Nasal Spray 1 Spray(s) Both Nostrils two times a day    CARDIOVASCULAR  HR: 97 (07-02-17 @ 07:00) (96 - 124)  BP: 122/56 (07-01-17 @ 19:15) (122/56 - 122/56)  BP(mean): 81 (07-01-17 @ 19:15) (81 - 81)  ABP: 98/45 (07-02-17 @ 07:00) (93/40 - 147/65)  ABP(mean): 66 (07-02-17 @ 07:00) (60 - 101)  Exam: regular rate and rhythm  Cardiac Rhythm: sinus  Perfusion     [x]Adequate   [ ]Inadequate  Mentation    [x]Normal       [ ]Reduced  Extremities  [x]Warm         [ ]Cool  Volume Status [ ]Hypervolemic [x]Euvolemic [ ]Hypovolemic  Meds: phenylephrine    Infusion 0.2 MICROgram(s)/kG/Min IV Continuous <Continuous>    GI/NUTRITION  Exam: soft, nontender, nondistended, incision C/D/I  Diet: NPO w/ TPN @ 62 mL/Hr  Meds:  ·	pantoprazole  Injectable 40 milliGRAM(s) IV Push daily  ·	ondansetron Injectable 4 milliGRAM(s) IV Push every 6 hours PRN Nausea    GENITOURINARY  I&O's Detail    07-01 @ 07:01  -  07-02 @ 07:00  --------------------------------------------------------  IN:    Lactated Ringers IV Bolus: 500 mL    lactated ringers.: 720 mL    phenylephrine   Infusion: 70 mL    phenylephrine   Infusion: 45.5 mL    Solution: 200 mL    Solution: 200 mL    TPN (Total Parenteral Nutrition): 1488 mL  Total IN: 3223.5 mL    OUT:    Colostomy: 20 mL    Drain: 750 mL    Indwelling Catheter - Urethral: 2120 mL    Nasoenteral Tube: 210 mL    VAC (Vacuum Assisted Closure) System: 185 mL  Total OUT: 3285 mL    Total NET: -61.5 mL      143  |  109<H>  |  37<H>  ----------------------------<  139<H>  4.9   |  22  |  1.68<H>    Ca    8.3<L>      02 Jul 2017 02:22  Phos  4.3  Mg     2.0  TPro  5.7<L>  /  Alb  2.1<L>  /  TBili  0.4  /  DBili  0.2  /  AST  14  /  ALT  5<L>  /  AlkPhos  137<H>  07-02    [x] Snyder catheter, indication: urine output monitoring in the critically ill  Meds: lactated ringers. 1000 milliLiter(s) IV Continuous <Continuous>    HEMATOLOGIC  Meds: fondaparinux Injectable 2.5 milliGRAM(s) SubCutaneous every 24 hours  [x] VTE Prophylaxis                        9.4    10.3  )-----------( 351      ( 02 Jul 2017 02:22 )             27.9     PT/INR - ( 02 Jul 2017 02:22 )   PT: 15.5 sec;   INR: 1.41 ratio    PTT - ( 02 Jul 2017 02:22 )  PTT:40.8 sec      INFECTIOUS DISEASES  WBC Count: 10.3 K/uL (07-02 @ 02:22)  WBC Count: 9.3 K/uL (07-01 @ 15:49)    RECENT CULTURES: pending    Meds: imipenem/cilastatin  IVPB 500 milliGRAM(s) IV Intermittent every 6 hours    ENDOCRINE  CAPILLARY BLOOD GLUCOSE:  114 (02 Jul 2017 06:00)  131 (02 Jul 2017 00:00)  Meds: levothyroxine Injectable 44 MICROGram(s) IV Push daily    ACCESS DEVICES:  [ ] Peripheral IV  [ ] Central Venous Line	[ ] R	[ ] L	[ ] IJ	[ ] Fem	[ ] SC	Placed:   [x] Arterial Line		[ ] R	[x] L	[x] Fem	[ ] Rad	[ ] Ax	Placed: 6/22/2017  [x] PICC placed 5/31/2017				[ ] Mediport  [x] Urinary Catheter, Date Placed: 6/22/2017  [x] Necessity of urinary, arterial, and venous catheters discussed    OTHER MEDICATIONS: none    CODE STATUS: full code    IMAGING:

## 2017-07-02 NOTE — PROGRESS NOTE ADULT - PROBLEM SELECTOR PLAN 2
Management as per SICU.  Will continue to evaluate electrolyte and fluid content of PN and adjust as indicated

## 2017-07-02 NOTE — PROGRESS NOTE ADULT - SUBJECTIVE AND OBJECTIVE BOX
Day #7 of PN  PN infusion at 62  07-01 @ 07:01  -  07-02 @ 07:00  --------------------------------------------------------  IN:    Lactated Ringers IV Bolus: 500 mL    lactated ringers.: 720 mL    phenylephrine   Infusion: 70 mL    phenylephrine   Infusion: 45.5 mL    Solution: 200 mL    Solution: 200 mL    TPN (Total Parenteral Nutrition): 1488 mL  Total IN: 3223.5 mL    OUT:    Colostomy: 20 mL    Drain: 750 mL    Indwelling Catheter - Urethral: 2120 mL    Nasoenteral Tube: 210 mL    VAC (Vacuum Assisted Closure) System: 185 mL  Total OUT: 3285 mL    Total NET: -61.5 mL    V    T(C): 36.6 (07-02-17 @ 07:00), Max: 38.2 (07-02-17 @ 00:15)  HR: 97 (07-02-17 @ 07:00) (96 - 124)  BP: 122/56 (07-01-17 @ 19:15) (122/56 - 122/56)  RR: 16 (07-02-17 @ 07:00) (14 - 46)  SpO2: 95% (07-02-17 @ 07:00) (93% - 100%)      Labs   07-02    143  |  109<H>  |  37<H>  ----------------------------<  139<H>  4.9   |  22  |  1.68<H>    Ca    8.3<L>      02 Jul 2017 02:22  Phos  4.3     07-02  Mg     2.0     07-02    TPro  5.7<L>  /  Alb  2.1<L>  /  TBili  0.4  /  DBili  0.2  /  AST  14  /  ALT  5<L>  /  AlkPhos  137<H>  07-02      LIVER FUNCTIONS - ( 02 Jul 2017 02:22 )  Alb: 2.1 g/dL / Pro: 5.7 g/dL / ALK PHOS: 137 U/L / ALT: 5 U/L RC / AST: 14 U/L / GGT: x           CAPILLARY BLOOD GLUCOSE  114 (02 Jul 2017 06:00)  131 (02 Jul 2017 00:00)        I&O's Detail    01 Jul 2017 07:01  -  02 Jul 2017 07:00  --------------------------------------------------------  IN:    Lactated Ringers IV Bolus: 500 mL    lactated ringers.: 720 mL    phenylephrine   Infusion: 70 mL    phenylephrine   Infusion: 45.5 mL    Solution: 200 mL    Solution: 200 mL    TPN (Total Parenteral Nutrition): 1488 mL  Total IN: 3223.5 mL    OUT:    Colostomy: 20 mL    Drain: 750 mL    Indwelling Catheter - Urethral: 2120 mL    Nasoenteral Tube: 210 mL    VAC (Vacuum Assisted Closure) System: 185 mL  Total OUT: 3285 mL    Total NET: -61.5 mL

## 2017-07-02 NOTE — PROGRESS NOTE ADULT - ASSESSMENT
·	Renal function not improving  ·	Febrile  ·	Blood glucose increasing    Possible indication of sepsis

## 2017-07-02 NOTE — PROGRESS NOTE ADULT - SUBJECTIVE AND OBJECTIVE BOX
HOSPITAL DAY #39    STATUS POST: revision of colostomy, debridement of open abdominal wound, VAC placement    POD: 1    INTERVAL EVENTS:     SUBJECTIVE: Patient reports abdominal pain.     PHYSICAL EXAM:   General: appears drowsy  Neuro: alert, oriented x3  HEENT: NC/AT, EOMI  Cardio: RRR, nml S1/S2  Resp: Good effort, CTA b/l  GI/Abd: Soft, ostomy pink. Vac holding suction. EC fistula pouch in place.     Vital Signs Last 24 Hrs  T(C): 37.5 (01 Jul 2017 03:00), Max: 38.1 (30 Jun 2017 23:00)  T(F): 99.5 (01 Jul 2017 03:00), Max: 100.6 (30 Jun 2017 23:00)  HR: 115 (01 Jul 2017 07:00) (94 - 122)  BP: 143/64 (30 Jun 2017 21:30) (143/64 - 143/64)  BP(mean): 92 (30 Jun 2017 21:30) (92 - 92)  RR: 15 (01 Jul 2017 07:00) (12 - 33)  SpO2: 95% (01 Jul 2017 07:00) (92% - 100%)    I&O's Detail    30 Jun 2017 07:01  -  01 Jul 2017 07:00  --------------------------------------------------------  IN:    lactated ringers.: 330 mL    Packed Red Blood Cells: 350 mL    phenylephrine   Infusion: 64.2 mL    Solution: 200 mL    Solution: 300 mL    TPN (Total Parenteral Nutrition): 1116 mL  Total IN: 2360.2 mL    OUT:    Colostomy: 120 mL    Indwelling Catheter - Urethral: 1295 mL    Nasoenteral Tube: 400 mL    Open/Penrose: 40 mL    VAC (Vacuum Assisted Closure) System: 100 mL  Total OUT: 1955 mL    Total NET: 405.2 mL                        9.3    8.3   )-----------( 341      ( 01 Jul 2017 03:44 )             28.5       07-01    142  |  107  |  36<H>  ----------------------------<  123<H>  4.6   |  22  |  1.60<H>    Ca    8.3<L>      01 Jul 2017 03:44  Phos  3.4     07-01  Mg     2.1     07-01    TPro  5.7<L>  /  Alb  1.9<L>  /  TBili  0.4  /  DBili  0.2  /  AST  12  /  ALT  6<L>  /  AlkPhos  135<H>  07-01 HOSPITAL DAY #39    STATUS POST: revision of colostomy, debridement of open abdominal wound, VAC placement    POD: 1    INTERVAL EVENTS:     SUBJECTIVE: Patient reports abdominal pain.     PHYSICAL EXAM:   General: appears drowsy  Neuro: alert, oriented x3  HEENT: NC/AT, EOMI  Cardio: RRR, nml S1/S2  Resp: Good effort, CTA b/l  GI/Abd: Soft, ostomy pink. Vac holding suction. EC fistula pouch in place.     Vital Signs Last 24 Hrs  T(C): 36.6 (02 Jul 2017 07:00), Max: 38.2 (02 Jul 2017 00:15)  T(F): 97.9 (02 Jul 2017 07:00), Max: 100.8 (02 Jul 2017 00:15)  HR: 95 (02 Jul 2017 11:00) (95 - 124)  BP: 122/56 (01 Jul 2017 19:15) (122/56 - 122/56)  BP(mean): 81 (01 Jul 2017 19:15) (81 - 81)  RR: 14 (02 Jul 2017 11:00) (13 - 46)  SpO2: 97% (02 Jul 2017 11:00) (93% - 100%)    I&O's Detail    01 Jul 2017 07:01  -  02 Jul 2017 07:00  --------------------------------------------------------  IN:    Lactated Ringers IV Bolus: 500 mL    lactated ringers.: 720 mL    phenylephrine   Infusion: 70 mL    phenylephrine   Infusion: 45.5 mL    Solution: 200 mL    Solution: 200 mL    TPN (Total Parenteral Nutrition): 1488 mL  Total IN: 3223.5 mL    OUT:    Colostomy: 20 mL    Drain: 750 mL    Indwelling Catheter - Urethral: 2120 mL    Nasoenteral Tube: 210 mL    VAC (Vacuum Assisted Closure) System: 185 mL  Total OUT: 3285 mL    Total NET: -61.5 mL      02 Jul 2017 07:01  -  02 Jul 2017 12:35  --------------------------------------------------------  IN:    TPN (Total Parenteral Nutrition): 248 mL  Total IN: 248 mL    OUT:    Indwelling Catheter - Urethral: 310 mL  Total OUT: 310 mL    Total NET: -62 mL      07-02    143  |  109<H>  |  37<H>  ----------------------------<  139<H>  4.9   |  22  |  1.68<H>    Ca    8.3<L>      02 Jul 2017 02:22  Phos  4.3     07-02  Mg     2.0     07-02    TPro  5.7<L>  /  Alb  2.1<L>  /  TBili  0.4  /  DBili  0.2  /  AST  14  /  ALT  5<L>  /  AlkPhos  137<H>  07-02

## 2017-07-02 NOTE — PROGRESS NOTE ADULT - ATTENDING COMMENTS
Hypotensive post op, febrile sepsis, on vasopressor support, titrate Enzo-Synephrine  On gentle hydration, CXR not in fluid overload, conservative fluid management  Abx, no new cultures available, Meropenem dose adjusted as per high CrCl  TPN, electrolytes are grossly wnl except developing hyperphosphatemia  TREVA on CKD, continue to monitor  Wound care VAC dressing  Glycemic control  PT  Overall poor prognosis

## 2017-07-02 NOTE — PROGRESS NOTE ADULT - ASSESSMENT
76 year old female SICU day #11, POD #23 s/p ex lap, extensive NANETTE, EC fistula takedown, repair of cecal serosal tear, partial R salpingectomy, colostomy revision, and parastomal & incisional hernia repair with Strattice mesh. Patient has had a prolonged hospital course complicated by MRSA pneumonia requiring re-intubation and vasopressor support, pulmonary vascular congestion requiring BiPAP, lack of ostomy function post-op, and septic shock requiring vasopressor support likely secondary to a new EC fistula s/p ostomy revision and isolation of EC fistula with VAC and pouch     NEURO: acute and chronic pain  ·	Continue chronic pain management with fentanyl patch and acute pain management w/ Dilaudid PRN.  ·	Consider chronic pain consult to assist with management of chronic pain.    CV: Hypotension  ·	Monitor vital signs.  ·	Wean vasopressor support as tolerated w/ goal MAP is greater than 60.    Pulm: COPD  ·	Continue Symbicort and Duoneb for COPD.  ·	Out of bed to chair, ambulate as tolerated, and incentive spirometry to prevent atelectasis.  ·	Flonase for middle ear effusion causing hearing loss likely related to the presence of the NG tube per Dr. Shah of ENT.    GI: EC fistula, SBO, ostomy dysfunction  ·	NPO to control EC fistula output and SBO management. TPN at 62 mL/hr for nutrition.  ·	Maintain pouch over fistula to help control contamination of the abdominal cavity & skin w/ enteric contents  ·	Monitor for ostomy function.    RENAL: TREVA on CKD stage 3  ·	Monitor intake and output.  ·	Monitor electrolytes and replete as necessary.    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  ·	Continue imipenem empirically in the setting of a new EC fistula.  ·	Follow-up ID recommendations - Dr. Ricardo    ENDOCRINE: hypothyroidism, glycemic control on TPN  ·	Synthroid IV for hypothyroidism as patient has no GI function.  ·	Monitor fingersticks and moderate ISS for glycemic control as patient is receiving TPN    DISPOSITION  ·	Full code  ·	Remain in SICU      Rosmery Kovacs PA-C    [id number]

## 2017-07-03 LAB
ALBUMIN SERPL ELPH-MCNC: 2.3 G/DL — LOW (ref 3.3–5)
ALP SERPL-CCNC: 140 U/L — HIGH (ref 40–120)
ALT FLD-CCNC: 5 U/L RC — LOW (ref 10–45)
ANION GAP SERPL CALC-SCNC: 12 MMOL/L — SIGNIFICANT CHANGE UP (ref 5–17)
APTT BLD: 35.5 SEC — SIGNIFICANT CHANGE UP (ref 27.5–37.4)
AST SERPL-CCNC: 15 U/L — SIGNIFICANT CHANGE UP (ref 10–40)
BILIRUB DIRECT SERPL-MCNC: 0.2 MG/DL — SIGNIFICANT CHANGE UP (ref 0–0.2)
BILIRUB INDIRECT FLD-MCNC: 0.2 MG/DL — SIGNIFICANT CHANGE UP (ref 0.2–1)
BILIRUB SERPL-MCNC: 0.4 MG/DL — SIGNIFICANT CHANGE UP (ref 0.2–1.2)
BUN SERPL-MCNC: 39 MG/DL — HIGH (ref 7–23)
CALCIUM SERPL-MCNC: 9.2 MG/DL — SIGNIFICANT CHANGE UP (ref 8.4–10.5)
CHLORIDE SERPL-SCNC: 108 MMOL/L — SIGNIFICANT CHANGE UP (ref 96–108)
CHOLEST SERPL-MCNC: 89 MG/DL — SIGNIFICANT CHANGE UP (ref 10–199)
CO2 SERPL-SCNC: 22 MMOL/L — SIGNIFICANT CHANGE UP (ref 22–31)
CREAT SERPL-MCNC: 1.73 MG/DL — HIGH (ref 0.5–1.3)
GLUCOSE SERPL-MCNC: 137 MG/DL — HIGH (ref 70–99)
HCT VFR BLD CALC: 28.8 % — LOW (ref 34.5–45)
HDLC SERPL-MCNC: 17 MG/DL — LOW (ref 40–125)
HGB BLD-MCNC: 9.6 G/DL — LOW (ref 11.5–15.5)
INR BLD: 1.29 RATIO — HIGH (ref 0.88–1.16)
LIPID PNL WITH DIRECT LDL SERPL: 36 MG/DL — SIGNIFICANT CHANGE UP
MAGNESIUM SERPL-MCNC: 2.2 MG/DL — SIGNIFICANT CHANGE UP (ref 1.6–2.6)
MCHC RBC-ENTMCNC: 33.1 PG — SIGNIFICANT CHANGE UP (ref 27–34)
MCHC RBC-ENTMCNC: 33.5 GM/DL — SIGNIFICANT CHANGE UP (ref 32–36)
MCV RBC AUTO: 98.7 FL — SIGNIFICANT CHANGE UP (ref 80–100)
PHOSPHATE SERPL-MCNC: 4.3 MG/DL — SIGNIFICANT CHANGE UP (ref 2.5–4.5)
PLATELET # BLD AUTO: 300 K/UL — SIGNIFICANT CHANGE UP (ref 150–400)
POTASSIUM SERPL-MCNC: 4.6 MMOL/L — SIGNIFICANT CHANGE UP (ref 3.5–5.3)
POTASSIUM SERPL-SCNC: 4.6 MMOL/L — SIGNIFICANT CHANGE UP (ref 3.5–5.3)
PROT SERPL-MCNC: 6.3 G/DL — SIGNIFICANT CHANGE UP (ref 6–8.3)
PROTHROM AB SERPL-ACNC: 14.1 SEC — HIGH (ref 9.8–12.7)
RBC # BLD: 2.92 M/UL — LOW (ref 3.8–5.2)
RBC # FLD: 15.4 % — HIGH (ref 10.3–14.5)
SODIUM SERPL-SCNC: 142 MMOL/L — SIGNIFICANT CHANGE UP (ref 135–145)
TOTAL CHOLESTEROL/HDL RATIO MEASUREMENT: 5.2 RATIO — SIGNIFICANT CHANGE UP (ref 3.3–7.1)
TRIGL SERPL-MCNC: 180 MG/DL — HIGH (ref 10–149)
WBC # BLD: 7.9 K/UL — SIGNIFICANT CHANGE UP (ref 3.8–10.5)
WBC # FLD AUTO: 7.9 K/UL — SIGNIFICANT CHANGE UP (ref 3.8–10.5)

## 2017-07-03 PROCEDURE — 71010: CPT | Mod: 26,76

## 2017-07-03 PROCEDURE — 99232 SBSQ HOSP IP/OBS MODERATE 35: CPT

## 2017-07-03 RX ORDER — ACETAMINOPHEN 500 MG
1000 TABLET ORAL ONCE
Qty: 0 | Refills: 0 | Status: COMPLETED | OUTPATIENT
Start: 2017-07-03 | End: 2017-07-03

## 2017-07-03 RX ORDER — DEXTROSE MONOHYDRATE, SODIUM CHLORIDE, AND POTASSIUM CHLORIDE 50; .745; 4.5 G/1000ML; G/1000ML; G/1000ML
1000 INJECTION, SOLUTION INTRAVENOUS
Qty: 0 | Refills: 0 | Status: DISCONTINUED | OUTPATIENT
Start: 2017-07-03 | End: 2017-07-06

## 2017-07-03 RX ORDER — FENTANYL CITRATE 50 UG/ML
1 INJECTION INTRAVENOUS
Qty: 0 | Refills: 0 | Status: DISCONTINUED | OUTPATIENT
Start: 2017-07-03 | End: 2017-07-06

## 2017-07-03 RX ORDER — ACETAMINOPHEN 500 MG
1000 TABLET ORAL ONCE
Qty: 0 | Refills: 0 | Status: COMPLETED | OUTPATIENT
Start: 2017-07-04 | End: 2017-07-04

## 2017-07-03 RX ORDER — HYDROMORPHONE HYDROCHLORIDE 2 MG/ML
0.25 INJECTION INTRAMUSCULAR; INTRAVENOUS; SUBCUTANEOUS ONCE
Qty: 0 | Refills: 0 | Status: DISCONTINUED | OUTPATIENT
Start: 2017-07-03 | End: 2017-07-03

## 2017-07-03 RX ORDER — HYDROMORPHONE HYDROCHLORIDE 2 MG/ML
0.5 INJECTION INTRAMUSCULAR; INTRAVENOUS; SUBCUTANEOUS ONCE
Qty: 0 | Refills: 0 | Status: DISCONTINUED | OUTPATIENT
Start: 2017-07-03 | End: 2017-07-03

## 2017-07-03 RX ORDER — SODIUM CHLORIDE 9 MG/ML
1000 INJECTION, SOLUTION INTRAVENOUS
Qty: 0 | Refills: 0 | Status: DISCONTINUED | OUTPATIENT
Start: 2017-07-03 | End: 2017-07-06

## 2017-07-03 RX ADMIN — Medication 1 SPRAY(S): at 18:08

## 2017-07-03 RX ADMIN — IMIPENEM AND CILASTATIN 100 MILLIGRAM(S): 250; 250 INJECTION, POWDER, FOR SOLUTION INTRAVENOUS at 18:08

## 2017-07-03 RX ADMIN — HYDROMORPHONE HYDROCHLORIDE 0.25 MILLIGRAM(S): 2 INJECTION INTRAMUSCULAR; INTRAVENOUS; SUBCUTANEOUS at 21:47

## 2017-07-03 RX ADMIN — BUDESONIDE AND FORMOTEROL FUMARATE DIHYDRATE 2 PUFF(S): 160; 4.5 AEROSOL RESPIRATORY (INHALATION) at 17:05

## 2017-07-03 RX ADMIN — HYDROMORPHONE HYDROCHLORIDE 0.25 MILLIGRAM(S): 2 INJECTION INTRAMUSCULAR; INTRAVENOUS; SUBCUTANEOUS at 21:00

## 2017-07-03 RX ADMIN — HYDROMORPHONE HYDROCHLORIDE 0.5 MILLIGRAM(S): 2 INJECTION INTRAMUSCULAR; INTRAVENOUS; SUBCUTANEOUS at 02:51

## 2017-07-03 RX ADMIN — HYDROMORPHONE HYDROCHLORIDE 0.5 MILLIGRAM(S): 2 INJECTION INTRAMUSCULAR; INTRAVENOUS; SUBCUTANEOUS at 06:03

## 2017-07-03 RX ADMIN — FENTANYL CITRATE 1 PATCH: 50 INJECTION INTRAVENOUS at 12:55

## 2017-07-03 RX ADMIN — Medication 400 MILLIGRAM(S): at 20:02

## 2017-07-03 RX ADMIN — Medication 1000 MILLIGRAM(S): at 21:47

## 2017-07-03 RX ADMIN — HYDROMORPHONE HYDROCHLORIDE 0.5 MILLIGRAM(S): 2 INJECTION INTRAMUSCULAR; INTRAVENOUS; SUBCUTANEOUS at 14:26

## 2017-07-03 RX ADMIN — HYDROMORPHONE HYDROCHLORIDE 0.5 MILLIGRAM(S): 2 INJECTION INTRAMUSCULAR; INTRAVENOUS; SUBCUTANEOUS at 05:43

## 2017-07-03 RX ADMIN — IMIPENEM AND CILASTATIN 100 MILLIGRAM(S): 250; 250 INJECTION, POWDER, FOR SOLUTION INTRAVENOUS at 05:13

## 2017-07-03 RX ADMIN — HYDROMORPHONE HYDROCHLORIDE 0.5 MILLIGRAM(S): 2 INJECTION INTRAMUSCULAR; INTRAVENOUS; SUBCUTANEOUS at 22:50

## 2017-07-03 RX ADMIN — HYDROMORPHONE HYDROCHLORIDE 0.5 MILLIGRAM(S): 2 INJECTION INTRAMUSCULAR; INTRAVENOUS; SUBCUTANEOUS at 15:15

## 2017-07-03 RX ADMIN — HYDROMORPHONE HYDROCHLORIDE 0.5 MILLIGRAM(S): 2 INJECTION INTRAMUSCULAR; INTRAVENOUS; SUBCUTANEOUS at 14:45

## 2017-07-03 RX ADMIN — HYDROMORPHONE HYDROCHLORIDE 0.5 MILLIGRAM(S): 2 INJECTION INTRAMUSCULAR; INTRAVENOUS; SUBCUTANEOUS at 03:12

## 2017-07-03 RX ADMIN — HYDROMORPHONE HYDROCHLORIDE 0.5 MILLIGRAM(S): 2 INJECTION INTRAMUSCULAR; INTRAVENOUS; SUBCUTANEOUS at 15:00

## 2017-07-03 RX ADMIN — PANTOPRAZOLE SODIUM 40 MILLIGRAM(S): 20 TABLET, DELAYED RELEASE ORAL at 12:55

## 2017-07-03 RX ADMIN — Medication 44 MICROGRAM(S): at 05:12

## 2017-07-03 RX ADMIN — BUDESONIDE AND FORMOTEROL FUMARATE DIHYDRATE 2 PUFF(S): 160; 4.5 AEROSOL RESPIRATORY (INHALATION) at 05:24

## 2017-07-03 RX ADMIN — FONDAPARINUX SODIUM 2.5 MILLIGRAM(S): 2.5 INJECTION, SOLUTION SUBCUTANEOUS at 18:08

## 2017-07-03 RX ADMIN — Medication 400 MILLIGRAM(S): at 14:00

## 2017-07-03 RX ADMIN — Medication 1000 MILLIGRAM(S): at 14:43

## 2017-07-03 RX ADMIN — FENTANYL CITRATE 1 PATCH: 50 INJECTION INTRAVENOUS at 13:22

## 2017-07-03 RX ADMIN — HYDROMORPHONE HYDROCHLORIDE 0.5 MILLIGRAM(S): 2 INJECTION INTRAMUSCULAR; INTRAVENOUS; SUBCUTANEOUS at 11:26

## 2017-07-03 RX ADMIN — HYDROMORPHONE HYDROCHLORIDE 0.5 MILLIGRAM(S): 2 INJECTION INTRAMUSCULAR; INTRAVENOUS; SUBCUTANEOUS at 11:30

## 2017-07-03 RX ADMIN — HYDROMORPHONE HYDROCHLORIDE 0.5 MILLIGRAM(S): 2 INJECTION INTRAMUSCULAR; INTRAVENOUS; SUBCUTANEOUS at 22:51

## 2017-07-03 RX ADMIN — Medication 400 MILLIGRAM(S): at 09:00

## 2017-07-03 RX ADMIN — HYDROMORPHONE HYDROCHLORIDE 0.5 MILLIGRAM(S): 2 INJECTION INTRAMUSCULAR; INTRAVENOUS; SUBCUTANEOUS at 19:53

## 2017-07-03 RX ADMIN — Medication 1000 MILLIGRAM(S): at 09:45

## 2017-07-03 RX ADMIN — HYDROMORPHONE HYDROCHLORIDE 0.5 MILLIGRAM(S): 2 INJECTION INTRAMUSCULAR; INTRAVENOUS; SUBCUTANEOUS at 20:52

## 2017-07-03 RX ADMIN — Medication 1 SPRAY(S): at 05:13

## 2017-07-03 NOTE — PROGRESS NOTE ADULT - ASSESSMENT
76F s/p ex lap, extensive NANETTE, EC fistula takedown, repair of cecal serosal tear, partial R salpingectomy, colostomy revision, and parastomal & incisional hernia repair with Strattice mesh (6/9/17). Patient has had a prolonged hospital course complicated by MRSA pneumonia requiring re-intubation and vasopressor support, pulmonary vascular congestion requiring BiPAP, lack of ostomy function post-op, and septic shock requiring vasopressor support likely secondary to a new EC fistula. Now s/p ostomy revision and isolation of EC fistula with VAC and pouch (6/30/17) recovering on parenteral nutrition.     NEURO: acute and chronic pain  ·	Continue chronic pain management with fentanyl patch and acute pain management w/ Dilaudid PRN.    CV: Hypotension  ·	resolved, off pressors    Pulm: COPD  ·	Continue Symbicort and Duoneb  ·	Out of bed to chair, ambulate as tolerated, and incentive spirometry to prevent atelectasis.  ·	Flonase for middle ear effusion causing hearing loss likely related to the presence of the NG tube per Dr. Shah of ENT.    GI: EC fistula, SBO, ostomy dysfunction  ·	NPO to control EC fistula output and SBO management; on TPN  ·	Maintain pouch over fistula to help control   ·	awaiting ostomy function.    RENAL: TREVA on CKD stage 3  ·	Monitor intake and output.  ·	Monitor electrolytes and replete as necessary.    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  ·	Continue imipenem empirically in the setting of EC fistula.  ·	Follow-up ID recommendations - Dr. Ricardo    ENDOCRINE: hypothyroidism, glycemic control on TPN  ·	Synthroid IV for hypothyroidism as patient has no GI function.  ·	Monitor fingersticks and moderate ISS for glycemic control as patient is receiving TPN    DISPOSITION  ·	Full code  ·	Remain in SICU

## 2017-07-03 NOTE — PROGRESS NOTE ADULT - SUBJECTIVE AND OBJECTIVE BOX
No pain, no shortness of breath      VITAL:  T(C): , Max: 37.3 (07-02-17 @ 23:00)  T(F): , Max: 99.1 (07-02-17 @ 23:00)  HR: 89 (07-03-17 @ 09:00)  BP: 104/50 (07-03-17 @ 07:00)  BP(mean): 72 (07-03-17 @ 07:00)  RR: 22 (07-03-17 @ 09:00)  SpO2: 98% (07-03-17 @ 09:00)      PHYSICAL EXAM:  Constitutional: NAD  Neck:  No JVD  Respiratory: Diminished breath sounds B/L  Cardiovascular: S1 and S2  Gastrointestinal: (+) ostomy, drain, and wound vac therapy  Extremities: Generalized edema  : (+) Snyder      LABS:                        9.6    7.9   )-----------( 300      ( 03 Jul 2017 03:37 )             28.8     Na(142)/K(4.6)/Cl(108)/HCO3(22)/BUN(39)/Cr(1.73)Glu(137)/Ca(9.2)/Mg(2.2)/PO4(4.3)    07-03 @ 03:37  Na(143)/K(4.9)/Cl(109)/HCO3(22)/BUN(37)/Cr(1.68)Glu(139)/Ca(8.3)/Mg(2.0)/PO4(4.3)    07-02 @ 02:22  Na(142)/K(4.6)/Cl(107)/HCO3(22)/BUN(36)/Cr(1.60)Glu(123)/Ca(8.3)/Mg(2.1)/PO4(3.4)    07-01 @ 03:44  Na(143)/K(4.3)/Cl(112)/HCO3(20)/BUN(31)/Cr(1.35)Glu(97)/Ca(7.8)/Mg(1.9)/PO4(3.1)    06-30 @ 20:29      		  IMPRESSION: 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  Acute on chronic renal failure  Anemia  Hypotension       (1)Renal - CKD (chronic kidney disease) stage 3, GFR 30-59 ml/min.  Plan: creatinine very slowly trending up - prerenally mediated, not overly concerning    (2)Hypernatremia - improved     (3)CV - off pressors at present    (4)ID - peritonitis - on Primaxin    (5)Surgery - s/p ECF repair. On TPN.          RECOMMEND:    (1)Dose new meds for GFR 35-45ml/min            Lázaro Rosales MD  [facility]  [phone] No pain, no shortness of breath      VITAL:  T(C): , Max: 37.3 (07-02-17 @ 23:00)  T(F): , Max: 99.1 (07-02-17 @ 23:00)  HR: 89 (07-03-17 @ 09:00)  BP: 104/50 (07-03-17 @ 07:00)  BP(mean): 72 (07-03-17 @ 07:00)  RR: 22 (07-03-17 @ 09:00)  SpO2: 98% (07-03-17 @ 09:00)      PHYSICAL EXAM:  Constitutional: NAD  Neck:  No JVD, (+)NGT  Respiratory: Diminished breath sounds B/L  Cardiovascular: S1 and S2  Gastrointestinal: (+) ostomy, drain, and wound vac therapy  Extremities: 2+ nonpitting LE edema b/l  : (+) Snyder      LABS:                        9.6    7.9   )-----------( 300      ( 03 Jul 2017 03:37 )             28.8     Na(142)/K(4.6)/Cl(108)/HCO3(22)/BUN(39)/Cr(1.73)Glu(137)/Ca(9.2)/Mg(2.2)/PO4(4.3)    07-03 @ 03:37  Na(143)/K(4.9)/Cl(109)/HCO3(22)/BUN(37)/Cr(1.68)Glu(139)/Ca(8.3)/Mg(2.0)/PO4(4.3)    07-02 @ 02:22  Na(142)/K(4.6)/Cl(107)/HCO3(22)/BUN(36)/Cr(1.60)Glu(123)/Ca(8.3)/Mg(2.1)/PO4(3.4)    07-01 @ 03:44  Na(143)/K(4.3)/Cl(112)/HCO3(20)/BUN(31)/Cr(1.35)Glu(97)/Ca(7.8)/Mg(1.9)/PO4(3.1)    06-30 @ 20:29      		  IMPRESSION: 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  Acute on chronic renal failure  Anemia  Hypotension       (1)Renal - CKD (chronic kidney disease) stage 3, GFR 30-59 ml/min.  Plan: creatinine very slowly trending up - prerenally mediated, not overly concerning    (2)Hypernatremia - improved     (3)CV - off pressors at present    (4)ID - peritonitis - on Primaxin    (5)Surgery - s/p ECF repair. On TPN.          RECOMMEND:    (1)Dose new meds for GFR 35-45ml/min  (2)TPN per Metabolic Support Service          Lázaro Rosales MD  [facility]  [phone]

## 2017-07-03 NOTE — PHYSICAL THERAPY INITIAL EVALUATION ADULT - PERTINENT HX OF CURRENT PROBLEM, REHAB EVAL
76 y/oF with extensive surgical history, recent admission for SBO (d/c 5/18), returned to hospital on 5/23 for SBO, loss of ostomy function with abdominal pain. On recent admission patient had plug placed in EC fistula by IR, states that it "fell out" on the day prior to admission. NG tube placed in ED. CT with SBO with transition point near SB-SB anastamosis, not significantly changed from previous admission. PMH diverticulitis s/p Ruben's, recurrent SBO s/p SBR, ECF s/p takedown, (cont)
Patient s/p recent x-lap for takedown ECF, now with formation of new ECF x2
Pt is a 77 y/o female admitted to [facility] on 5/23/17. Pt is now s/p ex-lap, extensive NANETTE, takedown of EC fistula, repair of cecal serosal tear, partial R salpingectomy, colostomy revision, parastomal and incisional hernia repair with strattice mesh on 6/9.

## 2017-07-03 NOTE — PROGRESS NOTE ADULT - ATTENDING COMMENTS
Pt seen and examined today at noon, agree with above. Pt's floating fistula vac taken down today and wound examined. Second fistula appearing on left side of open granulating wound. Colostomy with mild ischemia, grossly viable, no output. Floating fistula vac re-applied with goal of isolating remainder of wound to allow closure while draining fistula effluent.     CT tomorrow to re-assess status of SBO, location of SBO vs location of fistulae.    Continue TPN.

## 2017-07-03 NOTE — PROGRESS NOTE ADULT - SUBJECTIVE AND OBJECTIVE BOX
Follow-up Pulm Progress Note    No new respiratory events overnight.  Denies SOB/CP. o2 97% on room air. s/p revision of ostomy and isolation ec fistula, debiredment, vac on     Medications:  MEDICATIONS  (STANDING):  levothyroxine Injectable 44 MICROGram(s) IV Push daily  pantoprazole  Injectable 40 milliGRAM(s) IV Push daily  fondaparinux Injectable 2.5 milliGRAM(s) SubCutaneous every 24 hours  buDESOnide 160 MICROgram(s)/formoterol 4.5 MICROgram(s) Inhaler 2 Puff(s) Inhalation two times a day  fluticasone propionate 50 MICROgram(s)/spray Nasal Spray 1 Spray(s) Both Nostrils two times a day  imipenem/cilastatin  IVPB 500 milliGRAM(s) IV Intermittent every 12 hours  acetaminophen  IVPB. 1000 milliGRAM(s) IV Intermittent once  fentaNYL   Patch  75 MICROgram(s)/Hr. 1 Patch Transdermal every 48 hours  dextrose 5% + sodium chloride 0.45% with potassium chloride 20 mEq/L 1000 milliLiter(s) (50 mL/Hr) IV Continuous <Continuous>  lactated ringers. 1000 milliLiter(s) (1 mL/Hr) IV Continuous <Continuous>  HYDROmorphone  Injectable 0.5 milliGRAM(s) IV Push once    MEDICATIONS  (PRN):  ondansetron Injectable 4 milliGRAM(s) IV Push every 6 hours PRN Nausea  HYDROmorphone  Injectable 0.5 milliGRAM(s) IV Push every 3 hours PRN Moderate Pain (4 - 6)  ALBUTerol/ipratropium for Nebulization 3 milliLiter(s) Nebulizer every 6 hours PRN Shortness of Breath and/or Wheezing          Vital Signs Last 24 Hrs  T(C): 36.5 (2017 12:00), Max: 37.3 (2017 23:00)  T(F): 97.7 (2017 12:00), Max: 99.1 (2017 23:00)  HR: 95 (2017 14:00) (89 - 107)  BP: 104/50 (2017 07:00) (104/50 - 108/55)  BP(mean): 72 (2017 07:00) (72 - 77)  RR: 30 (2017 14:00) (13 - 42)  SpO2: 95% (2017 14:00) (9% - 100%)           @ 07:01  -   @ 07:00  --------------------------------------------------------  IN: 3223.5 mL / OUT: 3285 mL / NET: -61.5 mL     @ 07:  -   @ 07:00  --------------------------------------------------------  IN: 1688 mL / OUT: 3540 mL / NET: -1852 mL     @ 07:  -   @ 15:10  --------------------------------------------------------  IN: 746 mL / OUT: 700 mL / NET: 46 mL          LABS:                        9.6    7.9   )-----------( 300      ( 2017 03:37 )             28.8       wbc 7.9   @ 03:37  wbc 10.3  02 @ 02:22  wbc 9.3   @ 15:49    07-03    142  |  108  |  39<H>  ----------------------------<  137<H>  4.6   |  22  |  1.73<H>    Ca    9.2      2017 03:37  Phos  4.3     07-03  Mg     2.2     07-    TPro  6.3  /  Alb  2.3<L>  /  TBili  0.4  /  DBili  0.2  /  AST  15  /  ALT  5<L>  /  AlkPhos  140<H>  07-03    Cr 1.73  0703 @ 03:37  Cr 1.68  0702 @ 02:22  Cr 1.60  07-01 @ 03:44          CAPILLARY BLOOD GLUCOSE  114 (2017 06:00)        PT/INR - ( 2017 03:37 )   PT: 14.1 sec;   INR: 1.29 ratio         PTT - ( 2017 03:37 )  PTT:35.5 sec  Urinalysis Basic - ( 2017 01:46 )    Color: Yellow / Appearance: Clear / S.014 / pH: x  Gluc: x / Ketone: Negative  / Bili: Negative / Urobili: 2   Blood: x / Protein: 30 mg/dL / Nitrite: Negative   Leuk Esterase: Negative / RBC: 0-2 /HPF / WBC 2-5 /HPF   Sq Epi: x / Non Sq Epi: x / Bacteria: x                    CULTURES: (if applicable)        Physical Examination:  PULM: decreased bs blilat,  no significant sputum production  CVS: S1, S2 heard    RADIOLOGY REVIEWED  CXR: < from: Xray Chest 1 View AP- PORTABLE-Urgent (17 @ 11:23) >  Impression:    The heart is normal in size. The lungs are clear. NG tube is in the   stomach. A PICC line is seen on the left and the tip is in superior vena   cava. No pneumothorax.    < end of copied text >      CT chest:    TTE:

## 2017-07-03 NOTE — CHART NOTE - NSCHARTNOTEFT_GEN_A_CORE
Hospital Course: Pt c diverticulitis S/P Dunham's/ostomy creation, recurrent SBOs, enterocutaneous fistula; admitted c SBO, abdominal pain, loss of ostomy function, dislodged fistula plug. Pt now S/P 6/9  exploratory laparotomy, extensive lysis of adhesions, takedown of enterocutaneous fistula, repair of serosal tear on cecum, partial right salpingooophorectomy, colostomy revision, parastomal and incisional hernia repair with strattice mesh.     SICU RD f/u: Pt returned to SICU 6/22 with another SBO and concerning for an EC fistula; S/P 6/30 ostomy revision; still awaiting bowel function. Pt remains on TPN with high NGT output.    Source: Patient [ ]    Family [ ]     other [X ]; medical record, team rounds    Diet : NPO with TPN      Patient reports [ ] nausea  [ ] vomiting [ ] diarrhea [ ] constipation  [ ]chewing problems [ ] swallowing issues  [X ] other:   Colostomy output x 24-hours: 30ml (7/3), 30ml (7/2)  NGT output x 24-hours: 1050ml (7/3), 210ml (7/2)       PO intake:  < 50% [ ] 50-75% [ ]   % [ ]  other : n/a     Source for PO intake [ ] Patient [ ] family [ ] chart [ ] staff [ ] other     Enteral /Parenteral Nutrition: PN infusing at 62ml/hr (84Gm amino acids, 214Gm dextrose, 213ml 20%lipids) to provide 1490 clarke (20cal/Kg, 1.1Gm prot/Kg per dosing wt 74.4Kg). Of note, sodium decreased to 60mEq, calcium decreased to 5mEq; acetate adjusted to 100mEq, chloride removed; with 10cc MVI 9+3, 3cc MTE-5.      Current Weight: 68.2Kg (7/2), 71Kg (6/30), 74.4Kg (dosing 5/23)  % Weight Change: 92% of dosing wt    Edema:     Pertinent Medications: MEDICATIONS  (STANDING):  levothyroxine Injectable 44 MICROGram(s) IV Push daily  pantoprazole  Injectable 40 milliGRAM(s) IV Push daily  fondaparinux Injectable 2.5 milliGRAM(s) SubCutaneous every 24 hours  buDESOnide 160 MICROgram(s)/formoterol 4.5 MICROgram(s) Inhaler 2 Puff(s) Inhalation two times a day  fluticasone propionate 50 MICROgram(s)/spray Nasal Spray 1 Spray(s) Both Nostrils two times a day  imipenem/cilastatin  IVPB 500 milliGRAM(s) IV Intermittent every 12 hours  acetaminophen  IVPB. 1000 milliGRAM(s) IV Intermittent once  acetaminophen  IVPB. 1000 milliGRAM(s) IV Intermittent once  fentaNYL   Patch  75 MICROgram(s)/Hr. 1 Patch Transdermal every 48 hours    MEDICATIONS  (PRN):  ondansetron Injectable 4 milliGRAM(s) IV Push every 6 hours PRN Nausea  HYDROmorphone  Injectable 0.5 milliGRAM(s) IV Push every 3 hours PRN Moderate Pain (4 - 6)  ALBUTerol/ipratropium for Nebulization 3 milliLiter(s) Nebulizer every 6 hours PRN Shortness of Breath and/or Wheezing    Pertinent Labs:  07-03 Na142 mmol/L Glu 137 mg/dL<H> K+ 4.6 mmol/L Cr  1.73 mg/dL<H> BUN 39 mg/dL<H> Phos 4.3 mg/dL Alb 2.3 g/dL<L> PAB n/a         Skin:     Estimated Needs:   [ ] no change since previous assessment  [ ] recalculated:       Previous Nutrition Diagnosis:     [ ] Inadequate Energy Intake [ ]Inadequate Oral Intake [ ] Excessive Energy Intake     [ ] Underweight [ ] Increased Nutrient Needs [ ] Overweight/Obesity     [ ] Altered GI Function [ ] Unintended Weight Loss [ ] Food & Nutrition Related Knowledge Deficit [ ] Malnutrition          Nutrition Diagnosis is [ ] ongoing  [ ] resolved [ ] not applicable          New Nutrition Diagnosis: [ ] not applicable    [ ] Inadequate Protein Energy Intake [ ]Inadequate Oral Intake [ ] Excessive Energy Intake     [ ] Underweight [ ] Increased Nutrient Needs [ ] Overweight/Obesity     [ ] Altered GI Function [ ] Unintended Weight Loss [ ] Food & Nutrition Related Knowledge Deficit[ ] Limited Adherence to nutrition related recommendations [ ] Malnutrition  [ ] other: Free text       Related to:      As evidenced by:      Interventions:     Recommend    [ ] Change Diet To:    [ ] Nutrition Supplement    [ ] Nutrition Support    [ ] Other:        Monitoring and Evaluation:     [ ] PO intake [ ] Tolerance to diet prescription [ ] weights [ ] follow up per protocol    [ ] other: Hospital Course: Pt c diverticulitis S/P Dunham's/ostomy creation, recurrent SBOs, enterocutaneous fistula; admitted c SBO, abdominal pain, loss of ostomy function, dislodged fistula plug. Pt now S/P 6/9  exploratory laparotomy, extensive lysis of adhesions, takedown of enterocutaneous fistula, repair of serosal tear on cecum, partial right salpingooophorectomy, colostomy revision, parastomal and incisional hernia repair with strattice mesh.     SICU RD f/u: Pt returned to SICU 6/22 with another SBO and concerning for an EC fistula; S/P 6/30 ostomy revision; still awaiting bowel function. Pt remains on TPN with high NGT output.    Source: Patient [ ]    Family [ ]     other [X ]; medical record, team rounds    Diet : NPO with TPN      Patient reports [ ] nausea  [ ] vomiting [ ] diarrhea [ ] constipation  [ ]chewing problems [ ] swallowing issues  [X ] other:   Colostomy output x 24-hours: 30ml (7/3), 30ml (7/2)  NGT output x 24-hours: 1050ml (7/3), 210ml (7/2)       PO intake:  < 50% [ ] 50-75% [ ]   % [ ]  other : n/a     Source for PO intake [ ] Patient [ ] family [ ] chart [ ] staff [ ] other     Enteral /Parenteral Nutrition: PN infusing at 62ml/hr (84Gm amino acids, 214Gm dextrose, 213ml 20%lipids) to provide 1490 clarke (20cal/Kg, 1.1Gm prot/Kg per dosing wt 74.4Kg). Of note, sodium decreased to 60mEq, calcium decreased to 5mEq; acetate adjusted to 100mEq, chloride removed; with 10cc MVI 9+3, 3cc MTE-5.      Current Weight: 68.2Kg (7/2), 71Kg (6/30), 74.4Kg (dosing 5/23)  % Weight Change: 92% of dosing wt    Edema: 3+ generalized    Pertinent Medications: MEDICATIONS  (STANDING):  pantoprazole  Injectable 40 milliGRAM(s) IV Push daily  fentaNYL   Patch  75 MICROgram(s)/Hr. 1 Patch Transdermal every 48 hours    MEDICATIONS  (PRN):  ondansetron Injectable 4 milliGRAM(s) IV Push every 6 hours PRN Nausea  HYDROmorphone  Injectable 0.5 milliGRAM(s) IV Push every 3 hours PRN Moderate Pain (4 - 6)    Pertinent Labs:  07-03 Na142 mmol/L Glu 137 mg/dL<H> K+ 4.6 mmol/L Cr  1.73 mg/dL<H> BUN 39 mg/dL<H> Phos 4.3 mg/dL   Fingersticks x 24 hours: 114-131mg/dL, triglycerides 180 <H>, HDL 17 <L>    Skin: no pressure injuries    Estimated Needs:   [ X] no change since previous assessment  [ ] recalculated:       Previous Nutrition Diagnosis:      [X ]Inadequate Oral Intake    Nutrition Diagnosis is [X ] ongoing  [ ] resolved [ ] not applicable          New Nutrition Diagnosis: [X ] not applicable       Interventions:     Recommend    [ ] Change Diet To:    [ ] Nutrition Supplement    [ X] Nutrition Support: PN per Dr. Carias    [ ] Other:        Monitoring and Evaluation:     [ ] PO intake [ ] Tolerance to diet prescription [X ] weights [X ] follow up per protocol    [X ] other: monitor PN provision, ability to advance diet.

## 2017-07-03 NOTE — PROGRESS NOTE ADULT - SUBJECTIVE AND OBJECTIVE BOX
Interval History/ROS: Patient is a 76y old  female who presents with a chief complaint of Abdominal pain (26 May 2017 11:33)  76 year old female with a PMHx significant for diverticulitis s/p Ruben's c/b recurrent SBO (required an ex lap w/ SBR c/b wound dehiscence s/p abdominal reconstruction, EC fistula s/p takedown, and another EC fistula), COPD, prior DVT s/p IVC filter (now removed), GERD, hypothyroidism, and HIT who initially presented on  w/ a SBO that did not resolve with medical management so she is s/p ex lap, extensive NANETTE, EC fistula takedown, repair of cecal serosal tear, partial R salpingectomy, colostomy revision, and parastomal & incisional hernia repair w/ Strattice mesh on . Patient was admitted post-op intubated on vasopressor support. She was subsequently extubated and weaned off vasopressors. Hospital course complicated by lack of ostomy function and respiratory distress after getting PO contrast for a CT requiring re-intubation and Combicath culture positive for MRSA. Patient was subsequently extubated and hemodynamically stable for transfer to the floor. On the floor, she went into respiratory distress again likely secondary to pulmonary vascular congestion requiring BiPAP, became hypotensive requiring vasopressor support, and began draining thick brown fluid from her wound. CT revealed another SBO and and matted small bowel in the anterior lower abdominal wall defect concerning for an EC fistula. She was started on imipenem, had a drain placed in the wound that was subsequently placed on low wall suction & pouched, and was weaned off vasopressor support. Returned to OR on  for ostomy revision and isolation of EC fistula with vac and pouch, temporarily requiring phenylephrine post-operatively for hypotension. Patient also noted to be in post-renal TREVA pending a renal ultrasound.  pt resting in bed. uncomfortable. c/o pain in abdomen  feels cold  ? new ECF noted today      PAST MEDICAL & SURGICAL HISTORY:  Pulmonary embolism  Enterocutaneous fistula  Chronic diastolic CHF (congestive heart failure): mild diastolic dysfunction  Osteoarthritis of both knees, unspecified osteoarthritis type  Peripheral neuropathy  Clostridium difficile infection  HIT (heparin-induced thrombocytopenia)  Diverticulitis of intestine with abscess without bleedin  DVT (deep venous thrombosis): s/p IVC filter, which was later removed  Orthostatic hypotension  GERD (gastroesophageal reflux disease)  Hypothyroid  Chronic obstructive pulmonary disease (COPD)  Colostomy in place: s/p duarte procedure for diverticulitis  Status post Ruben procedure: for diverticulitis  S/P exploratory laparotomy: EC fistula complicated with pelvic abscesses  Wound dehiscence: Wound dehiscence and evisceration, s/p abdominal reconstruction with biologic mesh  Sigmoid diverticulitis: Ex lap, sigmoid resection, creation of colostomy.  Intestinal perforation: 2015, s/p closure with strattice mesh      Allergies    azithromycin (Unknown)  codeine (Unknown)  heparin (Other)  PC Pen VK (Unknown)  penicillin (Unknown)    Intolerances        ANTIMICROBIALS:  imipenem/cilastatin  IVPB 500 every 12 hours      OTHER MEDS:  levothyroxine Injectable 44 MICROGram(s) IV Push daily  pantoprazole  Injectable 40 milliGRAM(s) IV Push daily  ondansetron Injectable 4 milliGRAM(s) IV Push every 6 hours PRN  fondaparinux Injectable 2.5 milliGRAM(s) SubCutaneous every 24 hours  buDESOnide 160 MICROgram(s)/formoterol 4.5 MICROgram(s) Inhaler 2 Puff(s) Inhalation two times a day  fluticasone propionate 50 MICROgram(s)/spray Nasal Spray 1 Spray(s) Both Nostrils two times a day  HYDROmorphone  Injectable 0.5 milliGRAM(s) IV Push every 3 hours PRN  ALBUTerol/ipratropium for Nebulization 3 milliLiter(s) Nebulizer every 6 hours PRN  acetaminophen  IVPB. 1000 milliGRAM(s) IV Intermittent once  fentaNYL   Patch  75 MICROgram(s)/Hr. 1 Patch Transdermal every 48 hours  dextrose 5% + sodium chloride 0.45% with potassium chloride 20 mEq/L 1000 milliLiter(s) IV Continuous <Continuous>  lactated ringers. 1000 milliLiter(s) IV Continuous <Continuous>      Vital Signs Last 24 Hrs  T(C): 36.2 (2017 16:00), Max: 37.3 (2017 23:00)  T(F): 97.2 (2017 16:00), Max: 99.1 (2017 23:00)  HR: 90 (2017 19:00) (86 - 104)  BP: 104/50 (2017 07:00) (104/50 - 104/50)  BP(mean): 72 (2017 07:00) (72 - 72)  RR: 26 (2017 19:00) (13 - 33)  SpO2: 97% (2017 19:00) (9% - 98%)  PHYSICAL EXAM:      Constitutional:    Eyes:    ENMT:    Neck:    Breasts:    Back:    Respiratory:    Cardiovascular:    Gastrointestinal:    Genitourinary:    Rectal:    Extremities:    Vascular:    Neurological:    Skin:    Lymph Nodes:    Musculoskeletal:    Psychiatric:                            9.6    7.9   )-----------( 300      ( 2017 03:37 )             28.8       07-03    142  |  108  |  39<H>  ----------------------------<  137<H>  4.6   |  22  |  1.73<H>    Ca    9.2      2017 03:37  Phos  4.3     07-03  Mg     2.2     07-03    TPro  6.3  /  Alb  2.3<L>  /  TBili  0.4  /  DBili  0.2  /  AST  15  /  ALT  5<L>  /  AlkPhos  140<H>  07-03    LIVER FUNCTIONS - ( 2017 03:37 )  Alb: 2.3 g/dL / Pro: 6.3 g/dL / ALK PHOS: 140 U/L / ALT: 5 U/L RC / AST: 15 U/L / GGT: x                 Urinalysis Basic - ( 2017 01:46 )    Color: Yellow / Appearance: Clear / S.014 / pH: x  Gluc: x / Ketone: Negative  / Bili: Negative / Urobili: 2   Blood: x / Protein: 30 mg/dL / Nitrite: Negative   Leuk Esterase: Negative / RBC: 0-2 /HPF / WBC 2-5 /HPF   Sq Epi: x / Non Sq Epi: x / Bacteria: x        MICROBIOLOGY:    RECENT CULTURES:                      RADIOLOGY:

## 2017-07-03 NOTE — PROGRESS NOTE ADULT - SUBJECTIVE AND OBJECTIVE BOX
Day #8 of PN  PN infusion at 62  07-02 @ 07:01  -  07-03 @ 07:00  --------------------------------------------------------  IN:    Solution: 200 mL    TPN (Total Parenteral Nutrition): 1488 mL  Total IN: 1688 mL    OUT:    Colostomy: 30 mL    Drain: 525 mL    Indwelling Catheter - Urethral: 1935 mL    Nasoenteral Tube: 1050 mL  Total OUT: 3540 mL    Total NET: -1852 mL        T(C): 36.4 (07-03-17 @ 08:00), Max: 37.3 (07-02-17 @ 23:00)  HR: 92 (07-03-17 @ 08:00) (92 - 107)  BP: 104/50 (07-03-17 @ 07:00) (104/50 - 108/55)  RR: 13 (07-03-17 @ 08:00) (13 - 42)  SpO2: 95% (07-03-17 @ 08:00) (9% - 100%)  Wt(kg): --    Labs   07-03    142  |  108  |  39<H>  ----------------------------<  137<H>  4.6   |  22  |  1.73<H>    Ca    9.2      03 Jul 2017 03:37  Phos  4.3     07-03  Mg     2.2     07-03    TPro  6.3  /  Alb  2.3<L>  /  TBili  0.4  /  DBili  0.2  /  AST  15  /  ALT  5<L>  /  AlkPhos  140<H>  07-03      LIVER FUNCTIONS - ( 03 Jul 2017 03:37 )  Alb: 2.3 g/dL / Pro: 6.3 g/dL / ALK PHOS: 140 U/L / ALT: 5 U/L RC / AST: 15 U/L / GGT: x           CAPILLARY BLOOD GLUCOSE        I&O's Detail    02 Jul 2017 07:01  -  03 Jul 2017 07:00  --------------------------------------------------------  IN:    Solution: 200 mL    TPN (Total Parenteral Nutrition): 1488 mL  Total IN: 1688 mL    OUT:    Colostomy: 30 mL    Drain: 525 mL    Indwelling Catheter - Urethral: 1935 mL    Nasoenteral Tube: 1050 mL  Total OUT: 3540 mL    Total NET: -1852 mL      03 Jul 2017 07:01  -  03 Jul 2017 08:44  --------------------------------------------------------  IN:    Solution: 100 mL    TPN (Total Parenteral Nutrition): 62 mL  Total IN: 162 mL    OUT:    Indwelling Catheter - Urethral: 150 mL  Total OUT: 150 mL    Total NET: 12 mL

## 2017-07-03 NOTE — PHYSICAL THERAPY INITIAL EVALUATION ADULT - ORIENTATION, REHAB EVAL
oriented to person, place, time and situation

## 2017-07-03 NOTE — PROGRESS NOTE ADULT - ASSESSMENT
ASSESSMENT  76y female with high volume ECF s/p exploratory laparotomy, extensive lysis of adhesions, takedown of enterocutaneous fistula, repair of serosal tear on cecum, partial right salpingooophorectomy, colostomy revision, parastomal and incisional hernia repair with strattice mesh, POD 22 now s/p revision of colostomy, debridement of open abdominal wound, VAC placement    PLAN  - Cr trending up  - f/u US b/l kidneys for ?post renal lu  - awaiting ostomy fxn  - c/w primaxin  - Atrixtra for DVT prophylaxis with h/o HIT  - Pain control  - NPO / cont. with wound care, pouch around wound with suction  - care per RENALDO Pollard MD PGY1  Acute Care Surgery, #[id number] ASSESSMENT  76y female with high volume ECF s/p exploratory laparotomy, extensive lysis of adhesions, takedown of enterocutaneous fistula, repair of serosal tear on cecum, partial right salpingooophorectomy, colostomy revision, parastomal and incisional hernia repair with strattice mesh, POD 22 now s/p revision of colostomy, debridement of open abdominal wound, VAC placement    PLAN  - Cr trending up  - f/u US b/l kidneys for ?post renal lu  - change VAC  - awaiting ostomy fxn  - c/w primaxin  - Atrixtra for DVT prophylaxis with h/o HIT  - Pain control  - NPO / cont. with wound care, pouch around wound with suction  - care per RENALDO Pollard MD PGY1  Acute Care Surgery, #[id number]

## 2017-07-03 NOTE — PROGRESS NOTE ADULT - SUBJECTIVE AND OBJECTIVE BOX
HOSPITAL DAY #40    STATUS POST: revision of colostomy, debridement of open abdominal wound, VAC placement    POD: 2    INTERVAL EVENTS:     SUBJECTIVE: Patient reports abdominal pain.     PHYSICAL EXAM:   General: appears drowsy  Neuro: alert, oriented x3  HEENT: NC/AT, EOMI  Cardio: RRR, nml S1/S2  Resp: Good effort, CTA b/l  GI/Abd: Soft, ostomy pink. Vac holding suction. EC fistula pouch in place.     Vital Signs Last 24 Hrs  T(C): 36.6 (02 Jul 2017 07:00), Max: 38.2 (02 Jul 2017 00:15)  T(F): 97.9 (02 Jul 2017 07:00), Max: 100.8 (02 Jul 2017 00:15)  HR: 95 (02 Jul 2017 11:00) (95 - 124)  BP: 122/56 (01 Jul 2017 19:15) (122/56 - 122/56)  BP(mean): 81 (01 Jul 2017 19:15) (81 - 81)  RR: 14 (02 Jul 2017 11:00) (13 - 46)  SpO2: 97% (02 Jul 2017 11:00) (93% - 100%)    I&O's Detail    01 Jul 2017 07:01  -  02 Jul 2017 07:00  --------------------------------------------------------  IN:    Lactated Ringers IV Bolus: 500 mL    lactated ringers.: 720 mL    phenylephrine   Infusion: 70 mL    phenylephrine   Infusion: 45.5 mL    Solution: 200 mL    Solution: 200 mL    TPN (Total Parenteral Nutrition): 1488 mL  Total IN: 3223.5 mL    OUT:    Colostomy: 20 mL    Drain: 750 mL    Indwelling Catheter - Urethral: 2120 mL    Nasoenteral Tube: 210 mL    VAC (Vacuum Assisted Closure) System: 185 mL  Total OUT: 3285 mL    Total NET: -61.5 mL      02 Jul 2017 07:01  -  02 Jul 2017 12:35  --------------------------------------------------------  IN:    TPN (Total Parenteral Nutrition): 248 mL  Total IN: 248 mL    OUT:    Indwelling Catheter - Urethral: 310 mL  Total OUT: 310 mL    Total NET: -62 mL      07-02    143  |  109<H>  |  37<H>  ----------------------------<  139<H>  4.9   |  22  |  1.68<H>    Ca    8.3<L>      02 Jul 2017 02:22  Phos  4.3     07-02  Mg     2.0     07-02    TPro  5.7<L>  /  Alb  2.1<L>  /  TBili  0.4  /  DBili  0.2  /  AST  14  /  ALT  5<L>  /  AlkPhos  137<H>  07-02 HOSPITAL DAY #40    STATUS POST: revision of colostomy, debridement of open abdominal wound, VAC placement    POD: 2    INTERVAL EVENTS:     SUBJECTIVE: Patient reports abdominal pain and said several times "I've had it."    PHYSICAL EXAM:   General: appears drowsy  Neuro: alert, oriented x3  HEENT: NC/AT, EOMI  Cardio: RRR, nml S1/S2  Resp: Good effort, CTA b/l  GI/Abd: Soft, ostomy pink. Vac holding suction. EC fistula pouch in place.     Vital Signs Last 24 Hrs  T(C): 36.4 (03 Jul 2017 08:00), Max: 37.3 (02 Jul 2017 23:00)  T(F): 97.5 (03 Jul 2017 08:00), Max: 99.1 (02 Jul 2017 23:00)  HR: 89 (03 Jul 2017 09:00) (89 - 107)  BP: 104/50 (03 Jul 2017 07:00) (104/50 - 108/55)  BP(mean): 72 (03 Jul 2017 07:00) (72 - 77)  RR: 22 (03 Jul 2017 09:00) (13 - 42)  SpO2: 98% (03 Jul 2017 09:00) (9% - 100%)    I&O's Detail    02 Jul 2017 07:01  -  03 Jul 2017 07:00  --------------------------------------------------------  IN:    Solution: 200 mL    TPN (Total Parenteral Nutrition): 1488 mL  Total IN: 1688 mL    OUT:    Colostomy: 30 mL    Drain: 525 mL    Indwelling Catheter - Urethral: 1935 mL    Nasoenteral Tube: 1050 mL  Total OUT: 3540 mL    Total NET: -1852 mL      03 Jul 2017 07:01  -  03 Jul 2017 09:44  --------------------------------------------------------  IN:    Solution: 100 mL    TPN (Total Parenteral Nutrition): 124 mL  Total IN: 224 mL    OUT:    Indwelling Catheter - Urethral: 220 mL  Total OUT: 220 mL    Total NET: 4 mL    07-03    142  |  108  |  39<H>  ----------------------------<  137<H>  4.6   |  22  |  1.73<H>    Ca    9.2      03 Jul 2017 03:37  Phos  4.3     07-03  Mg     2.2     07-03    TPro  6.3  /  Alb  2.3<L>  /  TBili  0.4  /  DBili  0.2  /  AST  15  /  ALT  5<L>  /  AlkPhos  140<H>  07-03

## 2017-07-03 NOTE — PROGRESS NOTE ADULT - ASSESSMENT
76 year old female with history significant for COPD, prior DVT s/p IVC filter (now removed), GERD, hypothyroidism, HIT and diverticulitis s/p Ruben's, and recurrent SBO requiring ex-lap small bowel resection, EC fistula s/p takedown that was complicated by wound dehiscence and abdominal reconstruction, who underwent ex-lap, extensive NANETTE, EC fistula takedown, colostomy revision, and parastomal/incisional hernia repair on 6/9. She was admitted to SICU intubated post-operatively and was successfully extubated. However, after getting a CT with oral contrast, patient went into respiratory distress and was re-intubated and found to have MRSA pneumonia, likely aspiration in nature. She was subsequently extubated, weaned off pressors, and transferred to the floors. On the floor on 6/22, patient went into respiratory distress again likely secondary to pulmonary vascular congestion requiring BiPAP and became hypotensive requiring vasopressor support. Her wound also began draining thick brown fluid concerning for a new EC fistula vs. anastomotic breakdown and is currently on imipenem. Pressor needs decreasing, wound pouched with some improvement in clinical status, remains extubated, intermittently requiring BiPAP, now with decreased hearing.       Respiratory: remains stable on room air. Keep o2 sat>=90% w/ prn o2.  Last wore bipap on 6/24  COPD: not exacerbated. continue with symbicort  HIT/prior PE/DVT/+ivc filter- on fondaparinux -prophy  s/p tx for mrsa pna-observing off abx         renal - TPN/TREVA-improved  ID-on imipenem emperically for EC fistula   plan as per sicu  - ostomy revision,isolation of EC fistula, debreidment,, VAC on 6/30

## 2017-07-03 NOTE — PROGRESS NOTE ADULT - ASSESSMENT
76 year old female with a PMHx significant for diverticulitis s/p Ruben's c/b recurrent SBO (required an ex lap w/ SBR c/b wound dehiscence s/p abdominal reconstruction, EC fistula s/p takedown, and another EC fistula), COPD, prior DVT s/p IVC filter (now removed), GERD, hypothyroidism, and HIT who initially presented on 5/23 w/ a SBO that did not resolve with medical management so she is s/p ex lap, extensive NANETTE, EC fistula takedown, repair of cecal serosal tear, partial R salpingectomy, colostomy revision, and parastomal & incisional hernia repair w/ Strattice mesh on 6/9. Patient was admitted post-op intubated on vasopressor support. She was subsequently extubated and weaned off vasopressors. Hospital course complicated by lack of ostomy function and respiratory distress after getting PO contrast for a CT requiring re-intubation and Combicath culture positive for MRSA. Patient was subsequently extubated and hemodynamically stable for transfer to the floor. On the floor, she went into respiratory distress again likely secondary to pulmonary vascular congestion requiring BiPAP, became hypotensive requiring vasopressor support, and began draining thick brown fluid from her wound. CT revealed another SBO and and matted small bowel in the anterior lower abdominal wall defect concerning for an EC fistula. She was started on imipenem, had a drain placed in the wound that was subsequently placed on low wall suction & pouched, and was weaned off vasopressor support. Returned to OR on 6/30 for ostomy revision and isolation of EC fistula with vac and pouch, temporarily requiring phenylephrine post-operatively for hypotension. Patient also noted to be in post-renal TREVA pending a renal ultrasound.  pt resting in bed. uncomfortable. c/o pain in abdomen  feels cold  ? new ECF noted today  remains on meropenem adjusted for renal function  await repeat CT of abdomen  will decide on antibiotic course base on these findings  Pt on imipenem since June 22nd- day # 12    The ID service will be available for acute issues tomorrow    Amber Ricardo M.D. ,   Pager [phone]     after 5PM/ weekends [phone]

## 2017-07-03 NOTE — PHYSICAL THERAPY INITIAL EVALUATION ADULT - IMPAIRMENTS FOUND, PT EVAL
integumentary integrity
ROM/gait, locomotion, and balance/posture/aerobic capacity/endurance/muscle strength

## 2017-07-03 NOTE — PHYSICAL THERAPY INITIAL EVALUATION ADULT - PLANNED THERAPY INTERVENTIONS, PT EVAL
Negative Pressure Wound Therapy
strengthening/transfer training/gait training/balance training/bed mobility training

## 2017-07-03 NOTE — CHART NOTE - NSCHARTNOTESELECT_GEN_ALL_CORE
Event Note
Event Note
Nutrition Services
Off Service Note
Post operative check
Pre Op Note

## 2017-07-03 NOTE — PHYSICAL THERAPY INITIAL EVALUATION ADULT - CRITERIA FOR SKILLED THERAPEUTIC INTERVENTIONS
impairments found
pt is at functional baseline, has no hospital based skilled PT needs
functional limitations in following categories/risk reduction/prevention/rehab potential/impairments found

## 2017-07-03 NOTE — PROGRESS NOTE ADULT - SUBJECTIVE AND OBJECTIVE BOX
HISTORY  76 year old female with a PMHx significant for diverticulitis s/p Ruben's c/b recurrent SBO (required an ex lap w/ SBR c/b wound dehiscence s/p abdominal reconstruction, EC fistula s/p takedown, and another EC fistula), COPD, prior DVT s/p IVC filter (now removed), GERD, hypothyroidism, and HIT who initially presented on 5/23 w/ a SBO that did not resolve with medical management so she is s/p ex lap, extensive NANETTE, EC fistula takedown, repair of cecal serosal tear, partial R salpingectomy, colostomy revision, and parastomal & incisional hernia repair w/ Strattice mesh on 6/9. Patient was admitted post-op intubated on vasopressor support. She was subsequently extubated and weaned off vasopressors. Hospital course complicated by lack of ostomy function and respiratory distress after getting PO contrast for a CT requiring re-intubation and Combicath culture positive for MRSA. Patient was subsequently extubated and hemodynamically stable for transfer to the floor. On the floor, she went into respiratory distress again likely secondary to pulmonary vascular congestion requiring BiPAP, became hypotensive requiring vasopressor support, and began draining thick brown fluid from her wound. CT revealed another SBO and and matted small bowel in the anterior lower abdominal wall defect concerning for an EC fistula. She was started on imipenem, had a drain placed in the wound that was subsequently placed on low wall suction & pouched, and was weaned off vasopressor support. Returned to OR on 6/30 for ostomy revision and isolation of EC fistula with vac and pouch, temporarily requiring phenylephrine post-operatively for hypotension. Patient also noted to be in post-renal TREVA pending a renal ultrasound.    24 HOUR EVENTS:  No acute events overnight. Remains hemodynamically stable. Cr continues to rise gradually. Holding IVF.     SUBJECTIVE/ROS:  [x] A ten-point review of systems was otherwise negative except as noted.  [ ] Due to altered mental status/intubation, subjective information were not able to be obtained from the patient. History was obtained, to the extent possible, from review of the chart and collateral sources of information.      NEURO  CAM ICU: negative  Exam: awake, alert, oriented x3  Meds:   ·	ondansetron Injectable 4 milliGRAM(s) IV Push every 6 hours PRN Nausea  ·	fentaNYL   Patch  50 MICROgram(s)/Hr. 1 Patch Transdermal every 48 hours  ·	HYDROmorphone  Injectable 0.5 milliGRAM(s) IV Push every 3 hours PRN Moderate Pain (4 - 6)    [x] Adequacy of sedation and pain control has been assessed and adjusted      RESPIRATORY  RR: 23 (07-03-17 @ 02:00) (13 - 42)  SpO2: 96% (07-03-17 @ 02:00) (94% - 100%)  Wt(kg): --  Exam: breathing comfortably  Mechanical Ventilation: n/a    [N/A] Extubation Readiness Assessed  Meds:   ·	buDESOnide 160 MICROgram(s)/formoterol 4.5 MICROgram(s) Inhaler 2 Puff(s) Inhalation two times a day  ·	ALBUTerol/ipratropium for Nebulization 3 milliLiter(s) Nebulizer every 6 hours PRN Shortness of Breath and/or Wheezing        CARDIOVASCULAR  HR: 95 (07-03-17 @ 02:00) (95 - 107)  BP: 108/55 (07-02-17 @ 19:00) (108/55 - 108/55)  BP(mean): 77 (07-02-17 @ 19:00) (77 - 77)  ABP: 115/49 (07-03-17 @ 02:00) (92/45 - 138/70)  ABP(mean): 76 (07-03-17 @ 02:00) (64 - 98)  Wt(kg): --  CVP(cm H2O): --      Exam: regular rate and rhythm  Cardiac Rhythm: sinus  Perfusion     [x]Adequate   [ ]Inadequate  Mentation   [x]Normal       [ ]Reduced  Extremities  [x]Warm         [ ]Cool  Volume Status [x]Hypervolemic [ ]Euvolemic [ ]Hypovolemic  Meds: none      GI/NUTRITION  Exam: soft, appropriately, nondistended; ostomy pink w/ scant bowel sweat in appliance; VAC in place, vacuum holding seal. ECF w/ pouch draining.    Diet: NPO; TPN via PICC  Meds:   ·	pantoprazole  Injectable 40 milliGRAM(s) IV Push daily      GENITOURINARY  I&O's Detail    07-01 @ 07:01  -  07-02 @ 07:00  --------------------------------------------------------  IN:    Lactated Ringers IV Bolus: 500 mL    lactated ringers.: 720 mL    phenylephrine   Infusion: 70 mL    phenylephrine   Infusion: 45.5 mL    Solution: 200 mL    Solution: 200 mL    TPN (Total Parenteral Nutrition): 1488 mL  Total IN: 3223.5 mL    OUT:    Colostomy: 30 mL    Drain: 750 mL    Indwelling Catheter - Urethral: 2120 mL    Nasoenteral Tube: 210 mL    VAC (Vacuum Assisted Closure) System: 175 mL  Total OUT: 3285 mL    Total NET: -61.5 mL      07-02 @ 07:01  -  07-03 @ 03:11  --------------------------------------------------------  IN:    Solution: 100 mL    TPN (Total Parenteral Nutrition): 1178 mL  Total IN: 1278 mL    OUT:    Colostomy: 30 mL    Drain: 400 mL    Indwelling Catheter - Urethral: 1535 mL    Nasoenteral Tube: 750 mL  Total OUT: 2715 mL    Total NET: -1437 mL          07-02    143  |  109<H>  |  37<H>  ----------------------------<  139<H>  4.9   |  22  |  1.68<H>    Ca    8.3<L>      02 Jul 2017 02:22  Phos  4.3     07-02  Mg     2.0     07-02    TPro  5.7<L>  /  Alb  2.1<L>  /  TBili  0.4  /  DBili  0.2  /  AST  14  /  ALT  5<L>  /  AlkPhos  137<H>  07-02    [x] Snyder catheter, indication: close urine output monitoring in critically ill patient.  Meds: none      HEMATOLOGIC  Meds:   ·	fondaparinux Injectable 2.5 milliGRAM(s) SubCutaneous every 24 hours    [x] VTE Prophylaxis: fondaparinux                        9.4    10.3  )-----------( 351      ( 02 Jul 2017 02:22 )             27.9     PT/INR - ( 02 Jul 2017 02:22 )   PT: 15.5 sec;   INR: 1.41 ratio         PTT - ( 02 Jul 2017 02:22 )  PTT:40.8 sec  Transfusion     [ ] PRBC   [ ] Platelets   [ ] FFP   [ ] Cryoprecipitate      INFECTIOUS DISEASES    RECENT CULTURES: BCx (6/26/17): NG x5d    Meds:   ·	imipenem/cilastatin  IVPB 500 milliGRAM(s) IV Intermittent every 12 hours        ENDOCRINE  CAPILLARY BLOOD GLUCOSE  114 (02 Jul 2017 06:00)    Meds:   ·	levothyroxine Injectable 44 MICROGram(s) IV Push daily        ACCESS DEVICES:  [ ] Peripheral IV  [ ] Central Venous Line	[ ] R	[ ] L	[ ] IJ	[ ] Fem	[ ] SC	Placed:   [x] Arterial Line		[ ] R	[x] L	[x] Fem	[ ] Rad	[ ] Ax	Placed: 6/22/17  [x] PICC: 5/31/17 L				[ ] Mediport  [x] Urinary Catheter, Date Placed: 6/22/17  [x] Necessity of urinary, arterial, and venous catheters discussed    OTHER MEDICATIONS:  fluticasone propionate 50 MICROgram(s)/spray Nasal Spray 1 Spray(s) Both Nostrils two times a day      CODE STATUS: FULL      IMAGING: HISTORY  76 year old female with a PMHx significant for diverticulitis s/p Ruben's c/b recurrent SBO (required an ex lap w/ SBR c/b wound dehiscence s/p abdominal reconstruction, EC fistula s/p takedown, and another EC fistula), COPD, prior DVT s/p IVC filter (now removed), GERD, hypothyroidism, and HIT who initially presented on 5/23 w/ a SBO that did not resolve with medical management so she is s/p ex lap, extensive NANETTE, EC fistula takedown, repair of cecal serosal tear, partial R salpingectomy, colostomy revision, and parastomal & incisional hernia repair w/ Strattice mesh on 6/9. Patient was admitted post-op intubated on vasopressor support. She was subsequently extubated and weaned off vasopressors. Hospital course complicated by lack of ostomy function and respiratory distress after getting PO contrast for a CT requiring re-intubation and Combicath culture positive for MRSA. Patient was subsequently extubated and hemodynamically stable for transfer to the floor. On the floor, she went into respiratory distress again likely secondary to pulmonary vascular congestion requiring BiPAP, became hypotensive requiring vasopressor support, and began draining thick brown fluid from her wound. CT revealed another SBO and and matted small bowel in the anterior lower abdominal wall defect concerning for an EC fistula. She was started on imipenem, had a drain placed in the wound that was subsequently placed on low wall suction & pouched, and was weaned off vasopressor support. Returned to OR on 6/30 for ostomy revision and isolation of EC fistula with vac and pouch, temporarily requiring phenylephrine post-operatively for hypotension. Patient also noted to be in post-renal TREVA pending a renal ultrasound.    24 HOUR EVENTS:  No acute events overnight. Remains hemodynamically stable. Cr continues to rise gradually. Holding IVF.     SUBJECTIVE/ROS:  [x] A ten-point review of systems was otherwise negative except as noted.  [ ] Due to altered mental status/intubation, subjective information were not able to be obtained from the patient. History was obtained, to the extent possible, from review of the chart and collateral sources of information.      NEURO  CAM ICU: negative  Exam: awake, alert, oriented x3  Meds:   ·	ondansetron Injectable 4 milliGRAM(s) IV Push every 6 hours PRN Nausea  ·	fentaNYL   Patch  50 MICROgram(s)/Hr. 1 Patch Transdermal every 48 hours  ·	HYDROmorphone  Injectable 0.5 milliGRAM(s) IV Push every 3 hours PRN Moderate Pain (4 - 6)    [x] Adequacy of sedation and pain control has been assessed and adjusted      RESPIRATORY  RR: 23 (07-03-17 @ 02:00) (13 - 42)  SpO2: 96% (07-03-17 @ 02:00) (94% - 100%)  Wt(kg): --  Exam: breathing comfortably  Mechanical Ventilation: n/a    [N/A] Extubation Readiness Assessed  Meds:   ·	buDESOnide 160 MICROgram(s)/formoterol 4.5 MICROgram(s) Inhaler 2 Puff(s) Inhalation two times a day  ·	ALBUTerol/ipratropium for Nebulization 3 milliLiter(s) Nebulizer every 6 hours PRN Shortness of Breath and/or Wheezing        CARDIOVASCULAR  HR: 95 (07-03-17 @ 02:00) (95 - 107)  BP: 108/55 (07-02-17 @ 19:00) (108/55 - 108/55)  BP(mean): 77 (07-02-17 @ 19:00) (77 - 77)  ABP: 115/49 (07-03-17 @ 02:00) (92/45 - 138/70)  ABP(mean): 76 (07-03-17 @ 02:00) (64 - 98)  Wt(kg): --  CVP(cm H2O): --      Exam: regular rate and rhythm  Cardiac Rhythm: sinus  Perfusion     [x]Adequate   [ ]Inadequate  Mentation   [x]Normal       [ ]Reduced  Extremities  [x]Warm         [ ]Cool  Volume Status [x]Hypervolemic [ ]Euvolemic [ ]Hypovolemic  Meds: none      GI/NUTRITION  Exam: soft, appropriately, nondistended; ostomy pink w/ scant bowel sweat in appliance; VAC in place, vacuum holding seal. ECF w/ pouch draining.    Diet: NPO; TPN via PICC  Meds:   ·	pantoprazole  Injectable 40 milliGRAM(s) IV Push daily      GENITOURINARY  I&O's Detail    07-01 @ 07:01  -  07-02 @ 07:00  --------------------------------------------------------  IN:    Lactated Ringers IV Bolus: 500 mL    lactated ringers.: 720 mL    phenylephrine   Infusion: 70 mL    phenylephrine   Infusion: 45.5 mL    Solution: 200 mL    Solution: 200 mL    TPN (Total Parenteral Nutrition): 1488 mL  Total IN: 3223.5 mL    OUT:    Colostomy: 30 mL    Drain: 750 mL    Indwelling Catheter - Urethral: 2120 mL    Nasoenteral Tube: 210 mL    VAC (Vacuum Assisted Closure) System: 175 mL  Total OUT: 3285 mL    Total NET: -61.5 mL      07-02 @ 07:01  -  07-03 @ 03:11  --------------------------------------------------------  IN:    Solution: 100 mL    TPN (Total Parenteral Nutrition): 1178 mL  Total IN: 1278 mL    OUT:    Colostomy: 30 mL    Drain: 400 mL    Indwelling Catheter - Urethral: 1535 mL    Nasoenteral Tube: 750 mL  Total OUT: 2715 mL    Total NET: -1437 mL          07-02    143  |  109<H>  |  37<H>  ----------------------------<  139<H>  4.9   |  22  |  1.68<H>    Ca    8.3<L>      02 Jul 2017 02:22  Phos  4.3     07-02  Mg     2.0     07-02    TPro  5.7<L>  /  Alb  2.1<L>  /  TBili  0.4  /  DBili  0.2  /  AST  14  /  ALT  5<L>  /  AlkPhos  137<H>  07-02    [x] Snyder catheter, indication: close urine output monitoring in critically ill patient.  Meds: none      HEMATOLOGIC  Meds:   ·	fondaparinux Injectable 2.5 milliGRAM(s) SubCutaneous every 24 hours    [x] VTE Prophylaxis: fondaparinux                        9.4    10.3  )-----------( 351      ( 02 Jul 2017 02:22 )             27.9     PT/INR - ( 02 Jul 2017 02:22 )   PT: 15.5 sec;   INR: 1.41 ratio         PTT - ( 02 Jul 2017 02:22 )  PTT:40.8 sec  Transfusion     [ ] PRBC   [ ] Platelets   [ ] FFP   [ ] Cryoprecipitate      INFECTIOUS DISEASES    RECENT CULTURES: BCx (6/26/17): NG x5d    Meds:   ·	imipenem/cilastatin  IVPB 500 milliGRAM(s) IV Intermittent every 12 hours        ENDOCRINE  CAPILLARY BLOOD GLUCOSE  114 (02 Jul 2017 06:00)    Meds:   ·	levothyroxine Injectable 44 MICROGram(s) IV Push daily        ACCESS DEVICES:  [ ] Peripheral IV  [ ] Central Venous Line	[ ] R	[ ] L	[ ] IJ	[ ] Fem	[ ] SC	Placed:   [x] Arterial Line		[ ] R	[x] L	[x] Fem	[ ] Rad	[ ] Ax	Placed: 6/22/17  [x] PICC: L arm				[ ] Mediport  [x] Urinary Catheter, Date Placed: 6/22/17  [x] Necessity of urinary, arterial, and venous catheters discussed    OTHER MEDICATIONS:  fluticasone propionate 50 MICROgram(s)/spray Nasal Spray 1 Spray(s) Both Nostrils two times a day      CODE STATUS: FULL      IMAGING: HISTORY  76 year old female with a PMHx significant for diverticulitis s/p Ruben's c/b recurrent SBO (required an ex lap w/ SBR c/b wound dehiscence s/p abdominal reconstruction, EC fistula s/p takedown, and another EC fistula), COPD, prior DVT s/p IVC filter (now removed), GERD, hypothyroidism, and HIT who initially presented on 5/23 w/ a SBO that did not resolve with medical management so she is s/p ex lap, extensive NANETTE, EC fistula takedown, repair of cecal serosal tear, partial R salpingectomy, colostomy revision, and parastomal & incisional hernia repair w/ Strattice mesh on 6/9. Patient was admitted post-op intubated on vasopressor support. She was subsequently extubated and weaned off vasopressors. Hospital course complicated by lack of ostomy function and respiratory distress after getting PO contrast for a CT requiring re-intubation and Combicath culture positive for MRSA. Patient was subsequently extubated and hemodynamically stable for transfer to the floor. On the floor, she went into respiratory distress again likely secondary to pulmonary vascular congestion requiring BiPAP, became hypotensive requiring vasopressor support, and began draining thick brown fluid from her wound. CT revealed another SBO and and matted small bowel in the anterior lower abdominal wall defect concerning for an EC fistula. She was started on imipenem, had a drain placed in the wound that was subsequently placed on low wall suction & pouched, and was weaned off vasopressor support. Returned to OR on 6/30 for ostomy revision and isolation of EC fistula with vac and pouch, temporarily requiring phenylephrine post-operatively for hypotension. Patient also noted to be in post-renal TREVA pending a renal ultrasound.    24 HOUR EVENTS:  No acute events overnight. Remains hemodynamically stable. Cr continues to rise gradually. Holding IVF.     SUBJECTIVE/ROS:  [x] A ten-point review of systems was otherwise negative except as noted.  [ ] Due to altered mental status/intubation, subjective information were not able to be obtained from the patient. History was obtained, to the extent possible, from review of the chart and collateral sources of information.      NEURO  CAM ICU: negative  Exam: awake, alert, oriented x3  Meds:   ·	ondansetron Injectable 4 milliGRAM(s) IV Push every 6 hours PRN Nausea  ·	fentaNYL   Patch  50 MICROgram(s)/Hr. 1 Patch Transdermal every 48 hours  ·	HYDROmorphone  Injectable 0.5 milliGRAM(s) IV Push every 3 hours PRN Moderate Pain (4 - 6)    [x] Adequacy of sedation and pain control has been assessed and adjusted      RESPIRATORY  RR: 23 (07-03-17 @ 02:00) (13 - 42)  SpO2: 96% (07-03-17 @ 02:00) (94% - 100%)  Wt(kg): --  Exam: breathing comfortably  Mechanical Ventilation: n/a    [N/A] Extubation Readiness Assessed  Meds:   ·	buDESOnide 160 MICROgram(s)/formoterol 4.5 MICROgram(s) Inhaler 2 Puff(s) Inhalation two times a day  ·	ALBUTerol/ipratropium for Nebulization 3 milliLiter(s) Nebulizer every 6 hours PRN Shortness of Breath and/or Wheezing        CARDIOVASCULAR  HR: 95 (07-03-17 @ 02:00) (95 - 107)  BP: 108/55 (07-02-17 @ 19:00) (108/55 - 108/55)  BP(mean): 77 (07-02-17 @ 19:00) (77 - 77)  ABP: 115/49 (07-03-17 @ 02:00) (92/45 - 138/70)  ABP(mean): 76 (07-03-17 @ 02:00) (64 - 98)  Wt(kg): --  CVP(cm H2O): --      Exam: regular   Cardiac Rhythm: sinus  Perfusion     [x]Adequate   [ ]Inadequate  Mentation   [x]Normal       [ ]Reduced  Extremities  [x]Warm         [ ]Cool  Volume Status [x]Hypervolemic [ ]Euvolemic [ ]Hypovolemic  Meds: none      GI/NUTRITION  Exam: soft, appropriately, nondistended; ostomy pink w/ scant bowel sweat in appliance; VAC in place, vacuum holding seal. ECF w/ pouch draining.    Diet: NPO; TPN via PICC  Meds:   ·	pantoprazole  Injectable 40 milliGRAM(s) IV Push daily      GENITOURINARY  I&O's Detail    07-01 @ 07:01  -  07-02 @ 07:00  --------------------------------------------------------  IN:    Lactated Ringers IV Bolus: 500 mL    lactated ringers.: 720 mL    phenylephrine   Infusion: 70 mL    phenylephrine   Infusion: 45.5 mL    Solution: 200 mL    Solution: 200 mL    TPN (Total Parenteral Nutrition): 1488 mL  Total IN: 3223.5 mL    OUT:    Colostomy: 30 mL    Drain: 750 mL    Indwelling Catheter - Urethral: 2120 mL    Nasoenteral Tube: 210 mL    VAC (Vacuum Assisted Closure) System: 175 mL  Total OUT: 3285 mL    Total NET: -61.5 mL      07-02 @ 07:01  -  07-03 @ 03:11  --------------------------------------------------------  IN:    Solution: 100 mL    TPN (Total Parenteral Nutrition): 1178 mL  Total IN: 1278 mL    OUT:    Colostomy: 30 mL    Drain: 400 mL    Indwelling Catheter - Urethral: 1535 mL    Nasoenteral Tube: 750 mL  Total OUT: 2715 mL    Total NET: -1437 mL          07-02    143  |  109<H>  |  37<H>  ----------------------------<  139<H>  4.9   |  22  |  1.68<H>    Ca    8.3<L>      02 Jul 2017 02:22  Phos  4.3     07-02  Mg     2.0     07-02    TPro  5.7<L>  /  Alb  2.1<L>  /  TBili  0.4  /  DBili  0.2  /  AST  14  /  ALT  5<L>  /  AlkPhos  137<H>  07-02    [x] Snyder catheter, indication: close urine output monitoring in critically ill patient.  Meds: none      HEMATOLOGIC  Meds:   ·	fondaparinux Injectable 2.5 milliGRAM(s) SubCutaneous every 24 hours    [x] VTE Prophylaxis: fondaparinux                        9.4    10.3  )-----------( 351      ( 02 Jul 2017 02:22 )             27.9     PT/INR - ( 02 Jul 2017 02:22 )   PT: 15.5 sec;   INR: 1.41 ratio         PTT - ( 02 Jul 2017 02:22 )  PTT:40.8 sec  Transfusion     [ ] PRBC   [ ] Platelets   [ ] FFP   [ ] Cryoprecipitate      INFECTIOUS DISEASES    RECENT CULTURES: BCx (6/26/17): NG x5d    Meds:   ·	imipenem/cilastatin  IVPB 500 milliGRAM(s) IV Intermittent every 12 hours        ENDOCRINE  CAPILLARY BLOOD GLUCOSE  114 (02 Jul 2017 06:00)    Meds:   ·	levothyroxine Injectable 44 MICROGram(s) IV Push daily        ACCESS DEVICES:  [ ] Peripheral IV  [ ] Central Venous Line	[ ] R	[ ] L	[ ] IJ	[ ] Fem	[ ] SC	Placed:   [x] Arterial Line		[ ] R	[x] L	[x] Fem	[ ] Rad	[ ] Ax	Placed: 6/22/17  [x] PICC: L arm				[ ] Mediport  [x] Urinary Catheter, Date Placed: 6/22/17  [x] Necessity of urinary, arterial, and venous catheters discussed    OTHER MEDICATIONS:  fluticasone propionate 50 MICROgram(s)/spray Nasal Spray 1 Spray(s) Both Nostrils two times a day      CODE STATUS: FULL    IMAGING: none past 24h HISTORY  76 year old female with a PMHx significant for diverticulitis s/p Ruben's c/b recurrent SBO (required an ex lap w/ SBR c/b wound dehiscence s/p abdominal reconstruction, EC fistula s/p takedown, and another EC fistula), COPD, prior DVT s/p IVC filter (now removed), GERD, hypothyroidism, and HIT who initially presented on 5/23 w/ a SBO that did not resolve with medical management so she is s/p ex lap, extensive NANETTE, EC fistula takedown, repair of cecal serosal tear, partial R salpingectomy, colostomy revision, and parastomal & incisional hernia repair w/ Strattice mesh on 6/9. Patient was admitted post-op intubated on vasopressor support. She was subsequently extubated and weaned off vasopressors. Hospital course complicated by lack of ostomy function and respiratory distress after getting PO contrast for a CT requiring re-intubation and Combicath culture positive for MRSA. Patient was subsequently extubated and hemodynamically stable for transfer to the floor. On the floor, she went into respiratory distress again likely secondary to pulmonary vascular congestion requiring BiPAP, became hypotensive requiring vasopressor support, and began draining thick brown fluid from her wound. CT revealed another SBO and and matted small bowel in the anterior lower abdominal wall defect concerning for an EC fistula. She was started on imipenem, had a drain placed in the wound that was subsequently placed on low wall suction & pouched, and was weaned off vasopressor support. Returned to OR on 6/30 for ostomy revision and isolation of EC fistula with vac and pouch, temporarily requiring phenylephrine post-operatively for hypotension. Patient also noted to be in post-renal TREVA pending a renal ultrasound.    24 HOUR EVENTS:  No acute events overnight. Remains hemodynamically stable. Cr continues to rise gradually. Holding IVF.     SUBJECTIVE/ROS:  [x] A ten-point review of systems was otherwise negative except as noted.  [ ] Due to altered mental status/intubation, subjective information were not able to be obtained from the patient. History was obtained, to the extent possible, from review of the chart and collateral sources of information.      NEURO  CAM ICU: negative  Exam: awake, alert, oriented x3  Meds:   ·	ondansetron Injectable 4 milliGRAM(s) IV Push every 6 hours PRN Nausea  ·	fentaNYL   Patch  50 MICROgram(s)/Hr. 1 Patch Transdermal every 48 hours  ·	HYDROmorphone  Injectable 0.5 milliGRAM(s) IV Push every 3 hours PRN Moderate Pain (4 - 6)    [x] Adequacy of sedation and pain control has been assessed and adjusted      RESPIRATORY  RR: 23 (07-03-17 @ 02:00) (13 - 42)  SpO2: 96% (07-03-17 @ 02:00) (94% - 100%)  Wt(kg): --  Exam: breathing comfortably  Mechanical Ventilation: n/a    [N/A] Extubation Readiness Assessed  Meds:   ·	buDESOnide 160 MICROgram(s)/formoterol 4.5 MICROgram(s) Inhaler 2 Puff(s) Inhalation two times a day  ·	ALBUTerol/ipratropium for Nebulization 3 milliLiter(s) Nebulizer every 6 hours PRN Shortness of Breath and/or Wheezing        CARDIOVASCULAR  HR: 95 (07-03-17 @ 02:00) (95 - 107)  BP: 108/55 (07-02-17 @ 19:00) (108/55 - 108/55)  BP(mean): 77 (07-02-17 @ 19:00) (77 - 77)  ABP: 115/49 (07-03-17 @ 02:00) (92/45 - 138/70)  ABP(mean): 76 (07-03-17 @ 02:00) (64 - 98)  Wt(kg): --  CVP(cm H2O): --      Exam: regular   Cardiac Rhythm: sinus  Perfusion     [x]Adequate   [ ]Inadequate  Mentation   [x]Normal       [ ]Reduced  Extremities  [x]Warm         [ ]Cool  Volume Status [x]Hypervolemic [ ]Euvolemic [ ]Hypovolemic  Meds: none      GI/NUTRITION  Exam: soft, appropriately, nondistended; ostomy pink w/ scant bowel sweat in appliance; VAC in place, vacuum holding seal. ECF w/ pouch draining.    Diet: NPO; TPN via PICC  Meds:   ·	pantoprazole  Injectable 40 milliGRAM(s) IV Push daily      GENITOURINARY  I&O's Detail    07-01 @ 07:01  -  07-02 @ 07:00  --------------------------------------------------------  IN:    Lactated Ringers IV Bolus: 500 mL    lactated ringers.: 720 mL    phenylephrine   Infusion: 70 mL    phenylephrine   Infusion: 45.5 mL    Solution: 200 mL    Solution: 200 mL    TPN (Total Parenteral Nutrition): 1488 mL  Total IN: 3223.5 mL    OUT:    Colostomy: 30 mL    Drain: 750 mL    Indwelling Catheter - Urethral: 2120 mL    Nasoenteral Tube: 210 mL    VAC (Vacuum Assisted Closure) System: 175 mL  Total OUT: 3285 mL    Total NET: -61.5 mL      07-02 @ 07:01  -  07-03 @ 03:11  --------------------------------------------------------  IN:    Solution: 100 mL    TPN (Total Parenteral Nutrition): 1178 mL  Total IN: 1278 mL    OUT:    Colostomy: 30 mL    Drain: 400 mL    Indwelling Catheter - Urethral: 1535 mL    Nasoenteral Tube: 750 mL  Total OUT: 2715 mL    Total NET: -1437 mL    07-03    142  |  108  |  39<H>  ----------------------------<  137<H>  4.6   |  22  |  1.73<H>    Ca    9.2      03 Jul 2017 03:37  Phos  4.3     07-03  Mg     2.2     07-03    TPro  6.3  /  Alb  2.3<L>  /  TBili  0.4  /  DBili  0.2  /  AST  15  /  ALT  5<L>  /  AlkPhos  140<H>  07-03            [x] Snyder catheter, indication: close urine output monitoring in critically ill patient for strike I&O for volume status.  Meds: none      HEMATOLOGIC  Meds:   ·	fondaparinux Injectable 2.5 milliGRAM(s) SubCutaneous every 24 hours    [x] VTE Prophylaxis: fondaparinux                          9.6    7.9   )-----------( 300      ( 03 Jul 2017 03:37 )             28.8          PT/INR - ( 03 Jul 2017 03:37 )   PT: 14.1 sec;   INR: 1.29 ratio         PTT - ( 03 Jul 2017 03:37 )  PTT:35.5 sec      Transfusion     [ ] PRBC   [ ] Platelets   [ ] FFP   [ ] Cryoprecipitate      INFECTIOUS DISEASES    RECENT CULTURES: BCx (6/26/17): NG x5d    Meds:   ·	imipenem/cilastatin  IVPB 500 milliGRAM(s) IV Intermittent every 12 hours        ENDOCRINE  CAPILLARY BLOOD GLUCOSE  114 (02 Jul 2017 06:00)    Meds:   ·	levothyroxine Injectable 44 MICROGram(s) IV Push daily        ACCESS DEVICES:  [ ] Peripheral IV  [ ] Central Venous Line	[ ] R	[ ] L	[ ] IJ	[ ] Fem	[ ] SC	Placed:   [x] Arterial Line		[ ] R	[x] L	[x] Fem	[ ] Rad	[ ] Ax	Placed: 6/22/17  [x] PICC: L arm				[ ] Mediport  [x] Urinary Catheter, Date Placed: 6/22/17  [x] Necessity of urinary, arterial, and venous catheters discussed    OTHER MEDICATIONS:  fluticasone propionate 50 MICROgram(s)/spray Nasal Spray 1 Spray(s) Both Nostrils two times a day      CODE STATUS: FULL    IMAGING: none past 24h HISTORY  76 year old female with a PMHx significant for diverticulitis s/p Ruben's c/b recurrent SBO (required an ex lap w/ SBR c/b wound dehiscence s/p abdominal reconstruction, EC fistula s/p takedown, and another EC fistula), COPD, prior DVT s/p IVC filter (now removed), GERD, hypothyroidism, and HIT who initially presented on 5/23 w/ a SBO that did not resolve with medical management so she is s/p ex lap, extensive NANETTE, EC fistula takedown, repair of cecal serosal tear, partial R salpingectomy, colostomy revision, and parastomal & incisional hernia repair w/ Strattice mesh on 6/9. Patient was admitted post-op intubated on vasopressor support. She was subsequently extubated and weaned off vasopressors. Hospital course complicated by lack of ostomy function and respiratory distress after getting PO contrast for a CT requiring re-intubation and Combicath culture positive for MRSA. Patient was subsequently extubated and hemodynamically stable for transfer to the floor. On the floor, she went into respiratory distress again likely secondary to pulmonary vascular congestion requiring BiPAP, became hypotensive requiring vasopressor support, and began draining thick brown fluid from her wound. CT revealed another SBO and and matted small bowel in the anterior lower abdominal wall defect concerning for an EC fistula. She was started on imipenem, had a drain placed in the wound that was subsequently placed on low wall suction & pouched, and was weaned off vasopressor support. Returned to OR on 6/30 for ostomy revision and isolation of EC fistula with vac and pouch, temporarily requiring phenylephrine post-operatively for hypotension. Patient also noted to be in post-renal TREVA pending a renal ultrasound.    24 HOUR EVENTS:  No acute events overnight. Remains hemodynamically stable. Cr continues to rise gradually. Holding IVF.     SUBJECTIVE/ROS:  [x] A ten-point review of systems was otherwise negative except as noted.  [ ] Due to altered mental status/intubation, subjective information were not able to be obtained from the patient. History was obtained, to the extent possible, from review of the chart and collateral sources of information.      NEURO  CAM ICU: negative  Exam: awake, alert, oriented x3  Meds:   ·	ondansetron Injectable 4 milliGRAM(s) IV Push every 6 hours PRN Nausea  ·	fentaNYL   Patch  50 MICROgram(s)/Hr. 1 Patch Transdermal every 48 hours  ·	HYDROmorphone  Injectable 0.5 milliGRAM(s) IV Push every 3 hours PRN Moderate Pain (4 - 6)    [x] Adequacy of sedation and pain control has been assessed and adjusted      RESPIRATORY  RR: 23 (07-03-17 @ 02:00) (13 - 42)  SpO2: 96% (07-03-17 @ 02:00) (94% - 100%)  Wt(kg): --  Exam: breathing comfortably  Mechanical Ventilation: n/a    [N/A] Extubation Readiness Assessed  Meds:   ·	buDESOnide 160 MICROgram(s)/formoterol 4.5 MICROgram(s) Inhaler 2 Puff(s) Inhalation two times a day  ·	ALBUTerol/ipratropium for Nebulization 3 milliLiter(s) Nebulizer every 6 hours PRN Shortness of Breath and/or Wheezing        CARDIOVASCULAR  HR: 95 (07-03-17 @ 02:00) (95 - 107)  BP: 108/55 (07-02-17 @ 19:00) (108/55 - 108/55)  BP(mean): 77 (07-02-17 @ 19:00) (77 - 77)  ABP: 115/49 (07-03-17 @ 02:00) (92/45 - 138/70)  ABP(mean): 76 (07-03-17 @ 02:00) (64 - 98)  Wt(kg): --  CVP(cm H2O): --      Exam: regular   Cardiac Rhythm: sinus  Perfusion     [x]Adequate   [ ]Inadequate  Mentation   [x]Normal       [ ]Reduced  Extremities  [x]Warm         [ ]Cool  Volume Status [x]Hypervolemic [ ]Euvolemic [ ]Hypovolemic  Meds: none      GI/NUTRITION  Exam: soft, appropriately, nondistended; ostomy pink w/ scant bowel sweat in appliance; VAC in place, vacuum holding seal. ECF w/ pouch draining.    Diet: NPO; TPN via PICC  Meds:   ·	pantoprazole  Injectable 40 milliGRAM(s) IV Push daily      GENITOURINARY  I&O's Detail    02 Jul 2017 07:01  -  03 Jul 2017 07:00  --------------------------------------------------------  IN:    Solution: 200 mL    TPN (Total Parenteral Nutrition): 1488 mL  Total IN: 1688 mL    OUT:    Colostomy: 30 mL    Drain: 525 mL    Indwelling Catheter - Urethral: 1935 mL    Nasoenteral Tube: 1050 mL  Total OUT: 3540 mL    Total NET: -1852 mL          07-03    142  |  108  |  39<H>  ----------------------------<  137<H>  4.6   |  22  |  1.73<H>    Ca    9.2      03 Jul 2017 03:37  Phos  4.3     07-03  Mg     2.2     07-03    TPro  6.3  /  Alb  2.3<L>  /  TBili  0.4  /  DBili  0.2  /  AST  15  /  ALT  5<L>  /  AlkPhos  140<H>  07-03            [x] Snyder catheter, indication: close urine output monitoring in critically ill patient for strike I&O for volume status.  Meds: none      HEMATOLOGIC  Meds:   ·	fondaparinux Injectable 2.5 milliGRAM(s) SubCutaneous every 24 hours    [x] VTE Prophylaxis: fondaparinux                          9.6    7.9   )-----------( 300      ( 03 Jul 2017 03:37 )             28.8          PT/INR - ( 03 Jul 2017 03:37 )   PT: 14.1 sec;   INR: 1.29 ratio         PTT - ( 03 Jul 2017 03:37 )  PTT:35.5 sec      Transfusion     [ ] PRBC   [ ] Platelets   [ ] FFP   [ ] Cryoprecipitate      INFECTIOUS DISEASES    RECENT CULTURES: BCx (6/26/17): NG x5d    Meds:   ·	imipenem/cilastatin  IVPB 500 milliGRAM(s) IV Intermittent every 12 hours        ENDOCRINE  CAPILLARY BLOOD GLUCOSE  114 (02 Jul 2017 06:00)    Meds:   ·	levothyroxine Injectable 44 MICROGram(s) IV Push daily        ACCESS DEVICES:  [ ] Peripheral IV  [ ] Central Venous Line	[ ] R	[ ] L	[ ] IJ	[ ] Fem	[ ] SC	Placed:   [x] Arterial Line		[ ] R	[x] L	[x] Fem	[ ] Rad	[ ] Ax	Placed: 6/22/17  [x] PICC: L arm				[ ] Mediport  [x] Urinary Catheter, Date Placed: 6/22/17  [x] Necessity of urinary, arterial, and venous catheters discussed    OTHER MEDICATIONS:  fluticasone propionate 50 MICROgram(s)/spray Nasal Spray 1 Spray(s) Both Nostrils two times a day      CODE STATUS: FULL    IMAGING: none past 24h

## 2017-07-04 LAB
ANION GAP SERPL CALC-SCNC: 12 MMOL/L — SIGNIFICANT CHANGE UP (ref 5–17)
BUN SERPL-MCNC: 42 MG/DL — HIGH (ref 7–23)
CALCIUM SERPL-MCNC: 8.8 MG/DL — SIGNIFICANT CHANGE UP (ref 8.4–10.5)
CHLORIDE SERPL-SCNC: 105 MMOL/L — SIGNIFICANT CHANGE UP (ref 96–108)
CO2 SERPL-SCNC: 22 MMOL/L — SIGNIFICANT CHANGE UP (ref 22–31)
CREAT SERPL-MCNC: 1.63 MG/DL — HIGH (ref 0.5–1.3)
GLUCOSE SERPL-MCNC: 125 MG/DL — HIGH (ref 70–99)
HCT VFR BLD CALC: 28.6 % — LOW (ref 34.5–45)
HGB BLD-MCNC: 9.6 G/DL — LOW (ref 11.5–15.5)
MAGNESIUM SERPL-MCNC: 2 MG/DL — SIGNIFICANT CHANGE UP (ref 1.6–2.6)
MCHC RBC-ENTMCNC: 33.2 PG — SIGNIFICANT CHANGE UP (ref 27–34)
MCHC RBC-ENTMCNC: 33.5 GM/DL — SIGNIFICANT CHANGE UP (ref 32–36)
MCV RBC AUTO: 99 FL — SIGNIFICANT CHANGE UP (ref 80–100)
PHOSPHATE SERPL-MCNC: 4.1 MG/DL — SIGNIFICANT CHANGE UP (ref 2.5–4.5)
PLATELET # BLD AUTO: 273 K/UL — SIGNIFICANT CHANGE UP (ref 150–400)
POTASSIUM SERPL-MCNC: 4.9 MMOL/L — SIGNIFICANT CHANGE UP (ref 3.5–5.3)
POTASSIUM SERPL-SCNC: 4.9 MMOL/L — SIGNIFICANT CHANGE UP (ref 3.5–5.3)
RBC # BLD: 2.89 M/UL — LOW (ref 3.8–5.2)
RBC # FLD: 15.7 % — HIGH (ref 10.3–14.5)
SODIUM SERPL-SCNC: 139 MMOL/L — SIGNIFICANT CHANGE UP (ref 135–145)
WBC # BLD: 7.7 K/UL — SIGNIFICANT CHANGE UP (ref 3.8–10.5)
WBC # FLD AUTO: 7.7 K/UL — SIGNIFICANT CHANGE UP (ref 3.8–10.5)

## 2017-07-04 PROCEDURE — 71010: CPT | Mod: 26

## 2017-07-04 RX ORDER — NALOXONE HYDROCHLORIDE 4 MG/.1ML
0.1 SPRAY NASAL
Qty: 0 | Refills: 0 | Status: DISCONTINUED | OUTPATIENT
Start: 2017-07-04 | End: 2017-07-06

## 2017-07-04 RX ORDER — ACETAMINOPHEN 500 MG
1000 TABLET ORAL
Qty: 0 | Refills: 0 | Status: COMPLETED | OUTPATIENT
Start: 2017-07-04 | End: 2017-07-05

## 2017-07-04 RX ORDER — HYDROMORPHONE HYDROCHLORIDE 2 MG/ML
0.25 INJECTION INTRAMUSCULAR; INTRAVENOUS; SUBCUTANEOUS ONCE
Qty: 0 | Refills: 0 | Status: DISCONTINUED | OUTPATIENT
Start: 2017-07-04 | End: 2017-07-04

## 2017-07-04 RX ORDER — MORPHINE SULFATE 50 MG/1
4 CAPSULE, EXTENDED RELEASE ORAL ONCE
Qty: 0 | Refills: 0 | Status: DISCONTINUED | OUTPATIENT
Start: 2017-07-04 | End: 2017-07-04

## 2017-07-04 RX ORDER — MORPHINE SULFATE 50 MG/1
3 CAPSULE, EXTENDED RELEASE ORAL
Qty: 0 | Refills: 0 | Status: DISCONTINUED | OUTPATIENT
Start: 2017-07-04 | End: 2017-07-06

## 2017-07-04 RX ORDER — ONDANSETRON 8 MG/1
4 TABLET, FILM COATED ORAL EVERY 6 HOURS
Qty: 0 | Refills: 0 | Status: DISCONTINUED | OUTPATIENT
Start: 2017-07-04 | End: 2017-07-07

## 2017-07-04 RX ORDER — HYDROMORPHONE HYDROCHLORIDE 2 MG/ML
0.5 INJECTION INTRAMUSCULAR; INTRAVENOUS; SUBCUTANEOUS ONCE
Qty: 0 | Refills: 0 | Status: DISCONTINUED | OUTPATIENT
Start: 2017-07-04 | End: 2017-07-04

## 2017-07-04 RX ORDER — HYDROMORPHONE HYDROCHLORIDE 2 MG/ML
30 INJECTION INTRAMUSCULAR; INTRAVENOUS; SUBCUTANEOUS
Qty: 0 | Refills: 0 | Status: DISCONTINUED | OUTPATIENT
Start: 2017-07-04 | End: 2017-07-04

## 2017-07-04 RX ORDER — ACETAMINOPHEN 500 MG
1000 TABLET ORAL
Qty: 0 | Refills: 0 | Status: COMPLETED | OUTPATIENT
Start: 2017-07-04 | End: 2017-07-04

## 2017-07-04 RX ORDER — ONDANSETRON 8 MG/1
4 TABLET, FILM COATED ORAL EVERY 6 HOURS
Qty: 0 | Refills: 0 | Status: DISCONTINUED | OUTPATIENT
Start: 2017-07-04 | End: 2017-07-04

## 2017-07-04 RX ORDER — HYDROMORPHONE HYDROCHLORIDE 2 MG/ML
0.5 INJECTION INTRAMUSCULAR; INTRAVENOUS; SUBCUTANEOUS
Qty: 0 | Refills: 0 | Status: DISCONTINUED | OUTPATIENT
Start: 2017-07-04 | End: 2017-07-04

## 2017-07-04 RX ORDER — MORPHINE SULFATE 50 MG/1
2 CAPSULE, EXTENDED RELEASE ORAL
Qty: 0 | Refills: 0 | Status: DISCONTINUED | OUTPATIENT
Start: 2017-07-04 | End: 2017-07-06

## 2017-07-04 RX ORDER — MORPHINE SULFATE 50 MG/1
30 CAPSULE, EXTENDED RELEASE ORAL
Qty: 0 | Refills: 0 | Status: DISCONTINUED | OUTPATIENT
Start: 2017-07-04 | End: 2017-07-06

## 2017-07-04 RX ORDER — MORPHINE SULFATE 50 MG/1
2 CAPSULE, EXTENDED RELEASE ORAL ONCE
Qty: 0 | Refills: 0 | Status: DISCONTINUED | OUTPATIENT
Start: 2017-07-04 | End: 2017-07-04

## 2017-07-04 RX ADMIN — HYDROMORPHONE HYDROCHLORIDE 0.5 MILLIGRAM(S): 2 INJECTION INTRAMUSCULAR; INTRAVENOUS; SUBCUTANEOUS at 05:33

## 2017-07-04 RX ADMIN — IMIPENEM AND CILASTATIN 100 MILLIGRAM(S): 250; 250 INJECTION, POWDER, FOR SOLUTION INTRAVENOUS at 09:18

## 2017-07-04 RX ADMIN — IMIPENEM AND CILASTATIN 100 MILLIGRAM(S): 250; 250 INJECTION, POWDER, FOR SOLUTION INTRAVENOUS at 21:01

## 2017-07-04 RX ADMIN — HYDROMORPHONE HYDROCHLORIDE 0.5 MILLIGRAM(S): 2 INJECTION INTRAMUSCULAR; INTRAVENOUS; SUBCUTANEOUS at 17:55

## 2017-07-04 RX ADMIN — FONDAPARINUX SODIUM 2.5 MILLIGRAM(S): 2.5 INJECTION, SOLUTION SUBCUTANEOUS at 18:31

## 2017-07-04 RX ADMIN — Medication 400 MILLIGRAM(S): at 03:33

## 2017-07-04 RX ADMIN — MORPHINE SULFATE 2 MILLIGRAM(S): 50 CAPSULE, EXTENDED RELEASE ORAL at 22:15

## 2017-07-04 RX ADMIN — PANTOPRAZOLE SODIUM 40 MILLIGRAM(S): 20 TABLET, DELAYED RELEASE ORAL at 12:50

## 2017-07-04 RX ADMIN — Medication 1000 MILLIGRAM(S): at 05:33

## 2017-07-04 RX ADMIN — HYDROMORPHONE HYDROCHLORIDE 0.5 MILLIGRAM(S): 2 INJECTION INTRAMUSCULAR; INTRAVENOUS; SUBCUTANEOUS at 08:30

## 2017-07-04 RX ADMIN — HYDROMORPHONE HYDROCHLORIDE 0.5 MILLIGRAM(S): 2 INJECTION INTRAMUSCULAR; INTRAVENOUS; SUBCUTANEOUS at 05:43

## 2017-07-04 RX ADMIN — HYDROMORPHONE HYDROCHLORIDE 0.25 MILLIGRAM(S): 2 INJECTION INTRAMUSCULAR; INTRAVENOUS; SUBCUTANEOUS at 02:26

## 2017-07-04 RX ADMIN — ONDANSETRON 4 MILLIGRAM(S): 8 TABLET, FILM COATED ORAL at 21:20

## 2017-07-04 RX ADMIN — MORPHINE SULFATE 4 MILLIGRAM(S): 50 CAPSULE, EXTENDED RELEASE ORAL at 21:30

## 2017-07-04 RX ADMIN — MORPHINE SULFATE 4 MILLIGRAM(S): 50 CAPSULE, EXTENDED RELEASE ORAL at 21:15

## 2017-07-04 RX ADMIN — MORPHINE SULFATE 2 MILLIGRAM(S): 50 CAPSULE, EXTENDED RELEASE ORAL at 20:25

## 2017-07-04 RX ADMIN — MORPHINE SULFATE 2 MILLIGRAM(S): 50 CAPSULE, EXTENDED RELEASE ORAL at 18:28

## 2017-07-04 RX ADMIN — Medication 44 MICROGRAM(S): at 06:43

## 2017-07-04 RX ADMIN — HYDROMORPHONE HYDROCHLORIDE 0.5 MILLIGRAM(S): 2 INJECTION INTRAMUSCULAR; INTRAVENOUS; SUBCUTANEOUS at 18:29

## 2017-07-04 RX ADMIN — BUDESONIDE AND FORMOTEROL FUMARATE DIHYDRATE 2 PUFF(S): 160; 4.5 AEROSOL RESPIRATORY (INHALATION) at 06:17

## 2017-07-04 RX ADMIN — HYDROMORPHONE HYDROCHLORIDE 0.25 MILLIGRAM(S): 2 INJECTION INTRAMUSCULAR; INTRAVENOUS; SUBCUTANEOUS at 01:22

## 2017-07-04 RX ADMIN — Medication 1 SPRAY(S): at 18:31

## 2017-07-04 RX ADMIN — HYDROMORPHONE HYDROCHLORIDE 0.5 MILLIGRAM(S): 2 INJECTION INTRAMUSCULAR; INTRAVENOUS; SUBCUTANEOUS at 13:04

## 2017-07-04 RX ADMIN — BUDESONIDE AND FORMOTEROL FUMARATE DIHYDRATE 2 PUFF(S): 160; 4.5 AEROSOL RESPIRATORY (INHALATION) at 17:17

## 2017-07-04 RX ADMIN — MORPHINE SULFATE 2 MILLIGRAM(S): 50 CAPSULE, EXTENDED RELEASE ORAL at 19:00

## 2017-07-04 RX ADMIN — Medication 400 MILLIGRAM(S): at 23:30

## 2017-07-04 RX ADMIN — MORPHINE SULFATE 2 MILLIGRAM(S): 50 CAPSULE, EXTENDED RELEASE ORAL at 22:00

## 2017-07-04 RX ADMIN — MORPHINE SULFATE 2 MILLIGRAM(S): 50 CAPSULE, EXTENDED RELEASE ORAL at 20:10

## 2017-07-04 NOTE — PROGRESS NOTE ADULT - ASSESSMENT
A: 76F with multiple colonic fistulas for which she has had ostomy and vac placement. She is currently being monitored for bowel obstructions.    P:  - CT Abdomen and Pelvis with oral contrast to assess fistulas  - NPO/ monitor NGT output  - Adequate pain control  - Change ostomy dressings    ~ She Griffin MD, PGY-1

## 2017-07-04 NOTE — PROGRESS NOTE ADULT - ATTENDING COMMENTS
Pt seen and examined today at 5pm, agree with above. Pt with two entero-atmospheric fistulae into open wound, persistent postoperative SBO after ECF take-down with extensive NANETTE, resiting of colostomy, partial R salpingo-oophorectomy, repair of parastomal and incisional hernia repairs with Strattice mesh. I spoke with pt's daughters Fe and Kathie over the phone today to give updates regarding pt's two fistulae and plan to repeat CT tomorrow with contrast via ngt to determine location of fistulae and re-assess persistence and location of SBO.     I spoke with  again after this, and she said that she continues to have severe, 10/10, persistent pain despite consistent dilaudid IV q3, IV Tylenol, and fentanyl patch. She says that after dilaudid injection, there is minimal pain improvement after 15 minutes which only lasts a few minutes. She says that she understands that her fistulae have recurred and that she has a persistent SBO which is requiring ngt decompression to prevent N/V. She states that she does not want to continue aggressive medical interventions given that she believes she will need to be in a nursing facility for the rest of her life if she recovers enough to leave the hospital. She has been in several different nursing facilities since her first surgery and says that she would rather die than have to remain in one forever. In this past facility, which pt was in only because her fistula was difficult to pouch, she mostly remained in her room since many of the other residents had advanced dementia with agitation and behavioral problems, and she was afraid to sit in the common areas. She even took most of her meals in her room alone.      says that she does not want to continue to live in such pain, when there is little hope of acceptable recovery and good quality of life. I have discussed with her that we will try to alter her pain regimen again, as dilaudid and fentanyl patch is not effective, as I believe that inadequate pain control is a significant contributing factor to pt's frustration and despondency. She notes that morphine has worked well for her in the past, and we will try prn morphine tonight. If this works well, we will ask the pain svc to order a morphine PCA.     I have explained to Ms.Eugenia that she can choose to redirect her care to the main goals of pain control and comfort, eschewing further invasive and uncomfortable testing and procedures, such as CT scans and more surgery. I explained that if she wishes, she can specify that if she becomes sicker, she would not want to be re-intubated or have CPR performed. I explained that I do not anticipate these possibilities in the near future, but she does remain at risk for clinical deterioration, and if she made these decisions in advance, we could prevent the performance of these procedures if she does not want them. I explained that if we direct the main goals of her care towards pain control and comfort, we can be more aggressive with her pain control regimen, with the understanding that higher doses of narcotics given with the intention of treating severe pain can decrease the drive to breathe and can hasten death. I asked her to consider these things and that if she wishes, we can discuss these with her family tomorrow. If she decides to pursue this course, I will consult Palliative for further assistance in better pain control.

## 2017-07-04 NOTE — PROGRESS NOTE ADULT - SUBJECTIVE AND OBJECTIVE BOX
HISTORY  76 year old female with a PMHx significant for diverticulitis s/p Ruben's c/b recurrent SBO (required an ex lap w/ SBR c/b wound dehiscence s/p abdominal reconstruction, EC fistula s/p takedown, and another EC fistula), COPD, prior DVT s/p IVC filter (now removed), GERD, hypothyroidism, and HIT who initially presented on 5/23 w/ a SBO that did not resolve with medical management so she is s/p ex lap, extensive NANETTE, EC fistula takedown, repair of cecal serosal tear, partial R salpingectomy, colostomy revision, and parastomal & incisional hernia repair w/ Strattice mesh on 6/9. Patient was admitted post-op intubated on vasopressor support. She was subsequently extubated and weaned off vasopressors. Hospital course complicated by lack of ostomy function and respiratory distress after getting PO contrast for a CT requiring re-intubation and Combicath culture positive for MRSA. Patient was subsequently extubated and hemodynamically stable for transfer to the floor. On the floor, she went into respiratory distress again likely secondary to pulmonary vascular congestion requiring BiPAP, became hypotensive requiring vasopressor support, and began draining thick brown fluid from her wound. CT revealed another SBO and and matted small bowel in the anterior lower abdominal wall defect concerning for an EC fistula. She was started on imipenem, had a drain placed in the wound that was subsequently placed on low wall suction & pouched, and was weaned off vasopressor support. RTOR on 6/30 for ostomy revision and isolation of EC fistula with vac and pouch requiring phenylephrine post-operatively for hypotension. Patient also noted to be in post-renal TREVA pending a renal ultrasound.    24 HOUR EVENTS:     SUBJECTIVE/ROS:  [x] A ten-point review of systems was otherwise negative except as noted.  [ ] Due to altered mental status/intubation, subjective information were not able to be obtained from the patient. History was obtained, to the extent possible, from review of the chart and collateral sources of information.      NEURO  CAM ICU: negative  Exam: awake, alert, oriented x4  Meds:  HYDROmorphone  Injectable 0.5 milliGRAM(s) IV Push every 3 hours PRN Moderate Pain (4 - 6)  acetaminophen  IVPB. 1000 milliGRAM(s) IV Intermittent once  fentaNYL   Patch  75 MICROgram(s)/Hr. 1 Patch Transdermal every 48 hours  [x] Adequacy of sedation and pain control has been assessed and adjusted      RESPIRATORY  RR: 16 (07-03-17 @ 23:00) (13 - 41)  SpO2: 96% (07-03-17 @ 23:00) (9% - 100%)  Exam: unlabored, clear to auscultation bilaterally  Mechanical Ventilation: no  [N/A] Extubation Readiness Assessed  Meds:  buDESOnide 160 MICROgram(s)/formoterol 4.5 MICROgram(s) Inhaler 2 Puff(s) Inhalation two times a day  ALBUTerol/ipratropium for Nebulization 3 milliLiter(s) Nebulizer every 6 hours PRN Shortness of Breath and/or Wheezing  fluticasone propionate 50 MICROgram(s)/spray Nasal Spray 1 Spray(s) Both Nostrils two times a day    CARDIOVASCULAR  HR: 91 (07-03-17 @ 23:00) (86 - 98)  BP: 104/50 (07-03-17 @ 07:00) (104/50 - 104/50)  BP(mean): 72 (07-03-17 @ 07:00) (72 - 72)  ABP: 114/53 (07-03-17 @ 23:00) (100/45 - 140/64)  ABP(mean): 77 (07-03-17 @ 23:00) (66 - 98)  Exam:  Cardiac Rhythm:  Perfusion     [x]Adequate   [ ]Inadequate  Mentation    [x]Normal       [ ]Reduced  Extremities  [x]Warm         [ ]Cool  Volume Status [ ]Hypervolemic [x]Euvolemic [ ]Hypovolemic  Meds: none    GI/NUTRITION  Exam: soft, non-distended, mild diffuse tenderness in all four quadrants, VAC draining tannish fluid, NGT draining bilious fluid, ostomy pink & viable w/ no gas or stool  Diet: NPO w/ TPN @ 62 mL/Hr  Meds: pantoprazole  Injectable 40 milliGRAM(s) IV Push daily  ondansetron Injectable 4 milliGRAM(s) IV Push every 6 hours PRN Nausea    GENITOURINARY  [x] Snyder catheter, indication: urine output monitoring in the critically ill  Meds: dextrose 5% + sodium chloride 0.45% with potassium chloride 20 mEq/L infuse at 50 mL/Hr  lactated ringers. 1000 milliLiter(s) IV Continuous <Continuous>    HEMATOLOGIC  Meds: fondaparinux Injectable 2.5 milliGRAM(s) SubCutaneous every 24 hours  [x] VTE Prophylaxis      INFECTIOUS DISEASES  T(C): 36.2 (07-03-17 @ 16:00), Max: 36.5 (07-03-17 @ 03:00)  WBC Count: 7.9 K/uL (07-03 @ 03:37)  Recent Cultures: blood cultures  Meds: imipenem/cilastatin  IVPB 500 milliGRAM(s) IV Intermittent every 12 hours    ENDOCRINE  Capillary Blood Glucose: none  Meds: levothyroxine Injectable 44 MICROGram(s) IV Push daily    ACCESS DEVICES:  [ ] Peripheral IV  [ ] Central Venous Line	[ ] R	[ ] L	[ ] IJ	[ ] Fem	[ ] SC	Placed:   [x] Arterial Line		[ ] R	[x] L	[x] Fem	[ ] Rad	[ ] Ax	Placed: 6/22/2017  [x] PICC placed 5/31/2017				[ ] Mediport  [x] Urinary Catheter, Date Placed: 6/22/2017  [x] Necessity of urinary, arterial, and venous catheters discussed    OTHER MEDICATIONS:      CODE STATUS:     IMAGING: HISTORY  76 year old female with a PMHx significant for diverticulitis s/p Ruben's c/b recurrent SBO (required an ex lap w/ SBR c/b wound dehiscence s/p abdominal reconstruction, EC fistula s/p takedown, and another EC fistula), COPD, prior DVT s/p IVC filter (now removed), GERD, hypothyroidism, and HIT who initially presented on 5/23 w/ a SBO that did not resolve with medical management so she is s/p ex lap, extensive NANETTE, EC fistula takedown, repair of cecal serosal tear, partial R salpingectomy, colostomy revision, and parastomal & incisional hernia repair w/ Strattice mesh on 6/9. Patient was admitted post-op intubated on vasopressor support. She was subsequently extubated and weaned off vasopressors. Hospital course complicated by lack of ostomy function and respiratory distress after getting PO contrast for a CT requiring re-intubation and Combicath culture positive for MRSA. Patient was subsequently extubated and hemodynamically stable for transfer to the floor. On the floor, she went into respiratory distress again likely secondary to pulmonary vascular congestion requiring BiPAP, became hypotensive requiring vasopressor support, and began draining thick brown fluid from her wound. CT revealed another SBO and and matted small bowel in the anterior lower abdominal wall defect concerning for an EC fistula. She was started on imipenem, had a drain placed in the wound that was subsequently placed on low wall suction & pouched, and was weaned off vasopressor support. RTOR on 6/30 for ostomy revision and isolation of EC fistula with vac and pouch requiring phenylephrine post-operatively for hypotension. Patient also noted to be in post-renal TREVA pending a renal ultrasound.    24 HOUR EVENTS:     The patient had a wound vac and dressing change with 2 observed fistula sites. NGT output was 240cc over 24 hours and D5 1/2NS was continued at 50 mL/hr. Plan for PO contrast at 0800 7/4 for a CT to follow at 1200. Plan to monitor closely for aspiration.     SUBJECTIVE/ROS:  [x] A ten-point review of systems was otherwise negative except as noted.  [ ] Due to altered mental status/intubation, subjective information were not able to be obtained from the patient. History was obtained, to the extent possible, from review of the chart and collateral sources of information.      NEURO  CAM ICU: negative  Exam: awake, alert, oriented x4  Meds:  HYDROmorphone  Injectable 0.5 milliGRAM(s) IV Push every 3 hours PRN Moderate Pain (4 - 6)  acetaminophen  IVPB. 1000 milliGRAM(s) IV Intermittent once  fentaNYL   Patch  75 MICROgram(s)/Hr. 1 Patch Transdermal every 48 hours  [x] Adequacy of sedation and pain control has been assessed and adjusted      RESPIRATORY  RR: 16 (07-03-17 @ 23:00) (13 - 41)  SpO2: 96% (07-03-17 @ 23:00) (9% - 100%)  Exam: unlabored, clear to auscultation bilaterally  Mechanical Ventilation: no  [N/A] Extubation Readiness Assessed  Meds:  buDESOnide 160 MICROgram(s)/formoterol 4.5 MICROgram(s) Inhaler 2 Puff(s) Inhalation two times a day  ALBUTerol/ipratropium for Nebulization 3 milliLiter(s) Nebulizer every 6 hours PRN Shortness of Breath and/or Wheezing  fluticasone propionate 50 MICROgram(s)/spray Nasal Spray 1 Spray(s) Both Nostrils two times a day    CARDIOVASCULAR  HR: 91 (07-03-17 @ 23:00) (86 - 98)  BP: 104/50 (07-03-17 @ 07:00) (104/50 - 104/50)  BP(mean): 72 (07-03-17 @ 07:00) (72 - 72)  ABP: 114/53 (07-03-17 @ 23:00) (100/45 - 140/64)  ABP(mean): 77 (07-03-17 @ 23:00) (66 - 98)  Exam:  Cardiac Rhythm:  Perfusion     [x]Adequate   [ ]Inadequate  Mentation    [x]Normal       [ ]Reduced  Extremities  [x]Warm         [ ]Cool  Volume Status [ ]Hypervolemic [x]Euvolemic [ ]Hypovolemic  Meds: none    GI/NUTRITION  Exam: soft, non-distended, mild diffuse tenderness in all four quadrants, VAC draining tannish fluid, NGT draining bilious fluid, ostomy pink & viable w/ no gas or stool  Diet: NPO w/ TPN @ 62 mL/Hr  Meds: pantoprazole  Injectable 40 milliGRAM(s) IV Push daily  ondansetron Injectable 4 milliGRAM(s) IV Push every 6 hours PRN Nausea    GENITOURINARY  [x] Snyder catheter, indication: urine output monitoring in the critically ill  Meds: dextrose 5% + sodium chloride 0.45% with potassium chloride 20 mEq/L infuse at 50 mL/Hr  lactated ringers. 1000 milliLiter(s) IV Continuous <Continuous>    HEMATOLOGIC  Meds: fondaparinux Injectable 2.5 milliGRAM(s) SubCutaneous every 24 hours  [x] VTE Prophylaxis      INFECTIOUS DISEASES  T(C): 36.2 (07-03-17 @ 16:00), Max: 36.5 (07-03-17 @ 03:00)  WBC Count: 7.9 K/uL (07-03 @ 03:37)  Recent Cultures: blood cultures  Meds: imipenem/cilastatin  IVPB 500 milliGRAM(s) IV Intermittent every 12 hours    ENDOCRINE  Capillary Blood Glucose: none  Meds: levothyroxine Injectable 44 MICROGram(s) IV Push daily    ACCESS DEVICES:  [ ] Peripheral IV  [ ] Central Venous Line	[ ] R	[ ] L	[ ] IJ	[ ] Fem	[ ] SC	Placed:   [x] Arterial Line		[ ] R	[x] L	[x] Fem	[ ] Rad	[ ] Ax	Placed: 6/22/2017  [x] PICC placed 5/31/2017				[ ] Mediport  [x] Urinary Catheter, Date Placed: 6/22/2017  [x] Necessity of urinary, arterial, and venous catheters discussed    OTHER MEDICATIONS:      CODE STATUS:     IMAGING: HISTORY  76 year old female with a PMHx significant for diverticulitis s/p Ruben's c/b recurrent SBO (required an ex lap w/ SBR c/b wound dehiscence s/p abdominal reconstruction, EC fistula s/p takedown, and another EC fistula), COPD, prior DVT s/p IVC filter (now removed), GERD, hypothyroidism, and HIT who initially presented on 5/23 w/ a SBO that did not resolve with medical management so she is s/p ex lap, extensive NANETTE, EC fistula takedown, repair of cecal serosal tear, partial R salpingectomy, colostomy revision, and parastomal & incisional hernia repair w/ Strattice mesh on 6/9. Patient was admitted post-op intubated on vasopressor support. She was subsequently extubated and weaned off vasopressors. Hospital course complicated by lack of ostomy function and respiratory distress after getting PO contrast for a CT requiring re-intubation and Combicath culture positive for MRSA. Patient was subsequently extubated and hemodynamically stable for transfer to the floor. On the floor, she went into respiratory distress again likely secondary to pulmonary vascular congestion requiring BiPAP, became hypotensive requiring vasopressor support, and began draining thick brown fluid from her wound. CT revealed another SBO and and matted small bowel in the anterior lower abdominal wall defect concerning for an EC fistula. She was started on imipenem, had a drain placed in the wound that was subsequently placed on low wall suction & pouched, and was weaned off vasopressor support. RTOR on 6/30 for ostomy revision and isolation of EC fistula with vac and pouch requiring phenylephrine post-operatively for hypotension. Patient also noted to be in post-renal TREVA pending a renal ultrasound.    24 HOUR EVENTS:     The patient had a wound vac and dressing change with 2 observed fistula sites. NGT output was 240cc over 24 hours and D5 1/2NS was continued at 50 mL/hr. Plan for PO contrast at 0800 7/4 for a CT to follow at 1200. Plan to monitor closely for aspiration.     SUBJECTIVE/ROS:  [x] A ten-point review of systems was otherwise negative except as noted.  [ ] Due to altered mental status/intubation, subjective information were not able to be obtained from the patient. History was obtained, to the extent possible, from review of the chart and collateral sources of information.      NEURO  CAM ICU: negative  Exam: awake, alert, oriented x4  Meds:  HYDROmorphone  Injectable 0.5 milliGRAM(s) IV Push every 3 hours PRN Moderate Pain (4 - 6)  acetaminophen  IVPB. 1000 milliGRAM(s) IV Intermittent once  fentaNYL   Patch  75 MICROgram(s)/Hr. 1 Patch Transdermal every 48 hours  [x] Adequacy of sedation and pain control has been assessed and adjusted      RESPIRATORY  RR: 16 (07-03-17 @ 23:00) (13 - 41)  SpO2: 96% (07-03-17 @ 23:00) (9% - 100%)  Exam: unlabored, clear to auscultation bilaterally  Mechanical Ventilation: no  [N/A] Extubation Readiness Assessed  Meds:  buDESOnide 160 MICROgram(s)/formoterol 4.5 MICROgram(s) Inhaler 2 Puff(s) Inhalation two times a day  ALBUTerol/ipratropium for Nebulization 3 milliLiter(s) Nebulizer every 6 hours PRN Shortness of Breath and/or Wheezing  fluticasone propionate 50 MICROgram(s)/spray Nasal Spray 1 Spray(s) Both Nostrils two times a day    CARDIOVASCULAR  HR: 91 (07-03-17 @ 23:00) (86 - 98)  BP: 104/50 (07-03-17 @ 07:00) (104/50 - 104/50)  BP(mean): 72 (07-03-17 @ 07:00) (72 - 72)  ABP: 114/53 (07-03-17 @ 23:00) (100/45 - 140/64)  ABP(mean): 77 (07-03-17 @ 23:00) (66 - 98)  Exam:  Cardiac Rhythm:  Perfusion     [x]Adequate   [ ]Inadequate  Mentation    [x]Normal       [ ]Reduced  Extremities  [x]Warm         [ ]Cool  Volume Status [ ]Hypervolemic [x]Euvolemic [ ]Hypovolemic  Meds: none    GI/NUTRITION  Exam: soft, non-distended, mild diffuse tenderness in all four quadrants, VAC draining tannish fluid, NGT draining bilious fluid, ostomy pink & viable w/ no gas or stool  Diet: NPO w/ TPN @ 62 mL/Hr  Meds: pantoprazole  Injectable 40 milliGRAM(s) IV Push daily  ondansetron Injectable 4 milliGRAM(s) IV Push every 6 hours PRN Nausea    GENITOURINARY  [x] Snyder catheter, indication: urine output monitoring in the critically ill  Meds: dextrose 5% + sodium chloride 0.45% with potassium chloride 20 mEq/L infuse at 50 mL/Hr  lactated ringers. 1000 milliLiter(s) IV Continuous <Continuous>    I&O's Detail    02 Jul 2017 07:01  -  03 Jul 2017 07:00  --------------------------------------------------------  IN:    Solution: 200 mL    TPN (Total Parenteral Nutrition): 1488 mL  Total IN: 1688 mL    OUT:    Colostomy: 30 mL    Drain: 525 mL    Indwelling Catheter - Urethral: 1935 mL    Nasoenteral Tube: 1050 mL  Total OUT: 3540 mL    Total NET: -1852 mL      03 Jul 2017 07:01  -  04 Jul 2017 06:25  --------------------------------------------------------  IN:    dextrose 5% + sodium chloride 0.45% with potassium chloride 20 mEq/L: 900 mL    lactated ringers.: 270 mL    Solution: 100 mL    Solution: 100 mL    TPN (Total Parenteral Nutrition): 1426 mL  Total IN: 2796 mL    OUT:    Drain: 300 mL    Indwelling Catheter - Urethral: 1630 mL    Nasoenteral Tube: 240 mL    VAC (Vacuum Assisted Closure) System: 200 mL  Total OUT: 2370 mL    Total NET: 426 mL    07-04    139  |  105  |  42<H>  ----------------------------<  125<H>  4.9   |  22  |  1.63<H>    Ca    8.8      04 Jul 2017 04:09  Phos  4.1     07-04  Mg     2.0     07-04    TPro  6.3  /  Alb  2.3<L>  /  TBili  0.4  /  DBili  0.2  /  AST  15  /  ALT  5<L>  /  AlkPhos  140<H>  07-03                          9.6    7.7   )-----------( 273      ( 04 Jul 2017 04:09 )             28.6     PT/INR - ( 03 Jul 2017 03:37 )   PT: 14.1 sec;   INR: 1.29 ratio         PTT - ( 03 Jul 2017 03:37 )  PTT:35.5 sec      HEMATOLOGIC  Meds: fondaparinux Injectable 2.5 milliGRAM(s) SubCutaneous every 24 hours  [x] VTE Prophylaxis      INFECTIOUS DISEASES  T(C): 36.2 (07-03-17 @ 16:00), Max: 36.5 (07-03-17 @ 03:00)  WBC Count: 7.9 K/uL (07-03 @ 03:37)  Recent Cultures: blood cultures  Meds: imipenem/cilastatin  IVPB 500 milliGRAM(s) IV Intermittent every 12 hours    ENDOCRINE  Capillary Blood Glucose: none  Meds: levothyroxine Injectable 44 MICROGram(s) IV Push daily    ACCESS DEVICES:  [ ] Peripheral IV  [ ] Central Venous Line	[ ] R	[ ] L	[ ] IJ	[ ] Fem	[ ] SC	Placed:   [x] Arterial Line		[ ] R	[x] L	[x] Fem	[ ] Rad	[ ] Ax	Placed: 6/22/2017  [x] PICC placed 5/31/2017				[ ] Mediport  [x] Urinary Catheter, Date Placed: 6/22/2017  [x] Necessity of urinary, arterial, and venous catheters discussed    OTHER MEDICATIONS:      CODE STATUS:     IMAGING: HISTORY  76 year old female with a PMHx significant for diverticulitis s/p Ruben's c/b dehiscence and evisceration requiring ex lap and closure of abdomen with Surgimend mesh, c/b late enterocutaneous fistula development, s/p EC fistula takedown and abdominal wall reconstruction c/b recurrent ECF formation and multiple SBOs, COPD, CKD 3, prior DVT s/p IVC filter (removed and then replaced with a permanent filter), GERD, hypothyroidism, and HIT who initially presented on 5/23 w/ a SBO that did not resolve with medical management so she is s/p ex lap, extensive NANETTE, EC fistula takedown, repair of cecal serosal tear, partial R salpingectomy, colostomy revision, and parastomal & incisional hernia repair w/ Strattice mesh on 6/9.    Patient was admitted post-op intubated on vasopressor support. She was subsequently extubated and weaned off vasopressors. Hospital course complicated by lack of ostomy function and respiratory distress requiring re-intubation and MRSA PNA. Patient was subsequently extubated and hemodynamically stable for transfer to the floor.     On the floor, she went into respiratory distress again likely secondary to pulmonary vascular congestion requiring BiPAP, became hypotensive requiring vasopressor support, and began draining thick brown fluid from her wound. CT revealed another SBO and and matted small bowel in the anterior lower abdominal wall defect concerning for an EC fistula. She was started on imipenem and was weaned off vasopressor support. RTOR on 6/30 for ostomy revision and isolation of EC fistula with vac and pouch requiring phenylephrine post-operatively for hypotension. Patient also noted to be in post-renal TREVA pending a renal ultrasound.    24 HOUR EVENTS:     The patient had a wound vac and dressing change with 2 observed fistula sites. NGT output was 240cc over 24 hours and D5 1/2NS was continued at 50 mL/hr. Plan for PO contrast at 0800 7/4 for a CT to follow at 1200. Plan to monitor closely for aspiration.     SUBJECTIVE/ROS:  [x] A ten-point review of systems was otherwise negative except as noted.  [ ] Due to altered mental status/intubation, subjective information were not able to be obtained from the patient. History was obtained, to the extent possible, from review of the chart and collateral sources of information.      NEURO  CAM ICU: negative  Exam: awake, alert, oriented x4  Meds:  HYDROmorphone  Injectable 0.5 milliGRAM(s) IV Push every 3 hours PRN Moderate Pain (4 - 6)  acetaminophen  IVPB. 1000 milliGRAM(s) IV Intermittent once  fentaNYL   Patch  75 MICROgram(s)/Hr. 1 Patch Transdermal every 48 hours  [x] Adequacy of sedation and pain control has been assessed and adjusted      RESPIRATORY  RR: 16 (07-03-17 @ 23:00) (13 - 41)  SpO2: 96% (07-03-17 @ 23:00) (9% - 100%)  Exam: unlabored, clear to auscultation bilaterally  Mechanical Ventilation: no  [N/A] Extubation Readiness Assessed  Meds:  buDESOnide 160 MICROgram(s)/formoterol 4.5 MICROgram(s) Inhaler 2 Puff(s) Inhalation two times a day  ALBUTerol/ipratropium for Nebulization 3 milliLiter(s) Nebulizer every 6 hours PRN Shortness of Breath and/or Wheezing  fluticasone propionate 50 MICROgram(s)/spray Nasal Spray 1 Spray(s) Both Nostrils two times a day    CARDIOVASCULAR  HR: 91 (07-03-17 @ 23:00) (86 - 98)  BP: 104/50 (07-03-17 @ 07:00) (104/50 - 104/50)  BP(mean): 72 (07-03-17 @ 07:00) (72 - 72)  ABP: 114/53 (07-03-17 @ 23:00) (100/45 - 140/64)  ABP(mean): 77 (07-03-17 @ 23:00) (66 - 98)  Exam:  Cardiac Rhythm:  Perfusion     [x]Adequate   [ ]Inadequate  Mentation    [x]Normal       [ ]Reduced  Extremities  [x]Warm         [ ]Cool  Volume Status [ ]Hypervolemic [x]Euvolemic [ ]Hypovolemic  Meds: none    GI/NUTRITION  Exam: soft, non-distended, mild diffuse tenderness in all four quadrants, VAC draining tannish fluid, NGT draining bilious fluid, ostomy pink & viable w/ no gas or stool  Diet: NPO w/ TPN @ 62 mL/Hr  Meds: pantoprazole  Injectable 40 milliGRAM(s) IV Push daily  ondansetron Injectable 4 milliGRAM(s) IV Push every 6 hours PRN Nausea    GENITOURINARY  [x] Snyder catheter, indication: urine output monitoring in the critically ill  Meds: dextrose 5% + sodium chloride 0.45% with potassium chloride 20 mEq/L infuse at 50 mL/Hr  lactated ringers. 1000 milliLiter(s) IV Continuous <Continuous>    I&O's Detail    02 Jul 2017 07:01  -  03 Jul 2017 07:00  --------------------------------------------------------  IN:    Solution: 200 mL    TPN (Total Parenteral Nutrition): 1488 mL  Total IN: 1688 mL    OUT:    Colostomy: 30 mL    Drain: 525 mL    Indwelling Catheter - Urethral: 1935 mL    Nasoenteral Tube: 1050 mL  Total OUT: 3540 mL    Total NET: -1852 mL      03 Jul 2017 07:01  -  04 Jul 2017 06:25  --------------------------------------------------------  IN:    dextrose 5% + sodium chloride 0.45% with potassium chloride 20 mEq/L: 900 mL    lactated ringers.: 270 mL    Solution: 100 mL    Solution: 100 mL    TPN (Total Parenteral Nutrition): 1426 mL  Total IN: 2796 mL    OUT:    Drain: 300 mL    Indwelling Catheter - Urethral: 1630 mL    Nasoenteral Tube: 240 mL    VAC (Vacuum Assisted Closure) System: 200 mL  Total OUT: 2370 mL    Total NET: 426 mL    07-04    139  |  105  |  42<H>  ----------------------------<  125<H>  4.9   |  22  |  1.63<H>    Ca    8.8      04 Jul 2017 04:09  Phos  4.1     07-04  Mg     2.0     07-04    TPro  6.3  /  Alb  2.3<L>  /  TBili  0.4  /  DBili  0.2  /  AST  15  /  ALT  5<L>  /  AlkPhos  140<H>  07-03                          9.6    7.7   )-----------( 273      ( 04 Jul 2017 04:09 )             28.6     PT/INR - ( 03 Jul 2017 03:37 )   PT: 14.1 sec;   INR: 1.29 ratio         PTT - ( 03 Jul 2017 03:37 )  PTT:35.5 sec      HEMATOLOGIC  Meds: fondaparinux Injectable 2.5 milliGRAM(s) SubCutaneous every 24 hours  [x] VTE Prophylaxis      INFECTIOUS DISEASES  T(C): 36.2 (07-03-17 @ 16:00), Max: 36.5 (07-03-17 @ 03:00)  WBC Count: 7.9 K/uL (07-03 @ 03:37)  Recent Cultures: blood cultures  Meds: imipenem/cilastatin  IVPB 500 milliGRAM(s) IV Intermittent every 12 hours    ENDOCRINE  Capillary Blood Glucose: none  Meds: levothyroxine Injectable 44 MICROGram(s) IV Push daily    ACCESS DEVICES:  [ ] Peripheral IV  [ ] Central Venous Line	[ ] R	[ ] L	[ ] IJ	[ ] Fem	[ ] SC	Placed:   [x] Arterial Line		[ ] R	[x] L	[x] Fem	[ ] Rad	[ ] Ax	Placed: 6/22/2017  [x] PICC placed 5/31/2017				[ ] Mediport  [x] Urinary Catheter, Date Placed: 6/22/2017  [x] Necessity of urinary, arterial, and venous catheters discussed    OTHER MEDICATIONS:      CODE STATUS:     IMAGING: HISTORY  76 year old female with a PMHx significant for diverticulitis s/p Ruben's c/b dehiscence and evisceration requiring ex lap and closure of abdomen with Surgimend mesh, c/b late enterocutaneous fistula development, s/p EC fistula takedown and abdominal wall reconstruction c/b recurrent ECF formation and multiple SBOs, COPD, CKD 3, prior DVT s/p IVC filter (removed and then replaced with a permanent filter), GERD, hypothyroidism, and HIT who initially presented on 5/23 w/ a SBO that did not resolve with medical management so she is s/p ex lap, extensive NANETTE, EC fistula takedown, repair of cecal serosal tear, partial R salpingectomy, colostomy revision, and parastomal & incisional hernia repair w/ Strattice mesh on 6/9.    Patient was admitted post-op intubated on vasopressor support. She was subsequently extubated and weaned off vasopressors. Hospital course complicated by lack of ostomy function and respiratory distress requiring re-intubation and MRSA PNA. Patient was subsequently extubated and hemodynamically stable for transfer to the floor.     On the floor, she went into respiratory distress again likely secondary to pulmonary vascular congestion requiring BiPAP, became hypotensive requiring vasopressor support, and began draining thick brown fluid from her wound. CT revealed another SBO and and matted small bowel in the anterior lower abdominal wall defect concerning for an EC fistula. She was started on imipenem and was weaned off vasopressor support. RTOR on 6/30 for ostomy revision and isolation of EC fistula with vac and pouch requiring phenylephrine post-operatively for hypotension. Patient also noted to be in post-renal TREVA pending a renal ultrasound.    24 HOUR EVENTS:     The patient had a wound vac and dressing change with 2 observed fistula sites. NGT output was 240cc over 24 hours and D5 1/2NS was continued at 50 mL/hr. Plan for PO contrast at 0800 7/4 for a CT to follow at 1200. Plan to monitor closely for aspiration.     SUBJECTIVE/ROS:  [x] A ten-point review of systems was otherwise negative except as noted.      NEURO  CAM ICU: negative  Exam: awake, alert, oriented x4  Meds:  HYDROmorphone  Injectable 0.5 milliGRAM(s) IV Push every 3 hours PRN Moderate Pain (4 - 6)  acetaminophen  IVPB. 1000 milliGRAM(s) IV Intermittent once  fentaNYL   Patch  75 MICROgram(s)/Hr. 1 Patch Transdermal every 48 hours  [x] Adequacy of sedation and pain control has been assessed and adjusted      RESPIRATORY  RR: 16 (07-03-17 @ 23:00) (13 - 41)  SpO2: 96% (07-03-17 @ 23:00) (9% - 100%)  Exam: unlabored, clear to auscultation bilaterally  Mechanical Ventilation: no  [N/A] Extubation Readiness Assessed  Meds:  buDESOnide 160 MICROgram(s)/formoterol 4.5 MICROgram(s) Inhaler 2 Puff(s) Inhalation two times a day  ALBUTerol/ipratropium for Nebulization 3 milliLiter(s) Nebulizer every 6 hours PRN Shortness of Breath and/or Wheezing  fluticasone propionate 50 MICROgram(s)/spray Nasal Spray 1 Spray(s) Both Nostrils two times a day    CARDIOVASCULAR  HR: 91 (07-03-17 @ 23:00) (86 - 98)  BP: 104/50 (07-03-17 @ 07:00) (104/50 - 104/50)  BP(mean): 72 (07-03-17 @ 07:00) (72 - 72)  ABP: 114/53 (07-03-17 @ 23:00) (100/45 - 140/64)  ABP(mean): 77 (07-03-17 @ 23:00) (66 - 98)  Exam:  Cardiac Rhythm:  Perfusion     [x]Adequate   [ ]Inadequate  Mentation    [x]Normal       [ ]Reduced  Extremities  [x]Warm         [ ]Cool  Volume Status [ ]Hypervolemic [x]Euvolemic [ ]Hypovolemic  Meds: none    GI/NUTRITION  Exam: soft, non-distended, mild diffuse tenderness in all four quadrants, VAC draining tannish fluid, NGT draining bilious fluid, ostomy pink & viable w/ no gas or stool  Diet: NPO w/ TPN @ 62 mL/Hr  Meds: pantoprazole  Injectable 40 milliGRAM(s) IV Push daily  ondansetron Injectable 4 milliGRAM(s) IV Push every 6 hours PRN Nausea    GENITOURINARY  [x] Snyder catheter, indication: urine output monitoring in the critically ill  Meds: dextrose 5% + sodium chloride 0.45% with potassium chloride 20 mEq/L infuse at 50 mL/Hr  lactated ringers. 1000 milliLiter(s) IV Continuous <Continuous>    I&O's Detail    02 Jul 2017 07:01  -  03 Jul 2017 07:00  --------------------------------------------------------  IN:    Solution: 200 mL    TPN (Total Parenteral Nutrition): 1488 mL  Total IN: 1688 mL    OUT:    Colostomy: 30 mL    Drain: 525 mL    Indwelling Catheter - Urethral: 1935 mL    Nasoenteral Tube: 1050 mL  Total OUT: 3540 mL    Total NET: -1852 mL      03 Jul 2017 07:01  -  04 Jul 2017 06:25  --------------------------------------------------------  IN:    dextrose 5% + sodium chloride 0.45% with potassium chloride 20 mEq/L: 900 mL    lactated ringers.: 270 mL    Solution: 100 mL    Solution: 100 mL    TPN (Total Parenteral Nutrition): 1426 mL  Total IN: 2796 mL    OUT:    Drain: 300 mL    Indwelling Catheter - Urethral: 1630 mL    Nasoenteral Tube: 240 mL    VAC (Vacuum Assisted Closure) System: 200 mL  Total OUT: 2370 mL    Total NET: 426 mL    07-04    139  |  105  |  42<H>  ----------------------------<  125<H>  4.9   |  22  |  1.63<H>    Ca    8.8      04 Jul 2017 04:09  Phos  4.1     07-04  Mg     2.0     07-04    TPro  6.3  /  Alb  2.3<L>  /  TBili  0.4  /  DBili  0.2  /  AST  15  /  ALT  5<L>  /  AlkPhos  140<H>  07-03                          9.6    7.7   )-----------( 273      ( 04 Jul 2017 04:09 )             28.6     PT/INR - ( 03 Jul 2017 03:37 )   PT: 14.1 sec;   INR: 1.29 ratio         PTT - ( 03 Jul 2017 03:37 )  PTT:35.5 sec      HEMATOLOGIC  Meds: fondaparinux Injectable 2.5 milliGRAM(s) SubCutaneous every 24 hours  [x] VTE Prophylaxis      INFECTIOUS DISEASES  T(C): 36.2 (07-03-17 @ 16:00), Max: 36.5 (07-03-17 @ 03:00)  WBC Count: 7.9 K/uL (07-03 @ 03:37)  Recent Cultures: blood cultures  Meds: imipenem/cilastatin  IVPB 500 milliGRAM(s) IV Intermittent every 12 hours    ENDOCRINE  Capillary Blood Glucose: none  Meds: levothyroxine Injectable 44 MICROGram(s) IV Push daily    ACCESS DEVICES:  [ ] Peripheral IV  [ ] Central Venous Line	[ ] R	[ ] L	[ ] IJ	[ ] Fem	[ ] SC	Placed:   [x] Arterial Line		[ ] R	[x] L	[x] Fem	[ ] Rad	[ ] Ax	Placed: 6/22/2017  [x] PICC placed 5/31/2017				[ ] Mediport  [x] Urinary Catheter, Date Placed: 6/22/2017  [x] Necessity of urinary, arterial, and venous catheters discussed    OTHER MEDICATIONS:      CODE STATUS:     IMAGING:

## 2017-07-04 NOTE — PROGRESS NOTE ADULT - SUBJECTIVE AND OBJECTIVE BOX
Day # 9 of PN  PN infusion at 62 ccs/hr  07-03 @ 07:01  -  07-04 @ 07:00  --------------------------------------------------------  IN:    dextrose 5% + sodium chloride 0.45% with potassium chloride 20 mEq/L: 950 mL    lactated ringers.: 520 mL    Solution: 100 mL    Solution: 100 mL    TPN (Total Parenteral Nutrition): 1488 mL  Total IN: 3158 mL    OUT:    Drain: 300 mL    Indwelling Catheter - Urethral: 1670 mL    Nasoenteral Tube: 540 mL    VAC (Vacuum Assisted Closure) System: 400 mL  Total OUT: 2910 mL    Total NET: 248 mL        T(C): 37.1 (07-04-17 @ 03:00), Max: 37.2 (07-03-17 @ 23:00)  HR: 99 (07-04-17 @ 07:00) (86 - 101)  BP: --  RR: 25 (07-04-17 @ 07:00) (13 - 41)  SpO2: 96% (07-04-17 @ 07:00) (94% - 100%)    BC from 7/2 negative to date    Labs   07-04    139  |  105  |  42<H>  ----------------------------<  125<H>  4.9   |  22  |  1.63<H>    Ca    8.8      04 Jul 2017 04:09  Phos  4.1     07-04  Mg     2.0     07-04    TPro  6.3  /  Alb  2.3<L>  /  TBili  0.4  /  DBili  0.2  /  AST  15  /  ALT  5<L>  /  AlkPhos  140<H>  07-03      LIVER FUNCTIONS - ( 03 Jul 2017 03:37 )  Alb: 2.3 g/dL / Pro: 6.3 g/dL / ALK PHOS: 140 U/L / ALT: 5 U/L RC / AST: 15 U/L / GGT: x           CAPILLARY BLOOD GLUCOSE        I&O's Detail    03 Jul 2017 07:01  -  04 Jul 2017 07:00  --------------------------------------------------------  IN:    dextrose 5% + sodium chloride 0.45% with potassium chloride 20 mEq/L: 950 mL    lactated ringers.: 520 mL    Solution: 100 mL    Solution: 100 mL    TPN (Total Parenteral Nutrition): 1488 mL  Total IN: 3158 mL    OUT:    Drain: 300 mL    Indwelling Catheter - Urethral: 1670 mL    Nasoenteral Tube: 540 mL    VAC (Vacuum Assisted Closure) System: 400 mL  Total OUT: 2910 mL    Total NET: 248 mL

## 2017-07-04 NOTE — PROGRESS NOTE ADULT - SUBJECTIVE AND OBJECTIVE BOX
Patient seen and examined  C/O some abdominal discomfort otherwise ok    REVIEW OF SYSTEMS:  As above, NPO with TPN/IVF, otherwise 8 full 10 ROS were unremarkable    MEDICATIONS  (STANDING):  levothyroxine Injectable 44 MICROGram(s) IV Push daily  pantoprazole  Injectable 40 milliGRAM(s) IV Push daily  fondaparinux Injectable 2.5 milliGRAM(s) SubCutaneous every 24 hours  buDESOnide 160 MICROgram(s)/formoterol 4.5 MICROgram(s) Inhaler 2 Puff(s) Inhalation two times a day  fluticasone propionate 50 MICROgram(s)/spray Nasal Spray 1 Spray(s) Both Nostrils two times a day  imipenem/cilastatin  IVPB 500 milliGRAM(s) IV Intermittent every 12 hours  fentaNYL   Patch  75 MICROgram(s)/Hr. 1 Patch Transdermal every 48 hours  dextrose 5% + sodium chloride 0.45% with potassium chloride 20 mEq/L 1000 milliLiter(s) (50 mL/Hr) IV Continuous <Continuous>  lactated ringers. 1000 milliLiter(s) (1 mL/Hr) IV Continuous <Continuous>      VITAL:  T(C): , Max: 37.2 (07-03-17 @ 23:00)  T(F): , Max: 98.9 (07-03-17 @ 23:00)  HR: 107 (07-04-17 @ 12:00)  BP: --  BP(mean): --  RR: 22 (07-04-17 @ 12:00)  SpO2: 99% (07-04-17 @ 12:00)  Wt(kg): --    I and O's:    07-03 @ 07:01  -  07-04 @ 07:00  --------------------------------------------------------  IN: 3158 mL / OUT: 2910 mL / NET: 248 mL          PHYSICAL EXAM:    Constitutional: NAD, NG T +  Respiratory: CTAB  Cardiovascular: S1 and S2  Gastrointestinal: hypoactive BS, + drain and wound vac  Extremities: + peripheral edema  Neurological: A/O x 3, no focal deficits  : + Snyder      LABS:                        9.6    7.7   )-----------( 273      ( 04 Jul 2017 04:09 )             28.6     07-04    139  |  105  |  42<H>  ----------------------------<  125<H>  4.9   |  22  |  1.63<H>    Ca    8.8      04 Jul 2017 04:09  Phos  4.1     07-04  Mg     2.0     07-04    TPro  6.3  /  Alb  2.3<L>  /  TBili  0.4  /  DBili  0.2  /  AST  15  /  ALT  5<L>  /  AlkPhos  140<H>  07-03      Urine Studies:          RADIOLOGY & ADDITIONAL STUDIES:        ASSESSMENT  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  Stable renal function    Hypernatremia - improved     CV - off pressors at present    ID - peritonitis - on Primaxin    Surgery - s/p ECF repair. On TPN.          RECOMMEND:    Dose new meds for GFR 35-45ml/min  TPN per Metabolic Support Service  Avoid NSAIDs as medically able

## 2017-07-04 NOTE — PROGRESS NOTE ADULT - SUBJECTIVE AND OBJECTIVE BOX
ACS DAILY PROGRESS NOTE:    S: 76 year old female [id number] with extensive colonic fistulas and RTOR on 6/30 for ostomy revision and isolation of EC fistula with vac and pouch. She recently had a wound vac and dressing change with 2 observed fistula sites. One ostomy site was leaking and will be changed.    O:  ICU Vital Signs Last 24 Hrs  T(C): 36.5 (04 Jul 2017 07:00), Max: 37.2 (03 Jul 2017 23:00)  T(F): 97.7 (04 Jul 2017 07:00), Max: 98.9 (03 Jul 2017 23:00)  HR: 96 (04 Jul 2017 10:00) (86 - 101)  ABP: 120/54 (04 Jul 2017 10:00) (99/46 - 142/64)  ABP(mean): 82 (04 Jul 2017 10:00) (66 - 98)  RR: 13 (04 Jul 2017 10:00) (13 - 41)  SpO2: 100% (04 Jul 2017 10:00) (94% - 100%)    I&O's Detail    03 Jul 2017 07:01  -  04 Jul 2017 07:00  --------------------------------------------------------  IN:    dextrose 5% + sodium chloride 0.45% with potassium chloride 20 mEq/L: 950 mL    lactated ringers.: 520 mL    Solution: 100 mL    Solution: 100 mL    TPN (Total Parenteral Nutrition): 1488 mL  Total IN: 3158 mL    OUT:    Drain: 300 mL    Indwelling Catheter - Urethral: 1670 mL    Nasoenteral Tube: 540 mL    VAC (Vacuum Assisted Closure) System: 400 mL  Total OUT: 2910 mL    Total NET: 248 mL    07-04    139  |  105  |  42<H>  ----------------------------<  125<H>  4.9   |  22  |  1.63<H>    Ca    8.8      04 Jul 2017 04:09  Phos  4.1     07-04  Mg     2.0     07-04    TPro  6.3  /  Alb  2.3<L>  /  TBili  0.4  /  DBili  0.2  /  AST  15  /  ALT  5<L>  /  AlkPhos  140<H>  07-03    Physical Exam:  General: uncomfortable in bed  Abdomen: Ostomy sites viable, LUQ site leaking  Lines: A line, baldwin, and NGT in place

## 2017-07-04 NOTE — PROGRESS NOTE ADULT - ASSESSMENT
76 year old female SICU day #11, POD #23 s/p ex lap, extensive NANETTE, EC fistula takedown, repair of cecal serosal tear, partial R salpingectomy, colostomy revision, and parastomal & incisional hernia repair with Strattice mesh. Patient has had a prolonged hospital course complicated by MRSA pneumonia requiring re-intubation and vasopressor support, pulmonary vascular congestion requiring BiPAP, lack of ostomy function post-op, and septic shock requiring vasopressor support likely secondary to a new EC fistula s/p ostomy revision and isolation of EC fistula with VAC and pouch.    NEURO: acute and chronic pain  ·	Continue chronic pain management with fentanyl patch and acute pain management w/ Dilaudid PRN.  ·	Consider chronic pain consult to assist with management of chronic pain.    CV: Hypotension  ·	Monitor vital signs.    Pulm: COPD  ·	Continue Symbicort and Duoneb for COPD.  ·	Out of bed to chair, ambulate as tolerated, and incentive spirometry to prevent atelectasis.  ·	Flonase for middle ear effusion causing hearing loss likely related to the presence of the NG tube per Dr. Shah of ENT.    GI: EC fistula, SBO, ostomy dysfunction  ·	NPO to control EC fistula output and SBO management. TPN at 62 mL/hr for nutrition.  ·	Maintain pouch over fistula to help control contamination of the abdominal cavity & skin w/ enteric contents  ·	Monitor for ostomy function.    RENAL: TREVA on CKD stage 3  ·	Monitor intake and output.  ·	Monitor electrolytes and replete as necessary.    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  ·	Continue imipenem empirically in the setting of a new EC fistula.  ·	Follow-up ID recommendations - Dr. Ricardo    ENDOCRINE: hypothyroidism, glycemic control on TPN  ·	Synthroid IV for hypothyroidism as patient has no GI function.  ·	Monitor fingersticks and moderate ISS for glycemic control as patient is receiving TPN    DISPOSITION  ·	Full code  ·	Remain in SICU 76 year old female SICU day #11, POD #23 s/p ex lap, extensive NANETTE, EC fistula takedown, repair of cecal serosal tear, partial R salpingectomy, colostomy revision, and parastomal & incisional hernia repair with Strattice mesh. Patient has had a prolonged hospital course complicated by MRSA pneumonia requiring re-intubation and vasopressor support, pulmonary vascular congestion requiring BiPAP, lack of ostomy function post-op, and septic shock requiring vasopressor support likely secondary to a new EC fistula s/p ostomy revision and isolation of EC fistula with VAC and pouch.    NEURO: acute and chronic pain  ·	Continue chronic pain management with fentanyl patch and acute pain management w/ Dilaudid PRN.  ·	Consider chronic pain consult to assist with management of chronic pain.    CV: Hypotension  ·	Monitor vital signs.    Pulm: COPD  ·	Continue Symbicort and Duoneb for COPD.  ·	Out of bed to chair, ambulate as tolerated, and incentive spirometry to prevent atelectasis.  ·	Flonase for middle ear effusion causing hearing loss likely related to the presence of the NG tube per Dr. Shah of ENT.    GI: EC fistula, SBO, ostomy dysfunction  ·	NPO to control EC fistula output and SBO management. TPN at 62 mL/hr for nutrition.  ·	Maintain pouch over fistula to help control contamination of skin w/ enteric contents  ·	Monitor for ostomy function.    RENAL: TREVA on CKD stage 3  ·	Monitor intake and output.  ·	Monitor electrolytes and replete as necessary.    HEME: no active issues  ·	Arixtra for DVT prophylaxis as patient has a history of HIT    ID: EC fistula   ·	Monitor for clinical evidence of active infection.  ·	Continue imipenem empirically in the setting of a new EC fistula.  ·	Follow-up ID recommendations - Dr. Ricardo    ENDOCRINE: hypothyroidism, glycemic control on TPN  ·	Synthroid IV for hypothyroidism as patient has no GI function.  ·	Monitor fingersticks and moderate ISS for glycemic control as patient is receiving TPN    DISPOSITION  ·	Full code  ·	Remain in SICU

## 2017-07-05 LAB
ALBUMIN SERPL ELPH-MCNC: 2.2 G/DL — LOW (ref 3.3–5)
ALP SERPL-CCNC: 269 U/L — HIGH (ref 40–120)
ALT FLD-CCNC: 23 U/L RC — SIGNIFICANT CHANGE UP (ref 10–45)
ANION GAP SERPL CALC-SCNC: 12 MMOL/L — SIGNIFICANT CHANGE UP (ref 5–17)
AST SERPL-CCNC: 42 U/L — HIGH (ref 10–40)
BILIRUB SERPL-MCNC: 0.3 MG/DL — SIGNIFICANT CHANGE UP (ref 0.2–1.2)
BUN SERPL-MCNC: 44 MG/DL — HIGH (ref 7–23)
CALCIUM SERPL-MCNC: 8.8 MG/DL — SIGNIFICANT CHANGE UP (ref 8.4–10.5)
CHLORIDE SERPL-SCNC: 101 MMOL/L — SIGNIFICANT CHANGE UP (ref 96–108)
CO2 SERPL-SCNC: 22 MMOL/L — SIGNIFICANT CHANGE UP (ref 22–31)
CREAT SERPL-MCNC: 1.7 MG/DL — HIGH (ref 0.5–1.3)
GLUCOSE SERPL-MCNC: 131 MG/DL — HIGH (ref 70–99)
HCT VFR BLD CALC: 27.9 % — LOW (ref 34.5–45)
HGB BLD-MCNC: 9.4 G/DL — LOW (ref 11.5–15.5)
MAGNESIUM SERPL-MCNC: 2 MG/DL — SIGNIFICANT CHANGE UP (ref 1.6–2.6)
MCHC RBC-ENTMCNC: 33.6 PG — SIGNIFICANT CHANGE UP (ref 27–34)
MCHC RBC-ENTMCNC: 33.8 GM/DL — SIGNIFICANT CHANGE UP (ref 32–36)
MCV RBC AUTO: 99.6 FL — SIGNIFICANT CHANGE UP (ref 80–100)
PHOSPHATE SERPL-MCNC: 4.5 MG/DL — SIGNIFICANT CHANGE UP (ref 2.5–4.5)
PLATELET # BLD AUTO: 296 K/UL — SIGNIFICANT CHANGE UP (ref 150–400)
POTASSIUM SERPL-MCNC: 5.2 MMOL/L — SIGNIFICANT CHANGE UP (ref 3.5–5.3)
POTASSIUM SERPL-SCNC: 5.2 MMOL/L — SIGNIFICANT CHANGE UP (ref 3.5–5.3)
PROT SERPL-MCNC: 6.6 G/DL — SIGNIFICANT CHANGE UP (ref 6–8.3)
RBC # BLD: 2.8 M/UL — LOW (ref 3.8–5.2)
RBC # FLD: 16 % — HIGH (ref 10.3–14.5)
SODIUM SERPL-SCNC: 135 MMOL/L — SIGNIFICANT CHANGE UP (ref 135–145)
WBC # BLD: 7.6 K/UL — SIGNIFICANT CHANGE UP (ref 3.8–10.5)
WBC # FLD AUTO: 7.6 K/UL — SIGNIFICANT CHANGE UP (ref 3.8–10.5)

## 2017-07-05 PROCEDURE — 99232 SBSQ HOSP IP/OBS MODERATE 35: CPT

## 2017-07-05 RX ORDER — INSULIN LISPRO 100/ML
VIAL (ML) SUBCUTANEOUS EVERY 6 HOURS
Qty: 0 | Refills: 0 | Status: DISCONTINUED | OUTPATIENT
Start: 2017-07-05 | End: 2017-07-06

## 2017-07-05 RX ORDER — OCTREOTIDE ACETATE 200 UG/ML
50 INJECTION, SOLUTION INTRAVENOUS; SUBCUTANEOUS THREE TIMES A DAY
Qty: 0 | Refills: 0 | Status: DISCONTINUED | OUTPATIENT
Start: 2017-07-05 | End: 2017-07-06

## 2017-07-05 RX ADMIN — Medication 1000 MILLIGRAM(S): at 04:50

## 2017-07-05 RX ADMIN — IMIPENEM AND CILASTATIN 100 MILLIGRAM(S): 250; 250 INJECTION, POWDER, FOR SOLUTION INTRAVENOUS at 08:13

## 2017-07-05 RX ADMIN — Medication 1 SPRAY(S): at 05:51

## 2017-07-05 RX ADMIN — Medication 400 MILLIGRAM(S): at 12:00

## 2017-07-05 RX ADMIN — MORPHINE SULFATE 2 MILLIGRAM(S): 50 CAPSULE, EXTENDED RELEASE ORAL at 06:15

## 2017-07-05 RX ADMIN — MORPHINE SULFATE 30 MILLILITER(S): 50 CAPSULE, EXTENDED RELEASE ORAL at 00:00

## 2017-07-05 RX ADMIN — FENTANYL CITRATE 1 PATCH: 50 INJECTION INTRAVENOUS at 12:00

## 2017-07-05 RX ADMIN — SODIUM CHLORIDE 1 MILLILITER(S): 9 INJECTION, SOLUTION INTRAVENOUS at 17:50

## 2017-07-05 RX ADMIN — Medication 1000 MILLIGRAM(S): at 00:00

## 2017-07-05 RX ADMIN — Medication 44 MICROGRAM(S): at 05:51

## 2017-07-05 RX ADMIN — OCTREOTIDE ACETATE 50 MICROGRAM(S): 200 INJECTION, SOLUTION INTRAVENOUS; SUBCUTANEOUS at 15:15

## 2017-07-05 RX ADMIN — Medication 400 MILLIGRAM(S): at 17:49

## 2017-07-05 RX ADMIN — OCTREOTIDE ACETATE 50 MICROGRAM(S): 200 INJECTION, SOLUTION INTRAVENOUS; SUBCUTANEOUS at 22:48

## 2017-07-05 RX ADMIN — Medication 1000 MILLIGRAM(S): at 18:05

## 2017-07-05 RX ADMIN — IMIPENEM AND CILASTATIN 100 MILLIGRAM(S): 250; 250 INJECTION, POWDER, FOR SOLUTION INTRAVENOUS at 17:49

## 2017-07-05 RX ADMIN — Medication 400 MILLIGRAM(S): at 03:48

## 2017-07-05 RX ADMIN — Medication 1 SPRAY(S): at 17:49

## 2017-07-05 RX ADMIN — MORPHINE SULFATE 2 MILLIGRAM(S): 50 CAPSULE, EXTENDED RELEASE ORAL at 06:30

## 2017-07-05 RX ADMIN — MORPHINE SULFATE 30 MILLILITER(S): 50 CAPSULE, EXTENDED RELEASE ORAL at 19:16

## 2017-07-05 RX ADMIN — DEXTROSE MONOHYDRATE, SODIUM CHLORIDE, AND POTASSIUM CHLORIDE 50 MILLILITER(S): 50; .745; 4.5 INJECTION, SOLUTION INTRAVENOUS at 17:52

## 2017-07-05 RX ADMIN — Medication 1000 MILLIGRAM(S): at 12:22

## 2017-07-05 RX ADMIN — PANTOPRAZOLE SODIUM 40 MILLIGRAM(S): 20 TABLET, DELAYED RELEASE ORAL at 11:50

## 2017-07-05 RX ADMIN — BUDESONIDE AND FORMOTEROL FUMARATE DIHYDRATE 2 PUFF(S): 160; 4.5 AEROSOL RESPIRATORY (INHALATION) at 17:28

## 2017-07-05 RX ADMIN — FONDAPARINUX SODIUM 2.5 MILLIGRAM(S): 2.5 INJECTION, SOLUTION SUBCUTANEOUS at 18:55

## 2017-07-05 NOTE — PROGRESS NOTE ADULT - SUBJECTIVE AND OBJECTIVE BOX
Pain Management Attending Addendum    SUBJECTIVE: no complaints    Therapy:	  [X ] IV PCA	   [ ] Epidural           [ ] s/p Spinal Opoid              [ ] Postpartum infusion	  [ ] Patient controlled regional anesthesia (PCRA)    [ ] prn Analgesics    OBJECTIVE:   [ X] No new signs     [ ] Other:    Side Effects:  [X ] None			[ ] Other:    Assessment of Catheter Site:		[ ] Intact		[X ] Other:    ASSESSMENT/PLAN  [X ] Continue current therapy    [ ] Therapy changed to:    [ ] IV PCA       [ ] Epidural     [ ] prn Analgesics     [ ] post partum infusion    Comments:    Pain well controlled with morphine PCA

## 2017-07-05 NOTE — PROGRESS NOTE ADULT - RESPIRATORY
detailed exam
Breath Sounds equal & clear to percussion & auscultation, no accessory muscle use
detailed exam
detailed exam

## 2017-07-05 NOTE — PROGRESS NOTE ADULT - ASSESSMENT
HISTORY  76 year old female with a PMHx significant for diverticulitis s/p Ruben's c/b dehiscence and evisceration requiring ex lap and closure of abdomen with Surgimend mesh, c/b late enterocutaneous fistula development, s/p EC fistula takedown and abdominal wall reconstruction c/b recurrent ECF formation and multiple SBOs, COPD, CKD 3, prior DVT s/p IVC filter (removed and then replaced with a permanent filter), GERD, hypothyroidism, and HIT who initially presented on 5/23 w/ a SBO that did not resolve with medical management so she is s/p ex lap, extensive NANETTE, EC fistula takedown, repair of cecal serosal tear, partial R salpingectomy, colostomy revision, and parastomal & incisional hernia repair w/ Strattice mesh on 6/9.    Patient was admitted post-op intubated on vasopressor support. She was subsequently extubated and weaned off vasopressors. Hospital course complicated by lack of ostomy function and respiratory distress requiring re-intubation and MRSA PNA. Patient was subsequently extubated and hemodynamically stable for transfer to the floor.     On the floor, she went into respiratory distress again likely secondary to pulmonary vascular congestion requiring BiPAP, became hypotensive requiring vasopressor support, and began draining thick brown fluid from her wound. CT revealed another SBO and and matted small bowel in the anterior lower abdominal wall defect concerning for an EC fistula. She was started on imipenem and was weaned off vasopressor support. RTOR on 6/30 for ostomy revision and isolation of EC fistula with vac and pouch requiring phenylephrine post-operatively for hypotension. Patient also noted to be in post-renal TREVA pending a renal ultrasound.  Pt s/p ostomy repair Pt noted to have developed additional fistula    Day #14 of Imipenem. Discussed wit team, consider d/c and observe

## 2017-07-05 NOTE — PROGRESS NOTE ADULT - MUSCULOSKELETAL
negative
No joint pain, swelling or deformity; no limitation of movement

## 2017-07-05 NOTE — PROGRESS NOTE ADULT - SUBJECTIVE AND OBJECTIVE BOX
Day #10 of restarted PN  PN infusion at 62  07-04 @ 07:01  -  07-05 @ 07:00  --------------------------------------------------------  IN:    dextrose 5% + sodium chloride 0.45% with potassium chloride 20 mEq/L: 1150 mL    TPN (Total Parenteral Nutrition): 1488 mL  Total IN: 2638 mL    OUT:    Drain: 50 mL    Indwelling Catheter - Urethral: 1790 mL    Nasoenteral Tube: 750 mL    VAC (Vacuum Assisted Closure) System: 575 mL  Total OUT: 3165 mL    Total NET: -527 mL        T(C): 36.1 (07-05-17 @ 07:00), Max: 37.6 (07-04-17 @ 19:00)  HR: 91 (07-05-17 @ 08:00) (89 - 109)  BP: --  RR: 0 (07-05-17 @ 08:00) (0 - 37)  SpO2: 99% (07-05-17 @ 08:00) (96% - 100%)  Wt(kg): --    Labs   07-05    135  |  101  |  44<H>  ----------------------------<  131<H>  5.2   |  22  |  1.70<H>    Ca    8.8      05 Jul 2017 03:00  Phos  4.5     07-05  Mg     2.0     07-05    TPro  6.6  /  Alb  2.2<L>  /  TBili  0.3  /  DBili  x   /  AST  42<H>  /  ALT  23  /  AlkPhos  269<H>  07-05      LIVER FUNCTIONS - ( 05 Jul 2017 03:00 )  Alb: 2.2 g/dL / Pro: 6.6 g/dL / ALK PHOS: 269 U/L / ALT: 23 U/L RC / AST: 42 U/L / GGT: x           CAPILLARY BLOOD GLUCOSE        I&O's Detail    04 Jul 2017 07:01  -  05 Jul 2017 07:00  --------------------------------------------------------  IN:    dextrose 5% + sodium chloride 0.45% with potassium chloride 20 mEq/L: 1150 mL    TPN (Total Parenteral Nutrition): 1488 mL  Total IN: 2638 mL    OUT:    Drain: 50 mL    Indwelling Catheter - Urethral: 1790 mL    Nasoenteral Tube: 750 mL    VAC (Vacuum Assisted Closure) System: 575 mL  Total OUT: 3165 mL    Total NET: -527 mL      05 Jul 2017 07:01  -  05 Jul 2017 09:45  --------------------------------------------------------  IN:    dextrose 5% + sodium chloride 0.45% with potassium chloride 20 mEq/L: 50 mL    Solution: 100 mL    TPN (Total Parenteral Nutrition): 62 mL  Total IN: 212 mL    OUT:    Indwelling Catheter - Urethral: 225 mL  Total OUT: 225 mL    Total NET: -13 mL

## 2017-07-05 NOTE — PROGRESS NOTE ADULT - SUBJECTIVE AND OBJECTIVE BOX
Follow-up Pulm Progress Note    No new respiratory events overnight.  Denies SOB/CP. on PCA pump, reported 2 new EC fistulas    Medications:  MEDICATIONS  (STANDING):  levothyroxine Injectable 44 MICROGram(s) IV Push daily  pantoprazole  Injectable 40 milliGRAM(s) IV Push daily  fondaparinux Injectable 2.5 milliGRAM(s) SubCutaneous every 24 hours  buDESOnide 160 MICROgram(s)/formoterol 4.5 MICROgram(s) Inhaler 2 Puff(s) Inhalation two times a day  fluticasone propionate 50 MICROgram(s)/spray Nasal Spray 1 Spray(s) Both Nostrils two times a day  imipenem/cilastatin  IVPB 500 milliGRAM(s) IV Intermittent every 12 hours  fentaNYL   Patch  75 MICROgram(s)/Hr. 1 Patch Transdermal every 48 hours  dextrose 5% + sodium chloride 0.45% with potassium chloride 20 mEq/L 1000 milliLiter(s) (50 mL/Hr) IV Continuous <Continuous>  lactated ringers. 1000 milliLiter(s) (1 mL/Hr) IV Continuous <Continuous>  acetaminophen  IVPB. 1000 milliGRAM(s) IV Intermittent <User Schedule>  morphine PCA (5 mG/mL) 30 milliLiter(s) PCA Continuous PCA Continuous  octreotide  Injectable 50 MICROGram(s) IV Push three times a day  insulin lispro (HumaLOG) corrective regimen sliding scale   SubCutaneous every 6 hours    MEDICATIONS  (PRN):  ALBUTerol/ipratropium for Nebulization 3 milliLiter(s) Nebulizer every 6 hours PRN Shortness of Breath and/or Wheezing  ondansetron Injectable 4 milliGRAM(s) IV Push every 6 hours PRN Nausea  naloxone Injectable 0.1 milliGRAM(s) IV Push every 3 minutes PRN For ANY of the following changes in patient status:  A. RR LESS THAN 10 breaths per minute, B. Oxygen saturation LESS THAN 90%, C. Sedation score of 6  morphine  - Injectable 2 milliGRAM(s) IV Push every 3 hours PRN moderate to severe  morphine PCA (5 mG/mL) Rescue Clinician Bolus 3 milliGRAM(s) IV Push every 15 minutes PRN for Pain Scale GREATER THAN 6  naloxone Injectable 0.1 milliGRAM(s) IV Push every 3 minutes PRN For ANY of the following changes in patient status:  A. RR LESS THAN 10 breaths per minute, B. Oxygen saturation LESS THAN 90%, C. Sedation score of 6          Vital Signs Last 24 Hrs  T(C): 36.7 (05 Jul 2017 11:00), Max: 37.6 (04 Jul 2017 19:00)  T(F): 98.1 (05 Jul 2017 11:00), Max: 99.7 (04 Jul 2017 19:00)  HR: 96 (05 Jul 2017 13:00) (89 - 109)  BP: --  BP(mean): --  RR: 0 (05 Jul 2017 08:00) (0 - 32)  SpO2: 96% (05 Jul 2017 13:00) (96% - 100%)          07-03 @ 07:01  -  07-04 @ 07:00  --------------------------------------------------------  IN: 3158 mL / OUT: 2910 mL / NET: 248 mL    07-04 @ 07:01  -  07-05 @ 07:00  --------------------------------------------------------  IN: 2638 mL / OUT: 3165 mL / NET: -527 mL    07-05 @ 07:01  -  07-05 @ 13:39  --------------------------------------------------------  IN: 760 mL / OUT: 700 mL / NET: 60 mL          LABS:                        9.4    7.6   )-----------( 296      ( 05 Jul 2017 03:00 )             27.9       wbc 7.6  07-05 @ 03:00  wbc 7.7  07-04 @ 04:09  wbc 7.9  07-03 @ 03:37    07-05    135  |  101  |  44<H>  ----------------------------<  131<H>  5.2   |  22  |  1.70<H>    Ca    8.8      05 Jul 2017 03:00  Phos  4.5     07-05  Mg     2.0     07-05    TPro  6.6  /  Alb  2.2<L>  /  TBili  0.3  /  DBili  x   /  AST  42<H>  /  ALT  23  /  AlkPhos  269<H>  07-05    Cr 1.70  07-05 @ 03:00  Cr 1.63  07-04 @ 04:09  Cr 1.73  07-03 @ 03:37          CAPILLARY BLOOD GLUCOSE                          CULTURES: (if applicable)        Physical Examination:  PULM: decreased bs, no significant sputum production  CVS: S1, S2 rrr    RADIOLOGY REVIEWED  CXR: < from: Xray Chest 1 View AP -PORTABLE-Routine (07.04.17 @ 06:42) >  Findings:    There is an NG tube in place with tip in the stomach. There is a   left-sided PICC line in place with tip at the cavoatrial junction. There   is no pneumothorax. The lungs are clear. The heart size is within normal   limits.    Impression:    Findings grossly unchanged as above.          CT chest:    TTE:

## 2017-07-05 NOTE — PROGRESS NOTE ADULT - SUBJECTIVE AND OBJECTIVE BOX
HISTORY  76 year old female with a PMHx significant for diverticulitis s/p Ruben's c/b dehiscence and evisceration requiring ex lap and closure of abdomen with Surgimend mesh, c/b late enterocutaneous fistula development, s/p EC fistula takedown and abdominal wall reconstruction c/b recurrent ECF formation and multiple SBOs, COPD, CKD 3, prior DVT s/p IVC filter (removed and then replaced with a permanent filter), GERD, hypothyroidism, and HIT who initially presented on 5/23 w/ a SBO that did not resolve with medical management so she is s/p ex lap, extensive NANETTE, EC fistula takedown, repair of cecal serosal tear, partial R salpingectomy, colostomy revision, and parastomal & incisional hernia repair w/ Strattice mesh on 6/9.    Patient was admitted post-op intubated on vasopressor support. She was subsequently extubated and weaned off vasopressors. Hospital course complicated by lack of ostomy function and respiratory distress requiring re-intubation and MRSA PNA. Patient was subsequently extubated and hemodynamically stable for transfer to the floor.     On the floor, she went into respiratory distress again likely secondary to pulmonary vascular congestion requiring BiPAP, became hypotensive requiring vasopressor support, and began draining thick brown fluid from her wound. CT revealed another SBO and and matted small bowel in the anterior lower abdominal wall defect concerning for an EC fistula. She was started on imipenem and was weaned off vasopressor support. RTOR on 6/30 for ostomy revision and isolation of EC fistula with vac and pouch requiring phenylephrine post-operatively for hypotension. Patient also noted to be in post-renal TREVA pending a renal ultrasound.    24 HOUR EVENTS:     Pain consulted regarding PCA use for management of the patient's pain.  NGT output was 240cc over 24 hours and D5 1/2NS was continued at 50 mL/hr. Plan for PO contrast at 0800 7/5 for a CT to follow at 1200. Plan to monitor closely for aspiration.     SUBJECTIVE/ROS:  [x] A ten-point review of systems was otherwise negative except as noted.      NEURO  CAM ICU: negative  Exam: awake, alert, oriented x4  Meds:  HYDROmorphone  Injectable 0.5 milliGRAM(s) IV Push every 3 hours PRN Moderate Pain (4 - 6)  acetaminophen  IVPB. 1000 milliGRAM(s) IV Intermittent once  fentaNYL   Patch  75 MICROgram(s)/Hr. 1 Patch Transdermal every 48 hours  [x] Adequacy of sedation and pain control has been assessed and adjusted      RESPIRATORY  RR: 16 (07-03-17 @ 23:00) (13 - 41)  SpO2: 96% (07-03-17 @ 23:00) (9% - 100%)  Exam: unlabored, no use of accessory muscles  Mechanical Ventilation: no  [N/A] Extubation Readiness Assessed  Meds:  buDESOnide 160 MICROgram(s)/formoterol 4.5 MICROgram(s) Inhaler 2 Puff(s) Inhalation two times a day  ALBUTerol/ipratropium for Nebulization 3 milliLiter(s) Nebulizer every 6 hours PRN Shortness of Breath and/or Wheezing  fluticasone propionate 50 MICROgram(s)/spray Nasal Spray 1 Spray(s) Both Nostrils two times a day    CARDIOVASCULAR  HR: 91 (07-03-17 @ 23:00) (86 - 98)  BP: 104/50 (07-03-17 @ 07:00) (104/50 - 104/50)  BP(mean): 72 (07-03-17 @ 07:00) (72 - 72)  ABP: 114/53 (07-03-17 @ 23:00) (100/45 - 140/64)  ABP(mean): 77 (07-03-17 @ 23:00) (66 - 98)  Exam:  Cardiac Rhythm:  Perfusion     [x]Adequate   [ ]Inadequate  Mentation    [x]Normal       [ ]Reduced  Extremities  [x]Warm         [ ]Cool  Volume Status [ ]Hypervolemic [x]Euvolemic [ ]Hypovolemic  Meds: none    GI/NUTRITION  Exam: soft, non-distended, mild diffuse tenderness in all four quadrants, VAC draining tannish fluid, NGT draining bilious fluid, ostomy pink & viable w/ no gas or stool  Diet: NPO w/ TPN @ 62 mL/Hr  Meds: pantoprazole  Injectable 40 milliGRAM(s) IV Push daily  ondansetron Injectable 4 milliGRAM(s) IV Push every 6 hours PRN Nausea    GENITOURINARY  [x] Snyder catheter, indication: urine output monitoring in the critically ill  Meds: dextrose 5% + sodium chloride 0.45% with potassium chloride 20 mEq/L infuse at 50 mL/Hr  lactated ringers. 1000 milliLiter(s) IV Continuous <Continuous>    I&O's Detail    02 Jul 2017 07:01  -  03 Jul 2017 07:00  --------------------------------------------------------  IN:    Solution: 200 mL    TPN (Total Parenteral Nutrition): 1488 mL  Total IN: 1688 mL    OUT:    Colostomy: 30 mL    Drain: 525 mL    Indwelling Catheter - Urethral: 1935 mL    Nasoenteral Tube: 1050 mL  Total OUT: 3540 mL    Total NET: -1852 mL      03 Jul 2017 07:01  -  04 Jul 2017 06:25  --------------------------------------------------------  IN:    dextrose 5% + sodium chloride 0.45% with potassium chloride 20 mEq/L: 900 mL    lactated ringers.: 270 mL    Solution: 100 mL    Solution: 100 mL    TPN (Total Parenteral Nutrition): 1426 mL  Total IN: 2796 mL    OUT:    Drain: 300 mL    Indwelling Catheter - Urethral: 1630 mL    Nasoenteral Tube: 240 mL    VAC (Vacuum Assisted Closure) System: 200 mL  Total OUT: 2370 mL    Total NET: 426 mL    07-04    139  |  105  |  42<H>  ----------------------------<  125<H>  4.9   |  22  |  1.63<H>    Ca    8.8      04 Jul 2017 04:09  Phos  4.1     07-04  Mg     2.0     07-04    TPro  6.3  /  Alb  2.3<L>  /  TBili  0.4  /  DBili  0.2  /  AST  15  /  ALT  5<L>  /  AlkPhos  140<H>  07-03                          9.6    7.7   )-----------( 273      ( 04 Jul 2017 04:09 )             28.6     PT/INR - ( 03 Jul 2017 03:37 )   PT: 14.1 sec;   INR: 1.29 ratio         PTT - ( 03 Jul 2017 03:37 )  PTT:35.5 sec      HEMATOLOGIC  Meds: fondaparinux Injectable 2.5 milliGRAM(s) SubCutaneous every 24 hours  [x] VTE Prophylaxis      INFECTIOUS DISEASES  T(C): 36.2 (07-03-17 @ 16:00), Max: 36.5 (07-03-17 @ 03:00)  WBC Count: 7.9 K/uL (07-03 @ 03:37)  Recent Cultures: blood cultures  Meds: imipenem/cilastatin  IVPB 500 milliGRAM(s) IV Intermittent every 12 hours    ENDOCRINE  Capillary Blood Glucose: none  Meds: levothyroxine Injectable 44 MICROGram(s) IV Push daily    ACCESS DEVICES:  [ ] Peripheral IV  [ ] Central Venous Line	[ ] R	[ ] L	[ ] IJ	[ ] Fem	[ ] SC	Placed:   [x] Arterial Line		[ ] R	[x] L	[x] Fem	[ ] Rad	[ ] Ax	Placed: 6/22/2017  [x] PICC placed 5/31/2017				[ ] Mediport  [x] Urinary Catheter, Date Placed: 6/22/2017  [x] Necessity of urinary, arterial, and venous catheters discussed    OTHER MEDICATIONS:      CODE STATUS:     IMAGING:

## 2017-07-05 NOTE — PROGRESS NOTE ADULT - ATTENDING COMMENTS
Pt seen and examined today at noon, agree with above. Pt said that she has thought about her goals of care and does not wish to pursue any more tests or invasive interventions, given that the prognosis of her recurrent fistulae with non-resolving SBO is poor in terms of her goals of being able to leave the hospital/in-patient care setting and live independently with good quality of life. CT was canceled.  said that her pain is better controlled with morphine, and she has been much more comfortable since morphine was started last night.    At 's request, I spoke with her daughter Kathie to discuss pt's wishes to "be left alone." I explained that pt is alert and oriented, understands her condition and prognosis, and does not wish to pursue aggressive interventions (such as surgery, drain placement, etc), or further testing such as CT. I explained that I have discussed options of specifying if she does not wish to be intubated or have CPR if her clinical status deteriorates, as well as that I have requested a Palliative Care consultation to help further manage her pain. Kathie noted that her mother has discussed her wishes with her and her brother Elbert, and that she agrees that her mother understands her condition and prognosis fully.

## 2017-07-05 NOTE — PROGRESS NOTE ADULT - SUBJECTIVE AND OBJECTIVE BOX
[facility] [phone]        Patient seen and examined         MEDICATIONS  (STANDING):  levothyroxine Injectable 44 MICROGram(s) IV Push daily  pantoprazole  Injectable 40 milliGRAM(s) IV Push daily  fondaparinux Injectable 2.5 milliGRAM(s) SubCutaneous every 24 hours  buDESOnide 160 MICROgram(s)/formoterol 4.5 MICROgram(s) Inhaler 2 Puff(s) Inhalation two times a day  fluticasone propionate 50 MICROgram(s)/spray Nasal Spray 1 Spray(s) Both Nostrils two times a day  imipenem/cilastatin  IVPB 500 milliGRAM(s) IV Intermittent every 12 hours  fentaNYL   Patch  75 MICROgram(s)/Hr. 1 Patch Transdermal every 48 hours  dextrose 5% + sodium chloride 0.45% with potassium chloride 20 mEq/L 1000 milliLiter(s) (50 mL/Hr) IV Continuous <Continuous>  lactated ringers. 1000 milliLiter(s) (1 mL/Hr) IV Continuous <Continuous>  acetaminophen  IVPB. 1000 milliGRAM(s) IV Intermittent <User Schedule>  acetaminophen  IVPB. 1000 milliGRAM(s) IV Intermittent <User Schedule>  morphine PCA (5 mG/mL) 30 milliLiter(s) PCA Continuous PCA Continuous      VITAL:  T(C): , Max: 37.6 (07-04-17 @ 19:00)  T(F): , Max: 99.7 (07-04-17 @ 19:00)  HR: 97 (07-05-17 @ 10:00)  BP: --  BP(mean): --  RR: 0 (07-05-17 @ 08:00)  SpO2: 100% (07-05-17 @ 10:00)  Wt(kg): --    I and O's:    07-04 @ 07:01  -  07-05 @ 07:00  --------------------------------------------------------  IN: 2638 mL / OUT: 3165 mL / NET: -527 mL    07-05 @ 07:01  -  07-05 @ 10:45  --------------------------------------------------------  IN: 212 mL / OUT: 225 mL / NET: -13 mL          PHYSICAL EXAM:    Constitutional: NAD  HEENT: PERRLA    Neck:  No JVD  Respiratory: CTAB/L  Cardiovascular: S1 and S2  Gastrointestinal: BS+, soft, NT/ND  Extremities: No peripheral edema  Neurological: A/O x 3, no focal deficits  Psychiatric: Normal mood, normal affect  : No Snyder  Skin: No rashes  Access: Not applicable    LABS:                        9.4    7.6   )-----------( 296      ( 05 Jul 2017 03:00 )             27.9     07-05    135  |  101  |  44<H>  ----------------------------<  131<H>  5.2   |  22  |  1.70<H>    Ca    8.8      05 Jul 2017 03:00  Phos  4.5     07-05  Mg     2.0     07-05    TPro  6.6  /  Alb  2.2<L>  /  TBili  0.3  /  DBili  x   /  AST  42<H>  /  ALT  23  /  AlkPhos  269<H>  07-05          Urine Studies:          RADIOLOGY & ADDITIONAL STUDIES: [facility] [phone]        Patient seen and examined bed.  She is on a pain pump.  However she seems frustrated at present        MEDICATIONS  (STANDING):  levothyroxine Injectable 44 MICROGram(s) IV Push daily  pantoprazole  Injectable 40 milliGRAM(s) IV Push daily  fondaparinux Injectable 2.5 milliGRAM(s) SubCutaneous every 24 hours  buDESOnide 160 MICROgram(s)/formoterol 4.5 MICROgram(s) Inhaler 2 Puff(s) Inhalation two times a day  fluticasone propionate 50 MICROgram(s)/spray Nasal Spray 1 Spray(s) Both Nostrils two times a day  imipenem/cilastatin  IVPB 500 milliGRAM(s) IV Intermittent every 12 hours  fentaNYL   Patch  75 MICROgram(s)/Hr. 1 Patch Transdermal every 48 hours  dextrose 5% + sodium chloride 0.45% with potassium chloride 20 mEq/L 1000 milliLiter(s) (50 mL/Hr) IV Continuous <Continuous>  lactated ringers. 1000 milliLiter(s) (1 mL/Hr) IV Continuous <Continuous>  acetaminophen  IVPB. 1000 milliGRAM(s) IV Intermittent <User Schedule>  acetaminophen  IVPB. 1000 milliGRAM(s) IV Intermittent <User Schedule>  morphine PCA (5 mG/mL) 30 milliLiter(s) PCA Continuous PCA Continuous      VITAL:  T(C): , Max: 37.6 (07-04-17 @ 19:00)  T(F): , Max: 99.7 (07-04-17 @ 19:00)  HR: 97 (07-05-17 @ 10:00)  BP: --  BP(mean): --  RR: 0 (07-05-17 @ 08:00)  SpO2: 100% (07-05-17 @ 10:00)  Wt(kg): --    I and O's:    07-04 @ 07:01  -  07-05 @ 07:00  --------------------------------------------------------  IN: 2638 mL / OUT: 3165 mL / NET: -527 mL    07-05 @ 07:01  -  07-05 @ 10:45  --------------------------------------------------------  IN: 212 mL / OUT: 225 mL / NET: -13 mL          PHYSICAL EXAM:    Constitutional: NAD  HEENT: PERDANDYLA    Neck:  No JVD  Respiratory: poor effort  Cardiovascular: S1 and S2  Gastrointestinal: ostomy; surgical scar  Extremities: trace peripheral edema  Neurological: A/O x 3, no focal deficits  Psychiatric: Normal mood, normal affect  : +  Snyder  Skin: No rashes  Access: Not applicable    LABS:                        9.4    7.6   )-----------( 296      ( 05 Jul 2017 03:00 )             27.9     07-05    135  |  101  |  44<H>  ----------------------------<  131<H>  5.2   |  22  |  1.70<H>    Ca    8.8      05 Jul 2017 03:00  Phos  4.5     07-05  Mg     2.0     07-05    TPro  6.6  /  Alb  2.2<L>  /  TBili  0.3  /  DBili  x   /  AST  42<H>  /  ALT  23  /  AlkPhos  269<H>  07-05          Urine Studies:          RADIOLOGY & ADDITIONAL STUDIES:

## 2017-07-05 NOTE — PROGRESS NOTE ADULT - EXTREMITIES
No cyanosis, clubbing or edema
detailed exam
No cyanosis, clubbing or edema
detailed exam

## 2017-07-05 NOTE — PROGRESS NOTE ADULT - ASSESSMENT
76 year old female with history significant for COPD, prior DVT s/p IVC filter (now removed), GERD, hypothyroidism, HIT and diverticulitis s/p Ruben's, and recurrent SBO requiring ex-lap small bowel resection, EC fistula s/p takedown that was complicated by wound dehiscence and abdominal reconstruction, who underwent ex-lap, extensive NANETTE, EC fistula takedown, colostomy revision, and parastomal/incisional hernia repair on 6/9. She was admitted to SICU intubated post-operatively and was successfully extubated. However, after getting a CT with oral contrast, patient went into respiratory distress and was re-intubated and found to have MRSA pneumonia, likely aspiration in nature. She was subsequently extubated, weaned off pressors, and transferred to the floors. On the floor on 6/22, patient went into respiratory distress again likely secondary to pulmonary vascular congestion requiring BiPAP and became hypotensive requiring vasopressor support. Her wound also began draining thick brown fluid concerning for a new EC fistula vs. anastomotic breakdown and is currently on imipenem. Pressor needs decreasing, wound pouched with some improvement in clinical status, remains extubated, intermittently requiring BiPAP, now with decreased hearing.       Respiratory: remains stable on room air. Keep o2 sat>=90% w/ prn o2.  Last wore bipap on 6/24  COPD: not exacerbated. continue with symbicort  HIT/prior PE/DVT/+ivc filter- on fondaparinux -prophy  s/p tx for mrsa pna-observing off abx         renal - TPN/TREVA-improved  ID-on imipenem emperically for EC fistula   plan as per sicu  - ostomy revision,isolation of EC fistula, debreidment,, VAC on 6/30                              EC fistula managment as per SICU (abx, npo, tpn, reglan, octreotide)

## 2017-07-05 NOTE — PROGRESS NOTE ADULT - GASTROINTESTINAL COMMENTS
abd distended ostomy dressing in place
ostomy  pwound packed  stoma - exudative
ostomy grayish   bag over ? E_C fistula
ostomy grayish, ostomy bag over fistula  wound packed
ostomy bag over fistula     ostomy
ostomy intact  some necrosis alone wound  no cellulitis

## 2017-07-05 NOTE — PROGRESS NOTE ADULT - CONSTITUTIONAL COMMENTS
LYING IN BED
lying in bed awake
nasal cannula NG tube
lying in bed  Iqugmiut
sittiing in chair extubated

## 2017-07-05 NOTE — PROGRESS NOTE ADULT - PSYCHIATRIC
Affect and characteristics of appearance, verbalizations, behaviors are appropriate
negative
Affect and characteristics of appearance, verbalizations, behaviors are appropriate

## 2017-07-05 NOTE — PROGRESS NOTE ADULT - SUBJECTIVE AND OBJECTIVE BOX
Day 1 of Anesthesia Pain Management Service    SUBJECTIVE: Patient is asleep.    Pain Scale Score	At rest: ___ 	With Activity: ___ 	[ x] Refer to charted pain scores    THERAPY:    [x ] IV PCA Morphine		[x ] 5 mg/mL	[ ] 1 mg/mL  [ ] IV PCA Hydromorphone	[ ] 5 mg/mL                  [ ] 1 mg/mL  [ ] IV PCA Fentanyl		[ ] 50 micrograms/mL    Demand dose       1mg  Lockout                 6 minutes  Continuous rate      0      MEDICATIONS  (STANDING):  levothyroxine Injectable 44 MICROGram(s) IV Push daily  pantoprazole  Injectable 40 milliGRAM(s) IV Push daily  fondaparinux Injectable 2.5 milliGRAM(s) SubCutaneous every 24 hours  buDESOnide 160 MICROgram(s)/formoterol 4.5 MICROgram(s) Inhaler 2 Puff(s) Inhalation two times a day  fluticasone propionate 50 MICROgram(s)/spray Nasal Spray 1 Spray(s) Both Nostrils two times a day  imipenem/cilastatin  IVPB 500 milliGRAM(s) IV Intermittent every 12 hours  fentaNYL   Patch  75 MICROgram(s)/Hr. 1 Patch Transdermal every 48 hours  dextrose 5% + sodium chloride 0.45% with potassium chloride 20 mEq/L 1000 milliLiter(s) (50 mL/Hr) IV Continuous <Continuous>  lactated ringers. 1000 milliLiter(s) (1 mL/Hr) IV Continuous <Continuous>  acetaminophen  IVPB. 1000 milliGRAM(s) IV Intermittent <User Schedule>  acetaminophen  IVPB. 1000 milliGRAM(s) IV Intermittent <User Schedule>  morphine PCA (5 mG/mL) 30 milliLiter(s) PCA Continuous PCA Continuous    MEDICATIONS  (PRN):  ALBUTerol/ipratropium for Nebulization 3 milliLiter(s) Nebulizer every 6 hours PRN Shortness of Breath and/or Wheezing  ondansetron Injectable 4 milliGRAM(s) IV Push every 6 hours PRN Nausea  naloxone Injectable 0.1 milliGRAM(s) IV Push every 3 minutes PRN For ANY of the following changes in patient status:  A. RR LESS THAN 10 breaths per minute, B. Oxygen saturation LESS THAN 90%, C. Sedation score of 6  morphine  - Injectable 2 milliGRAM(s) IV Push every 3 hours PRN moderate to severe  morphine PCA (5 mG/mL) Rescue Clinician Bolus 3 milliGRAM(s) IV Push every 15 minutes PRN for Pain Scale GREATER THAN 6  naloxone Injectable 0.1 milliGRAM(s) IV Push every 3 minutes PRN For ANY of the following changes in patient status:  A. RR LESS THAN 10 breaths per minute, B. Oxygen saturation LESS THAN 90%, C. Sedation score of 6      OBJECTIVE:    Sedation Score:	[ ] Alert	[ ] Drowsy 	[ ] Arousable	[x ] Asleep	[ ] Unresponsive    Side Effects:	[ x] None	[ ] Nausea	[ ] Vomiting	[ ] Pruritus  		[ ] Other:    Vital Signs Last 24 Hrs  T(C): 36.1 (05 Jul 2017 07:00), Max: 37.6 (04 Jul 2017 19:00)  T(F): 97 (05 Jul 2017 07:00), Max: 99.7 (04 Jul 2017 19:00)  HR: 91 (05 Jul 2017 08:00) (89 - 109)  BP: --  BP(mean): --  RR: 0 (05 Jul 2017 08:00) (0 - 37)  SpO2: 99% (05 Jul 2017 08:00) (96% - 100%)    ASSESSMENT/ PLAN    Therapy to  be:	[ x] Continue   [ ] Discontinued   [ ] Change to prn Analgesics    Documentation and Verification of current medications:   [X] Done	[ ] Not done, not eligible    Comments: Patient is sleeping. Pca use 1-2x/hr.  + NGT. Continue PCA.

## 2017-07-05 NOTE — PROGRESS NOTE ADULT - ASSESSMENT
76 year old female SICU day #14, POD #26 s/p ex lap, extensive NANETTE, EC fistula takedown, repair of cecal serosal tear, partial R salpingectomy, colostomy revision, and parastomal & incisional hernia repair with Strattice mesh. Patient has had a prolonged hospital course complicated by MRSA pneumonia requiring re-intubation and vasopressor support, pulmonary vascular congestion requiring BiPAP, lack of ostomy function post-op, and septic shock requiring vasopressor support likely secondary to a new EC fistula s/p ostomy revision and isolation of EC fistula with VAC and pouch.    NEURO: acute and chronic pain  ·	Continue chronic pain management with fentanyl patch and acute pain management w/ morphine PCA and IV tylenol.    CV: Hypotension  ·	Monitor vital signs.    Pulm: COPD  ·	Continue Symbicort and Duoneb for COPD.  ·	Out of bed to chair, ambulate as tolerated, and incentive spirometry to prevent atelectasis.  ·	Flonase for middle ear effusion causing hearing loss likely related to the presence of the NG tube per Dr. Shah of ENT.    GI: EC fistula, SBO, ostomy dysfunction  ·	NPO to control EC fistula output and SBO management. TPN at 62 mL/hr for nutrition.  ·	Maintain pouch over fistula to help control contamination of skin w/ enteric contents  ·	Monitor for ostomy function.    RENAL: TREVA on CKD stage 3  ·	Monitor intake and output.  ·	Monitor electrolytes and replete as necessary.    HEME: no active issues  ·	Arixtra for DVT prophylaxis as patient has a history of HIT    ID: EC fistula   ·	Monitor for clinical evidence of active infection.  ·	Continue imipenem empirically in the setting of a new EC fistula.  ·	Follow-up ID recommendations - Dr. Ricardo    ENDOCRINE: hypothyroidism, glycemic control on TPN  ·	Synthroid IV for hypothyroidism as patient has no GI function.  ·	Monitor fingersticks and moderate ISS for glycemic control as patient is receiving TPN    DISPOSITION  ·	Full code  ·	Remain in SICU    Jimena Ansari, PGY1

## 2017-07-05 NOTE — PROGRESS NOTE ADULT - ASSESSMENT
ASSESSMENT  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  Stable renal function    Hypernatremia - improved     CV - off pressors at present    ID - peritonitis - on Primaxin    Surgery - s/p ECF repair. On TPN.          RECOMMEND:    Dose new meds for GFR 35-45ml/min  TPN per Metabolic Support Service  Avoid NSAIDs as medically able ASSESSMENT  Pt with hx of HIT/PE/COPD/CKD stage 3 with fistulae and SBO  Acute on chronic renal failure  Anemia          CKD (chronic kidney disease) stage 3, GFR 30-59 ml/min.  Stable renal function    Hypernatremia - improved    CV - off pressors at present    ID - peritonitis - on Primaxin    Surgery - s/p ECF repair. On TPN.          RECOMMEND:    Pain control  Dose new meds for GFR 35-45ml/min  TPN per Metabolic Support Service  Avoid NSAIDs as medically able  1/2nsD5w along with LR  to replace NG losses

## 2017-07-05 NOTE — PROGRESS NOTE ADULT - CONSTITUTIONAL
Well-developed, well nourished
detailed exam

## 2017-07-05 NOTE — PROGRESS NOTE ADULT - SUBJECTIVE AND OBJECTIVE BOX
Interval History/ROS:Patient is a 76y old  Female who presents with a chief complaint of Abdominal pain (26 May 2017 11:33)  events reviewed . pt now with pain pump        REVIEW OF SYSTEMS:  CONSTITUTIONAL: No fever, weight loss, or fatigue  EYES: No eye pain, visual disturbances, or discharge  ENMT:  No difficulty hearing, tinnitus, vertigo; No sinus or throat pain  NECK: No pain or stiffness  RESPIRATORY: denies cough,No wheezing, chills or hemoptysis; No shortness of breath  CARDIOVASCULAR: No chest pain,No palpitations, dizziness, or leg swelling  GASTROINTESTINAL: No abdominal or epigastric pain. No nausea, vomiting, or hematemesis; No diarrhea , no constipation. No melena or hematochezia.  GENITOURINARY: No dysuria, frequency, hematuria, or incontinence  NEUROLOGICAL: No headaches, memory loss, loss of strength, numbness, or tremors  SKIN: No itching, burning, rashes, or lesions   LYMPH NODES: No enlarged glands  ENDOCRINE: No heat or cold intolerance; No hair loss  MUSCULOSKELETAL: No joint pain or swelling; No muscle, back, or extremity pain  PSYCHIATRIC: No depression, anxiety, mood swings, or difficulty sleeping  HEME/LYMPH: No easy bruising, or bleeding gums  ALLERGY AND IMMUNOLOGIC: No hives or eczema      PAST MEDICAL & SURGICAL HISTORY:  Pulmonary embolism  Enterocutaneous fistula  Chronic diastolic CHF (congestive heart failure): mild diastolic dysfunction  Osteoarthritis of both knees, unspecified osteoarthritis type  Peripheral neuropathy  Clostridium difficile infection  HIT (heparin-induced thrombocytopenia)  Diverticulitis of intestine with abscess without bleedin  DVT (deep venous thrombosis): s/p IVC filter, which was later removed  Orthostatic hypotension  GERD (gastroesophageal reflux disease)  Hypothyroid  Chronic obstructive pulmonary disease (COPD)  Colostomy in place: s/p duarte procedure for diverticulitis  Status post Ruben procedure: for diverticulitis  S/P exploratory laparotomy: EC fistula complicated with pelvic abscesses  Wound dehiscence: Wound dehiscence and evisceration, s/p abdominal reconstruction with biologic mesh  Sigmoid diverticulitis: Ex lap, sigmoid resection, creation of colostomy.  Intestinal perforation: 2015, s/p closure with strattice mesh      Allergies    azithromycin (Unknown)  codeine (Unknown)  heparin (Other)  PC Pen VK (Unknown)  penicillin (Unknown)    Intolerances        ANTIMICROBIALS:  imipenem/cilastatin  IVPB 500 every 12 hours      OTHER MEDS:  levothyroxine Injectable 44 MICROGram(s) IV Push daily  pantoprazole  Injectable 40 milliGRAM(s) IV Push daily  fondaparinux Injectable 2.5 milliGRAM(s) SubCutaneous every 24 hours  buDESOnide 160 MICROgram(s)/formoterol 4.5 MICROgram(s) Inhaler 2 Puff(s) Inhalation two times a day  fluticasone propionate 50 MICROgram(s)/spray Nasal Spray 1 Spray(s) Both Nostrils two times a day  ALBUTerol/ipratropium for Nebulization 3 milliLiter(s) Nebulizer every 6 hours PRN  fentaNYL   Patch  75 MICROgram(s)/Hr. 1 Patch Transdermal every 48 hours  dextrose 5% + sodium chloride 0.45% with potassium chloride 20 mEq/L 1000 milliLiter(s) IV Continuous <Continuous>  lactated ringers. 1000 milliLiter(s) IV Continuous <Continuous>  ondansetron Injectable 4 milliGRAM(s) IV Push every 6 hours PRN  naloxone Injectable 0.1 milliGRAM(s) IV Push every 3 minutes PRN  morphine  - Injectable 2 milliGRAM(s) IV Push every 3 hours PRN  morphine PCA (5 mG/mL) 30 milliLiter(s) PCA Continuous PCA Continuous  morphine PCA (5 mG/mL) Rescue Clinician Bolus 3 milliGRAM(s) IV Push every 15 minutes PRN  naloxone Injectable 0.1 milliGRAM(s) IV Push every 3 minutes PRN  octreotide  Injectable 50 MICROGram(s) IV Push three times a day  insulin lispro (HumaLOG) corrective regimen sliding scale   SubCutaneous every 6 hours      Vital Signs Last 24 Hrs  T(C): 36.6 (2017 15:00), Max: 37.6 (2017 19:00)  T(F): 97.9 (2017 15:00), Max: 99.7 (2017 19:00)  HR: 93 (2017 17:30) (89 - 108)  BP: 120/56 (2017 14:21) (120/56 - 120/56)  BP(mean): 80 (2017 14:21) (80 - 80)  RR: 15 (2017 17:00) (0 - 32)  SpO2: 98% (2017 17:30) (95% - 100%)                            9.4    7.6   )-----------( 296      ( 2017 03:00 )             27.9       07-05    135  |  101  |  44<H>  ----------------------------<  131<H>  5.2   |  22  |  1.70<H>    Ca    8.8      2017 03:00  Phos  4.5       Mg     2.0         TPro  6.6  /  Alb  2.2<L>  /  TBili  0.3  /  DBili  x   /  AST  42<H>  /  ALT  23  /  AlkPhos  269<H>      LIVER FUNCTIONS - ( 2017 03:00 )  Alb: 2.2 g/dL / Pro: 6.6 g/dL / ALK PHOS: 269 U/L / ALT: 23 U/L RC / AST: 42 U/L / GGT: x                     MICROBIOLOGY:    RECENT CULTURES:                      RADIOLOGY:

## 2017-07-06 DIAGNOSIS — F41.9 ANXIETY DISORDER, UNSPECIFIED: ICD-10-CM

## 2017-07-06 DIAGNOSIS — R52 PAIN, UNSPECIFIED: ICD-10-CM

## 2017-07-06 DIAGNOSIS — K56.60 UNSPECIFIED INTESTINAL OBSTRUCTION: ICD-10-CM

## 2017-07-06 DIAGNOSIS — Z51.5 ENCOUNTER FOR PALLIATIVE CARE: ICD-10-CM

## 2017-07-06 LAB
ALBUMIN SERPL ELPH-MCNC: 2.4 G/DL — LOW (ref 3.3–5)
ALP SERPL-CCNC: 284 U/L — HIGH (ref 40–120)
ALT FLD-CCNC: 21 U/L RC — SIGNIFICANT CHANGE UP (ref 10–45)
ANION GAP SERPL CALC-SCNC: 10 MMOL/L — SIGNIFICANT CHANGE UP (ref 5–17)
ANION GAP SERPL CALC-SCNC: 11 MMOL/L — SIGNIFICANT CHANGE UP (ref 5–17)
APTT BLD: 44.5 SEC — HIGH (ref 27.5–37.4)
AST SERPL-CCNC: 30 U/L — SIGNIFICANT CHANGE UP (ref 10–40)
BILIRUB SERPL-MCNC: 0.3 MG/DL — SIGNIFICANT CHANGE UP (ref 0.2–1.2)
BUN SERPL-MCNC: 45 MG/DL — HIGH (ref 7–23)
BUN SERPL-MCNC: 47 MG/DL — HIGH (ref 7–23)
CA-I BLD-SCNC: 1.24 MMOL/L — SIGNIFICANT CHANGE UP (ref 1.12–1.3)
CALCIUM SERPL-MCNC: 9 MG/DL — SIGNIFICANT CHANGE UP (ref 8.4–10.5)
CALCIUM SERPL-MCNC: 9.2 MG/DL — SIGNIFICANT CHANGE UP (ref 8.4–10.5)
CHLORIDE SERPL-SCNC: 103 MMOL/L — SIGNIFICANT CHANGE UP (ref 96–108)
CHLORIDE SERPL-SCNC: 103 MMOL/L — SIGNIFICANT CHANGE UP (ref 96–108)
CO2 SERPL-SCNC: 22 MMOL/L — SIGNIFICANT CHANGE UP (ref 22–31)
CO2 SERPL-SCNC: 22 MMOL/L — SIGNIFICANT CHANGE UP (ref 22–31)
CREAT SERPL-MCNC: 1.7 MG/DL — HIGH (ref 0.5–1.3)
CREAT SERPL-MCNC: 1.73 MG/DL — HIGH (ref 0.5–1.3)
GLUCOSE SERPL-MCNC: 104 MG/DL — HIGH (ref 70–99)
GLUCOSE SERPL-MCNC: 118 MG/DL — HIGH (ref 70–99)
HCT VFR BLD CALC: 29.9 % — LOW (ref 34.5–45)
HGB BLD-MCNC: 9.8 G/DL — LOW (ref 11.5–15.5)
INR BLD: 1.17 RATIO — HIGH (ref 0.88–1.16)
MAGNESIUM SERPL-MCNC: 2.1 MG/DL — SIGNIFICANT CHANGE UP (ref 1.6–2.6)
MAGNESIUM SERPL-MCNC: 2.1 MG/DL — SIGNIFICANT CHANGE UP (ref 1.6–2.6)
MCHC RBC-ENTMCNC: 32.6 PG — SIGNIFICANT CHANGE UP (ref 27–34)
MCHC RBC-ENTMCNC: 32.8 GM/DL — SIGNIFICANT CHANGE UP (ref 32–36)
MCV RBC AUTO: 99.3 FL — SIGNIFICANT CHANGE UP (ref 80–100)
PHOSPHATE SERPL-MCNC: 5 MG/DL — HIGH (ref 2.5–4.5)
PHOSPHATE SERPL-MCNC: 5 MG/DL — HIGH (ref 2.5–4.5)
PLATELET # BLD AUTO: 315 K/UL — SIGNIFICANT CHANGE UP (ref 150–400)
POTASSIUM SERPL-MCNC: 6.1 MMOL/L — HIGH (ref 3.5–5.3)
POTASSIUM SERPL-MCNC: 6.5 MMOL/L — CRITICAL HIGH (ref 3.5–5.3)
POTASSIUM SERPL-SCNC: 6.1 MMOL/L — HIGH (ref 3.5–5.3)
POTASSIUM SERPL-SCNC: 6.5 MMOL/L — CRITICAL HIGH (ref 3.5–5.3)
PROT SERPL-MCNC: 7 G/DL — SIGNIFICANT CHANGE UP (ref 6–8.3)
PROTHROM AB SERPL-ACNC: 12.7 SEC — SIGNIFICANT CHANGE UP (ref 9.8–12.7)
RBC # BLD: 3.01 M/UL — LOW (ref 3.8–5.2)
RBC # FLD: 15.6 % — HIGH (ref 10.3–14.5)
SODIUM SERPL-SCNC: 135 MMOL/L — SIGNIFICANT CHANGE UP (ref 135–145)
SODIUM SERPL-SCNC: 136 MMOL/L — SIGNIFICANT CHANGE UP (ref 135–145)
WBC # BLD: 8.3 K/UL — SIGNIFICANT CHANGE UP (ref 3.8–10.5)
WBC # FLD AUTO: 8.3 K/UL — SIGNIFICANT CHANGE UP (ref 3.8–10.5)

## 2017-07-06 PROCEDURE — 99497 ADVNCD CARE PLAN 30 MIN: CPT | Mod: 25

## 2017-07-06 PROCEDURE — 99223 1ST HOSP IP/OBS HIGH 75: CPT

## 2017-07-06 PROCEDURE — 99498 ADVNCD CARE PLAN ADDL 30 MIN: CPT | Mod: 25

## 2017-07-06 RX ORDER — INSULIN HUMAN 100 [IU]/ML
10 INJECTION, SOLUTION SUBCUTANEOUS ONCE
Qty: 0 | Refills: 0 | Status: COMPLETED | OUTPATIENT
Start: 2017-07-06 | End: 2017-07-06

## 2017-07-06 RX ORDER — SODIUM CHLORIDE 9 MG/ML
1000 INJECTION, SOLUTION INTRAVENOUS
Qty: 0 | Refills: 0 | Status: DISCONTINUED | OUTPATIENT
Start: 2017-07-06 | End: 2017-07-06

## 2017-07-06 RX ORDER — CALCIUM GLUCONATE 100 MG/ML
1 VIAL (ML) INTRAVENOUS ONCE
Qty: 1 | Refills: 0 | Status: COMPLETED | OUTPATIENT
Start: 2017-07-06 | End: 2017-07-06

## 2017-07-06 RX ORDER — HYDROMORPHONE HYDROCHLORIDE 2 MG/ML
30 INJECTION INTRAMUSCULAR; INTRAVENOUS; SUBCUTANEOUS
Qty: 0 | Refills: 0 | Status: DISCONTINUED | OUTPATIENT
Start: 2017-07-06 | End: 2017-07-10

## 2017-07-06 RX ORDER — FUROSEMIDE 40 MG
10 TABLET ORAL ONCE
Qty: 0 | Refills: 0 | Status: COMPLETED | OUTPATIENT
Start: 2017-07-06 | End: 2017-07-06

## 2017-07-06 RX ORDER — DEXTROSE 50 % IN WATER 50 %
50 SYRINGE (ML) INTRAVENOUS ONCE
Qty: 0 | Refills: 0 | Status: COMPLETED | OUTPATIENT
Start: 2017-07-06 | End: 2017-07-06

## 2017-07-06 RX ORDER — OCTREOTIDE ACETATE 200 UG/ML
200 INJECTION, SOLUTION INTRAVENOUS; SUBCUTANEOUS THREE TIMES A DAY
Qty: 0 | Refills: 0 | Status: DISCONTINUED | OUTPATIENT
Start: 2017-07-06 | End: 2017-07-17

## 2017-07-06 RX ORDER — HYDROMORPHONE HYDROCHLORIDE 2 MG/ML
1 INJECTION INTRAMUSCULAR; INTRAVENOUS; SUBCUTANEOUS
Qty: 0 | Refills: 0 | Status: DISCONTINUED | OUTPATIENT
Start: 2017-07-06 | End: 2017-07-11

## 2017-07-06 RX ORDER — ACETAMINOPHEN 500 MG
650 TABLET ORAL EVERY 6 HOURS
Qty: 0 | Refills: 0 | Status: DISCONTINUED | OUTPATIENT
Start: 2017-07-06 | End: 2017-07-17

## 2017-07-06 RX ORDER — SODIUM CHLORIDE 9 MG/ML
1000 INJECTION, SOLUTION INTRAVENOUS
Qty: 0 | Refills: 0 | Status: DISCONTINUED | OUTPATIENT
Start: 2017-07-06 | End: 2017-07-14

## 2017-07-06 RX ADMIN — MORPHINE SULFATE 2 MILLIGRAM(S): 50 CAPSULE, EXTENDED RELEASE ORAL at 06:35

## 2017-07-06 RX ADMIN — OCTREOTIDE ACETATE 50 MICROGRAM(S): 200 INJECTION, SOLUTION INTRAVENOUS; SUBCUTANEOUS at 06:14

## 2017-07-06 RX ADMIN — Medication 44 MICROGRAM(S): at 06:14

## 2017-07-06 RX ADMIN — Medication 200 GRAM(S): at 08:45

## 2017-07-06 RX ADMIN — MORPHINE SULFATE 30 MILLILITER(S): 50 CAPSULE, EXTENDED RELEASE ORAL at 07:12

## 2017-07-06 RX ADMIN — IMIPENEM AND CILASTATIN 100 MILLIGRAM(S): 250; 250 INJECTION, POWDER, FOR SOLUTION INTRAVENOUS at 06:14

## 2017-07-06 RX ADMIN — OCTREOTIDE ACETATE 200 MICROGRAM(S): 200 INJECTION, SOLUTION INTRAVENOUS; SUBCUTANEOUS at 22:14

## 2017-07-06 RX ADMIN — HYDROMORPHONE HYDROCHLORIDE 30 MILLILITER(S): 2 INJECTION INTRAMUSCULAR; INTRAVENOUS; SUBCUTANEOUS at 19:08

## 2017-07-06 RX ADMIN — ONDANSETRON 4 MILLIGRAM(S): 8 TABLET, FILM COATED ORAL at 22:27

## 2017-07-06 RX ADMIN — FENTANYL CITRATE 1 PATCH: 50 INJECTION INTRAVENOUS at 15:30

## 2017-07-06 RX ADMIN — Medication 1 SPRAY(S): at 18:48

## 2017-07-06 RX ADMIN — Medication 1 SPRAY(S): at 06:14

## 2017-07-06 RX ADMIN — Medication 200 GRAM(S): at 06:14

## 2017-07-06 RX ADMIN — INSULIN HUMAN 10 UNIT(S): 100 INJECTION, SOLUTION SUBCUTANEOUS at 08:45

## 2017-07-06 RX ADMIN — Medication 10 MILLIGRAM(S): at 20:28

## 2017-07-06 RX ADMIN — Medication 50 MILLILITER(S): at 08:45

## 2017-07-06 RX ADMIN — MORPHINE SULFATE 2 MILLIGRAM(S): 50 CAPSULE, EXTENDED RELEASE ORAL at 06:20

## 2017-07-06 NOTE — PROGRESS NOTE ADULT - ASSESSMENT
· Assessment		  76 year old female SICU day #15, POD #27 s/p ex lap, extensive NANETTE, EC fistula takedown, repair of cecal serosal tear, partial R salpingectomy, colostomy revision, and parastomal & incisional hernia repair with Strattice mesh. Patient has had a prolonged hospital course complicated by MRSA pneumonia requiring re-intubation and vasopressor support, pulmonary vascular congestion requiring BiPAP, lack of ostomy function post-op, and septic shock requiring vasopressor support likely secondary to a new EC fistula s/p ostomy revision and isolation of EC fistula with VAC and pouch.    NEURO: acute and chronic pain  ·	Continue pain management with morphine PCA  ·	Awaiting palliative recs on optimization of pain management    CV: Hypotension  ·	Monitor vital signs.    Pulm: COPD  ·	Continue Symbicort and Duoneb for COPD.  ·	Out of bed to chair, ambulate as tolerated, and incentive spirometry to prevent atelectasis.  ·	Flonase for middle ear effusion causing hearing loss likely related to the presence of the NG tube per Dr. Shah of ENT.    GI: EC fistula, SBO, ostomy dysfunction  ·	NPO to control EC fistula output and SBO management. TPN at 62 mL/hr for nutrition.  ·	Maintain pouch over fistula to help control contamination of skin w/ enteric contents  ·	Monitor for ostomy function.  ·	Started octreotide today with goal of decreasing fistulae output    RENAL: TREVA on CKD stage 3  ·	Monitor intake and output.  ·	Monitor electrolytes and replete as necessary.  ·	Replace output from fistulae 0.5 to 1 LR    HEME: no active issues  ·	Arixtra for DVT prophylaxis as patient has a history of HIT    ID: EC fistula   ·	Monitor for clinical evidence of active infection.  ·	Continue imipenem empirically in the setting of a new EC fistula.  ·	Follow-up ID recommendations - Dr. Ricardo, will ask for new recs in setting of palliative care    ENDOCRINE: hypothyroidism, glycemic control on TPN  ·	Synthroid IV for hypothyroidism as patient has no GI function.  ·	Monitor fingersticks and moderate ISS for glycemic control as patient is receiving TPN    DISPOSITION  ·	Full code  ·	Remain in SICU for monitoring of critically ill patient    Francisco J Quigley MD    [id number] · Assessment		  76 year old female SICU day #15, POD #27 s/p ex lap, extensive NANETTE, EC fistula takedown, repair of cecal serosal tear, partial R salpingectomy, colostomy revision, and parastomal & incisional hernia repair with Strattice mesh. Patient has had a prolonged hospital course complicated by MRSA pneumonia requiring re-intubation and vasopressor support, pulmonary vascular congestion requiring BiPAP, lack of ostomy function post-op, and septic shock requiring vasopressor support likely secondary to a new EC fistula s/p ostomy revision and isolation of EC fistula with VAC and pouch.    NEURO: acute and chronic pain  ·	Continue pain management with morphine PCA  ·	Awaiting palliative recs on optimization of pain management    CV: Hypotension  ·	Monitor vital signs.    Pulm: COPD  ·	Continue Symbicort and Duoneb for COPD.  ·	Out of bed to chair, ambulate as tolerated, and incentive spirometry to prevent atelectasis.  ·	Flonase for middle ear effusion causing hearing loss likely related to the presence of the NG tube per Dr. Shah of ENT.    GI: EC fistula, SBO, ostomy dysfunction  ·	NPO to control EC fistula output and SBO management. TPN at 62 mL/hr for nutrition, stoping now for hyperkalemia, f/u with nutrition about K+ contents.  ·	Maintain pouch over fistula to help control contamination of skin w/ enteric contents  ·	Monitor for ostomy function.  ·	Started octreotide today with goal of decreasing fistulae output    RENAL: TREVA on CKD stage 3, hyperkalemia  ·	STAT EKG, stopped TPN, KCl removed from mIVF  ·	Monitor intake and output.  ·	Monitor electrolytes and replete as necessary.  ·	Replace output from fistulae 0.5 to 1 LR    HEME: no active issues  ·	Arixtra for DVT prophylaxis as patient has a history of HIT    ID: EC fistula   ·	Monitor for clinical evidence of active infection.  ·	Continue imipenem empirically in the setting of a new EC fistula.  ·	Follow-up ID recommendations - Dr. Ricardo, will ask for new recs in setting of palliative care    ENDOCRINE: hypothyroidism, glycemic control on TPN  ·	Synthroid IV for hypothyroidism as patient has no GI function.  ·	Monitor fingersticks and moderate ISS for glycemic control as patient is receiving TPN    DISPOSITION  ·	Full code  ·	Remain in SICU for monitoring of critically ill patient    Francisco J Quigley MD    [id number] · Assessment		  76 year old female SICU day #15, POD #27 s/p ex lap, extensive NANETTE, EC fistula takedown, repair of cecal serosal tear, partial R salpingectomy, colostomy revision, and parastomal & incisional hernia repair with Strattice mesh. Patient has had a prolonged hospital course complicated by MRSA pneumonia requiring re-intubation and vasopressor support, pulmonary vascular congestion requiring BiPAP, lack of ostomy function post-op, and septic shock requiring vasopressor support likely secondary to a new EC fistula s/p ostomy revision and isolation of EC fistula with VAC and pouch.    NEURO: acute and chronic pain  ·	Continue pain management with morphine PCA  ·	Awaiting palliative recs on optimization of pain management    CV: Hypotension  ·	Monitor vital signs.    Pulm: COPD  ·	Continue Symbicort and Duoneb for COPD.  ·	Out of bed to chair, ambulate as tolerated, and incentive spirometry to prevent atelectasis.  ·	Flonase for middle ear effusion causing hearing loss likely related to the presence of the NG tube per Dr. Shah of ENT.    GI: EC fistula, SBO, ostomy dysfunction  ·	NPO to control EC fistula output and SBO management. TPN at 62 mL/hr for nutrition, stoping now for hyperkalemia, f/u with nutrition about K+ contents.  ·	Maintain pouch over fistula to help control contamination of skin w/ enteric contents  ·	Monitor for ostomy function.  ·	Started octreotide today with goal of decreasing fistulae output    RENAL: TREVA on CKD stage 3, hyperkalemia  ·	EKG and 1 gram calcium gluconate STAT, stopped TPN temporarily, KCl removed from mIVF  ·	Monitor intake and output.  ·	Monitor electrolytes and replete as necessary.  ·	Replace output from fistulae 0.5 to 1 LR    HEME: no active issues  ·	Arixtra for DVT prophylaxis as patient has a history of HIT    ID: EC fistula   ·	Monitor for clinical evidence of active infection.  ·	Continue imipenem empirically in the setting of a new EC fistula.  ·	Follow-up ID recommendations - Dr. Ricardo, will ask for new recs in setting of palliative care    ENDOCRINE: hypothyroidism, glycemic control on TPN  ·	Synthroid IV for hypothyroidism as patient has no GI function.  ·	Monitor fingersticks and moderate ISS for glycemic control as patient is receiving TPN    DISPOSITION  ·	Full code  ·	Remain in SICU for monitoring of critically ill patient    Francisco J Quigley MD    [id number] · Assessment		  76 year old female SICU day #15, POD #27 s/p ex lap, extensive NANETTE, EC fistula takedown, repair of cecal serosal tear, partial R salpingectomy, colostomy revision, and parastomal & incisional hernia repair with Strattice mesh. Patient has had a prolonged hospital course complicated by MRSA pneumonia requiring re-intubation and vasopressor support, pulmonary vascular congestion requiring BiPAP, lack of ostomy function post-op, and septic shock requiring vasopressor support likely secondary to a new EC fistula s/p ostomy revision and isolation of EC fistula with VAC and pouch.    NEURO: acute and chronic pain  ·	Continue pain management with morphine PCA  ·	Awaiting palliative recs on optimization of pain management    CV: Hypotension  ·	Monitor vital signs.    Pulm: COPD  ·	Continue Symbicort and Duoneb for COPD.  ·	Out of bed to chair, ambulate as tolerated, and incentive spirometry to prevent atelectasis.  ·	Flonase for middle ear effusion causing hearing loss likely related to the presence of the NG tube per Dr. Shah of ENT.    GI: EC fistula, SBO, ostomy dysfunction  ·	NPO to control EC fistula output and SBO management. TPN at 62 mL/hr for nutrition, stoping now for hyperkalemia, f/u with nutrition about K+ contents.  ·	Maintain pouch over fistula to help control contamination of skin w/ enteric contents  ·	Monitor for ostomy function.  ·	Started octreotide today with goal of decreasing fistulae output    RENAL: TREVA on CKD stage 3, hyperkalemia  ·	EKG and 1 gram calcium gluconate STAT, stopped TPN temporarily, KCl removed from mIVF  ·	Monitor intake and output.  ·	Monitor electrolytes and replete as necessary.  ·	Replace output from fistulae 0.5 to 1 LR    HEME: no active issues  ·	Arixtra for DVT prophylaxis as patient has a history of HIT    ID: EC fistula   ·	Monitor for clinical evidence of active infection.  ·	Continue imipenem empirically in the setting of a new EC fistula.  ·	Follow-up ID recommendations - Dr. Ricardo, will ask for new recs in setting of palliative care    ENDOCRINE: hypothyroidism, glycemic control on TPN  ·	Synthroid IV for hypothyroidism as patient has no GI function.  ·	Monitor fingersticks and moderate ISS for glycemic control as patient is receiving TPN    DISPOSITION  ·	Full code  ·	Remain in SICU for monitoring of critically ill patient    KRYSTAL Ansari MD PGY2  Spectra: [phone]    Francisco J Quigley MD    [id number] · Assessment		  76 year old female SICU day #15, POD #27 s/p ex lap, extensive NANETTE, EC fistula takedown, repair of cecal serosal tear, partial R salpingectomy, colostomy revision, and parastomal & incisional hernia repair with Strattice mesh. Patient has had a prolonged hospital course complicated by MRSA pneumonia requiring re-intubation and vasopressor support, pulmonary vascular congestion requiring BiPAP, lack of ostomy function post-op, and septic shock requiring vasopressor support likely secondary to a new EC fistula s/p ostomy revision and isolation of EC fistula with VAC and pouch.    NEURO: acute and chronic pain  ·	Continue pain management with morphine PCA  ·	Awaiting palliative recs on optimization of pain management    CV: Hypotension  ·	Monitor vital signs.    Pulm: COPD  ·	Continue Symbicort and Duoneb for COPD.  ·	Out of bed to chair, ambulate as tolerated, and incentive spirometry to prevent atelectasis.  ·	Flonase for middle ear effusion causing hearing loss likely related to the presence of the NG tube per Dr. Shah of ENT.    GI: EC fistula, SBO, ostomy dysfunction  ·	NPO to control EC fistula output and SBO management. TPN at 62 mL/hr for nutrition, stoping now for hyperkalemia, f/u with nutrition about K+ contents.  ·	Maintain pouch over fistula to help control contamination of skin w/ enteric contents  ·	Monitor for ostomy function.  ·	Started octreotide today with goal of decreasing fistulae output    RENAL: TREVA on CKD stage 3, hyperkalemia  ·	EKG and 1 gram calcium gluconate STAT, stopped TPN temporarily, KCl removed from mIVF  ·	Monitor intake and output.  ·	Monitor electrolytes and replete as necessary.  ·	Replace output from fistulae 0.5 to 1 LR    HEME: no active issues  ·	Arixtra for DVT prophylaxis as patient has a history of HIT    ID: EC fistula   ·	Monitor for clinical evidence of active infection.  ·	Continue imipenem empirically in the setting of a new EC fistula.  ·	Follow-up ID recommendations - Dr. Ricardo, will ask for new recs in setting of palliative care    ENDOCRINE: hypothyroidism, glycemic control on TPN  ·	Synthroid IV for hypothyroidism as patient has no GI function.  ·	Monitor fingersticks and moderate ISS for glycemic control as patient is receiving TPN    DISPOSITION  ·	Full code  ·	Remain in SICU for monitoring of critically ill patient    Francisco J Quigley MD    [id number]

## 2017-07-06 NOTE — PROGRESS NOTE ADULT - SUBJECTIVE AND OBJECTIVE BOX
Pain Management Attending Addendum    SUBJECTIVE:    Therapy:	  [x ] IV PCA	   [ ] Epidural           [ ] s/p Spinal Opoid              [ ] Postpartum infusion	  [ ] Patient controlled regional anesthesia (PCRA)    [ ] prn Analgesics    OBJECTIVE:   [x ] No new signs     [ ] Other:    Side Effects:  [x ] None			[ ] Other:    Assessment of Catheter Site:		[ ] Intact		[ ] Other:    ASSESSMENT/PLAN  x[ ] Continue current therapy    [ ] Therapy changed to:    [ ] IV PCA       [ ] Epidural     [ ] prn Analgesics     [ ] post partum infusion    Comments:

## 2017-07-06 NOTE — PROGRESS NOTE ADULT - ASSESSMENT
76 year old female with history significant for COPD, prior DVT s/p IVC filter (now removed), GERD, hypothyroidism, HIT and diverticulitis s/p Ruben's, and recurrent SBO requiring ex-lap small bowel resection, EC fistula s/p takedown that was complicated by wound dehiscence and abdominal reconstruction, who underwent ex-lap, extensive NANETTE, EC fistula takedown, colostomy revision, and parastomal/incisional hernia repair on 6/9. She was admitted to SICU intubated post-operatively and was successfully extubated. However, after getting a CT with oral contrast, patient went into respiratory distress and was re-intubated and found to have MRSA pneumonia, likely aspiration in nature. She was subsequently extubated, weaned off pressors, and transferred to the floors. On the floor on 6/22, patient went into respiratory distress again likely secondary to pulmonary vascular congestion requiring BiPAP and became hypotensive requiring vasopressor support. Her wound also began draining thick brown fluid concerning for a new EC fistula vs. anastomotic breakdown and is currently on imipenem. Pressor needs decreasing, wound pouched with some improvement in clinical status, remains extubated, intermittently requiring BiPAP, now with decreased hearing.       Respiratory: remains stable on room air. Keep o2 sat>=90% w/ prn o2.  Last wore bipap on 6/24  COPD: not exacerbated. continue with symbicort  HIT/prior PE/DVT/+ivc filter- on fondaparinux -prophy  s/p tx for mrsa pna-observing off abx         renal - TPN/TREVA-improved  ID-on imipenem emperically for EC fistula   plan as per sicu  - ostomy revision,isolation of EC fistula, debreidment,, VAC on 6/30                              EC fistula managment as per SICU (abx, npo, tpn, reglan, octreotide)  agree with [facility] eval

## 2017-07-06 NOTE — PROGRESS NOTE ADULT - SUBJECTIVE AND OBJECTIVE BOX
NEPHROLOGY-[facility] [phone]        Patient seen and examined in bed.  She is frustrated about her condition at present        MEDICATIONS  (STANDING):  levothyroxine Injectable 44 MICROGram(s) IV Push daily  pantoprazole  Injectable 40 milliGRAM(s) IV Push daily  fondaparinux Injectable 2.5 milliGRAM(s) SubCutaneous every 24 hours  buDESOnide 160 MICROgram(s)/formoterol 4.5 MICROgram(s) Inhaler 2 Puff(s) Inhalation two times a day  fluticasone propionate 50 MICROgram(s)/spray Nasal Spray 1 Spray(s) Both Nostrils two times a day  imipenem/cilastatin  IVPB 500 milliGRAM(s) IV Intermittent every 12 hours  fentaNYL   Patch  75 MICROgram(s)/Hr. 1 Patch Transdermal every 48 hours  lactated ringers. 1000 milliLiter(s) (1 mL/Hr) IV Continuous <Continuous>  morphine PCA (5 mG/mL) 30 milliLiter(s) PCA Continuous PCA Continuous  octreotide  Injectable 50 MICROGram(s) IV Push three times a day  insulin lispro (HumaLOG) corrective regimen sliding scale   SubCutaneous every 6 hours  dextrose 5% + sodium chloride 0.45%. 1000 milliLiter(s) (50 mL/Hr) IV Continuous <Continuous>      VITAL:  T(C): , Max: 37.3 (07-06-17 @ 03:00)  T(F): , Max: 99.1 (07-06-17 @ 03:00)  HR: 103 (07-06-17 @ 07:00)  BP: 120/56 (07-05-17 @ 14:21)  BP(mean): 80 (07-05-17 @ 14:21)  RR: 42 (07-06-17 @ 07:00)  SpO2: 97% (07-06-17 @ 07:00)  Wt(kg): --    I and O's:    07-05 @ 07:01  -  07-06 @ 07:00  --------------------------------------------------------  IN: 3352 mL / OUT: 3935 mL / NET: -583 mL          PHYSICAL EXAM:    Constitutional: NAD  HEENT: PERRLA    Neck:  No JVD  Respiratory: CTAB/L  Cardiovascular: S1 and S2  Gastrointestinal: BS+, soft,+ ostomy  Extremities: No peripheral edema  Neurological: A/O x 3, no focal deficits  Psychiatric: Normal mood, normal affect  : + Snyder  Skin: No rashes  Access: Not applicable    LABS:                        9.8    8.3   )-----------( 315      ( 06 Jul 2017 03:22 )             29.9     07-06    136  |  103  |  47<H>  ----------------------------<  104<H>  6.5<HH>   |  22  |  1.73<H>    Ca    9.0      06 Jul 2017 07:40  Phos  5.0     07-06  Mg     2.1     07-06    TPro  7.0  /  Alb  2.4<L>  /  TBili  0.3  /  DBili  x   /  AST  30  /  ALT  21  /  AlkPhos  284<H>  07-06          Urine Studies:          RADIOLOGY & ADDITIONAL STUDIES:

## 2017-07-06 NOTE — CONSULT NOTE ADULT - PROBLEM SELECTOR RECOMMENDATION 9
Patient expressing wishes for full comfort care, states would like all treatment stopped other than pain medication, including IVF (verba orders given to RN to stop)  Verbal order given to RN to start Octreotide 200 mcg q8h  Oral care Patient is able to give verbalized understanding about her illness. She is also able to appreciate risks and benefits of Rx options. At this point she is  expressing wishes for continuing care guided only towards managing her pain and stopping all other measures that may be prolonging her illness process. As a result she would like medications for pain, anxiety, and other symptoms management to be continued. In the other hand, she would like Abx, IVF, and other non symptom related meds to be DC. She understands that by doing so, her life expectancy may be limited. However, she indicates that her illnesses and the procedures she has gone through have caused a major decline in her quality of life. This information was communicated to the patient's son and daughter that indicated they understand and respect the patient's wishes.

## 2017-07-06 NOTE — PROGRESS NOTE ADULT - SUBJECTIVE AND OBJECTIVE BOX
Day 1 of Anesthesia Pain Management Service    SUBJECTIVE: I'm okay    Pain Scale Score	At rest: ___ 	With Activity: ___ 	[ x] Refer to charted pain scores    THERAPY:    [x ] IV PCA Morphine		[ ] 5 mg/mL	[ x] 1 mg/mL  [ ] IV PCA Hydromorphone	[ ] 5 mg/mL                  [ ] 1 mg/mL  [ ] IV PCA Fentanyl		[ ] 50 micrograms/mL    Demand dose       1 mg  Lockout                 6 minutes  Continuous rate      0      MEDICATIONS  (STANDING):  levothyroxine Injectable 44 MICROGram(s) IV Push daily  pantoprazole  Injectable 40 milliGRAM(s) IV Push daily  fondaparinux Injectable 2.5 milliGRAM(s) SubCutaneous every 24 hours  buDESOnide 160 MICROgram(s)/formoterol 4.5 MICROgram(s) Inhaler 2 Puff(s) Inhalation two times a day  fluticasone propionate 50 MICROgram(s)/spray Nasal Spray 1 Spray(s) Both Nostrils two times a day  imipenem/cilastatin  IVPB 500 milliGRAM(s) IV Intermittent every 12 hours  fentaNYL   Patch  75 MICROgram(s)/Hr. 1 Patch Transdermal every 48 hours  lactated ringers. 1000 milliLiter(s) (1 mL/Hr) IV Continuous <Continuous>  morphine PCA (5 mG/mL) 30 milliLiter(s) PCA Continuous PCA Continuous  octreotide  Injectable 50 MICROGram(s) IV Push three times a day  insulin lispro (HumaLOG) corrective regimen sliding scale   SubCutaneous every 6 hours  dextrose 5% + sodium chloride 0.45%. 1000 milliLiter(s) (50 mL/Hr) IV Continuous <Continuous>    MEDICATIONS  (PRN):  ALBUTerol/ipratropium for Nebulization 3 milliLiter(s) Nebulizer every 6 hours PRN Shortness of Breath and/or Wheezing  ondansetron Injectable 4 milliGRAM(s) IV Push every 6 hours PRN Nausea  naloxone Injectable 0.1 milliGRAM(s) IV Push every 3 minutes PRN For ANY of the following changes in patient status:  A. RR LESS THAN 10 breaths per minute, B. Oxygen saturation LESS THAN 90%, C. Sedation score of 6  morphine  - Injectable 2 milliGRAM(s) IV Push every 3 hours PRN moderate to severe  morphine PCA (5 mG/mL) Rescue Clinician Bolus 3 milliGRAM(s) IV Push every 15 minutes PRN for Pain Scale GREATER THAN 6  naloxone Injectable 0.1 milliGRAM(s) IV Push every 3 minutes PRN For ANY of the following changes in patient status:  A. RR LESS THAN 10 breaths per minute, B. Oxygen saturation LESS THAN 90%, C. Sedation score of 6      OBJECTIVE:    Sedation Score:	[x ] Alert	[ ] Drowsy 	[ ] Arousable	[ ] Asleep	[ ] Unresponsive    Side Effects:	[ x] None	[ ] Nausea	[ ] Vomiting	[ ] Pruritus  		[ ] Other:    Vital Signs Last 24 Hrs  T(C): 37.3 (06 Jul 2017 03:00), Max: 37.3 (06 Jul 2017 03:00)  T(F): 99.1 (06 Jul 2017 03:00), Max: 99.1 (06 Jul 2017 03:00)  HR: 101 (06 Jul 2017 06:00) (87 - 103)  BP: 120/56 (05 Jul 2017 14:21) (120/56 - 120/56)  BP(mean): 80 (05 Jul 2017 14:21) (80 - 80)  RR: 21 (06 Jul 2017 06:00) (12 - 32)  SpO2: 97% (06 Jul 2017 06:00) (95% - 100%)    ASSESSMENT/ PLAN    Therapy to  be:	[ x] Continue   [ ] Discontinued   [ ] Change to prn Analgesics    Documentation and Verification of current medications:   [X] Done	[ ] Not done, not eligible    Comments: NPO. +NGT.  Using 1-3x/hr.

## 2017-07-06 NOTE — CONSULT NOTE ADULT - ASSESSMENT
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 However, after getting a CT with oral contrast, patient went into respiratory distress and was re-intubated and found to have MRSA pneumonia, likely aspiration in nature. She was subsequently extubated, weaned off pressors, and transferred to the floors. On the floor patient went into respiratory distress again likely secondary to pulmonary vascular congestion requiring BiPAP and became hypotensive requiring vasopressor support. Her wound also began draining thick brown fluid concerning for a new EC fistula vs. anastomotic breakdown and is currently on imipenem. Pressor needs decreasing, wound pouched with some improvement in clinical status, remains extubated, intermittently requiring BiPAP. PC consulted for symptom management, initiation of comfort care. 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 However, after getting a CT with oral contrast, patient went into respiratory distress and was re-intubated and found to have MRSA pneumonia, likely aspiration in nature. She was subsequently extubated, weaned off pressors, and transferred to the floors. On the floor patient went into respiratory distress again likely secondary to pulmonary vascular congestion requiring BiPAP and became hypotensive requiring vasopressor support. Her wound also began draining thick brown fluid concerning for a new EC fistula vs. anastomotic breakdown and is currently on imipenem remains extubated, intermittently requiring BiPAP. PC consulted for symptom management, initiation of comfort care. 76 year old female with history significant for COPD, prior DVT s/p IVC filter (now removed), GERD, hypothyroidism, HIT and diverticulitis s/p Ruben's, and recurrent SBO. Furthermore with EC fistulas and with multiple other complications including respiratory failure, septic shock, and TREVA. Consult called for GOC and symptoms.

## 2017-07-06 NOTE — CONSULT NOTE ADULT - PROBLEM SELECTOR RECOMMENDATION 6
DNR/DNI (hospital form) obtained. patient too weak to fill MOLST/HCP, but very clear with her wishes for comfort measures only. Capacity assessed for medical decision making and patient has capacity. Wants children Kathie and Elbert to be involved in all decision making other than decision re: DNR/I which she has made herself. Spoke at length with dtr Kathie who respects patient's whishes, however is struggling with the decision and needs lots of support. Granddaughter is getting  in one month and patient is her only living grandparent. Will reach out to dtr again in AM. Dr Berkowitz spoke with son Elbert, who understands patient's wishes, respects them and is in agreement. Will reassess patient tomorrow. DNR/DNI (hospital form) obtained. patient too weak to fill MOLST/HCP, but very clear with her wishes for comfort measures only. Wants her children, Kathie and Elbert, to be involved in all decision making other than decision re: DNR/I which she has made herself. Spoke at length with dtr Kathie who respects patient's whishes, however is struggling with the decision and needs lots of support. Granddaughter is getting  in one month and patient is her only living grandparent. Will reach out to her dtr again in AM. Dr Berkowitz spoke with her son, Elbert, who also understands the patient's wishes and respects them and is in agreement with the current plan of care.   PCU transferring and hospice services were d/w the patient and she will further d/w her children and Sx attending to decide about them.

## 2017-07-06 NOTE — PROGRESS NOTE ADULT - ATTENDING COMMENTS
Pt seen and examined today at 10am, agree with above. I spoke with pt today regarding her goals of care, and she clearly states that she wants "to be left alone," and wants to stop all fluids, TPN, antibiotics, and all other medications not given with the intent of treating pain or discomfort. She states that she would not want to be re-intubated and would not want CPR if she worsens. I have explained to her that I will ask the Palliative team to see her, for further assistance with her pain management, which she notes is much better with morphine PCA.     DNR/DNI

## 2017-07-06 NOTE — PROGRESS NOTE ADULT - ASSESSMENT
ASSESSMENT  Pt with hx of HIT/PE/COPD/CKD stage 3 with fistulae and SBO  Acute on chronic renal failure  Anemia  Hyperkalemia       Recommend    CKD (chronic kidney disease) stage 3, GFR 30-59 ml/min.  Stable renal function    Hyperkalemia-Give Lasix 60mg ivp x 1;  Would also DC the LR and change to NS(LR does have some potassium in it).  Also the potassium will be taken out of the TPN       CV - off pressors at present    ID - peritonitis - on Primaxin    Palliative care to meet the patient and go over GOC and pain control    Surgery - s/p ECF repair. On TPN.

## 2017-07-06 NOTE — CONSULT NOTE ADULT - ATTENDING COMMENTS
B/l effusions, likely secondary to NG tube. Recommend flonase BID for 2 weeks and audiogram to r/o sensorineural component related to IV antibiotics. We will f/u audio.
Agree with above.  Care as per SICU.
More than 60' spent in [facility] and [facility]

## 2017-07-06 NOTE — CONSULT NOTE ADULT - SUBJECTIVE AND OBJECTIVE BOX
Brief History:    76 year old woman with PMHx of diverticulitis s/p Ruben's c/b dehiscence and evisceration requiring ex lap and closure of abdomen with mesh, c/b late enterocutaneous fistula development, s/p EC fistula takedown and abdominal wall reconstruction c/b recurrent ECF formation and multiple SBOs, COPD, CKD 3, prior DVT s/p IVC filter (removed and then replaced with a permanent filter), GERD, hypothyroidism, and HIT who initially presented on 5/23 w/ a SBO, which required ex lap with extensive lysis of adhesions, EC fistula takedown, repair of cecal serosal tear, partial R salpingectomy, colostomy revision, and parastomal & incisional hernia repair w/ mesh on 6/9. Patient was admitted to SICU post-op intubated on vasopressor support. She was subsequently extubated and weaned off vasopressors. Hospital course complicated by lack of ostomy function and respiratory distress requiring re-intubation and MRSA PNA. Patient was eventually extubated and once hemodynamically stable and transferred to the floor, however floor the patient developed respiratory distress requiring BiPAP 2/2 pulmonary vascular congestion, and became hypotensive, with dark brown drainage from wound. Repeat CT scan revealed another SBO and abdominal wall defect concerning for a new enterocutaneous fistula, which is the presumed culprit for developed for septic shock. Patient was started on Imipenem, weaned of pressors and returned to OR on 6/30 for ostomy revision and isolation of EC fistula with vac and pouch, though noted to be hypotensive and in post-renal TREVA. Patient was placed on phenylephrine for hypotension. ID is on board for antibiotic management. Patient is NPO to limit EC fistula output and previously on TPN, now stopped temporarily 2/2 to hyperkalemia          PERTINENT PMH REVIEWED:  [ ] YES [ ] NO           SOCIAL HISTORY:  Significant other/partner:  [ ] YES  [ ] NO            Children:  [ ] YES  [ ] NO                   [employer]ist/Spirituality:  Substance hx:  [ ] YES   [ ] NO           Tobacco hx:  [ ] YES  [ ] NO             Alcohol hx: [ ] YES  [ ] NO        Home Opioid hx:  [ ] YES  [ ] NO   Living Situation: [ ] Home  [ ] Long term care  [ ] Rehab    REFERRALS:   [ ] Chaplaincy  [ ] Hospice  [ ] Child Life  [ ] Social Work  [ ] Case management [ ] Holistic Therapy     FAMILY HISTORY:  No pertinent family history in first degree relatives    [ ] Family history non contributory     BASELINE ADLs (prior to admission):  Independent [ ] moderately [ ] fully   Dependent   [ ] moderately [ ] fully    ADVANCE DIRECTIVES:  [ ] YES [ ] NO   DNR [ ] YES [ ] NO                      MOLST  [ ] YES [ ] NO    Living Will  [ ] YES [ ] NO    Health Care Proxy [ ] YES  [ ] NO      [ ] Surrogate  [ ] HCP  [ ] Guardian:                                                                  Phone#:    Allergies    azithromycin (Unknown)  codeine (Unknown)  heparin (Other)  PC Pen VK (Unknown)  penicillin (Unknown)    Intolerances        MEDICATIONS  (STANDING):  levothyroxine Injectable 44 MICROGram(s) IV Push daily  pantoprazole  Injectable 40 milliGRAM(s) IV Push daily  fondaparinux Injectable 2.5 milliGRAM(s) SubCutaneous every 24 hours  buDESOnide 160 MICROgram(s)/formoterol 4.5 MICROgram(s) Inhaler 2 Puff(s) Inhalation two times a day  fluticasone propionate 50 MICROgram(s)/spray Nasal Spray 1 Spray(s) Both Nostrils two times a day  imipenem/cilastatin  IVPB 500 milliGRAM(s) IV Intermittent every 12 hours  fentaNYL   Patch  75 MICROgram(s)/Hr. 1 Patch Transdermal every 48 hours  lactated ringers. 1000 milliLiter(s) (1 mL/Hr) IV Continuous <Continuous>  morphine PCA (5 mG/mL) 30 milliLiter(s) PCA Continuous PCA Continuous  octreotide  Injectable 50 MICROGram(s) IV Push three times a day  insulin lispro (HumaLOG) corrective regimen sliding scale   SubCutaneous every 6 hours  dextrose 5% + sodium chloride 0.45%. 1000 milliLiter(s) (50 mL/Hr) IV Continuous <Continuous>    MEDICATIONS  (PRN):  ALBUTerol/ipratropium for Nebulization 3 milliLiter(s) Nebulizer every 6 hours PRN Shortness of Breath and/or Wheezing  ondansetron Injectable 4 milliGRAM(s) IV Push every 6 hours PRN Nausea  naloxone Injectable 0.1 milliGRAM(s) IV Push every 3 minutes PRN For ANY of the following changes in patient status:  A. RR LESS THAN 10 breaths per minute, B. Oxygen saturation LESS THAN 90%, C. Sedation score of 6  morphine  - Injectable 2 milliGRAM(s) IV Push every 3 hours PRN moderate to severe  morphine PCA (5 mG/mL) Rescue Clinician Bolus 3 milliGRAM(s) IV Push every 15 minutes PRN for Pain Scale GREATER THAN 6  naloxone Injectable 0.1 milliGRAM(s) IV Push every 3 minutes PRN For ANY of the following changes in patient status:  A. RR LESS THAN 10 breaths per minute, B. Oxygen saturation LESS THAN 90%, C. Sedation score of 6      PRESENT SYMPTOMS:  Source: [ ] Patient   [ ] Family   [ ] Team     Pain: [ ] YES [ ] NO  OLDCARTS:     Dyspnea: [ ] YES [ ] NO   Anxiety: [ ] YES [ ] NO  Fatigue: [ ] YES [ ] NO   Nausea: [ ] YES [ ] NO  Loss of appetite: [ ] YES [ ] NO   Constipation: [ ] YES [ ] NO     Other Symptoms:  [ ] All other review of systems negative   [ ] Unable to obtain due to poor mentation     Karnofsky Performance Score/Palliative Performance Status Version 2:         %  Protein Calorie Malutrition:  [ ] Mild   [ ] Moderate   [ ] Severe     Vital Signs Last 24 Hrs  T(C): 36.7 (06 Jul 2017 07:00), Max: 37.3 (06 Jul 2017 03:00)  T(F): 98.1 (06 Jul 2017 07:00), Max: 99.1 (06 Jul 2017 03:00)  HR: 103 (06 Jul 2017 07:00) (87 - 103)  BP: 120/56 (05 Jul 2017 14:21) (120/56 - 120/56)  BP(mean): 80 (05 Jul 2017 14:21) (80 - 80)  RR: 42 (06 Jul 2017 07:00) (12 - 42)  SpO2: 97% (06 Jul 2017 07:00) (95% - 100%)    Physical Exam:    General: [ ] Alert,  A&O x     [ ] lethargic   [ ] Agitated   [ ] Cachexia   HEENT: [ ] Normal   [ ] Dry mouth   [ ] ET Tube    [ ] Trach   Lungs: [ ] Clear [ ] Rhonchi  [ ] Crackles [ ] Wheezing [ ] Tachypnea  [ ] Audible excessive secretions   Cardiovascular:  [ ] Regular rate and rhythm  [ ] Irregular [ ] Tachycardia   [ ] Bradycardia   Abdomen: [ ] Soft  [ ] Distended  [ ]  [ ] +BS  [ ] Non tender [ ] Tender  [ ]PEG   [ ] NGT   Last BM:     Genitourinary: [ ] Normal [ ] Incontinent   [ ] Oliguria/Anuria   [ ] Snyder  Musculoskeletal:  [ ] Normal   [ ] Generalized weakness  [ ] Bedbound   Neurological: [ ] No focal deficits  [ ] Cognitive impairment     Skin: [ ] Normal   [ ] Pressure ulcers     LABS:                        9.8    8.3   )-----------( 315      ( 06 Jul 2017 03:22 )             29.9     07-06    135  |  103  |  45<H>  ----------------------------<  118<H>  6.1<H>   |  22  |  1.70<H>    Ca    9.2      06 Jul 2017 03:22  Phos  5.0     07-06  Mg     2.1     07-06    TPro  7.0  /  Alb  2.4<L>  /  TBili  0.3  /  DBili  x   /  AST  30  /  ALT  21  /  AlkPhos  284<H>  07-06    PT/INR - ( 06 Jul 2017 03:22 )   PT: 12.7 sec;   INR: 1.17 ratio         PTT - ( 06 Jul 2017 03:22 )  PTT:44.5 sec    I&O's Summary    05 Jul 2017 07:01  -  06 Jul 2017 07:00  --------------------------------------------------------  IN: 3352 mL / OUT: 3935 mL / NET: -583 mL        RADIOLOGY & ADDITIONAL STUDIES: Brief History:    76 year old woman with PMHx of diverticulitis s/p Ruben's c/b dehiscence and evisceration requiring ex lap and closure of abdomen with mesh, c/b late enterocutaneous fistula development, s/p EC fistula takedown and abdominal wall reconstruction c/b recurrent ECF formation and multiple SBOs, COPD, CKD 3, prior DVT s/p IVC filter (removed and then replaced with a permanent filter), GERD, hypothyroidism, and HIT who initially presented on 5/23 w/ a SBO, which required ex lap with extensive lysis of adhesions, EC fistula takedown, repair of cecal serosal tear, partial R salpingectomy, colostomy revision, and parastomal & incisional hernia repair w/ mesh on 6/9. Patient was admitted to SICU post-op intubated on vasopressor support. She was subsequently extubated and weaned off vasopressors. Hospital course complicated by lack of ostomy function and respiratory distress requiring re-intubation and MRSA PNA. Patient was eventually extubated and once hemodynamically stable and transferred to the floor, however floor the patient developed respiratory distress requiring BiPAP 2/2 pulmonary vascular congestion, and became hypotensive, with dark brown drainage from wound. Repeat CT scan revealed another SBO and abdominal wall defect concerning for a new enterocutaneous fistula, which is the presumed culprit for developed for septic shock. Patient was started on Imipenem, weaned of pressors and returned to OR on 6/30 for ostomy revision and isolation of EC fistula with vac and pouch, though noted to be hypotensive and in post-renal TREVA. Patient was placed on phenylephrine for hypotension. ID is on board for antibiotic management. Patient is NPO to limit EC fistula output and previously on TPN, now stopped temporarily 2/2 to hyperkalemia.     Palliative care was consulted because patient expressed wanting comfort measures only to be taken, which was already established by the SICU team prior to our arrival. We are therefore helping with management of symptoms as well as patient/family support.     PERTINENT PMH REVIEWED: yes    SOCIAL HISTORY:  Significant other/partner:            Children:  four                Episcopal/Spirituality: [employer]   Substance hx:  no              Home Opioid hx: yes, morphine in the past      FAMILY HISTORY:  No pertinent family history in first degree relatives    BASELINE ADLs (prior to admission):  Independent [ ] moderately [ ] fully   Dependent   [x] moderately [ ] fully    ADVANCE DIRECTIVES:  Patient expressed wanting to be DNR/DNI. She would like Kathie Hurst [phone] and Elbert García  to be HCPs      Allergies    azithromycin (Unknown)  codeine (Unknown)  heparin (Other)  PC Pen VK (Unknown)  penicillin (Unknown)    Intolerances        MEDICATIONS  (STANDING):  levothyroxine Injectable 44 MICROGram(s) IV Push daily  pantoprazole  Injectable 40 milliGRAM(s) IV Push daily  fondaparinux Injectable 2.5 milliGRAM(s) SubCutaneous every 24 hours  buDESOnide 160 MICROgram(s)/formoterol 4.5 MICROgram(s) Inhaler 2 Puff(s) Inhalation two times a day  fluticasone propionate 50 MICROgram(s)/spray Nasal Spray 1 Spray(s) Both Nostrils two times a day  imipenem/cilastatin  IVPB 500 milliGRAM(s) IV Intermittent every 12 hours  fentaNYL   Patch  75 MICROgram(s)/Hr. 1 Patch Transdermal every 48 hours  lactated ringers. 1000 milliLiter(s) (1 mL/Hr) IV Continuous <Continuous>  morphine PCA (5 mG/mL) 30 milliLiter(s) PCA Continuous PCA Continuous  octreotide  Injectable 50 MICROGram(s) IV Push three times a day  insulin lispro (HumaLOG) corrective regimen sliding scale   SubCutaneous every 6 hours  dextrose 5% + sodium chloride 0.45%. 1000 milliLiter(s) (50 mL/Hr) IV Continuous <Continuous>    MEDICATIONS  (PRN):  ALBUTerol/ipratropium for Nebulization 3 milliLiter(s) Nebulizer every 6 hours PRN Shortness of Breath and/or Wheezing  ondansetron Injectable 4 milliGRAM(s) IV Push every 6 hours PRN Nausea  naloxone Injectable 0.1 milliGRAM(s) IV Push every 3 minutes PRN For ANY of the following changes in patient status:  A. RR LESS THAN 10 breaths per minute, B. Oxygen saturation LESS THAN 90%, C. Sedation score of 6  morphine  - Injectable 2 milliGRAM(s) IV Push every 3 hours PRN moderate to severe  morphine PCA (5 mG/mL) Rescue Clinician Bolus 3 milliGRAM(s) IV Push every 15 minutes PRN for Pain Scale GREATER THAN 6  naloxone Injectable 0.1 milliGRAM(s) IV Push every 3 minutes PRN For ANY of the following changes in patient status:  A. RR LESS THAN 10 breaths per minute, B. Oxygen saturation LESS THAN 90%, C. Sedation score of 6      PRESENT SYMPTOMS:  Source: Patient     Pain: Severe abdominal pain (requiring 10-14 PCA pushes per hour)    Dyspnea: NO   Anxiety:  NO  Fatigue: YES  Nausea:  NO  Loss of appetite: YES   Constipation:  NO     Other Symptoms:   All other review of systems negative       Karnofsky Performance Score/Palliative Performance Status Version 2:       20  %      Vital Signs Last 24 Hrs  T(C): 36.7 (06 Jul 2017 07:00), Max: 37.3 (06 Jul 2017 03:00)  T(F): 98.1 (06 Jul 2017 07:00), Max: 99.1 (06 Jul 2017 03:00)  HR: 103 (06 Jul 2017 07:00) (87 - 103)  BP: 120/56 (05 Jul 2017 14:21) (120/56 - 120/56)  BP(mean): 80 (05 Jul 2017 14:21) (80 - 80)  RR: 42 (06 Jul 2017 07:00) (12 - 42)  SpO2: 97% (06 Jul 2017 07:00) (95% - 100%)    Physical Exam:    General: AAO x 3, ill appearing  HEENT: + NGT - wall suction   Lungs: Clear   Cardiovascular: Regular rate and rhythm    Abdomen: soft. +wound vac RLQ draining dark brown material, moderate- large amount  Musculoskeletal: Generalized weakness   Neurological: No focal deficits   Skin: diaphoretic, see MSK exam     LABS:                        9.8    8.3   )-----------( 315      ( 06 Jul 2017 03:22 )             29.9     07-06    135  |  103  |  45<H>  ----------------------------<  118<H>  6.1<H>   |  22  |  1.70<H>    Ca    9.2      06 Jul 2017 03:22  Phos  5.0     07-06  Mg     2.1     07-06    TPro  7.0  /  Alb  2.4<L>  /  TBili  0.3  /  DBili  x   /  AST  30  /  ALT  21  /  AlkPhos  284<H>  07-06    PT/INR - ( 06 Jul 2017 03:22 )   PT: 12.7 sec;   INR: 1.17 ratio         PTT - ( 06 Jul 2017 03:22 )  PTT:44.5 sec    I&O's Summary    05 Jul 2017 07:01  -  06 Jul 2017 07:00  --------------------------------------------------------  IN: 3352 mL / OUT: 3935 mL / NET: -583 mL Brief History:    76 year old woman with PMHx of diverticulitis s/p Ruben's c/b dehiscence and evisceration requiring ex lap and closure of abdomen with mesh, c/b late enterocutaneous fistula development, s/p EC fistula takedown and abdominal wall reconstruction c/b recurrent ECF formation and multiple SBOs, COPD, CKD 3, prior DVT s/p IVC filter (removed and then replaced with a permanent filter), GERD, hypothyroidism, and HIT who initially presented on 5/23 w/ a SBO, which required ex lap with extensive lysis of adhesions, EC fistula takedown, repair of cecal serosal tear, partial R salpingectomy, colostomy revision, and parastomal & incisional hernia repair w/ mesh on 6/9. Patient was admitted to SICU post-op intubated on vasopressor support. She was subsequently extubated and weaned off vasopressors. Hospital course complicated by lack of ostomy function and respiratory distress requiring re-intubation and MRSA PNA. Patient was eventually extubated and once hemodynamically stable and transferred to the floor, however floor the patient developed respiratory distress requiring BiPAP 2/2 pulmonary vascular congestion, and became hypotensive, with dark brown drainage from wound. Repeat CT scan revealed another SBO and abdominal wall defect concerning for a new enterocutaneous fistula, which is the presumed culprit for developed for septic shock. Patient was started on Imipenem, weaned of pressors and returned to OR on 6/30 for ostomy revision and isolation of EC fistula with vac and pouch, though noted to be hypotensive and in post-renal TREVA. Patient was placed on phenylephrine for hypotension. ID is on board for antibiotic management. Patient is NPO to limit EC fistula output and previously on TPN, now stopped temporarily 2/2 to hyperkalemia.     Palliative care was consulted because patient expressed wanting comfort measures only to be taken, which was already established by the SICU team prior to our arrival. We are therefore helping with management of symptoms as well as patient/family support.     PERTINENT PMH REVIEWED: yes    SOCIAL HISTORY:  Significant other/partner:            Children:  four                Scientology/Spirituality: Judaism   Substance hx:  no              Home Opioid hx: yes, morphine in the past      FAMILY HISTORY:  No pertinent family history in first degree relatives    BASELINE ADLs (prior to admission):  Independent [ ] moderately [ ] fully   Dependent   [x] moderately [ ] fully    ADVANCE DIRECTIVES:  Patient expressed wanting to be DNR/DNI. She would like Kathie Hurst [phone] and Elbert García  to be HCPs      Allergies    azithromycin (Unknown)  codeine (Unknown)  heparin (Other)  PC Pen VK (Unknown)  penicillin (Unknown)    Intolerances        MEDICATIONS  (STANDING):  levothyroxine Injectable 44 MICROGram(s) IV Push daily  pantoprazole  Injectable 40 milliGRAM(s) IV Push daily  fondaparinux Injectable 2.5 milliGRAM(s) SubCutaneous every 24 hours  buDESOnide 160 MICROgram(s)/formoterol 4.5 MICROgram(s) Inhaler 2 Puff(s) Inhalation two times a day  fluticasone propionate 50 MICROgram(s)/spray Nasal Spray 1 Spray(s) Both Nostrils two times a day  imipenem/cilastatin  IVPB 500 milliGRAM(s) IV Intermittent every 12 hours  fentaNYL   Patch  75 MICROgram(s)/Hr. 1 Patch Transdermal every 48 hours  lactated ringers. 1000 milliLiter(s) (1 mL/Hr) IV Continuous <Continuous>  morphine PCA (5 mG/mL) 30 milliLiter(s) PCA Continuous PCA Continuous  octreotide  Injectable 50 MICROGram(s) IV Push three times a day  insulin lispro (HumaLOG) corrective regimen sliding scale   SubCutaneous every 6 hours  dextrose 5% + sodium chloride 0.45%. 1000 milliLiter(s) (50 mL/Hr) IV Continuous <Continuous>    MEDICATIONS  (PRN):  ALBUTerol/ipratropium for Nebulization 3 milliLiter(s) Nebulizer every 6 hours PRN Shortness of Breath and/or Wheezing  ondansetron Injectable 4 milliGRAM(s) IV Push every 6 hours PRN Nausea  naloxone Injectable 0.1 milliGRAM(s) IV Push every 3 minutes PRN For ANY of the following changes in patient status:  A. RR LESS THAN 10 breaths per minute, B. Oxygen saturation LESS THAN 90%, C. Sedation score of 6  morphine  - Injectable 2 milliGRAM(s) IV Push every 3 hours PRN moderate to severe  morphine PCA (5 mG/mL) Rescue Clinician Bolus 3 milliGRAM(s) IV Push every 15 minutes PRN for Pain Scale GREATER THAN 6  naloxone Injectable 0.1 milliGRAM(s) IV Push every 3 minutes PRN For ANY of the following changes in patient status:  A. RR LESS THAN 10 breaths per minute, B. Oxygen saturation LESS THAN 90%, C. Sedation score of 6      PRESENT SYMPTOMS:  Source: Patient     Pain: Severe abdominal pain (requiring 10-14 PCA pushes per hour)    Dyspnea: NO   Anxiety:  NO  Fatigue: YES  Nausea:  NO  Loss of appetite: YES   Constipation:  NO     Other Symptoms:   All other review of systems negative       Karnofsky Performance Score/Palliative Performance Status Version 2:       20  %      Vital Signs Last 24 Hrs  T(C): 36.7 (06 Jul 2017 07:00), Max: 37.3 (06 Jul 2017 03:00)  T(F): 98.1 (06 Jul 2017 07:00), Max: 99.1 (06 Jul 2017 03:00)  HR: 103 (06 Jul 2017 07:00) (87 - 103)  BP: 120/56 (05 Jul 2017 14:21) (120/56 - 120/56)  BP(mean): 80 (05 Jul 2017 14:21) (80 - 80)  RR: 42 (06 Jul 2017 07:00) (12 - 42)  SpO2: 97% (06 Jul 2017 07:00) (95% - 100%)    Physical Exam:    General: AAO x 3, ill appearing  HEENT: + NGT - wall suction   Lungs: Clear   Cardiovascular: Regular rate and rhythm    Abdomen: soft. +wound vac RLQ draining dark brown material, moderate- large amount  Musculoskeletal: Generalized weakness   Neurological: No focal deficits   Skin: diaphoretic, see MSK exam     LABS:                        9.8    8.3   )-----------( 315      ( 06 Jul 2017 03:22 )             29.9     07-06    135  |  103  |  45<H>  ----------------------------<  118<H>  6.1<H>   |  22  |  1.70<H>    Ca    9.2      06 Jul 2017 03:22  Phos  5.0     07-06  Mg     2.1     07-06    TPro  7.0  /  Alb  2.4<L>  /  TBili  0.3  /  DBili  x   /  AST  30  /  ALT  21  /  AlkPhos  284<H>  07-06    PT/INR - ( 06 Jul 2017 03:22 )   PT: 12.7 sec;   INR: 1.17 ratio         PTT - ( 06 Jul 2017 03:22 )  PTT:44.5 sec    I&O's Summary    05 Jul 2017 07:01  -  06 Jul 2017 07:00  --------------------------------------------------------  IN: 3352 mL / OUT: 3935 mL / NET: -583 mL    Imaging   < from: CT Abdomen and Pelvis No Cont (06.23.17 @ 16:16) >  IMPRESSION: Loops of matted small bowel are seen in close apposition to   the anterior lower abdominal wall defect, concerning for enterocutaneous   fistula, however evaluation is limited without enteric contrast..    Persistent dilated small bowel with relative collapse of the terminal   ileum raising suspicion for small bowel obstruction.    < end of copied text >

## 2017-07-06 NOTE — PROGRESS NOTE ADULT - SUBJECTIVE AND OBJECTIVE BOX
HISTORY  76 year old female with a PMHx significant for diverticulitis s/p Ruben's c/b dehiscence and evisceration requiring ex lap and closure of abdomen with Surgimend mesh, c/b late enterocutaneous fistula development, s/p EC fistula takedown and abdominal wall reconstruction c/b recurrent ECF formation and multiple SBOs, COPD, CKD 3, prior DVT s/p IVC filter (removed and then replaced with a permanent filter), GERD, hypothyroidism, and HIT who initially presented on 5/23 w/ a SBO that did not resolve with medical management so she is s/p ex lap, extensive NANETTE, EC fistula takedown, repair of cecal serosal tear, partial R salpingectomy, colostomy revision, and parastomal & incisional hernia repair w/ Strattice mesh on 6/9.    Patient was admitted post-op intubated on vasopressor support. She was subsequently extubated and weaned off vasopressors. Hospital course complicated by lack of ostomy function and respiratory distress requiring re-intubation and MRSA PNA. Patient was subsequently extubated and hemodynamically stable for transfer to the floor.     On the floor, she went into respiratory distress again likely secondary to pulmonary vascular congestion requiring BiPAP, became hypotensive requiring vasopressor support, and began draining thick brown fluid from her wound. CT revealed another SBO and and matted small bowel in the anterior lower abdominal wall defect concerning for an EC fistula. She was started on imipenem and was weaned off vasopressor support. RTOR on 6/30 for ostomy revision and isolation of EC fistula with vac and pouch requiring phenylephrine post-operatively for hypotension. Patient also noted to be in post-renal TREVA pending a renal ultrasound.    24 HOUR EVENTS: VAC changed this AM. Patient was started on dilaudid PCA. Palliative care consult was placed to optimize pain control and to discuss goals of care with her. Started octreotide with goal of reducing output from fistulae. Patient did not want to get the CTA that was scheduled for today because she does not want any more tests, so it was cancelled.    SUBJECTIVE/ROS:  [x] A ten-point review of systems was otherwise negative except as noted.  [ ] Due to altered mental status/intubation, subjective information were not able to be obtained from the patient. History was obtained, to the extent possible, from review of the chart and collateral sources of information.      NEURO  CAM ICU: negative  Exam: asleep but arousable and alert  Meds: fentaNYL   Patch  75 MICROgram(s)/Hr. 1 Patch Transdermal every 48 hours  ondansetron Injectable 4 milliGRAM(s) IV Push every 6 hours PRN Nausea  morphine  - Injectable 2 milliGRAM(s) IV Push every 3 hours PRN moderate to severe  morphine PCA (5 mG/mL) 30 milliLiter(s) PCA Continuous PCA Continuous  morphine PCA (5 mG/mL) Rescue Clinician Bolus 3 milliGRAM(s) IV Push every 15 minutes PRN for Pain Scale GREATER THAN 6    [x] Adequacy of sedation and pain control has been assessed and adjusted    RESPIRATORY  RR: 12 (07-05-17 @ 23:00) (0 - 32)  SpO2: 100% (07-05-17 @ 23:00) (95% - 100%)  Exam: unlabored, clear to auscultation bilaterally  Mechanical Ventilation: none  [N/A] Extubation Readiness Assessed  Meds: buDESOnide 160 MICROgram(s)/formoterol 4.5 MICROgram(s) Inhaler 2 Puff(s) Inhalation two times a day  ALBUTerol/ipratropium for Nebulization 3 milliLiter(s) Nebulizer every 6 hours PRN Shortness of Breath and/or Wheezing    CARDIOVASCULAR  HR: 89 (07-05-17 @ 23:00) (87 - 103)  BP: 120/56 (07-05-17 @ 14:21) (120/56 - 120/56)  BP(mean): 80 (07-05-17 @ 14:21) (80 - 80)  ABP: 107/46 (07-05-17 @ 23:00) (95/42 - 129/55)  ABP(mean): 69 (07-05-17 @ 23:00) (64 - 86)  Exam: RRR  Cardiac Rhythm: sinus  Perfusion     [x]Adequate   [ ]Inadequate  Mentation   [x]Normal       [ ]Reduced  Extremities  [x]Warm         [ ]Cool  Volume Status [ ]Hypervolemic [x]Euvolemic [ ]Hypovolemic  Meds:     GI/NUTRITION  Exam: soft, ND, mild tenderness throughout, VAC draining tannish fluid, NGT draining bilious fluid, ostomy pink & viable w/ no gas or stool  Diet: NPO w/ TPN @ 62  Meds: pantoprazole  Injectable 40 milliGRAM(s) IV Push daily  Octreotide    GENITOURINARY  I&O's Detail    07-04 @ 07:01  -  07-05 @ 07:00  --------------------------------------------------------  IN:    dextrose 5% + sodium chloride 0.45% with potassium chloride 20 mEq/L: 1150 mL    TPN (Total Parenteral Nutrition): 1488 mL  Total IN: 2638 mL    OUT:    Drain: 50 mL    Indwelling Catheter - Urethral: 1790 mL    Nasoenteral Tube: 750 mL    VAC (Vacuum Assisted Closure) System: 575 mL  Total OUT: 3165 mL    Total NET: -527 mL    07-05 @ 07:01  -  07-06 @ 02:02  --------------------------------------------------------  IN:    dextrose 5% + sodium chloride 0.45% with potassium chloride 20 mEq/L: 800 mL    lactated ringers.: 150 mL    Solution: 200 mL    Solution: 200 mL    TPN (Total Parenteral Nutrition): 992 mL  Total IN: 2342 mL    OUT:    Drain: 150 mL    Indwelling Catheter - Urethral: 1730 mL    Nasoenteral Tube: 150 mL    VAC (Vacuum Assisted Closure) System: 50 mL  Total OUT: 2080 mL    Total NET: 262 mL    07-05    135  |  101  |  44<H>  ----------------------------<  131<H>  5.2   |  22  |  1.70<H>    Ca    8.8      05 Jul 2017 03:00  Phos  4.5     07-05  Mg     2.0     07-05    TPro  6.6  /  Alb  2.2<L>  /  TBili  0.3  /  DBili  x   /  AST  42<H>  /  ALT  23  /  AlkPhos  269<H>  07-05    [x] Snyder catheter, indication: UOP monitoring in critically ill patient  Meds: dextrose 5% + sodium chloride 0.45% with potassium chloride 20 mEq/L 1000 milliLiter(s) IV Continuous <Continuous>  lactated ringers. 1000 milliLiter(s) IV Continuous <Continuous>    HEMATOLOGIC  Meds: fondaparinux Injectable 2.5 milliGRAM(s) SubCutaneous every 24 hours    [x] VTE Prophylaxis                        9.4    7.6   )-----------( 296      ( 05 Jul 2017 03:00 )             27.9     Transfusion     [ ] PRBC   [ ] Platelets   [ ] FFP   [ ] Cryoprecipitate    INFECTIOUS DISEASES  T(C): 36.3 (07-05-17 @ 19:00), Max: 36.7 (07-05-17 @ 11:00)  Wt(kg): --  WBC Count: 7.6 K/uL (07-05 @ 03:00)    Recent Cultures: none. Blood cx since 6/16 have all been negative.    Meds: imipenem/cilastatin  IVPB 500 milliGRAM(s) IV Intermittent every 12 hours    ENDOCRINE  Capillary Blood Glucose  141 (05 Jul 2017 18:00)  104 (05 Jul 2017 12:00)    Meds: levothyroxine Injectable 44 MICROGram(s) IV Push daily  octreotide  Injectable 50 MICROGram(s) IV Push three times a day  insulin lispro (HumaLOG) corrective regimen sliding scale   SubCutaneous every 6 hours    ACCESS DEVICES:  [x] Peripheral IV  [ ] Central Venous Line	[ ] R	[ ] L	[ ] IJ	[ ] Fem	[ ] SC	Placed:   [x] Arterial Line		[ ] R	[x] L	[x] Fem	[ ] Rad	[ ] Ax	Placed: 6/22/17  [x] PICC: L arm placed 5/31/17			[ ] Mediport  [x] Urinary Catheter, Date Placed: 6/22/17  [x] Necessity of urinary, arterial, and venous catheters discussed    OTHER MEDICATIONS:  fluticasone propionate 50 MICROgram(s)/spray Nasal Spray 1 Spray(s) Both Nostrils two times a day  naloxone Injectable 0.1 milliGRAM(s) IV Push every 3 minutes PRN  naloxone Injectable 0.1 milliGRAM(s) IV Push every 3 minutes PRN    CODE STATUS: full code    IMAGING: no new HISTORY  76 year old female with a PMHx significant for diverticulitis s/p Ruben's c/b dehiscence and evisceration requiring ex lap and closure of abdomen with Surgimend mesh, c/b late enterocutaneous fistula development, s/p EC fistula takedown and abdominal wall reconstruction c/b recurrent ECF formation and multiple SBOs, COPD, CKD 3, prior DVT s/p IVC filter (removed and then replaced with a permanent filter), GERD, hypothyroidism, and HIT who initially presented on 5/23 w/ a SBO that did not resolve with medical management so she is s/p ex lap, extensive NANETTE, EC fistula takedown, repair of cecal serosal tear, partial R salpingectomy, colostomy revision, and parastomal & incisional hernia repair w/ Strattice mesh on 6/9.    Patient was admitted post-op intubated on vasopressor support. She was subsequently extubated and weaned off vasopressors. Hospital course complicated by lack of ostomy function and respiratory distress requiring re-intubation and MRSA PNA. Patient was subsequently extubated and hemodynamically stable for transfer to the floor.     On the floor, she went into respiratory distress again likely secondary to pulmonary vascular congestion requiring BiPAP, became hypotensive requiring vasopressor support, and began draining thick brown fluid from her wound. CT revealed another SBO and and matted small bowel in the anterior lower abdominal wall defect concerning for an EC fistula. She was started on imipenem and was weaned off vasopressor support. RTOR on 6/30 for ostomy revision and isolation of EC fistula with vac and pouch requiring phenylephrine post-operatively for hypotension. Patient also noted to be in post-renal TREVA pending a renal ultrasound.    24 HOUR EVENTS: VAC changed this AM. Patient was started on dilaudid PCA. Palliative care consult was placed to optimize pain control and to discuss goals of care with her. Started octreotide with goal of reducing output from fistulae. Patient did not want to get the CTA that was scheduled for today because she does not want any more tests, so it was cancelled.    SUBJECTIVE/ROS:  [x] A ten-point review of systems was otherwise negative except as noted.  [ ] Due to altered mental status/intubation, subjective information were not able to be obtained from the patient. History was obtained, to the extent possible, from review of the chart and collateral sources of information.      NEURO  CAM ICU: negative  Exam: asleep but arousable and alert  Meds: fentaNYL   Patch  75 MICROgram(s)/Hr. 1 Patch Transdermal every 48 hours  ondansetron Injectable 4 milliGRAM(s) IV Push every 6 hours PRN Nausea  morphine  - Injectable 2 milliGRAM(s) IV Push every 3 hours PRN moderate to severe  morphine PCA (5 mG/mL) 30 milliLiter(s) PCA Continuous PCA Continuous  morphine PCA (5 mG/mL) Rescue Clinician Bolus 3 milliGRAM(s) IV Push every 15 minutes PRN for Pain Scale GREATER THAN 6    [x] Adequacy of sedation and pain control has been assessed and adjusted    RESPIRATORY  RR: 22 (06 Jul 2017 03:00) (0 - 32)  SpO2: 99% (06 Jul 2017 03:00) (95% - 100%)  Exam: unlabored, clear to auscultation bilaterally  Mechanical Ventilation: none  [N/A] Extubation Readiness Assessed  Meds: buDESOnide 160 MICROgram(s)/formoterol 4.5 MICROgram(s) Inhaler 2 Puff(s) Inhalation two times a day  ALBUTerol/ipratropium for Nebulization 3 milliLiter(s) Nebulizer every 6 hours PRN Shortness of Breath and/or Wheezing    CARDIOVASCULAR  HR: 95 (06 Jul 2017 03:00) (87 - 103)  BP: 120/56 (05 Jul 2017 14:21) (120/56 - 120/56)  BP(mean): 80 (05 Jul 2017 14:21) (80 - 80)  ABP: 101/45 (06 Jul 2017 03:00) (95/42 - 129/55)  ABP(mean): 67 (06 Jul 2017 03:00) (64 - 86)  Cardiac Rhythm: sinus  Perfusion     [x]Adequate   [ ]Inadequate  Mentation   [x]Normal       [ ]Reduced  Extremities  [x]Warm         [ ]Cool  Volume Status [ ]Hypervolemic [x]Euvolemic [ ]Hypovolemic  Meds:     GI/NUTRITION  Exam: soft, ND, mild tenderness throughout, VAC draining tannish fluid, NGT draining bilious fluid, ostomy pink & viable w/ no gas or stool  Diet: NPO w/ TPN @ 62  Meds: pantoprazole  Injectable 40 milliGRAM(s) IV Push daily  Octreotide    GENITOURINARY  I&O's Detail    04 Jul 2017 07:01  -  05 Jul 2017 07:00  --------------------------------------------------------  IN:    dextrose 5% + sodium chloride 0.45% with potassium chloride 20 mEq/L: 1150 mL    TPN (Total Parenteral Nutrition): 1488 mL  Total IN: 2638 mL    OUT:    Drain: 50 mL    Indwelling Catheter - Urethral: 1790 mL    Nasoenteral Tube: 750 mL    VAC (Vacuum Assisted Closure) System: 575 mL  Total OUT: 3165 mL    Total NET: -527 mL      05 Jul 2017 07:01  -  06 Jul 2017 04:35  --------------------------------------------------------  IN:    dextrose 5% + sodium chloride 0.45% with potassium chloride 20 mEq/L: 1000 mL    lactated ringers.: 150 mL    Solution: 200 mL    Solution: 200 mL    TPN (Total Parenteral Nutrition): 1240 mL  Total IN: 2790 mL    OUT:    Drain: 150 mL    Indwelling Catheter - Urethral: 2355 mL    Nasoenteral Tube: 150 mL    VAC (Vacuum Assisted Closure) System: 50 mL  Total OUT: 2705 mL    Total NET: 85 mL    07-06    135  |  103  |  45<H>  ----------------------------<  118<H>  6.1<H>   |  22  |  1.70<H>    Ca    9.2      06 Jul 2017 03:22  Phos  5.0     07-06  Mg     2.1     07-06    TPro  7.0  /  Alb  2.4<L>  /  TBili  0.3  /  DBili  x   /  AST  30  /  ALT  21  /  AlkPhos  284<H>  07-06    [x] Snyder catheter, indication: UOP monitoring in critically ill patient  Meds: dextrose 5% + sodium chloride 0.45% with potassium chloride 20 mEq/L 1000 milliLiter(s) IV Continuous <Continuous>  lactated ringers. 1000 milliLiter(s) IV Continuous <Continuous>    HEMATOLOGIC  Meds: fondaparinux Injectable 2.5 milliGRAM(s) SubCutaneous every 24 hours    [x] VTE Prophylaxis                          9.8    8.3   )-----------( 315      ( 06 Jul 2017 03:22 )             29.9     Transfusion     [ ] PRBC   [ ] Platelets   [ ] FFP   [ ] Cryoprecipitate    INFECTIOUS DISEASES  T(C): 37.3 (06 Jul 2017 03:00), Max: 37.3 (06 Jul 2017 03:00)  T(F): 99.1 (06 Jul 2017 03:00), Max: 99.1 (06 Jul 2017 03:00)  WBC Count: 7.6 K/uL (07-05 @ 03:00)    Recent Cultures: none. Blood cx since 6/16 have all been negative.    Meds: imipenem/cilastatin  IVPB 500 milliGRAM(s) IV Intermittent every 12 hours    ENDOCRINE  Capillary Blood Glucose  141 (05 Jul 2017 18:00)  104 (05 Jul 2017 12:00)    Meds: levothyroxine Injectable 44 MICROGram(s) IV Push daily  octreotide  Injectable 50 MICROGram(s) IV Push three times a day  insulin lispro (HumaLOG) corrective regimen sliding scale   SubCutaneous every 6 hours    ACCESS DEVICES:  [x] Peripheral IV  [ ] Central Venous Line	[ ] R	[ ] L	[ ] IJ	[ ] Fem	[ ] SC	Placed:   [x] Arterial Line		[ ] R	[x] L	[x] Fem	[ ] Rad	[ ] Ax	Placed: 6/22/17  [x] PICC: L arm placed 5/31/17			[ ] Mediport  [x] Urinary Catheter, Date Placed: 6/22/17  [x] Necessity of urinary, arterial, and venous catheters discussed    OTHER MEDICATIONS:  fluticasone propionate 50 MICROgram(s)/spray Nasal Spray 1 Spray(s) Both Nostrils two times a day  naloxone Injectable 0.1 milliGRAM(s) IV Push every 3 minutes PRN  naloxone Injectable 0.1 milliGRAM(s) IV Push every 3 minutes PRN    CODE STATUS: full code    IMAGING: no new HISTORY  76 year old female with a PMHx significant for diverticulitis s/p Ruben's c/b dehiscence and evisceration requiring ex lap and closure of abdomen with Surgimend mesh, c/b late enterocutaneous fistula development, s/p EC fistula takedown and abdominal wall reconstruction c/b recurrent ECF formation and multiple SBOs, COPD, CKD 3, prior DVT s/p IVC filter (removed and then replaced with a permanent filter), GERD, hypothyroidism, and HIT who initially presented on 5/23 w/ a SBO that did not resolve with medical management so she is s/p ex lap, extensive NANETTE, EC fistula takedown, repair of cecal serosal tear, partial R salpingectomy, colostomy revision, and parastomal & incisional hernia repair w/ Strattice mesh on 6/9.    Patient was admitted post-op intubated on vasopressor support. She was subsequently extubated and weaned off vasopressors. Hospital course complicated by lack of ostomy function and respiratory distress requiring re-intubation and MRSA PNA. Patient was subsequently extubated and hemodynamically stable for transfer to the floor.     On the floor, she went into respiratory distress again likely secondary to pulmonary vascular congestion requiring BiPAP, became hypotensive requiring vasopressor support, and began draining thick brown fluid from her wound. CT revealed another SBO and and matted small bowel in the anterior lower abdominal wall defect concerning for an EC fistula. She was started on imipenem and was weaned off vasopressor support. RTOR on 6/30 for ostomy revision and isolation of EC fistula with vac and pouch requiring phenylephrine post-operatively for hypotension. Patient also noted to be in post-renal TREVA pending a renal ultrasound.    24 HOUR EVENTS: VAC changed this AM. Patient was started on dilaudid PCA. Palliative care consult was placed to optimize pain control and to discuss goals of care with her. Started octreotide with goal of reducing output from fistulae. Patient did not want to get the CTA that was scheduled for today because she does not want any more tests, so it was cancelled.    SUBJECTIVE/ROS:  [x] A ten-point review of systems was otherwise negative except as noted.  [ ] Due to altered mental status/intubation, subjective information were not able to be obtained from the patient. History was obtained, to the extent possible, from review of the chart and collateral sources of information.      NEURO  CAM ICU: negative  Exam: asleep but arousable and alert  Meds: fentaNYL   Patch  75 MICROgram(s)/Hr. 1 Patch Transdermal every 48 hours  ondansetron Injectable 4 milliGRAM(s) IV Push every 6 hours PRN Nausea  morphine  - Injectable 2 milliGRAM(s) IV Push every 3 hours PRN moderate to severe  morphine PCA (5 mG/mL) 30 milliLiter(s) PCA Continuous PCA Continuous  morphine PCA (5 mG/mL) Rescue Clinician Bolus 3 milliGRAM(s) IV Push every 15 minutes PRN for Pain Scale GREATER THAN 6    [x] Adequacy of sedation and pain control has been assessed and adjusted    RESPIRATORY  RR: 22 (06 Jul 2017 03:00) (0 - 32)  SpO2: 99% (06 Jul 2017 03:00) (95% - 100%)  Exam: unlabored, clear to auscultation bilaterally  Mechanical Ventilation: none  [N/A] Extubation Readiness Assessed  Meds: buDESOnide 160 MICROgram(s)/formoterol 4.5 MICROgram(s) Inhaler 2 Puff(s) Inhalation two times a day  ALBUTerol/ipratropium for Nebulization 3 milliLiter(s) Nebulizer every 6 hours PRN Shortness of Breath and/or Wheezing    CARDIOVASCULAR  HR: 95 (06 Jul 2017 03:00) (87 - 103)  BP: 120/56 (05 Jul 2017 14:21) (120/56 - 120/56)  BP(mean): 80 (05 Jul 2017 14:21) (80 - 80)  ABP: 101/45 (06 Jul 2017 03:00) (95/42 - 129/55)  ABP(mean): 67 (06 Jul 2017 03:00) (64 - 86)  Cardiac Rhythm: sinus  Perfusion     [x]Adequate   [ ]Inadequate  Mentation   [x]Normal       [ ]Reduced  Extremities  [x]Warm         [ ]Cool  Volume Status [ ]Hypervolemic [x]Euvolemic [ ]Hypovolemic  Meds:     GI/NUTRITION  Exam: soft, ND, mild tenderness throughout, VAC draining tannish fluid, NGT draining bilious fluid, ostomy pink & viable w/ no gas or stool  Diet: NPO w/ TPN @ 62  Meds: pantoprazole  Injectable 40 milliGRAM(s) IV Push daily  Octreotide    GENITOURINARY  I&O's Detail    05 Jul 2017 07:01  -  06 Jul 2017 07:00  --------------------------------------------------------  IN:    dextrose 5% + sodium chloride 0.45% with potassium chloride 20 mEq/L: 1050 mL    dextrose 5% + sodium chloride 0.45%.: 150 mL    lactated ringers.: 450 mL    Solution: 200 mL    Solution: 200 mL    TPN (Total Parenteral Nutrition): 1302 mL  Total IN: 3352 mL    OUT:    Drain: 450 mL    Indwelling Catheter - Urethral: 2935 mL    Nasoenteral Tube: 450 mL    VAC (Vacuum Assisted Closure) System: 100 mL  Total OUT: 3935 mL    Total NET: -583 mL                          9.8    8.3   )-----------( 315      ( 06 Jul 2017 03:22 )             29.9       07-06    135  |  103  |  45<H>  ----------------------------<  118<H>  6.1<H>   |  22  |  1.70<H>    Ca    9.2      06 Jul 2017 03:22  Phos  5.0     07-06  Mg     2.1     07-06    TPro  7.0  /  Alb  2.4<L>  /  TBili  0.3  /  DBili  x   /  AST  30  /  ALT  21  /  AlkPhos  284<H>  07-06      PT/INR - ( 06 Jul 2017 03:22 )   PT: 12.7 sec;   INR: 1.17 ratio         PTT - ( 06 Jul 2017 03:22 )  PTT:44.5 sec    [x] Snyder catheter, indication: UOP monitoring in critically ill patient  Meds: dextrose 5% + sodium chloride 0.45% with potassium chloride 20 mEq/L 1000 milliLiter(s) IV Continuous <Continuous>  lactated ringers. 1000 milliLiter(s) IV Continuous <Continuous>    HEMATOLOGIC  Meds: fondaparinux Injectable 2.5 milliGRAM(s) SubCutaneous every 24 hours    [x] VTE Prophylaxis                          9.8    8.3   )-----------( 315      ( 06 Jul 2017 03:22 )             29.9     Transfusion     [ ] PRBC   [ ] Platelets   [ ] FFP   [ ] Cryoprecipitate    INFECTIOUS DISEASES  T(C): 37.3 (06 Jul 2017 03:00), Max: 37.3 (06 Jul 2017 03:00)  T(F): 99.1 (06 Jul 2017 03:00), Max: 99.1 (06 Jul 2017 03:00)  WBC Count: 7.6 K/uL (07-05 @ 03:00)    Recent Cultures: none. Blood cx since 6/16 have all been negative.    Meds: imipenem/cilastatin  IVPB 500 milliGRAM(s) IV Intermittent every 12 hours    ENDOCRINE  Capillary Blood Glucose  141 (05 Jul 2017 18:00)  104 (05 Jul 2017 12:00)    Meds: levothyroxine Injectable 44 MICROGram(s) IV Push daily  octreotide  Injectable 50 MICROGram(s) IV Push three times a day  insulin lispro (HumaLOG) corrective regimen sliding scale   SubCutaneous every 6 hours    ACCESS DEVICES:  [x] Peripheral IV  [ ] Central Venous Line	[ ] R	[ ] L	[ ] IJ	[ ] Fem	[ ] SC	Placed:   [x] Arterial Line		[ ] R	[x] L	[x] Fem	[ ] Rad	[ ] Ax	Placed: 6/22/17  [x] PICC: L arm placed 5/31/17			[ ] Mediport  [x] Urinary Catheter, Date Placed: 6/22/17  [x] Necessity of urinary, arterial, and venous catheters discussed    OTHER MEDICATIONS:  fluticasone propionate 50 MICROgram(s)/spray Nasal Spray 1 Spray(s) Both Nostrils two times a day  naloxone Injectable 0.1 milliGRAM(s) IV Push every 3 minutes PRN  naloxone Injectable 0.1 milliGRAM(s) IV Push every 3 minutes PRN    CODE STATUS: full code    IMAGING: no new

## 2017-07-06 NOTE — PROGRESS NOTE ADULT - SUBJECTIVE AND OBJECTIVE BOX
Follow-up Pulm Progress Note    No new respiratory events overnight.  Denies SOB/CP. o2 sat 97 % on room air. "i dont want this any more"    Medications:  MEDICATIONS  (STANDING):  levothyroxine Injectable 44 MICROGram(s) IV Push daily  pantoprazole  Injectable 40 milliGRAM(s) IV Push daily  fondaparinux Injectable 2.5 milliGRAM(s) SubCutaneous every 24 hours  buDESOnide 160 MICROgram(s)/formoterol 4.5 MICROgram(s) Inhaler 2 Puff(s) Inhalation two times a day  fluticasone propionate 50 MICROgram(s)/spray Nasal Spray 1 Spray(s) Both Nostrils two times a day  imipenem/cilastatin  IVPB 500 milliGRAM(s) IV Intermittent every 12 hours  fentaNYL   Patch  75 MICROgram(s)/Hr. 1 Patch Transdermal every 48 hours  lactated ringers. 1000 milliLiter(s) (1 mL/Hr) IV Continuous <Continuous>  morphine PCA (5 mG/mL) 30 milliLiter(s) PCA Continuous PCA Continuous  octreotide  Injectable 50 MICROGram(s) IV Push three times a day  insulin lispro (HumaLOG) corrective regimen sliding scale   SubCutaneous every 6 hours  dextrose 5% + sodium chloride 0.45%. 1000 milliLiter(s) (50 mL/Hr) IV Continuous <Continuous>    MEDICATIONS  (PRN):  ALBUTerol/ipratropium for Nebulization 3 milliLiter(s) Nebulizer every 6 hours PRN Shortness of Breath and/or Wheezing  ondansetron Injectable 4 milliGRAM(s) IV Push every 6 hours PRN Nausea  naloxone Injectable 0.1 milliGRAM(s) IV Push every 3 minutes PRN For ANY of the following changes in patient status:  A. RR LESS THAN 10 breaths per minute, B. Oxygen saturation LESS THAN 90%, C. Sedation score of 6  morphine  - Injectable 2 milliGRAM(s) IV Push every 3 hours PRN moderate to severe  morphine PCA (5 mG/mL) Rescue Clinician Bolus 3 milliGRAM(s) IV Push every 15 minutes PRN for Pain Scale GREATER THAN 6  naloxone Injectable 0.1 milliGRAM(s) IV Push every 3 minutes PRN For ANY of the following changes in patient status:  A. RR LESS THAN 10 breaths per minute, B. Oxygen saturation LESS THAN 90%, C. Sedation score of 6          Vital Signs Last 24 Hrs  T(C): 36.7 (06 Jul 2017 07:00), Max: 37.3 (06 Jul 2017 03:00)  T(F): 98.1 (06 Jul 2017 07:00), Max: 99.1 (06 Jul 2017 03:00)  HR: 99 (06 Jul 2017 08:00) (87 - 103)  BP: --  BP(mean): --  RR: 57 (06 Jul 2017 08:00) (12 - 57)  SpO2: 95% (06 Jul 2017 08:00) (95% - 100%)          07-04 @ 07:01  -  07-05 @ 07:00  --------------------------------------------------------  IN: 2638 mL / OUT: 3165 mL / NET: -527 mL    07-05 @ 07:01  -  07-06 @ 07:00  --------------------------------------------------------  IN: 3352 mL / OUT: 3935 mL / NET: -583 mL          LABS:                        9.8    8.3   )-----------( 315      ( 06 Jul 2017 03:22 )             29.9       wbc 8.3  07-06 @ 03:22  wbc 7.6  07-05 @ 03:00  wbc 7.7  07-04 @ 04:09    07-06    136  |  103  |  47<H>  ----------------------------<  104<H>  6.5<HH>   |  22  |  1.73<H>    Ca    9.0      06 Jul 2017 07:40  Phos  5.0     07-06  Mg     2.1     07-06    TPro  7.0  /  Alb  2.4<L>  /  TBili  0.3  /  DBili  x   /  AST  30  /  ALT  21  /  AlkPhos  284<H>  07-06    Cr 1.73  07-06 @ 07:40  Cr 1.70  07-06 @ 03:22  Cr 1.70  07-05 @ 03:00          CAPILLARY BLOOD GLUCOSE  119 (06 Jul 2017 00:00)        PT/INR - ( 06 Jul 2017 03:22 )   PT: 12.7 sec;   INR: 1.17 ratio         PTT - ( 06 Jul 2017 03:22 )  PTT:44.5 sec                  CULTURES: (if applicable)        Physical Examination:  PULM: decreased bd bilat, no significant sputum production  CVS: S1, S2 heard    RADIOLOGY REVIEWED  CXR: < from: Xray Chest 1 View AP -PORTABLE-Routine (07.04.17 @ 06:42) >    There is an NG tube in place with tip in the stomach. There is a   left-sided PICC line in place with tip at the cavoatrial junction. There   is no pneumothorax. The lungs are clear. The heart size is within normal   limits.    < end of copied text >      CT chest:    TTE:

## 2017-07-06 NOTE — CONSULT NOTE ADULT - PROBLEM SELECTOR RECOMMENDATION 3
Morphine PCA stopped 2/2 kidney injury. Switched to Dilaudid PCA 0.4mg/hour with 0.4 mg q10 min demand dosage, and 1 mg q 1 hour PRN clinician bolus  Tylenol suppository 650 mg q8h PRN  verbal orders given to RN Morphine PCA stopped 2/2 kidney injury. Switched to Dilaudid PCA 0.4mg/hour with 0.4 mg q10 min demand dosage, and 1 mg q 1 hour PRN clinician bolus  Tylenol suppository 650 mg q8h PRN  Fentanyl stopped  verbal orders given to RN

## 2017-07-07 LAB
CULTURE RESULTS: SIGNIFICANT CHANGE UP
CULTURE RESULTS: SIGNIFICANT CHANGE UP
SPECIMEN SOURCE: SIGNIFICANT CHANGE UP
SPECIMEN SOURCE: SIGNIFICANT CHANGE UP

## 2017-07-07 PROCEDURE — 99233 SBSQ HOSP IP/OBS HIGH 50: CPT | Mod: GC

## 2017-07-07 RX ORDER — DIPHENHYDRAMINE HCL 50 MG
25 CAPSULE ORAL EVERY 6 HOURS
Qty: 0 | Refills: 0 | Status: DISCONTINUED | OUTPATIENT
Start: 2017-07-07 | End: 2017-07-17

## 2017-07-07 RX ORDER — ONDANSETRON 8 MG/1
4 TABLET, FILM COATED ORAL EVERY 4 HOURS
Qty: 0 | Refills: 0 | Status: DISCONTINUED | OUTPATIENT
Start: 2017-07-07 | End: 2017-07-17

## 2017-07-07 RX ORDER — HYDROMORPHONE HYDROCHLORIDE 2 MG/ML
0.5 INJECTION INTRAMUSCULAR; INTRAVENOUS; SUBCUTANEOUS DAILY
Qty: 0 | Refills: 0 | Status: DISCONTINUED | OUTPATIENT
Start: 2017-07-07 | End: 2017-07-08

## 2017-07-07 RX ADMIN — HYDROMORPHONE HYDROCHLORIDE 0.5 MILLIGRAM(S): 2 INJECTION INTRAMUSCULAR; INTRAVENOUS; SUBCUTANEOUS at 12:36

## 2017-07-07 RX ADMIN — Medication 25 MILLIGRAM(S): at 17:04

## 2017-07-07 RX ADMIN — HYDROMORPHONE HYDROCHLORIDE 0.5 MILLIGRAM(S): 2 INJECTION INTRAMUSCULAR; INTRAVENOUS; SUBCUTANEOUS at 12:15

## 2017-07-07 RX ADMIN — HYDROMORPHONE HYDROCHLORIDE 30 MILLILITER(S): 2 INJECTION INTRAMUSCULAR; INTRAVENOUS; SUBCUTANEOUS at 19:14

## 2017-07-07 RX ADMIN — Medication 25 MILLIGRAM(S): at 11:43

## 2017-07-07 RX ADMIN — Medication 1 SPRAY(S): at 06:10

## 2017-07-07 RX ADMIN — Medication 1 SPRAY(S): at 18:06

## 2017-07-07 RX ADMIN — OCTREOTIDE ACETATE 200 MICROGRAM(S): 200 INJECTION, SOLUTION INTRAVENOUS; SUBCUTANEOUS at 21:15

## 2017-07-07 RX ADMIN — OCTREOTIDE ACETATE 200 MICROGRAM(S): 200 INJECTION, SOLUTION INTRAVENOUS; SUBCUTANEOUS at 06:09

## 2017-07-07 RX ADMIN — ONDANSETRON 4 MILLIGRAM(S): 8 TABLET, FILM COATED ORAL at 21:13

## 2017-07-07 RX ADMIN — PANTOPRAZOLE SODIUM 40 MILLIGRAM(S): 20 TABLET, DELAYED RELEASE ORAL at 11:43

## 2017-07-07 RX ADMIN — ONDANSETRON 4 MILLIGRAM(S): 8 TABLET, FILM COATED ORAL at 18:06

## 2017-07-07 RX ADMIN — HYDROMORPHONE HYDROCHLORIDE 30 MILLILITER(S): 2 INJECTION INTRAMUSCULAR; INTRAVENOUS; SUBCUTANEOUS at 07:19

## 2017-07-07 RX ADMIN — Medication 0.25 MILLIGRAM(S): at 07:33

## 2017-07-07 RX ADMIN — OCTREOTIDE ACETATE 200 MICROGRAM(S): 200 INJECTION, SOLUTION INTRAVENOUS; SUBCUTANEOUS at 14:49

## 2017-07-07 NOTE — PROGRESS NOTE ADULT - ASSESSMENT
76 year old female with history significant for COPD, prior DVT s/p IVC filter (now removed), GERD, hypothyroidism, HIT and diverticulitis s/p Ruben's, and recurrent SBO. Furthermore with EC fistulas and with multiple other complications including respiratory failure, septic shock, and TREVA. Consult called for GOC and symptoms. Patient requested full comfort care and is much more comfortable today.

## 2017-07-07 NOTE — PROGRESS NOTE ADULT - ASSESSMENT
76 year old female with history significant for COPD, prior DVT s/p IVC filter (now removed), GERD, hypothyroidism, HIT and diverticulitis s/p Ruben's, and recurrent SBO requiring ex-lap small bowel resection, EC fistula s/p takedown that was complicated by wound dehiscence and abdominal reconstruction, who underwent ex-lap, extensive NANETTE, EC fistula takedown, colostomy revision, and parastomal/incisional hernia repair on 6/9. She was admitted to SICU intubated post-operatively and was successfully extubated. However, after getting a CT with oral contrast, patient went into respiratory distress and was re-intubated and found to have MRSA pneumonia, likely aspiration in nature. She was subsequently extubated, weaned off pressors, and transferred to the floors. On the floor on 6/22, patient went into respiratory distress again likely secondary to pulmonary vascular congestion requiring BiPAP and became hypotensive requiring vasopressor support. Her wound also began draining thick brown fluid concerning for a new EC fistula vs. anastomotic breakdown and is currently on imipenem. Pressor needs decreasing, wound pouched with some improvement in clinical status, remains extubated, intermittently requiring BiPAP, now with decreased hearing.         - pt now dnr/dni-comfort measures. Awaiting [facility] bed. D/W daughter Carissa at bedside

## 2017-07-07 NOTE — PROGRESS NOTE ADULT - ASSESSMENT
ASSESSMENT:		  76 year old female SICU day #16, POD #28 s/p ex lap, extensive NANETTE, EC fistula takedown, repair of cecal serosal tear, partial R salpingectomy, colostomy revision, and parastomal & incisional hernia repair with Strattice mesh. Patient has had a prolonged hospital course complicated by MRSA pneumonia requiring re-intubation and vasopressor support, pulmonary vascular congestion requiring BiPAP, lack of ostomy function post-op, and septic shock requiring vasopressor support likely secondary to a new EC fistula s/p ostomy revision and isolation of EC fistula with VAC and pouch. After a goals of care discussion with the patient and her family, she elected to be DNR/DNI with comfort care. She is awaiting a bed in the palliative care unit.    Neurologic: Acute and chronic pain  -Continue pain management with Dilaudid PCA and Tylenol PRN  -Ativan PRN for anxiety, dyspnea  -Appreciate palliative care recommendations    Cardiovascular: Hypotension  -Monitor vital signs, no pressor support as pt is comfort care only    Respiratory: COPD  -Continue Symbicort and Duoneb for COPD    Gastrointestinal: EC fistula, SBO, ostomy dysfunction  -Continue octreotide with goal of decreasing fistulae output  -TPN discontinued as per goals of care discussion    Genitourinary/Renal: TREVA on CKD stage 3  -No intervention as pt is comfort care only    Hematologic: No active issues  -No VTE ppx as pt is comfort care only    Infectious Disease: EC fistula   -No antibiotics as pt is comfort care only    Endocrine: Hypothyroidism  -No intervention as pt is comfort care only    Disposition: DNR/DNI, awaiting bed in palliative care unit     -Heidi Calloway PA-C   [id number] ASSESSMENT:		  76 year old female SICU day #16, POD #28 s/p ex lap, extensive NANETTE, EC fistula takedown, repair of cecal serosal tear, partial R salpingectomy, colostomy revision, and parastomal & incisional hernia repair with Strattice mesh. Patient has had a prolonged hospital course complicated by MRSA pneumonia requiring re-intubation and vasopressor support, pulmonary vascular congestion requiring BiPAP, lack of ostomy function post-op, and septic shock requiring vasopressor support likely secondary to a new EC fistula s/p ostomy revision and isolation of EC fistula with VAC and pouch. After a goals of care discussion with the patient and her family, she elected to be DNR/DNI with comfort care. She is awaiting a bed in the palliative care unit.    PLAN:  Neurologic: Acute and chronic pain  -Continue pain management with Dilaudid PCA and Tylenol PRN  -Ativan PRN for anxiety, dyspnea  -Appreciate palliative care recommendations    Respiratory: COPD  -Continue Symbicort and Duoneb for COPD    Cardiovascular: Hypotension  -Monitor vital signs, no pressor support as pt is comfort care only    Gastrointestinal: EC fistula, SBO, ostomy dysfunction  -Continue octreotide with goal of decreasing fistulae output  -TPN discontinued as per goals of care discussion    Genitourinary/Renal: TREVA on CKD stage 3  -No intervention as pt is comfort care only    Hematologic: No active issues  -No VTE ppx as pt is comfort care only    Infectious Disease: EC fistula   -No antibiotics as pt is comfort care only    Endocrine: Hypothyroidism  -No intervention as pt is comfort care only    Disposition: DNR/DNI, awaiting bed in palliative care unit     -Heidi Calloway PA-C   [id number] ASSESSMENT:		  76 year old female SICU day #16, POD #28 s/p ex lap, extensive NANETTE, EC fistula takedown, repair of cecal serosal tear, partial R salpingectomy, colostomy revision, and parastomal & incisional hernia repair with Strattice mesh. Patient has had a prolonged hospital course complicated by MRSA pneumonia requiring re-intubation and vasopressor support, pulmonary vascular congestion requiring BiPAP, lack of ostomy function post-op, and septic shock requiring vasopressor support likely secondary to a new EC fistula s/p ostomy revision and isolation of EC fistula with VAC and pouch. After a goals of care discussion with the patient and her family, she elected to be DNR/DNI with comfort care. She is awaiting a bed in the palliative care unit.    PLAN:  Neurologic: Acute and chronic pain  -Continue pain management with Dilaudid PCA and Tylenol PRN  -Ativan PRN for anxiety, dyspnea  -Appreciate palliative care recommendations    Respiratory: COPD  -Continue Symbicort and Duoneb for COPD    Cardiovascular: Hypotension  -Monitor vital signs, no pressor support as pt is comfort care only  -Discontinue arterial line today     Gastrointestinal: EC fistula, SBO, ostomy dysfunction  -Continue octreotide with goal of decreasing fistulae output  -TPN discontinued as per goals of care discussion    Genitourinary/Renal: TREVA on CKD stage 3  -No intervention as pt is comfort care only    Hematologic: No active issues  -No VTE ppx as pt is comfort care only    Infectious Disease: EC fistula   -No antibiotics as pt is comfort care only    Endocrine: Hypothyroidism  -No intervention as pt is comfort care only    Disposition: DNR/DNI, awaiting bed in palliative care unit     -Heidi Calloway PA-C   [id number]

## 2017-07-07 NOTE — PROGRESS NOTE ADULT - PROBLEM SELECTOR PLAN 6
Met with children at bedside this morning, as well as spoke with dtrLisa Vázquez. Family in support of comfort care, Kathie having difficulty and is grieving. Holistic therapy consult placed. Patient's  to visit today. Placed on wait list for PCU bed. Hospice referral placed. Met with children at bedside this morning, as well as spoke with dtrLisa Vázquez. Family in support of comfort care, Kathie having difficulty and is grieving. Holistic therapy consult placed. Patient's  to visit today. Placed on wait list for PCU bed. Hospice referral placed. Family made to understand that in the unlikely circumstance she stabilizes there needs to be a discharge plan.

## 2017-07-07 NOTE — PROGRESS NOTE ADULT - SUBJECTIVE AND OBJECTIVE BOX
NEPHROLOGY-[facility] [phone]        Patient seen and examined in bed.         MEDICATIONS  (STANDING):  pantoprazole  Injectable 40 milliGRAM(s) IV Push daily  buDESOnide 160 MICROgram(s)/formoterol 4.5 MICROgram(s) Inhaler 2 Puff(s) Inhalation two times a day  fluticasone propionate 50 MICROgram(s)/spray Nasal Spray 1 Spray(s) Both Nostrils two times a day  octreotide  Injectable 200 MICROGram(s) IV Push three times a day  HYDROmorphone PCA (1 mG/mL) 30 milliLiter(s) PCA Continuous PCA Continuous  dextrose 5% + sodium chloride 0.45%. 1000 milliLiter(s) (20 mL/Hr) IV Continuous <Continuous>      VITAL:  T(C): , Max: 37.2 (07-07-17 @ 03:00)  T(F): , Max: 99 (07-07-17 @ 03:00)  HR: 98 (07-07-17 @ 07:00)  BP: --  BP(mean): --  RR: 28 (07-07-17 @ 07:00)  SpO2: 93% (07-07-17 @ 07:00)  Wt(kg): --    I and O's:    07-06 @ 07:01  -  07-07 @ 07:00  --------------------------------------------------------  IN: 740 mL / OUT: 2410 mL / NET: -1670 mL          PHYSICAL EXAM:    Constitutional: NAD  HEENT: PERRLA    Neck:  No JVD  Respiratory: CTAB/L  Cardiovascular: S1 and S2  Gastrointestinal: BS+, soft,+ ostomy  Extremities: No peripheral edema  Neurological: A/O x 3, no focal deficits  Psychiatric: Normal mood, normal affect  : No Snyder  Skin: No rashes  Access: Not applicable    LABS:                        9.8    8.3   )-----------( 315      ( 06 Jul 2017 03:22 )             29.9     07-06    136  |  103  |  47<H>  ----------------------------<  104<H>  6.5<HH>   |  22  |  1.73<H>    Ca    9.0      06 Jul 2017 07:40  Phos  5.0     07-06  Mg     2.1     07-06    TPro  7.0  /  Alb  2.4<L>  /  TBili  0.3  /  DBili  x   /  AST  30  /  ALT  21  /  AlkPhos  284<H>  07-06          Urine Studies:          RADIOLOGY & ADDITIONAL STUDIES:

## 2017-07-07 NOTE — PROGRESS NOTE ADULT - SUBJECTIVE AND OBJECTIVE BOX
HISTORY  76 year old female with a PMHx significant for diverticulitis s/p Ruben's c/b dehiscence and evisceration requiring ex lap and closure of abdomen with Surgimend mesh, c/b late enterocutaneous fistula development, s/p EC fistula takedown and abdominal wall reconstruction on  c/b recurrent ECF formation and multiple SBOs, COPD, CKD 3, prior DVT s/p IVC filter (removed and then replaced with a permanent filter), GERD, hypothyroidism, and HIT who initially presented on 5/23/17 w/ a SBO that did not resolve with medical management so she is s/p ex lap, extensive NANETTE, EC fistula takedown, repair of cecal serosal tear, partial R salpingectomy, colostomy revision, and parastomal & incisional hernia repair w/ Strattice mesh on 6/9/17.    Patient was admitted post-op intubated on vasopressor support. She was subsequently extubated and weaned off vasopressors. Hospital course complicated by lack of ostomy function and respiratory distress requiring re-intubation and MRSA PNA. Patient was subsequently extubated and hemodynamically stable for transfer to the floor.     On the floor, she went into respiratory distress again likely secondary to pulmonary vascular congestion requiring BiPAP, became hypotensive requiring vasopressor support, and began draining thick brown fluid from her wound. CT revealed another SBO and and matted small bowel in the anterior lower abdominal wall defect concerning for an EC fistula. She was started on imipenem and was weaned off vasopressor support. RTOR on 6/30 for ostomy revision and isolation of EC fistula with vac and pouch requiring phenylephrine post-operatively for hypotension. Patient also noted to be in post-renal TREVA.    24 HOUR EVENTS: Yesterday, after discussion with pt and her family, she elected to be DNR/DNI with comfort measures only.  The palliative care team started her on a Dilaudid PCA. She elected to be transferred to palliative care unit and is currently awaiting a bed.    SUBJECTIVE/ROS:   [ x] A ten-point review of systems was otherwise negative except as noted.  [ ] Due to altered mental status/intubation, subjective information were not able to be obtained from the patient. History was obtained, to the extent possible, from review of the chart and collateral sources of information.      NEURO  RASS:   0  GCS:     CAM ICU: negative  Exam: arousable, oriented  Meds: ondansetron Injectable 4 milliGRAM(s) IV Push every 6 hours PRN Nausea  HYDROmorphone PCA (1 mG/mL) 30 milliLiter(s) PCA Continuous PCA Continuous  HYDROmorphone PCA (1 mG/mL) Rescue Clinician Bolus 1 milliGRAM(s) IV Push every 1 hour PRN for Pain Scale GREATER THAN 6  acetaminophen  Suppository 650 milliGRAM(s) Rectal every 6 hours PRN For Temp greater than 38 C (100.4 F)  LORazepam   Injectable 0.25 milliGRAM(s) IV Push every 6 hours PRN Anxiety    [x] Adequacy of sedation and pain control has been assessed and adjusted      RESPIRATORY  RR: 13 (07-07-17 @ 03:00) (13 - 81)  SpO2: 95% (07-07-17 @ 03:00) (94% - 100%)  Exam: unlabored, clear to auscultation bilaterally  Mechanical Ventilation: N/A    [N/A ] Extubation Readiness Assessed  Meds: buDESOnide 160 MICROgram(s)/formoterol 4.5 MICROgram(s) Inhaler 2 Puff(s) Inhalation two times a day      CARDIOVASCULAR  HR: 93 (07-07-17 @ 03:00) (91 - 108)  ABP: 87/41 (07-07-17 @ 03:00) (87/41 - 112/52)  ABP(mean): 60 (07-07-17 @ 03:00) (59 - 77)      Exam: regular rate and rhythm  Cardiac Rhythm: sinus  Perfusion     [x ]Adequate   [ ]Inadequate  Mentation   [ x]Normal       [ ]Reduced  Extremities  [x ]Warm         [ ]Cool  Volume Status [ ]Hypervolemic [ x]Euvolemic [ ]Hypovolemic  Meds:       GI/NUTRITION  Exam: soft, ND, mild tenderness throughout, VAC draining tannish fluid, NGT draining bilious fluid, ostomy pink & viable w/ no gas or stool  Diet: NPO  Meds: pantoprazole  Injectable 40 milliGRAM(s) IV Push daily  octreotide  Injectable 200 MICROGram(s) IV Push three times a day      GENITOURINARY  I&O's Detail    07-05 @ 07:01  -  07-06 @ 07:00  --------------------------------------------------------  IN:    dextrose 5% + sodium chloride 0.45% with potassium chloride 20 mEq/L: 1050 mL    dextrose 5% + sodium chloride 0.45%.: 150 mL    lactated ringers.: 450 mL    Solution: 200 mL    Solution: 200 mL    TPN (Total Parenteral Nutrition): 1302 mL  Total IN: 3352 mL    OUT:    Drain: 450 mL    Indwelling Catheter - Urethral: 2935 mL    Nasoenteral Tube: 450 mL    VAC (Vacuum Assisted Closure) System: 100 mL  Total OUT: 3935 mL    Total NET: -583 mL      07-06 @ 07:01  -  07-07 @ 04:18  --------------------------------------------------------  IN:    dextrose 5% + sodium chloride 0.45%.: 400 mL    dextrose 5% + sodium chloride 0.45%.: 160 mL    Solution: 100 mL  Total IN: 660 mL    OUT:    Drain: 700 mL    Indwelling Catheter - Urethral: 980 mL    Nasoenteral Tube: 500 mL    VAC (Vacuum Assisted Closure) System: 30 mL  Total OUT: 2210 mL    Total NET: -1550 mL          07-06    136  |  103  |  47<H>  ----------------------------<  104<H>  6.5<HH>   |  22  |  1.73<H>    Ca    9.0      06 Jul 2017 07:40  Phos  5.0     07-06  Mg     2.1     07-06    TPro  7.0  /  Alb  2.4<L>  /  TBili  0.3  /  DBili  x   /  AST  30  /  ALT  21  /  AlkPhos  284<H>  07-06    [x ] Snyder catheter, indication: comfort  Meds: dextrose 5% + sodium chloride 0.45%. 1000 milliLiter(s) IV Continuous <Continuous>        HEMATOLOGIC  Meds: x  [ ] VTE Prophylaxis                         9.8    8.3   )-----------( 315      ( 06 Jul 2017 03:22 )             29.9     PT/INR - ( 06 Jul 2017 03:22 )   PT: 12.7 sec;   INR: 1.17 ratio         PTT - ( 06 Jul 2017 03:22 )  PTT:44.5 sec  Transfusion : none    [ ] PRBC   [ ] Platelets   [ ] FFP   [ ] Cryoprecipitate      INFECTIOUS DISEASES  T(C): 37.2 (07-07-17 @ 03:00), Max: 37.2 (07-07-17 @ 03:00)  Wt(kg): --    Recent Cultures:x    Meds: x      ENDOCRINE  Capillary Blood Glucose: No fingersticks    Meds: x        ACCESS DEVICES:  [x ] Peripheral IV  [ ] Central Venous Line	[ ] R	[ ] L	[ ] IJ	[ ] Fem	[ ] SC	Placed:   [x ] Arterial Line		[ ] R	[x ] L	[ x] Fem	[ ] Rad	[ ] Ax	Placed:   [ ] PICC:					[ ] Mediport  [x ] Urinary Catheter  [x ] Necessity of urinary, arterial, and venous catheters discussed    OTHER MEDICATIONS:  fluticasone propionate 50 MICROgram(s)/spray Nasal Spray 1 Spray(s) Both Nostrils two times a day      CODE STATUS: Yes  Yes      IMAGING:x HISTORY  76 year old female with a PMHx significant for diverticulitis s/p Ruben's c/b dehiscence and evisceration requiring ex lap and closure of abdomen with Surgimend mesh, c/b late enterocutaneous fistula development, s/p EC fistula takedown and abdominal wall reconstruction on  c/b recurrent ECF formation and multiple SBOs, COPD, CKD 3, prior DVT s/p IVC filter (removed and then replaced with a permanent filter), GERD, hypothyroidism, and HIT who initially presented on 5/23/17 w/ a SBO that did not resolve with medical management so she is s/p ex lap, extensive NANETTE, EC fistula takedown, repair of cecal serosal tear, partial R salpingectomy, colostomy revision, and parastomal & incisional hernia repair w/ Strattice mesh on 6/9/17.    Patient was admitted post-op intubated on vasopressor support. She was subsequently extubated and weaned off vasopressors. Hospital course complicated by lack of ostomy function and respiratory distress requiring re-intubation and MRSA PNA. Patient was subsequently extubated and hemodynamically stable for transfer to the floor.     On the floor, she went into respiratory distress again likely secondary to pulmonary vascular congestion requiring BiPAP, became hypotensive requiring vasopressor support, and began draining thick brown fluid from her wound. CT revealed another SBO and and matted small bowel in the anterior lower abdominal wall defect concerning for an EC fistula. She was started on imipenem and was weaned off vasopressor support. RTOR on 6/30 for ostomy revision and isolation of EC fistula with vac and pouch requiring phenylephrine post-operatively for hypotension. Patient also noted to be in post-renal TREVA.    24 HOUR EVENTS: Yesterday, after discussion with pt and her family, she elected to be DNR/DNI with comfort measures only.  The palliative care team started her on a Dilaudid PCA. She elected to be transferred to palliative care unit and is currently awaiting a bed.    SUBJECTIVE/ROS:   [ x] A ten-point review of systems was otherwise negative except as noted.  [ ] Due to altered mental status/intubation, subjective information were not able to be obtained from the patient. History was obtained, to the extent possible, from review of the chart and collateral sources of information.      NEURO  RASS:   0  GCS:     CAM ICU: negative  Exam: arousable, oriented  Meds: ondansetron Injectable 4 milliGRAM(s) IV Push every 6 hours PRN Nausea  HYDROmorphone PCA (1 mG/mL) 30 milliLiter(s) PCA Continuous PCA Continuous  HYDROmorphone PCA (1 mG/mL) Rescue Clinician Bolus 1 milliGRAM(s) IV Push every 1 hour PRN for Pain Scale GREATER THAN 6  acetaminophen  Suppository 650 milliGRAM(s) Rectal every 6 hours PRN For Temp greater than 38 C (100.4 F)  LORazepam   Injectable 0.25 milliGRAM(s) IV Push every 6 hours PRN Anxiety    [x] Adequacy of sedation and pain control has been assessed and adjusted      RESPIRATORY  RR: 13 (07-07-17 @ 03:00) (13 - 81)  SpO2: 95% (07-07-17 @ 03:00) (94% - 100%)  Exam: unlabored, clear to auscultation bilaterally  Mechanical Ventilation: N/A    [N/A ] Extubation Readiness Assessed  Meds: buDESOnide 160 MICROgram(s)/formoterol 4.5 MICROgram(s) Inhaler 2 Puff(s) Inhalation two times a day      CARDIOVASCULAR  HR: 93 (07-07-17 @ 03:00) (91 - 108)  ABP: 87/41 (07-07-17 @ 03:00) (87/41 - 112/52)  ABP(mean): 60 (07-07-17 @ 03:00) (59 - 77)      Exam: regular rate and rhythm  Cardiac Rhythm: sinus  Perfusion     [x ]Adequate   [ ]Inadequate  Mentation   [ x]Normal       [ ]Reduced  Extremities  [x ]Warm         [ ]Cool  Volume Status [ ]Hypervolemic [ x]Euvolemic [ ]Hypovolemic  Meds:       GI/NUTRITION  Exam: soft, ND, mild tenderness throughout, VAC draining tannish fluid, NGT draining bilious fluid, ostomy pink & viable w/ no gas or stool  Diet: NPO  Meds: pantoprazole  Injectable 40 milliGRAM(s) IV Push daily  octreotide  Injectable 200 MICROGram(s) IV Push three times a day      GENITOURINARY  I&O's Detail    06 Jul 2017 07:01  -  07 Jul 2017 07:00  --------------------------------------------------------  IN:    dextrose 5% + sodium chloride 0.45%.: 400 mL    dextrose 5% + sodium chloride 0.45%.: 240 mL    Solution: 100 mL  Total IN: 740 mL    OUT:    Drain: 700 mL    Indwelling Catheter - Urethral: 1080 mL    Nasoenteral Tube: 600 mL    VAC (Vacuum Assisted Closure) System: 30 mL  Total OUT: 2410 mL    Total NET: -1670 mL                          9.8    8.3   )-----------( 315      ( 06 Jul 2017 03:22 )             29.9       07-06    136  |  103  |  47<H>  ----------------------------<  104<H>  6.5<HH>   |  22  |  1.73<H>    Ca    9.0      06 Jul 2017 07:40  Phos  5.0     07-06  Mg     2.1     07-06    TPro  7.0  /  Alb  2.4<L>  /  TBili  0.3  /  DBili  x   /  AST  30  /  ALT  21  /  AlkPhos  284<H>  07-06      PT/INR - ( 06 Jul 2017 03:22 )   PT: 12.7 sec;   INR: 1.17 ratio         PTT - ( 06 Jul 2017 03:22 )  PTT:44.5 sec      [x ] Snyder catheter, indication: comfort  Meds: dextrose 5% + sodium chloride 0.45%. 1000 milliLiter(s) IV Continuous <Continuous>        HEMATOLOGIC  Meds: x  [ ] VTE Prophylaxis                         9.8    8.3   )-----------( 315      ( 06 Jul 2017 03:22 )             29.9     PT/INR - ( 06 Jul 2017 03:22 )   PT: 12.7 sec;   INR: 1.17 ratio         PTT - ( 06 Jul 2017 03:22 )  PTT:44.5 sec  Transfusion : none    [ ] PRBC   [ ] Platelets   [ ] FFP   [ ] Cryoprecipitate      INFECTIOUS DISEASES  T(C): 37.2 (07-07-17 @ 03:00), Max: 37.2 (07-07-17 @ 03:00)  Wt(kg): --    Recent Cultures:x    Meds: x      ENDOCRINE  Capillary Blood Glucose: No fingersticks    Meds: x        ACCESS DEVICES:  [x ] Peripheral IV  [ ] Central Venous Line	[ ] R	[ ] L	[ ] IJ	[ ] Fem	[ ] SC	Placed:   [x ] Arterial Line		[ ] R	[x ] L	[ x] Fem	[ ] Rad	[ ] Ax	Placed:   [ ] PICC:					[ ] Mediport  [x ] Urinary Catheter  [x ] Necessity of urinary, arterial, and venous catheters discussed    OTHER MEDICATIONS:  fluticasone propionate 50 MICROgram(s)/spray Nasal Spray 1 Spray(s) Both Nostrils two times a day      CODE STATUS: Yes  Yes      IMAGING:x HISTORY  76 year old female with a PMHx significant for diverticulitis s/p Ruben's c/b dehiscence and evisceration requiring ex lap and closure of abdomen with Surgimend mesh, c/b late enterocutaneous fistula development, s/p EC fistula takedown and abdominal wall reconstruction c/b recurrent ECF formation and multiple SBOs, COPD, CKD 3, prior DVT s/p IVC filter (removed and then replaced with a permanent filter), GERD, hypothyroidism, and HIT who initially presented on 5/23/17 w/ a SBO that did not resolve with medical management so she is s/p ex lap, extensive NANETTE, EC fistula takedown, repair of cecal serosal tear, partial R salpingectomy, colostomy revision, and parastomal & incisional hernia repair w/ Strattice mesh on 6/9/17.    Patient was admitted post-op intubated on vasopressor support. She was subsequently extubated and weaned off vasopressors. Hospital course complicated by lack of ostomy function and respiratory distress requiring re-intubation and MRSA PNA. Patient was subsequently extubated and hemodynamically stable for transfer to the floor.     On the floor, she went into respiratory distress again likely secondary to pulmonary vascular congestion requiring BiPAP, became hypotensive requiring vasopressor support, and began draining thick brown fluid from her wound. CT revealed another SBO and and matted small bowel in the anterior lower abdominal wall defect concerning for an EC fistula. She was started on imipenem and was weaned off vasopressor support. RTOR on 6/30 for ostomy revision and isolation of EC fistula with vac and pouch requiring phenylephrine post-operatively for hypotension. Patient also noted to be in post-renal TREVA.    24 HOUR EVENTS: Yesterday, after discussion with pt and her family, she elected to be DNR/DNI with comfort measures only.  The palliative care team started her on a Dilaudid PCA. She elected to be transferred to palliative care unit and is currently awaiting a bed.    SUBJECTIVE/ROS:   [ x] A ten-point review of systems was otherwise negative except as noted.      NEURO  RASS:   0  GCS:     CAM ICU: negative  Exam: arousable, oriented  Meds: ondansetron Injectable 4 milliGRAM(s) IV Push every 6 hours PRN Nausea  HYDROmorphone PCA (1 mG/mL) 30 milliLiter(s) PCA Continuous PCA Continuous  HYDROmorphone PCA (1 mG/mL) Rescue Clinician Bolus 1 milliGRAM(s) IV Push every 1 hour PRN for Pain Scale GREATER THAN 6  acetaminophen  Suppository 650 milliGRAM(s) Rectal every 6 hours PRN For Temp greater than 38 C (100.4 F)  LORazepam   Injectable 0.25 milliGRAM(s) IV Push every 6 hours PRN Anxiety    [x] Adequacy of sedation and pain control has been assessed and adjusted      RESPIRATORY  RR: 13 (07-07-17 @ 03:00) (13 - 81)  SpO2: 95% (07-07-17 @ 03:00) (94% - 100%)  Exam: unlabored, clear to auscultation bilaterally  Mechanical Ventilation: N/A    [N/A ] Extubation Readiness Assessed  Meds: buDESOnide 160 MICROgram(s)/formoterol 4.5 MICROgram(s) Inhaler 2 Puff(s) Inhalation two times a day      CARDIOVASCULAR  HR: 93 (07-07-17 @ 03:00) (91 - 108)  ABP: 87/41 (07-07-17 @ 03:00) (87/41 - 112/52)  ABP(mean): 60 (07-07-17 @ 03:00) (59 - 77)      Exam: regular rate and rhythm  Cardiac Rhythm: sinus  Perfusion     [x ]Adequate   [ ]Inadequate  Mentation   [ x]Normal       [ ]Reduced  Extremities  [x ]Warm         [ ]Cool  Volume Status [ ]Hypervolemic [ x]Euvolemic [ ]Hypovolemic  Meds:       GI/NUTRITION  Exam: soft, ND, mild tenderness throughout, VAC draining tannish fluid, NGT draining bilious fluid, ostomy pink & viable w/ no gas or stool  Diet: NPO  Meds: pantoprazole  Injectable 40 milliGRAM(s) IV Push daily  octreotide  Injectable 200 MICROGram(s) IV Push three times a day      GENITOURINARY  I&O's Detail    06 Jul 2017 07:01  -  07 Jul 2017 07:00  --------------------------------------------------------  IN:    dextrose 5% + sodium chloride 0.45%.: 400 mL    dextrose 5% + sodium chloride 0.45%.: 240 mL    Solution: 100 mL  Total IN: 740 mL    OUT:    Drain: 700 mL    Indwelling Catheter - Urethral: 1080 mL    Nasoenteral Tube: 600 mL    VAC (Vacuum Assisted Closure) System: 30 mL  Total OUT: 2410 mL    Total NET: -1670 mL                          9.8    8.3   )-----------( 315      ( 06 Jul 2017 03:22 )             29.9       07-06    136  |  103  |  47<H>  ----------------------------<  104<H>  6.5<HH>   |  22  |  1.73<H>    Ca    9.0      06 Jul 2017 07:40  Phos  5.0     07-06  Mg     2.1     07-06    TPro  7.0  /  Alb  2.4<L>  /  TBili  0.3  /  DBili  x   /  AST  30  /  ALT  21  /  AlkPhos  284<H>  07-06      PT/INR - ( 06 Jul 2017 03:22 )   PT: 12.7 sec;   INR: 1.17 ratio         PTT - ( 06 Jul 2017 03:22 )  PTT:44.5 sec      [x ] Snyder catheter, indication: comfort  Meds: dextrose 5% + sodium chloride 0.45%. 1000 milliLiter(s) IV Continuous <Continuous>        HEMATOLOGIC  Meds: x  [ ] VTE Prophylaxis                         9.8    8.3   )-----------( 315      ( 06 Jul 2017 03:22 )             29.9     PT/INR - ( 06 Jul 2017 03:22 )   PT: 12.7 sec;   INR: 1.17 ratio         PTT - ( 06 Jul 2017 03:22 )  PTT:44.5 sec  Transfusion : none    [ ] PRBC   [ ] Platelets   [ ] FFP   [ ] Cryoprecipitate      INFECTIOUS DISEASES  T(C): 37.2 (07-07-17 @ 03:00), Max: 37.2 (07-07-17 @ 03:00)  Wt(kg): --    Recent Cultures:x    Meds: x      ENDOCRINE  Capillary Blood Glucose: No fingersticks    Meds: x        ACCESS DEVICES:  [x ] Peripheral IV  [ ] Central Venous Line	[ ] R	[ ] L	[ ] IJ	[ ] Fem	[ ] SC	Placed:   [x ] Arterial Line		[ ] R	[x ] L	[ x] Fem	[ ] Rad	[ ] Ax	Placed:   [ ] PICC:					[ ] Mediport  [x ] Urinary Catheter  [x ] Necessity of urinary, arterial, and venous catheters discussed    OTHER MEDICATIONS:  fluticasone propionate 50 MICROgram(s)/spray Nasal Spray 1 Spray(s) Both Nostrils two times a day      CODE STATUS: Yes  Yes      IMAGING:x

## 2017-07-07 NOTE — PROGRESS NOTE ADULT - ASSESSMENT
A:  76y female with high volume ECF s/p exploratory laparotomy, extensive lysis of adhesions, takedown of enterocutaneous fistula, repair of serosal tear on cecum, partial right salpingooophorectomy, colostomy revision, parastomal and incisional hernia repair with strattice mesh on comfort care.    P:  - Comfort measures  - Care per SICU    ~ She Griffin MD, PGY-1

## 2017-07-07 NOTE — PROGRESS NOTE ADULT - SUBJECTIVE AND OBJECTIVE BOX
INTERVAL HPI/OVERNIGHT EVENTS: Pain and anxiety now controlled. Complaining of nausea    Allergies    azithromycin (Unknown)  codeine (Unknown)  heparin (Other)  PC Pen VK (Unknown)  penicillin (Unknown)    ADVANCE DIRECTIVES:    DNR: [x] YES [ ] NO           PRESENT SYMPTOMS:   SOURCE:  [x] Patient   [x] Family   [x] Team     Pain:     Dyspnea:  [ ] YES [x] NO  Anxiety:  [ ] YES [x] NO  Fatigue: [ ] YES [x] NO  Nausea: [x] YES [ ] NO  Loss of Appetite: [x] YES [ ] NO  Constipation [ ] YES   [x] No     OTHER SYMPTOMS:  [x] All other ROS negative       MEDICATIONS  (STANDING):  pantoprazole  Injectable 40 milliGRAM(s) IV Push daily  buDESOnide 160 MICROgram(s)/formoterol 4.5 MICROgram(s) Inhaler 2 Puff(s) Inhalation two times a day  fluticasone propionate 50 MICROgram(s)/spray Nasal Spray 1 Spray(s) Both Nostrils two times a day  octreotide  Injectable 200 MICROGram(s) IV Push three times a day  HYDROmorphone PCA (1 mG/mL) 30 milliLiter(s) PCA Continuous PCA Continuous  dextrose 5% + sodium chloride 0.45%. 1000 milliLiter(s) (20 mL/Hr) IV Continuous <Continuous>  ondansetron Injectable 4 milliGRAM(s) IV Push every 4 hours  HYDROmorphone  Injectable 0.5 milliGRAM(s) IV Push daily    MEDICATIONS  (PRN):  HYDROmorphone PCA (1 mG/mL) Rescue Clinician Bolus 1 milliGRAM(s) IV Push every 1 hour PRN for Pain Scale GREATER THAN 6  acetaminophen  Suppository 650 milliGRAM(s) Rectal every 6 hours PRN For Temp greater than 38 C (100.4 F)  LORazepam   Injectable 0.25 milliGRAM(s) IV Push every 6 hours PRN Anxiety  diphenhydrAMINE   Injectable 25 milliGRAM(s) IV Push every 6 hours PRN Itching      Karnofsky Performance Score/Palliative Performance Status Version 2:        20  %    Physical Exam:    General: AAO x 3, comfortable, calm, smiling  HEENT: + NGT - wall suction   Lungs: Clear   Cardiovascular: Regular rate and rhythm    Abdomen: soft. +wound vac RLQ draining dark brown material, moderate- large amount  Musculoskeletal: Generalized weakness   Neurological: No focal deficits   Skin: , see MSK exam     Vital Signs Last 24 Hrs  T(C): 37.5 (07 Jul 2017 15:00), Max: 37.5 (07 Jul 2017 15:00)  T(F): 99.5 (07 Jul 2017 15:00), Max: 99.5 (07 Jul 2017 15:00)  HR: 97 (07 Jul 2017 15:00) (93 - 101)  BP: --  BP(mean): --  RR: 17 (07 Jul 2017 15:00) (13 - 34)  SpO2: 93% (07 Jul 2017 11:00) (93% - 100%)    LABS:                        9.8    8.3   )-----------( 315      ( 06 Jul 2017 03:22 )             29.9     07-06    136  |  103  |  47<H>  ----------------------------<  104<H>  6.5<HH>   |  22  |  1.73<H>    Ca    9.0      06 Jul 2017 07:40  Phos  5.0     07-06  Mg     2.1     07-06    TPro  7.0  /  Alb  2.4<L>  /  TBili  0.3  /  DBili  x   /  AST  30  /  ALT  21  /  AlkPhos  284<H>  07-06    PT/INR - ( 06 Jul 2017 03:22 )   PT: 12.7 sec;   INR: 1.17 ratio         PTT - ( 06 Jul 2017 03:22 )  PTT:44.5 sec    I&O's Summary    06 Jul 2017 07:01  -  07 Jul 2017 07:00  --------------------------------------------------------  IN: 740 mL / OUT: 2410 mL / NET: -1670 mL    07 Jul 2017 07:01  -  07 Jul 2017 18:31  --------------------------------------------------------  IN: 200 mL / OUT: 300 mL / NET: -100 mL        RADIOLOGY & ADDITIONAL STUDIES: INTERVAL HPI/OVERNIGHT EVENTS: Pain and anxiety now controlled. Complaining of nausea    Allergies    azithromycin (Unknown)  codeine (Unknown)  heparin (Other)  PC Pen VK (Unknown)  penicillin (Unknown)    ADVANCE DIRECTIVES:    DNR: [x] YES [ ] NO           PRESENT SYMPTOMS:   SOURCE:  [x] Patient   [x] Family   [x] Team     Pain:     Dyspnea:  [ ] YES [x] NO  Anxiety:  [ ] YES [x] NO  Fatigue: [ ] YES [x] NO  Nausea: [x] YES [ ] NO  Loss of Appetite: [x] YES [ ] NO  Constipation [ ] YES   [x] No     OTHER SYMPTOMS:  [x] All other ROS negative       MEDICATIONS  (STANDING):  pantoprazole  Injectable 40 milliGRAM(s) IV Push daily  buDESOnide 160 MICROgram(s)/formoterol 4.5 MICROgram(s) Inhaler 2 Puff(s) Inhalation two times a day  fluticasone propionate 50 MICROgram(s)/spray Nasal Spray 1 Spray(s) Both Nostrils two times a day  octreotide  Injectable 200 MICROGram(s) IV Push three times a day  HYDROmorphone PCA (1 mG/mL) 30 milliLiter(s) PCA Continuous PCA Continuous  dextrose 5% + sodium chloride 0.45%. 1000 milliLiter(s) (20 mL/Hr) IV Continuous <Continuous>  ondansetron Injectable 4 milliGRAM(s) IV Push every 4 hours  HYDROmorphone  Injectable 0.5 milliGRAM(s) IV Push daily    MEDICATIONS  (PRN):  HYDROmorphone PCA (1 mG/mL) Rescue Clinician Bolus 1 milliGRAM(s) IV Push every 1 hour PRN for Pain Scale GREATER THAN 6  acetaminophen  Suppository 650 milliGRAM(s) Rectal every 6 hours PRN For Temp greater than 38 C (100.4 F)  LORazepam   Injectable 0.25 milliGRAM(s) IV Push every 6 hours PRN Anxiety  diphenhydrAMINE   Injectable 25 milliGRAM(s) IV Push every 6 hours PRN Itching      Karnofsky Performance Score/Palliative Performance Status Version 2:        20  %    Physical Exam:    General: AAO x 3, comfortable, calm, smiling  HEENT: + NGT - wall suction   Lungs: Clear   Cardiovascular: Regular rate and rhythm    Abdomen: soft. +wound vac RLQ draining dark brown material, moderate- large amount  Musculoskeletal: Generalized weakness   Neurological: No focal deficits   Skin: , see MSK exam     Vital Signs Last 24 Hrs  T(C): 37.5 (07 Jul 2017 15:00), Max: 37.5 (07 Jul 2017 15:00)  T(F): 99.5 (07 Jul 2017 15:00), Max: 99.5 (07 Jul 2017 15:00)  HR: 97 (07 Jul 2017 15:00) (93 - 101)  BP: --  BP(mean): --  RR: 17 (07 Jul 2017 15:00) (13 - 34)  SpO2: 93% (07 Jul 2017 11:00) (93% - 100%)    LABS:                        9.8    8.3   )-----------( 315      ( 06 Jul 2017 03:22 )             29.9     07-06    136  |  103  |  47<H>  ----------------------------<  104<H>  6.5<HH>   |  22  |  1.73<H>    Ca    9.0      06 Jul 2017 07:40  Phos  5.0     07-06  Mg     2.1     07-06    TPro  7.0  /  Alb  2.4<L>  /  TBili  0.3  /  DBili  x   /  AST  30  /  ALT  21  /  AlkPhos  284<H>  07-06    PT/INR - ( 06 Jul 2017 03:22 )   PT: 12.7 sec;   INR: 1.17 ratio         PTT - ( 06 Jul 2017 03:22 )  PTT:44.5 sec    I&O's Summary    06 Jul 2017 07:01  -  07 Jul 2017 07:00  --------------------------------------------------------  IN: 740 mL / OUT: 2410 mL / NET: -1670 mL    07 Jul 2017 07:01  -  07 Jul 2017 18:31  --------------------------------------------------------  IN: 200 mL / OUT: 300 mL / NET: -100 mL

## 2017-07-07 NOTE — PROGRESS NOTE ADULT - PROBLEM SELECTOR PLAN 1
2/2 adhesions  Patient would like to continue with NGT currently; no discomfort present   If discomfort were to occur, start cetacaine spray into nare and throat, every 3 hours PRN  Discussed with family that NGT may come out once output is decreased  C/w octreotide  Added Zofran 4 mg q6h

## 2017-07-07 NOTE — PROGRESS NOTE ADULT - SUBJECTIVE AND OBJECTIVE BOX
ACS DAILY PROGRESS NOTE    S: 76y female with high volume ECF s/p exploratory laparotomy, extensive lysis of adhesions, takedown of enterocutaneous fistula, repair of serosal tear on cecum, partial right salpingooophorectomy, colostomy revision, parastomal and incisional hernia repair with strattice mesh. POD 28 s/p revision of colostomy, debridement of open abdominal wound, VAC placement. She is currently comfort measures only.    O: ICU Vital Signs Last 24 Hrs  T(C): 37 (07 Jul 2017 07:00), Max: 37.2 (07 Jul 2017 03:00)  T(F): 98.6 (07 Jul 2017 07:00), Max: 99 (07 Jul 2017 03:00)  HR: 98 (07 Jul 2017 07:00) (91 - 102)  ABP: 91/44 (07 Jul 2017 07:00) (87/41 - 112/52)  ABP(mean): 63 (07 Jul 2017 07:00) (59 - 77)  RR: 28 (07 Jul 2017 07:00) (13 - 81)  SpO2: 93% (07 Jul 2017 07:00) (93% - 100%)    I&O's Summary    06 Jul 2017 07:01  -  07 Jul 2017 07:00  --------------------------------------------------------  IN: 740 mL / OUT: 2410 mL / NET: -1670 mL    07-06    136  |  103  |  47<H>  ----------------------------<  104<H>  6.5<HH>   |  22  |  1.73<H>    Ca    9.0      06 Jul 2017 07:40  Phos  5.0     07-06  Mg     2.1     07-06    TPro  7.0  /  Alb  2.4<L>  /  TBili  0.3  /  DBili  x   /  AST  30  /  ALT  21  /  AlkPhos  284<H>  07-06    PHYSICAL EXAM:   General: appears drowsy  Neuro: alert, oriented x3  HEENT: NC/AT, EOMI  GI/Abd: Soft, ostomy pink. Vac holding suction. EC fistula pouch in place.   76y female with high volume ECF s/p exploratory laparotomy, extensive lysis of adhesions, takedown of enterocutaneous fistula, repair of serosal tear on cecum, partial right salpingooophorectomy, colostomy revision, parastomal and incisional hernia repair with strattice mesh, POD 22 now s/p revision of colostomy, debridement of open abdominal wound, VAC placement

## 2017-07-07 NOTE — PROGRESS NOTE ADULT - ATTENDING COMMENTS
Pt seen and examined today at noon, agree with above. Met with patient and her daughter Fe and daughter-in-law at bedside. Pt's pain control is improved now. Our goals of care have transitioned to comfort care only. I have discussed  with Palliative team.     All meds discontinued except: dilaudid PCA, KVO to maintain PCA, Protonix and Octreotide to decrease NGT and fistula output. DNR/DNI. D/w pt and her family.

## 2017-07-07 NOTE — PROGRESS NOTE ADULT - SUBJECTIVE AND OBJECTIVE BOX
Follow-up Pulm Progress Note    No new respiratory events overnight.  o2 sat on room air -100%  Pt now dnr/dni comfort care-awaiting [facility] care bed    Medications:  MEDICATIONS  (STANDING):  pantoprazole  Injectable 40 milliGRAM(s) IV Push daily  buDESOnide 160 MICROgram(s)/formoterol 4.5 MICROgram(s) Inhaler 2 Puff(s) Inhalation two times a day  fluticasone propionate 50 MICROgram(s)/spray Nasal Spray 1 Spray(s) Both Nostrils two times a day  octreotide  Injectable 200 MICROGram(s) IV Push three times a day  HYDROmorphone PCA (1 mG/mL) 30 milliLiter(s) PCA Continuous PCA Continuous  dextrose 5% + sodium chloride 0.45%. 1000 milliLiter(s) (20 mL/Hr) IV Continuous <Continuous>  ondansetron Injectable 4 milliGRAM(s) IV Push every 4 hours  HYDROmorphone  Injectable 0.5 milliGRAM(s) IV Push daily    MEDICATIONS  (PRN):  HYDROmorphone PCA (1 mG/mL) Rescue Clinician Bolus 1 milliGRAM(s) IV Push every 1 hour PRN for Pain Scale GREATER THAN 6  acetaminophen  Suppository 650 milliGRAM(s) Rectal every 6 hours PRN For Temp greater than 38 C (100.4 F)  LORazepam   Injectable 0.25 milliGRAM(s) IV Push every 6 hours PRN Anxiety  diphenhydrAMINE   Injectable 25 milliGRAM(s) IV Push every 6 hours PRN Itching          Vital Signs Last 24 Hrs  T(C): 37.3 (07 Jul 2017 11:00), Max: 37.3 (07 Jul 2017 11:00)  T(F): 99.1 (07 Jul 2017 11:00), Max: 99.1 (07 Jul 2017 11:00)  HR: 100 (07 Jul 2017 11:00) (91 - 101)  BP: --  BP(mean): --  RR: 29 (07 Jul 2017 11:00) (13 - 41)  SpO2: 93% (07 Jul 2017 11:00) (93% - 100%)          07-05 @ 07:01  -  07-06 @ 07:00  --------------------------------------------------------  IN: 3352 mL / OUT: 3935 mL / NET: -583 mL    07-06 @ 07:01  -  07-07 @ 07:00  --------------------------------------------------------  IN: 740 mL / OUT: 2410 mL / NET: -1670 mL    07-07 @ 07:01  -  07-07 @ 13:04  --------------------------------------------------------  IN: 100 mL / OUT: 0 mL / NET: 100 mL          LABS:                        9.8    8.3   )-----------( 315      ( 06 Jul 2017 03:22 )             29.9       wbc 8.3  07-06 @ 03:22  wbc 7.6  07-05 @ 03:00  wbc 7.7  07-04 @ 04:09    07-06    136  |  103  |  47<H>  ----------------------------<  104<H>  6.5<HH>   |  22  |  1.73<H>    Ca    9.0      06 Jul 2017 07:40  Phos  5.0     07-06  Mg     2.1     07-06    TPro  7.0  /  Alb  2.4<L>  /  TBili  0.3  /  DBili  x   /  AST  30  /  ALT  21  /  AlkPhos  284<H>  07-06    Cr 1.73  07-06 @ 07:40  Cr 1.70  07-06 @ 03:22  Cr 1.70  07-05 @ 03:00          CAPILLARY BLOOD GLUCOSE  119 (06 Jul 2017 00:00)        PT/INR - ( 06 Jul 2017 03:22 )   PT: 12.7 sec;   INR: 1.17 ratio         PTT - ( 06 Jul 2017 03:22 )  PTT:44.5 sec                  CULTURES: (if applicable)        Physical Examination:  PULM: decreased bs bilat, no significant sputum production  CVS: S1, S2 heard    RADIOLOGY REVIEWED  CXR: < from: Xray Chest 1 View AP -PORTABLE-Routine (07.04.17 @ 06:42) >  There is an NG tube in place with tip in the stomach. There is a   left-sided PICC line in place with tip at the cavoatrial junction. There   is no pneumothorax. The lungs are clear. The heart size is within normal   limits.    Impression:    Findings grossly unchanged as above.          CT chest:    TTE:

## 2017-07-08 LAB — SURGICAL PATHOLOGY STUDY: SIGNIFICANT CHANGE UP

## 2017-07-08 RX ADMIN — ONDANSETRON 4 MILLIGRAM(S): 8 TABLET, FILM COATED ORAL at 10:32

## 2017-07-08 RX ADMIN — ONDANSETRON 4 MILLIGRAM(S): 8 TABLET, FILM COATED ORAL at 14:56

## 2017-07-08 RX ADMIN — HYDROMORPHONE HYDROCHLORIDE 30 MILLILITER(S): 2 INJECTION INTRAMUSCULAR; INTRAVENOUS; SUBCUTANEOUS at 07:14

## 2017-07-08 RX ADMIN — Medication 1 SPRAY(S): at 05:17

## 2017-07-08 RX ADMIN — ONDANSETRON 4 MILLIGRAM(S): 8 TABLET, FILM COATED ORAL at 01:39

## 2017-07-08 RX ADMIN — SODIUM CHLORIDE 20 MILLILITER(S): 9 INJECTION, SOLUTION INTRAVENOUS at 05:16

## 2017-07-08 RX ADMIN — HYDROMORPHONE HYDROCHLORIDE 30 MILLILITER(S): 2 INJECTION INTRAMUSCULAR; INTRAVENOUS; SUBCUTANEOUS at 19:15

## 2017-07-08 RX ADMIN — OCTREOTIDE ACETATE 200 MICROGRAM(S): 200 INJECTION, SOLUTION INTRAVENOUS; SUBCUTANEOUS at 06:02

## 2017-07-08 RX ADMIN — OCTREOTIDE ACETATE 200 MICROGRAM(S): 200 INJECTION, SOLUTION INTRAVENOUS; SUBCUTANEOUS at 14:57

## 2017-07-08 RX ADMIN — PANTOPRAZOLE SODIUM 40 MILLIGRAM(S): 20 TABLET, DELAYED RELEASE ORAL at 12:55

## 2017-07-08 RX ADMIN — OCTREOTIDE ACETATE 200 MICROGRAM(S): 200 INJECTION, SOLUTION INTRAVENOUS; SUBCUTANEOUS at 23:08

## 2017-07-08 RX ADMIN — ONDANSETRON 4 MILLIGRAM(S): 8 TABLET, FILM COATED ORAL at 05:16

## 2017-07-08 RX ADMIN — Medication 1 SPRAY(S): at 18:17

## 2017-07-08 RX ADMIN — ONDANSETRON 4 MILLIGRAM(S): 8 TABLET, FILM COATED ORAL at 18:18

## 2017-07-08 RX ADMIN — Medication 25 MILLIGRAM(S): at 23:45

## 2017-07-08 NOTE — PROGRESS NOTE ADULT - ATTENDING COMMENTS
Continuing Palliative approach with pain control, and octreotide/protonix to decrease intestinal secretions (to decrease leaks from fistula appliance, the changing of which is uncomfortable for pt). Pt's family at bedside.Pain is well-controlled with dilaudid pca.

## 2017-07-08 NOTE — PROGRESS NOTE ADULT - SUBJECTIVE AND OBJECTIVE BOX
HISTORY  76 year old female with a PMHx significant for diverticulitis s/p Ruben's c/b dehiscence and evisceration requiring ex lap and closure of abdomen with Surgimend mesh, c/b late enterocutaneous fistula development, s/p EC fistula takedown and abdominal wall reconstruction c/b recurrent ECF formation and multiple SBOs, COPD, CKD 3, prior DVT s/p IVC filter (removed and then replaced with a permanent filter), GERD, hypothyroidism, and HIT who initially presented on 5/23/17 w/ a SBO that did not resolve with medical management so she is s/p ex lap, extensive NANETTE, EC fistula takedown, repair of cecal serosal tear, partial R salpingectomy, colostomy revision, and parastomal & incisional hernia repair w/ Strattice mesh on 6/9/17.    Patient was admitted post-op intubated on vasopressor support. She was subsequently extubated and weaned off vasopressors. Hospital course complicated by lack of ostomy function and respiratory distress requiring re-intubation and MRSA PNA. Patient was subsequently extubated and hemodynamically stable for transfer to the floor.     On the floor, she went into respiratory distress again likely secondary to pulmonary vascular congestion requiring BiPAP, became hypotensive requiring vasopressor support, and began draining thick brown fluid from her wound. CT revealed another SBO and and matted small bowel in the anterior lower abdominal wall defect concerning for an EC fistula. She was started on imipenem and was weaned off vasopressor support. RTOR on 6/30 for ostomy revision and isolation of EC fistula with vac and pouch requiring phenylephrine post-operatively for hypotension. Patient also noted to be in post-renal TREVA.    24 HOUR EVENTS: Femoral arterial line and wound vac discontinued. Currently awaiting a bed in the PCU.    SUBJECTIVE/ROS: Pt remains comfortable.   [ x] A ten-point review of systems was otherwise negative except as noted.  [ ] Due to altered mental status/intubation, subjective information were not able to be obtained from the patient. History was obtained, to the extent possible, from review of the chart and collateral sources of information.      NEURO  RASS:  0   GCS:     CAM ICU : negative  Exam: arousable, oriented  Meds: HYDROmorphone PCA (1 mG/mL) 30 milliLiter(s) PCA Continuous PCA Continuous  HYDROmorphone PCA (1 mG/mL) Rescue Clinician Bolus 1 milliGRAM(s) IV Push every 1 hour PRN for Pain Scale GREATER THAN 6  acetaminophen  Suppository 650 milliGRAM(s) Rectal every 6 hours PRN For Temp greater than 38 C (100.4 F)  LORazepam   Injectable 0.25 milliGRAM(s) IV Push every 6 hours PRN Anxiety  ondansetron Injectable 4 milliGRAM(s) IV Push every 4 hours    [x] Adequacy of sedation and pain control has been assessed and adjusted      RESPIRATORY  RR: 16 (07-08-17 @ 03:00) (16 - 29)  SpO2: 96% (07-08-17 @ 03:00) (93% - 97%)    Exam: unlabored, clear to auscultation bilaterally  Mechanical Ventilation: n/a    [ n/a] Extubation Readiness Assessed  Meds: buDESOnide 160 MICROgram(s)/formoterol 4.5 MICROgram(s) Inhaler 2 Puff(s) Inhalation two times a day  diphenhydrAMINE   Injectable 25 milliGRAM(s) IV Push every 6 hours PRN Itching        CARDIOVASCULAR  HR: 96 (07-08-17 @ 03:00) (95 - 101)  BP: 97/51 (07-08-17 @ 03:00) (89/47 - 97/51)  BP(mean): 68 (07-08-17 @ 03:00) (65 - 68)  ABP: 92/49 (07-07-17 @ 15:00) (91/44 - 109/54)  ABP(mean): 66 (07-07-17 @ 15:00) (63 - 76)    Exam: regular rate and rhythm  Cardiac Rhythm: sinus rhythm  Perfusion     [x ]Adequate   [ ]Inadequate  Mentation   [x ]Normal       [ ]Reduced  Extremities  [x ]Warm         [ ]Cool  Volume Status [ ]Hypervolemic [ x]Euvolemic [ ]Hypovolemic  Meds: x      GI/NUTRITION  Exam: soft, ND, mild tenderness throughout, NGT draining bilious fluid, ostomy pink & viable   Diet: NPO  Meds: pantoprazole  Injectable 40 milliGRAM(s) IV Push daily      GENITOURINARY  I&O's Detail    07-06 @ 07:01  -  07-07 @ 07:00  --------------------------------------------------------  IN:    dextrose 5% + sodium chloride 0.45%.: 400 mL    dextrose 5% + sodium chloride 0.45%.: 240 mL    Solution: 100 mL  Total IN: 740 mL    OUT:    Drain: 700 mL    Indwelling Catheter - Urethral: 1080 mL    Nasoenteral Tube: 600 mL    VAC (Vacuum Assisted Closure) System: 30 mL  Total OUT: 2410 mL    Total NET: -1670 mL      07-07 @ 07:01  -  07-08 @ 05:45  --------------------------------------------------------  IN:    dextrose 5% + sodium chloride 0.45%.: 460 mL  Total IN: 460 mL    OUT:    Colostomy: 20 mL    Drain: 300 mL    Indwelling Catheter - Urethral: 620 mL    Nasoenteral Tube: 200 mL  Total OUT: 1140 mL    Total NET: -680 mL          07-06    136  |  103  |  47<H>  ----------------------------<  104<H>  6.5<HH>   |  22  |  1.73<H>    Ca    9.0      06 Jul 2017 07:40  Phos  5.0     07-06  Mg     2.1     07-06      [ x] Snyder catheter, indication: comfort  Meds: dextrose 5% + sodium chloride 0.45%. 1000 milliLiter(s) IV Continuous <Continuous>        HEMATOLOGIC  Meds: x  -x] VTE Prophylaxis -comfort care only      Transfusion: none     [ ] PRBC   [ ] Platelets   [ ] FFP   [ ] Cryoprecipitate      INFECTIOUS DISEASES  T(C): 37 (07-08-17 @ 03:00), Max: 37.5 (07-07-17 @ 15:00)      Recent Cultures:x    Meds: x      ENDOCRINE  Capillary Blood Glucose:x    Meds: octreotide  Injectable 200 MICROGram(s) IV Push three times a day        ACCESS DEVICES:  [ ] Peripheral IV  [ ] Central Venous Line	[ ] R	[ ] L	[ ] IJ	[ ] Fem	[ ] SC	Placed:   [ ] Arterial Line		[ ] R	[ ] L	[ ] Fem	[ ] Rad	[ ] Ax	Placed:   [x ] PICC: Left basilic vein		[ ] Mediport  [x ] Urinary Catheter   [x ] Necessity of urinary, arterial, and venous catheters discussed    OTHER MEDICATIONS:  fluticasone propionate 50 MICROgram(s)/spray Nasal Spray 1 Spray(s) Both Nostrils two times a day      CODE STATUS: DNR/ DNI, awaiting palliative care unit      IMAGING: x HISTORY  76 year old female with a PMHx significant for diverticulitis s/p Ruben's c/b dehiscence and evisceration requiring ex lap and closure of abdomen with Surgimend mesh, c/b late enterocutaneous fistula development, s/p EC fistula takedown and abdominal wall reconstruction c/b recurrent ECF formation and multiple SBOs, COPD, CKD 3, prior DVT s/p IVC filter (removed and then replaced with a permanent filter), GERD, hypothyroidism, and HIT who initially presented on 5/23/17 w/ a SBO that did not resolve with medical management so she is s/p ex lap, extensive NANETTE, EC fistula takedown, repair of cecal serosal tear, partial R salpingectomy, colostomy revision, and parastomal & incisional hernia repair w/ Strattice mesh on 6/9/17.    Patient was admitted post-op intubated on vasopressor support. She was subsequently extubated and weaned off vasopressors. Hospital course complicated by lack of ostomy function and respiratory distress requiring re-intubation and MRSA PNA. Patient was subsequently extubated and hemodynamically stable for transfer to the floor.     On the floor, she went into respiratory distress again likely secondary to pulmonary vascular congestion requiring BiPAP, became hypotensive requiring vasopressor support, and began draining thick brown fluid from her wound. CT revealed another SBO and and matted small bowel in the anterior lower abdominal wall defect concerning for an EC fistula. She was started on imipenem and was weaned off vasopressor support. RTOR on 6/30 for ostomy revision and isolation of EC fistula with vac and pouch requiring phenylephrine post-operatively for hypotension. Patient also noted to be in post-renal TREVA. After discussion with pt and her family, she elected to be DNR/DNI with comfort measures only. She elected to be transferred to palliative care unit and is currently awaiting a bed.    24 HOUR EVENTS: Femoral arterial line and wound vac discontinued. Currently awaiting a bed in the PCU.    SUBJECTIVE/ROS: Pt remains comfortable.   [ x] A ten-point review of systems was otherwise negative except as noted.  [ ] Due to altered mental status/intubation, subjective information were not able to be obtained from the patient. History was obtained, to the extent possible, from review of the chart and collateral sources of information.      NEURO  RASS:  0   GCS:     CAM ICU : negative  Exam: arousable, oriented  Meds: HYDROmorphone PCA (1 mG/mL) 30 milliLiter(s) PCA Continuous PCA Continuous  HYDROmorphone PCA (1 mG/mL) Rescue Clinician Bolus 1 milliGRAM(s) IV Push every 1 hour PRN for Pain Scale GREATER THAN 6  acetaminophen  Suppository 650 milliGRAM(s) Rectal every 6 hours PRN For Temp greater than 38 C (100.4 F)  LORazepam   Injectable 0.25 milliGRAM(s) IV Push every 6 hours PRN Anxiety  ondansetron Injectable 4 milliGRAM(s) IV Push every 4 hours    [x] Adequacy of sedation and pain control has been assessed and adjusted      RESPIRATORY  RR: 16 (07-08-17 @ 03:00) (16 - 29)  SpO2: 96% (07-08-17 @ 03:00) (93% - 97%)    Exam: unlabored, clear to auscultation bilaterally  Mechanical Ventilation: n/a    [ n/a] Extubation Readiness Assessed  Meds: buDESOnide 160 MICROgram(s)/formoterol 4.5 MICROgram(s) Inhaler 2 Puff(s) Inhalation two times a day  diphenhydrAMINE   Injectable 25 milliGRAM(s) IV Push every 6 hours PRN Itching        CARDIOVASCULAR  HR: 96 (07-08-17 @ 03:00) (95 - 101)  BP: 97/51 (07-08-17 @ 03:00) (89/47 - 97/51)  BP(mean): 68 (07-08-17 @ 03:00) (65 - 68)  ABP: 92/49 (07-07-17 @ 15:00) (91/44 - 109/54)  ABP(mean): 66 (07-07-17 @ 15:00) (63 - 76)    Exam: regular rate and rhythm  Cardiac Rhythm: sinus rhythm  Perfusion     [x ]Adequate   [ ]Inadequate  Mentation   [x ]Normal       [ ]Reduced  Extremities  [x ]Warm         [ ]Cool  Volume Status [ ]Hypervolemic [ x]Euvolemic [ ]Hypovolemic  Meds: x      GI/NUTRITION  Exam: soft, ND, mild tenderness throughout, NGT draining bilious fluid, ostomy pink & viable   Diet: NPO  Meds: pantoprazole  Injectable 40 milliGRAM(s) IV Push daily      GENITOURINARY  I&O's Detail    07-06 @ 07:01  -  07-07 @ 07:00  --------------------------------------------------------  IN:    dextrose 5% + sodium chloride 0.45%.: 400 mL    dextrose 5% + sodium chloride 0.45%.: 240 mL    Solution: 100 mL  Total IN: 740 mL    OUT:    Drain: 700 mL    Indwelling Catheter - Urethral: 1080 mL    Nasoenteral Tube: 600 mL    VAC (Vacuum Assisted Closure) System: 30 mL  Total OUT: 2410 mL    Total NET: -1670 mL      07-07 @ 07:01 - 07-08 @ 05:45  --------------------------------------------------------  IN:    dextrose 5% + sodium chloride 0.45%.: 460 mL  Total IN: 460 mL    OUT:    Colostomy: 20 mL    Drain: 300 mL    Indwelling Catheter - Urethral: 620 mL    Nasoenteral Tube: 200 mL  Total OUT: 1140 mL    Total NET: -680 mL          07-06    136  |  103  |  47<H>  ----------------------------<  104<H>  6.5<HH>   |  22  |  1.73<H>    Ca    9.0      06 Jul 2017 07:40  Phos  5.0     07-06  Mg     2.1     07-06      [ x] Snyder catheter, indication: comfort  Meds: dextrose 5% + sodium chloride 0.45%. 1000 milliLiter(s) IV Continuous <Continuous>        HEMATOLOGIC  Meds: x  -x] VTE Prophylaxis -comfort care only      Transfusion: none     [ ] PRBC   [ ] Platelets   [ ] FFP   [ ] Cryoprecipitate      INFECTIOUS DISEASES  T(C): 37 (07-08-17 @ 03:00), Max: 37.5 (07-07-17 @ 15:00)      Recent Cultures:x    Meds: x      ENDOCRINE  Capillary Blood Glucose:x    Meds: octreotide  Injectable 200 MICROGram(s) IV Push three times a day        ACCESS DEVICES:  [ ] Peripheral IV  [ ] Central Venous Line	[ ] R	[ ] L	[ ] IJ	[ ] Fem	[ ] SC	Placed:   [ ] Arterial Line		[ ] R	[ ] L	[ ] Fem	[ ] Rad	[ ] Ax	Placed:   [x ] PICC: Left basilic vein		[ ] Mediport  [x ] Urinary Catheter   [x ] Necessity of urinary, arterial, and venous catheters discussed    OTHER MEDICATIONS:  fluticasone propionate 50 MICROgram(s)/spray Nasal Spray 1 Spray(s) Both Nostrils two times a day      CODE STATUS: DNR/ DNI, awaiting palliative care unit      IMAGING: x HISTORY  76 year old female with a PMHx significant for diverticulitis s/p Ruben's c/b dehiscence and evisceration requiring ex lap and closure of abdomen with Surgimend mesh, c/b late enterocutaneous fistula development, s/p EC fistula takedown and abdominal wall reconstruction c/b recurrent ECF formation and multiple SBOs, COPD, CKD 3, prior DVT s/p IVC filter (removed and then replaced with a permanent filter), GERD, hypothyroidism, and HIT who initially presented on 5/23/17 w/ a SBO that did not resolve with medical management so she is s/p ex lap, extensive NANETTE, EC fistula takedown, repair of cecal serosal tear, partial R salpingectomy, colostomy revision, and parastomal & incisional hernia repair w/ Strattice mesh on 6/9/17.    Patient was admitted post-op intubated on vasopressor support. She was subsequently extubated and weaned off vasopressors. Hospital course complicated by lack of ostomy function and respiratory distress requiring re-intubation and MRSA PNA. Patient was subsequently extubated and hemodynamically stable for transfer to the floor.     On the floor, she went into respiratory distress again likely secondary to pulmonary vascular congestion requiring BiPAP, became hypotensive requiring vasopressor support, and began draining thick brown fluid from her wound. CT revealed another SBO and and matted small bowel in the anterior lower abdominal wall defect concerning for an EC fistula. She was started on imipenem and was weaned off vasopressor support. RTOR on 6/30 for ostomy revision and isolation of EC fistula with vac and pouch requiring phenylephrine post-operatively for hypotension. Patient also noted to be in post-renal TREVA. After discussion with pt and her family, she elected to be DNR/DNI with comfort measures only. She elected to be transferred to palliative care unit and is currently awaiting a bed.    24 HOUR EVENTS: Femoral arterial line and wound vac discontinued. Currently awaiting a bed in the PCU.    SUBJECTIVE/ROS: Pt remains comfortable.   [ x] A ten-point review of systems was otherwise negative except as noted.  [ ] Due to altered mental status/intubation, subjective information were not able to be obtained from the patient. History was obtained, to the extent possible, from review of the chart and collateral sources of information.      NEURO  RASS:  0   GCS:     CAM ICU : negative  Exam: arousable, oriented  Meds: HYDROmorphone PCA (1 mG/mL) 30 milliLiter(s) PCA Continuous PCA Continuous  HYDROmorphone PCA (1 mG/mL) Rescue Clinician Bolus 1 milliGRAM(s) IV Push every 1 hour PRN for Pain Scale GREATER THAN 6  acetaminophen  Suppository 650 milliGRAM(s) Rectal every 6 hours PRN For Temp greater than 38 C (100.4 F)  LORazepam   Injectable 0.25 milliGRAM(s) IV Push every 6 hours PRN Anxiety  ondansetron Injectable 4 milliGRAM(s) IV Push every 4 hours    [x] Adequacy of sedation and pain control has been assessed and adjusted      RESPIRATORY  RR: 16 (07-08-17 @ 03:00) (16 - 29)  SpO2: 96% (07-08-17 @ 03:00) (93% - 97%)    Exam: unlabored, clear to auscultation bilaterally  Mechanical Ventilation: n/a    [ n/a] Extubation Readiness Assessed  Meds: buDESOnide 160 MICROgram(s)/formoterol 4.5 MICROgram(s) Inhaler 2 Puff(s) Inhalation two times a day  diphenhydrAMINE   Injectable 25 milliGRAM(s) IV Push every 6 hours PRN Itching        CARDIOVASCULAR  HR: 96 (07-08-17 @ 03:00) (95 - 101)  BP: 97/51 (07-08-17 @ 03:00) (89/47 - 97/51)  BP(mean): 68 (07-08-17 @ 03:00) (65 - 68)  ABP: 92/49 (07-07-17 @ 15:00) (91/44 - 109/54)  ABP(mean): 66 (07-07-17 @ 15:00) (63 - 76)    Exam: regular rate and rhythm  Cardiac Rhythm: sinus rhythm  Perfusion     [x ]Adequate   [ ]Inadequate  Mentation   [x ]Normal       [ ]Reduced  Extremities  [x ]Warm         [ ]Cool  Volume Status [ ]Hypervolemic [ x]Euvolemic [ ]Hypovolemic  Meds: x      GI/NUTRITION  Exam: soft, ND, mild tenderness throughout, NGT draining bilious fluid, ostomy pink & viable   Diet: NPO  Meds: pantoprazole  Injectable 40 milliGRAM(s) IV Push daily      GENITOURINARY  I&O's Detail    07 Jul 2017 07:01 - 08 Jul 2017 07:00  --------------------------------------------------------  IN:    dextrose 5% + sodium chloride 0.45%.: 480 mL  Total IN: 480 mL    OUT:    Colostomy: 20 mL    Drain: 300 mL    Indwelling Catheter - Urethral: 620 mL   (last 3 voids: 0/50/0)    Nasoenteral Tube: 200 mL  Total OUT: 1140 mL    Total NET: -660 mL        07-06    136  |  103  |  47<H>  ----------------------------<  104<H>  6.5<HH>   |  22  |  1.73<H>    Ca    9.0      06 Jul 2017 07:40  Phos  5.0     07-06  Mg     2.1     07-06      [ x] Snyder catheter, indication: comfort  Meds: dextrose 5% + sodium chloride 0.45%. 1000 milliLiter(s) IV Continuous <Continuous>        HEMATOLOGIC  Meds: x  -x] VTE Prophylaxis -comfort care only      Transfusion: none     [ ] PRBC   [ ] Platelets   [ ] FFP   [ ] Cryoprecipitate      INFECTIOUS DISEASES  T(C): 37 (07-08-17 @ 03:00), Max: 37.5 (07-07-17 @ 15:00)      Recent Cultures:x    Meds: x      ENDOCRINE  Capillary Blood Glucose:x    Meds: octreotide  Injectable 200 MICROGram(s) IV Push three times a day        ACCESS DEVICES:  [ ] Peripheral IV  [ ] Central Venous Line	[ ] R	[ ] L	[ ] IJ	[ ] Fem	[ ] SC	Placed:   [ ] Arterial Line		[ ] R	[ ] L	[ ] Fem	[ ] Rad	[ ] Ax	Placed:   [x ] PICC: Left basilic vein		[ ] Mediport  [x ] Urinary Catheter   [x ] Necessity of urinary, arterial, and venous catheters discussed    OTHER MEDICATIONS:  fluticasone propionate 50 MICROgram(s)/spray Nasal Spray 1 Spray(s) Both Nostrils two times a day      CODE STATUS: DNR/ DNI, awaiting palliative care unit      IMAGING: x

## 2017-07-08 NOTE — PROGRESS NOTE ADULT - ASSESSMENT
ASSESSMENT:		  76 year old female SICU day #17, POD #29 s/p ex lap, extensive NANETTE, EC fistula takedown, repair of cecal serosal tear, partial R salpingectomy, colostomy revision, and parastomal & incisional hernia repair with Strattice mesh. Patient has had a prolonged hospital course complicated by MRSA pneumonia requiring re-intubation and vasopressor support, pulmonary vascular congestion requiring BiPAP, lack of ostomy function post-op, and septic shock requiring vasopressor support likely secondary to a new EC fistula s/p ostomy revision and isolation of EC fistula with VAC and pouch. After a goals of care discussion with the patient and her family, she elected to be DNR/DNI with comfort care. She is awaiting a bed in the palliative care unit.    PLAN:  Neurologic: Acute and chronic pain  -Continue pain management with Dilaudid PCA and Tylenol PRN  -Ativan PRN for anxiety, dyspnea  -Appreciate palliative care recommendations    Respiratory: COPD  -Continue Symbicort and Duoneb for COPD    Cardiovascular: No acute issues  -Monitor vital signs, no pressor support as pt is comfort care only    Gastrointestinal: EC fistula, SBO, ostomy dysfunction  -Continue octreotide with goal of decreasing fistulae output  -TPN discontinued as per goals of care discussion    Genitourinary/Renal: TREVA on CKD stage 3  -No intervention as pt is comfort care only    Hematologic: No active issues  -No VTE ppx as pt is comfort care only    Infectious Disease: EC fistula   -No antibiotics as pt is comfort care only    Endocrine: Hypothyroidism  -No intervention as pt is comfort care only    Disposition: DNR/DNI, awaiting bed in palliative care unit     -Heidi Calloway PA-C   [id number]

## 2017-07-08 NOTE — PROGRESS NOTE ADULT - SUBJECTIVE AND OBJECTIVE BOX
Day _7/4__ of Anest[facility] Pain Management Service    SUBJECTIVE:    Pain Scale Score	At rest: __2_ 	With Activity: ___ 	[ ] Refer to charted pain scores    THERAPY:    [ ] IV PCA Morphine		[ ] 5 mg/mL	[ ] 1 mg/mL  [2 ] IV PCA Hydromorphone	[ ] 5 mg/mL	[ ] 1 mg/mL  [ ] IV PCA Fentanyl		[ ] 50 micrograms/mL    Demand dose 0.4   lockout 10   (minutes) Continuous Rate 0.4   Total:     Daily      MEDICATIONS  (STANDING):  pantoprazole  Injectable 40 milliGRAM(s) IV Push daily  buDESOnide 160 MICROgram(s)/formoterol 4.5 MICROgram(s) Inhaler 2 Puff(s) Inhalation two times a day  fluticasone propionate 50 MICROgram(s)/spray Nasal Spray 1 Spray(s) Both Nostrils two times a day  octreotide  Injectable 200 MICROGram(s) IV Push three times a day  HYDROmorphone PCA (1 mG/mL) 30 milliLiter(s) PCA Continuous PCA Continuous  dextrose 5% + sodium chloride 0.45%. 1000 milliLiter(s) (20 mL/Hr) IV Continuous <Continuous>  ondansetron Injectable 4 milliGRAM(s) IV Push every 4 hours    MEDICATIONS  (PRN):  HYDROmorphone PCA (1 mG/mL) Rescue Clinician Bolus 1 milliGRAM(s) IV Push every 1 hour PRN for Pain Scale GREATER THAN 6  acetaminophen  Suppository 650 milliGRAM(s) Rectal every 6 hours PRN For Temp greater than 38 C (100.4 F)  LORazepam   Injectable 0.25 milliGRAM(s) IV Push every 6 hours PRN Anxiety  diphenhydrAMINE   Injectable 25 milliGRAM(s) IV Push every 6 hours PRN Itching      OBJECTIVE:    Sedation Score:	[x ] Alert	[ ] Drowsy 	[ ] Arousable	[ ] Asleep	[ ] Unresponsive    Side Effects:	[x ] None	[ ] Nausea	[ ] Vomiting	[ ] Pruritus  		[ ] Other:    Vital Signs Last 24 Hrs  T(C): 37 (08 Jul 2017 03:00), Max: 37.5 (07 Jul 2017 15:00)  T(F): 98.6 (08 Jul 2017 03:00), Max: 99.5 (07 Jul 2017 15:00)  HR: 96 (08 Jul 2017 03:00) (95 - 101)  BP: 97/51 (08 Jul 2017 03:00) (89/47 - 97/51)  BP(mean): 68 (08 Jul 2017 03:00) (65 - 68)  RR: 16 (08 Jul 2017 03:00) (16 - 29)  SpO2: 96% (08 Jul 2017 03:00) (93% - 97%)    ASSESSMENT/ PLAN    Therapy to  be:	[x ] Continue   [ ] Discontinued   [ ] Change to prn Analgesics    Documentation and Verification of current medications:   [X] Done	[ ] Not done, not elligible    Comments:

## 2017-07-08 NOTE — PROGRESS NOTE ADULT - SUBJECTIVE AND OBJECTIVE BOX
ACS DAILY PROGRESS NOTE    S: 76y female with high volume ECF s/p exploratory laparotomy, extensive lysis of adhesions, takedown of enterocutaneous fistula, repair of serosal tear on cecum, partial right salpingooophorectomy, colostomy revision, parastomal and incisional hernia repair with strattice mesh. POD 28 s/p revision of colostomy, debridement of open abdominal wound, VAC placement. She is currently comfort measures only.    O: ICU Vital Signs Last 24 Hrs  T(C): 37 (07 Jul 2017 07:00), Max: 37.2 (07 Jul 2017 03:00)  T(F): 98.6 (07 Jul 2017 07:00), Max: 99 (07 Jul 2017 03:00)  HR: 98 (07 Jul 2017 07:00) (91 - 102)  ABP: 91/44 (07 Jul 2017 07:00) (87/41 - 112/52)  ABP(mean): 63 (07 Jul 2017 07:00) (59 - 77)  RR: 28 (07 Jul 2017 07:00) (13 - 81)  SpO2: 93% (07 Jul 2017 07:00) (93% - 100%)    I&O's Summary    06 Jul 2017 07:01  -  07 Jul 2017 07:00  --------------------------------------------------------  IN: 740 mL / OUT: 2410 mL / NET: -1670 mL    07-06    136  |  103  |  47<H>  ----------------------------<  104<H>  6.5<HH>   |  22  |  1.73<H>    Ca    9.0      06 Jul 2017 07:40  Phos  5.0     07-06  Mg     2.1     07-06    TPro  7.0  /  Alb  2.4<L>  /  TBili  0.3  /  DBili  x   /  AST  30  /  ALT  21  /  AlkPhos  284<H>  07-06    PHYSICAL EXAM:   General: appears drowsy  Neuro: alert, oriented x3  HEENT: NC/AT, EOMI  GI/Abd: Soft, ostomy pink. Vac holding suction. EC fistula pouch in place.   76y female with high volume ECF s/p exploratory laparotomy, extensive lysis of adhesions, takedown of enterocutaneous fistula, repair of serosal tear on cecum, partial right salpingooophorectomy, colostomy revision, parastomal and incisional hernia repair with strattice mesh, POD 22 now s/p revision of colostomy, debridement of open abdominal wound, VAC placement ACS DAILY PROGRESS NOTE    S: 76y female with high volume ECF s/p exploratory laparotomy, extensive lysis of adhesions, takedown of enterocutaneous fistula, repair of serosal tear on cecum, partial right salpingooophorectomy, colostomy revision, parastomal and incisional hernia repair with strattice mesh. POD 28 s/p revision of colostomy, debridement of open abdominal wound, VAC placement. She is currently comfort measures only.    O:     ICU Vital Signs Last 24 Hrs  T(C): 36.4 (08 Jul 2017 11:00), Max: 37.5 (07 Jul 2017 15:00)  T(F): 97.5 (08 Jul 2017 11:00), Max: 99.5 (07 Jul 2017 15:00)  HR: 93 (08 Jul 2017 11:00) (93 - 101)  BP: 94/51 (08 Jul 2017 11:00) (89/47 - 97/51)  BP(mean): 70 (08 Jul 2017 11:00) (65 - 70)  ABP: 92/49 (07 Jul 2017 15:00) (92/49 - 92/49)  ABP(mean): 66 (07 Jul 2017 15:00) (66 - 66)  RR: 18 (08 Jul 2017 11:00) (16 - 20)  SpO2: 93% (08 Jul 2017 11:00) (93% - 100%)    I&O's Detail    07 Jul 2017 07:01  -  08 Jul 2017 07:00  --------------------------------------------------------  IN:    dextrose 5% + sodium chloride 0.45%.: 480 mL  Total IN: 480 mL    OUT:    Colostomy: 20 mL    Drain: 300 mL    Indwelling Catheter - Urethral: 620 mL    Nasoenteral Tube: 200 mL  Total OUT: 1140 mL    Total NET: -660 mL      08 Jul 2017 07:01  -  08 Jul 2017 13:25  --------------------------------------------------------  IN:    dextrose 5% + sodium chloride 0.45%.: 20 mL  Total IN: 20 mL    OUT:    Drain: 480 mL    Indwelling Catheter - Urethral: 110 mL  Total OUT: 590 mL    Total NET: -570 mL      PHYSICAL EXAM:   General: NAD  Neuro: alert, oriented x3  HEENT: NC/AT, EOMI  GI/Abd: Soft, ostomy pink. Vac holding suction. EC fistula pouch in place.   76y female with high volume ECF s/p exploratory laparotomy, extensive lysis of adhesions, takedown of enterocutaneous fistula, repair of serosal tear on cecum, partial right salpingooophorectomy, colostomy revision, parastomal and incisional hernia repair with strattice mesh, POD 22 now s/p revision of colostomy, debridement of open abdominal wound, VAC placement

## 2017-07-08 NOTE — PROGRESS NOTE ADULT - ASSESSMENT
A:  76y female with high volume ECF s/p exploratory laparotomy, extensive lysis of adhesions, takedown of enterocutaneous fistula, repair of serosal tear on cecum, partial right salpingooophorectomy, colostomy revision, parastomal and incisional hernia repair with strattice mesh on comfort care.    P:  - Comfort measures  - Care per SICU    ~ She Griffin MD, PGY-1 A:  76y female with high volume ECF s/p exploratory laparotomy, extensive lysis of adhesions, takedown of enterocutaneous fistula, repair of serosal tear on cecum, partial right salpingooophorectomy, colostomy revision, parastomal and incisional hernia repair with strattice mesh on comfort care.    P:  - Comfort measures  - Consider increasing PCA  - Care per SICU    ~ She Griffin MD, PGY-1

## 2017-07-09 RX ORDER — HYDROMORPHONE HYDROCHLORIDE 2 MG/ML
1 INJECTION INTRAMUSCULAR; INTRAVENOUS; SUBCUTANEOUS
Qty: 0 | Refills: 0 | Status: DISCONTINUED | OUTPATIENT
Start: 2017-07-09 | End: 2017-07-11

## 2017-07-09 RX ORDER — DIPHENHYDRAMINE HCL 50 MG
25 CAPSULE ORAL ONCE
Qty: 0 | Refills: 0 | Status: COMPLETED | OUTPATIENT
Start: 2017-07-09 | End: 2017-07-11

## 2017-07-09 RX ORDER — ROBINUL 0.2 MG/ML
0.4 INJECTION INTRAMUSCULAR; INTRAVENOUS EVERY 6 HOURS
Qty: 0 | Refills: 0 | Status: DISCONTINUED | OUTPATIENT
Start: 2017-07-09 | End: 2017-07-15

## 2017-07-09 RX ADMIN — HYDROMORPHONE HYDROCHLORIDE 1 MILLIGRAM(S): 2 INJECTION INTRAMUSCULAR; INTRAVENOUS; SUBCUTANEOUS at 03:25

## 2017-07-09 RX ADMIN — ONDANSETRON 4 MILLIGRAM(S): 8 TABLET, FILM COATED ORAL at 22:12

## 2017-07-09 RX ADMIN — OCTREOTIDE ACETATE 200 MICROGRAM(S): 200 INJECTION, SOLUTION INTRAVENOUS; SUBCUTANEOUS at 22:12

## 2017-07-09 RX ADMIN — ONDANSETRON 4 MILLIGRAM(S): 8 TABLET, FILM COATED ORAL at 17:05

## 2017-07-09 RX ADMIN — HYDROMORPHONE HYDROCHLORIDE 30 MILLILITER(S): 2 INJECTION INTRAMUSCULAR; INTRAVENOUS; SUBCUTANEOUS at 20:58

## 2017-07-09 RX ADMIN — OCTREOTIDE ACETATE 200 MICROGRAM(S): 200 INJECTION, SOLUTION INTRAVENOUS; SUBCUTANEOUS at 06:23

## 2017-07-09 RX ADMIN — Medication 1 SPRAY(S): at 18:49

## 2017-07-09 RX ADMIN — HYDROMORPHONE HYDROCHLORIDE 1 MILLIGRAM(S): 2 INJECTION INTRAMUSCULAR; INTRAVENOUS; SUBCUTANEOUS at 19:14

## 2017-07-09 RX ADMIN — Medication 1 SPRAY(S): at 08:29

## 2017-07-09 RX ADMIN — HYDROMORPHONE HYDROCHLORIDE 30 MILLILITER(S): 2 INJECTION INTRAMUSCULAR; INTRAVENOUS; SUBCUTANEOUS at 07:24

## 2017-07-09 RX ADMIN — ONDANSETRON 4 MILLIGRAM(S): 8 TABLET, FILM COATED ORAL at 08:47

## 2017-07-09 RX ADMIN — HYDROMORPHONE HYDROCHLORIDE 1 MILLIGRAM(S): 2 INJECTION INTRAMUSCULAR; INTRAVENOUS; SUBCUTANEOUS at 08:25

## 2017-07-09 RX ADMIN — HYDROMORPHONE HYDROCHLORIDE 30 MILLILITER(S): 2 INJECTION INTRAMUSCULAR; INTRAVENOUS; SUBCUTANEOUS at 19:13

## 2017-07-09 RX ADMIN — HYDROMORPHONE HYDROCHLORIDE 1 MILLIGRAM(S): 2 INJECTION INTRAMUSCULAR; INTRAVENOUS; SUBCUTANEOUS at 16:57

## 2017-07-09 RX ADMIN — OCTREOTIDE ACETATE 200 MICROGRAM(S): 200 INJECTION, SOLUTION INTRAVENOUS; SUBCUTANEOUS at 18:48

## 2017-07-09 RX ADMIN — SODIUM CHLORIDE 20 MILLILITER(S): 9 INJECTION, SOLUTION INTRAVENOUS at 21:44

## 2017-07-09 RX ADMIN — HYDROMORPHONE HYDROCHLORIDE 30 MILLILITER(S): 2 INJECTION INTRAMUSCULAR; INTRAVENOUS; SUBCUTANEOUS at 21:31

## 2017-07-09 NOTE — PROGRESS NOTE ADULT - SUBJECTIVE AND OBJECTIVE BOX
ACS DAILY PROGRESS NOTE    S: No overnight events. Currently comfort measures only. There is a wedding for her granddaughter in the SICU today.    O: ICU Vital Signs Last 24 Hrs  T(C): 36.1 (09 Jul 2017 11:00), Max: 37.1 (08 Jul 2017 23:00)  T(F): 97 (09 Jul 2017 11:00), Max: 98.8 (08 Jul 2017 23:00)  HR: 89 (09 Jul 2017 11:00) (82 - 96)  BP: 113/54 (09 Jul 2017 07:00) (74/41 - 113/54)  BP(mean): 78 (09 Jul 2017 07:00) (54 - 78)  RR: 18 (09 Jul 2017 11:00) (18 - 22)  SpO2: 97% (09 Jul 2017 11:00) (94% - 97%)    I&O's Summary    08 Jul 2017 07:01  -  09 Jul 2017 07:00  --------------------------------------------------------  IN: 420 mL / OUT: 2510 mL / NET: -2090 mL    09 Jul 2017 07:01  -  09 Jul 2017 13:40  --------------------------------------------------------  IN: 80 mL / OUT: 500 mL / NET: -420 mL    PHYSICAL EXAM:   General: NAD  GI/Abd: Soft, ostomy pink. Vac holding suction. EC fistula pouch in place.

## 2017-07-09 NOTE — PROGRESS NOTE ADULT - ASSESSMENT
ASSESSMENT:		  76 year old female SICU day #18, POD #30 s/p ex lap, extensive NANETTE, EC fistula takedown, repair of cecal serosal tear, partial R salpingectomy, colostomy revision, and parastomal & incisional hernia repair with Strattice mesh. Patient has had a prolonged hospital course complicated by MRSA pneumonia requiring re-intubation and vasopressor support, pulmonary vascular congestion requiring BiPAP, lack of ostomy function post-op, and septic shock requiring vasopressor support likely secondary to a new EC fistula s/p ostomy revision and isolation of EC fistula with VAC and pouch. After a goals of care discussion with the patient and her family, she elected to be DNR/DNI with comfort care. She is awaiting a bed in the palliative care unit.    PLAN:  Neurologic: Acute and chronic pain  -Continue pain management with Dilaudid PCA and Tylenol PRN  -Ativan PRN for anxiety, dyspnea  -Appreciate palliative care recommendations    Respiratory: COPD  -Continue Symbicort for COPD    Cardiovascular: Hypotension  -Monitor vital signs, no pressor support as pt is comfort care only    Gastrointestinal: EC fistula, SBO, ostomy dysfunction  -Continue octreotide with goal of decreasing fistulae output  -TPN discontinued as per goals of care discussion    Genitourinary/Renal: TREVA on CKD stage 3  -D5 1/2 NS @ 20 for KVO   -No intervention as pt is comfort care only    Hematologic: No active issues  -No VTE ppx as pt is comfort care only    Infectious Disease: EC fistula   -No antibiotics as pt is comfort care only    Endocrine: Hypothyroidism  -No intervention as pt is comfort care only    Disposition: DNR/DNI, awaiting bed in palliative care unit     -Heidi Calloway PA-C   [id number] ASSESSMENT:		  76 year old female SICU day #18, POD #30 s/p ex lap, extensive NANETTE, EC fistula takedown, repair of cecal serosal tear, partial R salpingectomy, colostomy revision, and parastomal & incisional hernia repair with Strattice mesh. Patient has had a prolonged hospital course complicated by MRSA pneumonia requiring re-intubation and vasopressor support, pulmonary vascular congestion requiring BiPAP, lack of ostomy function post-op, and septic shock requiring vasopressor support likely secondary to a new EC fistula s/p ostomy revision and isolation of EC fistula with VAC and pouch. After a goals of care discussion with the patient and her family, she elected to be DNR/DNI with comfort care. She is awaiting a bed in the palliative care unit.    PLAN:  Neurologic: Acute and chronic pain  -Continue pain management with Dilaudid PCA and Tylenol PRN  -Ativan PRN for anxiety, dyspnea  -Appreciate palliative care recommendations    Respiratory: COPD  -Continue Symbicort for COPD    Cardiovascular: Hypotension  -Monitor vital signs, no pressor support as pt is comfort care only    Gastrointestinal: EC fistula, SBO, ostomy dysfunction  -Continue octreotide and Protonix with goal of decreasing fistulae output  -TPN discontinued as per goals of care discussion    Genitourinary/Renal: TREVA on CKD stage 3  -D5 1/2 NS @ 20 for KVO   -No intervention as pt is comfort care only    Hematologic: No active issues  -No VTE ppx as pt is comfort care only    Infectious Disease: EC fistula   -No antibiotics as pt is comfort care only    Endocrine: Hypothyroidism  -No intervention as pt is comfort care only    Disposition: DNR/DNI, awaiting bed in palliative care unit     -Heidi Calloway PA-C   [id number]

## 2017-07-09 NOTE — PROVIDER CONTACT NOTE (OTHER) - RECOMMENDATIONS
offered turn and repositioning, bed bath, and lotion for itchiness, because benadryl is not due at this time. offered turn and repositioning, bed bath, and lotion for itchiness, because benadryl is not due at this time. patient refused bath and turn, family asked another nurse for benadryl order.

## 2017-07-09 NOTE — PROVIDER CONTACT NOTE (OTHER) - SITUATION
patient awakened for pain assessment, has pushed PCA button once in four hours. c/o high pain level, given PCA clinician bolus as per orders. refusing to be turned and repositioned. patient awakened for pain assessment, has pushed PCA button once in four hours. c/o high pain level, given PCA clinician bolus as per orders. c/o itchiness, benadryl not due until 0545.

## 2017-07-09 NOTE — PROGRESS NOTE ADULT - ASSESSMENT
A:  76y female with high volume ECF s/p exploratory laparotomy, extensive lysis of adhesions, takedown of enterocutaneous fistula, repair of serosal tear on cecum, partial right salpingooophorectomy, colostomy revision, parastomal and incisional hernia repair with strattice mesh on comfort care.    P:  - Comfort measures  - Care per SICU    ~ She Griffin MD, PGY-1 A:  76y female with high volume ECF s/p exploratory laparotomy, extensive lysis of adhesions, takedown of enterocutaneous fistula, repair of serosal tear on cecum, partial right salpingooophorectomy, colostomy revision, parastomal and incisional hernia repair with strattice mesh on comfort care.    P:  - Comfort measures  - on Dilaudid effusions  - Awaiting Palliative bed  - Care per SICU    ~ She Griffin MD, PGY-1

## 2017-07-09 NOTE — PROGRESS NOTE ADULT - ATTENDING COMMENTS
seen and examined yesterday at 1pm, agree with above. Pt comfortable on dilaudid PCA at current settings, says that her pain is very well-controlled. Alert and awake, pleasant and interactive with her family, who are at her bedside. Continue palliative approach, medications only for comfort, such as PCA, as well as octreotide and protonix to decrease intestinal secretions and decrease risk of leakage of pt's fistula appliance, the changing of which is very painful.

## 2017-07-09 NOTE — PROGRESS NOTE ADULT - SUBJECTIVE AND OBJECTIVE BOX
HISTORY  76 year old female with a PMHx significant for diverticulitis s/p Ruben's c/b dehiscence and evisceration requiring ex lap and closure of abdomen with Surgimend mesh, c/b late enterocutaneous fistula development, s/p EC fistula takedown and abdominal wall reconstruction c/b recurrent ECF formation and multiple SBOs, COPD, CKD 3, prior DVT s/p IVC filter (removed and then replaced with a permanent filter), GERD, hypothyroidism, and HIT who initially presented on 5/23/17 w/ a SBO that did not resolve with medical management so she is s/p ex lap, extensive NANETTE, EC fistula takedown, repair of cecal serosal tear, partial R salpingectomy, colostomy revision, and parastomal & incisional hernia repair w/ Strattice mesh on 6/9/17.    Patient was admitted post-op intubated on vasopressor support. She was subsequently extubated and weaned off vasopressors. Hospital course complicated by lack of ostomy function and respiratory distress requiring re-intubation and MRSA PNA. Patient was subsequently extubated and hemodynamically stable for transfer to the floor.     On the floor, she went into respiratory distress again likely secondary to pulmonary vascular congestion requiring BiPAP, became hypotensive requiring vasopressor support, and began draining thick brown fluid from her wound. CT revealed another SBO and and matted small bowel in the anterior lower abdominal wall defect concerning for an EC fistula. She was started on imipenem and was weaned off vasopressor support. RTOR on 6/30 for ostomy revision and isolation of EC fistula with vac and pouch requiring phenylephrine post-operatively for hypotension. Patient also noted to be in post-renal TREVA. After discussion with pt and her family, she elected to be DNR/DNI with comfort measures only. She elected to be transferred to palliative care unit and is currently awaiting a bed.    24 HOUR EVENTS: Pt was without any acute events. She is currently comfortable, surrounded by family, awaiting a bed in the palliative care unit.    SUBJECTIVE/ROS: Pt appears comfortable without any complaints.   [x ] A ten-point review of systems was otherwise negative except as noted.  [ ] Due to altered mental status/intubation, subjective information were not able to be obtained from the patient. History was obtained, to the extent possible, from review of the chart and collateral sources of information.      NEURO  RASS:  0     Exam: alert, no acute distress  Meds: HYDROmorphone PCA (1 mG/mL) 30 milliLiter(s) PCA Continuous PCA Continuous  HYDROmorphone PCA (1 mG/mL) Rescue Clinician Bolus 1 milliGRAM(s) IV Push every 1 hour PRN for Pain Scale GREATER THAN 6  acetaminophen  Suppository 650 milliGRAM(s) Rectal every 6 hours PRN For Temp greater than 38 C (100.4 F)  ondansetron Injectable 4 milliGRAM(s) IV Push every 4 hours  LORazepam   Injectable 0.125 milliGRAM(s) IntraMuscular every 4 hours PRN SOB/anxiety    [x] Adequacy of sedation and pain control has been assessed and adjusted      RESPIRATORY  RR: 22 (07-09-17 @ 03:00) (18 - 22)  SpO2: 95% (07-09-17 @ 03:00) (93% - 100%)    Exam: unlabored  Mechanical Ventilation: n/a    [n/a ] Extubation Readiness Assessed  Meds: buDESOnide 160 MICROgram(s)/formoterol 4.5 MICROgram(s) Inhaler 2 Puff(s) Inhalation two times a day  diphenhydrAMINE   Injectable 25 milliGRAM(s) IV Push every 6 hours PRN Itching  diphenhydrAMINE   Injectable 25 milliGRAM(s) IV Push once        CARDIOVASCULAR  HR: 87 (07-09-17 @ 03:00) (87 - 96)  BP: 110/55 (07-09-17 @ 03:00) (74/41 - 110/55)  BP(mean): 75 (07-09-17 @ 03:00) (54 - 75)    Cardiac Rhythm: sinus rhythm  Meds: x      GI/NUTRITION  Diet: NPO  Meds: pantoprazole  Injectable 40 milliGRAM(s) IV Push daily      GENITOURINARY  I&O's Detail    07-07 @ 07:01  -  07-08 @ 07:00  --------------------------------------------------------  IN:    dextrose 5% + sodium chloride 0.45%.: 480 mL  Total IN: 480 mL    OUT:    Colostomy: 20 mL    Drain: 300 mL    Indwelling Catheter - Urethral: 620 mL    Nasoenteral Tube: 200 mL  Total OUT: 1140 mL    Total NET: -660 mL      07-08 @ 07:01  -  07-09 @ 05:49  --------------------------------------------------------  IN:    dextrose 5% + sodium chloride 0.45%.: 400 mL  Total IN: 400 mL    OUT:    Drain: 930 mL    Indwelling Catheter - Urethral: 730 mL    Nasoenteral Tube: 750 mL  Total OUT: 2410 mL    Total NET: -2010 mL    [ x] Snyder catheter, indication: comfort  Meds: dextrose 5% + sodium chloride 0.45%. 1000 milliLiter(s) IV Continuous <Continuous>      HEMATOLOGIC  Meds: x  [-] VTE Prophylaxis      Transfusion : none    [ ] PRBC   [ ] Platelets   [ ] FFP   [ ] Cryoprecipitate      INFECTIOUS DISEASES  T(C): 36.5 (07-09-17 @ 03:00), Max: 37.1 (07-08-17 @ 23:00)    Recent Cultures:x  Meds: x      ENDOCRINE  Capillary Blood Glucose- no fingersticks    Meds: octreotide  Injectable 200 MICROGram(s) IV Push three times a day        ACCESS DEVICES:  [ ] Peripheral IV  [ ] Central Venous Line	[ ] R	[ ] L	[ ] IJ	[ ] Fem	[ ] SC	Placed:   [ ] Arterial Line		[ ] R	[ ] L	[ ] Fem	[ ] Rad	[ ] Ax	Placed:   [ x] PICC:	LUE PICC				[ ] Mediport  [ ] Urinary Catheter, Date Placed:   [ x] Necessity of urinary, arterial, and venous catheters discussed    OTHER MEDICATIONS:  fluticasone propionate 50 MICROgram(s)/spray Nasal Spray 1 Spray(s) Both Nostrils two times a day      CODE STATUS: DNR/DNI      IMAGING:x HISTORY  76 year old female with a PMHx significant for diverticulitis s/p Ruben's c/b dehiscence and evisceration requiring ex lap and closure of abdomen with Surgimend mesh, c/b late enterocutaneous fistula development, s/p EC fistula takedown and abdominal wall reconstruction c/b recurrent ECF formation and multiple SBOs, COPD, CKD 3, prior DVT s/p IVC filter (removed and then replaced with a permanent filter), GERD, hypothyroidism, and HIT who initially presented on 5/23/17 w/ a SBO that did not resolve with medical management so she is s/p ex lap, extensive NANETTE, EC fistula takedown, repair of cecal serosal tear, partial R salpingectomy, colostomy revision, and parastomal & incisional hernia repair w/ Strattice mesh on 6/9/17.    Patient was admitted post-op intubated on vasopressor support. She was subsequently extubated and weaned off vasopressors. Hospital course complicated by lack of ostomy function and respiratory distress requiring re-intubation and MRSA PNA. Patient was subsequently extubated and hemodynamically stable for transfer to the floor.     On the floor, she went into respiratory distress again likely secondary to pulmonary vascular congestion requiring BiPAP, became hypotensive requiring vasopressor support, and began draining thick brown fluid from her wound. CT revealed another SBO and and matted small bowel in the anterior lower abdominal wall defect concerning for an EC fistula. She was started on imipenem and was weaned off vasopressor support. RTOR on 6/30 for ostomy revision and isolation of EC fistula with vac and pouch requiring phenylephrine post-operatively for hypotension. Patient also noted to be in post-renal TREVA. After discussion with pt and her family, she elected to be DNR/DNI with comfort measures only. She elected to be transferred to palliative care unit and is currently awaiting a bed.    24 HOUR EVENTS: Pt was without any acute events. She is currently comfortable, surrounded by family, awaiting a bed in the palliative care unit.    SUBJECTIVE/ROS: Pt appears comfortable without any complaints.   [x ] A ten-point review of systems was otherwise negative except as noted.  [ ] Due to altered mental status/intubation, subjective information were not able to be obtained from the patient. History was obtained, to the extent possible, from review of the chart and collateral sources of information.      NEURO  RASS:  0     Exam: alert, no acute distress  Meds: HYDROmorphone PCA (1 mG/mL) 30 milliLiter(s) PCA Continuous PCA Continuous  HYDROmorphone PCA (1 mG/mL) Rescue Clinician Bolus 1 milliGRAM(s) IV Push every 1 hour PRN for Pain Scale GREATER THAN 6  acetaminophen  Suppository 650 milliGRAM(s) Rectal every 6 hours PRN For Temp greater than 38 C (100.4 F)  ondansetron Injectable 4 milliGRAM(s) IV Push every 4 hours  LORazepam   Injectable 0.125 milliGRAM(s) IntraMuscular every 4 hours PRN SOB/anxiety    [x] Adequacy of sedation and pain control has been assessed and adjusted      RESPIRATORY  RR: 22 (07-09-17 @ 03:00) (18 - 22)  SpO2: 95% (07-09-17 @ 03:00) (93% - 100%)    Exam: unlabored  Mechanical Ventilation: n/a    [n/a ] Extubation Readiness Assessed  Meds: buDESOnide 160 MICROgram(s)/formoterol 4.5 MICROgram(s) Inhaler 2 Puff(s) Inhalation two times a day  diphenhydrAMINE   Injectable 25 milliGRAM(s) IV Push every 6 hours PRN Itching  diphenhydrAMINE   Injectable 25 milliGRAM(s) IV Push once        CARDIOVASCULAR  HR: 87 (07-09-17 @ 03:00) (87 - 96)  BP: 110/55 (07-09-17 @ 03:00) (74/41 - 110/55)  BP(mean): 75 (07-09-17 @ 03:00) (54 - 75)    Cardiac Rhythm: sinus rhythm  Meds: x      GI/NUTRITION  Diet: NPO  Meds: pantoprazole  Injectable 40 milliGRAM(s) IV Push daily      GENITOURINARY  [ x] Snyder catheter, indication: comfort    I&O's Detail    08 Jul 2017 07:01  -  09 Jul 2017 07:00  --------------------------------------------------------  IN:    dextrose 5% + sodium chloride 0.45%.: 400 mL  Total IN: 400 mL    OUT:    Drain: 930 mL    Indwelling Catheter - Urethral: 730 mL    Nasoenteral Tube: 750 mL  Total OUT: 2410 mL    Total NET: -2010 mL      Meds: dextrose 5% + sodium chloride 0.45%. 1000 milliLiter(s) IV Continuous <Continuous>      HEMATOLOGIC  Meds: x  [-] VTE Prophylaxis      Transfusion : none    [ ] PRBC   [ ] Platelets   [ ] FFP   [ ] Cryoprecipitate      INFECTIOUS DISEASES  T(C): 36.5 (07-09-17 @ 03:00), Max: 37.1 (07-08-17 @ 23:00)      Recent Cultures:x  Meds: x      ENDOCRINE  Capillary Blood Glucose- no fingersticks    Meds: octreotide  Injectable 200 MICROGram(s) IV Push three times a day        ACCESS DEVICES:  [ ] Peripheral IV  [ ] Central Venous Line	[ ] R	[ ] L	[ ] IJ	[ ] Fem	[ ] SC	Placed:   [ ] Arterial Line		[ ] R	[ ] L	[ ] Fem	[ ] Rad	[ ] Ax	Placed:   [ x] PICC:	LUE PICC				[ ] Mediport  [ ] Urinary Catheter, Date Placed:   [ x] Necessity of urinary, arterial, and venous catheters discussed    OTHER MEDICATIONS:  fluticasone propionate 50 MICROgram(s)/spray Nasal Spray 1 Spray(s) Both Nostrils two times a day      CODE STATUS: DNR/DNI      IMAGING:x

## 2017-07-09 NOTE — PROVIDER CONTACT NOTE (OTHER) - SITUATION
patient becoming hypotensive, SBP 70's.  asymptomatic. patient continuing to refuse to be turned or have linen changed.

## 2017-07-10 PROCEDURE — 99233 SBSQ HOSP IP/OBS HIGH 50: CPT | Mod: GC

## 2017-07-10 RX ORDER — HYDROMORPHONE HYDROCHLORIDE 2 MG/ML
30 INJECTION INTRAMUSCULAR; INTRAVENOUS; SUBCUTANEOUS
Qty: 0 | Refills: 0 | Status: DISCONTINUED | OUTPATIENT
Start: 2017-07-10 | End: 2017-07-11

## 2017-07-10 RX ADMIN — Medication 0.5 MILLIGRAM(S): at 14:28

## 2017-07-10 RX ADMIN — OCTREOTIDE ACETATE 200 MICROGRAM(S): 200 INJECTION, SOLUTION INTRAVENOUS; SUBCUTANEOUS at 15:56

## 2017-07-10 RX ADMIN — ONDANSETRON 4 MILLIGRAM(S): 8 TABLET, FILM COATED ORAL at 05:31

## 2017-07-10 RX ADMIN — ONDANSETRON 4 MILLIGRAM(S): 8 TABLET, FILM COATED ORAL at 14:29

## 2017-07-10 RX ADMIN — ONDANSETRON 4 MILLIGRAM(S): 8 TABLET, FILM COATED ORAL at 18:28

## 2017-07-10 RX ADMIN — HYDROMORPHONE HYDROCHLORIDE 30 MILLILITER(S): 2 INJECTION INTRAMUSCULAR; INTRAVENOUS; SUBCUTANEOUS at 22:33

## 2017-07-10 RX ADMIN — ONDANSETRON 4 MILLIGRAM(S): 8 TABLET, FILM COATED ORAL at 10:51

## 2017-07-10 RX ADMIN — ONDANSETRON 4 MILLIGRAM(S): 8 TABLET, FILM COATED ORAL at 22:37

## 2017-07-10 RX ADMIN — PANTOPRAZOLE SODIUM 40 MILLIGRAM(S): 20 TABLET, DELAYED RELEASE ORAL at 12:51

## 2017-07-10 RX ADMIN — HYDROMORPHONE HYDROCHLORIDE 30 MILLILITER(S): 2 INJECTION INTRAMUSCULAR; INTRAVENOUS; SUBCUTANEOUS at 19:19

## 2017-07-10 RX ADMIN — ONDANSETRON 4 MILLIGRAM(S): 8 TABLET, FILM COATED ORAL at 02:13

## 2017-07-10 RX ADMIN — HYDROMORPHONE HYDROCHLORIDE 30 MILLILITER(S): 2 INJECTION INTRAMUSCULAR; INTRAVENOUS; SUBCUTANEOUS at 08:33

## 2017-07-10 RX ADMIN — HYDROMORPHONE HYDROCHLORIDE 1 MILLIGRAM(S): 2 INJECTION INTRAMUSCULAR; INTRAVENOUS; SUBCUTANEOUS at 05:46

## 2017-07-10 RX ADMIN — OCTREOTIDE ACETATE 200 MICROGRAM(S): 200 INJECTION, SOLUTION INTRAVENOUS; SUBCUTANEOUS at 05:31

## 2017-07-10 RX ADMIN — Medication 0.5 MILLIGRAM(S): at 06:50

## 2017-07-10 RX ADMIN — HYDROMORPHONE HYDROCHLORIDE 30 MILLILITER(S): 2 INJECTION INTRAMUSCULAR; INTRAVENOUS; SUBCUTANEOUS at 10:34

## 2017-07-10 RX ADMIN — OCTREOTIDE ACETATE 200 MICROGRAM(S): 200 INJECTION, SOLUTION INTRAVENOUS; SUBCUTANEOUS at 23:39

## 2017-07-10 RX ADMIN — Medication 1 SPRAY(S): at 05:31

## 2017-07-10 RX ADMIN — Medication 0.5 MILLIGRAM(S): at 19:11

## 2017-07-10 RX ADMIN — HYDROMORPHONE HYDROCHLORIDE 1 MILLIGRAM(S): 2 INJECTION INTRAMUSCULAR; INTRAVENOUS; SUBCUTANEOUS at 03:13

## 2017-07-10 NOTE — PROGRESS NOTE ADULT - SUBJECTIVE AND OBJECTIVE BOX
Geriatric and Palliative Care Unit Progress Note [x] Hospice Progress Note [ ]     HPI:  76-year-old F with PMH of COPD, h/o DVT s/p IVC filter (now removed), GERD, hypothyroidism, HIT and diverticulitis s/p Ruben's, recurrent SBO and EC fistulas with adhesions here with SBP, s/p ex-lap, fistula removal, colostomy, hernia repair; course complicated by difficulty extubating after surgery leading to reintubation, c/b MRSA pneumonia, c/b septic shock 2/2 new enterocutaneous fistula, initially on pressors, now on comfort care in PCU.     Indication for Palliative Care Unit Services: comfort care    ADVANCE DIRECTIVES:    DNR [X ] YES  [ ] NO                           [ X] Completed  MOLST  [ ] YES [X ] NO                      [ ] Completed  Health Care Proxy [X ] YES  [ ] NO   [ ] Completed  Living Will  [ ] YES [ ] NO             [ ] Surrogate  [X ] HCP  [ ] Guardian: children                                                              Phone#:    Allergies    azithromycin (Unknown)  codeine (Unknown)  heparin (Other)  PC Pen VK (Unknown)  penicillin (Unknown)    Intolerances        MEDICATIONS  (STANDING):  pantoprazole  Injectable 40 milliGRAM(s) IV Push daily  buDESOnide 160 MICROgram(s)/formoterol 4.5 MICROgram(s) Inhaler 2 Puff(s) Inhalation two times a day  fluticasone propionate 50 MICROgram(s)/spray Nasal Spray 1 Spray(s) Both Nostrils two times a day  octreotide  Injectable 200 MICROGram(s) IV Push three times a day  dextrose 5% + sodium chloride 0.45%. 1000 milliLiter(s) (20 mL/Hr) IV Continuous <Continuous>  ondansetron Injectable 4 milliGRAM(s) IV Push every 4 hours  diphenhydrAMINE   Injectable 25 milliGRAM(s) IV Push once  HYDROmorphone PCA (1 mG/mL) 30 milliLiter(s) PCA Continuous PCA Continuous    MEDICATIONS  (PRN):  HYDROmorphone PCA (1 mG/mL) Rescue Clinician Bolus 1 milliGRAM(s) IV Push every 1 hour PRN for Pain Scale GREATER THAN 6  acetaminophen  Suppository 650 milliGRAM(s) Rectal every 6 hours PRN For Temp greater than 38 C (100.4 F)  diphenhydrAMINE   Injectable 25 milliGRAM(s) IV Push every 6 hours PRN Itching  LORazepam   Injectable 0.5 milliGRAM(s) IV Push every 2 hours PRN Anxiety  glycopyrrolate Injectable 0.4 milliGRAM(s) IV Push every 6 hours PRN Secretions  HYDROmorphone  Injectable 1 milliGRAM(s) IV Push every 1 hour PRN Dyspnea      PRESENT SYMPTOMS:  Source: [ ] Patient   [x ] Family   [x ] Team     Pain:                        [ ] No [x ] Yes             [ ] Mild [x ] Moderate [ ] Severe    Onset -  Location - abdominal  Duration - constant  Character -  Alleviating/Aggravating - alleviated with dilaudid  Radiation -  Timing -      Dyspnea:                [x ] No [ ] Yes             [ ] Mild [ ] Moderate [ ] Severe    Anxiety:                  [ ] No [x ] Yes             [ ] Mild [x ] Moderate [ ] Severe    Fatigue:                  [ ] No [x ] Yes             [ ] Mild [ ] Moderate [ ] Severe    Nausea:                  [x ] No [ ] Yes             [ ] Mild [ ] Moderate [ ] Severe    Loss of appetite:   [ ] No [ ] Yes             [ ] Mild [ ] Moderate [ ] Severe    Constipation:        [ ] No [ ] Yes             [ ] Mild [ ] Moderate [ ] Severe    Other Symptoms:  [ ] All other review of systems negative   [x ] Limited, patient sedated from recent ativan administration    Karnofsky Performance Score/Palliative Performance Status Version 2: 30%    PHYSICAL EXAM:  Vital Signs Last 24 Hrs  T(C): 36.5 (09 Jul 2017 19:00), Max: 36.5 (09 Jul 2017 19:00)  T(F): 97.7 (09 Jul 2017 19:00), Max: 97.7 (09 Jul 2017 19:00)  HR: --  BP: 95/60 (09 Jul 2017 19:00) (95/60 - 95/60)  BP(mean): 60 (09 Jul 2017 19:00) (60 - 60)  RR: 22 (09 Jul 2017 19:00) (22 - 22)  SpO2: 97% (09 Jul 2017 19:00) (97% - 97%) I&O's Summary    09 Jul 2017 07:01  -  10 Jul 2017 07:00  --------------------------------------------------------  IN: 520 mL / OUT: 1645 mL / NET: -1125 mL    10 Jul 2017 07:01  -  10 Jul 2017 11:17  --------------------------------------------------------  IN: 0 mL / OUT: 100 mL / NET: -100 mL        General:  [ ] Alert  [ ] Oriented x      [x ] Lethargic  [ ] Agitated   [ ] Cachexia   [ ] Unarousable  [ ] Verbal  [ ] Non-Verbal    HEENT:  [x ] Normal   [ ] Dry mouth   [ ] ET Tube    [ ] Trach  [ ] Oral lesions    Lungs:   [x ] Clear [ ] Tachypnea  [ ] Audible excessive secretions   [ ] Rhonchi        [ ] Right [ ] Left [ ] Bilateral  [ ] Crackles        [ ] Right [ ] Left [ ] Bilateral  [ ] Wheezing     [ ] Right [ ] Left [ ] Bilateral  [ ] Respiratory failure [ ] Acute [ ] Chronic [ ] Hypoxemic [ ] Hypercarbic [ ] Other    Cardiovascular:   [x ] Regular [ ] Irregular [ ] Tachycardia   [ ] Bradycardia  [ ] Murmur [ ] Other  [ ] Shock [ ] Septic [ ] Hypovolemic [ ] Neurogenic [ ] Cardiogenic   [ ] Vasopressors [ ] Inotrophs    Abdomen:   [x ] Soft  [ ] Distended   [ ] +BS  [ ] Non tender [x ] Tender  [ ]PEG   [x ]  NGT   Last BM:     [ x] Other: open abdominal wound    Genitourinary:  [ ] Normal [ ] Incontinent   [ ] Oliguria/Anuria   [x] Snyder  [ ] Other       Musculoskeletal:    [ ] Normal   [ ] Weakness  [ ] Edema  [x ] Bedbound/Wheelchair bound [ ]  Ambulatory [ ] with [ ] without assistance [ ] Functional quadriplegia    Neurological: [x ] No focal deficits  [ ] Cognitive impairment  [ ] Dysphagia [ ] Dysarthria [ ] Paresis [ ] Other   [ ] Brain compression  [ ] Cerebral edema [ ] Encephalopathy    Skin: [ ] Normal   [ ] Ulcer(s) type [ ] Diabetic [ ] Pressure [x ] Other: midline abdominal wound   Stage        POA [ ]  Yes [ ]  No       [ ] Rash    LABS:  reviewed, no recent results              Protein Calorie Malnutrition: [ ] Mild [ ] Moderate [ ] Severe    Oral Intake: [ ] Unable/mouth care only [ ] Minimal [ ] Moderate [ ] Full Capability  Diet: [ ] NPO [ ] Tube feeds [ ] TPN [ ] Other     RADIOLOGY & ADDITIONAL STUDIES: reviewed, no recent results      REFERRALS:   [ ] Chaplaincy  [ ] Hospice Care  [ ] Child Life  [x ] Social Work  [ ] Case management [ ] Nutrition [ ] Holistic Therapy [ ] Wound Care [ ]Physical Therapy [ ] Other [ ]See goals of care note in [place] [facility] Progress Note [x] Hospice Progress Note [ ]     HPI:  76-year-old F with PMH of COPD, h/o DVT s/p IVC filter (now removed), GERD, hypothyroidism, HIT and diverticulitis s/p Ruben's, recurrent SBO and EC fistulas with adhesions here with SBP, s/p ex-lap, fistula removal, colostomy, hernia repair; course complicated by difficulty extubating after surgery leading to reintubation, c/b MRSA pneumonia, c/b septic shock 2/2 new enterocutaneous fistula, initially on pressors, now on comfort care in PCU.     Indication for Palliative Care Unit Services: comfort care    ADVANCE DIRECTIVES:    DNR [X ] YES  [ ] NO                           [ X] Completed  MOLST  [ ] YES [X ] NO                      [ ] Completed  Health Care Proxy [X ] YES  [ ] NO   [ ] Completed  Living Will  [ ] YES [ ] NO             [ ] Surrogate  [X ] HCP  [ ] Guardian: children                                                              Phone#:    Allergies    azithromycin (Unknown)  codeine (Unknown)  heparin (Other)  PC Pen VK (Unknown)  penicillin (Unknown)    Intolerances        MEDICATIONS  (STANDING):  pantoprazole  Injectable 40 milliGRAM(s) IV Push daily  buDESOnide 160 MICROgram(s)/formoterol 4.5 MICROgram(s) Inhaler 2 Puff(s) Inhalation two times a day  fluticasone propionate 50 MICROgram(s)/spray Nasal Spray 1 Spray(s) Both Nostrils two times a day  octreotide  Injectable 200 MICROGram(s) IV Push three times a day  dextrose 5% + sodium chloride 0.45%. 1000 milliLiter(s) (20 mL/Hr) IV Continuous <Continuous>  ondansetron Injectable 4 milliGRAM(s) IV Push every 4 hours  diphenhydrAMINE   Injectable 25 milliGRAM(s) IV Push once  HYDROmorphone PCA (1 mG/mL) 30 milliLiter(s) PCA Continuous PCA Continuous    MEDICATIONS  (PRN):  HYDROmorphone PCA (1 mG/mL) Rescue Clinician Bolus 1 milliGRAM(s) IV Push every 1 hour PRN for Pain Scale GREATER THAN 6  acetaminophen  Suppository 650 milliGRAM(s) Rectal every 6 hours PRN For Temp greater than 38 C (100.4 F)  diphenhydrAMINE   Injectable 25 milliGRAM(s) IV Push every 6 hours PRN Itching  LORazepam   Injectable 0.5 milliGRAM(s) IV Push every 2 hours PRN Anxiety  glycopyrrolate Injectable 0.4 milliGRAM(s) IV Push every 6 hours PRN Secretions  HYDROmorphone  Injectable 1 milliGRAM(s) IV Push every 1 hour PRN Dyspnea      PRESENT SYMPTOMS:  Source: [ ] Patient   [x ] Family   [x ] Team     Pain:                        [ ] No [x ] Yes             [ ] Mild [x ] Moderate [ ] Severe    Onset -  Location - abdominal  Duration - constant  Character -  Alleviating/Aggravating - alleviated with dilaudid  Radiation -  Timing -      Dyspnea:                [x ] No [ ] Yes             [ ] Mild [ ] Moderate [ ] Severe    Anxiety:                  [ ] No [x ] Yes             [ ] Mild [x ] Moderate [ ] Severe    Fatigue:                  [ ] No [x ] Yes             [ ] Mild [ ] Moderate [ ] Severe    Nausea:                  [x ] No [ ] Yes             [ ] Mild [ ] Moderate [ ] Severe    Loss of appetite:   [ ] No [ ] Yes             [ ] Mild [ ] Moderate [ ] Severe    Constipation:        [ ] No [ ] Yes             [ ] Mild [ ] Moderate [ ] Severe    Other Symptoms:  [ ] All other review of systems negative   [x ] Limited, patient sedated from recent ativan administration    Karnofsky Performance Score/Palliative Performance Status Version 2: 30%    PHYSICAL EXAM: patient/family deferred AM vital signs  Vital Signs Last 24 Hrs  T(C): 36.5 (09 Jul 2017 19:00), Max: 36.5 (09 Jul 2017 19:00)  T(F): 97.7 (09 Jul 2017 19:00), Max: 97.7 (09 Jul 2017 19:00)  HR: --  BP: 95/60 (09 Jul 2017 19:00) (95/60 - 95/60)  BP(mean): 60 (09 Jul 2017 19:00) (60 - 60)  RR: 22 (09 Jul 2017 19:00) (22 - 22)  SpO2: 97% (09 Jul 2017 19:00) (97% - 97%) I&O's Summary    09 Jul 2017 07:01  -  10 Jul 2017 07:00  --------------------------------------------------------  IN: 520 mL / OUT: 1645 mL / NET: -1125 mL    10 Jul 2017 07:01  -  10 Jul 2017 11:17  --------------------------------------------------------  IN: 0 mL / OUT: 100 mL / NET: -100 mL        General:  [ ] Alert  [ ] Oriented x      [x ] Lethargic  [ ] Agitated   [ ] Cachexia   [ ] Unarousable  [ ] Verbal  [ ] Non-Verbal    HEENT:  [x ] Normal   [ ] Dry mouth   [ ] ET Tube    [ ] Trach  [ ] Oral lesions    Lungs:   [x ] Clear [ ] Tachypnea  [ ] Audible excessive secretions   [ ] Rhonchi        [ ] Right [ ] Left [ ] Bilateral  [ ] Crackles        [ ] Right [ ] Left [ ] Bilateral  [ ] Wheezing     [ ] Right [ ] Left [ ] Bilateral  [ ] Respiratory failure [ ] Acute [ ] Chronic [ ] Hypoxemic [ ] Hypercarbic [ ] Other    Cardiovascular:   [x ] Regular [ ] Irregular [ ] Tachycardia   [ ] Bradycardia  [ ] Murmur [ ] Other  [ ] Shock [ ] Septic [ ] Hypovolemic [ ] Neurogenic [ ] Cardiogenic   [ ] Vasopressors [ ] Inotrophs    Abdomen:   [x ] Soft  [ ] Distended   [ ] +BS  [ ] Non tender [x ] Tender  [ ]PEG   [x ]  NGT   Last BM:     [ x] Other: open abdominal wound    Genitourinary:  [ ] Normal [ ] Incontinent   [ ] Oliguria/Anuria   [x] Snyder  [ ] Other       Musculoskeletal:    [ ] Normal   [ ] Weakness  [ ] Edema  [x ] Bedbound/Wheelchair bound [ ]  Ambulatory [ ] with [ ] without assistance [ ] Functional quadriplegia    Neurological: [x ] No focal deficits  [ ] Cognitive impairment  [ ] Dysphagia [ ] Dysarthria [ ] Paresis [ ] Other   [ ] Brain compression  [ ] Cerebral edema [ ] Encephalopathy    Skin: [ ] Normal   [ ] Ulcer(s) type [ ] Diabetic [ ] Pressure [x ] Other: midline abdominal wound   Stage        POA [ ]  Yes [ ]  No       [ ] Rash    LABS:  reviewed, no recent results              Protein Calorie Malnutrition: [ ] Mild [ ] Moderate [ ] Severe    Oral Intake: [ ] Unable/mouth care only [ ] Minimal [ ] Moderate [ ] Full Capability  Diet: [ ] NPO [ ] Tube feeds [ ] TPN [ ] Other     RADIOLOGY & ADDITIONAL STUDIES: reviewed, no recent results      REFERRALS:   [ ] Chaplaincy  [ ] Hospice Care  [ ] Child Life  [x ] Social Work  [ ] Case management [ ] Nutrition [ ] Holistic Therapy [ ] Wound Care [ ]Physical Therapy [ ] Other [ ]See goals of care note in [place]

## 2017-07-10 NOTE — PROGRESS NOTE ADULT - ASSESSMENT
76-year-old F with PMH of COPD, h/o DVT s/p IVC filter (now removed), GERD, hypothyroidism, HIT and diverticulitis s/p Ruben's, recurrent SBO and EC fistulas with adhesions here with SBP, s/p ex-lap, fistula removal, colostomy, hernia repair; course complicated by difficulty extubating after surgery leading to reintubation, c/b MRSA pneumonia, c/b septic shock 2/2 new enterocutaneous fistula, initially on pressors, now on comfort care in PCU.

## 2017-07-10 NOTE — PROGRESS NOTE ADULT - SUBJECTIVE AND OBJECTIVE BOX
Follow-up Pulm Progress Note    No new respiratory events overnight.  Denies SOB/CP.     Medications:  MEDICATIONS  (STANDING):  pantoprazole  Injectable 40 milliGRAM(s) IV Push daily  buDESOnide 160 MICROgram(s)/formoterol 4.5 MICROgram(s) Inhaler 2 Puff(s) Inhalation two times a day  fluticasone propionate 50 MICROgram(s)/spray Nasal Spray 1 Spray(s) Both Nostrils two times a day  octreotide  Injectable 200 MICROGram(s) IV Push three times a day  dextrose 5% + sodium chloride 0.45%. 1000 milliLiter(s) (20 mL/Hr) IV Continuous <Continuous>  ondansetron Injectable 4 milliGRAM(s) IV Push every 4 hours  diphenhydrAMINE   Injectable 25 milliGRAM(s) IV Push once  HYDROmorphone PCA (1 mG/mL) 30 milliLiter(s) PCA Continuous PCA Continuous    MEDICATIONS  (PRN):  HYDROmorphone PCA (1 mG/mL) Rescue Clinician Bolus 1 milliGRAM(s) IV Push every 1 hour PRN for Pain Scale GREATER THAN 6  acetaminophen  Suppository 650 milliGRAM(s) Rectal every 6 hours PRN For Temp greater than 38 C (100.4 F)  diphenhydrAMINE   Injectable 25 milliGRAM(s) IV Push every 6 hours PRN Itching  LORazepam   Injectable 0.5 milliGRAM(s) IV Push every 2 hours PRN Anxiety  glycopyrrolate Injectable 0.4 milliGRAM(s) IV Push every 6 hours PRN Secretions  HYDROmorphone  Injectable 1 milliGRAM(s) IV Push every 1 hour PRN Dyspnea          Vital Signs Last 24 Hrs  T(C): 36.5 (09 Jul 2017 19:00), Max: 36.5 (09 Jul 2017 19:00)  T(F): 97.7 (09 Jul 2017 19:00), Max: 97.7 (09 Jul 2017 19:00)  HR: --  BP: 95/60 (09 Jul 2017 19:00) (95/60 - 95/60)  BP(mean): 60 (09 Jul 2017 19:00) (60 - 60)  RR: 22 (09 Jul 2017 19:00) (22 - 22)  SpO2: 97% (09 Jul 2017 19:00) (97% - 97%)          07-09 @ 07:01  -  07-10 @ 07:00  --------------------------------------------------------  IN: 520 mL / OUT: 1645 mL / NET: -1125 mL          LABS:                CAPILLARY BLOOD GLUCOSE                            CULTURES: (if applicable)          Physical Examination:  PULM: Clear to auscultation bilaterally, no significant sputum production  CVS: S1, S2 heard    RADIOLOGY REVIEWED  CXR:     CT chest:    TTE:

## 2017-07-10 NOTE — PROGRESS NOTE ADULT - ASSESSMENT
76 year old female with history significant for COPD, prior DVT s/p IVC filter (now removed), GERD, hypothyroidism, HIT and diverticulitis s/p Ruben's, and recurrent SBO requiring ex-lap small bowel resection, EC fistula s/p takedown that was complicated by wound dehiscence and abdominal reconstruction, who underwent ex-lap, extensive NANETTE, EC fistula takedown, colostomy revision, and parastomal/incisional hernia repair on 6/9. She was admitted to SICU intubated post-operatively and was successfully extubated. However, after getting a CT with oral contrast, patient went into respiratory distress and was re-intubated and found to have MRSA pneumonia, likely aspiration in nature. She was subsequently extubated, weaned off pressors, and transferred to the floors. On the floor on 6/22, patient went into respiratory distress again likely secondary to pulmonary vascular congestion requiring BiPAP and became hypotensive requiring vasopressor support. Her wound also began draining thick brown fluid concerning for a new EC fistula vs. anastomotic breakdown and is currently on imipenem. Pressor needs decreasing, wound pouched with some improvement in clinical status, remains extubated, intermittently requiring BiPAP, now with decreased hearing.         - pt now dnr/dni-comfort measures.d/w children at bedside

## 2017-07-10 NOTE — PROGRESS NOTE ADULT - SUBJECTIVE AND OBJECTIVE BOX
Follow-up Pulm Progress Note    No new respiratory events overnight.  Denies SOB/CP. no complaints, a/ox3    Medications:  MEDICATIONS  (STANDING):  pantoprazole  Injectable 40 milliGRAM(s) IV Push daily  buDESOnide 160 MICROgram(s)/formoterol 4.5 MICROgram(s) Inhaler 2 Puff(s) Inhalation two times a day  fluticasone propionate 50 MICROgram(s)/spray Nasal Spray 1 Spray(s) Both Nostrils two times a day  octreotide  Injectable 200 MICROGram(s) IV Push three times a day  dextrose 5% + sodium chloride 0.45%. 1000 milliLiter(s) (20 mL/Hr) IV Continuous <Continuous>  ondansetron Injectable 4 milliGRAM(s) IV Push every 4 hours  diphenhydrAMINE   Injectable 25 milliGRAM(s) IV Push once  HYDROmorphone PCA (1 mG/mL) 30 milliLiter(s) PCA Continuous PCA Continuous    MEDICATIONS  (PRN):  HYDROmorphone PCA (1 mG/mL) Rescue Clinician Bolus 1 milliGRAM(s) IV Push every 1 hour PRN for Pain Scale GREATER THAN 6  acetaminophen  Suppository 650 milliGRAM(s) Rectal every 6 hours PRN For Temp greater than 38 C (100.4 F)  diphenhydrAMINE   Injectable 25 milliGRAM(s) IV Push every 6 hours PRN Itching  LORazepam   Injectable 0.5 milliGRAM(s) IV Push every 2 hours PRN Anxiety  glycopyrrolate Injectable 0.4 milliGRAM(s) IV Push every 6 hours PRN Secretions  HYDROmorphone  Injectable 1 milliGRAM(s) IV Push every 1 hour PRN Dyspnea          Vital Signs Last 24 Hrs  T(C): 36.5 (09 Jul 2017 19:00), Max: 36.5 (09 Jul 2017 19:00)  T(F): 97.7 (09 Jul 2017 19:00), Max: 97.7 (09 Jul 2017 19:00)  HR: --  BP: 95/60 (09 Jul 2017 19:00) (95/60 - 95/60)  BP(mean): 60 (09 Jul 2017 19:00) (60 - 60)  RR: 22 (09 Jul 2017 19:00) (22 - 22)  SpO2: 97% (09 Jul 2017 19:00) (97% - 97%)          07-09 @ 07:01  -  07-10 @ 07:00  --------------------------------------------------------  IN: 520 mL / OUT: 1645 mL / NET: -1125 mL          LABS:                CAPILLARY BLOOD GLUCOSE                            CULTURES: (if applicable)          Physical Examination:  PULM: Clear to auscultation bilaterally, no significant sputum production  CVS: S1, S2 heard    RADIOLOGY REVIEWED  CXR:     CT chest:    TTE:

## 2017-07-10 NOTE — PROGRESS NOTE ADULT - PROBLEM SELECTOR PLAN 3
Ativan prn, patient prefers to rest, will continue with 0.5mg ativan as she was comfortable but arousable with that dose this AM. If shows signs of over-sedation, can give 0.125 or 0.25mg ativan.

## 2017-07-10 NOTE — PROGRESS NOTE ADULT - ASSESSMENT
A:  76y female with high volume ECF s/p exploratory laparotomy, extensive lysis of adhesions, takedown of enterocutaneous fistula, repair of serosal tear on cecum, partial right salpingooophorectomy, colostomy revision, parastomal and incisional hernia repair with strattice mesh on comfort care.    P:  - Comfort measures  - on Dilaudid effusions  - Care per PCU    ~ She Griffin MD, PGY-1

## 2017-07-10 NOTE — PROGRESS NOTE ADULT - PROBLEM SELECTOR PLAN 5
Discussed with two of patient's four [place] ([place]), who confirmed comfort is main goal. Per family request, will not check VS or turn patient at night, family understands risks as explained to them by the interdisciplinary team.

## 2017-07-10 NOTE — PROGRESS NOTE ADULT - PROBLEM SELECTOR PLAN 2
c/w octreotide, surgery f/u appreciated, planning to place bag for drainage to prevent frequent interruptions to patient comfort. Pain management as above.

## 2017-07-10 NOTE — PROGRESS NOTE ADULT - SUBJECTIVE AND OBJECTIVE BOX
ACS DAILY PROGRESS NOTE    S: No overnight events. Patient in PCU now.  Currently comfort measures only.    O: ICU Vital Signs Last 24 Hrs  T(C): 36.1 (09 Jul 2017 11:00), Max: 37.1 (08 Jul 2017 23:00)  T(F): 97 (09 Jul 2017 11:00), Max: 98.8 (08 Jul 2017 23:00)  HR: 89 (09 Jul 2017 11:00) (82 - 96)  BP: 113/54 (09 Jul 2017 07:00) (74/41 - 113/54)  BP(mean): 78 (09 Jul 2017 07:00) (54 - 78)  RR: 18 (09 Jul 2017 11:00) (18 - 22)  SpO2: 97% (09 Jul 2017 11:00) (94% - 97%)    I&O's Summary    08 Jul 2017 07:01  -  09 Jul 2017 07:00  --------------------------------------------------------  IN: 420 mL / OUT: 2510 mL / NET: -2090 mL    09 Jul 2017 07:01  -  09 Jul 2017 13:40  --------------------------------------------------------  IN: 80 mL / OUT: 500 mL / NET: -420 mL    PHYSICAL EXAM:   General: NAD  GI/Abd: Soft, ostomy pink. Vac holding suction. EC fistula pouch in place.

## 2017-07-10 NOTE — PROGRESS NOTE ADULT - PROBLEM SELECTOR PLAN 1
Pain managed with PCA dilaudid [0.4/0.4/10 mins/1mg CAB Q1h] with 11 injections and 5 CAB over last 24 hours. Will change PCA to [1/1/15 mins/1mg CAB Q1h] and assess for improved symptom control.

## 2017-07-11 PROCEDURE — 99233 SBSQ HOSP IP/OBS HIGH 50: CPT | Mod: GC

## 2017-07-11 RX ORDER — HYDROMORPHONE HYDROCHLORIDE 2 MG/ML
2 INJECTION INTRAMUSCULAR; INTRAVENOUS; SUBCUTANEOUS
Qty: 100 | Refills: 0 | Status: DISCONTINUED | OUTPATIENT
Start: 2017-07-11 | End: 2017-07-13

## 2017-07-11 RX ORDER — HYDROMORPHONE HYDROCHLORIDE 2 MG/ML
4 INJECTION INTRAMUSCULAR; INTRAVENOUS; SUBCUTANEOUS
Qty: 0 | Refills: 0 | Status: DISCONTINUED | OUTPATIENT
Start: 2017-07-11 | End: 2017-07-13

## 2017-07-11 RX ADMIN — HYDROMORPHONE HYDROCHLORIDE 4 MILLIGRAM(S): 2 INJECTION INTRAMUSCULAR; INTRAVENOUS; SUBCUTANEOUS at 15:03

## 2017-07-11 RX ADMIN — ONDANSETRON 4 MILLIGRAM(S): 8 TABLET, FILM COATED ORAL at 01:43

## 2017-07-11 RX ADMIN — ONDANSETRON 4 MILLIGRAM(S): 8 TABLET, FILM COATED ORAL at 17:34

## 2017-07-11 RX ADMIN — Medication 0.5 MILLIGRAM(S): at 12:38

## 2017-07-11 RX ADMIN — HYDROMORPHONE HYDROCHLORIDE 4 MILLIGRAM(S): 2 INJECTION INTRAMUSCULAR; INTRAVENOUS; SUBCUTANEOUS at 22:18

## 2017-07-11 RX ADMIN — PANTOPRAZOLE SODIUM 40 MILLIGRAM(S): 20 TABLET, DELAYED RELEASE ORAL at 11:18

## 2017-07-11 RX ADMIN — Medication 0.5 MILLIGRAM(S): at 05:50

## 2017-07-11 RX ADMIN — HYDROMORPHONE HYDROCHLORIDE 2 MG/HR: 2 INJECTION INTRAMUSCULAR; INTRAVENOUS; SUBCUTANEOUS at 12:26

## 2017-07-11 RX ADMIN — Medication 25 MILLIGRAM(S): at 18:32

## 2017-07-11 RX ADMIN — ONDANSETRON 4 MILLIGRAM(S): 8 TABLET, FILM COATED ORAL at 05:51

## 2017-07-11 RX ADMIN — OCTREOTIDE ACETATE 200 MICROGRAM(S): 200 INJECTION, SOLUTION INTRAVENOUS; SUBCUTANEOUS at 22:03

## 2017-07-11 RX ADMIN — HYDROMORPHONE HYDROCHLORIDE 4 MILLIGRAM(S): 2 INJECTION INTRAMUSCULAR; INTRAVENOUS; SUBCUTANEOUS at 22:03

## 2017-07-11 RX ADMIN — OCTREOTIDE ACETATE 200 MICROGRAM(S): 200 INJECTION, SOLUTION INTRAVENOUS; SUBCUTANEOUS at 05:51

## 2017-07-11 RX ADMIN — HYDROMORPHONE HYDROCHLORIDE 30 MILLILITER(S): 2 INJECTION INTRAMUSCULAR; INTRAVENOUS; SUBCUTANEOUS at 07:08

## 2017-07-11 RX ADMIN — ONDANSETRON 4 MILLIGRAM(S): 8 TABLET, FILM COATED ORAL at 22:03

## 2017-07-11 RX ADMIN — ONDANSETRON 4 MILLIGRAM(S): 8 TABLET, FILM COATED ORAL at 13:13

## 2017-07-11 RX ADMIN — HYDROMORPHONE HYDROCHLORIDE 2 MG/HR: 2 INJECTION INTRAMUSCULAR; INTRAVENOUS; SUBCUTANEOUS at 19:30

## 2017-07-11 RX ADMIN — Medication 0.5 MILLIGRAM(S): at 01:50

## 2017-07-11 RX ADMIN — OCTREOTIDE ACETATE 200 MICROGRAM(S): 200 INJECTION, SOLUTION INTRAVENOUS; SUBCUTANEOUS at 13:13

## 2017-07-11 RX ADMIN — Medication 0.5 MILLIGRAM(S): at 18:31

## 2017-07-11 NOTE — PROGRESS NOTE ADULT - PROBLEM SELECTOR PLAN 5
Discussed with two of patient's four children (from [place]), who confirmed comfort is main goal. Per family request, will not check VS or turn patient at night, family understands risks as explained to them by the interdisciplinary team. Plan to discuss further with family today at 1300.

## 2017-07-11 NOTE — PROGRESS NOTE ADULT - SUBJECTIVE AND OBJECTIVE BOX
Geriatric and Palliative Care Unit Progress Note [x] Hospice Progress Note [ ]     HPI:  76-year-old F with PMH of COPD, h/o DVT s/p IVC filter (now removed), GERD, hypothyroidism, HIT and diverticulitis s/p Ruben's, recurrent SBO and EC fistulas with adhesions here with SBP, s/p ex-lap, fistula removal, colostomy, hernia repair; course complicated by difficulty extubating after surgery leading to reintubation, c/b MRSA pneumonia, c/b septic shock 2/2 new enterocutaneous fistula, initially on pressors, now on comfort care in PCU, with increasing pain and anxiety, and worsening confusion.    Indication for Palliative Care Unit Services: comfort care, symptom management (pain, anxiety)    ADVANCE DIRECTIVES:    DNR [X ] YES  [ ] NO                           [ X] Completed  MOLST  [ ] YES [X ] NO                      [ ] Completed  Health Care Proxy [X ] YES  [ ] NO   [ ] Completed  Living Will  [ ] YES [ ] NO             [ ] Surrogate  [X ] HCP  [ ] Guardian: children                                                              Phone#:    Allergies    azithromycin (Unknown)  codeine (Unknown)  heparin (Other)  PC Pen VK (Unknown)  penicillin (Unknown)    Intolerances        MEDICATIONS  (STANDING):  pantoprazole  Injectable 40 milliGRAM(s) IV Push daily  buDESOnide 160 MICROgram(s)/formoterol 4.5 MICROgram(s) Inhaler 2 Puff(s) Inhalation two times a day  fluticasone propionate 50 MICROgram(s)/spray Nasal Spray 1 Spray(s) Both Nostrils two times a day  octreotide  Injectable 200 MICROGram(s) IV Push three times a day  dextrose 5% + sodium chloride 0.45%. 1000 milliLiter(s) (20 mL/Hr) IV Continuous <Continuous>  ondansetron Injectable 4 milliGRAM(s) IV Push every 4 hours  diphenhydrAMINE   Injectable 25 milliGRAM(s) IV Push once  LORazepam   Injectable 0.5 milliGRAM(s) IV Push every 6 hours  HYDROmorphone Infusion 2 mG/Hr (2 mL/Hr) IV Continuous <Continuous>    MEDICATIONS  (PRN):  acetaminophen  Suppository 650 milliGRAM(s) Rectal every 6 hours PRN For Temp greater than 38 C (100.4 F)  diphenhydrAMINE   Injectable 25 milliGRAM(s) IV Push every 6 hours PRN Itching  LORazepam   Injectable 0.5 milliGRAM(s) IV Push every 2 hours PRN Anxiety  glycopyrrolate Injectable 0.4 milliGRAM(s) IV Push every 6 hours PRN Secretions  HYDROmorphone  Injectable 1 milliGRAM(s) IV Push every 1 hour PRN Dyspnea  HYDROmorphone  Injectable 4 milliGRAM(s) IV Push every 1 hour PRN pain  HYDROmorphone  Injectable 4 milliGRAM(s) IV Push every 1 hour PRN dyspnea          PRESENT SYMPTOMS:  Source: [ ] Patient   [x ] Family   [x ] Team     Pain:                        [ ] No [x ] Yes             [ ] Mild [x ] Moderate [ ] Severe    Onset -  Location - abdominal  Duration - constant  Character -  Alleviating/Aggravating - alleviated with dilaudid  Radiation -  Timing -      Dyspnea:                [x ] No [ ] Yes             [ ] Mild [ ] Moderate [ ] Severe    Anxiety:                  [ ] No [x ] Yes             [ ] Mild [x ] Moderate [ ] Severe    Fatigue:                  [ ] No [x ] Yes             [ ] Mild [ ] Moderate [ ] Severe    Nausea:                  [x ] No [ ] Yes             [ ] Mild [ ] Moderate [ ] Severe    Loss of appetite:   [ ] No [ ] Yes             [ ] Mild [ ] Moderate [ ] Severe    Constipation:        [ ] No [ ] Yes             [ ] Mild [ ] Moderate [ ] Severe    Other Symptoms: confusion  [ ] All other review of systems negative   [x ] Limited, patient sedated from recent ativan administration    Karnofsky Performance Score/Palliative Performance Status Version 2: <30%    PHYSICAL EXAM: patient/family deferred AM vital signs    General:  [ ] Alert  [ ] Oriented x      [x ] Lethargic  [ ] Agitated   [ ] Cachexia   [ ] Unarousable  [ ] Verbal  [ ] Non-Verbal    HEENT:  [x ] Normal   [ ] Dry mouth   [ ] ET Tube    [ ] Trach  [ ] Oral lesions    Lungs:   [x ] Clear [ ] Tachypnea  [ ] Audible excessive secretions   [ ] Rhonchi        [ ] Right [ ] Left [ ] Bilateral  [ ] Crackles        [ ] Right [ ] Left [ ] Bilateral  [ ] Wheezing     [ ] Right [ ] Left [ ] Bilateral  [ ] Respiratory failure [ ] Acute [ ] Chronic [ ] Hypoxemic [ ] Hypercarbic [ ] Other    Cardiovascular:   [x ] Regular [ ] Irregular [ ] Tachycardia   [ ] Bradycardia  [ ] Murmur [ ] Other  [ ] Shock [ ] Septic [ ] Hypovolemic [ ] Neurogenic [ ] Cardiogenic   [ ] Vasopressors [ ] Inotrophs    Abdomen:   [x ] Soft  [ ] Distended   [ ] +BS  [ ] Non tender [x ] Tender  [ ]PEG   [x ]  NGT   Last BM:     [ x] Other: open abdominal wound    Genitourinary:  [ ] Normal [ ] Incontinent   [ ] Oliguria/Anuria   [x] Snyder  [ ] Other       Musculoskeletal:    [ ] Normal   [ ] Weakness  [ ] Edema  [x ] Bedbound/Wheelchair bound [ ]  Ambulatory [ ] with [ ] without assistance [ ] Functional quadriplegia    Neurological: [x ] No focal deficits  [ ] Cognitive impairment  [ ] Dysphagia [ ] Dysarthria [ ] Paresis [ ] Other   [ ] Brain compression  [ ] Cerebral edema [ ] Encephalopathy    Skin: [ ] Normal   [ ] Ulcer(s) type [ ] Diabetic [ ] Pressure [x ] Other: midline abdominal wound   Stage        POA [ ]  Yes [ ]  No       [ ] Rash    LABS:  reviewed, no recent results    Protein Calorie Malnutrition: [ ] Mild [ ] Moderate [ ] Severe    Oral Intake: [ ] Unable/mouth care only [x ] Minimal [ ] Moderate [ ] Full Capability  Diet: [ ] NPO [ ] Tube feeds [ ] TPN [x ] Other: taking PO, has NGT to suction    RADIOLOGY & ADDITIONAL STUDIES: reviewed, no recent results      REFERRALS:   [ ] Chaplaincy  [ ] Hospice Care  [ ] Child Life  [x ] Social Work  [ ] Case management [ ] Nutrition [ ] Holistic Therapy [ ] Wound Care [ ]Physical Therapy [ ] Other [ ]See goals of care note in [place]

## 2017-07-11 NOTE — PROGRESS NOTE ADULT - PROBLEM SELECTOR PLAN 1
Pain managed with PCA dilaudid [1/1/15 mins/1mg CAB Q1h] with 10 injections and 0 CAB over last 24 hours. Given that patient is getting more confused, and has persistent pain, will change to dilaudid gtt 2mg/h with 4mg IV prn. Family and patient agree with plan.

## 2017-07-11 NOTE — PROGRESS NOTE ADULT - PROBLEM SELECTOR PLAN 3
Has been requiring 0.5mg ativan four times in last 24 hours; will start 0.5mg IV Q6 standing with same prn dose.

## 2017-07-11 NOTE — PROGRESS NOTE ADULT - SUBJECTIVE AND OBJECTIVE BOX
Follow-up Pulm Progress Note    No new respiratory events overnight.  Denies SOB/CP. on dilaudid drip    Medications:  MEDICATIONS  (STANDING):  pantoprazole  Injectable 40 milliGRAM(s) IV Push daily  buDESOnide 160 MICROgram(s)/formoterol 4.5 MICROgram(s) Inhaler 2 Puff(s) Inhalation two times a day  fluticasone propionate 50 MICROgram(s)/spray Nasal Spray 1 Spray(s) Both Nostrils two times a day  octreotide  Injectable 200 MICROGram(s) IV Push three times a day  dextrose 5% + sodium chloride 0.45%. 1000 milliLiter(s) (20 mL/Hr) IV Continuous <Continuous>  ondansetron Injectable 4 milliGRAM(s) IV Push every 4 hours  diphenhydrAMINE   Injectable 25 milliGRAM(s) IV Push once  LORazepam   Injectable 0.5 milliGRAM(s) IV Push every 6 hours  HYDROmorphone Infusion 2 mG/Hr (2 mL/Hr) IV Continuous <Continuous>    MEDICATIONS  (PRN):  acetaminophen  Suppository 650 milliGRAM(s) Rectal every 6 hours PRN For Temp greater than 38 C (100.4 F)  diphenhydrAMINE   Injectable 25 milliGRAM(s) IV Push every 6 hours PRN Itching  LORazepam   Injectable 0.5 milliGRAM(s) IV Push every 2 hours PRN Anxiety  glycopyrrolate Injectable 0.4 milliGRAM(s) IV Push every 6 hours PRN Secretions  HYDROmorphone  Injectable 4 milliGRAM(s) IV Push every 1 hour PRN pain  HYDROmorphone  Injectable 4 milliGRAM(s) IV Push every 1 hour PRN dyspnea          -          07-10 @ 07:01  -  07-11 @ 07:00  --------------------------------------------------------  IN: 0 mL / OUT: 1000 mL / NET: -1000 mL          LABS:no new labs                CAPILLARY BLOOD GLUCOSE                            CULTURES: (if applicable)          Physical Examination:  PULM: decreased bs bilat, no significant sputum production  CVS: S1, S2 heard    RADIOLOGY REVIEWED  CXR: < from: Xray Chest 1 View AP -PORTABLE-Routine (07.04.17 @ 06:42) >  There is an NG tube in place with tip in the stomach. There is a   left-sided PICC line in place with tip at the cavoatrial junction. There   is no pneumothorax. The lungs are clear. The heart size is within normal   limits.    Impression:    Findings grossly unchanged as above.    < end of copied text >      CT chest:    TTE:

## 2017-07-12 PROCEDURE — 99233 SBSQ HOSP IP/OBS HIGH 50: CPT | Mod: GC

## 2017-07-12 RX ORDER — HALOPERIDOL DECANOATE 100 MG/ML
0.5 INJECTION INTRAMUSCULAR
Qty: 0 | Refills: 0 | Status: DISCONTINUED | OUTPATIENT
Start: 2017-07-12 | End: 2017-07-17

## 2017-07-12 RX ADMIN — SODIUM CHLORIDE 20 MILLILITER(S): 9 INJECTION, SOLUTION INTRAVENOUS at 07:44

## 2017-07-12 RX ADMIN — Medication 1 MILLIGRAM(S): at 22:32

## 2017-07-12 RX ADMIN — HYDROMORPHONE HYDROCHLORIDE 4 MILLIGRAM(S): 2 INJECTION INTRAMUSCULAR; INTRAVENOUS; SUBCUTANEOUS at 16:25

## 2017-07-12 RX ADMIN — HYDROMORPHONE HYDROCHLORIDE 4 MILLIGRAM(S): 2 INJECTION INTRAMUSCULAR; INTRAVENOUS; SUBCUTANEOUS at 16:40

## 2017-07-12 RX ADMIN — ONDANSETRON 4 MILLIGRAM(S): 8 TABLET, FILM COATED ORAL at 06:23

## 2017-07-12 RX ADMIN — HYDROMORPHONE HYDROCHLORIDE 4 MILLIGRAM(S): 2 INJECTION INTRAMUSCULAR; INTRAVENOUS; SUBCUTANEOUS at 10:50

## 2017-07-12 RX ADMIN — Medication 1 MILLIGRAM(S): at 18:31

## 2017-07-12 RX ADMIN — ONDANSETRON 4 MILLIGRAM(S): 8 TABLET, FILM COATED ORAL at 02:11

## 2017-07-12 RX ADMIN — ONDANSETRON 4 MILLIGRAM(S): 8 TABLET, FILM COATED ORAL at 18:31

## 2017-07-12 RX ADMIN — PANTOPRAZOLE SODIUM 40 MILLIGRAM(S): 20 TABLET, DELAYED RELEASE ORAL at 12:31

## 2017-07-12 RX ADMIN — OCTREOTIDE ACETATE 200 MICROGRAM(S): 200 INJECTION, SOLUTION INTRAVENOUS; SUBCUTANEOUS at 06:22

## 2017-07-12 RX ADMIN — OCTREOTIDE ACETATE 200 MICROGRAM(S): 200 INJECTION, SOLUTION INTRAVENOUS; SUBCUTANEOUS at 14:47

## 2017-07-12 RX ADMIN — HYDROMORPHONE HYDROCHLORIDE 4 MILLIGRAM(S): 2 INJECTION INTRAMUSCULAR; INTRAVENOUS; SUBCUTANEOUS at 11:14

## 2017-07-12 RX ADMIN — SODIUM CHLORIDE 20 MILLILITER(S): 9 INJECTION, SOLUTION INTRAVENOUS at 21:39

## 2017-07-12 RX ADMIN — HYDROMORPHONE HYDROCHLORIDE 4 MILLIGRAM(S): 2 INJECTION INTRAMUSCULAR; INTRAVENOUS; SUBCUTANEOUS at 04:41

## 2017-07-12 RX ADMIN — HYDROMORPHONE HYDROCHLORIDE 2 MG/HR: 2 INJECTION INTRAMUSCULAR; INTRAVENOUS; SUBCUTANEOUS at 16:01

## 2017-07-12 RX ADMIN — Medication 1 MILLIGRAM(S): at 14:46

## 2017-07-12 RX ADMIN — OCTREOTIDE ACETATE 200 MICROGRAM(S): 200 INJECTION, SOLUTION INTRAVENOUS; SUBCUTANEOUS at 21:40

## 2017-07-12 RX ADMIN — ONDANSETRON 4 MILLIGRAM(S): 8 TABLET, FILM COATED ORAL at 21:39

## 2017-07-12 RX ADMIN — Medication 1 MILLIGRAM(S): at 21:39

## 2017-07-12 RX ADMIN — HYDROMORPHONE HYDROCHLORIDE 2 MG/HR: 2 INJECTION INTRAMUSCULAR; INTRAVENOUS; SUBCUTANEOUS at 07:43

## 2017-07-12 RX ADMIN — SODIUM CHLORIDE 20 MILLILITER(S): 9 INJECTION, SOLUTION INTRAVENOUS at 04:41

## 2017-07-12 RX ADMIN — ONDANSETRON 4 MILLIGRAM(S): 8 TABLET, FILM COATED ORAL at 14:46

## 2017-07-12 RX ADMIN — Medication 0.5 MILLIGRAM(S): at 04:36

## 2017-07-12 RX ADMIN — Medication 0.5 MILLIGRAM(S): at 00:00

## 2017-07-12 RX ADMIN — HYDROMORPHONE HYDROCHLORIDE 4 MILLIGRAM(S): 2 INJECTION INTRAMUSCULAR; INTRAVENOUS; SUBCUTANEOUS at 04:56

## 2017-07-12 RX ADMIN — ONDANSETRON 4 MILLIGRAM(S): 8 TABLET, FILM COATED ORAL at 10:40

## 2017-07-12 RX ADMIN — HYDROMORPHONE HYDROCHLORIDE 2 MG/HR: 2 INJECTION INTRAMUSCULAR; INTRAVENOUS; SUBCUTANEOUS at 19:30

## 2017-07-12 RX ADMIN — Medication 1 MILLIGRAM(S): at 10:41

## 2017-07-12 RX ADMIN — Medication 0.5 MILLIGRAM(S): at 06:22

## 2017-07-12 NOTE — PROGRESS NOTE ADULT - SUBJECTIVE AND OBJECTIVE BOX
Geriatric and Palliative Care Unit Progress Note [x] Hospice Progress Note [ ]     HPI:  76-year-old F with PMH of COPD, h/o DVT s/p IVC filter (now removed), GERD, hypothyroidism, HIT and diverticulitis s/p Ruben's, recurrent SBO and EC fistulas with adhesions here with SBP, s/p ex-lap, fistula removal, colostomy, hernia repair; course complicated by difficulty extubating after surgery leading to reintubation, c/b MRSA pneumonia, c/b septic shock 2/2 new enterocutaneous fistula, initially on pressors, now on comfort care in PCU, with increasing pain and anxiety, and worsening confusion. Worsening agitation overnight, requiring PRN ativan. Daughter is concerned that patient may pull out PICC line when agitated.    Indication for Palliative Care Unit Services: comfort care, symptom management (pain, anxiety)    ADVANCE DIRECTIVES:    DNR [X ] YES  [ ] NO                           [ X] Completed  MOLST  [ ] YES [X ] NO                      [ ] Completed  Health Care Proxy [X ] YES  [ ] NO   [ ] Completed  Living Will  [ ] YES [ ] NO             [ ] Surrogate  [X ] HCP  [ ] Guardian: children                                                              Phone#:    Allergies    azithromycin (Unknown)  codeine (Unknown)  heparin (Other)  PC Pen VK (Unknown)  penicillin (Unknown)    Intolerances    MEDICATIONS  (STANDING):  pantoprazole  Injectable 40 milliGRAM(s) IV Push daily  buDESOnide 160 MICROgram(s)/formoterol 4.5 MICROgram(s) Inhaler 2 Puff(s) Inhalation two times a day  fluticasone propionate 50 MICROgram(s)/spray Nasal Spray 1 Spray(s) Both Nostrils two times a day  octreotide  Injectable 200 MICROGram(s) IV Push three times a day  dextrose 5% + sodium chloride 0.45%. 1000 milliLiter(s) (20 mL/Hr) IV Continuous <Continuous>  ondansetron Injectable 4 milliGRAM(s) IV Push every 4 hours  HYDROmorphone Infusion 2 mG/Hr (2 mL/Hr) IV Continuous <Continuous>  LORazepam   Injectable 1 milliGRAM(s) IV Push every 4 hours    MEDICATIONS  (PRN):  acetaminophen  Suppository 650 milliGRAM(s) Rectal every 6 hours PRN For Temp greater than 38 C (100.4 F)  diphenhydrAMINE   Injectable 25 milliGRAM(s) IV Push every 6 hours PRN Itching  glycopyrrolate Injectable 0.4 milliGRAM(s) IV Push every 6 hours PRN Secretions  HYDROmorphone  Injectable 4 milliGRAM(s) IV Push every 1 hour PRN pain  HYDROmorphone  Injectable 4 milliGRAM(s) IV Push every 1 hour PRN dyspnea  haloperidol    Injectable 0.5 milliGRAM(s) IV Push every 1 hour PRN agitation  LORazepam   Injectable 1 milliGRAM(s) IV Push every 2 hours PRN Anxiety      PRESENT SYMPTOMS:  Source: [ ] Patient   [x ] Family   [x ] Team     Pain:                        [ ] No [x ] Yes             [ ] Mild [x ] Moderate [ ] Severe    Onset -  Location - abdominal  Duration - constant  Character -  Alleviating/Aggravating - alleviated with dilaudid  Radiation -  Timing -      Dyspnea:                [x ] No [ ] Yes             [ ] Mild [ ] Moderate [ ] Severe    Anxiety:                  [ ] No [x ] Yes             [ ] Mild [x ] Moderate [ ] Severe    Fatigue:                  [ ] No [x ] Yes             [ ] Mild [ ] Moderate [ ] Severe    Nausea:                  [x ] No [ ] Yes             [ ] Mild [ ] Moderate [ ] Severe    Loss of appetite:   [ ] No [ ] Yes             [ ] Mild [ ] Moderate [ ] Severe    Constipation:        [ ] No [ ] Yes             [ ] Mild [ ] Moderate [ ] Severe    Other Symptoms: confusion  [ ] All other review of systems negative   [x ] Limited, patient sedated from recent ativan administration    Karnofsky Performance Score/Palliative Performance Status Version 2: <30%    PHYSICAL EXAM: patient/family deferred AM vital signs    General:  [ ] Alert  [ ] Oriented x      [x ] Lethargic  [ ] Agitated   [ ] Cachexia   [ ] Unarousable  [ ] Verbal  [ ] Non-Verbal    HEENT:  [x ] Normal   [ ] Dry mouth   [ ] ET Tube    [ ] Trach  [ ] Oral lesions    Lungs:   [x ] Clear [ ] Tachypnea  [ ] Audible excessive secretions   [ ] Rhonchi        [ ] Right [ ] Left [ ] Bilateral  [ ] Crackles        [ ] Right [ ] Left [ ] Bilateral  [ ] Wheezing     [ ] Right [ ] Left [ ] Bilateral  [ ] Respiratory failure [ ] Acute [ ] Chronic [ ] Hypoxemic [ ] Hypercarbic [ ] Other    Cardiovascular:   [x ] Regular [ ] Irregular [ ] Tachycardia   [ ] Bradycardia  [ ] Murmur [ ] Other  [ ] Shock [ ] Septic [ ] Hypovolemic [ ] Neurogenic [ ] Cardiogenic   [ ] Vasopressors [ ] Inotrophs    Abdomen:   [ ] Soft  [ ] Distended   [ ] +BS  [ ] Non tender [ ] Tender  [ ]PEG   [x ]  NGT   Last BM:     [ x] Other: open abdominal wound    Genitourinary:  [ ] Normal [ ] Incontinent   [ ] Oliguria/Anuria   [x] Snyder  [ ] Other       Musculoskeletal:    [ ] Normal   [ ] Weakness  [ ] Edema  [x ] Bedbound/Wheelchair bound [ ]  Ambulatory [ ] with [ ] without assistance [ ] Functional quadriplegia    Neurological: [x ] No focal deficits  [ ] Cognitive impairment  [ ] Dysphagia [ ] Dysarthria [ ] Paresis [ ] Other   [ ] Brain compression  [ ] Cerebral edema [ ] Encephalopathy    Skin: [ ] Normal   [ ] Ulcer(s) type [ ] Diabetic [ ] Pressure [x ] Other: midline abdominal wound   Stage        POA [ ]  Yes [ ]  No       [ ] Rash    LABS:  reviewed, no recent results    Protein Calorie Malnutrition: [ ] Mild [ ] Moderate [ ] Severe    Oral Intake: [ ] Unable/mouth care only [x ] Minimal [ ] Moderate [ ] Full Capability  Diet: [ ] NPO [ ] Tube feeds [ ] TPN [x ] Other: taking PO, has NGT to suction    RADIOLOGY & ADDITIONAL STUDIES: reviewed, no recent results      REFERRALS:   [ ] Chaplaincy  [ ] Hospice Care  [ ] Child Life  [x ] Social Work  [ ] Case management [ ] Nutrition [ ] Holistic Therapy [ ] Wound Care [ ]Physical Therapy [ ] Other [ ]See goals of care note in [place]

## 2017-07-12 NOTE — PROGRESS NOTE ADULT - PROBLEM SELECTOR PLAN 5
Discussed with two of patient's four children ([place]), who confirmed comfort is main goal. Per family request, will not check VS or turn patient at night, family understands risks as explained to them by the interdisciplinary team. Family meeting held on 7/11 with all four siblings (one via conference call) to provide update on care plan and provide emotional support. Their goal is for patient to die with dignity, and they understand that she is in the dying process, and that this could take anywhere from days to weeks, given that she has minimal fluid and no PO intake. Discussed with two of patient's four children (from [place]), who confirmed comfort is main goal. Per family request, will not check VS or turn patient at night, family understands risks as explained to them by the interdisciplinary team. Family meeting held on 7/11 with all four siblings (one via conference call) to provide update on care plan and provide emotional support. Their goal is for patient to die with dignity, and they understand that she is in the dying process, and that this could take anywhere from days to weeks, given that she has minimal fluid and no PO intake. Discussed with daughter Fe today regarding patient assessments, and will conduct two nursing assessment daily.

## 2017-07-12 NOTE — PROGRESS NOTE ADULT - PROBLEM SELECTOR PLAN 3
Has been requiring 0.5mg ativan two times in last 24 hours; is on 0.5mg IV Q6 standing. Will increase dose and frequency to 1mg Q4 hours with 1mg Q2 breakthrough, and monitor symptoms. If continues to require significant doses of breakthrough and has significant agitation, will consider starting a continuous infusion. Has been requiring 0.5mg ativan two times in last 24 hours; is on 0.5mg IV Q6 standing. Will increase dose and frequency to 1mg Q4 hours with 1mg Q1 breakthrough, and monitor symptoms. If continues to require significant doses of breakthrough and has significant agitation, will consider starting a continuous infusion.

## 2017-07-12 NOTE — PROGRESS NOTE ADULT - PROBLEM SELECTOR PLAN 1
Pain managed previously with PCA, but given that patient is getting more confused, and has persistent pain, changed to dilaudid gtt 2mg/h with 4mg IV prn on 7/11. Pain better controlled.

## 2017-07-12 NOTE — PROGRESS NOTE ADULT - PROBLEM SELECTOR PLAN 2
c/w octreotide, surgery f/u appreciated. Daughter and son want to remove NGT as drainage has decreased and it may provide improved comfort, and are conferring with their two siblings before making a final decision. c/w octreotide, surgery f/u appreciated. Daughter and son want to remove NGT as drainage has decreased and it may provide improved comfort. d/w Dr. Lawton; will observe NGT output today, and possibly remove this evening if drainage is minimal, to avoid removing prematurely and causing recurrence of her nausea.

## 2017-07-13 PROCEDURE — 99233 SBSQ HOSP IP/OBS HIGH 50: CPT | Mod: GC

## 2017-07-13 RX ORDER — HYDROMORPHONE HYDROCHLORIDE 2 MG/ML
6 INJECTION INTRAMUSCULAR; INTRAVENOUS; SUBCUTANEOUS
Qty: 0 | Refills: 0 | Status: DISCONTINUED | OUTPATIENT
Start: 2017-07-13 | End: 2017-07-17

## 2017-07-13 RX ORDER — HYDROMORPHONE HYDROCHLORIDE 2 MG/ML
5 INJECTION INTRAMUSCULAR; INTRAVENOUS; SUBCUTANEOUS
Qty: 100 | Refills: 0 | Status: DISCONTINUED | OUTPATIENT
Start: 2017-07-13 | End: 2017-07-17

## 2017-07-13 RX ADMIN — HYDROMORPHONE HYDROCHLORIDE 6 MILLIGRAM(S): 2 INJECTION INTRAMUSCULAR; INTRAVENOUS; SUBCUTANEOUS at 16:11

## 2017-07-13 RX ADMIN — OCTREOTIDE ACETATE 200 MICROGRAM(S): 200 INJECTION, SOLUTION INTRAVENOUS; SUBCUTANEOUS at 15:20

## 2017-07-13 RX ADMIN — Medication 1 MG/HR: at 19:11

## 2017-07-13 RX ADMIN — HYDROMORPHONE HYDROCHLORIDE 4 MILLIGRAM(S): 2 INJECTION INTRAMUSCULAR; INTRAVENOUS; SUBCUTANEOUS at 06:38

## 2017-07-13 RX ADMIN — HYDROMORPHONE HYDROCHLORIDE 4 MILLIGRAM(S): 2 INJECTION INTRAMUSCULAR; INTRAVENOUS; SUBCUTANEOUS at 06:23

## 2017-07-13 RX ADMIN — HYDROMORPHONE HYDROCHLORIDE 6 MILLIGRAM(S): 2 INJECTION INTRAMUSCULAR; INTRAVENOUS; SUBCUTANEOUS at 20:30

## 2017-07-13 RX ADMIN — HYDROMORPHONE HYDROCHLORIDE 3 MG/HR: 2 INJECTION INTRAMUSCULAR; INTRAVENOUS; SUBCUTANEOUS at 19:21

## 2017-07-13 RX ADMIN — ONDANSETRON 4 MILLIGRAM(S): 8 TABLET, FILM COATED ORAL at 06:03

## 2017-07-13 RX ADMIN — ONDANSETRON 4 MILLIGRAM(S): 8 TABLET, FILM COATED ORAL at 18:38

## 2017-07-13 RX ADMIN — HYDROMORPHONE HYDROCHLORIDE 6 MILLIGRAM(S): 2 INJECTION INTRAMUSCULAR; INTRAVENOUS; SUBCUTANEOUS at 20:18

## 2017-07-13 RX ADMIN — PANTOPRAZOLE SODIUM 40 MILLIGRAM(S): 20 TABLET, DELAYED RELEASE ORAL at 11:39

## 2017-07-13 RX ADMIN — ONDANSETRON 4 MILLIGRAM(S): 8 TABLET, FILM COATED ORAL at 15:20

## 2017-07-13 RX ADMIN — HYDROMORPHONE HYDROCHLORIDE 3 MG/HR: 2 INJECTION INTRAMUSCULAR; INTRAVENOUS; SUBCUTANEOUS at 15:19

## 2017-07-13 RX ADMIN — HYDROMORPHONE HYDROCHLORIDE 4 MILLIGRAM(S): 2 INJECTION INTRAMUSCULAR; INTRAVENOUS; SUBCUTANEOUS at 09:56

## 2017-07-13 RX ADMIN — OCTREOTIDE ACETATE 200 MICROGRAM(S): 200 INJECTION, SOLUTION INTRAVENOUS; SUBCUTANEOUS at 21:38

## 2017-07-13 RX ADMIN — Medication 1 MILLIGRAM(S): at 06:02

## 2017-07-13 RX ADMIN — Medication 1 MILLIGRAM(S): at 02:14

## 2017-07-13 RX ADMIN — SODIUM CHLORIDE 20 MILLILITER(S): 9 INJECTION, SOLUTION INTRAVENOUS at 07:02

## 2017-07-13 RX ADMIN — HYDROMORPHONE HYDROCHLORIDE 3 MG/HR: 2 INJECTION INTRAMUSCULAR; INTRAVENOUS; SUBCUTANEOUS at 11:20

## 2017-07-13 RX ADMIN — Medication 1 MG/HR: at 12:11

## 2017-07-13 RX ADMIN — ONDANSETRON 4 MILLIGRAM(S): 8 TABLET, FILM COATED ORAL at 02:13

## 2017-07-13 RX ADMIN — OCTREOTIDE ACETATE 200 MICROGRAM(S): 200 INJECTION, SOLUTION INTRAVENOUS; SUBCUTANEOUS at 06:03

## 2017-07-13 RX ADMIN — Medication 1 MILLIGRAM(S): at 16:11

## 2017-07-13 RX ADMIN — SODIUM CHLORIDE 20 MILLILITER(S): 9 INJECTION, SOLUTION INTRAVENOUS at 19:12

## 2017-07-13 RX ADMIN — Medication 1 MILLIGRAM(S): at 09:55

## 2017-07-13 RX ADMIN — HYDROMORPHONE HYDROCHLORIDE 2 MG/HR: 2 INJECTION INTRAMUSCULAR; INTRAVENOUS; SUBCUTANEOUS at 07:01

## 2017-07-13 RX ADMIN — ONDANSETRON 4 MILLIGRAM(S): 8 TABLET, FILM COATED ORAL at 21:38

## 2017-07-13 RX ADMIN — SODIUM CHLORIDE 20 MILLILITER(S): 9 INJECTION, SOLUTION INTRAVENOUS at 06:01

## 2017-07-13 RX ADMIN — ONDANSETRON 4 MILLIGRAM(S): 8 TABLET, FILM COATED ORAL at 09:55

## 2017-07-13 RX ADMIN — HYDROMORPHONE HYDROCHLORIDE 3 MG/HR: 2 INJECTION INTRAMUSCULAR; INTRAVENOUS; SUBCUTANEOUS at 19:11

## 2017-07-13 NOTE — PROGRESS NOTE ADULT - SUBJECTIVE AND OBJECTIVE BOX
Geriatric and Palliative Care Unit Progress Note [x] Hospice Progress Note [ ]     HPI:  76-year-old F with PMH of COPD, h/o DVT s/p IVC filter (now removed), GERD, hypothyroidism, HIT and diverticulitis s/p Ruben's, recurrent SBO and EC fistulas with adhesions here with SBP, s/p ex-lap, fistula removal, colostomy, hernia repair; course complicated by difficulty extubating after surgery leading to reintubation, c/b MRSA pneumonia, c/b septic shock 2/2 new enterocutaneous fistula, initially on pressors, now on comfort care in PCU, with increasing pain and anxiety, and worsening confusion. Worsening agitation overnight, requiring PRN ativan. Daughter is concerned that patient may pull out PICC line when agitated.    Indication for Palliative Care Unit Services: comfort care, symptom management (pain, anxiety)    ADVANCE DIRECTIVES:    DNR [X ] YES  [ ] NO                           [ X] Completed  MOLST  [ ] YES [X ] NO                      [ ] Completed  Health Care Proxy [X ] YES  [ ] NO   [ ] Completed  Living Will  [ ] YES [ ] NO             [ ] Surrogate  [X ] HCP  [ ] Guardian: children                                                              Phone#:    Allergies    azithromycin (Unknown)  codeine (Unknown)  heparin (Other)  PC Pen VK (Unknown)  penicillin (Unknown)    Intolerances    MEDICATIONS  (STANDING):  pantoprazole  Injectable 40 milliGRAM(s) IV Push daily  buDESOnide 160 MICROgram(s)/formoterol 4.5 MICROgram(s) Inhaler 2 Puff(s) Inhalation two times a day  fluticasone propionate 50 MICROgram(s)/spray Nasal Spray 1 Spray(s) Both Nostrils two times a day  octreotide  Injectable 200 MICROGram(s) IV Push three times a day  dextrose 5% + sodium chloride 0.45%. 1000 milliLiter(s) (20 mL/Hr) IV Continuous <Continuous>  ondansetron Injectable 4 milliGRAM(s) IV Push every 4 hours  LORazepam   Injectable 1 milliGRAM(s) IV Push every 4 hours  LORazepam Infusion 1 mG/Hr (1 mL/Hr) IV Continuous <Continuous>  HYDROmorphone Infusion 3 mG/Hr (3 mL/Hr) IV Continuous <Continuous>    MEDICATIONS  (PRN):  acetaminophen  Suppository 650 milliGRAM(s) Rectal every 6 hours PRN For Temp greater than 38 C (100.4 F)  diphenhydrAMINE   Injectable 25 milliGRAM(s) IV Push every 6 hours PRN Itching  glycopyrrolate Injectable 0.4 milliGRAM(s) IV Push every 6 hours PRN Secretions  haloperidol    Injectable 0.5 milliGRAM(s) IV Push every 1 hour PRN agitation  LORazepam   Injectable 1 milliGRAM(s) IV Push every 1 hour PRN Anxiety  HYDROmorphone  Injectable 6 milliGRAM(s) IV Push every 1 hour PRN dyspnea  HYDROmorphone  Injectable 6 milliGRAM(s) IV Push every 1 hour PRN pain        PRESENT SYMPTOMS:  Source: [ ] Patient   [x ] Family   [x ] Team     Pain:                        [ ] No [x ] Yes             [ ] Mild [x ] Moderate [ ] Severe    Onset -  Location - abdominal  Duration - constant  Character -  Alleviating/Aggravating - alleviated with dilaudid  Radiation -  Timing -      Dyspnea:                [x ] No [ ] Yes             [ ] Mild [ ] Moderate [ ] Severe    Anxiety:                  [ ] No [x ] Yes             [ ] Mild [x ] Moderate [ ] Severe    Fatigue:                  [ ] No [x ] Yes             [ ] Mild [ ] Moderate [ ] Severe    Nausea:                  [x ] No [ ] Yes             [ ] Mild [ ] Moderate [ ] Severe    Loss of appetite:   [ ] No [ ] Yes             [ ] Mild [ ] Moderate [ ] Severe    Constipation:        [ ] No [ ] Yes             [ ] Mild [ ] Moderate [ ] Severe    Other Symptoms: confusion  [ ] All other review of systems negative   [x ] Limited, patient sedated from recent ativan administration    Karnofsky Performance Score/Palliative Performance Status Version 2: <30%    PHYSICAL EXAM: patient/family deferred AM vital signs    General:  [ ] Alert  [ ] Oriented x      [x ] Lethargic  [ ] Agitated   [ ] Cachexia   [ ] Unarousable  [ ] Verbal  [ ] Non-Verbal    HEENT:  [x ] Normal   [ ] Dry mouth   [ ] ET Tube    [ ] Trach  [ ] Oral lesions    Lungs:   [x ] Clear anteriorly [ ] Tachypnea  [ ] Audible excessive secretions   [ ] Rhonchi        [ ] Right [ ] Left [ ] Bilateral  [ ] Crackles        [ ] Right [ ] Left [ ] Bilateral  [ ] Wheezing     [ ] Right [ ] Left [ ] Bilateral  [ ] Respiratory failure [ ] Acute [ ] Chronic [ ] Hypoxemic [ ] Hypercarbic [ ] Other    Cardiovascular:   [x ] Regular [ ] Irregular [ ] Tachycardia   [ ] Bradycardia  [ ] Murmur [ ] Other  [ ] Shock [ ] Septic [ ] Hypovolemic [ ] Neurogenic [ ] Cardiogenic   [ ] Vasopressors [ ] Inotrophs    Abdomen:   [ ] Soft  [ ] Distended   [ ] +BS  [ ] Non tender [ ] Tender  [ ]PEG   [x ]  NGT   Last BM:     [ x] Other: open abdominal wound    Genitourinary:  [ ] Normal [ ] Incontinent   [ ] Oliguria/Anuria   [x] Snyder  [ ] Other       Musculoskeletal:    [ ] Normal   [ ] Weakness  [ ] Edema  [x ] Bedbound/Wheelchair bound [ ]  Ambulatory [ ] with [ ] without assistance [x ] Functional quadriplegia    Neurological: [x ] No focal deficits  [ ] Cognitive impairment  [ ] Dysphagia [ ] Dysarthria [ ] Paresis [ ] Other   [ ] Brain compression  [ ] Cerebral edema [ ] Encephalopathy    Skin: [ ] Normal   [ ] Ulcer(s) type [ ] Diabetic [ ] Pressure [x ] Other: midline abdominal wound   Stage        POA [ ]  Yes [ ]  No       [ ] Rash    LABS:  reviewed, no recent results    Protein Calorie Malnutrition: [ ] Mild [ ] Moderate [ ] Severe    Oral Intake: [ ] Unable/mouth care only [x ] Minimal [ ] Moderate [ ] Full Capability  Diet: [ ] NPO [ ] Tube feeds [ ] TPN [x ] Other: taking PO, has NGT to suction    RADIOLOGY & ADDITIONAL STUDIES: reviewed, no recent results      REFERRALS:   [ ] Chaplaincy  [ ] Hospice Care  [ ] Child Life  [x ] Social Work  [ ] Case management [ ] Nutrition [ ] Holistic Therapy [ ] Wound Care [ ]Physical Therapy [ ] Other [ ]See goals of care note in [place]

## 2017-07-13 NOTE — PROGRESS NOTE ADULT - PROBLEM SELECTOR PLAN 5
Discussed with patient's daughter Fe this AM. Plan is for continued comfort care and management of her agitation and pain. Continued emotional support for family to be provided during her admission.

## 2017-07-13 NOTE — PROGRESS NOTE ADULT - PROBLEM SELECTOR PLAN 1
Pain managed previously with PCA, but given that patient is getting more confused, and has persistent pain, changed to dilaudid gtt 2mg/h with 4mg IV prn on 7/11. Pain suboptimally controlled, changed to 3mg/h gtt on 7/13.

## 2017-07-13 NOTE — PROGRESS NOTE ADULT - PROBLEM SELECTOR PLAN 2
c/w octreotide, surgery f/u appreciated. NGT removed 7/12 PM, patient tolerating well, no nausea at this time.

## 2017-07-13 NOTE — PROGRESS NOTE ADULT - SUBJECTIVE AND OBJECTIVE BOX
Follow-up Pulm Progress Note    comfortable, on dilaudid/ativan gtt, family at bedside    Medications:  MEDICATIONS  (STANDING):  pantoprazole  Injectable 40 milliGRAM(s) IV Push daily  buDESOnide 160 MICROgram(s)/formoterol 4.5 MICROgram(s) Inhaler 2 Puff(s) Inhalation two times a day  fluticasone propionate 50 MICROgram(s)/spray Nasal Spray 1 Spray(s) Both Nostrils two times a day  octreotide  Injectable 200 MICROGram(s) IV Push three times a day  dextrose 5% + sodium chloride 0.45%. 1000 milliLiter(s) (20 mL/Hr) IV Continuous <Continuous>  ondansetron Injectable 4 milliGRAM(s) IV Push every 4 hours  LORazepam Infusion 1 mG/Hr (1 mL/Hr) IV Continuous <Continuous>  HYDROmorphone Infusion 3 mG/Hr (3 mL/Hr) IV Continuous <Continuous>    MEDICATIONS  (PRN):  acetaminophen  Suppository 650 milliGRAM(s) Rectal every 6 hours PRN For Temp greater than 38 C (100.4 F)  diphenhydrAMINE   Injectable 25 milliGRAM(s) IV Push every 6 hours PRN Itching  glycopyrrolate Injectable 0.4 milliGRAM(s) IV Push every 6 hours PRN Secretions  haloperidol    Injectable 0.5 milliGRAM(s) IV Push every 1 hour PRN agitation  LORazepam   Injectable 1 milliGRAM(s) IV Push every 1 hour PRN Anxiety  HYDROmorphone  Injectable 6 milliGRAM(s) IV Push every 1 hour PRN dyspnea  HYDROmorphone  Injectable 6 milliGRAM(s) IV Push every 1 hour PRN pain                      LABS:no new labs          Physical Examination:  PULM:, dec bs bilat  CVS: s1s2    RADIOLOGY REVIEWED  CXR:     < from: Xray Chest 1 View AP -PORTABLE-Routine (07.04.17 @ 06:42) >  he lungs are clear    < end of copied text >

## 2017-07-13 NOTE — PROGRESS NOTE ADULT - PROBLEM SELECTOR PLAN 3
Patient has been getting more agitated, pulling at her abdominal wound bag, PICC line. Will start ativan gtt 1mg/h to help manage her symptoms.

## 2017-07-14 PROCEDURE — 99233 SBSQ HOSP IP/OBS HIGH 50: CPT | Mod: GC

## 2017-07-14 RX ORDER — SODIUM CHLORIDE 9 MG/ML
1000 INJECTION INTRAMUSCULAR; INTRAVENOUS; SUBCUTANEOUS
Qty: 0 | Refills: 0 | Status: DISCONTINUED | OUTPATIENT
Start: 2017-07-14 | End: 2017-07-17

## 2017-07-14 RX ADMIN — SODIUM CHLORIDE 10 MILLILITER(S): 9 INJECTION INTRAMUSCULAR; INTRAVENOUS; SUBCUTANEOUS at 19:34

## 2017-07-14 RX ADMIN — PANTOPRAZOLE SODIUM 40 MILLIGRAM(S): 20 TABLET, DELAYED RELEASE ORAL at 10:35

## 2017-07-14 RX ADMIN — ONDANSETRON 4 MILLIGRAM(S): 8 TABLET, FILM COATED ORAL at 18:13

## 2017-07-14 RX ADMIN — HYDROMORPHONE HYDROCHLORIDE 3 MG/HR: 2 INJECTION INTRAMUSCULAR; INTRAVENOUS; SUBCUTANEOUS at 19:40

## 2017-07-14 RX ADMIN — ONDANSETRON 4 MILLIGRAM(S): 8 TABLET, FILM COATED ORAL at 05:26

## 2017-07-14 RX ADMIN — ONDANSETRON 4 MILLIGRAM(S): 8 TABLET, FILM COATED ORAL at 02:02

## 2017-07-14 RX ADMIN — Medication 1 MG/HR: at 19:31

## 2017-07-14 RX ADMIN — OCTREOTIDE ACETATE 200 MICROGRAM(S): 200 INJECTION, SOLUTION INTRAVENOUS; SUBCUTANEOUS at 06:22

## 2017-07-14 RX ADMIN — ONDANSETRON 4 MILLIGRAM(S): 8 TABLET, FILM COATED ORAL at 10:35

## 2017-07-14 RX ADMIN — Medication 1 MG/HR: at 13:47

## 2017-07-14 RX ADMIN — OCTREOTIDE ACETATE 200 MICROGRAM(S): 200 INJECTION, SOLUTION INTRAVENOUS; SUBCUTANEOUS at 23:25

## 2017-07-14 RX ADMIN — HYDROMORPHONE HYDROCHLORIDE 3 MG/HR: 2 INJECTION INTRAMUSCULAR; INTRAVENOUS; SUBCUTANEOUS at 19:30

## 2017-07-14 RX ADMIN — ONDANSETRON 4 MILLIGRAM(S): 8 TABLET, FILM COATED ORAL at 13:48

## 2017-07-14 RX ADMIN — OCTREOTIDE ACETATE 200 MICROGRAM(S): 200 INJECTION, SOLUTION INTRAVENOUS; SUBCUTANEOUS at 13:48

## 2017-07-14 RX ADMIN — HYDROMORPHONE HYDROCHLORIDE 3 MG/HR: 2 INJECTION INTRAMUSCULAR; INTRAVENOUS; SUBCUTANEOUS at 13:47

## 2017-07-14 RX ADMIN — ONDANSETRON 4 MILLIGRAM(S): 8 TABLET, FILM COATED ORAL at 22:51

## 2017-07-14 RX ADMIN — SODIUM CHLORIDE 10 MILLILITER(S): 9 INJECTION INTRAMUSCULAR; INTRAVENOUS; SUBCUTANEOUS at 13:49

## 2017-07-14 NOTE — PROGRESS NOTE ADULT - PROBLEM SELECTOR PLAN 3
Agitation improved after initiating of ativan 1mg/h gtt on 7/13. Continue to monitor. Limited interruptions to patient comfort.

## 2017-07-14 NOTE — PROGRESS NOTE ADULT - SUBJECTIVE AND OBJECTIVE BOX
Geriatric and Palliative Care Unit Progress Note [x] Hospice Progress Note [ ]     HPI:  76-year-old F with PMH of COPD, h/o DVT s/p IVC filter (now removed), GERD, hypothyroidism, HIT and diverticulitis s/p Ruben's, recurrent SBO and EC fistulas with adhesions here with SBP, s/p ex-lap, fistula removal, colostomy, hernia repair; course complicated by difficulty extubating after surgery leading to reintubation, c/b MRSA pneumonia, c/b septic shock 2/2 new enterocutaneous fistula, initially on pressors, now on comfort care in PCU, with increasing pain and anxiety, and worsening confusion. Worsening agitation overnight, requiring PRN ativan. Daughter is concerned that patient may pull out PICC line when agitated.    Indication for Palliative Care Unit Services: comfort care, symptom management (pain, anxiety)    ADVANCE DIRECTIVES:    DNR [X ] YES  [ ] NO                           [ X] Completed  MOLST  [ ] YES [X ] NO                      [ ] Completed  Health Care Proxy [X ] YES  [ ] NO   [ ] Completed  Living Will  [ ] YES [ ] NO             [ ] Surrogate  [X ] HCP  [ ] Guardian: children                                                              Phone#:    Allergies    azithromycin (Unknown)  codeine (Unknown)  heparin (Other)  PC Pen VK (Unknown)  penicillin (Unknown)    Intolerances    MEDICATIONS  (STANDING):  pantoprazole  Injectable 40 milliGRAM(s) IV Push daily  octreotide  Injectable 200 MICROGram(s) IV Push three times a day  ondansetron Injectable 4 milliGRAM(s) IV Push every 4 hours  LORazepam Infusion 1 mG/Hr (1 mL/Hr) IV Continuous <Continuous>  HYDROmorphone Infusion 3 mG/Hr (3 mL/Hr) IV Continuous <Continuous>  sodium chloride 0.9%. 1000 milliLiter(s) (10 mL/Hr) IV Continuous <Continuous>    MEDICATIONS  (PRN):  acetaminophen  Suppository 650 milliGRAM(s) Rectal every 6 hours PRN For Temp greater than 38 C (100.4 F)  diphenhydrAMINE   Injectable 25 milliGRAM(s) IV Push every 6 hours PRN Itching  glycopyrrolate Injectable 0.4 milliGRAM(s) IV Push every 6 hours PRN Secretions  haloperidol    Injectable 0.5 milliGRAM(s) IV Push every 1 hour PRN agitation  LORazepam   Injectable 1 milliGRAM(s) IV Push every 1 hour PRN Anxiety  HYDROmorphone  Injectable 6 milliGRAM(s) IV Push every 1 hour PRN dyspnea  HYDROmorphone  Injectable 6 milliGRAM(s) IV Push every 1 hour PRN pain          PRESENT SYMPTOMS:  Source: [ ] Patient   [x ] Family   [x ] Team     Pain:                        [ ] No [x ] Yes             [ ] Mild [x ] Moderate [ ] Severe    Onset - with palpation  Location - abdominal  Duration - constant  Character -   Alleviating/Aggravating - alleviated with dilaudid  Radiation - none reported  Timing -       Dyspnea:                [x ] No [ ] Yes             [ ] Mild [ ] Moderate [ ] Severe    Anxiety:                  [ ] No [x ] Yes             [ ] Mild [x ] Moderate [ ] Severe    Fatigue:                  [ ] No [x ] Yes             [ ] Mild [ ] Moderate [ ] Severe    Nausea:                  [x ] No [ ] Yes             [ ] Mild [ ] Moderate [ ] Severe    Loss of appetite:   [ ] No [ ] Yes             [ ] Mild [ ] Moderate [ ] Severe    Constipation:        [ ] No [ ] Yes             [ ] Mild [ ] Moderate [ ] Severe    Other Symptoms: confusion  [ ] All other review of systems negative   [x ] Limited, patient sedated from recent ativan administration    Karnofsky Performance Score/Palliative Performance Status Version 2: <30%    PHYSICAL EXAM: patient/family deferred AM vital signs    General:  [ ] Alert  [ ] Oriented x      [x ] Lethargic  [ ] Agitated   [ ] Cachexia   [ ] Unarousable  [ ] Verbal  [ ] Non-Verbal    HEENT:  [x ] Normal   [ ] Dry mouth   [ ] ET Tube    [ ] Trach  [ ] Oral lesions    Lungs:   [x ] Clear anteriorly [ ] Tachypnea  [ ] Audible excessive secretions   [ ] Rhonchi        [ ] Right [ ] Left [ ] Bilateral  [ ] Crackles        [ ] Right [ ] Left [ ] Bilateral  [ ] Wheezing     [ ] Right [ ] Left [ ] Bilateral  [ ] Respiratory failure [ ] Acute [ ] Chronic [ ] Hypoxemic [ ] Hypercarbic [ ] Other    Cardiovascular:   [x ] Regular [ ] Irregular [ ] Tachycardia   [ ] Bradycardia  [ ] Murmur [ ] Other  [ ] Shock [ ] Septic [ ] Hypovolemic [ ] Neurogenic [ ] Cardiogenic   [ ] Vasopressors [ ] Inotrophs    Abdomen:   [ ] Soft  [ ] Distended   [ ] +BS  [ ] Non tender [ ] Tender  [ ]PEG   [x ]  NGT   Last BM:     [ x] Other: open abdominal wound    Genitourinary:  [ ] Normal [ ] Incontinent   [ ] Oliguria/Anuria   [x] Snyder  [ ] Other       Musculoskeletal:    [ ] Normal   [ ] Weakness  [ ] Edema  [x ] Bedbound/Wheelchair bound [ ]  Ambulatory [ ] with [ ] without assistance [x ] Functional quadriplegia    Neurological: [x ] No focal deficits  [ ] Cognitive impairment  [ ] Dysphagia [ ] Dysarthria [ ] Paresis [ ] Other   [ ] Brain compression  [ ] Cerebral edema [ ] Encephalopathy    Skin: [ ] Normal   [ ] Ulcer(s) type [ ] Diabetic [ ] Pressure [x ] Other: midline abdominal wound   Stage        POA [ ]  Yes [ ]  No       [ ] Rash    LABS:  reviewed, no recent results    Protein Calorie Malnutrition: [ ] Mild [ ] Moderate [ ] Severe    Oral Intake: [ ] Unable/mouth care only [x ] Minimal [ ] Moderate [ ] Full Capability  Diet: [ ] NPO [ ] Tube feeds [ ] TPN [x ] Other: taking PO, has NGT to suction    RADIOLOGY & ADDITIONAL STUDIES: reviewed, no recent results      REFERRALS:   [ ] Chaplaincy  [ ] Hospice Care  [ ] Child Life  [x ] Social Work  [ ] Case management [ ] Nutrition [ ] Holistic Therapy [ ] Wound Care [ ]Physical Therapy [ ] Other [ ]See goals of care note in [place]

## 2017-07-14 NOTE — PROGRESS NOTE ADULT - SUBJECTIVE AND OBJECTIVE BOX
Follow-up Pulm Progress Note    comfortable on dilaudid/ativan gtt    Medications:  MEDICATIONS  (STANDING):  pantoprazole  Injectable 40 milliGRAM(s) IV Push daily  octreotide  Injectable 200 MICROGram(s) IV Push three times a day  ondansetron Injectable 4 milliGRAM(s) IV Push every 4 hours  LORazepam Infusion 1 mG/Hr (1 mL/Hr) IV Continuous <Continuous>  HYDROmorphone Infusion 3 mG/Hr (3 mL/Hr) IV Continuous <Continuous>  sodium chloride 0.9%. 1000 milliLiter(s) (10 mL/Hr) IV Continuous <Continuous>    MEDICATIONS  (PRN):  acetaminophen  Suppository 650 milliGRAM(s) Rectal every 6 hours PRN For Temp greater than 38 C (100.4 F)  diphenhydrAMINE   Injectable 25 milliGRAM(s) IV Push every 6 hours PRN Itching  glycopyrrolate Injectable 0.4 milliGRAM(s) IV Push every 6 hours PRN Secretions  haloperidol    Injectable 0.5 milliGRAM(s) IV Push every 1 hour PRN agitation  LORazepam   Injectable 1 milliGRAM(s) IV Push every 1 hour PRN Anxiety  HYDROmorphone  Injectable 6 milliGRAM(s) IV Push every 1 hour PRN dyspnea  HYDROmorphone  Injectable 6 milliGRAM(s) IV Push every 1 hour PRN pain          Vital Signs Last 24 Hrs  T(C): --  T(F): --  HR: --  BP: --  BP(mean): --  RR: --  SpO2: --          07-13 @ 07:01  -  07-14 @ 07:00  --------------------------------------------------------  IN: 288 mL / OUT: 0 mL / NET: 288 mL          LABS:  no new labs              CAPILLARY BLOOD GLUCOSE                            CULTURES: (if applicable)          Physical Examination:  PULM: decreased bs bilaterally, no significant sputum production  CVS: S1, S2 heard    RADIOLOGY REVIEWED  CXR:     CT chest:    TTE:

## 2017-07-15 VITALS
HEART RATE: 108 BPM | OXYGEN SATURATION: 81 % | DIASTOLIC BLOOD PRESSURE: 32 MMHG | TEMPERATURE: 98 F | SYSTOLIC BLOOD PRESSURE: 60 MMHG | RESPIRATION RATE: 12 BRPM

## 2017-07-15 PROCEDURE — 99233 SBSQ HOSP IP/OBS HIGH 50: CPT

## 2017-07-15 RX ORDER — ROBINUL 0.2 MG/ML
0.4 INJECTION INTRAMUSCULAR; INTRAVENOUS EVERY 4 HOURS
Qty: 0 | Refills: 0 | Status: DISCONTINUED | OUTPATIENT
Start: 2017-07-15 | End: 2017-07-17

## 2017-07-15 RX ADMIN — OCTREOTIDE ACETATE 200 MICROGRAM(S): 200 INJECTION, SOLUTION INTRAVENOUS; SUBCUTANEOUS at 06:09

## 2017-07-15 RX ADMIN — HYDROMORPHONE HYDROCHLORIDE 6 MILLIGRAM(S): 2 INJECTION INTRAMUSCULAR; INTRAVENOUS; SUBCUTANEOUS at 06:18

## 2017-07-15 RX ADMIN — HYDROMORPHONE HYDROCHLORIDE 3 MG/HR: 2 INJECTION INTRAMUSCULAR; INTRAVENOUS; SUBCUTANEOUS at 08:00

## 2017-07-15 RX ADMIN — ONDANSETRON 4 MILLIGRAM(S): 8 TABLET, FILM COATED ORAL at 02:04

## 2017-07-15 RX ADMIN — Medication 1 MG/HR: at 17:05

## 2017-07-15 RX ADMIN — ONDANSETRON 4 MILLIGRAM(S): 8 TABLET, FILM COATED ORAL at 13:48

## 2017-07-15 RX ADMIN — HYDROMORPHONE HYDROCHLORIDE 6 MILLIGRAM(S): 2 INJECTION INTRAMUSCULAR; INTRAVENOUS; SUBCUTANEOUS at 16:17

## 2017-07-15 RX ADMIN — ONDANSETRON 4 MILLIGRAM(S): 8 TABLET, FILM COATED ORAL at 22:40

## 2017-07-15 RX ADMIN — HYDROMORPHONE HYDROCHLORIDE 6 MILLIGRAM(S): 2 INJECTION INTRAMUSCULAR; INTRAVENOUS; SUBCUTANEOUS at 16:44

## 2017-07-15 RX ADMIN — ROBINUL 0.4 MILLIGRAM(S): 0.2 INJECTION INTRAMUSCULAR; INTRAVENOUS at 07:06

## 2017-07-15 RX ADMIN — HYDROMORPHONE HYDROCHLORIDE 6 MILLIGRAM(S): 2 INJECTION INTRAMUSCULAR; INTRAVENOUS; SUBCUTANEOUS at 14:33

## 2017-07-15 RX ADMIN — SODIUM CHLORIDE 10 MILLILITER(S): 9 INJECTION INTRAMUSCULAR; INTRAVENOUS; SUBCUTANEOUS at 09:52

## 2017-07-15 RX ADMIN — PANTOPRAZOLE SODIUM 40 MILLIGRAM(S): 20 TABLET, DELAYED RELEASE ORAL at 11:43

## 2017-07-15 RX ADMIN — OCTREOTIDE ACETATE 200 MICROGRAM(S): 200 INJECTION, SOLUTION INTRAVENOUS; SUBCUTANEOUS at 22:40

## 2017-07-15 RX ADMIN — ONDANSETRON 4 MILLIGRAM(S): 8 TABLET, FILM COATED ORAL at 10:20

## 2017-07-15 RX ADMIN — HYDROMORPHONE HYDROCHLORIDE 6 MILLIGRAM(S): 2 INJECTION INTRAMUSCULAR; INTRAVENOUS; SUBCUTANEOUS at 14:16

## 2017-07-15 RX ADMIN — ROBINUL 0.4 MILLIGRAM(S): 0.2 INJECTION INTRAMUSCULAR; INTRAVENOUS at 16:17

## 2017-07-15 RX ADMIN — Medication 1 MILLIGRAM(S): at 06:57

## 2017-07-15 RX ADMIN — HYDROMORPHONE HYDROCHLORIDE 6 MILLIGRAM(S): 2 INJECTION INTRAMUSCULAR; INTRAVENOUS; SUBCUTANEOUS at 06:30

## 2017-07-15 RX ADMIN — ONDANSETRON 4 MILLIGRAM(S): 8 TABLET, FILM COATED ORAL at 18:30

## 2017-07-15 RX ADMIN — Medication 1 MG/HR: at 08:00

## 2017-07-15 RX ADMIN — ONDANSETRON 4 MILLIGRAM(S): 8 TABLET, FILM COATED ORAL at 06:09

## 2017-07-15 RX ADMIN — HYDROMORPHONE HYDROCHLORIDE 3 MG/HR: 2 INJECTION INTRAMUSCULAR; INTRAVENOUS; SUBCUTANEOUS at 16:16

## 2017-07-15 RX ADMIN — Medication 1 MG/HR: at 19:19

## 2017-07-15 RX ADMIN — HYDROMORPHONE HYDROCHLORIDE 3 MG/HR: 2 INJECTION INTRAMUSCULAR; INTRAVENOUS; SUBCUTANEOUS at 19:19

## 2017-07-15 RX ADMIN — OCTREOTIDE ACETATE 200 MICROGRAM(S): 200 INJECTION, SOLUTION INTRAVENOUS; SUBCUTANEOUS at 15:17

## 2017-07-15 NOTE — PROGRESS NOTE ADULT - SUBJECTIVE AND OBJECTIVE BOX
Geriatric and Palliative Care Unit Progress Note [x] Hospice Progress Note [ ]     HPI:  76-year-old F with PMH of COPD, h/o DVT s/p IVC filter (now removed), GERD, hypothyroidism, HIT and diverticulitis s/p Ruben's, recurrent SBO and EC fistulas with adhesions here with SBP, s/p ex-lap, fistula removal, colostomy, hernia repair; course complicated by difficulty extubating after surgery leading to reintubation, c/b MRSA pneumonia, c/b septic shock 2/2 new enterocutaneous fistula, initially on pressors, now on comfort care in PCU, with increasing pain and anxiety, and worsening confusion. Worsening agitation on ativan gtt now.     Indication for Palliative Care Unit Services: comfort care, symptom management (pain, anxiety)    ADVANCE DIRECTIVES:    DNR [X ] YES  [ ] NO                           [ X] Completed  MOLST  [ ] YES [X ] NO                      [ ] Completed  Health Care Proxy [X ] YES  [ ] NO   [ ] Completed  Living Will  [ ] YES [ ] NO             [ ] Surrogate  [X ] HCP  [ ] Guardian: children                                                              Phone#:    Allergies    azithromycin (Unknown)  codeine (Unknown)  heparin (Other)  PC Pen VK (Unknown)  penicillin (Unknown)    Intolerances    MEDICATIONS  (STANDING):  pantoprazole  Injectable 40 milliGRAM(s) IV Push daily  octreotide  Injectable 200 MICROGram(s) IV Push three times a day  ondansetron Injectable 4 milliGRAM(s) IV Push every 4 hours  LORazepam Infusion 1 mG/Hr (1 mL/Hr) IV Continuous <Continuous>  HYDROmorphone Infusion 3 mG/Hr (3 mL/Hr) IV Continuous <Continuous>  sodium chloride 0.9%. 1000 milliLiter(s) (10 mL/Hr) IV Continuous <Continuous>    MEDICATIONS  (PRN):  acetaminophen  Suppository 650 milliGRAM(s) Rectal every 6 hours PRN For Temp greater than 38 C (100.4 F)  diphenhydrAMINE   Injectable 25 milliGRAM(s) IV Push every 6 hours PRN Itching  glycopyrrolate Injectable 0.4 milliGRAM(s) IV Push every 6 hours PRN Secretions  haloperidol    Injectable 0.5 milliGRAM(s) IV Push every 1 hour PRN agitation  LORazepam   Injectable 1 milliGRAM(s) IV Push every 1 hour PRN Anxiety  HYDROmorphone  Injectable 6 milliGRAM(s) IV Push every 1 hour PRN dyspnea  HYDROmorphone  Injectable 6 milliGRAM(s) IV Push every 1 hour PRN pain    PRESENT SYMPTOMS:  Source: [ ] Patient   [x ] Family   [x ] Team     Pain:                        [ ] No [x ] Yes             [ ] Mild [x ] Moderate [ ] Severe    Onset - with palpation  Location - abdominal  Duration - constant  Character -   Alleviating/Aggravating - alleviated with dilaudid  Radiation - none reported  Timing -       Dyspnea:                [x ] No [ ] Yes             [ ] Mild [ ] Moderate [ ] Severe    Anxiety:                  [ ] No [x ] Yes             [ ] Mild [x ] Moderate [ ] Severe    Fatigue:                  [ ] No [x ] Yes             [ ] Mild [ ] Moderate [x ] Severe    Nausea:                  [x ] No [ ] Yes             [ ] Mild [ ] Moderate [ ] Severe    Loss of appetite:   [ ] No [ ] Yes             [ ] Mild [ ] Moderate [ ] Severe    Constipation:        [ ] No [ ] Yes             [ ] Mild [ ] Moderate [ ] Severe    Other Symptoms: confusion  [ ] All other review of systems negative   [x ] Limited, patient sedated from recent ativan administration    Karnofsky Performance Score/Palliative Performance Status Version 2: 10%    PHYSICAL EXAM: patient/family deferred AM vital signs    General:  [ ] Alert  [ ] Oriented x      [x ] Lethargic  [ ] Agitated   [ ] Cachexia   [ ] Unarousable  [ ] Verbal  [ ] Non-Verbal    HEENT:  [x ] Normal   [ ] Dry mouth   [ ] ET Tube    [ ] Trach  [ ] Oral lesions    Lungs:   [x ] Clear anteriorly [ ] Tachypnea  [ ] Audible excessive secretions   [ ] Rhonchi        [ ] Right [ ] Left [ ] Bilateral  [ ] Crackles        [ ] Right [ ] Left [ ] Bilateral  [ ] Wheezing     [ ] Right [ ] Left [ ] Bilateral  [ ] Respiratory failure [ ] Acute [ ] Chronic [ ] Hypoxemic [ ] Hypercarbic [ ] Other    Cardiovascular:   [x ] Regular [ ] Irregular [ ] Tachycardia   [ ] Bradycardia  [ ] Murmur [ ] Other  [ ] Shock [ ] Septic [ ] Hypovolemic [ ] Neurogenic [ ] Cardiogenic   [ ] Vasopressors [ ] Inotrophs    Abdomen:   [ ] Soft  [ ] Distended   [ ] +BS  [ ] Non tender [ ] Tender  [ ]PEG   [x ]  NGT   Last BM:     [ x] Other: open abdominal wound with Fausto pouch    Genitourinary:  [ ] Normal [ ] Incontinent   [ ] Oliguria/Anuria   [x] Snyder  [ ] Other       Musculoskeletal:    [ ] Normal   [ ] Weakness  [ ] Edema  [x ] Bedbound/Wheelchair bound [ ]  Ambulatory [ ] with [ ] without assistance [x ] Functional quadriplegia    Neurological: [x ] No focal deficits  [ ] Cognitive impairment  [ ] Dysphagia [ ] Dysarthria [ ] Paresis [ ] Other   [ ] Brain compression  [ ] Cerebral edema [ ] Encephalopathy    Skin: [ ] Normal   [ ] Ulcer(s) type [ ] Diabetic [ ] Pressure [x ] Other: midline abdominal wound   Stage        POA [ ]  Yes [ ]  No       [ ] Rash    LABS:  reviewed, no recent results    Protein Calorie Malnutrition: [ ] Mild [ ] Moderate [ ] Severe    Oral Intake: [ ] Unable/mouth care only [x ] Minimal [ ] Moderate [ ] Full Capability  Diet: [ ] NPO [ ] Tube feeds [ ] TPN [x ] Other: taking PO, has NGT to suction    RADIOLOGY & ADDITIONAL STUDIES: reviewed, no recent results      REFERRALS:   [ ] Chaplaincy  [ ] Hospice Care  [ ] Child Life  [x ] Social Work  [ ] Case management [ ] Nutrition [ ] Holistic Therapy [ ] Wound Care [ ]Physical Therapy [ ] Other [ ]See goals of care note in [place]

## 2017-07-16 DIAGNOSIS — R06.89 OTHER ABNORMALITIES OF BREATHING: ICD-10-CM

## 2017-07-16 PROCEDURE — 99233 SBSQ HOSP IP/OBS HIGH 50: CPT

## 2017-07-16 RX ADMIN — HYDROMORPHONE HYDROCHLORIDE 6 MILLIGRAM(S): 2 INJECTION INTRAMUSCULAR; INTRAVENOUS; SUBCUTANEOUS at 11:56

## 2017-07-16 RX ADMIN — Medication 1 MILLIGRAM(S): at 16:42

## 2017-07-16 RX ADMIN — ROBINUL 0.4 MILLIGRAM(S): 0.2 INJECTION INTRAMUSCULAR; INTRAVENOUS at 02:27

## 2017-07-16 RX ADMIN — Medication 1 MILLIGRAM(S): at 09:57

## 2017-07-16 RX ADMIN — Medication 1 MG/HR: at 19:59

## 2017-07-16 RX ADMIN — ONDANSETRON 4 MILLIGRAM(S): 8 TABLET, FILM COATED ORAL at 16:23

## 2017-07-16 RX ADMIN — HYDROMORPHONE HYDROCHLORIDE 5 MG/HR: 2 INJECTION INTRAMUSCULAR; INTRAVENOUS; SUBCUTANEOUS at 16:41

## 2017-07-16 RX ADMIN — HYDROMORPHONE HYDROCHLORIDE 6 MILLIGRAM(S): 2 INJECTION INTRAMUSCULAR; INTRAVENOUS; SUBCUTANEOUS at 09:24

## 2017-07-16 RX ADMIN — HYDROMORPHONE HYDROCHLORIDE 5 MG/HR: 2 INJECTION INTRAMUSCULAR; INTRAVENOUS; SUBCUTANEOUS at 19:05

## 2017-07-16 RX ADMIN — Medication 1 MG/HR: at 07:28

## 2017-07-16 RX ADMIN — SODIUM CHLORIDE 10 MILLILITER(S): 9 INJECTION INTRAMUSCULAR; INTRAVENOUS; SUBCUTANEOUS at 07:28

## 2017-07-16 RX ADMIN — ROBINUL 0.4 MILLIGRAM(S): 0.2 INJECTION INTRAMUSCULAR; INTRAVENOUS at 16:00

## 2017-07-16 RX ADMIN — HYDROMORPHONE HYDROCHLORIDE 6 MILLIGRAM(S): 2 INJECTION INTRAMUSCULAR; INTRAVENOUS; SUBCUTANEOUS at 07:41

## 2017-07-16 RX ADMIN — OCTREOTIDE ACETATE 200 MICROGRAM(S): 200 INJECTION, SOLUTION INTRAVENOUS; SUBCUTANEOUS at 16:23

## 2017-07-16 RX ADMIN — ONDANSETRON 4 MILLIGRAM(S): 8 TABLET, FILM COATED ORAL at 21:35

## 2017-07-16 RX ADMIN — ONDANSETRON 4 MILLIGRAM(S): 8 TABLET, FILM COATED ORAL at 06:00

## 2017-07-16 RX ADMIN — HYDROMORPHONE HYDROCHLORIDE 3 MG/HR: 2 INJECTION INTRAMUSCULAR; INTRAVENOUS; SUBCUTANEOUS at 07:27

## 2017-07-16 RX ADMIN — ONDANSETRON 4 MILLIGRAM(S): 8 TABLET, FILM COATED ORAL at 18:42

## 2017-07-16 RX ADMIN — ONDANSETRON 4 MILLIGRAM(S): 8 TABLET, FILM COATED ORAL at 02:27

## 2017-07-16 RX ADMIN — HYDROMORPHONE HYDROCHLORIDE 6 MILLIGRAM(S): 2 INJECTION INTRAMUSCULAR; INTRAVENOUS; SUBCUTANEOUS at 16:23

## 2017-07-16 RX ADMIN — OCTREOTIDE ACETATE 200 MICROGRAM(S): 200 INJECTION, SOLUTION INTRAVENOUS; SUBCUTANEOUS at 06:00

## 2017-07-16 RX ADMIN — ONDANSETRON 4 MILLIGRAM(S): 8 TABLET, FILM COATED ORAL at 10:00

## 2017-07-16 RX ADMIN — ROBINUL 0.4 MILLIGRAM(S): 0.2 INJECTION INTRAMUSCULAR; INTRAVENOUS at 21:35

## 2017-07-16 RX ADMIN — OCTREOTIDE ACETATE 200 MICROGRAM(S): 200 INJECTION, SOLUTION INTRAVENOUS; SUBCUTANEOUS at 21:36

## 2017-07-16 RX ADMIN — PANTOPRAZOLE SODIUM 40 MILLIGRAM(S): 20 TABLET, DELAYED RELEASE ORAL at 12:01

## 2017-07-16 NOTE — PROGRESS NOTE ADULT - PROVIDER SPECIALTY LIST ADULT
Anesthesia
Critical Care
ENT
ENT
Infectious Disease
Internal Medicine
Internal Medicine
Nephrology
Nutrition Support
Palliative Care
Pulmonology
SICU
Surgery
Trauma Surgery
SICU
Trauma Surgery
SICU
Surgery
Nutrition Support

## 2017-07-16 NOTE — PROGRESS NOTE ADULT - PROBLEM SELECTOR PROBLEM 5
Fever
Gurgling breath sounds
Hypocalcemia
Palliative care encounter
Shortness of breath
Shortness of breath
Hypocalcemia
Palliative care encounter
Shortness of breath

## 2017-07-16 NOTE — PROGRESS NOTE ADULT - SUBJECTIVE AND OBJECTIVE BOX
Geriatric and Palliative Care Unit Progress Note [x] Hospice Progress Note [ ]     HPI:  76-year-old F with PMH of COPD, h/o DVT s/p IVC filter (now removed), GERD, hypothyroidism, HIT and diverticulitis s/p Ruben's, recurrent SBO and EC fistulas with adhesions here with SBP, s/p ex-lap, fistula removal, colostomy, hernia repair; course complicated by difficulty extubating after surgery leading to reintubation, c/b MRSA pneumonia, c/b septic shock 2/2 new enterocutaneous fistula, initially on pressors, now on comfort care in PCU, with increasing pain and anxiety, and worsening confusion. Worsening agitation on ativan gtt now.     Indication for Palliative Care Unit Services: comfort care, symptom management (pain, anxiety)    ADVANCE DIRECTIVES:    DNR [X ] YES  [ ] NO                           [ X] Completed  MOLST  [ ] YES [X ] NO                      [ ] Completed  Health Care Proxy [X ] YES  [ ] NO   [ ] Completed  Living Will  [ ] YES [ ] NO             [ ] Surrogate  [X ] HCP  [ ] Guardian: children                                                              Phone#:    Allergies    azithromycin (Unknown)  codeine (Unknown)  heparin (Other)  PC Pen VK (Unknown)  penicillin (Unknown)    Intolerances    MEDICATIONS  (STANDING):  pantoprazole  Injectable 40 milliGRAM(s) IV Push daily  octreotide  Injectable 200 MICROGram(s) IV Push three times a day  ondansetron Injectable 4 milliGRAM(s) IV Push every 4 hours  LORazepam Infusion 1 mG/Hr (1 mL/Hr) IV Continuous <Continuous>  HYDROmorphone Infusion 5 mG/Hr (5 mL/Hr) IV Continuous <Continuous>  sodium chloride 0.9%. 1000 milliLiter(s) (10 mL/Hr) IV Continuous <Continuous>    MEDICATIONS  (PRN):  acetaminophen  Suppository 650 milliGRAM(s) Rectal every 6 hours PRN For Temp greater than 38 C (100.4 F)  diphenhydrAMINE   Injectable 25 milliGRAM(s) IV Push every 6 hours PRN Itching  haloperidol    Injectable 0.5 milliGRAM(s) IV Push every 1 hour PRN agitation  LORazepam   Injectable 1 milliGRAM(s) IV Push every 1 hour PRN Anxiety  HYDROmorphone  Injectable 6 milliGRAM(s) IV Push every 1 hour PRN dyspnea  HYDROmorphone  Injectable 6 milliGRAM(s) IV Push every 1 hour PRN pain  glycopyrrolate Injectable 0.4 milliGRAM(s) IV Push every 4 hours PRN Secretions      PRESENT SYMPTOMS:  Source: [ ] Patient   [x ] Family   [x ] Team     Pain:                        [ ] No [x ] Yes             [ ] Mild [x ] Moderate [ ] Severe    see prior documentation on palliative care progress notes    Dyspnea:                [x ] No [ ] Yes             [ ] Mild [ ] Moderate [ ] Severe    Anxiety:                  [ ] No [x ] Yes             [ ] Mild [x ] Moderate [ ] Severe    Fatigue:                  [ ] No [x ] Yes             [ ] Mild [ ] Moderate [x ] Severe    Nausea:                  [x ] No [ ] Yes             [ ] Mild [ ] Moderate [ ] Severe    Loss of appetite:   [ ] No [ ] Yes             [ ] Mild [ ] Moderate [ ] Severe    Constipation:        [ ] No [ ] Yes             [ ] Mild [ ] Moderate [ ] Severe    Other Symptoms: confusion  [ ] All other review of systems negative   [x ] Limited, patient sedated from recent ativan administration    Karnofsky Performance Score/Palliative Performance Status Version 2: 10%    PHYSICAL EXAM: family deferred AM vital signs    General:  [ ] Alert  [ ] Oriented x      [x ] Lethargic  [ ] Agitated   [ ] Cachexia   [ ] Unarousable  [ ] Verbal  [x ] Non-Verbal    HEENT:  [x ] Normal   [ ] Dry mouth   [ ] ET Tube    [ ] Trach  [ ] Oral lesions    Lungs:   [x ] Clear anteriorly [ ] Tachypnea  [ ] Audible excessive secretions   [ ] Rhonchi        [ ] Right [ ] Left [ ] Bilateral  [ ] Crackles        [ ] Right [ ] Left [ ] Bilateral  [ ] Wheezing     [ ] Right [ ] Left [ ] Bilateral  [ ] Respiratory failure [ ] Acute [ ] Chronic [ ] Hypoxemic [ ] Hypercarbic [ ] Other    Cardiovascular:   [x ] Regular [ ] Irregular [ ] Tachycardia   [ ] Bradycardia  [ ] Murmur [ ] Other  [ ] Shock [ ] Septic [ ] Hypovolemic [ ] Neurogenic [ ] Cardiogenic   [ ] Vasopressors [ ] Inotrophs    Abdomen:   [ ] Soft  [ ] Distended   [ ] +BS  [ ] Non tender [ ] Tender  [ ]PEG   [x ]  NGT   Last BM:     [ x] Other: open abdominal wound with Fausto pouch    Genitourinary:  [ ] Normal [ ] Incontinent   [ ] Oliguria/Anuria   [x] Snyder  [ ] Other       Musculoskeletal:    [ ] Normal   [ ] Weakness  [ ] Edema  [x ] Bedbound/Wheelchair bound [ ]  Ambulatory [ ] with [ ] without assistance [x ] Functional quadriplegia    Neurological: [x ] No focal deficits  [ ] Cognitive impairment  [ ] Dysphagia [ ] Dysarthria [ ] Paresis [ ] Other   [ ] Brain compression  [ ] Cerebral edema [ ] Encephalopathy    Skin: [ ] Normal   [ ] Ulcer(s) type [ ] Diabetic [ ] Pressure [x ] Other: midline abdominal wound   Stage        POA [ ]  Yes [ ]  No       [ ] Rash    LABS:  reviewed, no recent results    Protein Calorie Malnutrition: [ ] Mild [ ] Moderate [ ] Severe    Oral Intake: [ ] Unable/mouth care only [x ] Minimal [ ] Moderate [ ] Full Capability  Diet: [ ] NPO [ ] Tube feeds [ ] TPN [x ] Other: taking PO, has NGT to suction    RADIOLOGY & ADDITIONAL STUDIES: reviewed, no recent results      REFERRALS:   [ ] Chaplaincy  [ ] Hospice Care  [ ] Child Life  [x ] Social Work  [ ] Case management [ ] Nutrition [ ] Holistic Therapy [ ] Wound Care [ ]Physical Therapy [ ] Other [ ]See goals of care note in [place]

## 2017-07-16 NOTE — PROGRESS NOTE ADULT - PROBLEM/PLAN-1
DISPLAY PLAN FREE TEXT

## 2017-07-16 NOTE — PROGRESS NOTE ADULT - ATTENDING COMMENTS
Patient in the active phases of dying.  Ongoing symptom management.  Dilaudid gtt increased as above. Family support.

## 2017-07-16 NOTE — PROGRESS NOTE ADULT - PROBLEM/PLAN-3
DISPLAY PLAN FREE TEXT

## 2017-07-16 NOTE — PROGRESS NOTE ADULT - PROBLEM SELECTOR PROBLEM 3
Pain
Acute respiratory failure with hypoxia
Anxiety
Chronic obstructive pulmonary disease, unspecified COPD type
Hypothyroidism, unspecified type
Acute hyperkalemia
Hypercalcemia
TREVA (acute kidney injury)
Acute respiratory failure with hypoxia
Anxiety
Chronic obstructive pulmonary disease, unspecified COPD type
Enterocutaneous fistula
Hypercalcemia
Mild protein-calorie malnutrition
TREVA (acute kidney injury)

## 2017-07-16 NOTE — PROGRESS NOTE ADULT - PROBLEM SELECTOR PLAN 6
Discussed with patient's daughter Fe this AM. Plan is for continued comfort care and management of her agitation and pain. Continued emotional support for family to be provided during her admission. Patient appears clinically worse today, will increase dilaudid gtt for comfort, manage secretions.

## 2017-07-16 NOTE — PROGRESS NOTE ADULT - PROBLEM SELECTOR PLAN 1
Pain managed with dilaudid gtt 3mg/h with 6mg IV prn, but required 2 PRN overnight, will increase to 5mg/h.

## 2017-07-17 PROCEDURE — 76080 X-RAY EXAM OF FISTULA: CPT

## 2017-07-17 PROCEDURE — 80053 COMPREHEN METABOLIC PANEL: CPT

## 2017-07-17 PROCEDURE — 86850 RBC ANTIBODY SCREEN: CPT

## 2017-07-17 PROCEDURE — P9016: CPT

## 2017-07-17 PROCEDURE — 74177 CT ABD & PELVIS W/CONTRAST: CPT

## 2017-07-17 PROCEDURE — 87449 NOS EACH ORGANISM AG IA: CPT

## 2017-07-17 PROCEDURE — 84145 PROCALCITONIN (PCT): CPT

## 2017-07-17 PROCEDURE — C8929: CPT

## 2017-07-17 PROCEDURE — 97161 PT EVAL LOW COMPLEX 20 MIN: CPT

## 2017-07-17 PROCEDURE — 87205 SMEAR GRAM STAIN: CPT

## 2017-07-17 PROCEDURE — 80061 LIPID PANEL: CPT

## 2017-07-17 PROCEDURE — 83935 ASSAY OF URINE OSMOLALITY: CPT

## 2017-07-17 PROCEDURE — 94660 CPAP INITIATION&MGMT: CPT

## 2017-07-17 PROCEDURE — 82435 ASSAY OF BLOOD CHLORIDE: CPT

## 2017-07-17 PROCEDURE — 97110 THERAPEUTIC EXERCISES: CPT

## 2017-07-17 PROCEDURE — 80202 ASSAY OF VANCOMYCIN: CPT

## 2017-07-17 PROCEDURE — 87186 SC STD MICRODIL/AGAR DIL: CPT

## 2017-07-17 PROCEDURE — 84295 ASSAY OF SERUM SODIUM: CPT

## 2017-07-17 PROCEDURE — 85027 COMPLETE CBC AUTOMATED: CPT

## 2017-07-17 PROCEDURE — 87040 BLOOD CULTURE FOR BACTERIA: CPT

## 2017-07-17 PROCEDURE — 97602 WOUND(S) CARE NON-SELECTIVE: CPT

## 2017-07-17 PROCEDURE — 97606 NEG PRS WND THER DME>50 SQCM: CPT

## 2017-07-17 PROCEDURE — 82947 ASSAY GLUCOSE BLOOD QUANT: CPT

## 2017-07-17 PROCEDURE — 88304 TISSUE EXAM BY PATHOLOGIST: CPT

## 2017-07-17 PROCEDURE — 82803 BLOOD GASES ANY COMBINATION: CPT

## 2017-07-17 PROCEDURE — 97116 GAIT TRAINING THERAPY: CPT

## 2017-07-17 PROCEDURE — 85384 FIBRINOGEN ACTIVITY: CPT

## 2017-07-17 PROCEDURE — 85014 HEMATOCRIT: CPT

## 2017-07-17 PROCEDURE — 85610 PROTHROMBIN TIME: CPT

## 2017-07-17 PROCEDURE — P9045: CPT

## 2017-07-17 PROCEDURE — 83036 HEMOGLOBIN GLYCOSYLATED A1C: CPT

## 2017-07-17 PROCEDURE — 36569 INSJ PICC 5 YR+ W/O IMAGING: CPT

## 2017-07-17 PROCEDURE — 83735 ASSAY OF MAGNESIUM: CPT

## 2017-07-17 PROCEDURE — 94002 VENT MGMT INPAT INIT DAY: CPT

## 2017-07-17 PROCEDURE — 96375 TX/PRO/DX INJ NEW DRUG ADDON: CPT

## 2017-07-17 PROCEDURE — 86900 BLOOD TYPING SEROLOGIC ABO: CPT

## 2017-07-17 PROCEDURE — 97112 NEUROMUSCULAR REEDUCATION: CPT

## 2017-07-17 PROCEDURE — 76937 US GUIDE VASCULAR ACCESS: CPT

## 2017-07-17 PROCEDURE — 84100 ASSAY OF PHOSPHORUS: CPT

## 2017-07-17 PROCEDURE — 88305 TISSUE EXAM BY PATHOLOGIST: CPT

## 2017-07-17 PROCEDURE — C1889: CPT

## 2017-07-17 PROCEDURE — 99285 EMERGENCY DEPT VISIT HI MDM: CPT | Mod: 25

## 2017-07-17 PROCEDURE — 96374 THER/PROPH/DIAG INJ IV PUSH: CPT | Mod: XU

## 2017-07-17 PROCEDURE — 94799 UNLISTED PULMONARY SVC/PX: CPT

## 2017-07-17 PROCEDURE — 82330 ASSAY OF CALCIUM: CPT

## 2017-07-17 PROCEDURE — 36000 PLACE NEEDLE IN VEIN: CPT | Mod: LT,XU

## 2017-07-17 PROCEDURE — 77001 FLUOROGUIDE FOR VEIN DEVICE: CPT

## 2017-07-17 PROCEDURE — 82570 ASSAY OF URINE CREATININE: CPT

## 2017-07-17 PROCEDURE — 83930 ASSAY OF BLOOD OSMOLALITY: CPT

## 2017-07-17 PROCEDURE — 88307 TISSUE EXAM BY PATHOLOGIST: CPT

## 2017-07-17 PROCEDURE — 87070 CULTURE OTHR SPECIMN AEROBIC: CPT

## 2017-07-17 PROCEDURE — 94003 VENT MGMT INPAT SUBQ DAY: CPT

## 2017-07-17 PROCEDURE — 94640 AIRWAY INHALATION TREATMENT: CPT

## 2017-07-17 PROCEDURE — 93005 ELECTROCARDIOGRAM TRACING: CPT

## 2017-07-17 PROCEDURE — 84133 ASSAY OF URINE POTASSIUM: CPT

## 2017-07-17 PROCEDURE — 84540 ASSAY OF URINE/UREA-N: CPT

## 2017-07-17 PROCEDURE — 82436 ASSAY OF URINE CHLORIDE: CPT

## 2017-07-17 PROCEDURE — 84132 ASSAY OF SERUM POTASSIUM: CPT

## 2017-07-17 PROCEDURE — 82565 ASSAY OF CREATININE: CPT

## 2017-07-17 PROCEDURE — 36430 TRANSFUSION BLD/BLD COMPNT: CPT

## 2017-07-17 PROCEDURE — 83605 ASSAY OF LACTIC ACID: CPT

## 2017-07-17 PROCEDURE — C1769: CPT

## 2017-07-17 PROCEDURE — 81001 URINALYSIS AUTO W/SCOPE: CPT

## 2017-07-17 PROCEDURE — 84443 ASSAY THYROID STIM HORMONE: CPT

## 2017-07-17 PROCEDURE — 82607 VITAMIN B-12: CPT

## 2017-07-17 PROCEDURE — 84484 ASSAY OF TROPONIN QUANT: CPT

## 2017-07-17 PROCEDURE — 71100 X-RAY EXAM RIBS UNI 2 VIEWS: CPT

## 2017-07-17 PROCEDURE — 97164 PT RE-EVAL EST PLAN CARE: CPT

## 2017-07-17 PROCEDURE — 74250 X-RAY XM SM INT 1CNTRST STD: CPT

## 2017-07-17 PROCEDURE — 80076 HEPATIC FUNCTION PANEL: CPT

## 2017-07-17 PROCEDURE — C1887: CPT

## 2017-07-17 PROCEDURE — 86901 BLOOD TYPING SEROLOGIC RH(D): CPT

## 2017-07-17 PROCEDURE — 74018 RADEX ABDOMEN 1 VIEW: CPT

## 2017-07-17 PROCEDURE — 82533 TOTAL CORTISOL: CPT

## 2017-07-17 PROCEDURE — 82746 ASSAY OF FOLIC ACID SERUM: CPT

## 2017-07-17 PROCEDURE — 97530 THERAPEUTIC ACTIVITIES: CPT

## 2017-07-17 PROCEDURE — 85730 THROMBOPLASTIN TIME PARTIAL: CPT

## 2017-07-17 PROCEDURE — C1751: CPT

## 2017-07-17 PROCEDURE — 86923 COMPATIBILITY TEST ELECTRIC: CPT

## 2017-07-17 PROCEDURE — 74176 CT ABD & PELVIS W/O CONTRAST: CPT

## 2017-07-17 PROCEDURE — 20501 NJX SINUS TRACT DIAGNOSTIC: CPT

## 2017-07-17 PROCEDURE — 99261: CPT

## 2017-07-17 PROCEDURE — 83880 ASSAY OF NATRIURETIC PEPTIDE: CPT

## 2017-07-17 PROCEDURE — 84300 ASSAY OF URINE SODIUM: CPT

## 2017-07-17 PROCEDURE — 80048 BASIC METABOLIC PNL TOTAL CA: CPT

## 2017-07-17 PROCEDURE — 87086 URINE CULTURE/COLONY COUNT: CPT

## 2017-07-17 PROCEDURE — 93308 TTE F-UP OR LMTD: CPT

## 2017-07-17 PROCEDURE — 71045 X-RAY EXAM CHEST 1 VIEW: CPT

## 2017-07-17 PROCEDURE — 94770: CPT

## 2017-07-17 RX ADMIN — Medication 1 MILLIGRAM(S): at 01:00

## 2017-07-17 RX ADMIN — ONDANSETRON 4 MILLIGRAM(S): 8 TABLET, FILM COATED ORAL at 01:00

## 2017-07-17 RX ADMIN — HYDROMORPHONE HYDROCHLORIDE 6 MILLIGRAM(S): 2 INJECTION INTRAMUSCULAR; INTRAVENOUS; SUBCUTANEOUS at 01:00

## 2017-07-17 NOTE — DISCHARGE NOTE FOR THE EXPIRED PATIENT - HOSPITAL COURSE
76-year-old F with PMH of COPD, h/o DVT s/p IVC filter (now removed), GERD, hypothyroidism, HIT and diverticulitis s/p Ruben's, recurrent SBO and EC fistulas with adhesions here with SBP, s/p ex-lap, fistula removal, colostomy, hernia repair; course complicated by difficulty extubating after surgery leading to reintubation, c/b MRSA pneumonia, c/b septic shock 2/2 new enterocutaneous fistula, initially on pressors, brought for comfort care to PCU, with increasing pain and anxiety. Aggressive symptomatic management was undertaken, family was emotionally supported, and she passed on 7/17 peacefully.

## 2017-07-17 NOTE — PROVIDER CONTACT NOTE (OTHER) - NAME OF MD/NP/PA/DO NOTIFIED:
MD Zee Walter
Anne Tejeda
Brodie GARCIA
Brodie GARCIA
Eliceo Arce. MD
MD Ruy Escudero
PCU team
RADHA Kemp
RADHA Moulton
RADHA Zavala.
Rubina GARCIA
Saige MARTIN
Saige, Resident
Timi MARTIN
fatimah MARTIN
ruddy GARCIA
Francesca PA
Jose Angel MARTIN

## 2017-07-17 NOTE — PROVIDER CONTACT NOTE (OTHER) - ACTION/TREATMENT ORDERED:
MD to order 1amp D50, and come to unit to see pt
MD's will come and speak with the patient in the AM. will continue to monitor.
MD notified. No new orders.
SICU team aware of pt's low HR, no orders given at this time. Will continue to monitor.
1 amp D5 IVP, BG increased to 340, pt became alert and talking. VSS.
As per PA administer breathing treatment. Respiratory at bedside. Pt encouraged to cough and deep breathe. Pt suctioned PRN.
As per PA ok to skip this hour fingerstick but to check again at 2:30AM. will continue to montior
Bedside echo
MD Aware, maintain pt at this time if sustained or begins to drop below 55 will treat at that time
MD aware no intervention at this time
No intervention ordered at this time; Will continue to monitor
PA aware, no intervention at this time
PA aware, will discuss with AM team regarding changing PICC over guidewire
RADHA Kemp to bedside. Dextrose 5% given as ordered.  Will follow hypoglycemic protocol.  Will continue to monitor pt.
See patient expiration note
continue to monitor
no interventions @ this time, cultures drawn at 6 am and 1 gram IV tylenol given this AM
one time benadryl ordered.  patient refused benadryl.

## 2017-07-17 NOTE — PROVIDER CONTACT NOTE (OTHER) - BACKGROUND
pt admin for ab surgery
pt on PCA morphine
pt s/p ab surgery  Pain uncontrolled, Pain consulted and Morphine PCA began
Patient has DNR order
Pt admitted for a small bowel obstruction. Pt having multiple rounds of hypoglycemia throughout afternoon/evening. Pt treated with D5 injectable x3.
Pt admitted with SBO
Pt with Hyperkalemia; treated with D5 and 10units regular.
multiple surgeries during stay, R PICC in place from 5/31/2017
psuedoaneurysm of transplanted kidney
s/p Fistula complications.
s/p abd surgery
s/p ex lap and colostomy formation with SBO
s/p ex lap, NANETTE, and extensive abd surgery; SBO, ostomy, open abdominal wound.
s/p exlap / NANETTE / incisional hernia repair
s/p extubation, a/p abd sx, PMH COPD, daughter states pt takes breathing treatments at home.
pt with elevated potassium- given insulin 10units, and D50 this afternoon- see EMAR, pt with low FS 4 hours later- MD to bedside d50 given as ordered. Rechecking FS as per MD order
ex lap NANETTE SBO
Pt admitted with SBO. Blood sugar on 5/30 was running low after injection of insulin and dextrose for high potassium treatment.

## 2017-07-17 NOTE — PROVIDER CONTACT NOTE (OTHER) - REASON
Death Pronouncement
Double lumen PICC (red port) not functioning
Hyperglycermic episode BG 29.
MAP as low 55
MAP of 55
No TPN ordered for tonight as per Pharmacy.
Pt colostomy stoma dusky / Pt tachycardic (-117)
Pt refusing fingerstick
Pt refusing fingerstick
Pt w/ HR sustained at 44
baldwin leaking
no urine output for 2 hrs
pt blood glucose level low
pt c/o SOB, wheezing noted
pt temp 39.1 and tachycardic
c/o pain, itchiness; refusing to be turned.
hypotension

## 2017-07-18 ENCOUNTER — TRANSCRIPTION ENCOUNTER (OUTPATIENT)
Age: 77
End: 2017-07-18

## 2017-11-29 NOTE — PROGRESS NOTE ADULT - I WAS PHYSICALLY PRESENT FOR THE KEY PORTIONS OF THE EVALUATION AND MANAGEMENT (E/M) SERVICE PROVIDED.  I AGREE WITH THE ABOVE HISTORY, PHYSICAL, AND PLAN WHICH I HAVE REVIEWED AND EDITED WHERE APPROPRIATE
Statement Selected
s/p ORIF D/c home if cleared by [facility]
Statement Selected

## 2018-02-24 NOTE — ED ADULT NURSE NOTE - CARDIO ASSESSMENT
PORTABLE CHEST 1 VIEW:

 

Date:  02/24/18 

Time:  0550 hours

 

HISTORY:  

Respiratory failure. 

 

FINDINGS/IMPRESSION: 

No significant interval change is seen since the previous day's exam. 

 

 

 

POS: IVAN WDL

## 2018-06-20 NOTE — PROGRESS NOTE ADULT - ASSESSMENT
Pt reports feeling suicidal all day following a dream. 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

## 2018-09-13 NOTE — PATIENT PROFILE ADULT. - PRO SERVICES AT DISCH
Implemented All Fall with Harm Risk Interventions:  [place] to call system. Call bell, personal items and telephone within reach. Instruct patient to call for assistance. Room bathroom lighting operational. Non-slip footwear when patient is off stretcher. Physically safe environment: no spills, clutter or unnecessary equipment. Stretcher in lowest position, wheels locked, appropriate side rails in place. Provide visual cue, wrist band, yellow gown, etc. Monitor gait and stability. Monitor for mental status changes and reorient to person, place, and time. Review medications for side effects contributing to fall risk. Reinforce activity limits and safety measures with patient and family. Provide visual clues: red socks. unsure

## 2018-11-17 NOTE — PATIENT PROFILE ADULT. - ATTEMPT TO OOB
PHYSICIAN NEXT STEPS:   Review Only      CHIEF COMPLAINT:   Chief Complaint/Protocol Used: Penis and Scrotum Symptoms   Onset: blistering on genital area began 07/27/2018, x 10 days         ASSESSMENT:   Â» Onset: blistering on genital area began 07/27/2018, x 10 days   Â» Normal True   Â» Normal, no trouble breathing True   Â» Symptom: blistering on genital area   Â» Location: on genital area    Â» Onset: 10 days ago   Â» Pain: Yes, severe with urination   Â» Urine: Painful urination    Â» Cause: Metformin side effect, had this reaction in the past with this medication    Â» Other Symptoms: Red streaks, bleeding blisters, broken skin    -------------------------------------------------------      DISPOSITION:   Disposition Recommendation: See Physician within 24 Hours   Questions that led to disposition:   Â» Pain or burning with passing urine   Patient Directed To: 0-Provider Office   Patient Intended Action: Seek care in the doctor's office          CALL NOTES:   08/06/2018 at 1:08 PM by Guera RUST» Addendum, patient described red streaks as a break in the skin. 08/06/2018 at 1:05 PM by Guera RUST» Patient agreed with care advice and 24 hour disposition. Appointment made for patient at [facility] Internal Medicine office with Dr. Radha Rodriguez on 08/06/2018 at 6:20PM.   Â» Blood sugar 480 recently. Was in [facility] ED on 07/25/18,  put on a new medication 500 mg daily metformin.   Today blood sugar 201, feeling blistering on genital area this happened in the past last time patient was on metformin having difficulty to move around       DISPOSITION OVERRIDE/PROVIDER CONSULT:   Disposition Override: N/A   Override Source: Unspecified   Consulted with PCP: No   Consulted with On-Call Physician: No      CALLER CONTACT INFO:   Name: RADHA BYRD (Self)   Phone 1: [phone] (Home)   Phone 2: [phone] - Preferred         ENCOUNTER STARTED:   08/06/18 12:51:18 PM   ENCOUNTER ASSIGNED  TO/CLOSED BY:   Guera Blackman @ 08/06/18 01:09:30 PM         -------------------------------------------------------      CARE ADVICE given per Penis and Scrotum Symptoms guideline.   FLUIDS: Drink extra fluids. (Reason: to produce a dilute, non-irritating urine.); CALL BACK IF:     * Fever occurs     * You become worse.; SEE PHYSICIAN WITHIN 24 HOURS:    * IF OFFICE WILL BE OPEN: You need to be seen within the next 24 hours. Call your doctor when the office opens, and make an appointment.   * IF OFFICE WILL BE CLOSED AND NO PCP TRIAGE: You need to be seen within the next 24 hours. An urgent care center is often a good source of care if your doctor's office is closed.   * IF OFFICE WILL BE CLOSED AND PCP TRIAGE REQUIRED: You may need to be seen within the next 24 hours. Your doctor will want to talk with you to decide what's best. I'll page the doctor now. NOTE: Since this isn't serious, hold the page between 10 pm and 7 am. Page the doctor in the morning.   * IF PATIENT HAS NO PCP: Refer patient to an Urgent Care Center or Retail Clinic. Also try to help caller find a PCP (medical home) for their future care.; PAIN MEDICINES:   * For pain relief, take acetaminophen, ibuprofen, or naproxen.   * Use the lowest amount that makes your pain feel better.   ACETAMINOPHEN (E.G., TYLENOL):    * Take 650 mg (two 325 mg pills) by mouth every 4-6 hours as needed. Each Regular Strength Tylenol pill has 325 mg of acetaminophen. The most you should take each day is 3,250 mg (10 Regular Strength pills a day).   * Another choice is to take 1,000 mg (two 500 mg pills) every 8 hours as needed. Each Extra Strength Tylenol pill has 500 mg of acetaminophen. The most you should take each day is 3,000 mg (6 Extra Strength pills a day).   IBUPROFEN (E.G., MOTRIN, ADVIL):   * Take 400 mg (two 200 mg pills) by mouth every 6 hours as needed.   * Another choice is to take 600 mg (three 200 mg pills) by mouth every 8 hours as needed.   * The  most you should take each day is 1,200 mg (six 200 mg pills a day), unless your doctor has told you to take more.   NAPROXEN (E.G., ALEVE):   * Take 220 mg (one 220 mg pill) by mouth every 8 hours as needed. You may take 440 mg (two 220 mg pills) for your first dose.   * The most you should take each day is 660 mg (three 220 mg pills a day), unless your doctor has told you to take more.   EXTRA NOTES:   * Acetaminophen is thought to be safer than ibuprofen or naproxen for people over 65 years old. Acetaminophen is in many OTC and prescription medicines. It might be in more than one medicine that you are taking. You need to be careful and not take an overdose. An acetaminophen overdose can hurt the liver.   * "[employer] the company that makes Tylenol, has different dosage instructions for Tylenol in [place] and the [place]. In [place], the maximum recommended dose per day is 4,000 mg or twelve (12) Regular-Strength (325 mg) pills. In the [place], "[employer]" recommends a maximum dose of ten (10) Regular-Strength (325 mg) pills.   * Before taking any medicine, read all the instructions on the package.         UNDERSTANDS CARE ADVICE: Yes      AGREES WITH CARE ADVICE: Yes      WILL FOLLOW CARE ADVICE: Yes      -------------------------------------------------------   no

## 2019-12-19 NOTE — ED PROVIDER NOTE - ENMT, MLM
[Negative] : Heme/Lymph
Airway patent, Nasal mucosa clear. Mouth with normal mucosa. Throat has no vesicles, no oropharyngeal exudates and uvula is midline.

## 2020-01-14 NOTE — PROGRESS NOTE ADULT - ASSESSMENT
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
## 2021-02-24 NOTE — ED PROVIDER NOTE - QUALITY
Addended by: TACOS NAPOLES on: 2/24/2021 11:58 AM     Modules accepted: Orders     cramping/aching/squeezing/deep pain

## 2021-03-16 NOTE — ED PROVIDER NOTE - CPE EDP CARDIAC NORM
[FreeTextEntry1] : Patient presents for followup on hypertension/hyperlipidemia/type 2 diabetes. Patient's fasting for today's labs/no complaints.Patient is currently on metoprolol/enalapril/hydrochlorothiazide for hypertension, on pravastatin for hyperlipidemia and is on metformin for type 2 diabetes.\par -got covid vaccines X2\par \par \par  normal...

## 2021-04-04 NOTE — PROGRESS NOTE ADULT - SUBJECTIVE AND OBJECTIVE BOX
[facility] Trauma/Acute Care [place] Progress Note    HOSPITAL DAY #:23  POST OPERATIVE DAY #:  5  STATUS POST:  exploratory laparotomy, extensive lysis of adhesions, takedown of enterocutaneous fistula, repair of serosal tear on cecum, partial right salpingooophorectomy, colostomy revision, parastomal and incisional hernia repair with strattice mesh                    INTERVAL EVENTS: Patient remains on pressors (both levo and vaso) but now on minimal levo rate (0.03 micrograms this am). Blood culture from 6/12 no growth to date.       SUBJECTIVE: Pt is intubated. Unable to obtain ROS.     OBJECTIVE:     Constitutional: Intubated, minimally arousable  Respiratory: A/C vent; PEEP 5 / FiO2 40%  Cardiovascular: sinus  Gastrointestinal: soft, distended, ostomy mucosa appears congested, dark but viable, no fxn. NGT in place: bilious, old colostomy site, packed  Ext: b/l UE edema  Psychiatric: unable to assess    Vital Signs Last 24 Hrs  T(C): 37.5, Max: 37.7 (06-13 @ 19:15)  T(F): 99.5, Max: 99.9 (06-13 @ 19:15)  HR: 89 (75 - 108)  BP: 99/52 (99/52 - 99/52)  BP(mean): 73 (73 - 73)  RR: 33 (17 - 36)  SpO2: 100% (89% - 100%)    I&O's Detail    I & Os for current day (as of 14 Jun 2017 07:25)  =============================================  IN:    TPN (Total Parenteral Nutrition): 1488 ml    lactated ringers.: 1200 ml    Solution: 625 ml    Solution: 400 ml    Solution: 400 ml    dexmedetomidine Infusion: 264.6 ml    Solution: 250 ml    norepinephrine Infusion: 247.2 ml    amiodarone Infusion: 200.4 ml    Solution: 200 ml    amiodarone Infusion: 166.6 ml    vasopressin Infusion: 57.6 ml    amiodarone Infusion: 33.3 ml    Total IN: 5532.7 ml  ---------------------------------------------  OUT:    Indwelling Catheter - Urethral: 1425 ml    Nasoenteral Tube: 950 ml    Colostomy: 50 ml    Total OUT: 2425 ml  ---------------------------------------------  Total NET: 3107.7 ml                            7.1    6.9   )-----------( 62       ( 14 Jun 2017 02:26 )             21.4       06-14    138  |  107  |  55<H>  ----------------------------<  158<H>  3.3<L>   |  20<L>  |  1.61<H>    Ca    8.4      14 Jun 2017 02:26  Phos  2.0     06-14  Mg     2.2     06-14    TPro  4.5<L>  /  Alb  2.4<L>  /  TBili  1.0  /  DBili  0.6<H>  /  AST  25  /  ALT  16  /  AlkPhos  111  06-14 73

## 2021-07-26 NOTE — AIRWAY REMOVAL NOTE  ADULT & PEDS - POSITIVE LEAK TEST
Spoke with patient. Patient notified of results, verbalized understanding, and had no further questions at this time. Patient provided with copy of results, per his request.    Medication ordered to [facility] in [place] on [address] per patient request.      updated.    Martha Pugh RN not applicable

## 2021-08-12 NOTE — PROGRESS NOTE ADULT - ASSESSMENT
76-year-old F with PMH of COPD, h/o DVT s/p IVC filter (now removed), GERD, hypothyroidism, HIT and diverticulitis s/p Ruben's, recurrent SBO and EC fistulas with adhesions here with SBP, s/p ex-lap, fistula removal, colostomy, hernia repair; course complicated by difficulty extubating after surgery leading to reintubation, c/b MRSA pneumonia, c/b septic shock 2/2 new enterocutaneous fistula, initially on pressors, now on comfort care in PCU. Patient with one or more new problems requiring additional work-up/treatment.

## 2021-10-17 NOTE — DISCHARGE NOTE ADULT - FUNCTIONAL STATUS DATE
Problem: Falls - Risk of  Goal: *Absence of Falls  Description: Document Earma Yasmany Fall Risk and appropriate interventions in the flowsheet. Outcome: Progressing Towards Goal  Note: Fall Risk Interventions:  Mobility Interventions: PT Consult for mobility concerns, OT consult for ADLs    Mentation Interventions: Adequate sleep, hydration, pain control, Bed/chair exit alarm, Reorient patient, More frequent rounding, Family/sitter at bedside    Medication Interventions: Bed/chair exit alarm    Elimination Interventions: Bed/chair exit alarm, Toileting schedule/hourly rounds    History of Falls Interventions: Investigate reason for fall         Problem: Pressure Injury - Risk of  Goal: *Prevention of pressure injury  Description: Document Remy Scale and appropriate interventions in the flowsheet.   Outcome: Progressing Towards Goal  Note: Pressure Injury Interventions:  Sensory Interventions: Assess changes in LOC, Minimize linen layers, Check visual cues for pain    Moisture Interventions: Absorbent underpads, Internal/External urinary devices    Activity Interventions: PT/OT evaluation    Mobility Interventions: PT/OT evaluation, Assess need for specialty bed    Nutrition Interventions: Document food/fluid/supplement intake    Friction and Shear Interventions: Apply protective barrier, creams and emollients, Minimize layers                Problem: Patient Education: Go to Patient Education Activity  Goal: Patient/Family Education  Outcome: Progressing Towards Goal 17-Feb-2017

## 2022-02-02 NOTE — PATIENT PROFILE ADULT. - ANESTHESIA, PREVIOUS REACTION, PROFILE
none Paramedian Forehead Flap Division And Inset Text: Division and inset of the paramedian forehead flap was performed to achieve optimal aesthetic result, restore normal anatomic appearance and avoid distortion of normal anatomy, expedite and facilitate wound healing, achieve optimal functional result and because linear closure either not possible or would produce suboptimal result. The patient was prepped and draped in the usual manner. The pedicle was infiltrated with local anesthesia. The pedicle was sectioned with a #15 blade. The pedicle was de-bulked and trimmed to match the shape of the defect. Hemostasis was achieved. The flap donor site and free margin of the flap were secured with deep buried sutures and the wound edges were re-approximated.

## 2023-01-19 NOTE — H&P PST ADULT - ABILITY TO HEAR (WITH HEARING AID OR HEARING APPLIANCE IF NORMALLY USED):
Counseled the patient that her anemia is mild to moderate in severity and is due to her end-stage chronic kidney disease.  Discussed with her that severe anemia of chronic kidney disease is typically treated with Epogen and that treatment is typically initiated when necessary by a kidney specialist.  Advised her that she probably does not need erythropoietin with the current hemoglobin and hematocrit measured in late December.  Ongoing management per Nephrology.   Adequate: hears normal conversation without difficulty

## 2023-02-17 NOTE — H&P PST ADULT - RESPIRATORY AND THORAX
Allergy medications have been called in  
In regards to seasonal allergies what brand of medications has patient tried already?    In regards to her diabetes, I have called in the Levemir flex pen this will be in place of the Lantus and is covered by insurance.  
PATIENT IS TAKING LEVECETIRIZINE AND A NASAL SPRAY STATES THIS SHOULD BE IN HER CHART. HUB RELAYED MESSAGE(S). PATIENT STATES SHE WILL WAIT FOR A RESPONSE.  
Patient called requesting a script for allergy relief. They report congestion and runny nose. They also requested an alternate script for Lantus. The patient's insurance is not paying for the Lantus. They will be bringing their spouse in for a visit on 2/14 and requested a solution by then  
Pt notified, received verbal understanding  
Tried to reach patient no answer left voicemail to return call.    HUB OK TO READ: In regards to seasonal allergies what brand of medications has patient tried already?     In regards to her diabetes, I have called in the Levemir flex pen this will be in place of the Lantus and is covered by insurance.  
details…

## 2023-05-11 NOTE — PATIENT PROFILE ADULT. - AS SC BRADEN MOBILITY
-[id number]-aeeb, cta  -p/a-soft, bs+  -cvs-[id number] normal     A/P    #ct abx, supportive care     #DVT pr (3) slightly limited

## 2024-01-03 NOTE — PROGRESS NOTE ADULT - ATTENDING COMMENTS
Patient continues to remain critically ill on small dose of vasopressors.  1)Mentation improved. Patient is following commands     -dilaudid and tylenol PRN for pain  2)Acute hypoxemic respiratory failure- continues on mechanical ventilation but will try SBT this morning      -patient is oxygenating appropriately     -CXR appears stable from prior, clear      - continue to wean off the ventilator if mental status improves and able to to perform a spontaneous breathing trial     -keep SpO2 >92%  3) Hypotension-levophed on small amount this am  4)Acute on chronic kidney insufficiency stage 3- continue to monitor urine out with goal 0.05ml/h or greater     -adequate  urine output  5) atrial fibrillation- on amiodarone      -goal HR <115 bpm and currently controlled  6) anemia of chronic disease-stable     -plan to transfuse one unit PRBC as patient continues to be on vasopressors      -goal to keep Hb >7  7) blood cultures x 1 GPC - unclear whether contaminant; will repeat them   8) Budding yeast on UA, continue on fluconazole     9) Hyperglycemia- continue insulin sliding scale   10) HIT- on fondaparineux for DVT prophylaxis  11) coagulopathy- improving  12) hypokalemia- repleted and will repeat  13)- protein calorie malnutrition- continue TPN       -LFTs appear normal, will continue to trend        I have spent 45 minutes of critical care time Detail Level: Zone Samples Given: Triamcinolone 0.1% ointment BID PRN x 2 weeks

## 2024-12-23 NOTE — ED PROCEDURE NOTE - CPROC ED INDICATIONS1
Candi John (:  1974) is a Established patient, presenting virtually for evaluation of the following:    Assessment & Plan   Below is the assessment and plan developed based on review of pertinent history, physical exam, labs, studies, and medications.  Assessment & Plan  Acute bacterial sinusitis    Acute- treat with Augmentin and follow up if no improvement.      Orders:    amoxicillin-clavulanate (AUGMENTIN) 875-125 MG per tablet; Take 1 tablet by mouth 2 times daily for 10 days    guaiFENesin-codeine (GUAIFENESIN AC) 100-10 MG/5ML liquid; Take 5 mLs by mouth 3 times daily as needed for Cough for up to 3 days. Max Daily Amount: 15 mLs    Acute cough    Cough syrup sent in, follow up if no improvement.     Orders:    guaiFENesin-codeine (GUAIFENESIN AC) 100-10 MG/5ML liquid; Take 5 mLs by mouth 3 times daily as needed for Cough for up to 3 days. Max Daily Amount: 15 mLs    Muscle spasm of back    Likely caused from cough- treat with muscle relaxer. Patient understands side effects and knows not to take with the cough syrup.     Orders:    tiZANidine (ZANAFLEX) 2 MG tablet; Take 1 tablet by mouth 3 times daily as needed (muscle pains)              Subjective   HPI  Patient presents today with concerns of cough, sore throat, sinus congestion, body aches. She denies any fevers. Symptoms started last week and are not improving. Her home covid test was negative. She has been taking Ibuprofen and nasal spray with no improvement.   She has coughed so hard she pulled a muscle in her lower back and it is causing some urinary incontinence.     Review of Systems   Constitutional:  Negative for fever.   HENT:  Positive for congestion, sinus pressure, sinus pain and sore throat.    Respiratory:  Positive for cough. Negative for shortness of breath and wheezing.    Cardiovascular: Negative.    Gastrointestinal: Negative.    Musculoskeletal:  Positive for myalgias.   Neurological:  Negative for headaches.       intestinal obstruction